# Patient Record
Sex: FEMALE | Race: WHITE | Employment: OTHER | URBAN - METROPOLITAN AREA
[De-identification: names, ages, dates, MRNs, and addresses within clinical notes are randomized per-mention and may not be internally consistent; named-entity substitution may affect disease eponyms.]

---

## 2018-04-11 LAB
ALBUMIN SERPL-MCNC: 4.4 G/DL
ALP BLD-CCNC: 90 U/L
ALT SERPL-CCNC: 10 U/L
ANION GAP SERPL CALCULATED.3IONS-SCNC: NORMAL MMOL/L
AST SERPL-CCNC: 14 U/L
BASOPHILS ABSOLUTE: 0 /ΜL
BASOPHILS RELATIVE PERCENT: 0 %
BILIRUB SERPL-MCNC: 0.4 MG/DL (ref 0.1–1.4)
BUN BLDV-MCNC: 8 MG/DL
CALCIUM SERPL-MCNC: 9.6 MG/DL
CHLORIDE BLD-SCNC: 100 MMOL/L
CHOLESTEROL, TOTAL: 200 MG/DL
CHOLESTEROL/HDL RATIO: NORMAL
CO2: 24 MMOL/L
CREAT SERPL-MCNC: 0.65 MG/DL
EOSINOPHILS ABSOLUTE: 0.3 /ΜL
EOSINOPHILS RELATIVE PERCENT: 3 %
GFR CALCULATED: 100
GLUCOSE BLD-MCNC: 118 MG/DL
HCT VFR BLD CALC: 46.3 % (ref 36–46)
HDLC SERPL-MCNC: 67 MG/DL (ref 35–70)
HEMOGLOBIN: 15.2 G/DL (ref 12–16)
LDL CHOLESTEROL CALCULATED: 114 MG/DL (ref 0–160)
LYMPHOCYTES ABSOLUTE: 2.6 /ΜL
LYMPHOCYTES RELATIVE PERCENT: 27 %
MCH RBC QN AUTO: 31.9 PG
MCHC RBC AUTO-ENTMCNC: 32.8 G/DL
MCV RBC AUTO: 97 FL
MONOCYTES ABSOLUTE: 0.6 /ΜL
MONOCYTES RELATIVE PERCENT: 6 %
NEUTROPHILS ABSOLUTE: 6.3 /ΜL
NEUTROPHILS RELATIVE PERCENT: 64 %
NONHDLC SERPL-MCNC: NORMAL MG/DL
PDW BLD-RTO: 14 %
PLATELET # BLD: 238 K/ΜL
PMV BLD AUTO: ABNORMAL FL
POTASSIUM SERPL-SCNC: 4.5 MMOL/L
RBC # BLD: 4.77 10^6/ΜL
SODIUM BLD-SCNC: 144 MMOL/L
TOTAL PROTEIN: 6.9
TRIGL SERPL-MCNC: 95 MG/DL
TSH SERPL DL<=0.05 MIU/L-ACNC: 6.07 UIU/ML
VLDLC SERPL CALC-MCNC: 19 MG/DL
WBC # BLD: 9.7 10^3/ML

## 2019-06-11 ENCOUNTER — APPOINTMENT (OUTPATIENT)
Dept: GENERAL RADIOLOGY | Age: 59
End: 2019-06-11
Payer: MEDICARE

## 2019-06-11 ENCOUNTER — APPOINTMENT (OUTPATIENT)
Dept: CT IMAGING | Age: 59
End: 2019-06-11
Payer: MEDICARE

## 2019-06-11 ENCOUNTER — HOSPITAL ENCOUNTER (EMERGENCY)
Age: 59
Discharge: ANOTHER HOSPITAL | End: 2019-06-11
Attending: EMERGENCY MEDICINE
Payer: MEDICARE

## 2019-06-11 VITALS
HEIGHT: 64 IN | WEIGHT: 100 LBS | SYSTOLIC BLOOD PRESSURE: 116 MMHG | BODY MASS INDEX: 17.07 KG/M2 | HEART RATE: 101 BPM | OXYGEN SATURATION: 96 % | TEMPERATURE: 97.2 F | DIASTOLIC BLOOD PRESSURE: 72 MMHG | RESPIRATION RATE: 21 BRPM

## 2019-06-11 DIAGNOSIS — S36.039A LACERATION OF SPLEEN, INITIAL ENCOUNTER: ICD-10-CM

## 2019-06-11 DIAGNOSIS — S36.899A TRAUMATIC HEMOPERITONEUM, INITIAL ENCOUNTER: Primary | ICD-10-CM
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
## 2019-06-11 DIAGNOSIS — R55 SYNCOPE AND COLLAPSE: ICD-10-CM

## 2019-06-11 LAB
A/G RATIO: 0.9 (ref 1.1–2.2)
ABO/RH: NORMAL
ACETAMINOPHEN LEVEL: <5 UG/ML (ref 10–30)
ALBUMIN SERPL-MCNC: 2.7 G/DL (ref 3.4–5)
ALP BLD-CCNC: 132 U/L (ref 40–129)
ALT SERPL-CCNC: 12 U/L (ref 10–40)
AMPHETAMINE SCREEN, URINE: POSITIVE
ANION GAP SERPL CALCULATED.3IONS-SCNC: 16 MMOL/L (ref 3–16)
ANTIBODY SCREEN: NORMAL
AST SERPL-CCNC: 16 U/L (ref 15–37)
BACTERIA: ABNORMAL /HPF
BANDED NEUTROPHILS RELATIVE PERCENT: 6 % (ref 0–7)
BARBITURATE SCREEN URINE: ABNORMAL
BASOPHILS ABSOLUTE: 0 K/UL (ref 0–0.2)
BASOPHILS RELATIVE PERCENT: 0 %
BENZODIAZEPINE SCREEN, URINE: ABNORMAL
BILIRUB SERPL-MCNC: 0.7 MG/DL (ref 0–1)
BILIRUBIN URINE: NEGATIVE
BLOOD, URINE: NEGATIVE
BUN BLDV-MCNC: 13 MG/DL (ref 7–20)
CALCIUM SERPL-MCNC: 8.9 MG/DL (ref 8.3–10.6)
CANNABINOID SCREEN URINE: POSITIVE
CHLORIDE BLD-SCNC: 91 MMOL/L (ref 99–110)
CLARITY: ABNORMAL
CO2: 22 MMOL/L (ref 21–32)
COCAINE METABOLITE SCREEN URINE: ABNORMAL
COLOR: YELLOW
CREAT SERPL-MCNC: 1.3 MG/DL (ref 0.6–1.1)
EOSINOPHILS ABSOLUTE: 0 K/UL (ref 0–0.6)
EOSINOPHILS RELATIVE PERCENT: 0 %
EPITHELIAL CELLS, UA: ABNORMAL /HPF
ETHANOL: NORMAL MG/DL (ref 0–0.08)
GFR AFRICAN AMERICAN: 51
GFR NON-AFRICAN AMERICAN: 42
GLOBULIN: 2.9 G/DL
GLUCOSE BLD-MCNC: 209 MG/DL (ref 70–99)
GLUCOSE URINE: NEGATIVE MG/DL
HCT VFR BLD CALC: 24.2 % (ref 36–48)
HEMOGLOBIN: 7.9 G/DL (ref 12–16)
INR BLD: 1.34 (ref 0.86–1.14)
KETONES, URINE: NEGATIVE MG/DL
LACTIC ACID, SEPSIS: 1.8 MMOL/L (ref 0.4–1.9)
LEUKOCYTE ESTERASE, URINE: NEGATIVE
LYMPHOCYTES ABSOLUTE: 1.5 K/UL (ref 1–5.1)
LYMPHOCYTES RELATIVE PERCENT: 5 %
Lab: ABNORMAL
MCH RBC QN AUTO: 30.9 PG (ref 26–34)
MCHC RBC AUTO-ENTMCNC: 32.6 G/DL (ref 31–36)
MCV RBC AUTO: 94.6 FL (ref 80–100)
METHADONE SCREEN, URINE: ABNORMAL
MICROSCOPIC EXAMINATION: YES
MONOCYTES ABSOLUTE: 0.3 K/UL (ref 0–1.3)
MONOCYTES RELATIVE PERCENT: 1 %
NEUTROPHILS ABSOLUTE: 28.8 K/UL (ref 1.7–7.7)
NEUTROPHILS RELATIVE PERCENT: 88 %
NITRITE, URINE: NEGATIVE
OCCULT BLOOD SCREENING: NORMAL
OPIATE SCREEN URINE: POSITIVE
OXYCODONE URINE: POSITIVE
PDW BLD-RTO: 14 % (ref 12.4–15.4)
PH UA: 5.5
PH UA: 5.5 (ref 5–8)
PHENCYCLIDINE SCREEN URINE: ABNORMAL
PLATELET # BLD: 359 K/UL (ref 135–450)
PLATELET SLIDE REVIEW: ADEQUATE
PMV BLD AUTO: 8.6 FL (ref 5–10.5)
POTASSIUM REFLEX MAGNESIUM: 4.5 MMOL/L (ref 3.5–5.1)
PROPOXYPHENE SCREEN: ABNORMAL
PROTEIN UA: 30 MG/DL
PROTHROMBIN TIME: 15.3 SEC (ref 9.8–13)
RBC # BLD: 2.55 M/UL (ref 4–5.2)
RBC UA: ABNORMAL /HPF (ref 0–2)
SALICYLATE, SERUM: 0.4 MG/DL (ref 15–30)
SLIDE REVIEW: ABNORMAL
SODIUM BLD-SCNC: 129 MMOL/L (ref 136–145)
SPECIFIC GRAVITY UA: 1.02 (ref 1–1.03)
SPECIMEN STATUS: NORMAL
TOTAL PROTEIN: 5.6 G/DL (ref 6.4–8.2)
TROPONIN: <0.01 NG/ML
URINE REFLEX TO CULTURE: ABNORMAL
URINE TYPE: ABNORMAL
UROBILINOGEN, URINE: 0.2 E.U./DL
WBC # BLD: 30.6 K/UL (ref 4–11)
WBC UA: ABNORMAL /HPF (ref 0–5)

## 2019-06-11 PROCEDURE — 71045 X-RAY EXAM CHEST 1 VIEW: CPT

## 2019-06-11 PROCEDURE — 87801 DETECT AGNT MULT DNA AMPLI: CPT

## 2019-06-11 PROCEDURE — 93005 ELECTROCARDIOGRAM TRACING: CPT | Performed by: EMERGENCY MEDICINE

## 2019-06-11 PROCEDURE — 96365 THER/PROPH/DIAG IV INF INIT: CPT

## 2019-06-11 PROCEDURE — 83605 ASSAY OF LACTIC ACID: CPT

## 2019-06-11 PROCEDURE — 74177 CT ABD & PELVIS W/CONTRAST: CPT

## 2019-06-11 PROCEDURE — 6360000004 HC RX CONTRAST MEDICATION: Performed by: EMERGENCY MEDICINE

## 2019-06-11 PROCEDURE — 2580000003 HC RX 258: Performed by: EMERGENCY MEDICINE

## 2019-06-11 PROCEDURE — 6360000002 HC RX W HCPCS: Performed by: EMERGENCY MEDICINE

## 2019-06-11 PROCEDURE — 86901 BLOOD TYPING SEROLOGIC RH(D): CPT

## 2019-06-11 PROCEDURE — 4500000026 HC ED CRITICAL CARE PROCEDURE

## 2019-06-11 PROCEDURE — G0480 DRUG TEST DEF 1-7 CLASSES: HCPCS

## 2019-06-11 PROCEDURE — 96361 HYDRATE IV INFUSION ADD-ON: CPT

## 2019-06-11 PROCEDURE — 99292 CRITICAL CARE ADDL 30 MIN: CPT

## 2019-06-11 PROCEDURE — 96375 TX/PRO/DX INJ NEW DRUG ADDON: CPT

## 2019-06-11 PROCEDURE — 85025 COMPLETE CBC W/AUTO DIFF WBC: CPT

## 2019-06-11 PROCEDURE — 87040 BLOOD CULTURE FOR BACTERIA: CPT

## 2019-06-11 PROCEDURE — 84443 ASSAY THYROID STIM HORMONE: CPT

## 2019-06-11 PROCEDURE — G0328 FECAL BLOOD SCRN IMMUNOASSAY: HCPCS

## 2019-06-11 PROCEDURE — 85610 PROTHROMBIN TIME: CPT

## 2019-06-11 PROCEDURE — 80053 COMPREHEN METABOLIC PANEL: CPT

## 2019-06-11 PROCEDURE — 80307 DRUG TEST PRSMV CHEM ANLYZR: CPT

## 2019-06-11 PROCEDURE — 96367 TX/PROPH/DG ADDL SEQ IV INF: CPT

## 2019-06-11 PROCEDURE — 81001 URINALYSIS AUTO W/SCOPE: CPT

## 2019-06-11 PROCEDURE — 70450 CT HEAD/BRAIN W/O DYE: CPT

## 2019-06-11 PROCEDURE — 86850 RBC ANTIBODY SCREEN: CPT

## 2019-06-11 PROCEDURE — 99291 CRITICAL CARE FIRST HOUR: CPT

## 2019-06-11 PROCEDURE — 86900 BLOOD TYPING SEROLOGIC ABO: CPT

## 2019-06-11 PROCEDURE — 84484 ASSAY OF TROPONIN QUANT: CPT

## 2019-06-11 PROCEDURE — 2580000003 HC RX 258

## 2019-06-11 PROCEDURE — 36415 COLL VENOUS BLD VENIPUNCTURE: CPT

## 2019-06-11 RX ORDER — 0.9 % SODIUM CHLORIDE 0.9 %
2000 INTRAVENOUS SOLUTION INTRAVENOUS ONCE
Status: COMPLETED | OUTPATIENT
Start: 2019-06-11 | End: 2019-06-11

## 2019-06-11 RX ORDER — SODIUM CHLORIDE, SODIUM LACTATE, POTASSIUM CHLORIDE, AND CALCIUM CHLORIDE .6; .31; .03; .02 G/100ML; G/100ML; G/100ML; G/100ML
2000 INJECTION, SOLUTION INTRAVENOUS ONCE
Status: COMPLETED | OUTPATIENT
Start: 2019-06-11 | End: 2019-06-11

## 2019-06-11 RX ORDER — ONDANSETRON 2 MG/ML
4 INJECTION INTRAMUSCULAR; INTRAVENOUS
Status: DISCONTINUED | OUTPATIENT
Start: 2019-06-11 | End: 2019-06-12 | Stop reason: HOSPADM

## 2019-06-11 RX ORDER — NALOXONE HYDROCHLORIDE 1 MG/ML
2 INJECTION INTRAMUSCULAR; INTRAVENOUS; SUBCUTANEOUS ONCE
Status: COMPLETED | OUTPATIENT
Start: 2019-06-11 | End: 2019-06-11

## 2019-06-11 RX ORDER — 0.9 % SODIUM CHLORIDE 0.9 %
250 INTRAVENOUS SOLUTION INTRAVENOUS ONCE
Status: DISCONTINUED | OUTPATIENT
Start: 2019-06-11 | End: 2019-06-12 | Stop reason: HOSPADM

## 2019-06-11 RX ORDER — SODIUM CHLORIDE 9 MG/ML
INJECTION, SOLUTION INTRAVENOUS
Status: COMPLETED
Start: 2019-06-11 | End: 2019-06-11

## 2019-06-11 RX ORDER — SODIUM CHLORIDE 9 MG/ML
INJECTION, SOLUTION INTRAVENOUS
Status: DISCONTINUED
Start: 2019-06-11 | End: 2019-06-12 | Stop reason: HOSPADM

## 2019-06-11 RX ADMIN — SODIUM CHLORIDE, POTASSIUM CHLORIDE, SODIUM LACTATE AND CALCIUM CHLORIDE 2000 ML: 600; 310; 30; 20 INJECTION, SOLUTION INTRAVENOUS at 22:35

## 2019-06-11 RX ADMIN — SODIUM CHLORIDE 2000 ML: 9 INJECTION, SOLUTION INTRAVENOUS at 18:21

## 2019-06-11 RX ADMIN — PIPERACILLIN AND TAZOBACTAM 3.38 G: 3; .375 INJECTION, POWDER, FOR SOLUTION INTRAVENOUS at 22:00

## 2019-06-11 RX ADMIN — VANCOMYCIN HYDROCHLORIDE 750 MG: 750 INJECTION, POWDER, LYOPHILIZED, FOR SOLUTION INTRAVENOUS at 22:35

## 2019-06-11 RX ADMIN — Medication 2000 ML: at 18:21

## 2019-06-11 RX ADMIN — NALOXONE HYDROCHLORIDE 2 MG: 1 INJECTION PARENTERAL at 18:15

## 2019-06-11 RX ADMIN — IOPAMIDOL 75 ML: 755 INJECTION, SOLUTION INTRAVENOUS at 21:34

## 2019-06-11 NOTE — ED NOTES
Bed: 02  Expected date:   Expected time:   Means of arrival:   Comments:     Marlene Frias RN  06/11/19 4020

## 2019-06-12 LAB
EKG ATRIAL RATE: 109 BPM
EKG DIAGNOSIS: NORMAL
EKG P AXIS: 56 DEGREES
EKG P-R INTERVAL: 126 MS
EKG Q-T INTERVAL: 326 MS
EKG QRS DURATION: 74 MS
EKG QTC CALCULATION (BAZETT): 439 MS
EKG R AXIS: 80 DEGREES
EKG T AXIS: 71 DEGREES
EKG VENTRICULAR RATE: 109 BPM
TSH SERPL DL<=0.05 MIU/L-ACNC: 0.35 UIU/ML (ref 0.27–4.2)

## 2019-06-12 PROCEDURE — 93010 ELECTROCARDIOGRAM REPORT: CPT | Performed by: INTERNAL MEDICINE

## 2019-06-12 NOTE — CONSULTS
Pharmacy to dose Vancomycin for ER    Weight: 45.4 kg  15 mg/kg X1 rounded up to 750 mg IV X1 dose.     Lashanda Lau, [address] [facility]

## 2019-06-12 NOTE — ED NOTES
Patient given narcan upon arrival.  Patient is now A&O. Patient was thrashing around the bed, and would not sit still. Patient was not following commands. Patient is now more pleasant. VSS. Patient has no complaints at this time.        Viola Dhillon RN  06/11/19 3194

## 2019-06-12 NOTE — ED PROVIDER NOTES
Emergency Physician Note    Chief Complaint  Fall (fall; complaining of chest pains, abdominal pains; low blood pressure and low oxygen)       History of Present Illness  PAIGE Burns is a 63 y.o. female who presents to the ED for fall. EMS reports that they were called for a fall. They report that patient had very low blood pressure and they could not obtain IV access so they placed a left humeral IO. They also report the EKG showed ST elevation for them. Patient complains of chest pain and abdominal pain. History and review of systems limited by patient's mental status    I have reviewed the following from the nursing documentation:      Prior to Admission medications    Not on File       Allergies as of 06/11/2019    (No Known Allergies)       No past medical history on file. Surgical History: No past surgical history on file. Family History:  No family history on file.     Social History     Socioeconomic History    Marital status:      Spouse name: Not on file    Number of children: Not on file    Years of education: Not on file    Highest education level: Not on file   Occupational History    Not on file   Social Needs    Financial resource strain: Not on file    Food insecurity:     Worry: Not on file     Inability: Not on file    Transportation needs:     Medical: Not on file     Non-medical: Not on file   Tobacco Use    Smoking status: Not on file   Substance and Sexual Activity    Alcohol use: Not on file    Drug use: Not on file    Sexual activity: Not on file   Lifestyle    Physical activity:     Days per week: Not on file     Minutes per session: Not on file    Stress: Not on file   Relationships    Social connections:     Talks on phone: Not on file     Gets together: Not on file     Attends [employer] service: Not on file     Active member of club or organization: Not on file     Attends meetings of clubs or organizations: Not on file     Relationship status: Not on file    Intimate partner violence:     Fear of current or ex partner: Not on file     Emotionally abused: Not on file     Physically abused: Not on file     Forced sexual activity: Not on file   Other Topics Concern    Not on file   Social History Narrative    Not on file       Nursing notes reviewed. ED Triage Vitals   Enc Vitals Group      BP 06/11/19 1814 84/61      Pulse 06/11/19 1845 111      Resp 06/11/19 1845 (!) 32      Temp 06/11/19 2058 97.2 °F (36.2 °C)      Temp src --       SpO2 06/11/19 1814 90 %      Weight 06/11/19 1816 100 lb (45.4 kg)      Height 06/11/19 1816 5' 4\" (1.626 m)      Head Circumference --       Peak Flow --       Pain Score --       Pain Loc --       Pain Edu? --       Excl. in [address]? --        GENERAL: Drowsy and barely arousable. Well developed, thin, mild respiratory distress. HENT:  Normocephalic, Atraumatic, moist mucous membranes. EYES:  Pupils equal round and reactive to light, Conjunctiva pale, extraocular movements normal.  NECK:  No meningeal signs, Supple. CHEST: Tachycardic and regular  LUNGS:  Clear to auscultation bilaterally. ABDOMEN:  Soft, mild diffuse tenderness, no rebound, rigidity or guarding, non-distended, normal bowel sounds. No costovertebral angle tenderness to palpation. EXTREMITIES:  Normal range of motion, no edema, no tenderness, no deformity, distal pulses present. BACK:  No tenderness. SKIN: Warm, dry and intact. NEUROLOGIC: Responds to pain and gives some disoriented 1 and 2 word answers to questions. LABS and DIAGNOSTIC RESULTS  EKG  The Ekg interpreted by me shows  sinus tachycardia, qylr=141   Axis is   Normal  QTc is  normal  Intervals and Durations are unremarkable. ST Segments: normal  Delta waves, Brugada Syndrome, and Short CO are not present. No prior EKG available for comparison. RADIOLOGY  X-RAYS:  I have reviewed radiologic plain film image(s).   ALL OTHER NON-PLAIN FILM IMAGES SUCH AS CT, Performed at:  [address],  [place] [facility] [employer]   Phone [phone]   BLOOD OCCULT STOOL SCREEN #1    Narrative:     ORDER#: [id number]                          ORDERED BY: ASIA CASPER  SOURCE: Stool                              COLLECTED:  06/11/19 22:10  ANTIBIOTICS AT [place].:                      RECEIVED :  06/11/19 22:14  Performed at:  [address],  [place] [facility] [employer] [facility]   Phone [phone]   TSH WITHOUT REFLEX   LACTIC ACID, PLASMA   LACTATE, SEPSIS   MICROSCOPIC URINALYSIS   TYPE AND SCREEN    Narrative:     Performed at:  [address],  [place] [facility] [employer] [facility]   Phone [phone]       PROCEDURES  CENTRAL VENOUS CATHETER PLACEMENT  Indication: vascular access, poor peripheral access, hypovolemia and need for frequent blood draws  The femoral vein was chosen because low risk for procedural complication given patient's inability to be fully compliant. The area was prepped and draped in a sterile fashion and the catheter was placed using Seldinger technique under direct ultrasound guidance. Ultrasound images saved on the machine. All the ports godwin and flushed easily and the line was sutured in place and a sterile dressing was placed over the line. The patient tolerated the procedure well with minimal blood loss. There were no apparent complications during the procedure. The patient tolerated procedure well. MEDICAL DECISION MAKING     The total Critical Care time is 75 minutes which excludes separately billable procedures. The critical care was concerning for treatment of intra-abdominal hemorrhage with crystalloid. .  This time is exclusive of any time documented by any other providers.     While in the emergency department the patient's mentation improved and she was able to give history that she had fallen from a standing position while walking from her bedroom to her bathroom. She believes she lost consciousness. She states that she called for her family members who called 911. Although the patient's been in the ER for 4 hours no family members have presented to the emergency department. Patient is now alert and oriented x3. While in the emergency department the patient responded to IV fluids and her blood pressure normalized and remained slightly tachycardic with heart rate of 105-110. I spoke with Dr. Beto Saucedo. We thoroughly discussed the history, physical exam, laboratory and imaging studies, as well as, emergency department course. Based upon that discussion, we've decided to admit Robson Savers for further observation and evaluation of PAIGE Burns's mental status change. As I have deemed necessary from their history, physical and studies, I have considered and evaluated PAIGE Burns for the following diagnoses:  DIABETES, INTRACRANIAL HEMORRHAGE, MENINGITIS, SEPSIS SUBARACHNOID HEMORRHAGE, SUBDURAL HEMATOMA, & STROKE. FINAL IMPRESSION  1. Traumatic hemoperitoneum, initial encounter    2. Laceration of spleen, initial encounter    3. Fall from slip, trip, or stumble, initial encounter    4. Syncope and collapse        Vitals:  Blood pressure 103/71, pulse 107, temperature 97.2 °F (36.2 °C), resp. rate 26, height 5' 4\" (1.626 m), weight 100 lb (45.4 kg), SpO2 96 %. Patient was given scripts for the following medications. I counseled patient how to take these medications. New Prescriptions    No medications on file       Disposition  Pt is in serious condition upon Transfer to [facility] to ED. This chart was generated using the [address] [place] [address] dictation system. I created this record but it may contain dictation errors.          Ana Bryant MD  06/11/19 8057

## 2019-06-12 NOTE — PROGRESS NOTES
2227 spoke to Dr Esperanza Hannah at [facility]   Patient going by [facility] to [facility] ED

## 2019-06-15 LAB — REPORT: NORMAL

## 2019-06-15 NOTE — ED NOTES
Report of positive blood cultures called to Trista Carvalho rn at [facility].      Fantasma Prince RN  06/15/19 2290

## 2019-06-16 LAB — CULTURE, BLOOD 2: NORMAL

## 2019-06-17 LAB
BLOOD CULTURE, ROUTINE: ABNORMAL
BLOOD CULTURE, ROUTINE: ABNORMAL
ORGANISM: ABNORMAL

## 2019-07-23 ENCOUNTER — HOSPITAL ENCOUNTER (EMERGENCY)
Age: 59
Discharge: HOME | End: 2019-07-23
Attending: EMERGENCY MEDICINE
Payer: MEDICARE

## 2019-07-23 ENCOUNTER — APPOINTMENT (OUTPATIENT)
Dept: CT IMAGING | Age: 59
End: 2019-07-23
Payer: MEDICARE

## 2019-07-23 VITALS
BODY MASS INDEX: 17.36 KG/M2 | SYSTOLIC BLOOD PRESSURE: 149 MMHG | WEIGHT: 98 LBS | OXYGEN SATURATION: 97 % | HEART RATE: 103 BPM | RESPIRATION RATE: 16 BRPM | HEIGHT: 63 IN | DIASTOLIC BLOOD PRESSURE: 86 MMHG | TEMPERATURE: 98.1 F

## 2019-07-23 DIAGNOSIS — D75.838 THROMBOCYTOSIS AFTER SPLENECTOMY: ICD-10-CM

## 2019-07-23 DIAGNOSIS — Z90.81 THROMBOCYTOSIS AFTER SPLENECTOMY: ICD-10-CM

## 2019-07-23 DIAGNOSIS — D72.829 LEUKOCYTOSIS, UNSPECIFIED TYPE: Primary | ICD-10-CM

## 2019-07-23 DIAGNOSIS — Z90.81 HISTORY OF SPLENECTOMY: ICD-10-CM

## 2019-07-23 LAB
A/G RATIO: 1.6 (ref 1.1–2.2)
ALBUMIN SERPL-MCNC: 4.8 G/DL (ref 3.4–5)
ALP BLD-CCNC: 118 U/L (ref 40–129)
ALT SERPL-CCNC: 15 U/L (ref 10–40)
ANION GAP SERPL CALCULATED.3IONS-SCNC: 12 MMOL/L (ref 3–16)
ANISOCYTOSIS: ABNORMAL
AST SERPL-CCNC: 22 U/L (ref 15–37)
BASOPHILS ABSOLUTE: 0.2 K/UL (ref 0–0.2)
BASOPHILS RELATIVE PERCENT: 1.2 %
BILIRUB SERPL-MCNC: 0.3 MG/DL (ref 0–1)
BUN BLDV-MCNC: 8 MG/DL (ref 7–20)
CALCIUM SERPL-MCNC: 10.6 MG/DL (ref 8.3–10.6)
CHLORIDE BLD-SCNC: 98 MMOL/L (ref 99–110)
CO2: 28 MMOL/L (ref 21–32)
CREAT SERPL-MCNC: 0.6 MG/DL (ref 0.6–1.1)
EOSINOPHILS ABSOLUTE: 0.2 K/UL (ref 0–0.6)
EOSINOPHILS RELATIVE PERCENT: 1.5 %
GFR AFRICAN AMERICAN: >60
GFR NON-AFRICAN AMERICAN: >60
GLOBULIN: 3 G/DL
GLUCOSE BLD-MCNC: 101 MG/DL (ref 70–99)
HCT VFR BLD CALC: 40.3 % (ref 36–48)
HEMOGLOBIN: 13.4 G/DL (ref 12–16)
LYMPHOCYTES ABSOLUTE: 3.7 K/UL (ref 1–5.1)
LYMPHOCYTES RELATIVE PERCENT: 27.2 %
MCH RBC QN AUTO: 29.1 PG (ref 26–34)
MCHC RBC AUTO-ENTMCNC: 33.2 G/DL (ref 31–36)
MCV RBC AUTO: 87.6 FL (ref 80–100)
MONOCYTES ABSOLUTE: 1.1 K/UL (ref 0–1.3)
MONOCYTES RELATIVE PERCENT: 7.9 %
NEUTROPHILS ABSOLUTE: 8.4 K/UL (ref 1.7–7.7)
NEUTROPHILS RELATIVE PERCENT: 62.2 %
PDW BLD-RTO: 23.2 % (ref 12.4–15.4)
PLATELET # BLD: 577 K/UL (ref 135–450)
PLATELET SLIDE REVIEW: ABNORMAL
PMV BLD AUTO: 7.5 FL (ref 5–10.5)
POTASSIUM REFLEX MAGNESIUM: 4.3 MMOL/L (ref 3.5–5.1)
RBC # BLD: 4.59 M/UL (ref 4–5.2)
SLIDE REVIEW: ABNORMAL
SODIUM BLD-SCNC: 138 MMOL/L (ref 136–145)
TOTAL PROTEIN: 7.8 G/DL (ref 6.4–8.2)
WBC # BLD: 13.5 K/UL (ref 4–11)

## 2019-07-23 PROCEDURE — 85025 COMPLETE CBC W/AUTO DIFF WBC: CPT

## 2019-07-23 PROCEDURE — 70450 CT HEAD/BRAIN W/O DYE: CPT

## 2019-07-23 PROCEDURE — 80053 COMPREHEN METABOLIC PANEL: CPT

## 2019-07-23 PROCEDURE — 99284 EMERGENCY DEPT VISIT MOD MDM: CPT

## 2019-07-23 PROCEDURE — 36415 COLL VENOUS BLD VENIPUNCTURE: CPT

## 2019-07-23 RX ORDER — HYDROMORPHONE HYDROCHLORIDE 2 MG/1
2 TABLET ORAL 2 TIMES DAILY
COMMUNITY
End: 2019-08-27

## 2019-07-23 RX ORDER — LEVOTHYROXINE SODIUM 0.05 MG/1
50 TABLET ORAL DAILY
COMMUNITY

## 2019-07-23 RX ORDER — GABAPENTIN 300 MG/1
300 CAPSULE ORAL 2 TIMES DAILY
COMMUNITY
End: 2020-11-05 | Stop reason: SDUPTHER

## 2019-07-23 RX ORDER — ONDANSETRON 8 MG/1
8 TABLET, ORALLY DISINTEGRATING ORAL EVERY 8 HOURS PRN
COMMUNITY

## 2019-07-23 RX ORDER — MIDODRINE HYDROCHLORIDE 5 MG/1
5 TABLET ORAL 3 TIMES DAILY
COMMUNITY
End: 2019-08-27

## 2019-07-23 RX ORDER — DULOXETIN HYDROCHLORIDE 60 MG/1
30 CAPSULE, DELAYED RELEASE ORAL 2 TIMES DAILY
COMMUNITY
End: 2019-09-24

## 2019-07-23 RX ORDER — PANTOPRAZOLE SODIUM 40 MG/1
40 GRANULE, DELAYED RELEASE ORAL
COMMUNITY
End: 2019-11-11 | Stop reason: SDUPTHER

## 2019-07-23 SDOH — HEALTH STABILITY: MENTAL HEALTH: HOW OFTEN DO YOU HAVE A DRINK CONTAINING ALCOHOL?: NEVER

## 2019-07-23 ASSESSMENT — PAIN DESCRIPTION - PAIN TYPE: TYPE: CHRONIC PAIN

## 2019-07-23 ASSESSMENT — ENCOUNTER SYMPTOMS
NAUSEA: 0
VOMITING: 0
COUGH: 0
SHORTNESS OF BREATH: 0

## 2019-07-23 ASSESSMENT — PAIN DESCRIPTION - LOCATION: LOCATION: BACK

## 2019-07-23 ASSESSMENT — PAIN DESCRIPTION - ONSET: ONSET: ON-GOING

## 2019-07-23 ASSESSMENT — PAIN DESCRIPTION - DESCRIPTORS: DESCRIPTORS: ACHING;CONSTANT

## 2019-07-23 NOTE — ED PROVIDER NOTES
[address] ED  eMERGENCY dEPARTMENT eNCOUnter        Pt Name: Nic Mackenzie  MRN: [id number]  [place] 1960  Date of evaluation: 7/23/2019  Provider: Carmen Wheeler PA-C  PCP: Anila Gardiner DO  ED Attending: Placido Diaz MD    [facility]       Chief Complaint   Patient presents with    Post-op Problem     Pt was told to come in here by her PCP due to an elevated WBC. Pt states that she had her spleen removed on 6.11.19 at [facility]. Pt was never started on antibiotics post-op. HISTORY OF PRESENT ILLNESS   (Location/Symptom, Timing/Onset, Context/Setting, Quality, Duration, Modifying Factors, Severity)  Note limiting factors. Nic Mackenzie is a 63 y.o. female for evaluation after being advised by PCP to come to the emergency room she states that she had a splenectomy a month ago and her PCP advised her her white blood cell count was double the high end of normal.  And she should be put on prophylactic antibiotics for a year. Patient denies having any symptoms at this time no signs of infection no fevers no increase in coughing or sputum production. No dysuria. No shortness of breath. Patient denies any chest pain. Patient indicates that she is having some residual intermittent nerve pain across her abdomen where the spleen was removed and at her incision site. She indicates she has had intermittent left upper and lower extremity paresthesias over the past month. Nursing Notes were all reviewed and agreed with or any disagreements were addressed  in the HPI. REVIEW OF SYSTEMS  (2-9 systems for level 4, 10 or more for level 5)     Review of Systems   Constitutional: Negative for chills and fever. HENT: Negative. Respiratory: Negative for cough, chest tightness and shortness of breath. Cardiovascular: Negative for chest pain and palpitations. Gastrointestinal: Positive for abdominal pain.  Negative for constipation, diarrhea, nausea, rectal pain and vomiting. Genitourinary: Negative for difficulty urinating, dysuria and frequency. Musculoskeletal: Positive for back pain. Chronic   Skin: Positive for wound. Neurological: Positive for weakness. All other systems reviewed and are negative. Positivesand Pertinent negatives as per HPI. Except as noted above in the ROS, all other systems were reviewed and negative. PAST MEDICAL HISTORY     Past Medical History:   Diagnosis Date    Back pain     [facility]     Thyroid disease     Ulcerative colitis, unspecified, without complications ([facility]          SURGICAL HISTORY       Past Surgical History:   Procedure Laterality Date    BACK SURGERY      HYSTERECTOMY      SPLENECTOMY      TONSILLECTOMY           CURRENT MEDICATIONS       Discharge Medication List as of 7/23/2019  5:57 PM      CONTINUE these medications which have NOT CHANGED    Details   fluticasone-salmeterol (ADVAIR) 250-50 MCG/DOSE AEPB Inhale 1 puff into the lungs every 12 hoursHistorical Med      DULoxetine (CYMBALTA) 60 MG extended release capsule Take 60 mg by mouth dailyHistorical Med      pantoprazole sodium (PROTONIX) 40 MG PACK packet Take 40 mg by mouth every morning (before breakfast)Historical Med      HYDROmorphone (DILAUDID) 2 MG tablet Take 2 mg by mouth 2 times daily. Historical Med      progesterone (PROMETRIUM) 100 MG capsule Take 100 mg by mouth dailyHistorical Med      gabapentin (NEURONTIN) 300 MG capsule Take 300 mg by mouth daily. Historical Med      levothyroxine (SYNTHROID) 50 MCG tablet Take 50 mcg by mouth DailyHistorical Med      ondansetron (ZOFRAN-ODT) 8 MG TBDP disintegrating tablet Place 8 mg under the tongue every 8 hours as needed for Nausea or VomitingHistorical Med      midodrine (PROAMATINE) 5 MG tablet Take 5 mg by mouth 3 times dailyHistorical Med               ALLERGIES     Patient has no known allergies. FAMILY HISTORY     History reviewed.  No

## 2019-07-24 ASSESSMENT — ENCOUNTER SYMPTOMS
CHEST TIGHTNESS: 0
BACK PAIN: 1
DIARRHEA: 0
CONSTIPATION: 0
ABDOMINAL PAIN: 1
RECTAL PAIN: 0

## 2019-07-26 NOTE — ED PROVIDER NOTES
I independently performed a history and physical on PAIGE Burns. All diagnostic, treatment, and disposition decisions were made by myself in conjunction with the advanced practice provider. Briefly, this is a 63 y.o. female here for elevated white blood cell count. The patient had her spleen removed, and was told by her doctor that she needs to be on antibiotics. On exam, patient has a benign physical examination, and we did speak with the patient's surgeon, who states that there is no need for the patient be on antibiotics which is exactly what we had thought was the case. For further details of [facility] emergency department encounter, please see documentation by advanced practice provider Lynn Vazquez.         Brigido Granados MD  07/26/19 0785

## 2019-08-12 ENCOUNTER — HOSPITAL ENCOUNTER (OUTPATIENT)
Dept: MRI IMAGING | Age: 59
Discharge: HOME | End: 2019-08-12
Payer: MEDICARE

## 2019-08-12 DIAGNOSIS — E27.8 ADRENAL NODULE (HCC): ICD-10-CM

## 2019-08-12 PROCEDURE — A9579 GAD-BASE MR CONTRAST NOS,1ML: HCPCS

## 2019-08-12 PROCEDURE — 74183 MRI ABD W/O CNTR FLWD CNTR: CPT

## 2019-08-12 PROCEDURE — 6360000004 HC RX CONTRAST MEDICATION

## 2019-08-12 RX ADMIN — GADOTERIDOL 9 ML: 279.3 INJECTION, SOLUTION INTRAVENOUS at 15:23

## 2019-08-27 ENCOUNTER — HOSPITAL ENCOUNTER (EMERGENCY)
Age: 59
Discharge: HOME | End: 2019-08-27
Attending: EMERGENCY MEDICINE
Payer: MEDICARE

## 2019-08-27 ENCOUNTER — APPOINTMENT (OUTPATIENT)
Dept: CT IMAGING | Age: 59
End: 2019-08-27
Payer: MEDICARE

## 2019-08-27 VITALS
HEIGHT: 63 IN | WEIGHT: 100 LBS | HEART RATE: 90 BPM | RESPIRATION RATE: 16 BRPM | BODY MASS INDEX: 17.72 KG/M2 | SYSTOLIC BLOOD PRESSURE: 127 MMHG | TEMPERATURE: 98.7 F | OXYGEN SATURATION: 95 % | DIASTOLIC BLOOD PRESSURE: 78 MMHG

## 2019-08-27 DIAGNOSIS — R03.0 ELEVATED BLOOD PRESSURE READING: ICD-10-CM

## 2019-08-27 DIAGNOSIS — R10.31 ABDOMINAL PAIN, RIGHT LOWER QUADRANT: ICD-10-CM

## 2019-08-27 DIAGNOSIS — N39.0 URINARY TRACT INFECTION WITHOUT HEMATURIA, SITE UNSPECIFIED: Primary | ICD-10-CM

## 2019-08-27 LAB
A/G RATIO: 1.9 (ref 1.1–2.2)
ALBUMIN SERPL-MCNC: 4.8 G/DL (ref 3.4–5)
ALP BLD-CCNC: 129 U/L (ref 40–129)
ALT SERPL-CCNC: 21 U/L (ref 10–40)
ANION GAP SERPL CALCULATED.3IONS-SCNC: 10 MMOL/L (ref 3–16)
ANISOCYTOSIS: ABNORMAL
AST SERPL-CCNC: 17 U/L (ref 15–37)
ATYPICAL LYMPHOCYTE RELATIVE PERCENT: 3 % (ref 0–6)
BACTERIA: ABNORMAL /HPF
BASOPHILS ABSOLUTE: 0.2 K/UL (ref 0–0.2)
BASOPHILS RELATIVE PERCENT: 1 %
BILIRUB SERPL-MCNC: 0.3 MG/DL (ref 0–1)
BILIRUBIN URINE: NEGATIVE
BLOOD, URINE: NEGATIVE
BUN BLDV-MCNC: 7 MG/DL (ref 7–20)
BURR CELLS: ABNORMAL
CALCIUM SERPL-MCNC: 10.6 MG/DL (ref 8.3–10.6)
CHLORIDE BLD-SCNC: 100 MMOL/L (ref 99–110)
CLARITY: CLEAR
CO2: 25 MMOL/L (ref 21–32)
COLOR: ABNORMAL
CREAT SERPL-MCNC: 0.6 MG/DL (ref 0.6–1.1)
EOSINOPHILS ABSOLUTE: 0.5 K/UL (ref 0–0.6)
EOSINOPHILS RELATIVE PERCENT: 3 %
EPITHELIAL CELLS, UA: ABNORMAL /HPF
GFR AFRICAN AMERICAN: >60
GFR NON-AFRICAN AMERICAN: >60
GLOBULIN: 2.5 G/DL
GLUCOSE BLD-MCNC: 116 MG/DL (ref 70–99)
GLUCOSE URINE: NEGATIVE MG/DL
HCT VFR BLD CALC: 40.8 % (ref 36–48)
HEMATOLOGY PATH CONSULT: YES
HEMOGLOBIN: 13.6 G/DL (ref 12–16)
KETONES, URINE: NEGATIVE MG/DL
LEUKOCYTE ESTERASE, URINE: ABNORMAL
LYMPHOCYTES ABSOLUTE: 8.9 K/UL (ref 1–5.1)
LYMPHOCYTES RELATIVE PERCENT: 55 %
MCH RBC QN AUTO: 29.6 PG (ref 26–34)
MCHC RBC AUTO-ENTMCNC: 33.3 G/DL (ref 31–36)
MCV RBC AUTO: 88.6 FL (ref 80–100)
MICROSCOPIC EXAMINATION: YES
MONOCYTES ABSOLUTE: 0.9 K/UL (ref 0–1.3)
MONOCYTES RELATIVE PERCENT: 6 %
NEUTROPHILS ABSOLUTE: 4.9 K/UL (ref 1.7–7.7)
NEUTROPHILS RELATIVE PERCENT: 32 %
NITRITE, URINE: NEGATIVE
PDW BLD-RTO: 19.5 % (ref 12.4–15.4)
PH UA: 6.5 (ref 5–8)
PLATELET # BLD: 543 K/UL (ref 135–450)
PLATELET SLIDE REVIEW: ABNORMAL
PMV BLD AUTO: 7.6 FL (ref 5–10.5)
POIKILOCYTES: ABNORMAL
POTASSIUM REFLEX MAGNESIUM: 3.9 MMOL/L (ref 3.5–5.1)
PROTEIN UA: NEGATIVE MG/DL
RBC # BLD: 4.6 M/UL (ref 4–5.2)
RBC UA: ABNORMAL /HPF (ref 0–2)
SLIDE REVIEW: ABNORMAL
SODIUM BLD-SCNC: 135 MMOL/L (ref 136–145)
SPECIFIC GRAVITY UA: 1.01 (ref 1–1.03)
TARGET CELLS: ABNORMAL
TOTAL PROTEIN: 7.3 G/DL (ref 6.4–8.2)
URINE REFLEX TO CULTURE: YES
URINE TYPE: ABNORMAL
UROBILINOGEN, URINE: 0.2 E.U./DL
WBC # BLD: 15.3 K/UL (ref 4–11)
WBC UA: ABNORMAL /HPF (ref 0–5)

## 2019-08-27 PROCEDURE — 96375 TX/PRO/DX INJ NEW DRUG ADDON: CPT

## 2019-08-27 PROCEDURE — 80053 COMPREHEN METABOLIC PANEL: CPT

## 2019-08-27 PROCEDURE — 96361 HYDRATE IV INFUSION ADD-ON: CPT

## 2019-08-27 PROCEDURE — 74177 CT ABD & PELVIS W/CONTRAST: CPT

## 2019-08-27 PROCEDURE — 85025 COMPLETE CBC W/AUTO DIFF WBC: CPT

## 2019-08-27 PROCEDURE — 6360000002 HC RX W HCPCS: Performed by: EMERGENCY MEDICINE

## 2019-08-27 PROCEDURE — 99284 EMERGENCY DEPT VISIT MOD MDM: CPT

## 2019-08-27 PROCEDURE — 96365 THER/PROPH/DIAG IV INF INIT: CPT

## 2019-08-27 PROCEDURE — 6360000004 HC RX CONTRAST MEDICATION: Performed by: EMERGENCY MEDICINE

## 2019-08-27 PROCEDURE — 87086 URINE CULTURE/COLONY COUNT: CPT

## 2019-08-27 PROCEDURE — 81001 URINALYSIS AUTO W/SCOPE: CPT

## 2019-08-27 PROCEDURE — 2580000003 HC RX 258: Performed by: EMERGENCY MEDICINE

## 2019-08-27 RX ORDER — 0.9 % SODIUM CHLORIDE 0.9 %
1000 INTRAVENOUS SOLUTION INTRAVENOUS ONCE
Status: COMPLETED | OUTPATIENT
Start: 2019-08-27 | End: 2019-08-27

## 2019-08-27 RX ORDER — SULFAMETHOXAZOLE AND TRIMETHOPRIM 800; 160 MG/1; MG/1
1 TABLET ORAL 2 TIMES DAILY
Qty: 14 TABLET | Refills: 0 | Status: SHIPPED | OUTPATIENT
Start: 2019-08-27 | End: 2019-09-03

## 2019-08-27 RX ORDER — ONDANSETRON 2 MG/ML
4 INJECTION INTRAMUSCULAR; INTRAVENOUS
Status: DISCONTINUED | OUTPATIENT
Start: 2019-08-27 | End: 2019-08-27 | Stop reason: HOSPADM

## 2019-08-27 RX ORDER — PHENAZOPYRIDINE HYDROCHLORIDE 100 MG/1
100 TABLET, FILM COATED ORAL 3 TIMES DAILY PRN
Qty: 10 TABLET | Refills: 0 | Status: SHIPPED | OUTPATIENT
Start: 2019-08-27 | End: 2019-09-12 | Stop reason: ALTCHOICE

## 2019-08-27 RX ORDER — KETOROLAC TROMETHAMINE 30 MG/ML
30 INJECTION, SOLUTION INTRAMUSCULAR; INTRAVENOUS ONCE
Status: COMPLETED | OUTPATIENT
Start: 2019-08-27 | End: 2019-08-27

## 2019-08-27 RX ADMIN — KETOROLAC TROMETHAMINE 30 MG: 30 INJECTION, SOLUTION INTRAMUSCULAR at 20:36

## 2019-08-27 RX ADMIN — IOPAMIDOL 75 ML: 755 INJECTION, SOLUTION INTRAVENOUS at 19:22

## 2019-08-27 RX ADMIN — HYDROMORPHONE HYDROCHLORIDE 0.5 MG: 1 INJECTION, SOLUTION INTRAMUSCULAR; INTRAVENOUS; SUBCUTANEOUS at 19:06

## 2019-08-27 RX ADMIN — SODIUM CHLORIDE 1000 ML: 9 INJECTION, SOLUTION INTRAVENOUS at 19:04

## 2019-08-27 RX ADMIN — ONDANSETRON 4 MG: 2 INJECTION INTRAMUSCULAR; INTRAVENOUS at 19:05

## 2019-08-27 RX ADMIN — CEFTRIAXONE SODIUM 1 G: 1 INJECTION, POWDER, FOR SOLUTION INTRAMUSCULAR; INTRAVENOUS at 20:36

## 2019-08-27 ASSESSMENT — PAIN DESCRIPTION - ONSET: ONSET: GRADUAL

## 2019-08-27 ASSESSMENT — PAIN DESCRIPTION - FREQUENCY: FREQUENCY: CONTINUOUS

## 2019-08-27 ASSESSMENT — PAIN DESCRIPTION - PROGRESSION: CLINICAL_PROGRESSION: GRADUALLY WORSENING

## 2019-08-27 ASSESSMENT — PAIN SCALES - GENERAL
PAINLEVEL_OUTOF10: 6

## 2019-08-27 ASSESSMENT — PAIN DESCRIPTION - DESCRIPTORS: DESCRIPTORS: STABBING

## 2019-08-27 ASSESSMENT — PAIN DESCRIPTION - LOCATION: LOCATION: ABDOMEN

## 2019-08-27 ASSESSMENT — PAIN DESCRIPTION - ORIENTATION: ORIENTATION: RIGHT;LOWER

## 2019-08-27 ASSESSMENT — PAIN DESCRIPTION - PAIN TYPE: TYPE: ACUTE PAIN

## 2019-08-27 NOTE — ED PROVIDER NOTES
[address] ED      CHIEF COMPLAINT  Abdominal Pain (pt arrives to room 14 c/o rlq onset sunday, pt states had a fever at home Sunday that was 100.2,  pt states has prn atb at home due to not having spleen pt states has had 3 doses since, pt states saw pcp today and was told to come to ED, pt states having nausea, pt states taking zofran without relief, pt states last bm a few hours ago, pt denies any urinary complaints)       HISTORY OF PRESENT ILLNESS  Girish Livingston is a 63 y.o. female  who presents to the ED complaining of right lower quadrant abdominal pain. Symptoms started on Sunday and have been persistent. She states that sharp pain is made worse whenever she tries to eat or drink anything. She states that her bowel movements been \"different\" have been lower caliber and more of thin slivers but no definite blood or melena. She has been very nauseated but no vomiting. She has been taking Zofran without any relief. She states that she saw her PCP and was advised to come here for further evaluation. She had a fever on Sunday but states that she has not had any persistent fevers. She is asplenic and is supposed to be on prophylactic antibiotic's but does not like to take them but did start taking them on Sunday. She denies any dysuria or hematuria. She states that she has a history of diverticulitis and ulcerative colitis. No other complaints, modifying factors or associated symptoms. I have reviewed the following from the nursing documentation. Past Medical History:   Diagnosis Date    Back pain     COPD (chronic obstructive pulmonary disease) (HCC)     Thyroid disease     Ulcerative colitis, unspecified, without complications ([facility]      Past Surgical History:   Procedure Laterality Date    BACK SURGERY      HYSTERECTOMY      SPLENECTOMY      TONSILLECTOMY       History reviewed. No pertinent family history.   Social History     Socioeconomic History    Marital status: function. Urine does show evidence of mild UTI. Unclear if this could be the full etiology of her symptoms. She was given initially IV Dilaudid with improvement of her pain but does still have mild residual headache and will be given IV Toradol. She is also given Zofran for some further symptom control. She is also given IV fluids. CT scan of the abdomen pelvis shows no additional intra-abdominal pathology to explain her symptoms, including no evidence of acute appendicitis or other inflammation of the bowels. Patient will be started on antibiotics for UTI and was given IV Rocephin while here in the ER. No previous urine culture to guide antibiotic selection. She will be started on Bactrim given her previous splenectomy for further coverage of her urine infection and is to follow very closely with her PCP. She has an appointment next Tuesday but I advised her to follow-up closer for recheck if possible. She is incidental finding of elevated blood pressure but no evidence of acute endorgan damage and I will have her have this rechecked with her PCP as well. Reasons to return to the ER were discussed. All questions answered at time of discharge. I estimate there is LOW risk for ACUTE APPENDICITIS, BOWEL OBSTRUCTION, CHOLECYSTITIS, DIVERTICULITIS, INCARCERATED HERNIA, PANCREATITIS, or PERFORATED BOWEL or ULCER, thus I consider the discharge disposition reasonable. Also, there is no evidence or peritonitis, sepsis, or toxicity. Kiley Damon and I have discussed the diagnosis and risks, and we agree with discharging home to follow-up with their primary doctor. We also discussed returning to the Emergency Department immediately if new or worsening symptoms occur. We have discussed the symptoms which are most concerning (e.g., bloody stool, fever, changing or worsening pain, vomiting) that necessitate immediate return.        During the patient's ED course, the patient was given:  Medications HYDROmorphone (DILAUDID) injection 0.5 mg (0.5 mg Intravenous Given 8/27/19 1906)   ondansetron (ZOFRAN) injection 4 mg (4 mg Intravenous Given 8/27/19 1905)   cefTRIAXone (ROCEPHIN) 1 g IVPB in 50 mL D5W minibag (1 g Intravenous New Bag 8/27/19 2036)   0.9 % sodium chloride bolus (1,000 mLs Intravenous New Bag 8/27/19 1904)   iopamidol (ISOVUE-370) 76 % injection 75 mL (75 mLs Intravenous Given 8/27/19 1922)   ketorolac (TORADOL) injection 30 mg (30 mg Intravenous Given 8/27/19 2036)        CLINICAL IMPRESSION  1. Urinary tract infection without hematuria, site unspecified    2. Abdominal pain, right lower quadrant    3. Elevated blood pressure reading        Blood pressure (!) 155/91, pulse 92, temperature 98.7 °F (37.1 °C), temperature source Oral, resp. rate 16, height 5' 3\" (1.6 m), weight 100 lb (45.4 kg), SpO2 96 %. DISPOSITION  PAIGE Burns was discharged to home in stable condition. Patient was given scripts for the following medications. I counseled patient how to take these medications. New Prescriptions    PHENAZOPYRIDINE (PYRIDIUM) 100 MG TABLET    Take 1 tablet by mouth 3 times daily as needed for Pain    SULFAMETHOXAZOLE-TRIMETHOPRIM (BACTRIM DS;SEPTRA DS) 800-160 MG PER TABLET    Take 1 tablet by mouth 2 times daily for 7 days       Follow-up with:  Debra Graff [place] [address]  [phone]    Schedule an appointment as soon as possible for a visit in 2 days  For recheck and to recheck your blood pressure, which was slightly elevated today in the ER      DISCLAIMER: This chart was created using Dragon dictation software. Efforts were made by me to ensure accuracy, however some errors may be present due to limitations of this technology and occasionally words are not transcribed correctly.         Allyssa Griggs MD  08/27/19 2038

## 2019-08-28 LAB
HEMATOLOGY PATH CONSULT: NORMAL
ORGANISM: ABNORMAL
URINE CULTURE, ROUTINE: ABNORMAL

## 2019-09-03 ENCOUNTER — OFFICE VISIT (OUTPATIENT)
Dept: CARDIOLOGY CLINIC | Age: 59
End: 2019-09-03
Payer: MEDICARE

## 2019-09-03 VITALS
WEIGHT: 104.6 LBS | OXYGEN SATURATION: 98 % | HEIGHT: 63 IN | SYSTOLIC BLOOD PRESSURE: 100 MMHG | HEART RATE: 118 BPM | BODY MASS INDEX: 18.54 KG/M2 | DIASTOLIC BLOOD PRESSURE: 80 MMHG

## 2019-09-03 DIAGNOSIS — R55 SYNCOPE AND COLLAPSE: ICD-10-CM

## 2019-09-03 PROCEDURE — 99204 OFFICE O/P NEW MOD 45 MIN: CPT | Performed by: INTERNAL MEDICINE

## 2019-09-03 PROCEDURE — G8427 DOCREV CUR MEDS BY ELIG CLIN: HCPCS | Performed by: INTERNAL MEDICINE

## 2019-09-03 PROCEDURE — G8420 CALC BMI NORM PARAMETERS: HCPCS | Performed by: INTERNAL MEDICINE

## 2019-09-03 RX ORDER — AMOXICILLIN 500 MG/1
1000 CAPSULE ORAL EVERY 12 HOURS
Status: ON HOLD | COMMUNITY
End: 2019-12-02 | Stop reason: SDUPTHER

## 2019-09-03 RX ORDER — METOPROLOL SUCCINATE 25 MG/1
25 TABLET, EXTENDED RELEASE ORAL DAILY
Qty: 30 TABLET | Refills: 11 | Status: SHIPPED
Start: 2019-09-03 | End: 2020-11-05 | Stop reason: DRUGHIGH

## 2019-09-03 RX ORDER — CEFUROXIME AXETIL 500 MG/1
500 TABLET ORAL 2 TIMES DAILY
COMMUNITY
End: 2019-11-29

## 2019-09-03 RX ORDER — LISINOPRIL 10 MG/1
10 TABLET ORAL DAILY
Status: ON HOLD | COMMUNITY
End: 2019-09-26 | Stop reason: HOSPADM

## 2019-09-12 ENCOUNTER — OFFICE VISIT (OUTPATIENT)
Dept: PULMONOLOGY | Age: 59
End: 2019-09-12
Payer: MEDICARE

## 2019-09-12 VITALS
SYSTOLIC BLOOD PRESSURE: 144 MMHG | HEIGHT: 63 IN | WEIGHT: 105 LBS | OXYGEN SATURATION: 95 % | HEART RATE: 112 BPM | BODY MASS INDEX: 18.61 KG/M2 | DIASTOLIC BLOOD PRESSURE: 96 MMHG

## 2019-09-12 DIAGNOSIS — Z87.891 PERSONAL HISTORY OF TOBACCO USE: ICD-10-CM

## 2019-09-12 DIAGNOSIS — J43.9 PULMONARY EMPHYSEMA, UNSPECIFIED EMPHYSEMA TYPE (HCC): Primary | ICD-10-CM

## 2019-09-12 PROCEDURE — 1036F TOBACCO NON-USER: CPT | Performed by: INTERNAL MEDICINE

## 2019-09-12 PROCEDURE — G8926 SPIRO NO PERF OR DOC: HCPCS | Performed by: INTERNAL MEDICINE

## 2019-09-12 PROCEDURE — 3017F COLORECTAL CA SCREEN DOC REV: CPT | Performed by: INTERNAL MEDICINE

## 2019-09-12 PROCEDURE — G8420 CALC BMI NORM PARAMETERS: HCPCS | Performed by: INTERNAL MEDICINE

## 2019-09-12 PROCEDURE — 3023F SPIROM DOC REV: CPT | Performed by: INTERNAL MEDICINE

## 2019-09-12 PROCEDURE — G8427 DOCREV CUR MEDS BY ELIG CLIN: HCPCS | Performed by: INTERNAL MEDICINE

## 2019-09-12 PROCEDURE — G0296 VISIT TO DETERM LDCT ELIG: HCPCS | Performed by: INTERNAL MEDICINE

## 2019-09-12 PROCEDURE — 99205 OFFICE O/P NEW HI 60 MIN: CPT | Performed by: INTERNAL MEDICINE

## 2019-09-12 RX ORDER — ATORVASTATIN CALCIUM 10 MG/1
10 TABLET, FILM COATED ORAL DAILY
Status: ON HOLD | COMMUNITY
End: 2021-03-23 | Stop reason: SDUPTHER

## 2019-09-12 RX ORDER — SUCRALFATE 1 G/1
TABLET ORAL PRN
Refills: 0 | COMMUNITY
Start: 2019-08-27 | End: 2019-11-29

## 2019-09-12 RX ORDER — HYDROCODONE BITARTRATE AND ACETAMINOPHEN 5; 325 MG/1; MG/1
1 TABLET ORAL EVERY 6 HOURS PRN
COMMUNITY
End: 2019-09-24

## 2019-09-12 RX ORDER — IPRATROPIUM BROMIDE AND ALBUTEROL SULFATE 2.5; .5 MG/3ML; MG/3ML
1 SOLUTION RESPIRATORY (INHALATION) EVERY 4 HOURS PRN
Refills: 5 | COMMUNITY
Start: 2019-06-28 | End: 2020-06-23 | Stop reason: ALTCHOICE

## 2019-09-12 RX ORDER — ALBUTEROL SULFATE 90 UG/1
1 AEROSOL, METERED RESPIRATORY (INHALATION) PRN
COMMUNITY
End: 2019-10-10 | Stop reason: SDUPTHER

## 2019-09-12 NOTE — PROGRESS NOTES
Problem Relation Age of Onset    Heart Disease Father     High Blood Pressure Sister      Allergies:  has No Known Allergies. Current Outpatient Medications:     Acetaminophen (TYLENOL) 325 MG CAPS, Take 975 mg by mouth as needed, Disp: , Rfl:     atorvastatin (LIPITOR) 10 MG tablet, Take 10 mg by mouth daily, Disp: , Rfl:     ipratropium-albuterol (DUONEB) 0.5-2.5 (3) MG/3ML SOLN nebulizer solution, as needed, Disp: , Rfl: 5    sucralfate (CARAFATE) 1 GM tablet, as needed, Disp: , Rfl: 0    albuterol sulfate  (90 Base) MCG/ACT inhaler, Inhale 1 puff into the lungs as needed, Disp: , Rfl:     HYDROcodone-acetaminophen (NORCO) 5-325 MG per tablet, Take 1 tablet by mouth every 6 hours as needed for Pain., Disp: , Rfl:     lisinopril (PRINIVIL;ZESTRIL) 10 MG tablet, Take 10 mg by mouth daily, Disp: , Rfl:     amoxicillin (AMOXIL) 500 MG capsule, Take 1,000 mg by mouth every 12 hours, Disp: , Rfl:     cefUROXime (CEFTIN) 500 MG tablet, Take 500 mg by mouth 2 times daily Only if fever over 100.4, Disp: , Rfl:     metoprolol succinate (TOPROL XL) 25 MG extended release tablet, Take 1 tablet by mouth daily, Disp: 30 tablet, Rfl: 11    fluticasone-salmeterol (ADVAIR) 250-50 MCG/DOSE AEPB, Inhale 1 puff into the lungs every 12 hours, Disp: , Rfl:     DULoxetine (CYMBALTA) 60 MG extended release capsule, Take 30 mg by mouth 2 times daily , Disp: , Rfl:     pantoprazole sodium (PROTONIX) 40 MG PACK packet, Take 40 mg by mouth every morning (before breakfast), Disp: , Rfl:     gabapentin (NEURONTIN) 300 MG capsule, Take 300 mg by mouth 2 times daily.  , Disp: , Rfl:     levothyroxine (SYNTHROID) 50 MCG tablet, Take 50 mcg by mouth Daily, Disp: , Rfl:     ondansetron (ZOFRAN-ODT) 8 MG TBDP disintegrating tablet, Place 8 mg under the tongue every 8 hours as needed for Nausea or Vomiting, Disp: , Rfl:     Objective:   PHYSICAL EXAM:   BP (!) 144/96 (Site: Left Upper Arm, Position: Sitting, Cuff Size: cigarette smoking abstinence if former smoker; or the importance of smoking cessation if current smoker and, if appropriate, furnishing of information about tobacco cessation interventions; and   I have furnished a written order for lung cancer screening with LDCT. Order for Screening chest CT scan should be placed with documentation as below:  Beneficiary date of birth;    Actual pack - year smoking history (number) from above;   Current smoking status, and for former smokers, the number of years since quitting smoking from above  Beneficiary is asymptomatic   National Provider Identifier (NPI) for Dr. Gloria Whiteside

## 2019-09-24 ENCOUNTER — APPOINTMENT (OUTPATIENT)
Dept: GENERAL RADIOLOGY | Age: 59
End: 2019-09-24
Payer: MEDICARE

## 2019-09-24 ENCOUNTER — HOSPITAL ENCOUNTER (OUTPATIENT)
Age: 59
Setting detail: OBSERVATION
Discharge: HOME | End: 2019-09-26
Attending: EMERGENCY MEDICINE | Admitting: EMERGENCY MEDICINE
Payer: MEDICARE

## 2019-09-24 DIAGNOSIS — I10 HYPERTENSION, UNSPECIFIED TYPE: ICD-10-CM

## 2019-09-24 DIAGNOSIS — I15.8 OTHER SECONDARY HYPERTENSION: ICD-10-CM

## 2019-09-24 DIAGNOSIS — R07.9 CHEST PAIN, UNSPECIFIED TYPE: Primary | ICD-10-CM

## 2019-09-24 PROBLEM — R51.9 HEADACHE: Status: ACTIVE | Noted: 2019-09-24

## 2019-09-24 LAB
A/G RATIO: 1.5 (ref 1.1–2.2)
ALBUMIN SERPL-MCNC: 4.7 G/DL (ref 3.4–5)
ALP BLD-CCNC: 125 U/L (ref 40–129)
ALT SERPL-CCNC: 20 U/L (ref 10–40)
ANION GAP SERPL CALCULATED.3IONS-SCNC: 10 MMOL/L (ref 3–16)
AST SERPL-CCNC: 19 U/L (ref 15–37)
ATYPICAL LYMPHOCYTE RELATIVE PERCENT: 12 % (ref 0–6)
BANDED NEUTROPHILS RELATIVE PERCENT: 1 % (ref 0–7)
BASOPHILS ABSOLUTE: 0 K/UL (ref 0–0.2)
BASOPHILS RELATIVE PERCENT: 0 %
BILIRUB SERPL-MCNC: 0.3 MG/DL (ref 0–1)
BUN BLDV-MCNC: 9 MG/DL (ref 7–20)
CALCIUM SERPL-MCNC: 10.3 MG/DL (ref 8.3–10.6)
CHLORIDE BLD-SCNC: 103 MMOL/L (ref 99–110)
CO2: 27 MMOL/L (ref 21–32)
CREAT SERPL-MCNC: 0.6 MG/DL (ref 0.6–1.1)
EOSINOPHILS ABSOLUTE: 0.9 K/UL (ref 0–0.6)
EOSINOPHILS RELATIVE PERCENT: 6 %
GFR AFRICAN AMERICAN: >60
GFR NON-AFRICAN AMERICAN: >60
GLOBULIN: 3.1 G/DL
GLUCOSE BLD-MCNC: 93 MG/DL (ref 70–99)
HCT VFR BLD CALC: 44.1 % (ref 36–48)
HEMATOLOGY PATH CONSULT: YES
HEMOGLOBIN: 14.7 G/DL (ref 12–16)
LYMPHOCYTES ABSOLUTE: 6.1 K/UL (ref 1–5.1)
LYMPHOCYTES RELATIVE PERCENT: 31 %
MCH RBC QN AUTO: 30.2 PG (ref 26–34)
MCHC RBC AUTO-ENTMCNC: 33.2 G/DL (ref 31–36)
MCV RBC AUTO: 90.9 FL (ref 80–100)
MONOCYTES ABSOLUTE: 0.9 K/UL (ref 0–1.3)
MONOCYTES RELATIVE PERCENT: 6 %
NEUTROPHILS ABSOLUTE: 6.4 K/UL (ref 1.7–7.7)
NEUTROPHILS RELATIVE PERCENT: 44 %
PDW BLD-RTO: 15.9 % (ref 12.4–15.4)
PLATELET # BLD: 592 K/UL (ref 135–450)
PMV BLD AUTO: 8.4 FL (ref 5–10.5)
POTASSIUM SERPL-SCNC: 4.4 MMOL/L (ref 3.5–5.1)
RBC # BLD: 4.86 M/UL (ref 4–5.2)
SLIDE REVIEW: ABNORMAL
SODIUM BLD-SCNC: 140 MMOL/L (ref 136–145)
TARGET CELLS: ABNORMAL
TOTAL PROTEIN: 7.8 G/DL (ref 6.4–8.2)
TROPONIN: <0.01 NG/ML
WBC # BLD: 14.3 K/UL (ref 4–11)

## 2019-09-24 PROCEDURE — 2580000003 HC RX 258: Performed by: INTERNAL MEDICINE

## 2019-09-24 PROCEDURE — 96375 TX/PRO/DX INJ NEW DRUG ADDON: CPT

## 2019-09-24 PROCEDURE — 85025 COMPLETE CBC W/AUTO DIFF WBC: CPT

## 2019-09-24 PROCEDURE — 6370000000 HC RX 637 (ALT 250 FOR IP): Performed by: INTERNAL MEDICINE

## 2019-09-24 PROCEDURE — G0378 HOSPITAL OBSERVATION PER HR: HCPCS

## 2019-09-24 PROCEDURE — 6360000002 HC RX W HCPCS: Performed by: INTERNAL MEDICINE

## 2019-09-24 PROCEDURE — 83835 ASSAY OF METANEPHRINES: CPT

## 2019-09-24 PROCEDURE — 6370000000 HC RX 637 (ALT 250 FOR IP): Performed by: EMERGENCY MEDICINE

## 2019-09-24 PROCEDURE — 80053 COMPREHEN METABOLIC PANEL: CPT

## 2019-09-24 PROCEDURE — 96361 HYDRATE IV INFUSION ADD-ON: CPT

## 2019-09-24 PROCEDURE — 93005 ELECTROCARDIOGRAM TRACING: CPT | Performed by: EMERGENCY MEDICINE

## 2019-09-24 PROCEDURE — 96374 THER/PROPH/DIAG INJ IV PUSH: CPT

## 2019-09-24 PROCEDURE — 6360000002 HC RX W HCPCS: Performed by: EMERGENCY MEDICINE

## 2019-09-24 PROCEDURE — 94640 AIRWAY INHALATION TREATMENT: CPT

## 2019-09-24 PROCEDURE — 2580000003 HC RX 258: Performed by: EMERGENCY MEDICINE

## 2019-09-24 PROCEDURE — 71045 X-RAY EXAM CHEST 1 VIEW: CPT

## 2019-09-24 PROCEDURE — 99285 EMERGENCY DEPT VISIT HI MDM: CPT

## 2019-09-24 PROCEDURE — 84484 ASSAY OF TROPONIN QUANT: CPT

## 2019-09-24 RX ORDER — DIPHENHYDRAMINE HYDROCHLORIDE 50 MG/ML
25 INJECTION INTRAMUSCULAR; INTRAVENOUS ONCE
Status: COMPLETED | OUTPATIENT
Start: 2019-09-24 | End: 2019-09-24

## 2019-09-24 RX ORDER — METOPROLOL SUCCINATE 25 MG/1
25 TABLET, EXTENDED RELEASE ORAL DAILY
Status: DISCONTINUED | OUTPATIENT
Start: 2019-09-24 | End: 2019-09-26 | Stop reason: HOSPADM

## 2019-09-24 RX ORDER — NITROGLYCERIN 0.4 MG/1
0.4 TABLET SUBLINGUAL EVERY 5 MIN PRN
Status: DISCONTINUED | OUTPATIENT
Start: 2019-09-24 | End: 2019-09-26 | Stop reason: HOSPADM

## 2019-09-24 RX ORDER — METOCLOPRAMIDE HYDROCHLORIDE 5 MG/ML
10 INJECTION INTRAMUSCULAR; INTRAVENOUS ONCE
Status: COMPLETED | OUTPATIENT
Start: 2019-09-24 | End: 2019-09-24

## 2019-09-24 RX ORDER — 0.9 % SODIUM CHLORIDE 0.9 %
1000 INTRAVENOUS SOLUTION INTRAVENOUS ONCE
Status: COMPLETED | OUTPATIENT
Start: 2019-09-24 | End: 2019-09-24

## 2019-09-24 RX ORDER — LISINOPRIL 10 MG/1
10 TABLET ORAL DAILY
Status: DISCONTINUED | OUTPATIENT
Start: 2019-09-24 | End: 2019-09-25

## 2019-09-24 RX ORDER — LEVOTHYROXINE SODIUM 0.05 MG/1
50 TABLET ORAL DAILY
Status: DISCONTINUED | OUTPATIENT
Start: 2019-09-25 | End: 2019-09-26 | Stop reason: HOSPADM

## 2019-09-24 RX ORDER — ATORVASTATIN CALCIUM 10 MG/1
10 TABLET, FILM COATED ORAL DAILY
Status: DISCONTINUED | OUTPATIENT
Start: 2019-09-24 | End: 2019-09-26 | Stop reason: HOSPADM

## 2019-09-24 RX ORDER — ALBUTEROL SULFATE 2.5 MG/3ML
2.5 SOLUTION RESPIRATORY (INHALATION) DAILY
Status: DISCONTINUED | OUTPATIENT
Start: 2019-09-25 | End: 2019-09-25

## 2019-09-24 RX ORDER — ONDANSETRON 2 MG/ML
4 INJECTION INTRAMUSCULAR; INTRAVENOUS EVERY 6 HOURS PRN
Status: DISCONTINUED | OUTPATIENT
Start: 2019-09-24 | End: 2019-09-26 | Stop reason: HOSPADM

## 2019-09-24 RX ORDER — ALBUTEROL SULFATE 90 UG/1
1 AEROSOL, METERED RESPIRATORY (INHALATION) EVERY 4 HOURS PRN
Status: DISCONTINUED | OUTPATIENT
Start: 2019-09-24 | End: 2019-09-26 | Stop reason: HOSPADM

## 2019-09-24 RX ORDER — SODIUM CHLORIDE 0.9 % (FLUSH) 0.9 %
10 SYRINGE (ML) INJECTION EVERY 12 HOURS SCHEDULED
Status: DISCONTINUED | OUTPATIENT
Start: 2019-09-24 | End: 2019-09-26 | Stop reason: HOSPADM

## 2019-09-24 RX ORDER — GABAPENTIN 300 MG/1
300 CAPSULE ORAL 2 TIMES DAILY
Status: DISCONTINUED | OUTPATIENT
Start: 2019-09-24 | End: 2019-09-26 | Stop reason: HOSPADM

## 2019-09-24 RX ORDER — PANTOPRAZOLE SODIUM 40 MG/1
40 GRANULE, DELAYED RELEASE ORAL
Status: DISCONTINUED | OUTPATIENT
Start: 2019-09-25 | End: 2019-09-26 | Stop reason: HOSPADM

## 2019-09-24 RX ORDER — ACETAMINOPHEN 325 MG/1
650 TABLET ORAL EVERY 4 HOURS PRN
Status: DISCONTINUED | OUTPATIENT
Start: 2019-09-24 | End: 2019-09-26 | Stop reason: HOSPADM

## 2019-09-24 RX ORDER — MORPHINE SULFATE 2 MG/ML
2 INJECTION, SOLUTION INTRAMUSCULAR; INTRAVENOUS EVERY 4 HOURS PRN
Status: DISCONTINUED | OUTPATIENT
Start: 2019-09-24 | End: 2019-09-26 | Stop reason: HOSPADM

## 2019-09-24 RX ORDER — SODIUM CHLORIDE 0.9 % (FLUSH) 0.9 %
10 SYRINGE (ML) INJECTION PRN
Status: DISCONTINUED | OUTPATIENT
Start: 2019-09-24 | End: 2019-09-26 | Stop reason: HOSPADM

## 2019-09-24 RX ORDER — ASPIRIN 81 MG/1
324 TABLET, CHEWABLE ORAL ONCE
Status: COMPLETED | OUTPATIENT
Start: 2019-09-24 | End: 2019-09-24

## 2019-09-24 RX ADMIN — METOPROLOL SUCCINATE 25 MG: 25 TABLET, EXTENDED RELEASE ORAL at 23:30

## 2019-09-24 RX ADMIN — ONDANSETRON 4 MG: 2 INJECTION INTRAMUSCULAR; INTRAVENOUS at 23:30

## 2019-09-24 RX ADMIN — GABAPENTIN 300 MG: 300 CAPSULE ORAL at 23:30

## 2019-09-24 RX ADMIN — ATORVASTATIN CALCIUM 10 MG: 10 TABLET, FILM COATED ORAL at 23:30

## 2019-09-24 RX ADMIN — DIPHENHYDRAMINE HYDROCHLORIDE 25 MG: 50 INJECTION, SOLUTION INTRAMUSCULAR; INTRAVENOUS at 16:07

## 2019-09-24 RX ADMIN — Medication 10 ML: at 23:31

## 2019-09-24 RX ADMIN — Medication 2 PUFF: at 21:36

## 2019-09-24 RX ADMIN — LISINOPRIL 10 MG: 10 TABLET ORAL at 23:30

## 2019-09-24 RX ADMIN — SODIUM CHLORIDE 1000 ML: 9 INJECTION, SOLUTION INTRAVENOUS at 16:07

## 2019-09-24 RX ADMIN — ASPIRIN 81 MG CHEWABLE TABLET 324 MG: 81 TABLET CHEWABLE at 18:58

## 2019-09-24 RX ADMIN — METOCLOPRAMIDE 10 MG: 5 INJECTION, SOLUTION INTRAMUSCULAR; INTRAVENOUS at 16:07

## 2019-09-24 ASSESSMENT — ENCOUNTER SYMPTOMS
WHEEZING: 0
VOICE CHANGE: 0
TROUBLE SWALLOWING: 0
ABDOMINAL PAIN: 0
FACIAL SWELLING: 0
STRIDOR: 0
VOMITING: 0
COLOR CHANGE: 0
SHORTNESS OF BREATH: 0
NAUSEA: 0

## 2019-09-24 ASSESSMENT — PAIN SCALES - GENERAL
PAINLEVEL_OUTOF10: 3
PAINLEVEL_OUTOF10: 7
PAINLEVEL_OUTOF10: 6

## 2019-09-24 ASSESSMENT — PAIN DESCRIPTION - PAIN TYPE: TYPE: ACUTE PAIN

## 2019-09-24 ASSESSMENT — PAIN DESCRIPTION - LOCATION: LOCATION: HEAD

## 2019-09-24 NOTE — ED PROVIDER NOTES
organizations: None     Relationship status: None    Intimate partner violence:     Fear of current or ex partner: None     Emotionally abused: None     Physically abused: None     Forced sexual activity: None   Other Topics Concern    None   Social History Narrative    None         PHYSICAL EXAM    (up to 7 for level 4, 8 or more for level 5)     ED Triage Vitals [09/24/19 1432]   BP Temp Temp Source Pulse Resp SpO2 Height Weight   (!) 165/100 98.6 °F (37 °C) Oral 81 18 97 % 5' 3\" (1.6 m) 105 lb (47.6 kg)       Physical Exam   Constitutional: She is oriented to person, place, and time. She appears well-developed and well-nourished. Non-toxic appearing. No acute distress. HENT:   Head: Normocephalic and atraumatic. Right Ear: External ear normal.   Left Ear: External ear normal.   Eyes: Conjunctivae and EOM are normal.   Neck: Neck supple. No JVD present. No tracheal deviation present. Cardiovascular: Normal rate and intact distal pulses. Pulmonary/Chest: Effort normal and breath sounds normal. No respiratory distress. She has no wheezes. Abdominal: Soft. She exhibits no distension. There is no tenderness. There is no rebound and no guarding. Musculoskeletal: Normal range of motion. She exhibits no tenderness or deformity. Neurological: She is alert and oriented to person, place, and time. No cranial nerve deficit. Skin: Skin is warm and dry. She is not diaphoretic. Nursing note and vitals reviewed. DIAGNOSTIC RESULTS     EKG:All EKG's are interpreted by the Emergency Department Physician who either signs or Co-signs this chart in the absence of a cardiologist.    The Ekg interpreted by me shows  normal sinus rhythm with a rate of 83  Axis is   Normal  QTc is  427ms  Intervals and Durations are unremarkable.       ST Segments: no acute change  No significant change from prior EKG dated 6/11/19      RADIOLOGY:     Interpretation per the Radiologist below, if available at the time of this

## 2019-09-25 ENCOUNTER — APPOINTMENT (OUTPATIENT)
Dept: CT IMAGING | Age: 59
End: 2019-09-25
Payer: MEDICARE

## 2019-09-25 LAB
EKG ATRIAL RATE: 83 BPM
EKG DIAGNOSIS: NORMAL
EKG P AXIS: 84 DEGREES
EKG P-R INTERVAL: 156 MS
EKG Q-T INTERVAL: 364 MS
EKG QRS DURATION: 80 MS
EKG QTC CALCULATION (BAZETT): 427 MS
EKG R AXIS: 88 DEGREES
EKG T AXIS: 73 DEGREES
EKG VENTRICULAR RATE: 83 BPM
HEMATOLOGY PATH CONSULT: NORMAL
TROPONIN: 0.02 NG/ML
TROPONIN: <0.01 NG/ML

## 2019-09-25 PROCEDURE — 36415 COLL VENOUS BLD VENIPUNCTURE: CPT

## 2019-09-25 PROCEDURE — 2580000003 HC RX 258: Performed by: INTERNAL MEDICINE

## 2019-09-25 PROCEDURE — 93010 ELECTROCARDIOGRAM REPORT: CPT | Performed by: INTERNAL MEDICINE

## 2019-09-25 PROCEDURE — 6370000000 HC RX 637 (ALT 250 FOR IP): Performed by: INTERNAL MEDICINE

## 2019-09-25 PROCEDURE — 6360000002 HC RX W HCPCS: Performed by: INTERNAL MEDICINE

## 2019-09-25 PROCEDURE — G0378 HOSPITAL OBSERVATION PER HR: HCPCS

## 2019-09-25 PROCEDURE — 75574 CT ANGIO HRT W/3D IMAGE: CPT

## 2019-09-25 PROCEDURE — 99220 PR INITIAL OBSERVATION CARE/DAY 70 MINUTES: CPT | Performed by: INTERNAL MEDICINE

## 2019-09-25 PROCEDURE — 84484 ASSAY OF TROPONIN QUANT: CPT

## 2019-09-25 PROCEDURE — 6360000004 HC RX CONTRAST MEDICATION: Performed by: INTERNAL MEDICINE

## 2019-09-25 PROCEDURE — 96376 TX/PRO/DX INJ SAME DRUG ADON: CPT

## 2019-09-25 PROCEDURE — 83835 ASSAY OF METANEPHRINES: CPT

## 2019-09-25 PROCEDURE — 94640 AIRWAY INHALATION TREATMENT: CPT

## 2019-09-25 RX ORDER — VALSARTAN 80 MG/1
160 TABLET ORAL DAILY
Status: DISCONTINUED | OUTPATIENT
Start: 2019-09-26 | End: 2019-09-26 | Stop reason: HOSPADM

## 2019-09-25 RX ORDER — METOPROLOL TARTRATE 50 MG/1
50 TABLET, FILM COATED ORAL
Status: COMPLETED | OUTPATIENT
Start: 2019-09-25 | End: 2019-09-25

## 2019-09-25 RX ORDER — ALBUTEROL SULFATE 2.5 MG/3ML
2.5 SOLUTION RESPIRATORY (INHALATION) EVERY 6 HOURS PRN
Status: DISCONTINUED | OUTPATIENT
Start: 2019-09-25 | End: 2019-09-26 | Stop reason: HOSPADM

## 2019-09-25 RX ORDER — HYDROCHLOROTHIAZIDE 12.5 MG/1
25 CAPSULE, GELATIN COATED ORAL DAILY
Status: DISCONTINUED | OUTPATIENT
Start: 2019-09-25 | End: 2019-09-26 | Stop reason: HOSPADM

## 2019-09-25 RX ADMIN — ACETAMINOPHEN 650 MG: 325 TABLET ORAL at 09:06

## 2019-09-25 RX ADMIN — Medication 10 ML: at 20:52

## 2019-09-25 RX ADMIN — ATORVASTATIN CALCIUM 10 MG: 10 TABLET, FILM COATED ORAL at 20:52

## 2019-09-25 RX ADMIN — Medication 10 ML: at 09:09

## 2019-09-25 RX ADMIN — METOPROLOL TARTRATE 50 MG: 50 TABLET ORAL at 15:49

## 2019-09-25 RX ADMIN — ACETAMINOPHEN 650 MG: 325 TABLET ORAL at 04:27

## 2019-09-25 RX ADMIN — GABAPENTIN 300 MG: 300 CAPSULE ORAL at 20:52

## 2019-09-25 RX ADMIN — LEVOTHYROXINE SODIUM 50 MCG: 50 TABLET ORAL at 09:06

## 2019-09-25 RX ADMIN — GABAPENTIN 300 MG: 300 CAPSULE ORAL at 09:06

## 2019-09-25 RX ADMIN — Medication 2 PUFF: at 08:43

## 2019-09-25 RX ADMIN — ONDANSETRON 4 MG: 2 INJECTION INTRAMUSCULAR; INTRAVENOUS at 11:53

## 2019-09-25 RX ADMIN — Medication 2 PUFF: at 20:14

## 2019-09-25 RX ADMIN — PANTOPRAZOLE SODIUM 40 MG: 40 GRANULE, DELAYED RELEASE ORAL at 09:06

## 2019-09-25 RX ADMIN — IOPAMIDOL 100 ML: 755 INJECTION, SOLUTION INTRAVENOUS at 17:23

## 2019-09-25 RX ADMIN — ACETAMINOPHEN 650 MG: 325 TABLET ORAL at 14:31

## 2019-09-25 RX ADMIN — LISINOPRIL 10 MG: 10 TABLET ORAL at 09:06

## 2019-09-25 RX ADMIN — ALBUTEROL SULFATE 2.5 MG: 2.5 SOLUTION RESPIRATORY (INHALATION) at 08:43

## 2019-09-25 RX ADMIN — METOPROLOL SUCCINATE 25 MG: 25 TABLET, EXTENDED RELEASE ORAL at 09:06

## 2019-09-25 ASSESSMENT — PAIN SCALES - GENERAL
PAINLEVEL_OUTOF10: 5
PAINLEVEL_OUTOF10: 6
PAINLEVEL_OUTOF10: 5
PAINLEVEL_OUTOF10: 6

## 2019-09-25 ASSESSMENT — PAIN DESCRIPTION - PAIN TYPE: TYPE: ACUTE PAIN

## 2019-09-25 ASSESSMENT — PAIN DESCRIPTION - LOCATION: LOCATION: HEAD

## 2019-09-25 NOTE — H&P
Hospital Medicine History & Physical      PCP: Beverly Ying DO    Date of Admission: 9/24/2019    Date of Service: Pt seen/examined on 9/24   and Admitted to Inpatient with expected LOS greater than two midnights due to medical therapy. Chief Complaint:  Elevated BP, headaches, r/u pheochromocytoma in fact of presence of adrenal mass per CT abdomen and pelvis      History Of Present Illness:     63 y.o. female who presented to [facility] for evaluation of elevated blood pressures  ( systolics of approximately 215) and headaches  headaches which have been ongoing for several weeks, also some pressure-like chest pain which beginning yesterday. The patient  had a CT abd/ pelvis 1 month ago which showed stable right adrenal adenoma,  concern for possible Pheochromocytoma per her PCP  who  was planning on doing an outpatient work up , but based on the fact that she developed hypertensive emergency she was advised to events to the emergency room for admission to the hospital for further evaluation. Past Medical History:          Diagnosis Date    Back pain     COPD [facility]ch[facility] obstructive [facility] disease) [facility]     Hypertension     Syncope and collapse 03/2019    Thyroid disease     Ulcerative colitis, unspecified, without complications ([facility] [address]        Past Surgical History:          Procedure Laterality Date    BACK SURGERY      HYSTERECTOMY      SPLENECTOMY      TONSILLECTOMY         Medications Prior to Admission:      Prior to Admission medications    Medication Sig Start Date End Date Taking?  Authorizing Provider   Acetaminophen (TYLENOL) 325 MG CAPS Take 975 mg by mouth as needed 6/15/19  Yes Historical Provider, MD   atorvastatin (LIPITOR) 10 MG tablet Take 10 mg by mouth daily   Yes Historical Provider, MD   sucralfate (CARAFATE) 1 GM tablet as needed 8/27/19  Yes Historical Provider, MD   albuterol sulfate  (90 Base) MCG/ACT inhaler Inhale 1 puff into the lungs as needed

## 2019-09-25 NOTE — CONSULTS
[address] [address]                                  CONSULTATION    PATIENT NAME: Christina Jaimes                    :        1960  MED REC NO:   [id number]                          ROOM:       [id number]  ACCOUNT NO:   [de-identified]                           ADMIT DATE: 2019  PROVIDER:     Willy Farris MD    CONSULT DATE:  2019    REASON FOR CONSULTATION:  Chest pain, hypertension. HISTORY OF PRESENT ILLNESS:  A 41-year-old female presented to the  hospital with complaints of chest pain, headaches, high blood pressure. Symptoms started the day of admission. She noted her blood pressure was  very high over 062 systolic and 834 diastolic. She had associated  headaches. She had associated chest pain, felt like a pressure  sensation in the center of her chest, radiates into her left arm. When  her blood pressure came down, the chest pain went away. She also notes  that she occasionally gets chest pain with activity, but primarily she  gets it with high blood pressure. It can last for minutes to hours. She has no associated shortness of breath. PAST MEDICAL HISTORY:  1.  Labile hypertension. 2.  History of orthostatic hypotension in the spring of 2019 when she  was living in [place]. Ultimately, she was diagnosed with a splenic  laceration from an injury and was noted to be anemic and when these  issues were addressed, her orthostatic hypotension resolved. 3.  COPD. SOCIAL HISTORY:  She is a former smoker. She is . FAMILY HISTORY:  Positive for heart disease. REVIEW OF SYSTEMS:  She denies fevers or chills. No cough. No focal  neurologic symptoms. No visual changes. No recent GI or  bleeding. All other systems are negative except history of present illness. ALLERGIES:  No known drug allergies. MEDICATIONS:  See list in the chart which I have reviewed.     PHYSICAL

## 2019-09-25 NOTE — DISCHARGE SUMMARY
Hospital Medicine PROGRESS NOTE  Not discharged because she needs more monitoring per cardiology, I appreciate their prompt consultation. Patient ID: Naren Garcia      Patient's PCP: Gin Lazcano DO    Admit Date: 9/24/2019     Discharge Date:   09/25/19     Admitting Physician: Jeanmarie Vang MD     Discharge Physician: Stefani Paz MD     Discharge Diagnoses: Active Hospital Problems    Diagnosis    HTN (hypertension) [I10]    Headache [R51]    Chest pain [R07.9]       The patient was seen and examined on day of discharge and this discharge summary is in conjunction with any daily progress note from day of discharge. Hospital Course: The patient is a 63 Y F with a h/o HTN and COPD. She had a severe respiratory illness early this year, says she required intubation in [place] and eventually discharged with home oxygen after 9 days. She apparently recovered well, quit smoking, and no longer requires supplemental oxygen. She moved to [place] 3 months ago to be closer to her grandchildren. She has been fixing up a house, and she slipped and lacerated her spleen which required emergent splenectomy at [facility] in 6/2019. She seemed to recover after this as well. For the last 3 months, however, she has had more severe and more frequent headaches than normal.  They are in the frontal area, feel like throbbing. Sometimes she notices that when she is in severe pain her BP is quite high. She hasn't had any focal neurological deficits. She has actually been gaining a little weight since her critical illness, and doesn't really have any particular symptoms suggestive of malignancy. She followed up with her primary physician yesterday regarding the headache, and her SBP was reportedly 214 at the time, so she was sent to the ED with concerns for a pheochromocytoma.   BP was 165/100 upon arrival, and BP quickly normalized and stayed normal.  When headache worsened again later, the

## 2019-09-25 NOTE — PLAN OF CARE
Problem: Falls - Risk of:  Goal: Will remain free from falls  Description  Will remain free from falls  9/25/2019 1017 by Patricia Coffey RN  Outcome: Ongoing    No falls recorded during this shift. Pt using call light appropriately.

## 2019-09-26 VITALS
HEIGHT: 63 IN | OXYGEN SATURATION: 97 % | RESPIRATION RATE: 16 BRPM | TEMPERATURE: 98 F | BODY MASS INDEX: 18.8 KG/M2 | SYSTOLIC BLOOD PRESSURE: 144 MMHG | WEIGHT: 106.1 LBS | DIASTOLIC BLOOD PRESSURE: 81 MMHG | HEART RATE: 62 BPM

## 2019-09-26 LAB
LV EF: 55 %
LVEF MODALITY: NORMAL

## 2019-09-26 PROCEDURE — G0378 HOSPITAL OBSERVATION PER HR: HCPCS

## 2019-09-26 PROCEDURE — 93306 TTE W/DOPPLER COMPLETE: CPT

## 2019-09-26 PROCEDURE — 94640 AIRWAY INHALATION TREATMENT: CPT

## 2019-09-26 PROCEDURE — 6370000000 HC RX 637 (ALT 250 FOR IP): Performed by: INTERNAL MEDICINE

## 2019-09-26 PROCEDURE — 2580000003 HC RX 258: Performed by: INTERNAL MEDICINE

## 2019-09-26 RX ORDER — LOSARTAN POTASSIUM AND HYDROCHLOROTHIAZIDE 12.5; 5 MG/1; MG/1
1 TABLET ORAL DAILY
Qty: 30 TABLET | Refills: 2 | Status: ON HOLD | OUTPATIENT
Start: 2019-09-26 | End: 2021-03-23 | Stop reason: HOSPADM

## 2019-09-26 RX ORDER — SUMATRIPTAN 25 MG/1
25 TABLET, FILM COATED ORAL
Qty: 9 TABLET | Refills: 0 | Status: SHIPPED | OUTPATIENT
Start: 2019-09-26 | End: 2020-06-23

## 2019-09-26 RX ADMIN — VALSARTAN 160 MG: 80 TABLET, FILM COATED ORAL at 08:14

## 2019-09-26 RX ADMIN — Medication 2 PUFF: at 08:11

## 2019-09-26 RX ADMIN — PANTOPRAZOLE SODIUM 40 MG: 40 GRANULE, DELAYED RELEASE ORAL at 08:14

## 2019-09-26 RX ADMIN — GABAPENTIN 300 MG: 300 CAPSULE ORAL at 08:14

## 2019-09-26 RX ADMIN — Medication 10 ML: at 08:20

## 2019-09-26 RX ADMIN — ACETAMINOPHEN 650 MG: 325 TABLET ORAL at 08:14

## 2019-09-26 RX ADMIN — LEVOTHYROXINE SODIUM 50 MCG: 50 TABLET ORAL at 08:14

## 2019-09-26 RX ADMIN — HYDROCHLOROTHIAZIDE 25 MG: 12.5 CAPSULE ORAL at 08:14

## 2019-09-26 RX ADMIN — ACETAMINOPHEN 650 MG: 325 TABLET ORAL at 02:58

## 2019-09-26 RX ADMIN — METOPROLOL SUCCINATE 25 MG: 25 TABLET, EXTENDED RELEASE ORAL at 08:14

## 2019-09-26 ASSESSMENT — PAIN DESCRIPTION - PAIN TYPE: TYPE: ACUTE PAIN

## 2019-09-26 ASSESSMENT — PAIN SCALES - GENERAL
PAINLEVEL_OUTOF10: 3
PAINLEVEL_OUTOF10: 6
PAINLEVEL_OUTOF10: 6

## 2019-09-26 ASSESSMENT — PAIN DESCRIPTION - LOCATION: LOCATION: HEAD

## 2019-09-26 NOTE — PROGRESS NOTES
Pt given discharge instructions. Pt verbalized understanding. All questions answered. Medications will be picked up at [address] IV and tele box removed. No further needs and pt discharged home.     Electronically signed by Jose Pang RN on 9/26/2019 at 11:53 AM
when ANY of the following criteria are met  a. Incognizant or uncooperative          b. Patients treated with HHN at Home        c. Unable to demonstrate proper use of MDI with spacer     d. RR > 30 bpm   5. Bronchodilators will be delivered via Metered Dose Inhaler (MDI), HHN, Aerogen to intubated patients on mechanical ventilation. 6. Inhalation medication orders will be delivered and/or substituted as outlined below. Aerosolized Medications Ordering and Administration Guidelines:    1. All Medications will be ordered by a physician, and their frequency and/or modality will be adjusted as defined by the patients Respiratory Severity Index (RSI) score. 2. If the patient does not have documented COPD, consider discontinuing anticholinergics when RSI is less than 9.  3. If the bronchospasm worsens (increased RSI), then the bronchodilator frequency can be increased to a maximum of every 4 hours. If greater than every 4 hours is required, the physician will be contacted. 4. If the bronchospasm improves, the frequency of the bronchodilator can be decreased, based on the patient's RSI, but not less than home treatment regimen frequency. 5. Bronchodilator(s) will be discontinued if patient has a RSI less than 9 and has received no scheduled or as needed treatment for 72  Hrs. Patients Ordered on a Mucolytic Agent:    1. Must always be administered with a bronchodilator. 2. Discontinue if patient experiences worsened bronchospasm, or secretions have lessened to the point that the patient is able to clear them with a cough. Anti-inflammatory and Combination Medications:    1. If the patient lacks prior history of lung disease, is not using inhaled anti-inflammatory medication at home, and lacks wheezing by examination or by history for at least 24 hours, contact physician for possible discontinuation.

## 2019-09-27 LAB
METANEPH/PLASMA INTERP: ABNORMAL
METANEPHRINE FREE PLASMA: 0.27 NMOL/L (ref 0–0.49)
NORMETANEPHRINE FREE PLASMA: 1.95 NMOL/L (ref 0–0.89)

## 2019-09-28 LAB
CREATININE 24 HOUR URINE: ABNORMAL MG/D (ref 500–1400)
CREATININE URINE: 26 MG/DL
METANEPH/PLASMA INTERP: ABNORMAL
METANEPHRINE FREE PLASMA: 0.34 NMOL/L (ref 0–0.49)
METANEPHRINE INTREP URINE: ABNORMAL
METANEPHRINE UG/G CRE: 258 UG/G CRT (ref 0–300)
METANEPHRINE, UR-PER VOL: 67 UG/L
METANEPHRINES URINE: ABNORMAL UG/D (ref 39–143)
NORMETANEPHRINE 24 HOUR URINE: ABNORMAL UG/D (ref 109–393)
NORMETANEPHRINE FREE PLASMA: 1.01 NMOL/L (ref 0–0.89)
NORMETANEPHRINE, (G/CRT): 765 UG/G CRT (ref 0–400)
NORMETANEPHRINES, NMOL/L: 199 UG/L
URINE TOTAL VOLUME: ABNORMAL ML

## 2019-09-30 ENCOUNTER — TELEPHONE (OUTPATIENT)
Dept: CARDIOLOGY CLINIC | Age: 59
End: 2019-09-30

## 2019-09-30 DIAGNOSIS — R89.9 ABNORMAL LABORATORY TEST: Primary | ICD-10-CM

## 2019-09-30 NOTE — TELEPHONE ENCOUNTER
Created telephone encounter. Spoke with Nnliza relayed message per [address] regarding labs. Pt verbalized understanding. Placed referral to Dr Vesna Lora.

## 2019-10-07 ENCOUNTER — HOSPITAL ENCOUNTER (OUTPATIENT)
Dept: CT IMAGING | Age: 59
Discharge: HOME | End: 2019-10-07
Payer: MEDICARE

## 2019-10-07 ENCOUNTER — HOSPITAL ENCOUNTER (OUTPATIENT)
Dept: PULMONOLOGY | Age: 59
Discharge: HOME | End: 2019-10-07
Payer: MEDICARE

## 2019-10-07 ENCOUNTER — HOSPITAL ENCOUNTER (OUTPATIENT)
Age: 59
Discharge: HOME | End: 2019-10-07
Payer: MEDICARE

## 2019-10-07 DIAGNOSIS — J43.9 PULMONARY EMPHYSEMA, UNSPECIFIED EMPHYSEMA TYPE (HCC): ICD-10-CM

## 2019-10-07 DIAGNOSIS — Z87.891 PERSONAL HISTORY OF TOBACCO USE: ICD-10-CM

## 2019-10-07 LAB
DLCO %PRED: 49 %
DLCO PRED: NORMAL ML/MIN/MMHG
DLCO/VA %PRED: NORMAL %
DLCO/VA PRED: NORMAL ML/MIN/MMHG
DLCO/VA: NORMAL ML/MIN/MMHG
DLCO: NORMAL ML/MIN/MMHG
EXPIRATORY TIME-POST: NORMAL SEC
EXPIRATORY TIME: NORMAL SEC
FEF 25-75% %CHNG: NORMAL
FEF 25-75% %PRED-POST: NORMAL %
FEF 25-75% %PRED-PRE: NORMAL L/SEC
FEF 25-75% PRED: NORMAL L/SEC
FEF 25-75%-POST: NORMAL L/SEC
FEF 25-75%-PRE: NORMAL L/SEC
FEV1 %PRED-POST: 54 %
FEV1 %PRED-PRE: 51 %
FEV1 PRED: NORMAL L
FEV1-POST: NORMAL L
FEV1-PRE: NORMAL L
FEV1/FVC %PRED-POST: NORMAL %
FEV1/FVC %PRED-PRE: NORMAL %
FEV1/FVC PRED: NORMAL %
FEV1/FVC-POST: 48 %
FEV1/FVC-PRE: 50 %
FVC %PRED-POST: NORMAL L
FVC %PRED-PRE: NORMAL %
FVC PRED: NORMAL L
FVC-POST: NORMAL L
FVC-PRE: NORMAL L
GAW %PRED: NORMAL %
GAW PRED: NORMAL L/S/CMH2O
GAW: NORMAL L/S/CMH2O
IC %PRED: NORMAL %
IC PRED: NORMAL L
IC: NORMAL L
MEP: NORMAL
MIP: NORMAL
MVV %PRED-PRE: NORMAL %
MVV PRED: NORMAL L/MIN
MVV-PRE: NORMAL L/MIN
PEF %PRED-POST: NORMAL %
PEF %PRED-PRE: NORMAL L/SEC
PEF PRED: NORMAL L/SEC
PEF%CHNG: NORMAL
PEF-POST: NORMAL L/SEC
PEF-PRE: NORMAL L/SEC
RAW %PRED: NORMAL %
RAW PRED: NORMAL CMH2O/L/S
RAW: NORMAL CMH2O/L/S
RV %PRED: NORMAL %
RV PRED: NORMAL L
RV: NORMAL L
SVC %PRED: NORMAL %
SVC PRED: NORMAL L
SVC: NORMAL L
TLC %PRED: 124 %
TLC PRED: NORMAL L
TLC: NORMAL L
VA %PRED: NORMAL %
VA PRED: NORMAL L
VA: NORMAL L
VTG %PRED: NORMAL %
VTG PRED: NORMAL L
VTG: NORMAL L

## 2019-10-07 PROCEDURE — G0297 LDCT FOR LUNG CA SCREEN: HCPCS

## 2019-10-07 PROCEDURE — 94060 EVALUATION OF WHEEZING: CPT

## 2019-10-07 PROCEDURE — 94618 PULMONARY STRESS TESTING: CPT

## 2019-10-07 PROCEDURE — 6360000002 HC RX W HCPCS: Performed by: INTERNAL MEDICINE

## 2019-10-07 PROCEDURE — 94640 AIRWAY INHALATION TREATMENT: CPT

## 2019-10-07 PROCEDURE — 82104 ALPHA-1-ANTITRYPSIN PHENO: CPT

## 2019-10-07 PROCEDURE — 94729 DIFFUSING CAPACITY: CPT

## 2019-10-07 PROCEDURE — 94726 PLETHYSMOGRAPHY LUNG VOLUMES: CPT

## 2019-10-07 PROCEDURE — 82103 ALPHA-1-ANTITRYPSIN TOTAL: CPT

## 2019-10-07 RX ORDER — ALBUTEROL SULFATE 2.5 MG/3ML
2.5 SOLUTION RESPIRATORY (INHALATION) ONCE
Status: COMPLETED | OUTPATIENT
Start: 2019-10-07 | End: 2019-10-07

## 2019-10-07 RX ADMIN — ALBUTEROL SULFATE 2.5 MG: 2.5 SOLUTION RESPIRATORY (INHALATION) at 14:56

## 2019-10-07 ASSESSMENT — PULMONARY FUNCTION TESTS
FEV1/FVC_POST: 48
FEV1/FVC_PRE: 50
FEV1_PERCENT_PREDICTED_PRE: 51
FEV1_PERCENT_PREDICTED_POST: 54

## 2019-10-09 LAB
ALPHA-1 ANTITRYPSIN PHENOTYPE: NORMAL
ALPHA-1 ANTITRYPSIN: 100 MG/DL (ref 90–200)

## 2019-10-10 ENCOUNTER — OFFICE VISIT (OUTPATIENT)
Dept: PULMONOLOGY | Age: 59
End: 2019-10-10
Payer: MEDICARE

## 2019-10-10 VITALS
BODY MASS INDEX: 18.51 KG/M2 | DIASTOLIC BLOOD PRESSURE: 64 MMHG | HEART RATE: 90 BPM | HEIGHT: 64 IN | WEIGHT: 108.4 LBS | OXYGEN SATURATION: 98 % | RESPIRATION RATE: 16 BRPM | SYSTOLIC BLOOD PRESSURE: 101 MMHG

## 2019-10-10 DIAGNOSIS — Z87.891 PERSONAL HISTORY OF TOBACCO USE: ICD-10-CM

## 2019-10-10 DIAGNOSIS — E88.01 HETEROZYGOUS ALPHA 1-ANTITRYPSIN DEFICIENCY (HCC): ICD-10-CM

## 2019-10-10 DIAGNOSIS — J44.9 MODERATE COPD (CHRONIC OBSTRUCTIVE PULMONARY DISEASE) (HCC): Primary | ICD-10-CM

## 2019-10-10 PROCEDURE — G8926 SPIRO NO PERF OR DOC: HCPCS | Performed by: INTERNAL MEDICINE

## 2019-10-10 PROCEDURE — 3017F COLORECTAL CA SCREEN DOC REV: CPT | Performed by: INTERNAL MEDICINE

## 2019-10-10 PROCEDURE — G8420 CALC BMI NORM PARAMETERS: HCPCS | Performed by: INTERNAL MEDICINE

## 2019-10-10 PROCEDURE — 99214 OFFICE O/P EST MOD 30 MIN: CPT | Performed by: INTERNAL MEDICINE

## 2019-10-10 PROCEDURE — 1036F TOBACCO NON-USER: CPT | Performed by: INTERNAL MEDICINE

## 2019-10-10 PROCEDURE — G8427 DOCREV CUR MEDS BY ELIG CLIN: HCPCS | Performed by: INTERNAL MEDICINE

## 2019-10-10 PROCEDURE — 3023F SPIROM DOC REV: CPT | Performed by: INTERNAL MEDICINE

## 2019-10-10 PROCEDURE — G8484 FLU IMMUNIZE NO ADMIN: HCPCS | Performed by: INTERNAL MEDICINE

## 2019-10-10 RX ORDER — CIPROFLOXACIN 500 MG/1
500 TABLET, FILM COATED ORAL 2 TIMES DAILY PRN
COMMUNITY
End: 2020-06-23

## 2019-10-10 RX ORDER — ALBUTEROL SULFATE 90 UG/1
1 AEROSOL, METERED RESPIRATORY (INHALATION) PRN
Qty: 1 INHALER | Refills: 5 | Status: SHIPPED | OUTPATIENT
Start: 2019-10-10 | End: 2020-11-25 | Stop reason: SDUPTHER

## 2019-10-30 ENCOUNTER — HOSPITAL ENCOUNTER (OUTPATIENT)
Age: 59
Discharge: HOME | End: 2019-10-30
Payer: MEDICARE

## 2019-10-30 ENCOUNTER — OFFICE VISIT (OUTPATIENT)
Dept: ENDOCRINOLOGY | Age: 59
End: 2019-10-30
Payer: MEDICARE

## 2019-10-30 VITALS
OXYGEN SATURATION: 97 % | DIASTOLIC BLOOD PRESSURE: 81 MMHG | WEIGHT: 106.6 LBS | BODY MASS INDEX: 18.2 KG/M2 | HEIGHT: 64 IN | HEART RATE: 81 BPM | SYSTOLIC BLOOD PRESSURE: 120 MMHG

## 2019-10-30 DIAGNOSIS — E03.9 ACQUIRED HYPOTHYROIDISM: ICD-10-CM

## 2019-10-30 DIAGNOSIS — R79.89 ELEVATED PLASMA METANEPHRINES: ICD-10-CM

## 2019-10-30 DIAGNOSIS — I15.8 OTHER SECONDARY HYPERTENSION: Primary | ICD-10-CM

## 2019-10-30 DIAGNOSIS — D35.01 ADENOMA OF RIGHT ADRENAL GLAND: ICD-10-CM

## 2019-10-30 DIAGNOSIS — I15.8 OTHER SECONDARY HYPERTENSION: ICD-10-CM

## 2019-10-30 DIAGNOSIS — G44.211 INTRACTABLE EPISODIC TENSION-TYPE HEADACHE: ICD-10-CM

## 2019-10-30 LAB
A/G RATIO: 2.1 (ref 1.1–2.2)
ALBUMIN SERPL-MCNC: 5.5 G/DL (ref 3.4–5)
ALP BLD-CCNC: 121 U/L (ref 40–129)
ALT SERPL-CCNC: 18 U/L (ref 10–40)
ANION GAP SERPL CALCULATED.3IONS-SCNC: 16 MMOL/L (ref 3–16)
AST SERPL-CCNC: 22 U/L (ref 15–37)
BILIRUB SERPL-MCNC: 0.4 MG/DL (ref 0–1)
BUN BLDV-MCNC: 9 MG/DL (ref 7–20)
CALCIUM SERPL-MCNC: 10.7 MG/DL (ref 8.3–10.6)
CHLORIDE BLD-SCNC: 96 MMOL/L (ref 99–110)
CO2: 30 MMOL/L (ref 21–32)
CREAT SERPL-MCNC: 0.7 MG/DL (ref 0.6–1.1)
GFR AFRICAN AMERICAN: >60
GFR NON-AFRICAN AMERICAN: >60
GLOBULIN: 2.6 G/DL
GLUCOSE BLD-MCNC: 114 MG/DL (ref 70–99)
POTASSIUM SERPL-SCNC: 4.9 MMOL/L (ref 3.5–5.1)
SODIUM BLD-SCNC: 142 MMOL/L (ref 136–145)
TOTAL PROTEIN: 8.1 G/DL (ref 6.4–8.2)

## 2019-10-30 PROCEDURE — 36415 COLL VENOUS BLD VENIPUNCTURE: CPT

## 2019-10-30 PROCEDURE — 99205 OFFICE O/P NEW HI 60 MIN: CPT | Performed by: INTERNAL MEDICINE

## 2019-10-30 PROCEDURE — G8484 FLU IMMUNIZE NO ADMIN: HCPCS | Performed by: INTERNAL MEDICINE

## 2019-10-30 PROCEDURE — 80053 COMPREHEN METABOLIC PANEL: CPT

## 2019-10-30 PROCEDURE — G8419 CALC BMI OUT NRM PARAM NOF/U: HCPCS | Performed by: INTERNAL MEDICINE

## 2019-10-30 PROCEDURE — 84244 ASSAY OF RENIN: CPT

## 2019-10-30 PROCEDURE — 83835 ASSAY OF METANEPHRINES: CPT

## 2019-10-30 PROCEDURE — G8427 DOCREV CUR MEDS BY ELIG CLIN: HCPCS | Performed by: INTERNAL MEDICINE

## 2019-10-30 PROCEDURE — 82088 ASSAY OF ALDOSTERONE: CPT

## 2019-10-30 PROCEDURE — 1036F TOBACCO NON-USER: CPT | Performed by: INTERNAL MEDICINE

## 2019-10-30 PROCEDURE — 84443 ASSAY THYROID STIM HORMONE: CPT

## 2019-10-30 PROCEDURE — 3017F COLORECTAL CA SCREEN DOC REV: CPT | Performed by: INTERNAL MEDICINE

## 2019-10-30 RX ORDER — DEXAMETHASONE 1 MG
TABLET ORAL
Qty: 1 TABLET | Refills: 0 | Status: SHIPPED | OUTPATIENT
Start: 2019-10-30 | End: 2019-11-29

## 2019-10-31 ENCOUNTER — TELEPHONE (OUTPATIENT)
Dept: GASTROENTEROLOGY | Age: 59
End: 2019-10-31

## 2019-10-31 LAB — TSH, 3RD GENERATION: 1.62 MU/L (ref 0.3–4)

## 2019-11-01 LAB
ALDOSTERONE: 11.1 NG/DL
RENIN ACTIVITY: 1.5 NG/ML/HR

## 2019-11-02 LAB
METANEPH/PLASMA INTERP: ABNORMAL
METANEPHRINE FREE PLASMA: 0.45 NMOL/L (ref 0–0.49)
NORMETANEPHRINE FREE PLASMA: 2 NMOL/L (ref 0–0.89)

## 2019-11-05 ENCOUNTER — HOSPITAL ENCOUNTER (OUTPATIENT)
Age: 59
Discharge: HOME | End: 2019-11-05
Payer: MEDICARE

## 2019-11-05 DIAGNOSIS — D35.01 ADENOMA OF RIGHT ADRENAL GLAND: ICD-10-CM

## 2019-11-05 DIAGNOSIS — R79.89 ELEVATED PLASMA METANEPHRINES: ICD-10-CM

## 2019-11-05 LAB — CORTISOL - AM: 2.1 UG/DL (ref 4.3–22.4)

## 2019-11-05 PROCEDURE — 82024 ASSAY OF ACTH: CPT

## 2019-11-05 PROCEDURE — 82533 TOTAL CORTISOL: CPT

## 2019-11-05 PROCEDURE — 83835 ASSAY OF METANEPHRINES: CPT

## 2019-11-05 PROCEDURE — 36415 COLL VENOUS BLD VENIPUNCTURE: CPT

## 2019-11-07 LAB — ADRENOCORTICOTROPIC HORMONE: 11 PG/ML (ref 6–58)

## 2019-11-09 LAB
CREATININE 24 HOUR URINE: 616 MG/D (ref 500–1400)
CREATININE URINE: 77 MG/DL
HOURS COLLECTED: 24 HR
METANEPHRINE INTREP URINE: ABNORMAL
METANEPHRINE UG/G CRE: 212 UG/G CRT (ref 0–300)
METANEPHRINE, UR-PER VOL: 163 UG/L
METANEPHRINES URINE: 130 UG/D (ref 39–143)
NORMETANEPHRINE 24 HOUR URINE: 418 UG/D (ref 109–393)
NORMETANEPHRINE, (G/CRT): 679 UG/G CRT (ref 0–400)
NORMETANEPHRINES, NMOL/L: 523 UG/L
URINE TOTAL VOLUME: 800 ML

## 2019-11-11 ENCOUNTER — INITIAL CONSULT (OUTPATIENT)
Dept: GASTROENTEROLOGY | Age: 59
End: 2019-11-11
Payer: MEDICARE

## 2019-11-11 VITALS
SYSTOLIC BLOOD PRESSURE: 120 MMHG | BODY MASS INDEX: 18.2 KG/M2 | HEART RATE: 74 BPM | WEIGHT: 106.6 LBS | DIASTOLIC BLOOD PRESSURE: 80 MMHG | RESPIRATION RATE: 16 BRPM | HEIGHT: 64 IN | OXYGEN SATURATION: 98 %

## 2019-11-11 DIAGNOSIS — K51.019 ULCERATIVE PANCOLITIS WITH COMPLICATION (HCC): ICD-10-CM

## 2019-11-11 DIAGNOSIS — R12 HEART BURN: Primary | ICD-10-CM

## 2019-11-11 PROCEDURE — 3017F COLORECTAL CA SCREEN DOC REV: CPT | Performed by: INTERNAL MEDICINE

## 2019-11-11 PROCEDURE — G8484 FLU IMMUNIZE NO ADMIN: HCPCS | Performed by: INTERNAL MEDICINE

## 2019-11-11 PROCEDURE — 1036F TOBACCO NON-USER: CPT | Performed by: INTERNAL MEDICINE

## 2019-11-11 PROCEDURE — G8427 DOCREV CUR MEDS BY ELIG CLIN: HCPCS | Performed by: INTERNAL MEDICINE

## 2019-11-11 PROCEDURE — 99204 OFFICE O/P NEW MOD 45 MIN: CPT | Performed by: INTERNAL MEDICINE

## 2019-11-11 PROCEDURE — G8419 CALC BMI OUT NRM PARAM NOF/U: HCPCS | Performed by: INTERNAL MEDICINE

## 2019-11-11 RX ORDER — PANTOPRAZOLE SODIUM 40 MG/1
40 GRANULE, DELAYED RELEASE ORAL 2 TIMES DAILY
Qty: 60 PACKET | Refills: 1 | Status: SHIPPED | OUTPATIENT
Start: 2019-11-11 | End: 2019-11-20 | Stop reason: CLARIF

## 2019-11-11 RX ORDER — FOLIC ACID 1 MG/1
1 TABLET ORAL DAILY
Qty: 30 TABLET | Refills: 11 | Status: SHIPPED | OUTPATIENT
Start: 2019-11-11

## 2019-11-11 RX ORDER — SULFASALAZINE 500 MG/1
1000 TABLET, DELAYED RELEASE ORAL 4 TIMES DAILY
Qty: 240 TABLET | Refills: 1 | Status: SHIPPED | OUTPATIENT
Start: 2019-11-11 | End: 2020-02-12 | Stop reason: ALTCHOICE

## 2019-11-12 ENCOUNTER — HOSPITAL ENCOUNTER (OUTPATIENT)
Dept: CARDIAC REHAB | Age: 59
Setting detail: THERAPIES SERIES
Discharge: HOME | End: 2019-11-12
Payer: MEDICARE

## 2019-11-15 ENCOUNTER — APPOINTMENT (OUTPATIENT)
Dept: CARDIAC REHAB | Age: 59
End: 2019-11-15
Payer: MEDICARE

## 2019-11-18 ENCOUNTER — HOSPITAL ENCOUNTER (OUTPATIENT)
Dept: CARDIAC REHAB | Age: 59
Setting detail: THERAPIES SERIES
Discharge: HOME | End: 2019-11-18
Payer: MEDICARE

## 2019-11-18 PROCEDURE — G0424 PULMONARY REHAB W EXER: HCPCS

## 2019-11-19 ENCOUNTER — TELEPHONE (OUTPATIENT)
Dept: GASTROENTEROLOGY | Age: 59
End: 2019-11-19

## 2019-11-20 ENCOUNTER — HOSPITAL ENCOUNTER (OUTPATIENT)
Dept: CARDIAC REHAB | Age: 59
Setting detail: THERAPIES SERIES
Discharge: HOME | End: 2019-11-20
Payer: MEDICARE

## 2019-11-20 PROCEDURE — G0424 PULMONARY REHAB W EXER: HCPCS

## 2019-11-20 RX ORDER — OMEPRAZOLE 20 MG/1
20 CAPSULE, DELAYED RELEASE ORAL
Qty: 60 CAPSULE | Refills: 1 | Status: SHIPPED | OUTPATIENT
Start: 2019-11-20 | End: 2020-11-05

## 2019-11-20 RX ORDER — OMEPRAZOLE 20 MG/1
20 CAPSULE, DELAYED RELEASE ORAL
Qty: 30 CAPSULE | Refills: 1 | Status: SHIPPED | OUTPATIENT
Start: 2019-11-20 | End: 2019-11-20 | Stop reason: CLARIF

## 2019-11-22 ENCOUNTER — HOSPITAL ENCOUNTER (OUTPATIENT)
Dept: CARDIAC REHAB | Age: 59
Setting detail: THERAPIES SERIES
Discharge: HOME | End: 2019-11-22
Payer: MEDICARE

## 2019-11-22 PROCEDURE — G0424 PULMONARY REHAB W EXER: HCPCS

## 2019-11-29 ENCOUNTER — APPOINTMENT (OUTPATIENT)
Dept: GENERAL RADIOLOGY | Age: 59
DRG: 190 | End: 2019-11-29
Payer: MEDICARE

## 2019-11-29 ENCOUNTER — HOSPITAL ENCOUNTER (INPATIENT)
Age: 59
LOS: 3 days | Discharge: HOME | DRG: 190 | End: 2019-12-02
Attending: EMERGENCY MEDICINE | Admitting: INTERNAL MEDICINE
Payer: MEDICARE

## 2019-11-29 DIAGNOSIS — J96.21 ACUTE ON CHRONIC RESPIRATORY FAILURE WITH HYPOXIA (HCC): ICD-10-CM

## 2019-11-29 DIAGNOSIS — J44.1 COPD EXACERBATION (HCC): Primary | ICD-10-CM

## 2019-11-29 LAB
A/G RATIO: 1.2 (ref 1.1–2.2)
ALBUMIN SERPL-MCNC: 4.3 G/DL (ref 3.4–5)
ALP BLD-CCNC: 97 U/L (ref 40–129)
ALT SERPL-CCNC: 17 U/L (ref 10–40)
ANION GAP SERPL CALCULATED.3IONS-SCNC: 11 MMOL/L (ref 3–16)
AST SERPL-CCNC: 25 U/L (ref 15–37)
BASE EXCESS VENOUS: 3.2 MMOL/L (ref -3–3)
BASOPHILS ABSOLUTE: 0 K/UL (ref 0–0.2)
BASOPHILS RELATIVE PERCENT: 0.3 %
BILIRUB SERPL-MCNC: 0.4 MG/DL (ref 0–1)
BILIRUBIN URINE: NEGATIVE
BLOOD, URINE: NEGATIVE
BUN BLDV-MCNC: 9 MG/DL (ref 7–20)
CALCIUM SERPL-MCNC: 10.7 MG/DL (ref 8.3–10.6)
CARBOXYHEMOGLOBIN: 4.4 % (ref 0–1.5)
CHLORIDE BLD-SCNC: 98 MMOL/L (ref 99–110)
CLARITY: CLEAR
CO2: 28 MMOL/L (ref 21–32)
COLOR: YELLOW
CREAT SERPL-MCNC: 0.7 MG/DL (ref 0.6–1.1)
EOSINOPHILS ABSOLUTE: 1.7 K/UL (ref 0–0.6)
EOSINOPHILS RELATIVE PERCENT: 13.7 %
GFR AFRICAN AMERICAN: >60
GFR NON-AFRICAN AMERICAN: >60
GLOBULIN: 3.5 G/DL
GLUCOSE BLD-MCNC: 124 MG/DL (ref 70–99)
GLUCOSE URINE: NEGATIVE MG/DL
HCO3 VENOUS: 28.3 MMOL/L (ref 23–29)
HCT VFR BLD CALC: 43.6 % (ref 36–48)
HEMOGLOBIN: 14.2 G/DL (ref 12–16)
KETONES, URINE: NEGATIVE MG/DL
LEUKOCYTE ESTERASE, URINE: NEGATIVE
LYMPHOCYTES ABSOLUTE: 3.2 K/UL (ref 1–5.1)
LYMPHOCYTES RELATIVE PERCENT: 25.7 %
MCH RBC QN AUTO: 30.7 PG (ref 26–34)
MCHC RBC AUTO-ENTMCNC: 32.6 G/DL (ref 31–36)
MCV RBC AUTO: 94.1 FL (ref 80–100)
METHEMOGLOBIN VENOUS: 0.3 %
MICROSCOPIC EXAMINATION: NORMAL
MONOCYTES ABSOLUTE: 1.3 K/UL (ref 0–1.3)
MONOCYTES RELATIVE PERCENT: 10.3 %
NEUTROPHILS ABSOLUTE: 6.3 K/UL (ref 1.7–7.7)
NEUTROPHILS RELATIVE PERCENT: 50 %
NITRITE, URINE: NEGATIVE
O2 CONTENT, VEN: 18 VOL %
O2 SAT, VEN: 87 %
O2 THERAPY: ABNORMAL
PCO2, VEN: 44.5 MMHG (ref 40–50)
PDW BLD-RTO: 14.9 % (ref 12.4–15.4)
PH UA: 6 (ref 5–8)
PH VENOUS: 7.42 (ref 7.35–7.45)
PLATELET # BLD: 506 K/UL (ref 135–450)
PMV BLD AUTO: 8.4 FL (ref 5–10.5)
PO2, VEN: 51.7 MMHG (ref 25–40)
POTASSIUM REFLEX MAGNESIUM: 3.7 MMOL/L (ref 3.5–5.1)
PROCALCITONIN: 0.02 NG/ML (ref 0–0.15)
PROTEIN UA: NEGATIVE MG/DL
RBC # BLD: 4.63 M/UL (ref 4–5.2)
SODIUM BLD-SCNC: 137 MMOL/L (ref 136–145)
SPECIFIC GRAVITY UA: 1.02 (ref 1–1.03)
TCO2 CALC VENOUS: 30 MMOL/L
TOTAL PROTEIN: 7.8 G/DL (ref 6.4–8.2)
TROPONIN: <0.01 NG/ML
TROPONIN: <0.01 NG/ML
TSH REFLEX: 0.53 UIU/ML (ref 0.27–4.2)
URINE TYPE: NORMAL
UROBILINOGEN, URINE: 0.2 E.U./DL
WBC # BLD: 12.6 K/UL (ref 4–11)

## 2019-11-29 PROCEDURE — 94150 VITAL CAPACITY TEST: CPT

## 2019-11-29 PROCEDURE — 6370000000 HC RX 637 (ALT 250 FOR IP): Performed by: EMERGENCY MEDICINE

## 2019-11-29 PROCEDURE — 2700000000 HC OXYGEN THERAPY PER DAY

## 2019-11-29 PROCEDURE — 84443 ASSAY THYROID STIM HORMONE: CPT

## 2019-11-29 PROCEDURE — 1200000000 HC SEMI PRIVATE

## 2019-11-29 PROCEDURE — 81003 URINALYSIS AUTO W/O SCOPE: CPT

## 2019-11-29 PROCEDURE — 85025 COMPLETE CBC W/AUTO DIFF WBC: CPT

## 2019-11-29 PROCEDURE — 6370000000 HC RX 637 (ALT 250 FOR IP): Performed by: INTERNAL MEDICINE

## 2019-11-29 PROCEDURE — 96361 HYDRATE IV INFUSION ADD-ON: CPT

## 2019-11-29 PROCEDURE — 71046 X-RAY EXAM CHEST 2 VIEWS: CPT

## 2019-11-29 PROCEDURE — 84484 ASSAY OF TROPONIN QUANT: CPT

## 2019-11-29 PROCEDURE — 80053 COMPREHEN METABOLIC PANEL: CPT

## 2019-11-29 PROCEDURE — 96374 THER/PROPH/DIAG INJ IV PUSH: CPT

## 2019-11-29 PROCEDURE — 94761 N-INVAS EAR/PLS OXIMETRY MLT: CPT

## 2019-11-29 PROCEDURE — 99285 EMERGENCY DEPT VISIT HI MDM: CPT

## 2019-11-29 PROCEDURE — 82803 BLOOD GASES ANY COMBINATION: CPT

## 2019-11-29 PROCEDURE — 6360000002 HC RX W HCPCS: Performed by: EMERGENCY MEDICINE

## 2019-11-29 PROCEDURE — 36415 COLL VENOUS BLD VENIPUNCTURE: CPT

## 2019-11-29 PROCEDURE — 2580000003 HC RX 258: Performed by: EMERGENCY MEDICINE

## 2019-11-29 PROCEDURE — 84145 PROCALCITONIN (PCT): CPT

## 2019-11-29 PROCEDURE — 93005 ELECTROCARDIOGRAM TRACING: CPT | Performed by: EMERGENCY MEDICINE

## 2019-11-29 PROCEDURE — 94640 AIRWAY INHALATION TREATMENT: CPT

## 2019-11-29 RX ORDER — SODIUM CHLORIDE 0.9 % (FLUSH) 0.9 %
10 SYRINGE (ML) INJECTION PRN
Status: DISCONTINUED | OUTPATIENT
Start: 2019-11-29 | End: 2019-12-02 | Stop reason: HOSPADM

## 2019-11-29 RX ORDER — FOLIC ACID 1 MG/1
1 TABLET ORAL DAILY
Status: DISCONTINUED | OUTPATIENT
Start: 2019-11-30 | End: 2019-12-02 | Stop reason: HOSPADM

## 2019-11-29 RX ORDER — IPRATROPIUM BROMIDE AND ALBUTEROL SULFATE 2.5; .5 MG/3ML; MG/3ML
1 SOLUTION RESPIRATORY (INHALATION)
Status: DISCONTINUED | OUTPATIENT
Start: 2019-11-30 | End: 2019-11-29

## 2019-11-29 RX ORDER — 0.9 % SODIUM CHLORIDE 0.9 %
500 INTRAVENOUS SOLUTION INTRAVENOUS ONCE
Status: COMPLETED | OUTPATIENT
Start: 2019-11-29 | End: 2019-11-29

## 2019-11-29 RX ORDER — GABAPENTIN 300 MG/1
300 CAPSULE ORAL 2 TIMES DAILY
Status: DISCONTINUED | OUTPATIENT
Start: 2019-11-29 | End: 2019-12-02 | Stop reason: HOSPADM

## 2019-11-29 RX ORDER — DOXYCYCLINE HYCLATE 100 MG
100 TABLET ORAL ONCE
Status: COMPLETED | OUTPATIENT
Start: 2019-11-29 | End: 2019-11-29

## 2019-11-29 RX ORDER — HYDROCHLOROTHIAZIDE 25 MG/1
6.25 TABLET ORAL DAILY
Status: DISCONTINUED | OUTPATIENT
Start: 2019-11-30 | End: 2019-12-02 | Stop reason: HOSPADM

## 2019-11-29 RX ORDER — PANTOPRAZOLE SODIUM 20 MG/1
20 TABLET, DELAYED RELEASE ORAL
Status: DISCONTINUED | OUTPATIENT
Start: 2019-11-30 | End: 2019-12-02 | Stop reason: HOSPADM

## 2019-11-29 RX ORDER — ACETAMINOPHEN 325 MG/1
650 TABLET ORAL EVERY 4 HOURS PRN
Status: DISCONTINUED | OUTPATIENT
Start: 2019-11-29 | End: 2019-12-02 | Stop reason: HOSPADM

## 2019-11-29 RX ORDER — ZOLPIDEM TARTRATE 5 MG/1
5 TABLET ORAL NIGHTLY PRN
COMMUNITY
End: 2020-02-12

## 2019-11-29 RX ORDER — IPRATROPIUM BROMIDE AND ALBUTEROL SULFATE 2.5; .5 MG/3ML; MG/3ML
1 SOLUTION RESPIRATORY (INHALATION) ONCE
Status: COMPLETED | OUTPATIENT
Start: 2019-11-29 | End: 2019-11-29

## 2019-11-29 RX ORDER — LOSARTAN POTASSIUM AND HYDROCHLOROTHIAZIDE 12.5; 5 MG/1; MG/1
0.5 TABLET ORAL DAILY
Status: DISCONTINUED | OUTPATIENT
Start: 2019-11-30 | End: 2019-11-29 | Stop reason: CLARIF

## 2019-11-29 RX ORDER — NICOTINE 21 MG/24HR
1 PATCH, TRANSDERMAL 24 HOURS TRANSDERMAL EVERY 24 HOURS
COMMUNITY
End: 2020-02-12

## 2019-11-29 RX ORDER — SODIUM CHLORIDE 0.9 % (FLUSH) 0.9 %
10 SYRINGE (ML) INJECTION EVERY 12 HOURS SCHEDULED
Status: DISCONTINUED | OUTPATIENT
Start: 2019-11-29 | End: 2019-12-02 | Stop reason: HOSPADM

## 2019-11-29 RX ORDER — ONDANSETRON 2 MG/ML
4 INJECTION INTRAMUSCULAR; INTRAVENOUS EVERY 6 HOURS PRN
Status: DISCONTINUED | OUTPATIENT
Start: 2019-11-29 | End: 2019-12-02 | Stop reason: HOSPADM

## 2019-11-29 RX ORDER — ATORVASTATIN CALCIUM 10 MG/1
10 TABLET, FILM COATED ORAL DAILY
Status: DISCONTINUED | OUTPATIENT
Start: 2019-11-30 | End: 2019-12-02 | Stop reason: HOSPADM

## 2019-11-29 RX ORDER — ALBUTEROL SULFATE 2.5 MG/3ML
2.5 SOLUTION RESPIRATORY (INHALATION)
Status: DISCONTINUED | OUTPATIENT
Start: 2019-11-29 | End: 2019-12-02 | Stop reason: HOSPADM

## 2019-11-29 RX ORDER — LEVOTHYROXINE SODIUM 0.05 MG/1
50 TABLET ORAL DAILY
Status: DISCONTINUED | OUTPATIENT
Start: 2019-11-30 | End: 2019-12-02 | Stop reason: HOSPADM

## 2019-11-29 RX ORDER — METOPROLOL SUCCINATE 25 MG/1
25 TABLET, EXTENDED RELEASE ORAL DAILY
Status: DISCONTINUED | OUTPATIENT
Start: 2019-11-30 | End: 2019-12-02 | Stop reason: HOSPADM

## 2019-11-29 RX ORDER — SODIUM CHLORIDE 9 MG/ML
INJECTION, SOLUTION INTRAVENOUS CONTINUOUS
Status: DISCONTINUED | OUTPATIENT
Start: 2019-11-29 | End: 2019-11-30

## 2019-11-29 RX ORDER — IPRATROPIUM BROMIDE AND ALBUTEROL SULFATE 2.5; .5 MG/3ML; MG/3ML
1 SOLUTION RESPIRATORY (INHALATION) EVERY 4 HOURS
Status: DISCONTINUED | OUTPATIENT
Start: 2019-11-30 | End: 2019-11-30

## 2019-11-29 RX ORDER — ASPIRIN 325 MG
325 TABLET ORAL ONCE
Status: COMPLETED | OUTPATIENT
Start: 2019-11-29 | End: 2019-11-29

## 2019-11-29 RX ORDER — MORPHINE SULFATE 2 MG/ML
2 INJECTION, SOLUTION INTRAMUSCULAR; INTRAVENOUS
Status: DISCONTINUED | OUTPATIENT
Start: 2019-11-29 | End: 2019-12-02 | Stop reason: HOSPADM

## 2019-11-29 RX ORDER — LOSARTAN POTASSIUM 25 MG/1
25 TABLET ORAL DAILY
Status: DISCONTINUED | OUTPATIENT
Start: 2019-11-30 | End: 2019-12-02 | Stop reason: HOSPADM

## 2019-11-29 RX ORDER — LANOLIN ALCOHOL/MO/W.PET/CERES
6 CREAM (GRAM) TOPICAL NIGHTLY PRN
Status: DISCONTINUED | OUTPATIENT
Start: 2019-11-29 | End: 2019-12-02 | Stop reason: HOSPADM

## 2019-11-29 RX ORDER — METHYLPREDNISOLONE SODIUM SUCCINATE 125 MG/2ML
125 INJECTION, POWDER, LYOPHILIZED, FOR SOLUTION INTRAMUSCULAR; INTRAVENOUS ONCE
Status: COMPLETED | OUTPATIENT
Start: 2019-11-29 | End: 2019-11-29

## 2019-11-29 RX ORDER — SULFASALAZINE 500 MG/1
500 TABLET, DELAYED RELEASE ORAL 4 TIMES DAILY
Status: DISCONTINUED | OUTPATIENT
Start: 2019-11-30 | End: 2019-12-02 | Stop reason: HOSPADM

## 2019-11-29 RX ORDER — DOXYCYCLINE HYCLATE 100 MG
100 TABLET ORAL EVERY 12 HOURS SCHEDULED
Status: DISCONTINUED | OUTPATIENT
Start: 2019-11-30 | End: 2019-12-02 | Stop reason: HOSPADM

## 2019-11-29 RX ADMIN — DOXYCYCLINE HYCLATE 100 MG: 100 TABLET, COATED ORAL at 20:00

## 2019-11-29 RX ADMIN — IPRATROPIUM BROMIDE AND ALBUTEROL SULFATE 1 AMPULE: .5; 3 SOLUTION RESPIRATORY (INHALATION) at 18:51

## 2019-11-29 RX ADMIN — Medication 325 MG: at 19:45

## 2019-11-29 RX ADMIN — SODIUM CHLORIDE 500 ML: 9 INJECTION, SOLUTION INTRAVENOUS at 18:50

## 2019-11-29 RX ADMIN — IPRATROPIUM BROMIDE AND ALBUTEROL SULFATE 1 AMPULE: .5; 3 SOLUTION RESPIRATORY (INHALATION) at 23:15

## 2019-11-29 RX ADMIN — METHYLPREDNISOLONE SODIUM SUCCINATE 125 MG: 125 INJECTION, POWDER, FOR SOLUTION INTRAMUSCULAR; INTRAVENOUS at 18:51

## 2019-11-29 ASSESSMENT — PAIN DESCRIPTION - PAIN TYPE: TYPE: ACUTE PAIN

## 2019-11-29 ASSESSMENT — PAIN DESCRIPTION - LOCATION: LOCATION: CHEST

## 2019-11-29 ASSESSMENT — PAIN SCALES - GENERAL: PAINLEVEL_OUTOF10: 6

## 2019-11-30 PROBLEM — J44.1 COPD WITH ACUTE EXACERBATION (HCC): Status: ACTIVE | Noted: 2019-11-30

## 2019-11-30 PROBLEM — R05.9 COUGH: Status: ACTIVE | Noted: 2019-11-30

## 2019-11-30 LAB
A/G RATIO: 1.2 (ref 1.1–2.2)
ALBUMIN SERPL-MCNC: 3.7 G/DL (ref 3.4–5)
ALP BLD-CCNC: 77 U/L (ref 40–129)
ALT SERPL-CCNC: 15 U/L (ref 10–40)
ANION GAP SERPL CALCULATED.3IONS-SCNC: 13 MMOL/L (ref 3–16)
AST SERPL-CCNC: 20 U/L (ref 15–37)
BASOPHILS ABSOLUTE: 0 K/UL (ref 0–0.2)
BASOPHILS RELATIVE PERCENT: 0.4 %
BILIRUB SERPL-MCNC: 0.3 MG/DL (ref 0–1)
BUN BLDV-MCNC: 10 MG/DL (ref 7–20)
CALCIUM SERPL-MCNC: 9.6 MG/DL (ref 8.3–10.6)
CHLORIDE BLD-SCNC: 100 MMOL/L (ref 99–110)
CO2: 24 MMOL/L (ref 21–32)
CREAT SERPL-MCNC: <0.5 MG/DL (ref 0.6–1.1)
EKG ATRIAL RATE: 95 BPM
EKG DIAGNOSIS: NORMAL
EKG P AXIS: 82 DEGREES
EKG P-R INTERVAL: 158 MS
EKG Q-T INTERVAL: 360 MS
EKG QRS DURATION: 86 MS
EKG QTC CALCULATION (BAZETT): 452 MS
EKG R AXIS: 85 DEGREES
EKG T AXIS: 72 DEGREES
EKG VENTRICULAR RATE: 95 BPM
EOSINOPHILS ABSOLUTE: 0 K/UL (ref 0–0.6)
EOSINOPHILS RELATIVE PERCENT: 0.3 %
GFR AFRICAN AMERICAN: >60
GFR NON-AFRICAN AMERICAN: >60
GLOBULIN: 3.2 G/DL
GLUCOSE BLD-MCNC: 136 MG/DL (ref 70–99)
HCT VFR BLD CALC: 39.7 % (ref 36–48)
HEMOGLOBIN: 12.8 G/DL (ref 12–16)
LYMPHOCYTES ABSOLUTE: 1.5 K/UL (ref 1–5.1)
LYMPHOCYTES RELATIVE PERCENT: 16 %
MCH RBC QN AUTO: 30.5 PG (ref 26–34)
MCHC RBC AUTO-ENTMCNC: 32.2 G/DL (ref 31–36)
MCV RBC AUTO: 94.7 FL (ref 80–100)
MONOCYTES ABSOLUTE: 0.5 K/UL (ref 0–1.3)
MONOCYTES RELATIVE PERCENT: 4.7 %
NEUTROPHILS ABSOLUTE: 7.5 K/UL (ref 1.7–7.7)
NEUTROPHILS RELATIVE PERCENT: 78.6 %
PDW BLD-RTO: 14.5 % (ref 12.4–15.4)
PLATELET # BLD: 467 K/UL (ref 135–450)
PMV BLD AUTO: 8.7 FL (ref 5–10.5)
POTASSIUM REFLEX MAGNESIUM: 4.1 MMOL/L (ref 3.5–5.1)
RAPID INFLUENZA  B AGN: NEGATIVE
RAPID INFLUENZA A AGN: NEGATIVE
RBC # BLD: 4.2 M/UL (ref 4–5.2)
SODIUM BLD-SCNC: 137 MMOL/L (ref 136–145)
TOTAL PROTEIN: 6.9 G/DL (ref 6.4–8.2)
WBC # BLD: 9.5 K/UL (ref 4–11)

## 2019-11-30 PROCEDURE — 93010 ELECTROCARDIOGRAM REPORT: CPT | Performed by: INTERNAL MEDICINE

## 2019-11-30 PROCEDURE — 99223 1ST HOSP IP/OBS HIGH 75: CPT | Performed by: INTERNAL MEDICINE

## 2019-11-30 PROCEDURE — 94640 AIRWAY INHALATION TREATMENT: CPT

## 2019-11-30 PROCEDURE — 87205 SMEAR GRAM STAIN: CPT

## 2019-11-30 PROCEDURE — 85025 COMPLETE CBC W/AUTO DIFF WBC: CPT

## 2019-11-30 PROCEDURE — 1200000000 HC SEMI PRIVATE

## 2019-11-30 PROCEDURE — 6360000002 HC RX W HCPCS: Performed by: INTERNAL MEDICINE

## 2019-11-30 PROCEDURE — 6370000000 HC RX 637 (ALT 250 FOR IP): Performed by: INTERNAL MEDICINE

## 2019-11-30 PROCEDURE — 87804 INFLUENZA ASSAY W/OPTIC: CPT

## 2019-11-30 PROCEDURE — 2700000000 HC OXYGEN THERAPY PER DAY

## 2019-11-30 PROCEDURE — 94150 VITAL CAPACITY TEST: CPT

## 2019-11-30 PROCEDURE — 87070 CULTURE OTHR SPECIMN AEROBIC: CPT

## 2019-11-30 PROCEDURE — 2580000003 HC RX 258: Performed by: INTERNAL MEDICINE

## 2019-11-30 PROCEDURE — 99232 SBSQ HOSP IP/OBS MODERATE 35: CPT | Performed by: INTERNAL MEDICINE

## 2019-11-30 PROCEDURE — 94761 N-INVAS EAR/PLS OXIMETRY MLT: CPT

## 2019-11-30 PROCEDURE — 80053 COMPREHEN METABOLIC PANEL: CPT

## 2019-11-30 PROCEDURE — 36415 COLL VENOUS BLD VENIPUNCTURE: CPT

## 2019-11-30 RX ORDER — METHYLPREDNISOLONE SODIUM SUCCINATE 40 MG/ML
40 INJECTION, POWDER, LYOPHILIZED, FOR SOLUTION INTRAMUSCULAR; INTRAVENOUS DAILY
Status: DISCONTINUED | OUTPATIENT
Start: 2019-11-30 | End: 2019-12-02

## 2019-11-30 RX ORDER — NICOTINE 21 MG/24HR
1 PATCH, TRANSDERMAL 24 HOURS TRANSDERMAL DAILY
Status: DISCONTINUED | OUTPATIENT
Start: 2019-11-30 | End: 2019-12-02 | Stop reason: HOSPADM

## 2019-11-30 RX ORDER — IPRATROPIUM BROMIDE AND ALBUTEROL SULFATE 2.5; .5 MG/3ML; MG/3ML
1 SOLUTION RESPIRATORY (INHALATION) EVERY 6 HOURS
Status: DISCONTINUED | OUTPATIENT
Start: 2019-12-01 | End: 2019-12-02 | Stop reason: HOSPADM

## 2019-11-30 RX ADMIN — IPRATROPIUM BROMIDE AND ALBUTEROL SULFATE 1 AMPULE: .5; 3 SOLUTION RESPIRATORY (INHALATION) at 07:00

## 2019-11-30 RX ADMIN — GABAPENTIN 300 MG: 300 CAPSULE ORAL at 00:37

## 2019-11-30 RX ADMIN — Medication 5 ML: at 17:32

## 2019-11-30 RX ADMIN — IPRATROPIUM BROMIDE AND ALBUTEROL SULFATE 1 AMPULE: .5; 3 SOLUTION RESPIRATORY (INHALATION) at 19:50

## 2019-11-30 RX ADMIN — Medication 5 ML: at 13:37

## 2019-11-30 RX ADMIN — DOXYCYCLINE HYCLATE 100 MG: 100 TABLET, COATED ORAL at 09:34

## 2019-11-30 RX ADMIN — MELATONIN 3 MG ORAL TABLET 6 MG: 3 TABLET ORAL at 00:37

## 2019-11-30 RX ADMIN — Medication 10 ML: at 13:37

## 2019-11-30 RX ADMIN — MELATONIN 3 MG ORAL TABLET 6 MG: 3 TABLET ORAL at 22:08

## 2019-11-30 RX ADMIN — LOSARTAN POTASSIUM 25 MG: 25 TABLET, FILM COATED ORAL at 09:34

## 2019-11-30 RX ADMIN — SODIUM CHLORIDE: 9 INJECTION, SOLUTION INTRAVENOUS at 00:37

## 2019-11-30 RX ADMIN — Medication 10 ML: at 20:19

## 2019-11-30 RX ADMIN — DOXYCYCLINE HYCLATE 100 MG: 100 TABLET, COATED ORAL at 20:19

## 2019-11-30 RX ADMIN — SULFASALAZINE 500 MG: 500 TABLET, DELAYED RELEASE ORAL at 09:33

## 2019-11-30 RX ADMIN — IPRATROPIUM BROMIDE AND ALBUTEROL SULFATE 1 AMPULE: .5; 3 SOLUTION RESPIRATORY (INHALATION) at 03:05

## 2019-11-30 RX ADMIN — IPRATROPIUM BROMIDE AND ALBUTEROL SULFATE 1 AMPULE: .5; 3 SOLUTION RESPIRATORY (INHALATION) at 14:54

## 2019-11-30 RX ADMIN — PANTOPRAZOLE SODIUM 20 MG: 20 TABLET, DELAYED RELEASE ORAL at 09:34

## 2019-11-30 RX ADMIN — GABAPENTIN 300 MG: 300 CAPSULE ORAL at 20:19

## 2019-11-30 RX ADMIN — ATORVASTATIN CALCIUM 10 MG: 10 TABLET, FILM COATED ORAL at 09:33

## 2019-11-30 RX ADMIN — SULFASALAZINE 500 MG: 500 TABLET, DELAYED RELEASE ORAL at 16:35

## 2019-11-30 RX ADMIN — IPRATROPIUM BROMIDE AND ALBUTEROL SULFATE 1 AMPULE: .5; 3 SOLUTION RESPIRATORY (INHALATION) at 23:35

## 2019-11-30 RX ADMIN — HYDROCHLOROTHIAZIDE 6.25 MG: 25 TABLET ORAL at 09:34

## 2019-11-30 RX ADMIN — PANTOPRAZOLE SODIUM 20 MG: 20 TABLET, DELAYED RELEASE ORAL at 16:35

## 2019-11-30 RX ADMIN — ENOXAPARIN SODIUM 40 MG: 40 INJECTION SUBCUTANEOUS at 09:34

## 2019-11-30 RX ADMIN — LEVOTHYROXINE SODIUM 50 MCG: 50 TABLET ORAL at 09:32

## 2019-11-30 RX ADMIN — Medication 10 ML: at 00:37

## 2019-11-30 RX ADMIN — METOPROLOL SUCCINATE 25 MG: 25 TABLET, FILM COATED, EXTENDED RELEASE ORAL at 09:34

## 2019-11-30 RX ADMIN — SULFASALAZINE 500 MG: 500 TABLET, DELAYED RELEASE ORAL at 13:37

## 2019-11-30 RX ADMIN — Medication 10 ML: at 09:35

## 2019-11-30 RX ADMIN — SULFASALAZINE 500 MG: 500 TABLET, DELAYED RELEASE ORAL at 20:19

## 2019-11-30 RX ADMIN — GABAPENTIN 300 MG: 300 CAPSULE ORAL at 09:34

## 2019-11-30 RX ADMIN — Medication 5 ML: at 22:08

## 2019-11-30 RX ADMIN — FOLIC ACID 1 MG: 1 TABLET ORAL at 09:33

## 2019-11-30 RX ADMIN — IPRATROPIUM BROMIDE AND ALBUTEROL SULFATE 1 AMPULE: .5; 3 SOLUTION RESPIRATORY (INHALATION) at 10:36

## 2019-11-30 RX ADMIN — METHYLPREDNISOLONE SODIUM SUCCINATE 40 MG: 40 INJECTION, POWDER, FOR SOLUTION INTRAMUSCULAR; INTRAVENOUS at 13:37

## 2019-11-30 ASSESSMENT — PAIN SCALES - GENERAL: PAINLEVEL_OUTOF10: 0

## 2019-12-01 PROCEDURE — 99231 SBSQ HOSP IP/OBS SF/LOW 25: CPT | Performed by: NURSE PRACTITIONER

## 2019-12-01 PROCEDURE — 6360000002 HC RX W HCPCS: Performed by: INTERNAL MEDICINE

## 2019-12-01 PROCEDURE — 6370000000 HC RX 637 (ALT 250 FOR IP): Performed by: INTERNAL MEDICINE

## 2019-12-01 PROCEDURE — 94761 N-INVAS EAR/PLS OXIMETRY MLT: CPT

## 2019-12-01 PROCEDURE — 2700000000 HC OXYGEN THERAPY PER DAY

## 2019-12-01 PROCEDURE — 99232 SBSQ HOSP IP/OBS MODERATE 35: CPT | Performed by: INTERNAL MEDICINE

## 2019-12-01 PROCEDURE — 1200000000 HC SEMI PRIVATE

## 2019-12-01 PROCEDURE — 2580000003 HC RX 258: Performed by: INTERNAL MEDICINE

## 2019-12-01 PROCEDURE — 94640 AIRWAY INHALATION TREATMENT: CPT

## 2019-12-01 RX ADMIN — Medication 10 ML: at 08:24

## 2019-12-01 RX ADMIN — GABAPENTIN 300 MG: 300 CAPSULE ORAL at 21:19

## 2019-12-01 RX ADMIN — LOSARTAN POTASSIUM 25 MG: 25 TABLET, FILM COATED ORAL at 08:13

## 2019-12-01 RX ADMIN — Medication 10 ML: at 21:19

## 2019-12-01 RX ADMIN — Medication 5 ML: at 05:03

## 2019-12-01 RX ADMIN — DOXYCYCLINE HYCLATE 100 MG: 100 TABLET, COATED ORAL at 09:11

## 2019-12-01 RX ADMIN — MELATONIN 3 MG ORAL TABLET 6 MG: 3 TABLET ORAL at 21:19

## 2019-12-01 RX ADMIN — LEVOTHYROXINE SODIUM 50 MCG: 50 TABLET ORAL at 05:03

## 2019-12-01 RX ADMIN — ACETAMINOPHEN 650 MG: 325 TABLET ORAL at 21:19

## 2019-12-01 RX ADMIN — DOXYCYCLINE HYCLATE 100 MG: 100 TABLET, COATED ORAL at 21:19

## 2019-12-01 RX ADMIN — PANTOPRAZOLE SODIUM 20 MG: 20 TABLET, DELAYED RELEASE ORAL at 17:35

## 2019-12-01 RX ADMIN — SULFASALAZINE 500 MG: 500 TABLET, DELAYED RELEASE ORAL at 21:19

## 2019-12-01 RX ADMIN — Medication 5 ML: at 12:58

## 2019-12-01 RX ADMIN — ENOXAPARIN SODIUM 40 MG: 40 INJECTION SUBCUTANEOUS at 08:18

## 2019-12-01 RX ADMIN — IPRATROPIUM BROMIDE AND ALBUTEROL SULFATE 1 AMPULE: 2.5; .5 SOLUTION RESPIRATORY (INHALATION) at 06:49

## 2019-12-01 RX ADMIN — IPRATROPIUM BROMIDE AND ALBUTEROL SULFATE 1 AMPULE: 2.5; .5 SOLUTION RESPIRATORY (INHALATION) at 19:35

## 2019-12-01 RX ADMIN — ATORVASTATIN CALCIUM 10 MG: 10 TABLET, FILM COATED ORAL at 08:15

## 2019-12-01 RX ADMIN — PANTOPRAZOLE SODIUM 20 MG: 20 TABLET, DELAYED RELEASE ORAL at 05:03

## 2019-12-01 RX ADMIN — SULFASALAZINE 500 MG: 500 TABLET, DELAYED RELEASE ORAL at 12:48

## 2019-12-01 RX ADMIN — Medication 5 ML: at 17:35

## 2019-12-01 RX ADMIN — Medication 5 ML: at 09:11

## 2019-12-01 RX ADMIN — METOPROLOL SUCCINATE 25 MG: 25 TABLET, FILM COATED, EXTENDED RELEASE ORAL at 08:13

## 2019-12-01 RX ADMIN — FOLIC ACID 1 MG: 1 TABLET ORAL at 08:14

## 2019-12-01 RX ADMIN — HYDROCHLOROTHIAZIDE 6.25 MG: 25 TABLET ORAL at 08:14

## 2019-12-01 RX ADMIN — METHYLPREDNISOLONE SODIUM SUCCINATE 40 MG: 40 INJECTION, POWDER, FOR SOLUTION INTRAMUSCULAR; INTRAVENOUS at 08:14

## 2019-12-01 RX ADMIN — SULFASALAZINE 500 MG: 500 TABLET, DELAYED RELEASE ORAL at 08:18

## 2019-12-01 RX ADMIN — IPRATROPIUM BROMIDE AND ALBUTEROL SULFATE 1 AMPULE: 2.5; .5 SOLUTION RESPIRATORY (INHALATION) at 13:20

## 2019-12-01 RX ADMIN — GABAPENTIN 300 MG: 300 CAPSULE ORAL at 08:13

## 2019-12-01 RX ADMIN — SULFASALAZINE 500 MG: 500 TABLET, DELAYED RELEASE ORAL at 17:35

## 2019-12-01 RX ADMIN — Medication 5 ML: at 21:19

## 2019-12-01 ASSESSMENT — PAIN SCALES - GENERAL: PAINLEVEL_OUTOF10: 3

## 2019-12-02 VITALS
OXYGEN SATURATION: 97 % | RESPIRATION RATE: 18 BRPM | SYSTOLIC BLOOD PRESSURE: 117 MMHG | DIASTOLIC BLOOD PRESSURE: 66 MMHG | HEIGHT: 64 IN | TEMPERATURE: 98.3 F | HEART RATE: 95 BPM | BODY MASS INDEX: 18.27 KG/M2 | WEIGHT: 107 LBS

## 2019-12-02 LAB
CULTURE, RESPIRATORY: NORMAL
GRAM STAIN RESULT: NORMAL

## 2019-12-02 PROCEDURE — 2580000003 HC RX 258: Performed by: INTERNAL MEDICINE

## 2019-12-02 PROCEDURE — 2700000000 HC OXYGEN THERAPY PER DAY

## 2019-12-02 PROCEDURE — 94640 AIRWAY INHALATION TREATMENT: CPT

## 2019-12-02 PROCEDURE — 99238 HOSP IP/OBS DSCHRG MGMT 30/<: CPT | Performed by: INTERNAL MEDICINE

## 2019-12-02 PROCEDURE — 6360000002 HC RX W HCPCS: Performed by: INTERNAL MEDICINE

## 2019-12-02 PROCEDURE — 6370000000 HC RX 637 (ALT 250 FOR IP): Performed by: INTERNAL MEDICINE

## 2019-12-02 PROCEDURE — 99232 SBSQ HOSP IP/OBS MODERATE 35: CPT | Performed by: INTERNAL MEDICINE

## 2019-12-02 PROCEDURE — 94761 N-INVAS EAR/PLS OXIMETRY MLT: CPT

## 2019-12-02 RX ORDER — PREDNISONE 20 MG/1
40 TABLET ORAL DAILY
Status: DISCONTINUED | OUTPATIENT
Start: 2019-12-02 | End: 2019-12-02 | Stop reason: HOSPADM

## 2019-12-02 RX ORDER — PREDNISONE 10 MG/1
TABLET ORAL
Qty: 18 TABLET | Refills: 0 | Status: SHIPPED | OUTPATIENT
Start: 2019-12-02 | End: 2020-02-12 | Stop reason: ALTCHOICE

## 2019-12-02 RX ORDER — DOXYCYCLINE HYCLATE 100 MG
100 TABLET ORAL EVERY 12 HOURS SCHEDULED
Qty: 8 TABLET | Refills: 0 | Status: SHIPPED | OUTPATIENT
Start: 2019-12-02 | End: 2019-12-06

## 2019-12-02 RX ORDER — AMOXICILLIN 500 MG/1
1000 CAPSULE ORAL EVERY 12 HOURS
COMMUNITY
Start: 2019-12-07 | End: 2020-11-05

## 2019-12-02 RX ADMIN — IPRATROPIUM BROMIDE AND ALBUTEROL SULFATE 1 AMPULE: 2.5; .5 SOLUTION RESPIRATORY (INHALATION) at 07:08

## 2019-12-02 RX ADMIN — LOSARTAN POTASSIUM 25 MG: 25 TABLET, FILM COATED ORAL at 09:41

## 2019-12-02 RX ADMIN — Medication 5 ML: at 05:46

## 2019-12-02 RX ADMIN — ATORVASTATIN CALCIUM 10 MG: 10 TABLET, FILM COATED ORAL at 09:41

## 2019-12-02 RX ADMIN — SULFASALAZINE 500 MG: 500 TABLET, DELAYED RELEASE ORAL at 09:41

## 2019-12-02 RX ADMIN — Medication 10 ML: at 09:39

## 2019-12-02 RX ADMIN — IPRATROPIUM BROMIDE AND ALBUTEROL SULFATE 1 AMPULE: 2.5; .5 SOLUTION RESPIRATORY (INHALATION) at 14:35

## 2019-12-02 RX ADMIN — LEVOTHYROXINE SODIUM 50 MCG: 50 TABLET ORAL at 05:43

## 2019-12-02 RX ADMIN — GABAPENTIN 300 MG: 300 CAPSULE ORAL at 09:40

## 2019-12-02 RX ADMIN — PREDNISONE 40 MG: 20 TABLET ORAL at 10:36

## 2019-12-02 RX ADMIN — IPRATROPIUM BROMIDE AND ALBUTEROL SULFATE 1 AMPULE: 2.5; .5 SOLUTION RESPIRATORY (INHALATION) at 01:15

## 2019-12-02 RX ADMIN — DOXYCYCLINE HYCLATE 100 MG: 100 TABLET, COATED ORAL at 09:41

## 2019-12-02 RX ADMIN — FOLIC ACID 1 MG: 1 TABLET ORAL at 09:41

## 2019-12-02 RX ADMIN — HYDROCHLOROTHIAZIDE 6.25 MG: 25 TABLET ORAL at 09:40

## 2019-12-02 RX ADMIN — ENOXAPARIN SODIUM 40 MG: 40 INJECTION SUBCUTANEOUS at 09:39

## 2019-12-02 RX ADMIN — SULFASALAZINE 500 MG: 500 TABLET, DELAYED RELEASE ORAL at 13:38

## 2019-12-02 RX ADMIN — PANTOPRAZOLE SODIUM 20 MG: 20 TABLET, DELAYED RELEASE ORAL at 05:43

## 2019-12-02 RX ADMIN — METOPROLOL SUCCINATE 25 MG: 25 TABLET, FILM COATED, EXTENDED RELEASE ORAL at 09:41

## 2019-12-06 ENCOUNTER — HOSPITAL ENCOUNTER (OUTPATIENT)
Dept: CARDIAC REHAB | Age: 59
Setting detail: THERAPIES SERIES
Discharge: HOME | End: 2019-12-06
Payer: MEDICARE

## 2019-12-06 PROCEDURE — G0424 PULMONARY REHAB W EXER: HCPCS

## 2019-12-09 ENCOUNTER — HOSPITAL ENCOUNTER (OUTPATIENT)
Dept: CARDIAC REHAB | Age: 59
Setting detail: THERAPIES SERIES
Discharge: HOME | End: 2019-12-09
Payer: MEDICARE

## 2019-12-09 PROCEDURE — G0424 PULMONARY REHAB W EXER: HCPCS

## 2019-12-11 ENCOUNTER — HOSPITAL ENCOUNTER (OUTPATIENT)
Dept: CARDIAC REHAB | Age: 59
Setting detail: THERAPIES SERIES
Discharge: HOME | End: 2019-12-11
Payer: MEDICARE

## 2019-12-11 PROCEDURE — G0424 PULMONARY REHAB W EXER: HCPCS

## 2019-12-13 ENCOUNTER — HOSPITAL ENCOUNTER (OUTPATIENT)
Dept: CARDIAC REHAB | Age: 59
Setting detail: THERAPIES SERIES
Discharge: HOME | End: 2019-12-13
Payer: MEDICARE

## 2019-12-13 PROCEDURE — G0424 PULMONARY REHAB W EXER: HCPCS

## 2019-12-18 ENCOUNTER — HOSPITAL ENCOUNTER (OUTPATIENT)
Dept: CARDIAC REHAB | Age: 59
Setting detail: THERAPIES SERIES
Discharge: HOME | End: 2019-12-18
Payer: MEDICARE

## 2019-12-18 PROCEDURE — G0424 PULMONARY REHAB W EXER: HCPCS

## 2019-12-20 ENCOUNTER — HOSPITAL ENCOUNTER (OUTPATIENT)
Dept: CARDIAC REHAB | Age: 59
Setting detail: THERAPIES SERIES
Discharge: HOME | End: 2019-12-20
Payer: MEDICARE

## 2019-12-20 PROCEDURE — G0424 PULMONARY REHAB W EXER: HCPCS

## 2019-12-23 ENCOUNTER — HOSPITAL ENCOUNTER (OUTPATIENT)
Dept: CARDIAC REHAB | Age: 59
Setting detail: THERAPIES SERIES
Discharge: HOME | End: 2019-12-23
Payer: MEDICARE

## 2019-12-23 PROCEDURE — G0424 PULMONARY REHAB W EXER: HCPCS

## 2019-12-27 ENCOUNTER — HOSPITAL ENCOUNTER (OUTPATIENT)
Dept: CARDIAC REHAB | Age: 59
Setting detail: THERAPIES SERIES
Discharge: HOME | End: 2019-12-27
Payer: MEDICARE

## 2019-12-27 PROCEDURE — G0424 PULMONARY REHAB W EXER: HCPCS

## 2019-12-30 ENCOUNTER — HOSPITAL ENCOUNTER (OUTPATIENT)
Dept: CARDIAC REHAB | Age: 59
Setting detail: THERAPIES SERIES
Discharge: HOME | End: 2019-12-30
Payer: MEDICARE

## 2019-12-30 PROCEDURE — 93798 PHYS/QHP OP CAR RHAB W/ECG: CPT

## 2019-12-30 PROCEDURE — G0424 PULMONARY REHAB W EXER: HCPCS

## 2020-01-01 PROBLEM — R05.9 COUGH: Status: RESOLVED | Noted: 2019-11-30 | Resolved: 2020-01-01

## 2020-01-03 ENCOUNTER — HOSPITAL ENCOUNTER (OUTPATIENT)
Dept: CARDIAC REHAB | Age: 60
Setting detail: THERAPIES SERIES
Discharge: HOME | End: 2020-01-03
Payer: MEDICARE

## 2020-01-03 PROCEDURE — G0424 PULMONARY REHAB W EXER: HCPCS

## 2020-01-06 ENCOUNTER — HOSPITAL ENCOUNTER (OUTPATIENT)
Dept: CARDIAC REHAB | Age: 60
Setting detail: THERAPIES SERIES
Discharge: HOME | End: 2020-01-06
Payer: MEDICARE

## 2020-01-06 PROCEDURE — G0424 PULMONARY REHAB W EXER: HCPCS

## 2020-01-08 ENCOUNTER — HOSPITAL ENCOUNTER (OUTPATIENT)
Dept: CARDIAC REHAB | Age: 60
Setting detail: THERAPIES SERIES
Discharge: HOME | End: 2020-01-08
Payer: MEDICARE

## 2020-01-08 PROCEDURE — G0424 PULMONARY REHAB W EXER: HCPCS

## 2020-01-13 ENCOUNTER — HOSPITAL ENCOUNTER (OUTPATIENT)
Dept: CARDIAC REHAB | Age: 60
Setting detail: THERAPIES SERIES
Discharge: HOME | End: 2020-01-13
Payer: MEDICARE

## 2020-01-13 PROCEDURE — G0424 PULMONARY REHAB W EXER: HCPCS

## 2020-01-15 ENCOUNTER — HOSPITAL ENCOUNTER (OUTPATIENT)
Dept: CARDIAC REHAB | Age: 60
Setting detail: THERAPIES SERIES
Discharge: HOME | End: 2020-01-15
Payer: MEDICARE

## 2020-01-15 PROCEDURE — 93798 PHYS/QHP OP CAR RHAB W/ECG: CPT

## 2020-01-15 PROCEDURE — G0424 PULMONARY REHAB W EXER: HCPCS

## 2020-01-17 ENCOUNTER — HOSPITAL ENCOUNTER (OUTPATIENT)
Dept: CARDIAC REHAB | Age: 60
Setting detail: THERAPIES SERIES
Discharge: HOME | End: 2020-01-17
Payer: MEDICARE

## 2020-01-17 PROCEDURE — G0424 PULMONARY REHAB W EXER: HCPCS

## 2020-01-22 ENCOUNTER — HOSPITAL ENCOUNTER (OUTPATIENT)
Dept: CARDIAC REHAB | Age: 60
Setting detail: THERAPIES SERIES
Discharge: HOME | End: 2020-01-22
Payer: MEDICARE

## 2020-01-22 LAB
GLUCOSE BLD-MCNC: 100 MG/DL (ref 70–99)
PERFORMED ON: ABNORMAL

## 2020-01-22 PROCEDURE — G0424 PULMONARY REHAB W EXER: HCPCS

## 2020-01-24 ENCOUNTER — HOSPITAL ENCOUNTER (OUTPATIENT)
Dept: CARDIAC REHAB | Age: 60
Setting detail: THERAPIES SERIES
Discharge: HOME | End: 2020-01-24
Payer: MEDICARE

## 2020-01-24 PROCEDURE — G0424 PULMONARY REHAB W EXER: HCPCS

## 2020-01-27 ENCOUNTER — HOSPITAL ENCOUNTER (OUTPATIENT)
Dept: CARDIAC REHAB | Age: 60
Setting detail: THERAPIES SERIES
Discharge: HOME | End: 2020-01-27
Payer: MEDICARE

## 2020-01-27 PROCEDURE — G0424 PULMONARY REHAB W EXER: HCPCS

## 2020-01-29 ENCOUNTER — HOSPITAL ENCOUNTER (OUTPATIENT)
Dept: CARDIAC REHAB | Age: 60
Setting detail: THERAPIES SERIES
Discharge: HOME | End: 2020-01-29
Payer: MEDICARE

## 2020-01-29 PROCEDURE — G0424 PULMONARY REHAB W EXER: HCPCS

## 2020-01-31 ENCOUNTER — HOSPITAL ENCOUNTER (OUTPATIENT)
Dept: CARDIAC REHAB | Age: 60
Setting detail: THERAPIES SERIES
Discharge: HOME | End: 2020-01-31
Payer: MEDICARE

## 2020-01-31 PROCEDURE — G0424 PULMONARY REHAB W EXER: HCPCS

## 2020-02-03 ENCOUNTER — HOSPITAL ENCOUNTER (OUTPATIENT)
Dept: CARDIAC REHAB | Age: 60
Setting detail: THERAPIES SERIES
Discharge: HOME | End: 2020-02-03
Payer: MEDICARE

## 2020-02-03 PROCEDURE — G0424 PULMONARY REHAB W EXER: HCPCS

## 2020-02-05 ENCOUNTER — HOSPITAL ENCOUNTER (OUTPATIENT)
Dept: CARDIAC REHAB | Age: 60
Setting detail: THERAPIES SERIES
Discharge: HOME | End: 2020-02-05
Payer: MEDICARE

## 2020-02-05 PROCEDURE — G0424 PULMONARY REHAB W EXER: HCPCS

## 2020-02-07 ENCOUNTER — HOSPITAL ENCOUNTER (OUTPATIENT)
Dept: CARDIAC REHAB | Age: 60
Setting detail: THERAPIES SERIES
Discharge: HOME | End: 2020-02-07
Payer: MEDICARE

## 2020-02-07 PROCEDURE — G0424 PULMONARY REHAB W EXER: HCPCS

## 2020-02-10 ENCOUNTER — HOSPITAL ENCOUNTER (OUTPATIENT)
Dept: CARDIAC REHAB | Age: 60
Setting detail: THERAPIES SERIES
Discharge: HOME | End: 2020-02-10
Payer: MEDICARE

## 2020-02-10 PROCEDURE — G0424 PULMONARY REHAB W EXER: HCPCS

## 2020-02-12 ENCOUNTER — HOSPITAL ENCOUNTER (OUTPATIENT)
Dept: CARDIAC REHAB | Age: 60
Setting detail: THERAPIES SERIES
Discharge: HOME | End: 2020-02-12
Payer: MEDICARE

## 2020-02-12 ENCOUNTER — OFFICE VISIT (OUTPATIENT)
Dept: ENDOCRINOLOGY | Age: 60
End: 2020-02-12
Payer: MEDICARE

## 2020-02-12 VITALS
WEIGHT: 113.8 LBS | OXYGEN SATURATION: 96 % | SYSTOLIC BLOOD PRESSURE: 152 MMHG | DIASTOLIC BLOOD PRESSURE: 95 MMHG | HEIGHT: 64 IN | BODY MASS INDEX: 19.43 KG/M2 | HEART RATE: 83 BPM

## 2020-02-12 PROCEDURE — G8484 FLU IMMUNIZE NO ADMIN: HCPCS | Performed by: INTERNAL MEDICINE

## 2020-02-12 PROCEDURE — 99214 OFFICE O/P EST MOD 30 MIN: CPT | Performed by: INTERNAL MEDICINE

## 2020-02-12 PROCEDURE — G8427 DOCREV CUR MEDS BY ELIG CLIN: HCPCS | Performed by: INTERNAL MEDICINE

## 2020-02-12 PROCEDURE — 3017F COLORECTAL CA SCREEN DOC REV: CPT | Performed by: INTERNAL MEDICINE

## 2020-02-12 PROCEDURE — 1036F TOBACCO NON-USER: CPT | Performed by: INTERNAL MEDICINE

## 2020-02-12 PROCEDURE — G0424 PULMONARY REHAB W EXER: HCPCS

## 2020-02-12 PROCEDURE — G8420 CALC BMI NORM PARAMETERS: HCPCS | Performed by: INTERNAL MEDICINE

## 2020-02-12 RX ORDER — DULOXETIN HYDROCHLORIDE 60 MG/1
60 CAPSULE, DELAYED RELEASE ORAL DAILY
COMMUNITY

## 2020-02-12 RX ORDER — QUETIAPINE FUMARATE 25 MG/1
TABLET, FILM COATED ORAL
COMMUNITY
Start: 2020-01-07 | End: 2020-06-23

## 2020-02-12 ASSESSMENT — ENCOUNTER SYMPTOMS
BACK PAIN: 0
PHOTOPHOBIA: 0
COUGH: 0

## 2020-02-12 NOTE — PROGRESS NOTES
Endocrinology  Mirian Miranda M.D. Phone: [phone]   FAX: [phone]       Larissa Capone [facility]   YOB: 1960    Date of Visit:  2/12/2020    No Known Allergies  Outpatient Medications Marked as Taking for the 2/12/20 encounter (Office Visit) with Genet Ramos MD   Medication Sig Dispense Refill    amoxicillin (AMOXIL) 500 MG capsule Take 2 capsules by mouth every 12 hours Resume after completion of course of DOXY      ciprofloxacin (CIPRO) 500 MG tablet Take 500 mg by mouth 2 times daily as needed Take only when she has a fever over 100.4      albuterol sulfate  (90 Base) MCG/ACT inhaler Inhale 1 puff into the lungs as needed for Wheezing or Shortness of Breath 1 Inhaler 5    losartan-hydrochlorothiazide (HYZAAR) 50-12.5 MG per tablet Take 1 tablet by mouth daily (Patient taking differently: Take 0.5 tablets by mouth daily ) 30 tablet 2    Acetaminophen (TYLENOL) 325 MG CAPS Take 975 mg by mouth as needed      atorvastatin (LIPITOR) 10 MG tablet Take 10 mg by mouth daily      ipratropium-albuterol (DUONEB) 0.5-2.5 (3) MG/3ML SOLN nebulizer solution Take 1 vial by nebulization every 4 hours as needed for Shortness of Breath   5    metoprolol succinate (TOPROL XL) 25 MG extended release tablet Take 1 tablet by mouth daily 30 tablet 11    fluticasone-salmeterol (ADVAIR) 250-50 MCG/DOSE AEPB Inhale 1 puff into the lungs every 12 hours      gabapentin (NEURONTIN) 300 MG capsule Take 300 mg by mouth 2 times daily.        levothyroxine (SYNTHROID) 50 MCG tablet Take 50 mcg by mouth Daily      ondansetron (ZOFRAN-ODT) 8 MG TBDP disintegrating tablet Place 8 mg under the tongue every 8 hours as needed for Nausea or Vomiting           Vitals:    02/12/20 1539 02/12/20 1544   BP: (!) 165/98 (!) 152/95   Site: Right Upper Arm Left Upper Arm   Position: Sitting Sitting   Cuff Size: Medium Adult Medium Adult   Pulse: 83    SpO2: 96%    Weight: 113 lb 12.8 oz (51.6 kg)    Height: 5' 4\" (1.626 m)      Body mass index is 19.53 kg/m². Wt Readings from Last 3 Encounters:   20 113 lb 12.8 oz (51.6 kg)   19 107 lb (48.5 kg)   19 106 lb 9.6 oz (48.4 kg)     BP Readings from Last 3 Encounters:   20 (!) 152/95   19 117/66   19 120/80        Past Medical History:   Diagnosis Date    Back pain     [facility] [address]     Hypertension     Syncope and collapse 2019    Thyroid disease     Ulcerative colitis, unspecified, without complications ([facility] [address]      Past Surgical History:   Procedure Laterality Date    BACK SURGERY      HYSTERECTOMY      SPLENECTOMY      TONSILLECTOMY       Family History   Problem Relation Age of Onset    Heart Disease Father     High Blood Pressure Sister      Social History     Tobacco Use   Smoking Status Former Smoker    Packs/day: 1.00    Years: 32.00    Pack years: 32.00    Types: Cigarettes    Last attempt to quit: 3/1/2019    Years since quittin.9   Smokeless Tobacco Never Used      Social History     Substance and Sexual Activity   Alcohol Use Never    Frequency: Never       HPI    Darlyn Stanton is a 63 y.o. female who is here for management of  adrenal mass. Dr. Corby Smith ( cardiology)      Patient has a PMH of HTN, COPD, respiratory failure,  Ulcerative colitis, hypothyroidism , hyperlipidemia. She was living in [place] and moved to [place] in Summer of  to be close to her grand kids. She has been experiencing spells of headache and HTN for 3-4 months. She is currently on Hyzaar 50-12.5 mg daily and Toprol XL 25 mg BID      Experiences headache, nausea, sweating and very high BP ( in 200s)    She was hospitalized in  for chest pain and very high BP , was seen and evaluated by cardiology    Plasma metaneprhine and urine metanephrine in the indeterminate range.        Patient had a CT scan of abdomen in  which showed 2 cm Right adrenal adenoma with HU WITHOUT AND WITH CONTRAST, 8/12/2019 3:23 pm       TECHNIQUE:   Multiplanar multisequence MRI of the abdomen was performed without and with   the administration of intravenous contrast.       COMPARISON:   Recent CT scan 06/11/2019       HISTORY:   ORDERING SYSTEM PROVIDED HISTORY: Adrenal nodule ([address]   TECHNOLOGIST PROVIDED HISTORY:   Reason for Exam: Abnormal findings on CT of abd/pelvis   Acuity: Acute   Type of Exam: Subsequent/Follow-up       FINDINGS:   There is mild respiratory motion artifact.       There is redemonstration of the previously described nodule in the right   adrenal gland.  This measures approximately 2.0 cm medial-lateral.  This   loses signal on out of phase imaging. , compatible with adenoma       No significant intrahepatic biliary ductal dilatation.       No hydronephrosis on the right.  No hydronephrosis on the left       T2 hyperintense focus is seen in the left kidney measuring 1.4 cm, likely cyst       Spleen is absent.       Mild spurring is seen in the spine.  Sacral nerve sheath cysts are seen,   incidentally noted       Postsurgical changes seen in the anterior abdominal wall. .           Impression   Right adrenal nodule has imaging features most compatible with benign adrenal   adenoma       Interval splenectomy since prior CT scan 06/11/2019     Hospital Outpatient Visit on 01/22/2020   Component Date Value Ref Range Status    POC Glucose 01/22/2020 100* 70 - 99 mg/dl Final    Performed on 01/22/2020 ACCU-CHEK   Final         Assessment/Plan      1. Elevated normetanephrines      This 63 yrs old female has been experiencing spells of headache , sweatingand high BP for 3-4 months. Was found to have 2 cm adrenal adenoma ( benign in appearance)    Her plasma and urine metanephrine were found to be in indeterminate range in 09/19 and 10/19     She is experiencing high BP, headaches.       -Will repeat her plasma metanephrine and 24 hr urine test again        2.  Right adrenal

## 2020-02-14 ENCOUNTER — HOSPITAL ENCOUNTER (OUTPATIENT)
Dept: CARDIAC REHAB | Age: 60
Setting detail: THERAPIES SERIES
Discharge: HOME | End: 2020-02-14
Payer: MEDICARE

## 2020-02-14 PROCEDURE — G0424 PULMONARY REHAB W EXER: HCPCS

## 2020-02-19 ENCOUNTER — HOSPITAL ENCOUNTER (OUTPATIENT)
Dept: CARDIAC REHAB | Age: 60
Setting detail: THERAPIES SERIES
Discharge: HOME | End: 2020-02-19
Payer: MEDICARE

## 2020-02-19 PROCEDURE — G0424 PULMONARY REHAB W EXER: HCPCS

## 2020-02-20 ENCOUNTER — TELEPHONE (OUTPATIENT)
Dept: ENDOCRINOLOGY | Age: 60
End: 2020-02-20

## 2020-02-21 ENCOUNTER — HOSPITAL ENCOUNTER (OUTPATIENT)
Dept: CARDIAC REHAB | Age: 60
Setting detail: THERAPIES SERIES
Discharge: HOME | End: 2020-02-21
Payer: MEDICARE

## 2020-02-21 ENCOUNTER — HOSPITAL ENCOUNTER (OUTPATIENT)
Age: 60
Discharge: HOME | End: 2020-02-21
Payer: MEDICARE

## 2020-02-21 ENCOUNTER — HOSPITAL ENCOUNTER (OUTPATIENT)
Dept: GENERAL RADIOLOGY | Age: 60
Discharge: HOME | End: 2020-02-21
Payer: MEDICARE

## 2020-02-21 ENCOUNTER — OFFICE VISIT (OUTPATIENT)
Dept: PULMONOLOGY | Age: 60
End: 2020-02-21
Payer: MEDICARE

## 2020-02-21 VITALS
HEIGHT: 64 IN | HEART RATE: 63 BPM | OXYGEN SATURATION: 93 % | BODY MASS INDEX: 18.78 KG/M2 | DIASTOLIC BLOOD PRESSURE: 78 MMHG | SYSTOLIC BLOOD PRESSURE: 126 MMHG | WEIGHT: 110 LBS | RESPIRATION RATE: 15 BRPM

## 2020-02-21 PROCEDURE — 99214 OFFICE O/P EST MOD 30 MIN: CPT | Performed by: INTERNAL MEDICINE

## 2020-02-21 PROCEDURE — G8420 CALC BMI NORM PARAMETERS: HCPCS | Performed by: INTERNAL MEDICINE

## 2020-02-21 PROCEDURE — 3023F SPIROM DOC REV: CPT | Performed by: INTERNAL MEDICINE

## 2020-02-21 PROCEDURE — G0424 PULMONARY REHAB W EXER: HCPCS

## 2020-02-21 PROCEDURE — G0296 VISIT TO DETERM LDCT ELIG: HCPCS | Performed by: INTERNAL MEDICINE

## 2020-02-21 PROCEDURE — G8926 SPIRO NO PERF OR DOC: HCPCS | Performed by: INTERNAL MEDICINE

## 2020-02-21 PROCEDURE — 1036F TOBACCO NON-USER: CPT | Performed by: INTERNAL MEDICINE

## 2020-02-21 PROCEDURE — G8427 DOCREV CUR MEDS BY ELIG CLIN: HCPCS | Performed by: INTERNAL MEDICINE

## 2020-02-21 PROCEDURE — 3017F COLORECTAL CA SCREEN DOC REV: CPT | Performed by: INTERNAL MEDICINE

## 2020-02-21 PROCEDURE — G8484 FLU IMMUNIZE NO ADMIN: HCPCS | Performed by: INTERNAL MEDICINE

## 2020-02-21 PROCEDURE — 72040 X-RAY EXAM NECK SPINE 2-3 VW: CPT

## 2020-02-21 RX ORDER — DOXYCYCLINE HYCLATE 100 MG
100 TABLET ORAL 2 TIMES DAILY
Qty: 14 TABLET | Refills: 0 | Status: SHIPPED | OUTPATIENT
Start: 2020-02-21 | End: 2020-02-28

## 2020-02-21 RX ORDER — PREDNISONE 10 MG/1
30 TABLET ORAL DAILY
Qty: 15 TABLET | Refills: 0 | Status: SHIPPED | OUTPATIENT
Start: 2020-02-21 | End: 2020-02-26

## 2020-02-21 NOTE — PROGRESS NOTES
[facility]    Outpatient Follow Up Note    Chief Complaint: COPD, hosp f/u  Consulting provider: Rodriguez Cordova DO    Interval History: 63 y.o. female USing nebs about 2x/day. Some SOB and cough with yellow sputum. Probably worse than last visit. Admitted for COPD 12/2019    Initial HPI:The patient has a history of COPD and chronic back pain. The patient does not have SOB at rest but has REESE. Exercise tolerance on level ground is 1 block. Trouble going up Stairs. The patient does not wheeze or cough. She was followed by a pulmonologist in [facility] and had been on Oxygen after a resp illness. She does not use it now. She states she was hospitalized for COPD several times in the past. She was intubated last year. She has been taking advair since that time. She does not notice that it helps. She has a nebulizer that she uses once per day. The patient has smoked 32 PPD for years. Quit in 3/2019. Her  still smokes.     Current Outpatient Medications:     DULoxetine (CYMBALTA) 30 MG extended release capsule, Take 30 mg by mouth, Disp: , Rfl:     QUEtiapine (SEROQUEL) 25 MG tablet, , Disp: , Rfl:     amoxicillin (AMOXIL) 500 MG capsule, Take 2 capsules by mouth every 12 hours Resume after completion of course of DOXY, Disp: , Rfl:     omeprazole (PRILOSEC) 20 MG delayed release capsule, Take 1 capsule by mouth 2 times daily (before meals), Disp: 60 capsule, Rfl: 1    folic acid (FOLVITE) 1 MG tablet, Take 1 tablet by mouth daily, Disp: 30 tablet, Rfl: 11    albuterol sulfate  (90 Base) MCG/ACT inhaler, Inhale 1 puff into the lungs as needed for Wheezing or Shortness of Breath, Disp: 1 Inhaler, Rfl: 5    losartan-hydrochlorothiazide (HYZAAR) 50-12.5 MG per tablet, Take 1 tablet by mouth daily (Patient taking differently: Take 0.5 tablets by mouth daily ), Disp: 30 tablet, Rfl: 2    SUMAtriptan (IMITREX) 25 MG tablet, Take 1 tablet by mouth once as needed for Migraine, Disp: 9 tablet, Rfl: 0    Acetaminophen (TYLENOL) 325 MG CAPS, Take 975 mg by mouth as needed, Disp: , Rfl:     atorvastatin (LIPITOR) 10 MG tablet, Take 10 mg by mouth daily, Disp: , Rfl:     ipratropium-albuterol (DUONEB) 0.5-2.5 (3) MG/3ML SOLN nebulizer solution, Take 1 vial by nebulization every 4 hours as needed for Shortness of Breath , Disp: , Rfl: 5    metoprolol succinate (TOPROL XL) 25 MG extended release tablet, Take 1 tablet by mouth daily, Disp: 30 tablet, Rfl: 11    fluticasone-salmeterol (ADVAIR) 250-50 MCG/DOSE AEPB, Inhale 1 puff into the lungs every 12 hours, Disp: , Rfl:     gabapentin (NEURONTIN) 300 MG capsule, Take 300 mg by mouth 2 times daily. , Disp: , Rfl:     levothyroxine (SYNTHROID) 50 MCG tablet, Take 50 mcg by mouth Daily, Disp: , Rfl:     ondansetron (ZOFRAN-ODT) 8 MG TBDP disintegrating tablet, Place 8 mg under the tongue every 8 hours as needed for Nausea or Vomiting, Disp: , Rfl:     ciprofloxacin (CIPRO) 500 MG tablet, Take 500 mg by mouth 2 times daily as needed Take only when she has a fever over 100.4, Disp: , Rfl:     Objective:   PHYSICAL EXAM: /78   Pulse 63   Resp 15   Ht 5' 4\" (1.626 m)   Wt 110 lb (49.9 kg)   SpO2 93% Comment: RA  BMI 18.88 kg/m²    Constitutional:  No acute distress. Eyes: PERRL. Conjunctivae anicteric. ENT: Normal nose. Normal tongue. Oropharynx clear. Neck:  Trachea is midline. No thyroid tenderness. Respiratory: No accessory muscle usage. decreased breath sounds. + wheezes. No rales. No Rhonchi. Cardiovascular: Normal S1S2. No digit clubbing. No digit cyanosis. No LE edema. Psychiatric: No anxiety or Agitation. Alert and Oriented to person, place and time. LABS:  Reviewed any pertinent new labs that are available.   A1AT level 100    PFTs 10/7/19  FVC  (%) FEV1 1.32 (62%) FEV1/FVC ratio 0.50  TLC  (124%)  RV  (217%)   DLCO (49%) Bronchodilator response: +    6MWT: 1000ft, 92%    IMAGING:  I personally reviewed and interpreted the following today in the office:   LDCT 10/7/19 Chest CT:  Lungs/pleura: Linear and curvilinear opacities are seen in the apices,   compatible with scarring.  There is moderate to severe apical predominant   emphysema.  No pneumonia.  No edema.  No pleural effusion.       A few calcified granuloma seen in the right upper lobe.  There is a 3-4 mm   noncalcified pulmonary nodule in the right lower lobe.       There is a tiny punctate nodular density in the right upper lobe measuring 3   mm. .  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 There are no signs or symptoms of lung cancer. Tobacco History 32 pack-years, quit  1 years ago  · Verbal counseling has been performed by me to include benefits and harms of screening, follow-up diagnostic testing, over-diagnosis, false positive rate, and total radiation exposure;   · I have counseled on the importance of adherence to annual lung cancer LDCT screening, the impact of comorbidities and patient is willing to undergo diagnosis and treatment;   · I have provided counseling on the importance of maintaining cigarette smoking abstinence if former smoker; or the importance of smoking cessation if current smoker and, if appropriate, furnishing of information about tobacco cessation interventions; and   · I have furnished a written order for lung cancer screening with LDCT.    · Order for Screening chest CT scan should be placed with documentation as below:  · Beneficiary date of birth;   · Actual pack - year smoking history (number) from above;   · Current smoking status, and for former smokers, the number of years since quitting smoking from above  · Beneficiary is asymptomatic   · National Provider Identifier (NPI) for Dr. Lanny Kovacs

## 2020-02-24 ENCOUNTER — HOSPITAL ENCOUNTER (OUTPATIENT)
Dept: CARDIAC REHAB | Age: 60
Setting detail: THERAPIES SERIES
Discharge: HOME | End: 2020-02-24
Payer: MEDICARE

## 2020-02-24 ENCOUNTER — HOSPITAL ENCOUNTER (OUTPATIENT)
Dept: VASCULAR LAB | Age: 60
Discharge: HOME | End: 2020-02-24
Payer: MEDICARE

## 2020-02-24 PROCEDURE — 93931 UPPER EXTREMITY STUDY: CPT

## 2020-02-24 PROCEDURE — G0424 PULMONARY REHAB W EXER: HCPCS

## 2020-02-26 ENCOUNTER — HOSPITAL ENCOUNTER (OUTPATIENT)
Dept: CARDIAC REHAB | Age: 60
Setting detail: THERAPIES SERIES
Discharge: HOME | End: 2020-02-26
Payer: MEDICARE

## 2020-02-26 PROCEDURE — G0424 PULMONARY REHAB W EXER: HCPCS

## 2020-02-28 ENCOUNTER — HOSPITAL ENCOUNTER (OUTPATIENT)
Dept: CARDIAC REHAB | Age: 60
Setting detail: THERAPIES SERIES
Discharge: HOME | End: 2020-02-28
Payer: MEDICARE

## 2020-02-28 PROCEDURE — G0424 PULMONARY REHAB W EXER: HCPCS

## 2020-03-04 ENCOUNTER — HOSPITAL ENCOUNTER (OUTPATIENT)
Dept: CARDIAC REHAB | Age: 60
Setting detail: THERAPIES SERIES
Discharge: HOME | End: 2020-03-04
Payer: MEDICARE

## 2020-03-04 PROCEDURE — G0424 PULMONARY REHAB W EXER: HCPCS

## 2020-03-06 ENCOUNTER — HOSPITAL ENCOUNTER (OUTPATIENT)
Dept: CARDIAC REHAB | Age: 60
Setting detail: THERAPIES SERIES
Discharge: HOME | End: 2020-03-06
Payer: MEDICARE

## 2020-03-06 PROCEDURE — G0424 PULMONARY REHAB W EXER: HCPCS

## 2020-03-09 ENCOUNTER — APPOINTMENT (OUTPATIENT)
Dept: CARDIAC REHAB | Age: 60
End: 2020-03-09
Payer: MEDICARE

## 2020-03-11 ENCOUNTER — APPOINTMENT (OUTPATIENT)
Dept: CARDIAC REHAB | Age: 60
End: 2020-03-11
Payer: MEDICARE

## 2020-03-13 ENCOUNTER — APPOINTMENT (OUTPATIENT)
Dept: CARDIAC REHAB | Age: 60
End: 2020-03-13
Payer: MEDICARE

## 2020-03-16 ENCOUNTER — APPOINTMENT (OUTPATIENT)
Dept: CARDIAC REHAB | Age: 60
End: 2020-03-16
Payer: MEDICARE

## 2020-03-18 ENCOUNTER — APPOINTMENT (OUTPATIENT)
Dept: CARDIAC REHAB | Age: 60
End: 2020-03-18
Payer: MEDICARE

## 2020-03-18 PROCEDURE — 9900000038 HC CARDIAC REHAB PHASE 3 - MONTHLY

## 2020-03-23 ENCOUNTER — TELEPHONE (OUTPATIENT)
Dept: PULMONOLOGY | Age: 60
End: 2020-03-23

## 2020-03-23 NOTE — TELEPHONE ENCOUNTER
Do you have the following symptoms? Shortness of Breath  yes  Wheezing  yes  Cough  yes                  Cough Characteristics:               Productive   yes          Sputum Color    Brownish yellow           Hemoptysis   Yes in the morning           Consistency of sputum  Thick. Watery after she uses nebulizer     Fever:  No Temp:n/a  Chills/Sweats:  sweats  What other symptoms are you having?:  Ear ache,left lymph node swollen and sore, tired    How long have you had these symptoms? 4-5 days    Ph[facility]: [facility]          Review medications and allergies: Allergies? No Known Allergies                   Currently on Antibiotics? Amoxicillin  (Drug/Dose/Frequency and how long on?) 500mg 2x a day since June since she had spleen removed                   Systemic Steroids? No         Pull in last office note.   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  Tobacco History 32 pack-years, quit  1 years ago  · Verbal counseling has been performed by me to include benefits and harms of screening, follow-up diagnostic testing, over-diagnosis, false positive rate, and total radiation exposure;   · I have counseled on the importance of adherence to annual lung cancer LDCT screening, the impact of comorbidities and patient is willing to undergo diagnosis and treatment;   · I have provided counseling on the importance of maintaining cigarette smoking abstinence if former smoker; or the importance of smoking cessation if current smoker and, if appropriate, furnishing of information about tobacco cessation interventions; and   · I have furnished a written order for lung cancer screening with LDCT.    · Order for Screening chest CT scan should be placed with documentation as below:  · Beneficiary date of birth;   · Actual pack - year smoking history (number) from above;   · Current smoking status, and for former smokers, the number of years since quitting smoking from above  · Beneficiary is asymptomatic   · National Provider Identifier (NPI) for Dr. Luc Lopez

## 2020-03-24 ENCOUNTER — VIRTUAL VISIT (OUTPATIENT)
Dept: PULMONOLOGY | Age: 60
End: 2020-03-24
Payer: MEDICARE

## 2020-03-24 PROCEDURE — G8428 CUR MEDS NOT DOCUMENT: HCPCS | Performed by: INTERNAL MEDICINE

## 2020-03-24 PROCEDURE — 3017F COLORECTAL CA SCREEN DOC REV: CPT | Performed by: INTERNAL MEDICINE

## 2020-03-24 PROCEDURE — 99214 OFFICE O/P EST MOD 30 MIN: CPT | Performed by: INTERNAL MEDICINE

## 2020-03-24 RX ORDER — LEVOFLOXACIN 750 MG/1
750 TABLET ORAL DAILY
Qty: 7 TABLET | Refills: 0 | Status: SHIPPED | OUTPATIENT
Start: 2020-03-24 | End: 2020-03-31

## 2020-03-24 RX ORDER — PREDNISONE 10 MG/1
TABLET ORAL
Qty: 30 TABLET | Refills: 0 | Status: SHIPPED | OUTPATIENT
Start: 2020-03-24 | End: 2020-04-03

## 2020-03-24 NOTE — PROGRESS NOTES
[facility]    Outpatient Follow Up Note    Chief Complaint: COPD, hosp f/u  Consulting provider: Dr. Lorin Grubbs    Interval History: 63 y.o. female   Shortness of Breath  yes  Wheezing  yes  Cough  yes                  Cough Characteristics:               Productive   yes          Sputum Color    Brownish yellow           Hemoptysis   Yes in the morning           Consistency of sputum  Thick. Watery after she uses nebulizer     Fever:  No Temp:n/a  Chills/Sweats:  sweats  What other symptoms are you having?:  Ear ache,left lymph node swollen and sore, tired     How long have you had these symptoms? 4-5 days    Admitted for COPD 12/2019    Initial HPI:The patient has a history of COPD and chronic back pain. The patient does not have SOB at rest but has REESE. Exercise tolerance on level ground is 1 block. Trouble going up Stairs. The patient does not wheeze or cough. She was followed by a pulmonologist in [facility] and had been on Oxygen after a resp illness. She does not use it now. She states she was hospitalized for COPD several times in the past. She was intubated last year. She has been taking advair since that time. She does not notice that it helps. She has a nebulizer that she uses once per day. The patient has smoked 32 PPD for years. Quit in 3/2019. Her  still smokes.     Current Outpatient Medications:     DULoxetine (CYMBALTA) 30 MG extended release capsule, Take 30 mg by mouth, Disp: , Rfl:     QUEtiapine (SEROQUEL) 25 MG tablet, , Disp: , Rfl:     amoxicillin (AMOXIL) 500 MG capsule, Take 2 capsules by mouth every 12 hours Resume after completion of course of DOXY, Disp: , Rfl:     omeprazole (PRILOSEC) 20 MG delayed release capsule, Take 1 capsule by mouth 2 times daily (before meals), Disp: 60 capsule, Rfl: 1    folic acid (FOLVITE) 1 MG tablet, Take 1 tablet by mouth daily, Disp: 30 tablet, Rfl: 11    ciprofloxacin (CIPRO) 500 MG tablet, Take 500 mg by mouth 2 times daily as needed Take only when she has a fever over 100.4, Disp: , Rfl:     albuterol sulfate  (90 Base) MCG/ACT inhaler, Inhale 1 puff into the lungs as needed for Wheezing or Shortness of Breath, Disp: 1 Inhaler, Rfl: 5    losartan-hydrochlorothiazide (HYZAAR) 50-12.5 MG per tablet, Take 1 tablet by mouth daily (Patient taking differently: Take 0.5 tablets by mouth daily ), Disp: 30 tablet, Rfl: 2    SUMAtriptan (IMITREX) 25 MG tablet, Take 1 tablet by mouth once as needed for Migraine, Disp: 9 tablet, Rfl: 0    Acetaminophen (TYLENOL) 325 MG CAPS, Take 975 mg by mouth as needed, Disp: , Rfl:     atorvastatin (LIPITOR) 10 MG tablet, Take 10 mg by mouth daily, Disp: , Rfl:     ipratropium-albuterol (DUONEB) 0.5-2.5 (3) MG/3ML SOLN nebulizer solution, Take 1 vial by nebulization every 4 hours as needed for Shortness of Breath , Disp: , Rfl: 5    metoprolol succinate (TOPROL XL) 25 MG extended release tablet, Take 1 tablet by mouth daily, Disp: 30 tablet, Rfl: 11    fluticasone-salmeterol (ADVAIR) 250-50 MCG/DOSE AEPB, Inhale 1 puff into the lungs every 12 hours, Disp: , Rfl:     gabapentin (NEURONTIN) 300 MG capsule, Take 300 mg by mouth 2 times daily. , Disp: , Rfl:     levothyroxine (SYNTHROID) 50 MCG tablet, Take 50 mcg by mouth Daily, Disp: , Rfl:     ondansetron (ZOFRAN-ODT) 8 MG TBDP disintegrating tablet, Place 8 mg under the tongue every 8 hours as needed for Nausea or Vomiting, Disp: , Rfl:     Objective:   PHYSICAL EXAM: HR 95, SPo2 91% on 2.5lpm  Constitutional:  No acute distress. Eyes: PERRL. Conjunctivae anicteric. ENT: Normal nose. Normal tongue. Neck:  Trachea is midline. Thyroid not obviously enlarged. Respiratory: No accessory muscle usage. Cardiovascular: No LE edema. Skin: No rash on the exposed extremities. No Nodules or induration on exposed extremities. Psychiatric: No anxiety or Agitation. Alert and Oriented to person, place and time.  Normal judgement and insight. LABS:  Reviewed any pertinent new labs that are available. A1AT level 100    PFTs 10/7/19  FVC  (%) FEV1 1.32 (62%) FEV1/FVC ratio 0.50  TLC  (124%)  RV  (217%)   DLCO (49%) Bronchodilator response: +    6MWT: 1000ft, 92%    IMAGING:  I personally reviewed and interpreted the following today in the office:   LDCT 10/7/19 Chest CT:  Lungs/pleura: Linear and curvilinear opacities are seen in the apices,   compatible with scarring.  There is moderate to severe apical predominant   emphysema.  No pneumonia.  No edema.  No pleural effusion.       A few calcified granuloma seen in the right upper lobe.  There is a 3-4 mm   noncalcified pulmonary nodule in the right lower lobe.       There is a tiny punctate nodular density in the right upper lobe measuring 3   mm. .  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 There are no signs or symptoms of lung cancer.   Tobacco History 32 pack-years, quit  1 years ago  · Verbal counseling has been performed by me to include benefits and harms of screening, follow-up diagnostic testing, over-diagnosis, false positive rate, and total radiation exposure;   · I have counseled on the importance of adherence to annual lung cancer LDCT screening, the impact of comorbidities and patient is willing to undergo diagnosis and treatment;   · I have provided counseling on the importance of maintaining cigarette smoking abstinence if former smoker; or the importance of smoking cessation if current smoker and, if appropriate, furnishing of information about tobacco cessation interventions; and   · I have furnished a written order for lung cancer screening with LDCT. · Order for Screening chest CT scan should be placed with documentation as below:  · Beneficiary date of birth;   · Actual pack - year smoking history (number) from above;   · Current smoking status, and for former smokers, the number of years since quitting smoking from above  · Beneficiary is asymptomatic   · National Provider Identifier (NPI) for Dr. Sreekanth Wright. ME  Pursuant to the emergency declaration under the [facility], [facility] waiver authority and the [employer] and [employer] Act, this Virtual  Visit was conducted, with patient's consent, to reduce the patient's risk of exposure to COVID-19 and provide continuity of care for an established patient. Services were provided through a video synchronous discussion virtually to substitute for in-person clinic visit.

## 2020-03-24 NOTE — PROGRESS NOTES
illness- We want to confirm that, for purposes of billing, this is a virtual visit with your provider  for which we will submit a claim for reimbursement with your insurance company. You  will be responsible for any copays, coinsurance amounts or other amounts not covered  by your insurance company. If you do not accept this, unfortunately we will not be able  to schedule a virtual visit with the provider. Do you accept?  Patient replied \"yes\"

## 2020-03-30 ENCOUNTER — HOSPITAL ENCOUNTER (OUTPATIENT)
Dept: CARDIAC REHAB | Age: 60
Discharge: HOME | End: 2020-03-30

## 2020-04-10 ENCOUNTER — HOSPITAL ENCOUNTER (EMERGENCY)
Age: 60
Discharge: HOME | End: 2020-04-10
Payer: MEDICARE

## 2020-04-10 ENCOUNTER — APPOINTMENT (OUTPATIENT)
Dept: CT IMAGING | Age: 60
End: 2020-04-10
Payer: MEDICARE

## 2020-04-10 VITALS
HEART RATE: 84 BPM | RESPIRATION RATE: 16 BRPM | WEIGHT: 107 LBS | DIASTOLIC BLOOD PRESSURE: 77 MMHG | TEMPERATURE: 97.9 F | HEIGHT: 64 IN | BODY MASS INDEX: 18.27 KG/M2 | SYSTOLIC BLOOD PRESSURE: 130 MMHG | OXYGEN SATURATION: 98 %

## 2020-04-10 LAB
A/G RATIO: 1.4 (ref 1.1–2.2)
ALBUMIN SERPL-MCNC: 4.1 G/DL (ref 3.4–5)
ALP BLD-CCNC: 93 U/L (ref 40–129)
ALT SERPL-CCNC: 15 U/L (ref 10–40)
ANION GAP SERPL CALCULATED.3IONS-SCNC: 10 MMOL/L (ref 3–16)
ANISOCYTOSIS: ABNORMAL
AST SERPL-CCNC: 19 U/L (ref 15–37)
ATYPICAL LYMPHOCYTE RELATIVE PERCENT: 5 % (ref 0–6)
BACTERIA: ABNORMAL /HPF
BANDED NEUTROPHILS RELATIVE PERCENT: 3 % (ref 0–7)
BASOPHILS ABSOLUTE: 0 K/UL (ref 0–0.2)
BASOPHILS RELATIVE PERCENT: 0 %
BILIRUB SERPL-MCNC: 0.5 MG/DL (ref 0–1)
BILIRUBIN URINE: NEGATIVE
BLOOD, URINE: NEGATIVE
BUN BLDV-MCNC: 9 MG/DL (ref 7–20)
CALCIUM SERPL-MCNC: 10 MG/DL (ref 8.3–10.6)
CHLORIDE BLD-SCNC: 98 MMOL/L (ref 99–110)
CLARITY: CLEAR
CO2: 25 MMOL/L (ref 21–32)
COLOR: ABNORMAL
CREAT SERPL-MCNC: 0.7 MG/DL (ref 0.6–1.1)
EOSINOPHILS ABSOLUTE: 0.8 K/UL (ref 0–0.6)
EOSINOPHILS RELATIVE PERCENT: 4 %
GFR AFRICAN AMERICAN: >60
GFR NON-AFRICAN AMERICAN: >60
GLOBULIN: 3 G/DL
GLUCOSE BLD-MCNC: 108 MG/DL (ref 70–99)
GLUCOSE URINE: NEGATIVE MG/DL
HCT VFR BLD CALC: 44 % (ref 36–48)
HEMOGLOBIN: 14.6 G/DL (ref 12–16)
KETONES, URINE: NEGATIVE MG/DL
LEUKOCYTE ESTERASE, URINE: ABNORMAL
LYMPHOCYTES ABSOLUTE: 4.5 K/UL (ref 1–5.1)
LYMPHOCYTES RELATIVE PERCENT: 17 %
MCH RBC QN AUTO: 30.9 PG (ref 26–34)
MCHC RBC AUTO-ENTMCNC: 33.1 G/DL (ref 31–36)
MCV RBC AUTO: 93.4 FL (ref 80–100)
MICROSCOPIC EXAMINATION: YES
MONOCYTES ABSOLUTE: 1.2 K/UL (ref 0–1.3)
MONOCYTES RELATIVE PERCENT: 6 %
NEUTROPHILS ABSOLUTE: 14 K/UL (ref 1.7–7.7)
NEUTROPHILS RELATIVE PERCENT: 65 %
NITRITE, URINE: NEGATIVE
PDW BLD-RTO: 14.4 % (ref 12.4–15.4)
PH UA: 5 (ref 5–8)
PLATELET # BLD: 464 K/UL (ref 135–450)
PLATELET SLIDE REVIEW: ADEQUATE
PMV BLD AUTO: 7.6 FL (ref 5–10.5)
POIKILOCYTES: ABNORMAL
POLYCHROMASIA: ABNORMAL
POTASSIUM REFLEX MAGNESIUM: 4.2 MMOL/L (ref 3.5–5.1)
PROTEIN UA: NEGATIVE MG/DL
RBC # BLD: 4.71 M/UL (ref 4–5.2)
RBC UA: ABNORMAL /HPF (ref 0–4)
SLIDE REVIEW: ABNORMAL
SODIUM BLD-SCNC: 133 MMOL/L (ref 136–145)
SPECIFIC GRAVITY UA: 1.01 (ref 1–1.03)
TARGET CELLS: ABNORMAL
TOTAL PROTEIN: 7.1 G/DL (ref 6.4–8.2)
URINE REFLEX TO CULTURE: YES
URINE TYPE: ABNORMAL
UROBILINOGEN, URINE: 0.2 E.U./DL
WBC # BLD: 20.6 K/UL (ref 4–11)
WBC UA: ABNORMAL /HPF (ref 0–5)

## 2020-04-10 PROCEDURE — 87086 URINE CULTURE/COLONY COUNT: CPT

## 2020-04-10 PROCEDURE — 80053 COMPREHEN METABOLIC PANEL: CPT

## 2020-04-10 PROCEDURE — 96375 TX/PRO/DX INJ NEW DRUG ADDON: CPT

## 2020-04-10 PROCEDURE — 6360000002 HC RX W HCPCS: Performed by: PHYSICIAN ASSISTANT

## 2020-04-10 PROCEDURE — 81001 URINALYSIS AUTO W/SCOPE: CPT

## 2020-04-10 PROCEDURE — 74176 CT ABD & PELVIS W/O CONTRAST: CPT

## 2020-04-10 PROCEDURE — 99284 EMERGENCY DEPT VISIT MOD MDM: CPT

## 2020-04-10 PROCEDURE — 85025 COMPLETE CBC W/AUTO DIFF WBC: CPT

## 2020-04-10 PROCEDURE — 36415 COLL VENOUS BLD VENIPUNCTURE: CPT

## 2020-04-10 PROCEDURE — 96374 THER/PROPH/DIAG INJ IV PUSH: CPT

## 2020-04-10 PROCEDURE — 2580000003 HC RX 258: Performed by: PHYSICIAN ASSISTANT

## 2020-04-10 RX ORDER — MORPHINE SULFATE 4 MG/ML
4 INJECTION, SOLUTION INTRAMUSCULAR; INTRAVENOUS ONCE
Status: COMPLETED | OUTPATIENT
Start: 2020-04-10 | End: 2020-04-10

## 2020-04-10 RX ORDER — ONDANSETRON 2 MG/ML
4 INJECTION INTRAMUSCULAR; INTRAVENOUS ONCE
Status: COMPLETED | OUTPATIENT
Start: 2020-04-10 | End: 2020-04-10

## 2020-04-10 RX ORDER — ONDANSETRON 4 MG/1
4 TABLET, ORALLY DISINTEGRATING ORAL EVERY 8 HOURS PRN
Qty: 15 TABLET | Refills: 0 | Status: SHIPPED | OUTPATIENT
Start: 2020-04-10 | End: 2020-06-23

## 2020-04-10 RX ORDER — 0.9 % SODIUM CHLORIDE 0.9 %
1000 INTRAVENOUS SOLUTION INTRAVENOUS ONCE
Status: COMPLETED | OUTPATIENT
Start: 2020-04-10 | End: 2020-04-10

## 2020-04-10 RX ORDER — OXYCODONE HYDROCHLORIDE AND ACETAMINOPHEN 5; 325 MG/1; MG/1
1 TABLET ORAL EVERY 6 HOURS PRN
Qty: 12 TABLET | Refills: 0 | Status: SHIPPED | OUTPATIENT
Start: 2020-04-10 | End: 2020-04-13

## 2020-04-10 RX ADMIN — ONDANSETRON 4 MG: 2 INJECTION INTRAMUSCULAR; INTRAVENOUS at 17:55

## 2020-04-10 RX ADMIN — MORPHINE SULFATE 4 MG: 4 INJECTION, SOLUTION INTRAMUSCULAR; INTRAVENOUS at 17:55

## 2020-04-10 RX ADMIN — SODIUM CHLORIDE 1000 ML: 9 INJECTION, SOLUTION INTRAVENOUS at 17:55

## 2020-04-10 ASSESSMENT — ENCOUNTER SYMPTOMS
SHORTNESS OF BREATH: 0
ABDOMINAL PAIN: 1
DIARRHEA: 1
BACK PAIN: 0
COLOR CHANGE: 0
COUGH: 0
EYES NEGATIVE: 1
NAUSEA: 1
VOMITING: 1

## 2020-04-10 ASSESSMENT — PAIN SCALES - GENERAL
PAINLEVEL_OUTOF10: 8
PAINLEVEL_OUTOF10: 8

## 2020-04-10 ASSESSMENT — PAIN DESCRIPTION - PAIN TYPE: TYPE: ACUTE PAIN

## 2020-04-10 ASSESSMENT — PAIN DESCRIPTION - PROGRESSION: CLINICAL_PROGRESSION: NOT CHANGED

## 2020-04-10 ASSESSMENT — PAIN DESCRIPTION - FREQUENCY: FREQUENCY: CONTINUOUS

## 2020-04-10 ASSESSMENT — PAIN DESCRIPTION - LOCATION: LOCATION: ABDOMEN

## 2020-04-10 ASSESSMENT — PAIN DESCRIPTION - DESCRIPTORS: DESCRIPTORS: ACHING

## 2020-04-10 ASSESSMENT — PAIN DESCRIPTION - ONSET: ONSET: ON-GOING

## 2020-04-10 NOTE — ED PROVIDER NOTES
lungs as needed for Wheezing or Shortness of Breath, Disp-1 Inhaler, R-5Normal      losartan-hydrochlorothiazide (HYZAAR) 50-12.5 MG per tablet Take 1 tablet by mouth daily, Disp-30 tablet, R-2Normal      SUMAtriptan (IMITREX) 25 MG tablet Take 1 tablet by mouth once as needed for Migraine, Disp-9 tablet, R-0Normal      Acetaminophen (TYLENOL) 325 MG CAPS Take 975 mg by mouth as neededHistorical Med      atorvastatin (LIPITOR) 10 MG tablet Take 10 mg by mouth dailyHistorical Med      ipratropium-albuterol (DUONEB) 0.5-2.5 (3) MG/3ML SOLN nebulizer solution Take 1 vial by nebulization every 4 hours as needed for Shortness of Breath , R-5Historical Med      metoprolol succinate (TOPROL XL) 25 MG extended release tablet Take 1 tablet by mouth daily, Disp-30 tablet, R-11Normal      fluticasone-salmeterol (ADVAIR) 250-50 MCG/DOSE AEPB Inhale 1 puff into the lungs every 12 hoursHistorical Med      gabapentin (NEURONTIN) 300 MG capsule Take 300 mg by mouth 2 times daily. Historical Med      levothyroxine (SYNTHROID) 50 MCG tablet Take 50 mcg by mouth DailyHistorical Med      !! ondansetron (ZOFRAN-ODT) 8 MG TBDP disintegrating tablet Place 8 mg under the tongue every 8 hours as needed for Nausea or VomitingHistorical Med       !! - Potential duplicate medications found. Please discuss with provider. ALLERGIES:    Patient has no known allergies.     FAMILY HISTORY:       Family History   Problem Relation Age of Onset    Heart Disease Father     High Blood Pressure Sister           SOCIAL HISTORY:       Social History     Socioeconomic History    Marital status:      Spouse name: None    Number of children: None    Years of education: None    Highest education level: None   Occupational History    None   Social Needs    Financial resource strain: None    Food insecurity     Worry: None     Inability: None    Transportation needs     Medical: None     Non-medical: None   Tobacco Use    Smoking status: Former Smoker     Packs/day: 1.00     Years: 32.00     Pack years: 32.00     Types: Cigarettes     Last attempt to quit: 2020     Years since quittin.1    Smokeless tobacco: Never Used   Substance and Sexual Activity    Alcohol use: Never     Frequency: Never    Drug use: Yes     Types: Marijuana    Sexual activity: None   Lifestyle    Physical activity     Days per week: None     Minutes per session: None    Stress: None   Relationships    Social connections     Talks on phone: None     Gets together: None     Attends Hoahaoism service: None     Active member of club or organization: None     Attends meetings of clubs or organizations: None     Relationship status: None    Intimate partner violence     Fear of current or ex partner: None     Emotionally abused: None     Physically abused: None     Forced sexual activity: None   Other Topics Concern    None   Social History Narrative    None       SCREENINGS:             PHYSICAL EXAM:       ED Triage Vitals [04/10/20 1709]   BP Temp Temp Source Pulse Resp SpO2 Height Weight   132/84 97.9 °F (36.6 °C) Oral 90 12 99 % 5' 4\" (1.626 m) 107 lb (48.5 kg)       Physical Exam    CONSTITUTIONAL: Awake and alert. Cooperative. Well-developed. Well-nourished. Non-toxic. No acute distress. HENT: Normocephalic. Atraumatic. External ears normal, without discharge. No nasal discharge. Oropharynx clear. Mucous membranes moist.  EYES: Conjunctiva non-injected. No scleral icterus. PERRL. EOM's grossly intact. NECK: Supple. Normal ROM. CARDIOVASCULAR: RRR. No Murmer. Intact distal pulses. PULMONARY/CHEST WALL: Effort normal. No tachypnea. Lungs clear to ausculation. ABDOMEN: Normal BS. Soft. Nondistended. LLQ tenderness to palpate. No guarding. /ANORECTAL: Not assessed  MUSKULOSKELETAL: Normal ROM. No acute deformities. No edema. No tenderness to palpate. SKIN: Warm and dry. No rash. NEUROLOGICAL: Alert and oriented x 3.  GCS 15. CN II-XII No ascites or pneumoperitoneum. Aorta normal in caliber Bones/Soft Tissues: No acute bony abnormality. Status post L4-L5 and left SI fusion     1. No acute process 2. Right adrenal adenoma 3. Duodenal and colonic diverticulosis         PROCEDURES:   N/A    CRITICAL CARE TIME:       None      CONSULTS:  IP CONSULT TO GI      EMERGENCY DEPARTMENT COURSE and DIFFERENTIAL DIAGNOSIS/MDM:   Vitals:    Vitals:    04/10/20 1709 04/10/20 1739 04/10/20 1846 04/10/20 2015   BP: 132/84 125/81 137/78 130/77   Pulse: 90 88 87 84   Resp: 12 14 15 16   Temp: 97.9 °F (36.6 °C)      TempSrc: Oral      SpO2: 99% 99% 97% 98%   Weight: 107 lb (48.5 kg)      Height: 5' 4\" (1.626 m)          Patient was given the following medications:  Medications   0.9 % sodium chloride bolus (0 mLs Intravenous Stopped 4/10/20 2015)   ondansetron (ZOFRAN) injection 4 mg (4 mg Intravenous Given 4/10/20 1755)   morphine (PF) injection 4 mg (4 mg Intravenous Given 4/10/20 1755)         I have evaluated this patient in the ED. Old records were reviewed. Patient arrives describing 2 to 3 weeks of diarrhea and more recent left lower quadrant abdominal pain with rare nausea and vomiting. She has normal vital signs here. She has had no fevers. She has not vomited at all today. Her last bowel movement was around 2:30 PM today. CBC in the ED shows a white count of 20.6. H&H 14.6 and 44.0. Platelet count 529. Both CMP and urinalysis are unremarkable. CT abdomen pelvis shows no acute process. She has a right adrenal adenoma. There is duodenal and colonic diverticulosis. Given the findings both on labs with leukocytosis and with her unremarkable CT scan I consulted her GI doctor. I spoke with Dr. Lou Apodaca. Given her history of ulcerative colitis he recommended me order stool studies for C. difficile and calprotectin. He would like to have a telemedicine appointment with the patient as well.   Patient unable to give stool sample in the ED so

## 2020-04-10 NOTE — DISCHARGE INSTR - COC
Continuity of Care Form    Patient Name: Salvatore Trejo   :  1960  MRN:  [id number]    Admit date:  4/10/2020  Discharge date:  ***    Code Status Order: Prior   Advance Directives:     Admitting Physician:  No admitting provider for patient encounter.   PCP: Kevin Cates DO    Discharging Nurse: [facility] Unit/Room#:   Discharging Unit Phone Number: ***    Emergency Contact:   Extended Emergency Contact Information  Primary Emergency Contact: YORDAN VELEZ  Home Phone: [phone]  Mobile Phone: [phone]  Relation: Spouse  Secondary Emergency Contact: blair holcomb  Home Phone: [phone]  Mobile Phone: [phone]  Relation: Child    Past Surgical History:  Past Surgical History:   Procedure Laterality Date    BACK SURGERY      HYSTERECTOMY      SPLENECTOMY      TONSILLECTOMY         Immunization History:   Immunization History   Administered Date(s) Administered    HIB PRP-T (ActHIB, Hiberix) 06/15/2019    Meningococcal ACWY Vaccine 2019    Meningococcal B, OMV (Bexsero) 06/15/2019, 06/15/2019    Meningococcal B, Recombinant Margeret Lighter) 2019, 2019, 2019, 2019    Meningococcal MCV4O (Menveo) 06/15/2019    Meningococcal Vac Of Unknown Formula And Unknown Serogroup 2019    Pneumococcal Polysaccharide ([id number]) 06/15/2019, 06/15/2019       Active Problems:  Patient Active Problem List   Diagnosis Code    Syncope and collapse R55    HTN (hypertension) I10    Headache R51    Chest pain R07.9    Moderate COPD (chronic obstructive pulmonary disease) ([facility] [address] J44.9    Heterozygous alpha 1-antitrypsin deficiency (HCC) E88.01    Acute on chronic respiratory failure with hypoxia ([facility]) J96.21    COPD exacerbation ([facility]) J44.1    Leukocytosis D72.829       Isolation/Infection:   Isolation          No Isolation        Patient Infection Status     None to display          Nurse Assessment:  Last Vital Signs: /78   Pulse 87   Temp 97.9 °F (36.6 °C) (Oral)   Resp 15   Ht 5' 4\" (1.626 m)   Wt 107 lb (48.5 kg)   SpO2 97%   BMI 18.37 kg/m²     Last documented pain score (0-10 scale): Pain Level: 8  Last Weight:   Wt Readings from Last 1 Encounters:   04/10/20 107 lb (48.5 kg)     Mental Status:  {IP PT MENTAL STATUS:}    IV Access:  { KAYODE IV ACCESS:157166600}    Nursing Mobility/ADLs:  Walking   {CHP DME HJG}  Transfer  {CHP DME IBJS:064373160}  Bathing  {CHP DME MWTK:189374034}  Dressing  {CHP DME IUPH:006957649}  Toileting  {CHP DME GLORIA:702979320}  Feeding  {CHP DME NPD}  Med Admin  {CHP DME PADE:256774620}  Med Delivery   { KAYODE MED Delivery:598435712}    Wound Care Documentation and Therapy:        Elimination:  Continence:   · Bowel: {YES / BF:30327}  · Bladder: {YES / IJ:11384}  Urinary Catheter: {Urinary Catheter:172760078}   Colostomy/Ileostomy/Ileal Conduit: {YES / HE:35787}       Date of Last BM: ***  No intake or output data in the 24 hours ending 04/10/20 1959  No intake/output data recorded.     Safety Concerns:     [employer] Safety Concerns:291012331}    Impairments/Disabilities:      [employer] Impairments/Disabilities:620422143}    Nutrition Therapy:  Current Nutrition Therapy:   [employer] Diet List:217017458}    Routes of Feeding: {CHP DME Other Feedings:673913555}  Liquids: {Slp liquid thickness:51993}  Daily Fluid Restriction: {CHP DME Yes amt example:633852472}  Last Modified Barium Swallow with Video (Video Swallowing Test): {Done Not Done FSB}    Treatments at the Time of Hospital Discharge:   Respiratory Treatments: ***  Oxygen Therapy:  {Therapy; copd oxygen:10861}  Ventilator:    { CC Vent DXKR:306950167}    Rehab Therapies: {THERAPEUTIC INTERVENTION:2345842453}  Weight Bearing Status/Restrictions: [address] SHEIKH Weight Bearin}  Other Medical Equipment (for information only, NOT a DME order):  {EQUIPMENT:486578631}  Other Treatments: ***    Patient's personal belongings (please

## 2020-04-11 ENCOUNTER — CARE COORDINATION (OUTPATIENT)
Dept: CARE COORDINATION | Age: 60
End: 2020-04-11

## 2020-04-11 LAB — URINE CULTURE, ROUTINE: NORMAL

## 2020-04-13 ENCOUNTER — CARE COORDINATION (OUTPATIENT)
Dept: CARE COORDINATION | Age: 60
End: 2020-04-13

## 2020-04-28 ENCOUNTER — CARE COORDINATION (OUTPATIENT)
Dept: CARE COORDINATION | Age: 60
End: 2020-04-28

## 2020-04-28 NOTE — CARE COORDINATION
You Patient resolved from the Care Transitions episode on 4/28/20  Patient/family has been provided the following resources and education related to COVID-19:                         Signs, symptoms and red flags related to COVID-19            CDC exposure and quarantine guidelines            Conduit exposure contact - [phone]            Contact for their local Department of Health               No further outreach scheduled with this CTN/ACM. Episode of Care resolved. Patient has this CTN/ACM contact information if future needs arise.

## 2020-05-26 ENCOUNTER — VIRTUAL VISIT (OUTPATIENT)
Dept: CARDIOLOGY CLINIC | Age: 60
End: 2020-05-26
Payer: MEDICARE

## 2020-05-26 VITALS
DIASTOLIC BLOOD PRESSURE: 88 MMHG | HEIGHT: 64 IN | OXYGEN SATURATION: 94 % | BODY MASS INDEX: 18.27 KG/M2 | SYSTOLIC BLOOD PRESSURE: 148 MMHG | WEIGHT: 107 LBS | HEART RATE: 80 BPM

## 2020-05-26 PROCEDURE — 99214 OFFICE O/P EST MOD 30 MIN: CPT | Performed by: INTERNAL MEDICINE

## 2020-05-26 NOTE — PROGRESS NOTES
Historical Provider, MD   albuterol sulfate  (90 Base) MCG/ACT inhaler Inhale 1 puff into the lungs as needed for Wheezing or Shortness of Breath Yes Fernando Whitfield MD   losartan-hydrochlorothiazide (HYZAAR) 50-12.5 MG per tablet Take 1 tablet by mouth daily  Patient taking differently: Take 0.5 tablets by mouth daily  Yes Zoran Crespo MD   Acetaminophen (TYLENOL) 325 MG CAPS Take 975 mg by mouth as needed Yes Historical Provider, MD   atorvastatin (LIPITOR) 10 MG tablet Take 10 mg by mouth daily Yes Historical Provider, MD   ipratropium-albuterol (DUONEB) 0.5-2.5 (3) MG/3ML SOLN nebulizer solution Take 1 vial by nebulization every 4 hours as needed for Shortness of Breath  Yes Historical Provider, MD   metoprolol succinate (TOPROL XL) 25 MG extended release tablet Take 1 tablet by mouth daily Yes Munira Meléndez MD   fluticasone-salmeterol (ADVAIR) 250-50 MCG/DOSE AEPB Inhale 1 puff into the lungs every 12 hours Yes Historical Provider, MD   gabapentin (NEURONTIN) 300 MG capsule Take 300 mg by mouth 2 times daily.   Yes Historical Provider, MD   levothyroxine (SYNTHROID) 50 MCG tablet Take 50 mcg by mouth Daily Yes Historical Provider, MD   ondansetron (ZOFRAN-ODT) 8 MG TBDP disintegrating tablet Place 8 mg under the tongue every 8 hours as needed for Nausea or Vomiting Yes Historical Provider, MD   omeprazole (PRILOSEC) 20 MG delayed release capsule Take 1 capsule by mouth 2 times daily (before meals)  Erika Iverson MD   folic acid (FOLVITE) 1 MG tablet Take 1 tablet by mouth daily  Erika Iverson MD   SUMAtriptan (IMITREX) 25 MG tablet Take 1 tablet by mouth once as needed for Migraine  Zoran Crespo MD       Social History     Tobacco Use    Smoking status: Former Smoker     Packs/day: 1.00     Years: 32.00     Pack years: 32.00     Types: Cigarettes     Last attempt to quit: 2020     Years since quittin.2    Smokeless tobacco: Never Used   Substance Use Topics    Alcohol use: Never     Frequency: Never    Drug use: Yes     Types: Marijuana          No Known Allergies,   Past Medical History:   Diagnosis Date    Back pain     COPD (chronic obstructive pulmonary disease) ([facility])     Hypertension     Syncope and collapse 2019    Thyroid disease     Ulcerative colitis, unspecified, without complications ([facility] [address]    ,   Past Surgical History:   Procedure Laterality Date    BACK SURGERY      HYSTERECTOMY      SPLENECTOMY      TONSILLECTOMY     ,   Social History     Tobacco Use    Smoking status: Former Smoker     Packs/day: 1.00     Years: 32.00     Pack years: 32.00     Types: Cigarettes     Last attempt to quit: 2020     Years since quittin.2    Smokeless tobacco: Never Used   Substance Use Topics    Alcohol use: Never     Frequency: Never    Drug use: Yes     Types: Marijuana   ,   Family History   Problem Relation Age of Onset    Heart Disease Father     High Blood Pressure Sister    ,   Immunization History   Administered Date(s) Administered    HIB PRP-T (ActHIB, Hiberix) 06/15/2019    Meningococcal ACWY Vaccine 2019    Meningococcal B, OMV (Bexsero) 06/15/2019, 06/15/2019    Meningococcal B, Recombinant Regine Serene) 2019, 2019, 2019, 2019    Meningococcal MCV4O (Menveo) 06/15/2019    Meningococcal Vac Of Unknown Formula And Unknown Serogroup 2019    Pneumococcal Polysaccharide ([id number]) 06/15/2019, 06/15/2019   ,   Health Maintenance   Topic Date Due    Hepatitis C screen  1960    Lipid screen  1971    HIV screen  1976    DTaP/Tdap/Td vaccine (1 - Tdap) 1980    Cervical cancer screen  1982    Breast cancer screen  2011    Shingles Vaccine (1 of 2) 2011    Colon cancer screen colonoscopy  2011    Annual Wellness Visit (AWV)  2019    Meningococcal (ACWY) vaccine (2 - Risk 2-dose series) 2019    Meningococcal B vaccine (3 of 3 - Risk Trumenba 3-dose series) 02/06/2020    Pneumococcal 0-64 years Vaccine (2 of 3 - PCV13) 06/15/2020    Flu vaccine (Season Ended) 09/01/2020    Low dose CT lung screening  10/07/2020    Potassium monitoring  04/10/2021    Creatinine monitoring  04/10/2021    Hib vaccine  Completed    Hepatitis A vaccine  Aged Out    Hepatitis B vaccine  Aged Out     BP (!) 148/88   Pulse 80   Ht 5' 4\" (1.626 m)   Wt 107 lb (48.5 kg)   SpO2 94%   BMI 18.37 kg/m²      PHYSICAL EXAMINATION:  [ INSTRUCTIONS:  \"[x]\" Indicates a positive item  \"[]\" Indicates a negative item  -- DELETE ALL ITEMS NOT EXAMINED]  Vital Signs: (As obtained by patient/caregiver or practitioner observation)    Blood pressure- 148/88 Heart rate- 80   Respiratory rate-    Temperature-  Pulse oximetry-           Other pertinent observable physical exam findings-     Stress test 4/2019 [place]   Normal     Cardiac CTA 9/2019  No significant coronary artery stenosis is identified. Right coronary dominance. Left ventricular ejection fraction 70.9%. Advanced pattern of emphysema. Echocardiogram 9/26/19  Summary   Technically difficult examination. Low parasternal window secondary to COPD. LV systolic function is normal with EF estimated at 55%. No regional wall motion abnormalities. Normal left ventricular diastolic filling pressure. Mild mitral regurgitation. ASSESSMENT:  Syncope - resolved post splenectomy 6/2019. Was believed to have had splecic laceration that was undiagnosed for weeks. No recurrence. Orthostatic hypotension - persistent. BP remains labile ranging from -150. Reluctant to add midodrine or florinef as will exacerbate HTN. Symptoms currently manageable. SOB due to smoking, pulmonary  Tachycardia - intermittent   Hypertension - labile bu as above, acceptable  COPD   Former smoker-  Continued cessation discussed.       Plan:   Continue current medications   Check blood pressure at home weekly  Regular exercise and following

## 2020-05-26 NOTE — PATIENT INSTRUCTIONS
Plan:   Continue current medications   Check blood pressure at home weekly  Regular exercise and following a healthy diet encouraged   Follow up with me in 1 year

## 2020-06-08 ENCOUNTER — TELEPHONE (OUTPATIENT)
Dept: PULMONOLOGY | Age: 60
End: 2020-06-08

## 2020-06-08 NOTE — TELEPHONE ENCOUNTER

## 2020-06-10 ENCOUNTER — VIRTUAL VISIT (OUTPATIENT)
Dept: ENDOCRINOLOGY | Age: 60
End: 2020-06-10
Payer: MEDICARE

## 2020-06-10 PROCEDURE — G8427 DOCREV CUR MEDS BY ELIG CLIN: HCPCS | Performed by: INTERNAL MEDICINE

## 2020-06-10 PROCEDURE — 3017F COLORECTAL CA SCREEN DOC REV: CPT | Performed by: INTERNAL MEDICINE

## 2020-06-10 PROCEDURE — 99214 OFFICE O/P EST MOD 30 MIN: CPT | Performed by: INTERNAL MEDICINE

## 2020-06-10 ASSESSMENT — ENCOUNTER SYMPTOMS
COUGH: 0
PHOTOPHOBIA: 0
BACK PAIN: 0

## 2020-06-10 NOTE — PROGRESS NOTES
Allergic/Immunologic: Negative for environmental allergies. Neurological: Positive for headaches. Negative for dizziness, tremors and seizures. Hematological: Does not bruise/bleed easily. Psychiatric/Behavioral: Negative for hallucinations and suicidal ideas. The patient is not nervous/anxious. EXAMINATION:   MRI OF THE ABDOMEN WITHOUT AND WITH CONTRAST, 8/12/2019 3:23 pm       TECHNIQUE:   Multiplanar multisequence MRI of the abdomen was performed without and with   the administration of intravenous contrast.       COMPARISON:   Recent CT scan 06/11/2019       HISTORY:   ORDERING SYSTEM PROVIDED HISTORY: Adrenal nodule ([facility] [address]   TECHNOLOGIST PROVIDED HISTORY:   Reason for Exam: Abnormal findings on CT of abd/pelvis   Acuity: Acute   Type of Exam: Subsequent/Follow-up       FINDINGS:   There is mild respiratory motion artifact.       There is redemonstration of the previously described nodule in the right   adrenal gland.  This measures approximately 2.0 cm medial-lateral.  This   loses signal on out of phase imaging. , compatible with adenoma       No significant intrahepatic biliary ductal dilatation.       No hydronephrosis on the right.  No hydronephrosis on the left       T2 hyperintense focus is seen in the left kidney measuring 1.4 cm, likely cyst       Spleen is absent.       Mild spurring is seen in the spine.  Sacral nerve sheath cysts are seen,   incidentally noted       Postsurgical changes seen in the anterior abdominal wall. .           Impression   Right adrenal nodule has imaging features most compatible with benign adrenal   adenoma       Interval splenectomy since prior CT scan 06/11/2019     No visits with results within 2 Month(s) from this visit.    Latest known visit with results is:   Admission on 04/10/2020, Discharged on 04/10/2020   Component Date Value Ref Range Status    Color, UA 04/10/2020 Straw  Straw/Yellow Final    Clarity, UA 04/10/2020 Clear  Clear Final    Glucose, Ur 04/10/2020 Negative  Negative mg/dL Final    Bilirubin Urine 04/10/2020 Negative  Negative Final    Ketones, Urine 04/10/2020 Negative  Negative mg/dL Final    Specific Gravity, UA 04/10/2020 1.015  1.005 - 1.030 Final    Blood, Urine 04/10/2020 Negative  Negative Final    pH, UA 04/10/2020 5.0  5.0 - 8.0 Final    Protein, UA 04/10/2020 Negative  Negative mg/dL Final    Urobilinogen, Urine 04/10/2020 0.2  <2.0 E.U./dL Final    Nitrite, Urine 04/10/2020 Negative  Negative Final    Leukocyte Esterase, Urine 04/10/2020 TRACE* Negative Final    Microscopic Examination 04/10/2020 YES   Final    Urine Type 04/10/2020 NotGiven   Final    Urine received in a container without preservatives.     Urine Reflex to Culture 04/10/2020 Yes   Final    WBC 04/10/2020 20.6* 4.0 - 11.0 K/uL Final    RBC 04/10/2020 4.71  4.00 - 5.20 M/uL Final    Hemoglobin 04/10/2020 14.6  12.0 - 16.0 g/dL Final    Hematocrit 04/10/2020 44.0  36.0 - 48.0 % Final    MCV 04/10/2020 93.4  80.0 - 100.0 fL Final    MCH 04/10/2020 30.9  26.0 - 34.0 pg Final    MCHC 04/10/2020 33.1  31.0 - 36.0 g/dL Final    RDW 04/10/2020 14.4  12.4 - 15.4 % Final    Platelets 71/31/9424 464* 135 - 450 K/uL Final    MPV 04/10/2020 7.6  5.0 - 10.5 fL Final    PLATELET SLIDE REVIEW 04/10/2020 Adequate   Final    SLIDE REVIEW 04/10/2020 see below   Final    Slide review agrees with reported results    Neutrophils % 04/10/2020 65.0  % Final    Lymphocytes % 04/10/2020 17.0  % Final    Monocytes % 04/10/2020 6.0  % Final    Eosinophils % 04/10/2020 4.0  % Final    Basophils % 04/10/2020 0.0  % Final    Neutrophils Absolute 04/10/2020 14.0* 1.7 - 7.7 K/uL Final    Lymphocytes Absolute 04/10/2020 4.5  1.0 - 5.1 K/uL Final    Monocytes Absolute 04/10/2020 1.2  0.0 - 1.3 K/uL Final    Eosinophils Absolute 04/10/2020 0.8* 0.0 - 0.6 K/uL Final    Basophils Absolute 04/10/2020 0.0  0.0 - 0.2 K/uL Final    Bands Relative 04/10/2020 3  0 - 7

## 2020-06-15 ENCOUNTER — HOSPITAL ENCOUNTER (EMERGENCY)
Age: 60
Discharge: HOME | End: 2020-06-15
Attending: EMERGENCY MEDICINE
Payer: MEDICARE

## 2020-06-15 ENCOUNTER — APPOINTMENT (OUTPATIENT)
Dept: GENERAL RADIOLOGY | Age: 60
End: 2020-06-15
Payer: MEDICARE

## 2020-06-15 VITALS
RESPIRATION RATE: 19 BRPM | OXYGEN SATURATION: 95 % | DIASTOLIC BLOOD PRESSURE: 95 MMHG | WEIGHT: 107 LBS | HEART RATE: 81 BPM | TEMPERATURE: 97.8 F | HEIGHT: 63 IN | SYSTOLIC BLOOD PRESSURE: 150 MMHG | BODY MASS INDEX: 18.96 KG/M2

## 2020-06-15 LAB
A/G RATIO: 1.8 (ref 1.1–2.2)
ALBUMIN SERPL-MCNC: 4.5 G/DL (ref 3.4–5)
ALP BLD-CCNC: 99 U/L (ref 40–129)
ALT SERPL-CCNC: 14 U/L (ref 10–40)
ANION GAP SERPL CALCULATED.3IONS-SCNC: 12 MMOL/L (ref 3–16)
AST SERPL-CCNC: 25 U/L (ref 15–37)
BASE EXCESS VENOUS: -0.5 MMOL/L (ref -3–3)
BASOPHILS ABSOLUTE: 0.1 K/UL (ref 0–0.2)
BASOPHILS RELATIVE PERCENT: 0.7 %
BILIRUB SERPL-MCNC: 0.4 MG/DL (ref 0–1)
BUN BLDV-MCNC: 6 MG/DL (ref 7–20)
CALCIUM SERPL-MCNC: 10.6 MG/DL (ref 8.3–10.6)
CARBOXYHEMOGLOBIN: 2.9 % (ref 0–1.5)
CHLORIDE BLD-SCNC: 100 MMOL/L (ref 99–110)
CO2: 22 MMOL/L (ref 21–32)
CREAT SERPL-MCNC: 0.6 MG/DL (ref 0.6–1.1)
EKG ATRIAL RATE: 82 BPM
EKG DIAGNOSIS: NORMAL
EKG P AXIS: 84 DEGREES
EKG P-R INTERVAL: 170 MS
EKG Q-T INTERVAL: 362 MS
EKG QRS DURATION: 82 MS
EKG QTC CALCULATION (BAZETT): 422 MS
EKG R AXIS: 82 DEGREES
EKG T AXIS: 72 DEGREES
EKG VENTRICULAR RATE: 82 BPM
EOSINOPHILS ABSOLUTE: 1.6 K/UL (ref 0–0.6)
EOSINOPHILS RELATIVE PERCENT: 10.8 %
GFR AFRICAN AMERICAN: >60
GFR NON-AFRICAN AMERICAN: >60
GLOBULIN: 2.5 G/DL
GLUCOSE BLD-MCNC: 98 MG/DL (ref 70–99)
HCO3 VENOUS: 24.2 MMOL/L (ref 23–29)
HCT VFR BLD CALC: 43.1 % (ref 36–48)
HEMOGLOBIN: 14 G/DL (ref 12–16)
LYMPHOCYTES ABSOLUTE: 4.6 K/UL (ref 1–5.1)
LYMPHOCYTES RELATIVE PERCENT: 30.5 %
MCH RBC QN AUTO: 30.8 PG (ref 26–34)
MCHC RBC AUTO-ENTMCNC: 32.4 G/DL (ref 31–36)
MCV RBC AUTO: 95.3 FL (ref 80–100)
METHEMOGLOBIN VENOUS: 0.4 %
MONOCYTES ABSOLUTE: 1.4 K/UL (ref 0–1.3)
MONOCYTES RELATIVE PERCENT: 9.6 %
NEUTROPHILS ABSOLUTE: 7.2 K/UL (ref 1.7–7.7)
NEUTROPHILS RELATIVE PERCENT: 48.4 %
O2 CONTENT, VEN: 19 VOL %
O2 SAT, VEN: 90 %
O2 THERAPY: ABNORMAL
PCO2, VEN: 40.1 MMHG (ref 40–50)
PDW BLD-RTO: 14.5 % (ref 12.4–15.4)
PH VENOUS: 7.4 (ref 7.35–7.45)
PLATELET # BLD: 551 K/UL (ref 135–450)
PMV BLD AUTO: 8.7 FL (ref 5–10.5)
PO2, VEN: 54.4 MMHG (ref 25–40)
POTASSIUM REFLEX MAGNESIUM: 4.6 MMOL/L (ref 3.5–5.1)
RBC # BLD: 4.53 M/UL (ref 4–5.2)
SODIUM BLD-SCNC: 134 MMOL/L (ref 136–145)
TCO2 CALC VENOUS: 26 MMOL/L
TOTAL PROTEIN: 7 G/DL (ref 6.4–8.2)
TROPONIN: <0.01 NG/ML
WBC # BLD: 15 K/UL (ref 4–11)

## 2020-06-15 PROCEDURE — 85025 COMPLETE CBC W/AUTO DIFF WBC: CPT

## 2020-06-15 PROCEDURE — 82803 BLOOD GASES ANY COMBINATION: CPT

## 2020-06-15 PROCEDURE — 99285 EMERGENCY DEPT VISIT HI MDM: CPT

## 2020-06-15 PROCEDURE — 6360000002 HC RX W HCPCS: Performed by: NURSE PRACTITIONER

## 2020-06-15 PROCEDURE — 84484 ASSAY OF TROPONIN QUANT: CPT

## 2020-06-15 PROCEDURE — 93005 ELECTROCARDIOGRAM TRACING: CPT | Performed by: EMERGENCY MEDICINE

## 2020-06-15 PROCEDURE — 6370000000 HC RX 637 (ALT 250 FOR IP): Performed by: NURSE PRACTITIONER

## 2020-06-15 PROCEDURE — 71045 X-RAY EXAM CHEST 1 VIEW: CPT

## 2020-06-15 PROCEDURE — 94640 AIRWAY INHALATION TREATMENT: CPT

## 2020-06-15 PROCEDURE — 80053 COMPREHEN METABOLIC PANEL: CPT

## 2020-06-15 PROCEDURE — 93010 ELECTROCARDIOGRAM REPORT: CPT | Performed by: INTERNAL MEDICINE

## 2020-06-15 RX ORDER — IPRATROPIUM BROMIDE AND ALBUTEROL SULFATE 2.5; .5 MG/3ML; MG/3ML
1 SOLUTION RESPIRATORY (INHALATION) ONCE
Status: COMPLETED | OUTPATIENT
Start: 2020-06-15 | End: 2020-06-15

## 2020-06-15 RX ORDER — PREDNISONE 10 MG/1
40 TABLET ORAL DAILY
Qty: 16 TABLET | Refills: 0 | Status: SHIPPED | OUTPATIENT
Start: 2020-06-15 | End: 2020-06-19

## 2020-06-15 RX ORDER — ALBUTEROL SULFATE 2.5 MG/3ML
5 SOLUTION RESPIRATORY (INHALATION) ONCE
Status: COMPLETED | OUTPATIENT
Start: 2020-06-15 | End: 2020-06-15

## 2020-06-15 RX ORDER — PREDNISONE 20 MG/1
60 TABLET ORAL ONCE
Status: COMPLETED | OUTPATIENT
Start: 2020-06-15 | End: 2020-06-15

## 2020-06-15 RX ORDER — IBUPROFEN 800 MG/1
800 TABLET ORAL ONCE
Status: COMPLETED | OUTPATIENT
Start: 2020-06-15 | End: 2020-06-15

## 2020-06-15 RX ADMIN — IBUPROFEN 800 MG: 800 TABLET ORAL at 18:52

## 2020-06-15 RX ADMIN — IPRATROPIUM BROMIDE AND ALBUTEROL SULFATE 1 AMPULE: .5; 3 SOLUTION RESPIRATORY (INHALATION) at 15:53

## 2020-06-15 RX ADMIN — ALBUTEROL SULFATE 5 MG: 2.5 SOLUTION RESPIRATORY (INHALATION) at 15:55

## 2020-06-15 RX ADMIN — PREDNISONE 60 MG: 20 TABLET ORAL at 16:46

## 2020-06-15 ASSESSMENT — PAIN SCALES - GENERAL
PAINLEVEL_OUTOF10: 6
PAINLEVEL_OUTOF10: 6

## 2020-06-15 ASSESSMENT — PAIN DESCRIPTION - ORIENTATION: ORIENTATION: LEFT

## 2020-06-15 ASSESSMENT — PAIN DESCRIPTION - LOCATION
LOCATION: CHEST
LOCATION: HEAD

## 2020-06-15 ASSESSMENT — PAIN DESCRIPTION - PAIN TYPE: TYPE: ACUTE PAIN

## 2020-06-15 NOTE — PROGRESS NOTES
Ambulated pt approximately 15 feet without assistance of staff or assistive device and on 3 L of oxygen. Pt denied feeling SOB, dizzy, but stated that she felt light headed but thinks that may be due to being in bed \"forever\". Pt's gait was steady. Pt's pulse ranged from 85 bpm - 90 bpm and oxygen ranged 94% - 96% throughout the course of the ambulation. Pt back to bed, bed locked and in lowest position, call light in reach, side rails up x2. ED RN Eduin Calloway and ED NP Matilda notified of results and completion of ambulation.

## 2020-06-15 NOTE — ED PROVIDER NOTES
Evaluated by Advanced Practice Provider    EMERGENCY DEPARTMENT ENCOUNTER      CHIEFCOMPLAINT  Shortness of Breath (hx copd, worsened SOB over the weekend); Chest Pain (chest \"tightness\" ); Cough; and Other (sores in her nose that have been there for about a month)    HPI    Christianne Moran is a 62 y.o. female who presents to the emergency department with complaints of shortness of breath. CP that is squeezing her chest, lower chest on left side and into the mid left back. Reports SOB with this as well. Has had the CP and SOB for the past 3 weeks. When she gets up and walks around her CP and SOB get worse. She has to sleep sitting up because it also gets worse then as well. Reports nausea. Has also had a HA and feeling like her head is cloudy. Has history of COPD, wears 2.5L oxygen at home, does not need it when at rest.    Has tumor on her adrenal gland, MD thinks its causing her BP to go high, passing out, etc. She had a bad sinus infection and sore in her nose that are not getting better. These have been present for about 1 month. She is on amoxicillin daily and has been for a year, she does not have a spleen and her doctor wanted her on it. Uses inhalers, rinses her mouth out after. Has been using her nebulizer every 3-4 hours. Just got off steroids 2 weeks ago. Does feel better when she is taking them. Patient denies abdominal pain, nausea, vomiting, diarrhea. Patient denies any fever or chills. Sees Dr. Renetta Thompson, called them last Wednesday. She has had only virtual appointments, with endocrine and one scheduled with pulm on the 23rd. Oscar had virtual appointment and thinks her symptoms are from the tumor on the adrenal gland. Thepatient is currently rating their pain as 6/10 and describes it as an aching, squeezing type of pain. Treatments tried prior to arrival in the ED include: none. The patient denies other complaints, modifying factors orassociated symptoms.      The patient arrived to Take 1 vial by nebulization every 4 hours as needed for Shortness of Breath   5    metoprolol succinate (TOPROL XL) 25 MG extended release tablet Take 1 tablet by mouth daily 30 tablet 11    fluticasone-salmeterol (ADVAIR) 250-50 MCG/DOSE AEPB Inhale 1 puff into the lungs every 12 hours      gabapentin (NEURONTIN) 300 MG capsule Take 300 mg by mouth 2 times daily.        levothyroxine (SYNTHROID) 50 MCG tablet Take 50 mcg by mouth Daily      ondansetron (ZOFRAN-ODT) 8 MG TBDP disintegrating tablet Place 8 mg under the tongue every 8 hours as needed for Nausea or Vomiting       ALLERGIES    No Known Allergies    FAMILY HISTORY    Family History   Problem Relation Age of Onset    Heart Disease Father     High Blood Pressure Sister        SOCIAL HISTORY    Social History     Socioeconomic History    Marital status:      Spouse name: Not on file    Number of children: Not on file    Years of education: Not on file    Highest education level: Not on file   Occupational History    Not on file   Social Needs    Financial resource strain: Not on file    Food insecurity     Worry: Not on file     Inability: Not on file    Transportation needs     Medical: Not on file     Non-medical: Not on file   Tobacco Use    Smoking status: Former Smoker     Packs/day: 0.00     Years: 32.00     Pack years: 0.00     Types: Cigarettes     Last attempt to quit: 2020     Years since quittin.3    Smokeless tobacco: Never Used   Substance and Sexual Activity    Alcohol use: Never     Frequency: Never    Drug use: Yes     Types: Marijuana    Sexual activity: Not on file   Lifestyle    Physical activity     Days per week: Not on file     Minutes per session: Not on file    Stress: Not on file   Relationships    Social connections     Talks on phone: Not on file     Gets together: Not on file     Attends Jewish service: Not on file     Active member of club or organization: Not on file     Attends meetings of 06/15/20   CBC Auto Differential   Result Value Ref Range    WBC 15.0 (H) 4.0 - 11.0 K/uL    RBC 4.53 4.00 - 5.20 M/uL    Hemoglobin 14.0 12.0 - 16.0 g/dL    Hematocrit 43.1 36.0 - 48.0 %    MCV 95.3 80.0 - 100.0 fL    MCH 30.8 26.0 - 34.0 pg    MCHC 32.4 31.0 - 36.0 g/dL    RDW 14.5 12.4 - 15.4 %    Platelets 682 (H) 444 - 450 K/uL    MPV 8.7 5.0 - 10.5 fL    Neutrophils % 48.4 %    Lymphocytes % 30.5 %    Monocytes % 9.6 %    Eosinophils % 10.8 %    Basophils % 0.7 %    Neutrophils Absolute 7.2 1.7 - 7.7 K/uL    Lymphocytes Absolute 4.6 1.0 - 5.1 K/uL    Monocytes Absolute 1.4 (H) 0.0 - 1.3 K/uL    Eosinophils Absolute 1.6 (H) 0.0 - 0.6 K/uL    Basophils Absolute 0.1 0.0 - 0.2 K/uL   Comprehensive Metabolic Panel w/ Reflex to MG   Result Value Ref Range    Sodium 134 (L) 136 - 145 mmol/L    Potassium reflex Magnesium 4.6 3.5 - 5.1 mmol/L    Chloride 100 99 - 110 mmol/L    CO2 22 21 - 32 mmol/L    Anion Gap 12 3 - 16    Glucose 98 70 - 99 mg/dL    BUN 6 (L) 7 - 20 mg/dL    CREATININE 0.6 0.6 - 1.1 mg/dL    GFR Non-African American >60 >60    GFR African American >60 >60    Calcium 10.6 8.3 - 10.6 mg/dL    Total Protein 7.0 6.4 - 8.2 g/dL    Alb 4.5 3.4 - 5.0 g/dL    Albumin/Globulin Ratio 1.8 1.1 - 2.2    Total Bilirubin 0.4 0.0 - 1.0 mg/dL    Alkaline Phosphatase 99 40 - 129 U/L    ALT 14 10 - 40 U/L    AST 25 15 - 37 U/L    Globulin 2.5 g/dL   Blood Gas, Venous   Result Value Ref Range    pH, Michael 7.399 7.350 - 7.450    pCO2, Michael 40.1 40.0 - 50.0 mmHg    pO2, Michael 54.4 (H) 25.0 - 40.0 mmHg    HCO3, Venous 24.2 23.0 - 29.0 mmol/L    Base Excess, Michael -0.5 -3.0 - 3.0 mmol/L    O2 Sat, Michael 90 Not Established %    Carboxyhemoglobin 2.9 (H) 0.0 - 1.5 %    MetHgb, Michael 0.4 <1.5 %    TC02 (Calc), Michael 26 Not Established mmol/L    O2 Content, Michael 19 Not Established VOL %    O2 Therapy Unknown    Troponin   Result Value Ref Range    Troponin <0.01 <0.01 ng/mL   EKG 12 Lead   Result Value Ref Range    Ventricular Rate 82 BPM

## 2020-06-15 NOTE — ED PROVIDER NOTES
Patient seen and evaluated by nurse practitioner. Sinus rhythm with a rate of 82. Fusion complexes noted. Normal intervals and durations. QTc 422. No ST or T wave changes appreciated.      July Plane, DO  06/15/20 1600

## 2020-06-15 NOTE — ED NOTES
Pt is medium fall risk. Fall risk orders started. Bed check in place, fall risk bracelet on R wrist,  Fall risk light on outside room.      Mariluz Gillespie RN  06/15/20 4130

## 2020-06-15 NOTE — ED PROVIDER NOTES
I independently performed a history and physical on Maranda Burns. All diagnostic, treatment, and disposition decisions were made by myself in conjunction with the advanced practice provider.     -Sarah Contreras is a 62 y.o. female HNT, COPD, presents to ED for shortness of breath and left chest pain. Patient states she has COPD, and shortness of breath has gotten worse over the last 2 weeks, more dyspneic on exertion. Also reports left side chest pain that radiates around to left back. States she thinks its 'my lungs' intermittent, 'squeezing sensation' No aggravating, alleviating factors. Reports orthopnea, no leg swelling. Has been using inhaler and nebulizer Q3-4 hours without much improvement. -PE: well appearing, nontoxic, not in acute distress. RRR, CTAB/l, no w/r/r, abdomen soft, ND, NTTP, + BS x 4, no rigidity, rebound, guarding  -lab workup significant for: Unremarkable  -This x-ray shows no radiographic evidence of acute cardiopulmonary process  -Was given breathing treatment and steroids.  -Patient was ambulated and was able to maintain her oxygen saturation.  -Results were discussed with patient, she requested to be discharged. Strict ED return precautions given for new/worsending symptoms. Patient and family in agreement with plan, verbally confirm understanding and have no further questions/concerns. For further details of Argelia Treviño emergency department encounter, please see HAYLEY Murphy documentation.         Airam Diaz MD  06/16/20 3507

## 2020-06-16 ENCOUNTER — CARE COORDINATION (OUTPATIENT)
Dept: CARE COORDINATION | Age: 60
End: 2020-06-16

## 2020-06-16 NOTE — CARE COORDINATION
Patient contacted regarding [address] Discussed COVID-19 related testing which was not done at this time. Test results were not done. Patient informed of results, if available? N/A    Care Transition Nurse/ Ambulatory Care Manager contacted the patient by telephone to perform post discharge assessment. Verified name and  with patient as identifiers. Provided introduction to self, and explanation of the CTN/ACM role, and reason for call due to risk factors for infection and/or exposure to COVID-19. She states that she is feeling about the same today. Her  is at the pharmacy now picking up her prednisone. She states that she is having increased REESE that resolves with rest. She states that she is using some of the breathing techniques that she learned at pulmonary rehab. She is using 2.5-3lpm of her supplemental O2; normally only wears it with activity. She is monitoring her spO2; states that it stays around 97% if she \"is in bed all day,\" but then drops to 91% with activity. She states that someone from [facility] called this morning to enroll her in the [facility] program, which she accepted. She is waiting to hear back from them regarding when a physician will come to her home. Symptoms reviewed with patient who verbalized the following symptoms: cough, shortness of breath, no new symptoms and no worsening symptoms. Due to no new or worsening symptoms encounter was not routed to provider for escalation. Discussed follow-up appointments.  If no appointment was previously scheduled, appointment scheduling offered: N/A; appointment already scheduled  [address] follow up appointment(s):   Future Appointments   Date Time Provider Dieudonne Salmeron   2020  1:45 PM Clementina Burger MD [facility]   10/14/2020 11:00 AM [facility] CT ED [facility] [place] [facility]   10/14/2020 11:30 AM Clementina Burger MD [facility]   2020  2:20 PM Haley Henry MD AND [facility]     Non-[facility] follow up

## 2020-06-19 ENCOUNTER — CARE COORDINATION (OUTPATIENT)
Dept: CARE COORDINATION | Age: 60
End: 2020-06-19

## 2020-06-19 NOTE — CARE COORDINATION
Protect Yourself      Plan for follow-up call in 5-7 days based on severity of symptoms and risk factors. Future Appointments   Date Time Provider Dieudonne Salmeron   6/23/2020  1:45 PM Nithin Boles MD [facility]   10/14/2020 11:00 AM [facility] CT ED [facility] CT [facility] [place] [facility]   10/14/2020 11:30 AM Nithin Boles MD [facility]   12/16/2020  2:20 PM Minna Goodwin MD AND [facility]       Yifan Chow MSN, RN  Ambulatory Care Manager  [phone]  [email or web address]

## 2020-06-23 ENCOUNTER — VIRTUAL VISIT (OUTPATIENT)
Dept: PULMONOLOGY | Age: 60
End: 2020-06-23
Payer: MEDICARE

## 2020-06-23 PROCEDURE — 99214 OFFICE O/P EST MOD 30 MIN: CPT | Performed by: INTERNAL MEDICINE

## 2020-06-23 RX ORDER — PREDNISONE 20 MG/1
40 TABLET ORAL DAILY
COMMUNITY
End: 2020-11-05

## 2020-06-23 RX ORDER — FLUTICASONE FUROATE, UMECLIDINIUM BROMIDE AND VILANTEROL TRIFENATATE 100; 62.5; 25 UG/1; UG/1; UG/1
1 POWDER RESPIRATORY (INHALATION) DAILY
Qty: 1 EACH | Refills: 5 | Status: SHIPPED | OUTPATIENT
Start: 2020-06-23 | End: 2021-05-10

## 2020-06-23 RX ORDER — SACCHAROMYCES BOULARDII 250 MG
CAPSULE ORAL
COMMUNITY
Start: 2020-06-01 | End: 2020-06-23

## 2020-06-23 RX ORDER — ALBUTEROL SULFATE 2.5 MG/3ML
2.5 SOLUTION RESPIRATORY (INHALATION) EVERY 6 HOURS PRN
Qty: 120 EACH | Refills: 3 | Status: SHIPPED | OUTPATIENT
Start: 2020-06-23

## 2020-06-23 RX ORDER — ACETAMINOPHEN, ASPIRIN AND CAFFEINE 250; 250; 65 MG/1; MG/1; MG/1
1 TABLET, FILM COATED ORAL EVERY 6 HOURS PRN
COMMUNITY

## 2020-06-23 NOTE — PROGRESS NOTES
· Current smoking status, and for former smokers, the number of years since quitting smoking from above  · Beneficiary is asymptomatic   · National Provider Identifier (NPI) for Dr. Deejay Glass. [place]  Pursuant to the emergency declaration under the [facility], [facility] waiver authority and the [employer] Act, this Virtual  Visit was conducted, with patient's consent, to reduce the patient's risk of exposure to COVID-19 and provide continuity of care for an established patient. Services were provided through a video synchronous discussion virtually to substitute for in-person clinic visit.

## 2020-06-26 ENCOUNTER — HOSPITAL ENCOUNTER (OUTPATIENT)
Age: 60
Discharge: HOME | End: 2020-06-26
Payer: MEDICARE

## 2020-06-26 LAB — TSH SERPL DL<=0.05 MIU/L-ACNC: 0.82 UIU/ML (ref 0.27–4.2)

## 2020-06-26 PROCEDURE — 82103 ALPHA-1-ANTITRYPSIN TOTAL: CPT

## 2020-06-26 PROCEDURE — 83835 ASSAY OF METANEPHRINES: CPT

## 2020-06-26 PROCEDURE — 36415 COLL VENOUS BLD VENIPUNCTURE: CPT

## 2020-06-26 PROCEDURE — 84443 ASSAY THYROID STIM HORMONE: CPT

## 2020-06-30 LAB
ALPHA-1 ANTITRYPSIN: 83 MG/DL (ref 90–200)
METANEPH/PLASMA INTERP: ABNORMAL
METANEPHRINE FREE PLASMA: 0.36 NMOL/L (ref 0–0.49)
NORMETANEPHRINE FREE PLASMA: 1.14 NMOL/L (ref 0–0.89)

## 2020-07-01 ENCOUNTER — CARE COORDINATION (OUTPATIENT)
Dept: CARE COORDINATION | Age: 60
End: 2020-07-01

## 2020-07-09 ENCOUNTER — TELEPHONE (OUTPATIENT)
Dept: PULMONOLOGY | Age: 60
End: 2020-07-09

## 2020-09-01 ENCOUNTER — HOSPITAL ENCOUNTER (OUTPATIENT)
Age: 60
Discharge: HOME | End: 2020-09-01
Payer: MEDICARE

## 2020-09-01 PROCEDURE — 82103 ALPHA-1-ANTITRYPSIN TOTAL: CPT

## 2020-09-04 LAB — ALPHA-1 ANTITRYPSIN: 99 MG/DL (ref 90–200)

## 2020-09-08 ENCOUNTER — VIRTUAL VISIT (OUTPATIENT)
Dept: PULMONOLOGY | Age: 60
End: 2020-09-08
Payer: MEDICARE

## 2020-09-08 PROCEDURE — 99214 OFFICE O/P EST MOD 30 MIN: CPT | Performed by: INTERNAL MEDICINE

## 2020-09-08 RX ORDER — QUETIAPINE FUMARATE 25 MG/1
TABLET, FILM COATED ORAL
COMMUNITY
Start: 2020-07-17 | End: 2020-12-02 | Stop reason: ALTCHOICE

## 2020-09-08 RX ORDER — HYDROXYZINE HYDROCHLORIDE 10 MG/1
TABLET, FILM COATED ORAL
Status: ON HOLD | COMMUNITY
Start: 2020-08-26 | End: 2021-03-23 | Stop reason: HOSPADM

## 2020-09-08 NOTE — PROGRESS NOTES
losartan-hydrochlorothiazide (HYZAAR) 50-12.5 MG per tablet, Take 1 tablet by mouth daily (Patient taking differently: Take 0.5 tablets by mouth daily ), Disp: 30 tablet, Rfl: 2    Acetaminophen (TYLENOL) 325 MG CAPS, Take 975 mg by mouth as needed, Disp: , Rfl:     atorvastatin (LIPITOR) 10 MG tablet, Take 10 mg by mouth daily, Disp: , Rfl:     metoprolol succinate (TOPROL XL) 25 MG extended release tablet, Take 1 tablet by mouth daily, Disp: 30 tablet, Rfl: 11    gabapentin (NEURONTIN) 300 MG capsule, Take 300 mg by mouth 2 times daily. , Disp: , Rfl:     levothyroxine (SYNTHROID) 50 MCG tablet, Take 50 mcg by mouth Daily, Disp: , Rfl:     ondansetron (ZOFRAN-ODT) 8 MG TBDP disintegrating tablet, Place 8 mg under the tongue every 8 hours as needed for Nausea or Vomiting, Disp: , Rfl:     hydrOXYzine (ATARAX) 10 MG tablet, , Disp: , Rfl:     QUEtiapine (SEROQUEL) 25 MG tablet, , Disp: , Rfl:     predniSONE (DELTASONE) 20 MG tablet, Take 40 mg by mouth daily, Disp: , Rfl:     amoxicillin (AMOXIL) 500 MG capsule, Take 2 capsules by mouth every 12 hours Resume after completion of course of DOXY, Disp: , Rfl:     Objective:   PHYSICAL EXAM: HR 95, SPo2 91% on 2.5lpm  Constitutional:  No acute distress. Eyes: PERRL. Conjunctivae anicteric. ENT: Normal nose. Normal tongue. Neck:  Trachea is midline. Thyroid not obviously enlarged. Respiratory: No accessory muscle usage. Cardiovascular: No LE edema. Skin: No rash on the exposed extremities. No Nodules or induration on exposed extremities. Psychiatric: No anxiety or Agitation. Alert and Oriented to person, place and time. Normal judgement and insight. LABS:  Reviewed any pertinent new labs that are available.   10/7/19 A1AT level 100      6/26/20 83      9/1/20 99    PFTs 10/7/19  FVC  (%) FEV1 1.32 (62%) FEV1/FVC ratio 0.50  TLC  (124%)  RV  (217%)   DLCO (49%) Bronchodilator response: +    6MWT: 1000ft, 92%    IMAGING:  I personally reviewed and interpreted the following today in the office:   LDCT 10/7/19 Chest CT:  Lungs/pleura: Linear and curvilinear opacities are seen in the apices,   compatible with scarring.  There is moderate to severe apical predominant   emphysema.  No pneumonia.  No edema.  No pleural effusion.       A few calcified granuloma seen in the right upper lobe.  There is a 3-4 mm   noncalcified pulmonary nodule in the right lower lobe.       There is a tiny punctate nodular density in the right upper lobe measuring 3   mm. .  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 There are no signs or symptoms of lung cancer. Tobacco History 32 pack-years, quit  1 years ago  · Verbal counseling has been performed by me to include benefits and harms of screening, follow-up diagnostic testing, over-diagnosis, false positive rate, and total radiation exposure;   · I have counseled on the importance of adherence to annual lung cancer LDCT screening, the impact of comorbidities and patient is willing to undergo diagnosis and treatment;   · I have provided counseling on the importance of maintaining cigarette smoking abstinence if former smoker; or the importance of smoking cessation if current smoker and, if appropriate, furnishing of information about tobacco cessation interventions; and   · I have furnished a written order for lung cancer screening with LDCT.    · Order for Screening chest CT scan should be placed with documentation as below:  · Beneficiary date of birth;   · Actual pack - year smoking history (number) from above;   · Current smoking status, and for former smokers, the number of years since quitting smoking from above  · Beneficiary is asymptomatic   · National Provider Identifier (NPI) for Dr. Colby JOSEPH  Pursuant to the emergency declaration under the [address] [place] [address] waiver authority and the [employer] and [employer] Act, this Virtual  Visit was conducted, with patient's consent, to reduce the patient's risk of exposure to COVID-19 and provide continuity of care for an established patient. Services were provided through a video synchronous discussion virtually to substitute for in-person clinic visit.

## 2020-10-14 ENCOUNTER — HOSPITAL ENCOUNTER (OUTPATIENT)
Dept: CT IMAGING | Age: 60
Discharge: HOME | End: 2020-10-14
Payer: MEDICARE

## 2020-10-14 PROCEDURE — G0297 LDCT FOR LUNG CA SCREEN: HCPCS

## 2020-10-15 ENCOUNTER — VIRTUAL VISIT (OUTPATIENT)
Dept: PULMONOLOGY | Age: 60
End: 2020-10-15
Payer: MEDICARE

## 2020-10-15 PROCEDURE — 99214 OFFICE O/P EST MOD 30 MIN: CPT | Performed by: INTERNAL MEDICINE

## 2020-10-15 PROCEDURE — 99406 BEHAV CHNG SMOKING 3-10 MIN: CPT | Performed by: INTERNAL MEDICINE

## 2020-10-15 RX ORDER — PREDNISONE 10 MG/1
30 TABLET ORAL DAILY
Qty: 15 TABLET | Refills: 0 | Status: SHIPPED | OUTPATIENT
Start: 2020-10-15 | End: 2020-10-20

## 2020-10-15 RX ORDER — DOXYCYCLINE HYCLATE 100 MG/1
100 CAPSULE ORAL 2 TIMES DAILY
Qty: 10 CAPSULE | Refills: 0 | Status: SHIPPED | OUTPATIENT
Start: 2020-10-15 | End: 2020-10-20

## 2020-10-29 ENCOUNTER — TELEPHONE (OUTPATIENT)
Dept: INTERNAL MEDICINE CLINIC | Age: 60
End: 2020-10-29

## 2020-10-29 NOTE — TELEPHONE ENCOUNTER
----- Message from Marsha Campos sent at 10/27/2020  2:42 PM EDT -----  Subject: Appointment Request    Reason for Call: New Patient Request Appointment    QUESTIONS  Type of Appointment? New Patient/New to Provider  Reason for appointment request? No appointments available during search  Additional Information for Provider? Patient will be a new patient she is   experiencing a rash on her back and patient does have back pain and would   like to be seen or advice please give pt a call   ---------------------------------------------------------------------------  --------------  CALL BACK INFO  What is the best way for the office to contact you? OK to leave message on   voicemail  Preferred Call Back Phone Number? [phone]  ---------------------------------------------------------------------------  --------------  SCRIPT ANSWERS  Relationship to Patient? Self  Appointment reason? Establish Care/Find a provider  Have you been diagnosed with   tested for   or told that you are suspected of having COVID-19 (Coronavirus)? No  Have you had a fever or taken medication to treat a fever within the past   3 days? No  Have you had a cough   shortness of breath or flu-like symptoms within the past 3 days? No  Do you currently have flu-like symptoms including fever or chills   cough   shortness of breath   or difficulty breathing   or new loss of taste or smell? No  (Service Expert  click yes below to proceed with [employer] As [employer]ual   Scheduling)?  Yes

## 2020-11-05 ENCOUNTER — OFFICE VISIT (OUTPATIENT)
Dept: INTERNAL MEDICINE CLINIC | Age: 60
End: 2020-11-05
Payer: MEDICARE

## 2020-11-05 VITALS
HEIGHT: 63 IN | WEIGHT: 109 LBS | SYSTOLIC BLOOD PRESSURE: 132 MMHG | TEMPERATURE: 98.3 F | DIASTOLIC BLOOD PRESSURE: 78 MMHG | BODY MASS INDEX: 19.31 KG/M2 | OXYGEN SATURATION: 94 % | HEART RATE: 83 BPM

## 2020-11-05 PROBLEM — R51.9 HEADACHE: Status: RESOLVED | Noted: 2019-09-24 | Resolved: 2020-11-05

## 2020-11-05 PROBLEM — K21.9 GASTROESOPHAGEAL REFLUX DISEASE WITHOUT ESOPHAGITIS: Status: ACTIVE | Noted: 2020-11-05

## 2020-11-05 PROBLEM — F32.A ANXIETY AND DEPRESSION: Status: ACTIVE | Noted: 2020-11-05

## 2020-11-05 PROBLEM — J44.1 COPD EXACERBATION (HCC): Status: RESOLVED | Noted: 2019-11-30 | Resolved: 2020-11-05

## 2020-11-05 PROBLEM — J96.21 ACUTE ON CHRONIC RESPIRATORY FAILURE WITH HYPOXIA (HCC): Status: RESOLVED | Noted: 2019-11-29 | Resolved: 2020-11-05

## 2020-11-05 PROBLEM — R55 SYNCOPE AND COLLAPSE: Status: RESOLVED | Noted: 2019-09-03 | Resolved: 2020-11-05

## 2020-11-05 PROBLEM — F41.9 ANXIETY AND DEPRESSION: Status: ACTIVE | Noted: 2020-11-05

## 2020-11-05 PROBLEM — R07.9 CHEST PAIN: Status: RESOLVED | Noted: 2019-09-24 | Resolved: 2020-11-05

## 2020-11-05 PROBLEM — E03.9 ACQUIRED HYPOTHYROIDISM: Status: ACTIVE | Noted: 2020-11-05

## 2020-11-05 PROBLEM — G57.92 NEUROPATHY OF LEFT LOWER EXTREMITY: Status: ACTIVE | Noted: 2020-11-05

## 2020-11-05 LAB
CHOLESTEROL, TOTAL: 236 MG/DL (ref 0–199)
HDLC SERPL-MCNC: 94 MG/DL (ref 40–60)
LDL CHOLESTEROL CALCULATED: 121 MG/DL
TRIGL SERPL-MCNC: 107 MG/DL (ref 0–150)
VLDLC SERPL CALC-MCNC: 21 MG/DL

## 2020-11-05 PROCEDURE — 90686 IIV4 VACC NO PRSV 0.5 ML IM: CPT | Performed by: INTERNAL MEDICINE

## 2020-11-05 PROCEDURE — 3017F COLORECTAL CA SCREEN DOC REV: CPT | Performed by: INTERNAL MEDICINE

## 2020-11-05 PROCEDURE — 99386 PREV VISIT NEW AGE 40-64: CPT | Performed by: INTERNAL MEDICINE

## 2020-11-05 PROCEDURE — G8427 DOCREV CUR MEDS BY ELIG CLIN: HCPCS | Performed by: INTERNAL MEDICINE

## 2020-11-05 PROCEDURE — 3023F SPIROM DOC REV: CPT | Performed by: INTERNAL MEDICINE

## 2020-11-05 PROCEDURE — 99204 OFFICE O/P NEW MOD 45 MIN: CPT | Performed by: INTERNAL MEDICINE

## 2020-11-05 PROCEDURE — G8420 CALC BMI NORM PARAMETERS: HCPCS | Performed by: INTERNAL MEDICINE

## 2020-11-05 PROCEDURE — 36415 COLL VENOUS BLD VENIPUNCTURE: CPT | Performed by: INTERNAL MEDICINE

## 2020-11-05 PROCEDURE — 1036F TOBACCO NON-USER: CPT | Performed by: INTERNAL MEDICINE

## 2020-11-05 PROCEDURE — G8926 SPIRO NO PERF OR DOC: HCPCS | Performed by: INTERNAL MEDICINE

## 2020-11-05 PROCEDURE — G8482 FLU IMMUNIZE ORDER/ADMIN: HCPCS | Performed by: INTERNAL MEDICINE

## 2020-11-05 PROCEDURE — G0008 ADMIN INFLUENZA VIRUS VAC: HCPCS | Performed by: INTERNAL MEDICINE

## 2020-11-05 RX ORDER — CLINDAMYCIN HYDROCHLORIDE 150 MG/1
150 CAPSULE ORAL 3 TIMES DAILY
Status: ON HOLD | COMMUNITY
Start: 2020-11-03 | End: 2020-12-24 | Stop reason: HOSPADM

## 2020-11-05 RX ORDER — TRAMADOL HYDROCHLORIDE 50 MG/1
50 TABLET ORAL EVERY 6 HOURS
COMMUNITY
Start: 2020-11-03 | End: 2020-12-02 | Stop reason: ALTCHOICE

## 2020-11-05 RX ORDER — METOPROLOL SUCCINATE 50 MG/1
50 TABLET, EXTENDED RELEASE ORAL DAILY
Qty: 1 TABLET | Refills: 0 | Status: ON HOLD | COMMUNITY
Start: 2020-11-05 | End: 2021-03-23 | Stop reason: SDUPTHER

## 2020-11-05 RX ORDER — GABAPENTIN 300 MG/1
300 CAPSULE ORAL 4 TIMES DAILY
Qty: 120 CAPSULE | Refills: 0 | Status: SHIPPED | OUTPATIENT
Start: 2020-11-05

## 2020-11-05 RX ORDER — SACCHAROMYCES BOULARDII 250 MG
250 CAPSULE ORAL DAILY
COMMUNITY
Start: 2020-09-17

## 2020-11-05 RX ORDER — PANTOPRAZOLE SODIUM 40 MG/1
40 TABLET, DELAYED RELEASE ORAL DAILY
COMMUNITY
Start: 2020-10-16

## 2020-11-05 RX ORDER — CHLORHEXIDINE GLUCONATE 0.12 MG/ML
RINSE ORAL
Status: ON HOLD | COMMUNITY
Start: 2020-11-03 | End: 2021-03-23 | Stop reason: HOSPADM

## 2020-11-05 NOTE — PROGRESS NOTES
[facility]  History and Physical  Sarah Guerrero M.D. Jamshid Burns  YOB: 1960    Date of Service:  11/5/2020    Chief Complaint:   Thomas Cheng is a 62 y.o. female who presents for   Chief Complaint   Patient presents with    New Patient     Establish care, back pain (pt needing referral to rheumatology)    Hypertension    COPD     Sees Dr. Momo Funk for this        HPI: Here for Annual Physical and Follow up. Hypothyroidism: Recent symptoms: weight loss, hair loss, diarrhea daily and increase HR on Levothyroxine 50 mcg qd. She denies fatigue, weight gain, cold intolerance, heat intolerance, dry skin, constipation and edema. Patient is  taking her medication consistently on an empty stomach. Hypertension with ? Adrenal ds:  Home blood pressure monitoring: No 180/100 on Hyzaar 50/12.5 mg bid and Toprol XL 50 mg qd. She is adherent to a low sodium diet. Patient denies chest pain, shortness of breath, headache, lightheadedness, blurred vision, peripheral edema, palpitations, dry cough and fatigue. Antihypertensive medication side effects: no medication side effects noted. Use of agents associated with hypertension: thyroid hormones. Hyperlipidemia:  No new myalgias or GI upset on atorvastatin (Lipitor) 10 mg qd with intermittent muslce spams/jerks. Medication compliance: compliant most of the time. Patient is  following a low fat, low cholesterol diet. She is  exercising regularly.    GERD:  Stable on Protonix 40 mg qd and Florastor 250 mg bid  Severe COPD with alpha 1 antitrypsin deficiency:  Stable on Trelegy 511 qd and Folic acid qd  Anxiety/depression: stable on meds on Cymbalta 60 mg qd, seroquel 25 mg qhs, hydroxyzine 10 mg    Neuropathy left leg:  Still some symptoms on neurontin 300 mg tid    Lab Results   Component Value Date    LABMICR YES 04/10/2020     Lab Results   Component Value Date     (L) 06/15/2020    K 4.6 06/15/2020     06/15/2020    CO2 22 06/15/2020    BUN 6 (L) 06/15/2020    CREATININE 0.6 06/15/2020    GLUCOSE 98 06/15/2020    CALCIUM 10.6 06/15/2020     No results found for: CHOL, TRIG, HDL, LDLCALC, LDLDIRECT  Lab Results   Component Value Date    ALT 14 06/15/2020    AST 25 06/15/2020     Lab Results   Component Value Date    TSH 0.82 06/26/2020    TSH 0.35 06/11/2019     Lab Results   Component Value Date    WBC 15.0 (H) 06/15/2020    HGB 14.0 06/15/2020    HCT 43.1 06/15/2020    MCV 95.3 06/15/2020     (H) 06/15/2020     Lab Results   Component Value Date    INR 1.34 (H) 06/11/2019      No results found for: PSA   No results found for: LABURIC     Wt Readings from Last 3 Encounters:   11/05/20 109 lb (49.4 kg)   06/15/20 107 lb (48.5 kg)   05/26/20 107 lb (48.5 kg)     BP Readings from Last 3 Encounters:   11/05/20 132/78   06/15/20 (!) 150/95   05/26/20 (!) 148/88       Patient Active Problem List   Diagnosis    Syncope and collapse    HTN (hypertension)    Headache    Chest pain    Moderate COPD (chronic obstructive pulmonary disease) (HCC)    Heterozygous alpha 1-antitrypsin deficiency (HCC)    Acute on chronic respiratory failure with hypoxia (HCC)    COPD exacerbation (HCC)    Leukocytosis       No Known Allergies  Outpatient Medications Marked as Taking for the 11/5/20 encounter (Office Visit) with Mathew Abdul MD   Medication Sig Dispense Refill    clindamycin (CLEOCIN) 150 MG capsule Take 150 mg by mouth 3 times daily      traMADol (ULTRAM) 50 MG tablet Take 50 mg by mouth every 6 hours.       pantoprazole (PROTONIX) 40 MG tablet Take 40 mg by mouth daily      FLORASTOR 250 MG capsule Take 250 mg by mouth daily      chlorhexidine (PERIDEX) 0.12 % solution       nicotine (NICODERM CQ) 7 MG/24HR Place 1 patch onto the skin daily 30 patch 1    hydrOXYzine (ATARAX) 10 MG tablet       QUEtiapine (SEROQUEL) 25 MG tablet       aspirin-acetaminophen-caffeine (EXCEDRIN MIGRAINE) 250-250-65 MG per tablet Take 1 tablet by mouth every 6 hours as needed for Headaches      fluticasone-umeclidin-vilant (TRELEGY ELLIPTA) 100-62.5-25 MCG/INH AEPB Inhale 1 puff into the lungs daily 1 each 5    albuterol (PROVENTIL) (2.5 MG/3ML) 0.083% nebulizer solution Take 3 mLs by nebulization every 6 hours as needed for Wheezing 120 each 3    DULoxetine (CYMBALTA) 60 MG extended release capsule Take 60 mg by mouth       omeprazole (PRILOSEC) 20 MG delayed release capsule Take 1 capsule by mouth 2 times daily (before meals) 60 capsule 1    folic acid (FOLVITE) 1 MG tablet Take 1 tablet by mouth daily 30 tablet 11    albuterol sulfate  (90 Base) MCG/ACT inhaler Inhale 1 puff into the lungs as needed for Wheezing or Shortness of Breath 1 Inhaler 5    losartan-hydrochlorothiazide (HYZAAR) 50-12.5 MG per tablet Take 1 tablet by mouth daily (Patient taking differently: Take 1 tablet by mouth 2 times daily ) 30 tablet 2    Acetaminophen (TYLENOL) 325 MG CAPS Take 975 mg by mouth as needed      atorvastatin (LIPITOR) 10 MG tablet Take 10 mg by mouth daily      metoprolol succinate (TOPROL XL) 25 MG extended release tablet Take 1 tablet by mouth daily 30 tablet 11    gabapentin (NEURONTIN) 300 MG capsule Take 300 mg by mouth 2 times daily.        levothyroxine (SYNTHROID) 50 MCG tablet Take 50 mcg by mouth Daily      ondansetron (ZOFRAN-ODT) 8 MG TBDP disintegrating tablet Place 8 mg under the tongue every 8 hours as needed for Nausea or Vomiting         Past Medical History:   Diagnosis Date    Back pain     COPD [facility]chronic obstructive pulmonary disease) ([facility] [address]     Hypertension     Syncope and collapse 03/2019    Thyroid disease     Ulcerative colitis, unspecified, without complications ([address]      Past Surgical History:   Procedure Laterality Date    BACK SURGERY      L4-L5 rods in    HYSTERECTOMY, VAGINAL  2000    SPLENECTOMY  2019    due to a fall    TONSILLECTOMY       Family History   Problem Relation Age of Onset    Heart Disease Father     High Blood Pressure Sister      Social History     Socioeconomic History    Marital status:      Spouse name: Not on file    Number of children: Not on file    Years of education: Not on file    Highest education level: Not on file   Occupational History    Not on file   Social Needs    Financial resource strain: Not on file    Food insecurity     Worry: Not on file     Inability: Not on file    Transportation needs     Medical: Not on file     Non-medical: Not on file   Tobacco Use    Smoking status: Former Smoker     Packs/day: 0.25     Years: 32.00     Pack years: 8.00     Types: Cigarettes     Last attempt to quit: 2020     Years since quittin.7    Smokeless tobacco: Never Used    Tobacco comment: currently using nicotine patch   Substance and Sexual Activity    Alcohol use: Never     Frequency: Never    Drug use: Yes     Types: Marijuana    Sexual activity: Not on file   Lifestyle    Physical activity     Days per week: Not on file     Minutes per session: Not on file    Stress: Not on file   Relationships    Social connections     Talks on phone: Not on file     Gets together: Not on file     Attends Islam service: Not on file     Active member of club or organization: Not on file     Attends meetings of clubs or organizations: Not on file     Relationship status: Not on file    Intimate partner violence     Fear of current or ex partner: Not on file     Emotionally abused: Not on file     Physically abused: Not on file     Forced sexual activity: Not on file   Other Topics Concern    Not on file   Social History Narrative    Not on file       Review of Systems:  A comprehensive review of systems was negative except for what was noted in the HPI.      Physical Exam:   Vitals:    20 1154   BP: 132/78   Site: Right Upper Arm   Position: Sitting   Cuff Size: Medium Adult   Pulse: 83   Temp: 98.3 °F (36.8 °C)   TempSrc: Temporal   SpO2: 94%   Weight: 109 lb (49.4 kg)   Height: 5' 3\" (1.6 m)     Body mass index is 19.31 kg/m². Constitutional: She is oriented to person, place, and time. She appears well-developed and well-nourished. No distress. HEENT:   Head: Normocephalic and atraumatic. Right Ear: Tympanic membrane, external ear and ear canal normal.   Left Ear: Tympanic membrane, external ear and ear canal normal.   Mouth/Throat: Oropharynx is clear and moist, and mucous membranes are normal.  There is no cervical adenopathy. Eyes: Conjunctivae and extraocular motions are normal. Pupils are equal, round, and reactive to light. Neck: Supple. No JVD present. Carotid bruit is not present. No mass and no thyromegaly present. Cardiovascular: Normal rate, regular rhythm, normal heart sounds and intact distal pulses. Exam reveals no gallop and no friction rub. No murmur heard. Pulmonary/Chest: Effort normal and breath sounds normal. No respiratory distress. She has no wheezes, rhonchi or rales. Abdominal: Soft, non-tender. Bowel sounds and aorta are normal. She exhibits no organomegaly, mass or bruit. Musculoskeletal: Normal range of motion, no synovitis. She exhibits no edema. Neurological: She is alert and oriented to person, place, and time. She has normal reflexes. No cranial nerve deficit. Coordination normal.   Skin: Skin is warm and dry. There is no rash or erythema. No suspicious lesions noted. Psychiatric: She has a normal mood and affect.  Her speech is normal and behavior is normal. Judgment, cognition and memory are normal.       Preventive Care:  Health Maintenance   Topic Date Due    Hepatitis C screen  1960    Lipid screen  05/28/1971    HIV screen  05/28/1976    Breast cancer screen  05/28/2011    Shingles Vaccine (1 of 2) 05/28/2011    Colon cancer screen colonoscopy  05/28/2011    Annual Wellness Visit (AWV)  06/22/2019    Meningococcal (ACWY) vaccine (2 - Risk 2-dose series) 09/07/2019    Meningococcal B vaccine (3 of 5 - Risk Trumenba 3-dose series) 02/06/2020    Flu vaccine (1) 09/01/2020    Potassium monitoring  06/15/2021    Creatinine monitoring  06/15/2021    Pneumococcal 0-64 years Vaccine (3 of 3 - PPSV23) 06/15/2024    DTaP/Tdap/Td vaccine (2 - Td) 07/17/2030    Hib vaccine  Completed    Hepatitis A vaccine  Aged Out    Hepatitis B vaccine  Aged Out      Hx abnormal PAP: no  Sexual activity: none   Last eye exam: 2019, normal  Exercise: walks 7 time(s) per week       Preventive plan of care for Anayeli Burns        11/5/2020           Preventive Measures Status       Recommendations for screening                   Diabetes Screen  Glucose (mg/dL)   Date Value   06/15/2020 98    Repeat yearly   Cholesterol Screen  No results found for: CHOL, TRIG, HDL, LDLCALC, LDLDIRECT Test recommended and ordered       Weight: Body mass index is 19.31 kg/m².   5' 3\" (1.6 m)109 lb (49.4 kg)    Your BMI is between 18.5 and 24.9, which indicates that you are at a healthy weight        Recommended Immunizations    Immunization History   Administered Date(s) Administered    HIB PRP-T (ActHIB, Hiberix) 06/15/2019    Meningococcal ACWY Vaccine 07/13/2019    Meningococcal B, OMV (Bexsero) 06/15/2019, 06/15/2019    Meningococcal B, Recombinant Arely Sauloinski) 08/06/2019, 08/06/2019, 09/11/2019, 09/11/2019    Meningococcal MCV4O (Menveo) 06/15/2019    Meningococcal Vac Of Unknown Formula And Unknown Serogroup 07/13/2019    Pneumococcal Conjugate 13-valent ([id number]) 07/17/2020    Pneumococcal Polysaccharide ([id number]) 06/15/2019, 06/15/2019    Tdap (Boostrix, Adacel) 07/17/2020        Influenza vaccine:  recommended every fall         Other Recommendations ·   See a dentist every 6 months  · Try to get at least 30 minutes of exercise 3-5 days per week  · Always wear a seat belt when traveling in a car  · Always wear a helmet when riding a bicycle or motorcycle  · When exposed to the sun, use a sunscreen that protects against both UVA and UVB radiation with an SPF of 30 or greater- reapply every 2 to 3 hours or after sweating, drying off with a towel, or swimming  · You need 8249-8889 mg of calcium and 1509-3037 IU of vitamin D per day- it is possible to meet your calcium requirement with diet alone, but a vitamin D supplement is usually necessary                 Assessment/Plan:    Levi Grubbs was seen today for new patient, hypertension and copd. Diagnoses and all orders for this visit:    Annual physical exam  -     Lipid Panel    Need for influenza vaccination  -     INFLUENZA, QUADV, 3 YRS AND OLDER, IM PF, PREFILL SYR OR SDV, 0.5ML (AFLURIA QUADV, PF)    Neuropathy of left lower extremity  Increase gabapentin (NEURONTIN) 300 MG capsule; Take 1 capsule by mouth 4 times daily for 30 days. Acquired hypothyroidism  Decrease synthroid 50 mcg 1/2 qd    Mixed hyperlipidemia  -     Lipid Panel  D/c lipitor to see if musle spasm/jerks improve    Essential hypertension  Per cardio    Gastroesophageal reflux disease without esophagitis    Anxiety and depression    Moderate COPD (chronic obstructive pulmonary disease) (HCC)    Heterozygous alpha 1-antitrypsin deficiency ([facility] [address]        Return Dec 2 at 2:40 VV neuropathy and thyroid.

## 2020-11-05 NOTE — LETTER
CONTROLLED SUBSTANCE MEDICATION AGREEMENT     Patient Name: Teresa Ramirez  Patient YOB: 1960   I understand, that controlled substance medications may be used to help better manage my symptoms and to improve my ability to function at home, work and in social settings. However, I also understand that these medications do have risks, which have been discussed with me, including possible development of physical or psychological dependence. I understand that successful treatment requires mutual trust and honesty between me and my provider. I understand and agree that following this Medication Agreement is necessary in continuing my provider-patient relationship and the success of my treatment plan. Explanation from my Provider: Benefits and Goals of Controlled Substance Medications: There are two potential goals for your treatment: (1) decreased pain and suffering (2) improved daily life functions. There are many possible treatments for your chronic condition(s). Alternatives such as physical therapy, yoga, massage, home daily exercise, meditation, relaxation techniques, injections, chiropractic manipulations, surgery, cognitive therapy, hypnosis and many medications that are not habit-forming may be used. Use of controlled substance medications may be helpful, but they are unlikely to resolve all symptoms or restore all function. Explanation from my Provider: Risks of Controlled Substance Medications:  Opioid pain medications: These medications can lead to problems such as addiction/dependence, sedation, lightheadedness/dizziness, memory issues, falls, constipation, nausea, or vomiting. They may also impair the ability to drive or operate machinery. Additionally, these medications may lower testosterone levels, leading to loss of bone strength, stamina and sex drive.   They may cause problems with breathing, sleep apnea and reduced coughing, which is especially dangerous for patients with lung disease. Overdose or dangerous interactions with alcohol and other medications may occur, leading to death. Hyperalgesia may develop, which means patients receiving opioids for the treatment of pain may become more sensitive to certain painful stimuli, and in some cases, experience pain from ordinarily non-painful stimuli. Women between the ages of 14-53 who could become pregnant should carefully weigh the risks and benefits of opioids with their physicians, as these medications increase the risk of pregnancy complications, including miscarriage,  delivery and stillbirth. It is also possible for babies to be born addicted to opioids. Opioid dependence withdrawal symptoms may include; feelings of uneasiness, increased pain, irritability, belly pain, diarrhea, sweats and goose-flesh. Benzodiazepines and non-benzodiazepine sleep medications: These medications can lead to problems such as addiction/dependence, sedation, fatigue, lightheadedness, dizziness, incoordination, falls, depression, hallucinations, and impaired judgment, memory and concentration. The ability to drive and operate machinery may also be affected. Abnormal sleep-related behaviors have been reported, including sleepwalking, driving, making telephone calls, eating, or having sex while not fully awake. These medications can suppress breathing and worsen sleep apnea, particularly when combined with alcohol or other sedating medications, potentially leading to death. Dependence withdrawal symptoms may include tremors, anxiety, hallucinations and seizures. Stimulants:  Common adverse effects include addiction/dependence, increased blood  pressure and heart rate, decreased appetite, nausea, involuntary weight loss, insomnia,                                                                                                                     Initials:_______   irritability, and headaches.   These risks may increase when these medications are combined with other stimulants, such as caffeine pills or energy drinks, certain weight loss supplements and oral decongestants. Dependence withdrawal symptoms may include depressed mood, loss of interest, suicidal thoughts, anxiety, fatigue, appetite changes and agitation. Testosterone replacement therapy:  Potential side effects include increased risk of stroke and heart attack, blood clots, increased blood pressure, increased cholesterol, enlarged prostate, sleep apnea, irritability/aggression and other mood disorders, and decreased fertility. I agree and understand that I and my prescriber have the following rights and responsibilities regarding my treatment plan:     1. MY RIGHTS:  To be informed of my treatment and medication plan. To be an active participant in my health and wellbeing. 2. MY RESPONSIBILITY AND UNDERSTANDING FOR USE OF MEDICATIONS   I will take medications at the dose and frequency as directed. For my safety, I will not increase or change how I take my medications without the recommendation of my healthcare provider.  I will actively participate in any program recommended by my provider which may improve function, including social, physical, psychological programs.  I will not take my medications with alcohol or other drugs not prescribed to me. I understand that drinking alcohol with my medications increases the chances of side effects, including reduced breathing rate and could lead to personal injury when operating machinery.  I understand that if I have a history of substance use disorders, including alcohol or other illicit drugs, that I may be at increased risk of addiction to my medications.  I agree to notify my provider immediately if I should become pregnant so that my treatment plan can be adjusted.    I agree and understand that I shall only receive controlled substance medications from the prescriber that signed this agreement unless there is written agreement among other prescribers of controlled substances outlining the responsibility of the medications being prescribed.  I understand that the if the controlled medication is not helping to achieve goals, the dosage may be tapered and no longer prescribed. 3. MY RESPONSIBILITY FOR COMMUNICATION / PRESCRIPTION RENEWALS   I agree that all controlled substance medications that I take will be prescribed only by my provider. If another healthcare provider prescribes me medication in an emergency, I will notify my provider within seventy-two (72) hours.  I will arrange for refills at the prescribed interval ONLY during regular office hours. I will not ask for refills earlier than agreed, after-hours, on holidays or weekends. Refills may take up to 72 hours for processing and prescriptions to reach the pharmacy.  I will inform my other health care providers that I am taking these medications and of the existence of this [id number]. In the event of an emergency, I will provide the same information to the emergency department prescribers.  I will keep my provider updated on the pharmacy I am using for controlled medication prescription filling. Initials:_______  4. MY RESPONSIBILITY FOR PROTECTING MEDICATIONS   I will protect my prescriptions and medications. I understand that lost or misplaced prescriptions will not be replaced.  I will keep medications only for my own use and will not share them with others. I will keep all medications away from children.  I agree that if my medications are adjusted or discontinued, I will properly dispose of any remaining medications. I understand that I will be required to dispose of any remaining controlled medications as, directed by my prescriber, prior to being provided with any prescriptions for other controlled medications.   Medication drop box locations can be found at: [email or web address]    5. MY RESPONSIBILITY WITH ILLEGAL DRUGS    I will not use illegal or street drugs or another person's prescription medications not prescribed to me.  If there are identified addiction type symptoms, then referral to a program may be provided by my provider and I agree to follow through with this recommendation. 6. MY RESPONSIBILITY FOR COOPERATION WITH INVESTIGATIONS   I understand that my provider will comply with any applicable law and may discuss my use and/or possible misuse/abuse of controlled substances and alcohol, as appropriate, with any health care provider involved in my care, pharmacist, or legal authority.  I authorize my provider and pharmacy to cooperate fully with law enforcement agencies (as permitted by law) in the investigation of any possible misuse, sale, or other diversion of my controlled substances.  I agree to waive any applicable privilege or right of privacy or confidentiality with respect to these authorizations. 7. PROVIDERS RIGHT TO MONITOR FOR SAFETY: PRESCRIPTION MONITORING / DRUG TESTING   I consent to drug/toxicology screening and will submit to a drug screen upon my providers request to assure I am only taking the prescribed drugs for my safety monitoring. I understand that a drug screen is a laboratory test in which a sample of my urine, blood or saliva is checked to see what drugs I have been taking. This may entail an observed urine specimen, which means that a nurse or other health care provider may watch me provide urine, and I will cooperate if I am asked to provide an observed specimen.  I understand that my provider will check a copy of my State Prescription Monitoring Program () Report in order to safely prescribe medications.  Pill Counts: I consent to pill counts when requested.   I may be asked to bring all my prescribed controlled substance medications, in their original bottles, to all of my scheduled appointments. In addition, my provider may ask me to come to the practice at any time for a random pill count. 8. TERMINATION OF THIS AGREEMENT  For my safety, my prescriber has the right to stop prescribing controlled substance medications and may end this agreement. Initials:_______   Conditions that may result in termination of this agreement:  a. I do not show any improvement in pain, or my activity has not improved. b. I develop rapid tolerance or loss of improvement, as described in my treatment plan.  c. I develop significant side effects from the medication. d. My behavior is not consistent with the responsibilities outlined above, thereby causing safety concerns to continue prescribing controlled substance medications. e. I fail to follow the terms of this agreement. f. Other:____________________________       UNDERSTANDING THIS MEDICATION AGREEMENT:    I have read the above and have had all my questions answered. For chronic disease management, I know that my symptoms can be managed with many types of treatments. A chronic medication trial may be part of my treatment, but I must be an active participant in my care. Medication therapy is only one part of my symptom management plan. In some cases, there may be limited scientific evidence to support the chronic use of certain medications to improve symptoms and daily function. Furthermore, in certain circumstances, there may be scientific information that suggests that the use of chronic controlled substances may worsen my symptoms and increase my risk of unintentional death directly related to this medication therapy. I know that if my provider feels my risk from controlled medications is greater than my benefit, I will have my controlled substance medication(s) compassionately lowered or removed altogether.      I further agree to allow this office to contact my HIPAA contact if there are concerns about my safety and use of the controlled medications. I have agreed to use the prescribed controlled substance medications to me as instructed by my provider and as stated in this Medication Agreement. My initial on each page and my signature below shows that I have read each page and I have had the opportunity to ask questions with answers provided by my provider. Patient Name (Printed): _____________________________________      Patient Signature:  ______________________   Date: _____________      Prescriber Name (Printed): ___________________________________  Prescriber Signature: Becca Rasmussen   AUNJ:20/9/7495

## 2020-11-12 ENCOUNTER — TELEPHONE (OUTPATIENT)
Dept: INTERNAL MEDICINE CLINIC | Age: 60
End: 2020-11-12

## 2020-11-12 NOTE — TELEPHONE ENCOUNTER
Outgoing fax attached, Medical Records Request faxed 11/11/2020.     ( our fax machine is broken so outgoing fax sent from another office)

## 2020-11-25 RX ORDER — ALBUTEROL SULFATE 90 UG/1
1 AEROSOL, METERED RESPIRATORY (INHALATION) PRN
Qty: 1 INHALER | Refills: 5 | Status: SHIPPED | OUTPATIENT
Start: 2020-11-25 | End: 2022-01-05

## 2020-12-02 ENCOUNTER — HOSPITAL ENCOUNTER (OUTPATIENT)
Age: 60
Discharge: HOME | End: 2020-12-02
Payer: MEDICARE

## 2020-12-02 ENCOUNTER — OFFICE VISIT (OUTPATIENT)
Dept: ENDOCRINOLOGY | Age: 60
End: 2020-12-02

## 2020-12-02 VITALS
WEIGHT: 108.2 LBS | OXYGEN SATURATION: 98 % | DIASTOLIC BLOOD PRESSURE: 81 MMHG | BODY MASS INDEX: 19.17 KG/M2 | HEART RATE: 74 BPM | HEIGHT: 63 IN | SYSTOLIC BLOOD PRESSURE: 125 MMHG

## 2020-12-02 LAB — TSH SERPL DL<=0.05 MIU/L-ACNC: 2.25 UIU/ML (ref 0.27–4.2)

## 2020-12-02 PROCEDURE — 36415 COLL VENOUS BLD VENIPUNCTURE: CPT

## 2020-12-02 PROCEDURE — G8427 DOCREV CUR MEDS BY ELIG CLIN: HCPCS | Performed by: INTERNAL MEDICINE

## 2020-12-02 PROCEDURE — G8420 CALC BMI NORM PARAMETERS: HCPCS | Performed by: INTERNAL MEDICINE

## 2020-12-02 PROCEDURE — G8482 FLU IMMUNIZE ORDER/ADMIN: HCPCS | Performed by: INTERNAL MEDICINE

## 2020-12-02 PROCEDURE — 3017F COLORECTAL CA SCREEN DOC REV: CPT | Performed by: INTERNAL MEDICINE

## 2020-12-02 PROCEDURE — 1036F TOBACCO NON-USER: CPT | Performed by: INTERNAL MEDICINE

## 2020-12-02 PROCEDURE — 83835 ASSAY OF METANEPHRINES: CPT

## 2020-12-02 PROCEDURE — 84443 ASSAY THYROID STIM HORMONE: CPT

## 2020-12-02 PROCEDURE — 99214 OFFICE O/P EST MOD 30 MIN: CPT | Performed by: INTERNAL MEDICINE

## 2020-12-02 ASSESSMENT — ENCOUNTER SYMPTOMS
PHOTOPHOBIA: 0
BACK PAIN: 0
COUGH: 0

## 2020-12-02 NOTE — PROGRESS NOTES
Endocrinology  Jose Shah M.D. Phone: [phone]   FAX: [phone]       Maricarmen Fosterjackie Boone   YOB: 1960    Date of Visit:  12/2/2020    No Known Allergies  Outpatient Medications Marked as Taking for the 12/2/20 encounter (Office Visit) with Jamaal Gómez MD   Medication Sig Dispense Refill    albuterol sulfate  (90 Base) MCG/ACT inhaler Inhale 1 puff into the lungs as needed for Wheezing or Shortness of Breath 1 Inhaler 5    clindamycin (CLEOCIN) 150 MG capsule Take 150 mg by mouth 3 times daily      pantoprazole (PROTONIX) 40 MG tablet Take 40 mg by mouth daily      FLORASTOR 250 MG capsule Take 250 mg by mouth daily      chlorhexidine (PERIDEX) 0.12 % solution       metoprolol succinate (TOPROL XL) 50 MG extended release tablet Take 1 tablet by mouth daily 1 tablet 0    gabapentin (NEURONTIN) 300 MG capsule Take 1 capsule by mouth 4 times daily for 30 days.  120 capsule 0    nicotine (NICODERM CQ) 7 MG/24HR Place 1 patch onto the skin daily 30 patch 1    hydrOXYzine (ATARAX) 10 MG tablet       aspirin-acetaminophen-caffeine (EXCEDRIN MIGRAINE) 250-250-65 MG per tablet Take 1 tablet by mouth every 6 hours as needed for Headaches      fluticasone-umeclidin-vilant (TRELEGY ELLIPTA) 100-62.5-25 MCG/INH AEPB Inhale 1 puff into the lungs daily 1 each 5    albuterol (PROVENTIL) (2.5 MG/3ML) 0.083% nebulizer solution Take 3 mLs by nebulization every 6 hours as needed for Wheezing 120 each 3    DULoxetine (CYMBALTA) 60 MG extended release capsule Take 60 mg by mouth       folic acid (FOLVITE) 1 MG tablet Take 1 tablet by mouth daily 30 tablet 11    losartan-hydrochlorothiazide (HYZAAR) 50-12.5 MG per tablet Take 1 tablet by mouth daily (Patient taking differently: Take 1 tablet by mouth 2 times daily ) 30 tablet 2    Acetaminophen (TYLENOL) 325 MG CAPS Take 975 mg by mouth as needed      atorvastatin (LIPITOR) 10 MG tablet Take 10 mg by mouth daily      levothyroxine (SYNTHROID) 50 MCG tablet Take 50 mcg by mouth Daily      ondansetron (ZOFRAN-ODT) 8 MG TBDP disintegrating tablet Place 8 mg under the tongue every 8 hours as needed for Nausea or Vomiting           Vitals:    20 1353   BP: 125/81   Site: Right Upper Arm   Position: Sitting   Cuff Size: Medium Adult   Pulse: 74   SpO2: 98%   Weight: 108 lb 3.2 oz (49.1 kg)   Height: 5' 3\" (1.6 m)     Body mass index is 19.17 kg/m². Wt Readings from Last 3 Encounters:   20 108 lb 3.2 oz (49.1 kg)   20 109 lb (49.4 kg)   06/15/20 107 lb (48.5 kg)     BP Readings from Last 3 Encounters:   20 125/81   20 132/78   06/15/20 (!) 150/95        Past Medical History:   Diagnosis Date    Back pain     [facility]chronic obstructive [facility] disease) [facility] [address]     Hypertension     Syncope and collapse 2019    Thyroid disease     Ulcerative colitis, unspecified, without complications ([facility] [address]      Past Surgical History:   Procedure Laterality Date    BACK SURGERY      L4-L5 rods in    HYSTERECTOMY, VAGINAL  2000    SPLENECTOMY  2019    due to a fall    TONSILLECTOMY       Family History   Problem Relation Age of Onset    Heart Disease Father     High Blood Pressure Sister      Social History     Tobacco Use   Smoking Status Former Smoker    Packs/day: 0.25    Years: 32.00    Pack years: 8.00    Types: Cigarettes    Last attempt to quit: 2020    Years since quittin.8   Smokeless Tobacco Never Used   Tobacco Comment    currently using nicotine patch      Social History     Substance and Sexual Activity   Alcohol Use Never    Frequency: Never       HPI    Maria Luisa Regalado is a 62 y.o. female who is here for management of  adrenal mass, thyroid disease     Dr. Patrick Holland ( cardiology)          Patient has a PMH of HTN, COPD, respiratory failure,  Ulcerative colitis, hypothyroidism , hyperlipidemia.        She was living in [place] and moved to [place] in Summer of 2019 to be close to her grand kids. She has been experiencing spells of headache and HTN for 3-4 months. She is currently on Hyzaar 50-12.5 mg daily and Toprol XL 25 mg BID      Experiences headache, nausea, sweating and very high BP ( in 200s)    She was hospitalized in 09/19 for chest pain and very high BP , was seen and evaluated by cardiology    Plasma metaneprhine and urine metanephrine in the indeterminate range. Patient had a CT scan of abdomen in 06/19 which showed 2 cm Right adrenal adenoma with HU of 52    She had MRI in 08/19 which showed 2 cm Right adrenal adenoma with loss of signal intensity on ot of phase imaging consistent with benign adenoma       H/o weight gain  No renal stones, easy bruisability. No recent Steroid use. Hypothyroidism :    Diagnosed in 2017  On levothyroxine 50 mcg daily. C/o fatigue, weight gain. C/o high blood pressure and headache. Review of Systems   Constitutional: Negative for chills, diaphoresis and fever. Eyes: Negative for photophobia. Respiratory: Negative for cough. Cardiovascular: Negative for chest pain and palpitations. Endocrine: Negative for polydipsia. Genitourinary: Negative for dysuria, flank pain, frequency, hematuria and urgency. Musculoskeletal: Negative for back pain, myalgias and neck pain. Skin: Negative for rash. Allergic/Immunologic: Negative for environmental allergies. Neurological: Positive for headaches. Negative for dizziness, tremors and seizures. Hematological: Does not bruise/bleed easily. Psychiatric/Behavioral: Negative for hallucinations and suicidal ideas. The patient is not nervous/anxious.               EXAMINATION:   MRI OF THE ABDOMEN WITHOUT AND WITH CONTRAST, 8/12/2019 3:23 pm       TECHNIQUE:   Multiplanar multisequence MRI of the abdomen was performed without and with   the administration of intravenous contrast.       COMPARISON:   Recent CT scan 06/11/2019       HISTORY:   [address] PROVIDED HISTORY: Adrenal nodule ([address]   TECHNOLOGIST PROVIDED HISTORY:   Reason for Exam: Abnormal findings on CT of abd/pelvis   Acuity: Acute   Type of Exam: Subsequent/Follow-up       FINDINGS:   There is mild respiratory motion artifact.       There is redemonstration of the previously described nodule in the right   adrenal gland.  This measures approximately 2.0 cm medial-lateral.  This   loses signal on out of phase imaging. , compatible with adenoma       No significant intrahepatic biliary ductal dilatation.       No hydronephrosis on the right.  No hydronephrosis on the left       T2 hyperintense focus is seen in the left kidney measuring 1.4 cm, likely cyst       Spleen is absent.       Mild spurring is seen in the spine.  Sacral nerve sheath cysts are seen,   incidentally noted       Postsurgical changes seen in the anterior abdominal wall. .           Impression   Right adrenal nodule has imaging features most compatible with benign adrenal   adenoma       Interval splenectomy since prior CT scan 06/11/2019     Abstract on 11/17/2020   Component Date Value Ref Range Status    WBC 04/11/2018 9.7  10^3/mL Final    RBC 04/11/2018 4.77  10^6/µL Final    Hemoglobin 04/11/2018 15.2  12.0 - 16.0 g/dL Final    Hematocrit 04/11/2018 46.3* 36 - 46 % Final    MCV 04/11/2018 97  fL Final    MCH 04/11/2018 31.9  pg Final    MCHC 04/11/2018 32.8  g/dL Final    Platelets 29/78/4033 238  K/µL Final    RDW 04/11/2018 14.0  % Final    Neutrophils % 04/11/2018 64  % Final    Lymphocytes % 04/11/2018 27  % Final    Monocytes % 04/11/2018 6  % Final    Eosinophils % 04/11/2018 3  % Final    Basophils % 04/11/2018 0  % Final    Neutrophils Absolute 04/11/2018 6.3  /µL Final    Lymphocytes Absolute 04/11/2018 2.6  /µL Final    Monocytes Absolute 04/11/2018 0.6  /µL Final    Eosinophils Absolute 04/11/2018 0.3  /µL Final    Basophils Absolute 04/11/2018 0.0  /µL Final    Sodium 04/11/2018 144  mmol/L Final  Chloride 04/11/2018 100  mmol/L Final    Potassium 04/11/2018 4.5  mmol/L Final    BUN 04/11/2018 8  mg/dL Final    CREATININE 04/11/2018 0.65   Final    Glucose 04/11/2018 118  mg/dL Final    AST 04/11/2018 14  U/L Final    ALT 04/11/2018 10  U/L Final    Calcium 04/11/2018 9.6  mg/dL Final    Total Protein 04/11/2018 6.9   Final    CO2 04/11/2018 24  mmol/L Final    Alb 04/11/2018 4.4   Final    Alkaline Phosphatase 04/11/2018 90  U/L Final    Total Bilirubin 04/11/2018 0.4  0.1 - 1.4 mg/dL Final    Gfr Calculated 04/11/2018 100   Final    TSH 04/11/2018 6.070  uIU/mL Final    Cholesterol, Total 04/11/2018 200  mg/dL Final    HDL 04/11/2018 67  35 - 70 mg/dL Final    LDL Calculated 04/11/2018 114  0 - 160 mg/dL Final    Triglycerides 04/11/2018 95  mg/dL Final    VLDL 04/11/2018 19  mg/dL Final   Office Visit on 11/05/2020   Component Date Value Ref Range Status    Cholesterol, Total 11/05/2020 236* 0 - 199 mg/dL Final    Triglycerides 11/05/2020 107  0 - 150 mg/dL Final    HDL 11/05/2020 94* 40 - 60 mg/dL Final    LDL Calculated 11/05/2020 121* <100 mg/dL Final    VLDL Cholesterol Calculated 11/05/2020 21  Not Established mg/dL Final         Assessment/Plan      1. Elevated normetanephrines      This 62 yrs old female had been experiencing spells of headache , sweating and high BP . Was found to have 2 cm adrenal adenoma ( benign in appearance)    Her plasma and urine metanephrine were found to be in indeterminate range in 09/19 and 10/19     Plasma normetanephrine 2 in 10/19     Plasma normetanephrine 1.14 in 06/20  Metanephrine normal.     BP is controlled on current medications       -Will repeat her plasma metanephrine.       2. Right adrenal adenoma      She has a 2 cm R adrenal adenoma      MRI showed the adenoma to be benign  with loss of signal intensity on ot of phase imaging consistent with benign adenoma      Low risk of cancer given the imaging characteristics    aldosterone, renin, 1 mg dex suppression test showed normal results in 10/19      CT scan in 04/20 showed low density adrenal adenoma 2.5 cm       Will repeat imaging in 1 year again. 3. Hypothyroidism     On levothyroxine 50 mcg daily. TSH 0.53 in 11/19---> 0.82 ( 06/20 )           4. COPD On oxygen at night  Managed by pulmonology.

## 2020-12-06 LAB
METANEPH/PLASMA INTERP: ABNORMAL
METANEPHRINE FREE PLASMA: 0.45 NMOL/L (ref 0–0.49)
NORMETANEPHRINE FREE PLASMA: 3.36 NMOL/L (ref 0–0.89)

## 2020-12-08 ENCOUNTER — TELEPHONE (OUTPATIENT)
Dept: ENDOCRINOLOGY | Age: 60
End: 2020-12-08

## 2020-12-08 NOTE — TELEPHONE ENCOUNTER
Pt called back I read her message from the doctor she understood .  And to schedule in the time period needed we had to schedule a virtual thru [facility]

## 2020-12-22 ENCOUNTER — HOSPITAL ENCOUNTER (INPATIENT)
Age: 60
LOS: 2 days | Discharge: HOME | DRG: 191 | End: 2020-12-24
Attending: EMERGENCY MEDICINE | Admitting: INTERNAL MEDICINE
Payer: MEDICARE

## 2020-12-22 ENCOUNTER — TELEPHONE (OUTPATIENT)
Dept: PULMONOLOGY | Age: 60
End: 2020-12-22

## 2020-12-22 ENCOUNTER — APPOINTMENT (OUTPATIENT)
Dept: GENERAL RADIOLOGY | Age: 60
DRG: 191 | End: 2020-12-22
Payer: MEDICARE

## 2020-12-22 DIAGNOSIS — R77.8 ELEVATED TROPONIN: ICD-10-CM

## 2020-12-22 DIAGNOSIS — J44.1 COPD EXACERBATION (HCC): Primary | ICD-10-CM

## 2020-12-22 DIAGNOSIS — J96.21 ACUTE ON CHRONIC RESPIRATORY FAILURE WITH HYPOXIA (HCC): ICD-10-CM

## 2020-12-22 LAB
A/G RATIO: 1.4 (ref 1.1–2.2)
ALBUMIN SERPL-MCNC: 4.1 G/DL (ref 3.4–5)
ALP BLD-CCNC: 90 U/L (ref 40–129)
ALT SERPL-CCNC: 20 U/L (ref 10–40)
ANION GAP SERPL CALCULATED.3IONS-SCNC: 6 MMOL/L (ref 3–16)
AST SERPL-CCNC: 31 U/L (ref 15–37)
BASE EXCESS VENOUS: 4.4 MMOL/L (ref -3–3)
BASOPHILS ABSOLUTE: 0 K/UL (ref 0–0.2)
BASOPHILS RELATIVE PERCENT: 0 %
BILIRUB SERPL-MCNC: 0.4 MG/DL (ref 0–1)
BUN BLDV-MCNC: 8 MG/DL (ref 7–20)
CALCIUM SERPL-MCNC: 10.1 MG/DL (ref 8.3–10.6)
CARBOXYHEMOGLOBIN: 4.8 % (ref 0–1.5)
CHLORIDE BLD-SCNC: 98 MMOL/L (ref 99–110)
CO2: 29 MMOL/L (ref 21–32)
CREAT SERPL-MCNC: 0.6 MG/DL (ref 0.6–1.1)
D DIMER: <200 NG/ML DDU (ref 0–229)
EKG ATRIAL RATE: 74 BPM
EKG DIAGNOSIS: NORMAL
EKG P AXIS: 88 DEGREES
EKG P-R INTERVAL: 168 MS
EKG Q-T INTERVAL: 402 MS
EKG QRS DURATION: 78 MS
EKG QTC CALCULATION (BAZETT): 446 MS
EKG R AXIS: 78 DEGREES
EKG T AXIS: 78 DEGREES
EKG VENTRICULAR RATE: 74 BPM
EOSINOPHILS ABSOLUTE: 4.3 K/UL (ref 0–0.6)
EOSINOPHILS RELATIVE PERCENT: 27 %
GFR AFRICAN AMERICAN: >60
GFR NON-AFRICAN AMERICAN: >60
GLOBULIN: 2.9 G/DL
GLUCOSE BLD-MCNC: 120 MG/DL (ref 70–99)
HCO3 VENOUS: 29.2 MMOL/L (ref 23–29)
HCT VFR BLD CALC: 40.9 % (ref 36–48)
HEMATOLOGY PATH CONSULT: YES
HEMOGLOBIN: 13.5 G/DL (ref 12–16)
LYMPHOCYTES ABSOLUTE: 4 K/UL (ref 1–5.1)
LYMPHOCYTES RELATIVE PERCENT: 25 %
MAGNESIUM: 1.9 MG/DL (ref 1.8–2.4)
MCH RBC QN AUTO: 30.4 PG (ref 26–34)
MCHC RBC AUTO-ENTMCNC: 33.1 G/DL (ref 31–36)
MCV RBC AUTO: 91.9 FL (ref 80–100)
METHEMOGLOBIN VENOUS: 0.3 %
MONOCYTES ABSOLUTE: 1.4 K/UL (ref 0–1.3)
MONOCYTES RELATIVE PERCENT: 9 %
NEUTROPHILS ABSOLUTE: 6.2 K/UL (ref 1.7–7.7)
NEUTROPHILS RELATIVE PERCENT: 39 %
O2 CONTENT, VEN: 19 VOL %
O2 SAT, VEN: 99 %
O2 THERAPY: ABNORMAL
PCO2, VEN: 44.2 MMHG (ref 40–50)
PDW BLD-RTO: 14.5 % (ref 12.4–15.4)
PH VENOUS: 7.44 (ref 7.35–7.45)
PLATELET # BLD: 464 K/UL (ref 135–450)
PLATELET SLIDE REVIEW: ABNORMAL
PMV BLD AUTO: 8.9 FL (ref 5–10.5)
PO2, VEN: 121.2 MMHG (ref 25–40)
POTASSIUM REFLEX MAGNESIUM: 3.2 MMOL/L (ref 3.5–5.1)
RBC # BLD: 4.45 M/UL (ref 4–5.2)
SARS-COV-2, NAAT: NOT DETECTED
SODIUM BLD-SCNC: 133 MMOL/L (ref 136–145)
TCO2 CALC VENOUS: 31 MMOL/L
TOTAL PROTEIN: 7 G/DL (ref 6.4–8.2)
TROPONIN: 0.02 NG/ML
TROPONIN: 0.02 NG/ML
TROPONIN: <0.01 NG/ML
WBC # BLD: 15.9 K/UL (ref 4–11)

## 2020-12-22 PROCEDURE — 6370000000 HC RX 637 (ALT 250 FOR IP): Performed by: INTERNAL MEDICINE

## 2020-12-22 PROCEDURE — U0002 COVID-19 LAB TEST NON-CDC: HCPCS

## 2020-12-22 PROCEDURE — 2580000003 HC RX 258: Performed by: EMERGENCY MEDICINE

## 2020-12-22 PROCEDURE — 83735 ASSAY OF MAGNESIUM: CPT

## 2020-12-22 PROCEDURE — 2580000003 HC RX 258: Performed by: INTERNAL MEDICINE

## 2020-12-22 PROCEDURE — 82803 BLOOD GASES ANY COMBINATION: CPT

## 2020-12-22 PROCEDURE — 71045 X-RAY EXAM CHEST 1 VIEW: CPT

## 2020-12-22 PROCEDURE — 94640 AIRWAY INHALATION TREATMENT: CPT

## 2020-12-22 PROCEDURE — 85379 FIBRIN DEGRADATION QUANT: CPT

## 2020-12-22 PROCEDURE — 93010 ELECTROCARDIOGRAM REPORT: CPT | Performed by: INTERNAL MEDICINE

## 2020-12-22 PROCEDURE — 6370000000 HC RX 637 (ALT 250 FOR IP): Performed by: EMERGENCY MEDICINE

## 2020-12-22 PROCEDURE — 84484 ASSAY OF TROPONIN QUANT: CPT

## 2020-12-22 PROCEDURE — 2700000000 HC OXYGEN THERAPY PER DAY

## 2020-12-22 PROCEDURE — 2500000003 HC RX 250 WO HCPCS: Performed by: EMERGENCY MEDICINE

## 2020-12-22 PROCEDURE — 96374 THER/PROPH/DIAG INJ IV PUSH: CPT

## 2020-12-22 PROCEDURE — 99283 EMERGENCY DEPT VISIT LOW MDM: CPT

## 2020-12-22 PROCEDURE — 36415 COLL VENOUS BLD VENIPUNCTURE: CPT

## 2020-12-22 PROCEDURE — 93005 ELECTROCARDIOGRAM TRACING: CPT | Performed by: EMERGENCY MEDICINE

## 2020-12-22 PROCEDURE — 6360000002 HC RX W HCPCS: Performed by: EMERGENCY MEDICINE

## 2020-12-22 PROCEDURE — 6360000002 HC RX W HCPCS: Performed by: INTERNAL MEDICINE

## 2020-12-22 PROCEDURE — 80053 COMPREHEN METABOLIC PANEL: CPT

## 2020-12-22 PROCEDURE — 85025 COMPLETE CBC W/AUTO DIFF WBC: CPT

## 2020-12-22 PROCEDURE — 96375 TX/PRO/DX INJ NEW DRUG ADDON: CPT

## 2020-12-22 PROCEDURE — 1200000000 HC SEMI PRIVATE

## 2020-12-22 PROCEDURE — 94761 N-INVAS EAR/PLS OXIMETRY MLT: CPT

## 2020-12-22 RX ORDER — ATORVASTATIN CALCIUM 10 MG/1
10 TABLET, FILM COATED ORAL DAILY
Status: DISCONTINUED | OUTPATIENT
Start: 2020-12-23 | End: 2020-12-24 | Stop reason: HOSPADM

## 2020-12-22 RX ORDER — PREDNISONE 20 MG/1
40 TABLET ORAL DAILY
Status: DISCONTINUED | OUTPATIENT
Start: 2020-12-25 | End: 2020-12-24 | Stop reason: HOSPADM

## 2020-12-22 RX ORDER — METHYLPREDNISOLONE SODIUM SUCCINATE 40 MG/ML
40 INJECTION, POWDER, LYOPHILIZED, FOR SOLUTION INTRAMUSCULAR; INTRAVENOUS EVERY 6 HOURS
Status: DISCONTINUED | OUTPATIENT
Start: 2020-12-22 | End: 2020-12-24 | Stop reason: HOSPADM

## 2020-12-22 RX ORDER — POTASSIUM CHLORIDE 20 MEQ/1
40 TABLET, EXTENDED RELEASE ORAL ONCE
Status: COMPLETED | OUTPATIENT
Start: 2020-12-22 | End: 2020-12-22

## 2020-12-22 RX ORDER — METOPROLOL SUCCINATE 50 MG/1
50 TABLET, EXTENDED RELEASE ORAL DAILY
Status: DISCONTINUED | OUTPATIENT
Start: 2020-12-23 | End: 2020-12-24 | Stop reason: HOSPADM

## 2020-12-22 RX ORDER — POTASSIUM CHLORIDE 7.45 MG/ML
10 INJECTION INTRAVENOUS PRN
Status: DISCONTINUED | OUTPATIENT
Start: 2020-12-22 | End: 2020-12-24 | Stop reason: HOSPADM

## 2020-12-22 RX ORDER — HYDROCHLOROTHIAZIDE 25 MG/1
12.5 TABLET ORAL DAILY
Status: DISCONTINUED | OUTPATIENT
Start: 2020-12-23 | End: 2020-12-24 | Stop reason: HOSPADM

## 2020-12-22 RX ORDER — IPRATROPIUM BROMIDE AND ALBUTEROL SULFATE 2.5; .5 MG/3ML; MG/3ML
1 SOLUTION RESPIRATORY (INHALATION) ONCE
Status: COMPLETED | OUTPATIENT
Start: 2020-12-22 | End: 2020-12-22

## 2020-12-22 RX ORDER — KETOROLAC TROMETHAMINE 30 MG/ML
15 INJECTION, SOLUTION INTRAMUSCULAR; INTRAVENOUS ONCE
Status: COMPLETED | OUTPATIENT
Start: 2020-12-22 | End: 2020-12-22

## 2020-12-22 RX ORDER — LEVOTHYROXINE SODIUM 0.05 MG/1
50 TABLET ORAL DAILY
Status: DISCONTINUED | OUTPATIENT
Start: 2020-12-23 | End: 2020-12-24 | Stop reason: HOSPADM

## 2020-12-22 RX ORDER — SACCHAROMYCES BOULARDII 250 MG
250 CAPSULE ORAL DAILY
Status: DISCONTINUED | OUTPATIENT
Start: 2020-12-23 | End: 2020-12-24 | Stop reason: HOSPADM

## 2020-12-22 RX ORDER — SODIUM CHLORIDE 0.9 % (FLUSH) 0.9 %
10 SYRINGE (ML) INJECTION PRN
Status: DISCONTINUED | OUTPATIENT
Start: 2020-12-22 | End: 2020-12-24 | Stop reason: HOSPADM

## 2020-12-22 RX ORDER — ACETAMINOPHEN 650 MG/1
650 SUPPOSITORY RECTAL EVERY 6 HOURS PRN
Status: DISCONTINUED | OUTPATIENT
Start: 2020-12-22 | End: 2020-12-24 | Stop reason: HOSPADM

## 2020-12-22 RX ORDER — FOLIC ACID 1 MG/1
1 TABLET ORAL DAILY
Status: DISCONTINUED | OUTPATIENT
Start: 2020-12-23 | End: 2020-12-24 | Stop reason: HOSPADM

## 2020-12-22 RX ORDER — PROMETHAZINE HYDROCHLORIDE 25 MG/1
12.5 TABLET ORAL EVERY 6 HOURS PRN
Status: DISCONTINUED | OUTPATIENT
Start: 2020-12-22 | End: 2020-12-24 | Stop reason: HOSPADM

## 2020-12-22 RX ORDER — ASPIRIN 81 MG/1
162 TABLET, CHEWABLE ORAL ONCE
Status: COMPLETED | OUTPATIENT
Start: 2020-12-22 | End: 2020-12-22

## 2020-12-22 RX ORDER — PANTOPRAZOLE SODIUM 40 MG/1
40 TABLET, DELAYED RELEASE ORAL DAILY
Status: DISCONTINUED | OUTPATIENT
Start: 2020-12-23 | End: 2020-12-24 | Stop reason: HOSPADM

## 2020-12-22 RX ORDER — SODIUM CHLORIDE 0.9 % (FLUSH) 0.9 %
10 SYRINGE (ML) INJECTION EVERY 12 HOURS SCHEDULED
Status: DISCONTINUED | OUTPATIENT
Start: 2020-12-22 | End: 2020-12-24 | Stop reason: HOSPADM

## 2020-12-22 RX ORDER — POLYETHYLENE GLYCOL 3350 17 G/17G
17 POWDER, FOR SOLUTION ORAL DAILY PRN
Status: DISCONTINUED | OUTPATIENT
Start: 2020-12-22 | End: 2020-12-24 | Stop reason: HOSPADM

## 2020-12-22 RX ORDER — LOSARTAN POTASSIUM AND HYDROCHLOROTHIAZIDE 12.5; 5 MG/1; MG/1
1 TABLET ORAL DAILY
Status: DISCONTINUED | OUTPATIENT
Start: 2020-12-23 | End: 2020-12-22

## 2020-12-22 RX ORDER — METHYLPREDNISOLONE SODIUM SUCCINATE 125 MG/2ML
125 INJECTION, POWDER, LYOPHILIZED, FOR SOLUTION INTRAMUSCULAR; INTRAVENOUS ONCE
Status: COMPLETED | OUTPATIENT
Start: 2020-12-22 | End: 2020-12-22

## 2020-12-22 RX ORDER — DOXYCYCLINE HYCLATE 100 MG
100 TABLET ORAL EVERY 12 HOURS
Status: DISCONTINUED | OUTPATIENT
Start: 2020-12-23 | End: 2020-12-24 | Stop reason: HOSPADM

## 2020-12-22 RX ORDER — HYDROXYZINE HYDROCHLORIDE 10 MG/1
10 TABLET, FILM COATED ORAL 3 TIMES DAILY PRN
Status: DISCONTINUED | OUTPATIENT
Start: 2020-12-22 | End: 2020-12-24 | Stop reason: HOSPADM

## 2020-12-22 RX ORDER — POTASSIUM CHLORIDE 20 MEQ/1
40 TABLET, EXTENDED RELEASE ORAL PRN
Status: DISCONTINUED | OUTPATIENT
Start: 2020-12-22 | End: 2020-12-24 | Stop reason: HOSPADM

## 2020-12-22 RX ORDER — LOSARTAN POTASSIUM 25 MG/1
50 TABLET ORAL DAILY
Status: DISCONTINUED | OUTPATIENT
Start: 2020-12-23 | End: 2020-12-24 | Stop reason: HOSPADM

## 2020-12-22 RX ORDER — DULOXETIN HYDROCHLORIDE 60 MG/1
60 CAPSULE, DELAYED RELEASE ORAL DAILY
Status: DISCONTINUED | OUTPATIENT
Start: 2020-12-23 | End: 2020-12-24 | Stop reason: HOSPADM

## 2020-12-22 RX ORDER — IPRATROPIUM BROMIDE AND ALBUTEROL SULFATE 2.5; .5 MG/3ML; MG/3ML
1 SOLUTION RESPIRATORY (INHALATION)
Status: DISCONTINUED | OUTPATIENT
Start: 2020-12-23 | End: 2020-12-22

## 2020-12-22 RX ORDER — ACETAMINOPHEN 325 MG/1
650 TABLET ORAL EVERY 6 HOURS PRN
Status: DISCONTINUED | OUTPATIENT
Start: 2020-12-22 | End: 2020-12-24 | Stop reason: HOSPADM

## 2020-12-22 RX ORDER — IPRATROPIUM BROMIDE AND ALBUTEROL SULFATE 2.5; .5 MG/3ML; MG/3ML
1 SOLUTION RESPIRATORY (INHALATION) EVERY 4 HOURS
Status: DISCONTINUED | OUTPATIENT
Start: 2020-12-22 | End: 2020-12-23

## 2020-12-22 RX ORDER — ONDANSETRON 4 MG/1
8 TABLET, ORALLY DISINTEGRATING ORAL EVERY 8 HOURS PRN
Status: DISCONTINUED | OUTPATIENT
Start: 2020-12-22 | End: 2020-12-24 | Stop reason: HOSPADM

## 2020-12-22 RX ORDER — ONDANSETRON 2 MG/ML
4 INJECTION INTRAMUSCULAR; INTRAVENOUS EVERY 6 HOURS PRN
Status: DISCONTINUED | OUTPATIENT
Start: 2020-12-22 | End: 2020-12-24 | Stop reason: HOSPADM

## 2020-12-22 RX ADMIN — METHYLPREDNISOLONE SODIUM SUCCINATE 40 MG: 40 INJECTION, POWDER, FOR SOLUTION INTRAMUSCULAR; INTRAVENOUS at 23:23

## 2020-12-22 RX ADMIN — IPRATROPIUM BROMIDE AND ALBUTEROL SULFATE 1 AMPULE: .5; 2.5 SOLUTION RESPIRATORY (INHALATION) at 16:12

## 2020-12-22 RX ADMIN — IPRATROPIUM BROMIDE AND ALBUTEROL SULFATE 1 AMPULE: .5; 3 SOLUTION RESPIRATORY (INHALATION) at 22:50

## 2020-12-22 RX ADMIN — ACETAMINOPHEN 650 MG: 325 TABLET ORAL at 23:19

## 2020-12-22 RX ADMIN — KETOROLAC TROMETHAMINE 15 MG: 30 INJECTION, SOLUTION INTRAMUSCULAR at 19:49

## 2020-12-22 RX ADMIN — METHYLPREDNISOLONE SODIUM SUCCINATE 125 MG: 125 INJECTION, POWDER, FOR SOLUTION INTRAMUSCULAR; INTRAVENOUS at 16:12

## 2020-12-22 RX ADMIN — ASPIRIN 162 MG: 81 TABLET, CHEWABLE ORAL at 17:38

## 2020-12-22 RX ADMIN — POTASSIUM CHLORIDE 40 MEQ: 1500 TABLET, EXTENDED RELEASE ORAL at 17:39

## 2020-12-22 RX ADMIN — DOXYCYCLINE 100 MG: 100 INJECTION, POWDER, LYOPHILIZED, FOR SOLUTION INTRAVENOUS at 17:39

## 2020-12-22 RX ADMIN — POTASSIUM CHLORIDE 40 MEQ: 1500 TABLET, EXTENDED RELEASE ORAL at 23:19

## 2020-12-22 RX ADMIN — Medication 10 ML: at 23:19

## 2020-12-22 RX ADMIN — IPRATROPIUM BROMIDE AND ALBUTEROL SULFATE 1 AMPULE: .5; 2.5 SOLUTION RESPIRATORY (INHALATION) at 19:50

## 2020-12-22 ASSESSMENT — PAIN DESCRIPTION - DESCRIPTORS: DESCRIPTORS: BURNING;ACHING

## 2020-12-22 ASSESSMENT — PAIN DESCRIPTION - LOCATION: LOCATION: CHEST

## 2020-12-22 ASSESSMENT — PAIN DESCRIPTION - PAIN TYPE: TYPE: ACUTE PAIN

## 2020-12-22 ASSESSMENT — PAIN SCALES - GENERAL
PAINLEVEL_OUTOF10: 6
PAINLEVEL_OUTOF10: 7

## 2020-12-22 NOTE — PLAN OF CARE
Acute copd exaceration      Steroids, doxy    Mildly elevated troponin   no chest pain  Follow troponins

## 2020-12-22 NOTE — PROGRESS NOTES
Cardiology on call notified. Patient with hypoxic respiratory failure and chest discomfort. Reproducible per ER physician. EKG stable from prior. Recommend treating underlying cause, aspirin, and trend troponin q8h. Cardiology will evaluate tomorrow. Please call with any questions.

## 2020-12-22 NOTE — ED NOTES
Perfect serve message to Dr. Renzo Culp @ [phone]  Dr Renzo Culp returned the call to Dr. René Black @ [facility] 7  12/22/20 [id number]

## 2020-12-22 NOTE — TELEPHONE ENCOUNTER
Patient had Covid19 test done 3 weeks ago and it was negative. Do you have the following symptoms? Shortness of Breath  Yes   Wheezing  yes  Cough  yes  Cough Characteristics:  Productive    yes  Sputum Color    yellow  Hemoptysis   Yes - streaks  Consistency of sputum   thick     Fever:    no    Temp:n/a  Chills/Sweats:  sweats  What other symptoms are you having?:  Upon waking in morning o2 sat is at 84% (pt uses nocturnal O2) pt is taking breathing treatments every 4 hours. Pt said that after doing a breathing treatment she can get it to 90% on 3lpm at rest and and 88% on 3lpm with exertion    How long have you had these symptoms? 2 months     Ph[facility]: [facility]          Review medications and allergies: Allergies? No Known Allergies          Currently on Antibiotics? (Drug/Dose/Frequency and how long on?) yes - levofloxacin 750 mg Daily & Metronidazole 500 MG TID (for a bone infection in mouth)        Systemic Steroids? (Drug/Dose/Frequency and how long on?) no      Last sick call taken on n/a. Meds prescribed were n/a    Pull in last office note. 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 We discussed the association of smoking with the patient's current symptoms and the risks of continued use and the options for achieving cessation. The patient was advised to set a quit date and alert family members and remove smoking paraphernalia before the quit date.    Rx for Nicotine low dose patches    · F/u for COPD in 4 mo and for LDCT next in October  · Screening CT scan was considered in a lung cancer screening counseling and shared decision making visit today that included the following elements:   · Eligibility: Karolina Pennant are no signs or symptoms of lung cancer.  Tobacco History 32 pack-years, quit  1 years ago  · Verbal counseling has been performed by me to include benefits and harms of screening, follow-up diagnostic testing, over-diagnosis, false positive rate, and total radiation exposure;   · I have counseled on the importance of adherence to annual lung cancer LDCT screening, the impact of comorbidities and patient is willing to undergo diagnosis and treatment;   · I have provided counseling on the importance of maintaining cigarette smoking abstinence if former smoker; or the importance of smoking cessation if current smoker and, if appropriate, furnishing of information about tobacco cessation interventions; and   · I have furnished a written order for lung cancer screening with LDCT.      THIS VISIT WAS COMPLETED VIRTUALLY USING DOXY. ME  Pursuant to the emergency declaration under the [facility], [facility] waiver authority and the [employer] Act, this Virtual  Visit was conducted, with patient's consent, to reduce the patient's risk of exposure to COVID-19 and provide continuity of care for an established patient. Services were provided through a video synchronous discussion virtually to substitute for in-person clinic visit.

## 2020-12-23 LAB
BASOPHILS ABSOLUTE: 0.1 K/UL (ref 0–0.2)
BASOPHILS RELATIVE PERCENT: 0.6 %
C DIFF TOXIN/ANTIGEN: NORMAL
EOSINOPHILS ABSOLUTE: 0 K/UL (ref 0–0.6)
EOSINOPHILS RELATIVE PERCENT: 0.2 %
HCT VFR BLD CALC: 39.6 % (ref 36–48)
HEMATOLOGY PATH CONSULT: NORMAL
HEMOGLOBIN: 13.1 G/DL (ref 12–16)
LYMPHOCYTES ABSOLUTE: 1.2 K/UL (ref 1–5.1)
LYMPHOCYTES RELATIVE PERCENT: 14.7 %
MCH RBC QN AUTO: 30.9 PG (ref 26–34)
MCHC RBC AUTO-ENTMCNC: 33.1 G/DL (ref 31–36)
MCV RBC AUTO: 93.3 FL (ref 80–100)
MONOCYTES ABSOLUTE: 0.2 K/UL (ref 0–1.3)
MONOCYTES RELATIVE PERCENT: 2.1 %
NEUTROPHILS ABSOLUTE: 6.8 K/UL (ref 1.7–7.7)
NEUTROPHILS RELATIVE PERCENT: 82.4 %
PDW BLD-RTO: 15 % (ref 12.4–15.4)
PLATELET # BLD: 445 K/UL (ref 135–450)
PMV BLD AUTO: 9.1 FL (ref 5–10.5)
RBC # BLD: 4.25 M/UL (ref 4–5.2)
TROPONIN: <0.01 NG/ML
WBC # BLD: 8.3 K/UL (ref 4–11)

## 2020-12-23 PROCEDURE — 99232 SBSQ HOSP IP/OBS MODERATE 35: CPT | Performed by: INTERNAL MEDICINE

## 2020-12-23 PROCEDURE — 6370000000 HC RX 637 (ALT 250 FOR IP): Performed by: PHYSICIAN ASSISTANT

## 2020-12-23 PROCEDURE — 99221 1ST HOSP IP/OBS SF/LOW 40: CPT | Performed by: INTERNAL MEDICINE

## 2020-12-23 PROCEDURE — 6360000002 HC RX W HCPCS: Performed by: INTERNAL MEDICINE

## 2020-12-23 PROCEDURE — 87324 CLOSTRIDIUM AG IA: CPT

## 2020-12-23 PROCEDURE — 6370000000 HC RX 637 (ALT 250 FOR IP): Performed by: INTERNAL MEDICINE

## 2020-12-23 PROCEDURE — 84484 ASSAY OF TROPONIN QUANT: CPT

## 2020-12-23 PROCEDURE — 85025 COMPLETE CBC W/AUTO DIFF WBC: CPT

## 2020-12-23 PROCEDURE — 87449 NOS EACH ORGANISM AG IA: CPT

## 2020-12-23 PROCEDURE — 94761 N-INVAS EAR/PLS OXIMETRY MLT: CPT

## 2020-12-23 PROCEDURE — 1200000000 HC SEMI PRIVATE

## 2020-12-23 PROCEDURE — 36415 COLL VENOUS BLD VENIPUNCTURE: CPT

## 2020-12-23 PROCEDURE — 2700000000 HC OXYGEN THERAPY PER DAY

## 2020-12-23 PROCEDURE — 2580000003 HC RX 258: Performed by: INTERNAL MEDICINE

## 2020-12-23 PROCEDURE — 94640 AIRWAY INHALATION TREATMENT: CPT

## 2020-12-23 RX ORDER — IPRATROPIUM BROMIDE AND ALBUTEROL SULFATE 2.5; .5 MG/3ML; MG/3ML
1 SOLUTION RESPIRATORY (INHALATION) 4 TIMES DAILY
Status: DISCONTINUED | OUTPATIENT
Start: 2020-12-24 | End: 2020-12-24 | Stop reason: HOSPADM

## 2020-12-23 RX ORDER — BUDESONIDE AND FORMOTEROL FUMARATE DIHYDRATE 160; 4.5 UG/1; UG/1
1 AEROSOL RESPIRATORY (INHALATION) DAILY
Status: CANCELLED | OUTPATIENT
Start: 2020-12-23

## 2020-12-23 RX ORDER — GABAPENTIN 300 MG/1
300 CAPSULE ORAL 4 TIMES DAILY
Status: DISCONTINUED | OUTPATIENT
Start: 2020-12-23 | End: 2020-12-24 | Stop reason: HOSPADM

## 2020-12-23 RX ORDER — LOPERAMIDE HYDROCHLORIDE 2 MG/1
2 CAPSULE ORAL 4 TIMES DAILY PRN
Status: DISCONTINUED | OUTPATIENT
Start: 2020-12-23 | End: 2020-12-24 | Stop reason: HOSPADM

## 2020-12-23 RX ADMIN — PANTOPRAZOLE SODIUM 40 MG: 40 TABLET, DELAYED RELEASE ORAL at 08:29

## 2020-12-23 RX ADMIN — METHYLPREDNISOLONE SODIUM SUCCINATE 40 MG: 40 INJECTION, POWDER, FOR SOLUTION INTRAMUSCULAR; INTRAVENOUS at 06:04

## 2020-12-23 RX ADMIN — ENOXAPARIN SODIUM 40 MG: 40 INJECTION SUBCUTANEOUS at 08:28

## 2020-12-23 RX ADMIN — GABAPENTIN 300 MG: 300 CAPSULE ORAL at 17:51

## 2020-12-23 RX ADMIN — ATORVASTATIN CALCIUM 10 MG: 10 TABLET, FILM COATED ORAL at 08:29

## 2020-12-23 RX ADMIN — DULOXETINE HYDROCHLORIDE 60 MG: 60 CAPSULE, DELAYED RELEASE ORAL at 08:30

## 2020-12-23 RX ADMIN — GABAPENTIN 300 MG: 300 CAPSULE ORAL at 12:53

## 2020-12-23 RX ADMIN — DOXYCYCLINE HYCLATE 100 MG: 100 TABLET, COATED ORAL at 06:04

## 2020-12-23 RX ADMIN — METOPROLOL SUCCINATE 50 MG: 50 TABLET, EXTENDED RELEASE ORAL at 08:29

## 2020-12-23 RX ADMIN — IPRATROPIUM BROMIDE AND ALBUTEROL SULFATE 1 AMPULE: .5; 3 SOLUTION RESPIRATORY (INHALATION) at 11:32

## 2020-12-23 RX ADMIN — GABAPENTIN 300 MG: 300 CAPSULE ORAL at 21:18

## 2020-12-23 RX ADMIN — IPRATROPIUM BROMIDE AND ALBUTEROL SULFATE 1 AMPULE: .5; 3 SOLUTION RESPIRATORY (INHALATION) at 08:32

## 2020-12-23 RX ADMIN — IPRATROPIUM BROMIDE AND ALBUTEROL SULFATE 1 AMPULE: .5; 3 SOLUTION RESPIRATORY (INHALATION) at 15:09

## 2020-12-23 RX ADMIN — DOXYCYCLINE HYCLATE 100 MG: 100 TABLET, COATED ORAL at 17:52

## 2020-12-23 RX ADMIN — ACETAMINOPHEN 650 MG: 325 TABLET ORAL at 11:44

## 2020-12-23 RX ADMIN — HYDROCHLOROTHIAZIDE 12.5 MG: 25 TABLET ORAL at 08:28

## 2020-12-23 RX ADMIN — LEVOTHYROXINE SODIUM 50 MCG: 50 TABLET ORAL at 06:04

## 2020-12-23 RX ADMIN — RDII 250 MG CAPSULE 250 MG: at 08:29

## 2020-12-23 RX ADMIN — LOSARTAN POTASSIUM 50 MG: 25 TABLET, FILM COATED ORAL at 08:29

## 2020-12-23 RX ADMIN — FOLIC ACID 1 MG: 1 TABLET ORAL at 08:29

## 2020-12-23 RX ADMIN — Medication 10 ML: at 08:28

## 2020-12-23 RX ADMIN — IPRATROPIUM BROMIDE AND ALBUTEROL SULFATE 1 AMPULE: .5; 3 SOLUTION RESPIRATORY (INHALATION) at 03:14

## 2020-12-23 RX ADMIN — LOPERAMIDE HYDROCHLORIDE 2 MG: 2 CAPSULE ORAL at 11:44

## 2020-12-23 RX ADMIN — Medication 10 ML: at 19:44

## 2020-12-23 RX ADMIN — ACETAMINOPHEN 650 MG: 325 TABLET ORAL at 19:44

## 2020-12-23 RX ADMIN — IPRATROPIUM BROMIDE AND ALBUTEROL SULFATE 1 AMPULE: .5; 3 SOLUTION RESPIRATORY (INHALATION) at 19:34

## 2020-12-23 RX ADMIN — METHYLPREDNISOLONE SODIUM SUCCINATE 40 MG: 40 INJECTION, POWDER, FOR SOLUTION INTRAMUSCULAR; INTRAVENOUS at 11:40

## 2020-12-23 RX ADMIN — METHYLPREDNISOLONE SODIUM SUCCINATE 40 MG: 40 INJECTION, POWDER, FOR SOLUTION INTRAMUSCULAR; INTRAVENOUS at 17:55

## 2020-12-23 ASSESSMENT — PAIN DESCRIPTION - PAIN TYPE: TYPE: ACUTE PAIN

## 2020-12-23 ASSESSMENT — PAIN DESCRIPTION - LOCATION: LOCATION: HEAD

## 2020-12-23 ASSESSMENT — PAIN SCALES - GENERAL
PAINLEVEL_OUTOF10: 6
PAINLEVEL_OUTOF10: 2
PAINLEVEL_OUTOF10: 6

## 2020-12-23 NOTE — CARE COORDINATION
Case Management Assessment  Initial Evaluation      Patient Name: Simone Card  YOB: 1960  Diagnosis: [facility] with acute exacerbation ([facility] [J44.1]  Date / Time: 12/22/2020  2:44 PM    Admission status/Date:  12/23/2020  Chart Reviewed: Yes      Patient Interviewed: Yes   Family Interviewed:  No      Hospitalization in the last 30 days:  No      Health Care Decision Maker :     (CM - must 1st enter selection under Navigator - emergency contact- [place] Relationship and pick relationship)   Who do you trust or have selected to make healthcare decisions for you      Met with: patient  Interview conducted  (bedside/phone): phone    Current PCP: Caesar Scott MD    Financial  [employer] Medicare  Precert required for SNF : WAIVED       3 night stay required - NA    ADLS  Support Systems/Care Needs:    Transportation: self/spouse    Meal Preparation: self    Housing  Living Arrangements: lives in ranch home w/sp Steps: 2  Intent for return to present living arrangements: Yes  Identified Issues: NA    Home Care Information  Active with [address] : Yes - SN visits through [place] 2x month     Passport/Waiver : No  :                      Phone Number:    Passport/Waiver Services: NA          Durable Medical Equiptment   DME Provider: Yanet Franco  Equipment:   Walker_X__Cane_X__RTS___ BSC___Shower Chair_X__Hospital Bed___W/C____Other________  02 at __3__Liter(s)---wears(frequency)_______ HHN _X__ CPAP___ BiPap___   N/A____      Home O2 Use :  Yes      Informed of need for care provider to bring portable home O2 tank on day of discharge for nursing to connect prior to leaving:   Yes  Verbalized agreement/Understanding:   Yes    Community Service Affiliation  Dialysis:  No    · Agency:  · Location:  · Dialysis Schedule:  · Phone:   · Fax:     Other Community Services: (ex:PT/OT,Mental Health,Wound Clinic, [address] [place] [address] [facility] DISCHARGE PLAN: Explained Case Management role/services. Chart reviewed. Met with pt via room intercom and explained the role of the CM. Plans to return home. Pt is interested in portable concentrator. She will need walk test, documentation and DME order for portable concentrator sent to [address] [place] [address] upon DC.  +CM following

## 2020-12-23 NOTE — CONSULTS
reports that she quit smoking about 10 months ago. Her smoking use included cigarettes. She has a 8.00 pack-year smoking history. She has never used smokeless tobacco. She reports previous drug use. Drug: Marijuana. She reports that she does not drink alcohol. Family History:  family history includes Heart Disease in her father; High Blood Pressure in her sister. Home Medications:  Were reviewed and are listed in nursing record. and/or listed below  Prior to Admission medications    Medication Sig Start Date End Date Taking?  Authorizing Provider   albuterol sulfate  (90 Base) MCG/ACT inhaler Inhale 1 puff into the lungs as needed for Wheezing or Shortness of Breath 11/25/20  Yes Justin Gregg MD   clindamycin (CLEOCIN) 150 MG capsule Take 150 mg by mouth 3 times daily 11/3/20  Yes Historical Provider, MD   pantoprazole (PROTONIX) 40 MG tablet Take 40 mg by mouth daily 10/16/20  Yes Historical Provider, MD   FLORASTOR 250 MG capsule Take 250 mg by mouth daily 9/17/20  Yes Historical Provider, MD   chlorhexidine (PERIDEX) 0.12 % solution  11/3/20  Yes Historical Provider, MD   metoprolol succinate (TOPROL XL) 50 MG extended release tablet Take 1 tablet by mouth daily 11/5/20  Yes Leo Abdul MD   hydrOXYzine (ATARAX) 10 MG tablet  8/26/20  Yes Historical Provider, MD   aspirin-acetaminophen-caffeine (Abhishek Bullion) 425-223-10 MG per tablet Take 1 tablet by mouth every 6 hours as needed for Headaches   Yes Historical Provider, MD   fluticasone-umeclidin-vilant (TRELEGY ELLIPTA) 100-62.5-25 MCG/INH AEPB Inhale 1 puff into the lungs daily 6/23/20  Yes Justin Gregg MD   albuterol (PROVENTIL) (2.5 MG/3ML) 0.083% nebulizer solution Take 3 mLs by nebulization every 6 hours as needed for Wheezing 6/23/20  Yes Justin Gregg MD   DULoxetine (CYMBALTA) 60 MG extended release capsule Take 60 mg by mouth    Yes Historical Provider, MD   folic acid (FOLVITE) 1 MG tablet Take 1 tablet by mouth daily 11/11/19 Yes Frankie Baumgarten, MD   losartan-hydrochlorothiazide [facility]) 50-12.5 MG per tablet Take 1 tablet by mouth daily  Patient taking differently: Take 1 tablet by mouth 2 times daily  9/26/19  Yes Slick Saucedo MD   atorvastatin (LIPITOR) 10 MG tablet Take 10 mg by mouth daily   Yes Historical Provider, MD   levothyroxine (SYNTHROID) 50 MCG tablet Take 50 mcg by mouth Daily   Yes Historical Provider, MD   ondansetron (ZOFRAN-ODT) 8 MG TBDP disintegrating tablet Place 8 mg under the tongue every 8 hours as needed for Nausea or Vomiting   Yes Historical Provider, MD   gabapentin (NEURONTIN) 300 MG capsule Take 1 capsule by mouth 4 times daily for 30 days. 11/5/20 12/5/20  Sandi Abdul MD   nicotine (NICODERM CQ) 7 MG/24HR Place 1 patch onto the skin daily 10/15/20 12/14/20  Alfonso Castrejon MD   Acetaminophen (TYLENOL) 325 MG CAPS Take 975 mg by mouth as needed 6/15/19   Historical Provider, MD        Allergies:  Patient has no known allergies.      Review of Systems:   12 point ROS negative in all areas as listed below except as in [place]  Constitutional, EENT, Cardiovascular, pulmonary, GI, , Musculoskeletal, skin, neurological, hematological, endocrine, Psychiatric    Physical Examination:    Vitals:    12/23/20 0832   BP:    Pulse:    Resp: 18   Temp:    SpO2: 96%    Weight: 106 lb (48.1 kg)         General Appearance:  Alert, cooperative, no distress, appears stated age   Head:  Normocephalic, without obvious abnormality, atraumatic   Eyes:  PERRL, conjunctiva/corneas clear       Nose: Nares normal, no drainage or sinus tenderness   Throat: Lips, mucosa, and tongue normal   Neck: Supple, symmetrical, trachea midline, no adenopathy, thyroid: not enlarged, symmetric, no tenderness/mass/nodules, no carotid bruit or JVD       Lungs:   Diffusely soft breath sounds with expiratory wheezing when coughing   Chest Wall:  No deformity; +tenderness left chest   Heart:  Regular rate and rhythm, S1, S2 normal, no murmur, rub or gallop   Abdomen:   Soft, non-tender, bowel sounds active all four quadrants,  no masses, no organomegaly           Extremities: Extremities normal, atraumatic, no cyanosis or edema   Pulses: 2+ and symmetric   Skin: Skin color, texture, turgor normal, no rashes or lesions   Pysch: Normal mood and affect   Neurologic: Normal gross motor and sensory exam.         Labs  CBC:   Lab Results   Component Value Date    WBC 8.3 12/23/2020    RBC 4.25 12/23/2020    HGB 13.1 12/23/2020    HCT 39.6 12/23/2020    MCV 93.3 12/23/2020    RDW 15.0 12/23/2020     12/23/2020     CMP:    Lab Results   Component Value Date     12/22/2020    K 3.2 12/22/2020    CL 98 12/22/2020    CO2 29 12/22/2020    BUN 8 12/22/2020    CREATININE 0.6 12/22/2020    GFRAA >60 12/22/2020    AGRATIO 1.4 12/22/2020    LABGLOM >60 12/22/2020    GLUCOSE 120 12/22/2020    PROT 7.0 12/22/2020    CALCIUM 10.1 12/22/2020    BILITOT 0.4 12/22/2020    ALKPHOS 90 12/22/2020    AST 31 12/22/2020    ALT 20 12/22/2020     PT/INR:  No results found for: [employer]  Lab Results   Component Value Date    TROPONINI <0.01 12/23/2020       EKG 12/22/20   Normal sinus rhythmNormal ECGNo previous ECGs availableConfirmed by Catarina Araiza MD, [address] ([facility]) on 12/22/2020 4:57:37 PM    ECHO 9/26/19   Technically difficult examination. Low parasternal window secondary to COPD. LV systolic function is normal with EF estimated at 55%. No regional wall motion abnormalities. Normal left ventricular diastolic filling pressure. Mild mitral regurgitation. Cardiac CTA 9/25/19   No significant coronary artery stenosis is identified. Right coronary dominance. Left ventricular ejection fraction 70.9%. Advanced pattern of emphysema. Cesar Valle 4/3/2019 [place], [place]       Assessment:  Kavon Qiu is a 62 y.o. patient who presented to [facility] 12/22/20 with c/o SOB and cough.  She has PMH: HTN, thyroid disease, right adrenal adenoma (follows Dr. Eric Real), s/p splenectomy, and moderate COPD wears 3L NC O2 mainly nighttime (follows Dr. Chelly Carmona). She follows my partner Dr. Ruffin Waterford 5/26/20 and 9/30/19. Past testing lexiscan myoview 4/2019 showed no ischemia. Cardiac CTA 9/25/19 showed no significant CAD; EF=71%; calcium score=0. Most recent ECHO 9/26/19  EF estimated at 55%. No regional wall motion abnormalities; Normal left ventricular diastolic filling pressure. Mild  Now presents with c/o worsening SOB and cough x 1 month. Reports being on 2 abx (levaquin and unknown 2nd drug) due to abscess tooth pulled 1 month ago. Has continued abx but reports increasing SOB mainly with exertion associated with cough productive of yellowish sputum. Also with focal left chest pain to touch and some tightness in chest with SOB. Admit EKG NSR 74bpm (no change from 6/20 EKG); Moni 0.02, <0.01, <0.1; K+ 3.2; BUN/Cr=8/0.6; CXR Lungs hyperinflated with generalized interstitial prominence, compatible with COPD. No focal airspace consolidation. Diagnosis of elevated Moni and unspecified CP in middle-aged female with COPD. Recs:  1. Note EKG negative for ischemia and Moni equivocal mild elevation with two results 0.2 and then remainder within normal limits. 2. Note recent CTA 9/19 with no concerning CAD and calcium score 0.  3. Focal tenderness in chest wall reproduces pain and likely non-cardiac. 4. Pulmonary consult for COPD    No need for cardiac testing at this time. No further cardiac recs and will sign off. Call if further questions. Thank you.       Patient Active Problem List   Diagnosis    Essential hypertension    Chest pain    Moderate COPD (chronic obstructive pulmonary disease) (HCC)    Heterozygous alpha 1-antitrypsin deficiency ([address]    COPD exacerbation (HCC)    Gastroesophageal reflux disease without esophagitis    Anxiety and depression    Neuropathy of left lower extremity    Acquired hypothyroidism    COPD with acute exacerbation ([address]    Diarrhea

## 2020-12-23 NOTE — PROGRESS NOTES
Assessment complete and medications administered per order, see MAR. Patient resting quietly in bed with no C/O thus far. Call light in reach, will continue to monitor.

## 2020-12-23 NOTE — PLAN OF CARE
Problem: Discharge Planning:  Goal: Discharged to appropriate level of care  Description: Discharged to appropriate level of care  Outcome: Ongoing     Problem: Activity Intolerance:  Goal: Ability to tolerate increased activity will improve  Description: Ability to tolerate increased activity will improve  Outcome: Ongoing     Problem: Airway Clearance - Ineffective:  Goal: Ability to maintain a clear airway will improve  Description: Ability to maintain a clear airway will improve  Outcome: Ongoing     Problem: Breathing Pattern - Ineffective:  Goal: Ability to achieve and maintain a regular respiratory rate will improve  Description: Ability to achieve and maintain a regular respiratory rate will improve  Outcome: Ongoing     Problem: Gas Exchange - Impaired:  Goal: Levels of oxygenation will improve  Description: Levels of oxygenation will improve  Outcome: Ongoing     Problem: Infection:  Goal: Will remain free from infection  Description: Will remain free from infection  Outcome: Ongoing     Problem: Safety:  Goal: Free from accidental physical injury  Description: Free from accidental physical injury  Outcome: Ongoing  Goal: Free from intentional harm  Description: Free from intentional harm  Outcome: Ongoing     Problem: Daily Care:  Goal: Daily care needs are met  Description: Daily care needs are met  Outcome: Ongoing     Problem: Pain:  Goal: Patient's pain/discomfort is manageable  Description: Patient's pain/discomfort is manageable  Outcome: Ongoing     Problem: Skin Integrity:  Goal: Skin integrity will stabilize  Description: Skin integrity will stabilize  Outcome: Ongoing     Problem: Discharge Planning:  Goal: Patients continuum of care needs are met  Description: Patients continuum of care needs are met  Outcome: Ongoing     Problem:  Bowel/Gastric:  Goal: Control of bowel function will improve  Description: Control of bowel function will improve  Outcome: Ongoing  Goal: Ability to achieve a regular elimination pattern will improve  Description: Ability to achieve a regular elimination pattern will improve  Outcome: Ongoing     Problem: Nutritional:  Goal: Ability to follow a diet with enough fiber (20 to 30 grams) for normal bowel function will improve  Description: Ability to follow a diet with enough fiber (20 to 30 grams) for normal bowel function will improve  Outcome: Ongoing     Problem: Skin Integrity:  Goal: Risk for impaired skin integrity will decrease  Description: Risk for impaired skin integrity will decrease  Outcome: Ongoing

## 2020-12-23 NOTE — PROGRESS NOTES
RESPIRATORY THERAPY ASSESSMENT    Name:  Alyx Smith  Medical Record Number:  [id number]  Age: 62 y.o. Gender: female  : 1960  Today's Date:  2020  Room:  0216/0216-01    Assessment     Is the patient being admitted for a COPD or Asthma exacerbation? Yes   (If yes the patient will be seen every 4 hours for the first 24 hours and then reassessed)    Patient Admission Diagnosis      Allergies  No Known Allergies    Minimum Predicted Vital Capacity:               Actual Vital Capacity:                    Pulmonary History:COPD  Home Oxygen Therapy:  3l/m at noc and PRN  Home Respiratory Therapy:Albuterol and Trelegy   Current Respiratory Therapy:  duoneb Q4WA Trelegy          Respiratory Severity Index(RSI)   Patients with orders for inhalation medications, oxygen, or any therapeutic treatment modality will be placed on Respiratory Protocol. They will be assessed with the first treatment and at least every 72 hours thereafter. The following severity scale will be used to determine frequency of treatment intervention.     Smoking History: Pulmonary Disease or Smoking History, Greater than 15 pack year = 2    Social History  Social History     Tobacco Use    Smoking status: Former Smoker     Packs/day: 0.25     Years: 32.00     Pack years: 8.00     Types: Cigarettes     Quit date: 2020     Years since quittin.8    Smokeless tobacco: Never Used    Tobacco comment: currently using nicotine patch   Substance Use Topics    Alcohol use: Never     Frequency: Never    Drug use: Not Currently     Types: Marijuana       Recent Surgical History: None = 0  Past Surgical History  Past Surgical History:   Procedure Laterality Date    BACK SURGERY      L4-L5 rods in    HYSTERECTOMY, VAGINAL  2000    SPLENECTOMY  2019    due to a fall    TONSILLECTOMY         Level of Consciousness: Alert, Oriented, and Cooperative = 0    Level of Activity: Walking unassisted = 0    Respiratory Pattern: Dyspnea with exertion;Irregular pattern;or RR less than 6 = 2    Breath Sounds: Diminshed bilaterally and/or crackles = 2    Sputum   ,  ,    Cough: Strong, spontaneous, non-productive = 0    Vital Signs   /73   Pulse 82   Temp 96.9 °F (36.1 °C) (Oral)   Resp 19   Ht 5' 4\" (1.626 m)   Wt 106 lb (48.1 kg)   SpO2 92%   BMI 18.19 kg/m²   SPO2 (COPD values may differ): 90-91% on room air or greater than 92% on FiO2 24- 28% = 1    Peak Flow (asthma only): not applicable = 0    RSI: 7-8 = BID and Q4HPRN (every four hours as needed) for dyspnea        Plan       Goals: medication delivery and improve oxygenation    Patient/caregiver was educated on the proper method of use for Respiratory Care Devices:  Yes      Level of patient/caregiver understanding able to:   ? Verbalize understanding   ? Demonstrate understanding       ? Teach back        ? Needs reinforcement       ? No available caregiver               ? Other:     Response to education:  Excellent     Is patient being placed on Home Treatment Regimen? No     Does the patient have everything they need prior to discharge? NA     Comments: Patient chart reviewed, patient assessed. Plan of Care: change patient to duoneb Q4 x24 hours     Electronically signed by Vinay Saavedra RCP on 12/22/2020 at 10:46 PM    Respiratory Protocol Guidelines     1. Assessment and treatment by Respiratory Therapy will be initiated for medication and therapeutic interventions upon initiation of aerosolized medication. 2. Physician will be contacted for respiratory rate (RR) greater than 35 breaths per minute. Therapy will be held for heart rate (HR) greater than 140 beats per minute, pending direction from physician. 3. Bronchodilators will be administered via Metered Dose Inhaler (MDI) with spacer when the following criteria are met:  a.  Alert and cooperative     b. HR < 140 bpm  c. RR < 30 bpm                d. Can demonstrate a 23 second inspiratory hold  4. Bronchodilators will be administered via Hand Held Nebulizer [facility]) to patients when ANY of the following criteria are met  a. Incognizant or uncooperative          b. Patients treated with HHN at Home        c. Unable to demonstrate proper use of MDI with spacer     d. RR > 30 bpm   5. Bronchodilators will be delivered via Metered Dose Inhaler (MDI), HHN, Aerogen to intubated patients on mechanical ventilation. 6. Inhalation medication orders will be delivered and/or substituted as outlined below. Aerosolized Medications Ordering and Administration Guidelines:    1. All Medications will be ordered by a physician, and their frequency and/or modality will be adjusted as defined by the patients Respiratory Severity Index (RSI) score. 2. If the patient does not have documented COPD, consider discontinuing anticholinergics when RSI is less than 9.  3. If the bronchospasm worsens (increased RSI), then the bronchodilator frequency can be increased to a maximum of every 4 hours. If greater than every 4 hours is required, the physician will be contacted. 4. If the bronchospasm improves, the frequency of the bronchodilator can be decreased, based on the patient's RSI, but not less than home treatment regimen frequency. 5. Bronchodilator(s) will be discontinued if patient has a RSI less than 9 and has received no scheduled or as needed treatment for 72  Hrs. Patients Ordered on a Mucolytic Agent:    1. Must always be administered with a bronchodilator. 2. Discontinue if patient experiences worsened bronchospasm, or secretions have lessened to the point that the patient is able to clear them with a cough. Anti-inflammatory and Combination Medications:    1. If the patient lacks prior history of lung disease, is not using inhaled anti-inflammatory medication at home, and lacks wheezing by examination or by history for at least 24 hours, contact physician for possible discontinuation.

## 2020-12-23 NOTE — H&P
(PROTONIX) 40 MG tablet Take 40 mg by mouth daily 10/16/20  Yes Historical Provider, MD   FLORASTOR 250 MG capsule Take 250 mg by mouth daily 9/17/20  Yes Historical Provider, MD   chlorhexidine (PERIDEX) 0.12 % solution  11/3/20  Yes Historical Provider, MD   metoprolol succinate (TOPROL XL) 50 MG extended release tablet Take 1 tablet by mouth daily 11/5/20  Yes Lorene Abdul MD   hydrOXYzine (ATARAX) 10 MG tablet  8/26/20  Yes Historical Provider, MD   aspirin-acetaminophen-caffeine ([place]bb [place]) [phone] MG per tablet Take 1 tablet by mouth every 6 hours as needed for Headaches   Yes Historical Provider, MD   fluticasone-umeclidin-vilant (Marj Genta) 100-62.5-25 MCG/INH AEPB Inhale 1 puff into the lungs daily 6/23/20  Yes James Gray MD   albuterol (PROVENTIL) (2.5 MG/3ML) 0.083% nebulizer solution Take 3 mLs by nebulization every 6 hours as needed for Wheezing 6/23/20  Yes James Gray MD   DULoxetine (CYMBALTA) 60 MG extended release capsule Take 60 mg by mouth    Yes Historical Provider, MD   folic acid (FOLVITE) 1 MG tablet Take 1 tablet by mouth daily 11/11/19  Yes Simona Machuca MD   losartan-hydrochlorothiazide (HYZAAR) 50-12.5 MG per tablet Take 1 tablet by mouth daily  Patient taking differently: Take 1 tablet by mouth 2 times daily  9/26/19  Yes Fidencio Grey MD   atorvastatin (LIPITOR) 10 MG tablet Take 10 mg by mouth daily   Yes Historical Provider, MD   levothyroxine (SYNTHROID) 50 MCG tablet Take 50 mcg by mouth Daily   Yes Historical Provider, MD   ondansetron (ZOFRAN-ODT) 8 MG TBDP disintegrating tablet Place 8 mg under the tongue every 8 hours as needed for Nausea or Vomiting   Yes Historical Provider, MD   gabapentin (NEURONTIN) 300 MG capsule Take 1 capsule by mouth 4 times daily for 30 days.  11/5/20 12/5/20  Kim Abdul MD   nicotine (NICODERM CQ) 7 MG/24HR Place 1 patch onto the skin daily 10/15/20 12/14/20  James Gray MD   Acetaminophen (TYLENOL) 325 MG CAPS reviewed the data (labs and imaging) and after discussion with my PA formulated the plan. Agree with note with the following edits. HPI: A 60-year-old female with a history of COPD, hypertension, hypothyroidism presented emergency room with shortness of breath which has been worsening over the past month or so. Has a productive cough. Complains having intermittent left-sided chest pain. Not related to exertion. No radiation. I reviewed the patient's Past Medical History, Past Surgical History, Medications, and Allergies. Physical exam:    /79   Pulse 89   Temp 96.8 °F (36 °C) (Oral)   Resp 18   Ht 5' 4\" (1.626 m)   Wt 106 lb (48.1 kg)   SpO2 96%   BMI 18.19 kg/m²     Gen: No distress. Alert. Eyes: PERRL. No sclera icterus. No conjunctival injection. ENT: No discharge. Pharynx clear. Neck: Trachea midline. Normal thyroid. Resp: No accessory muscle use. No crackles. lanie wheezes. No rhonchi. No dullness on percussion. CV: Regular rate. Regular rhythm. No murmur or rub. No edema. GI: Non-tender. Non-distended. No masses. No organomegaly. Normal bowel sounds. No hernia. Skin: Warm and dry. No nodule on exposed extremities. No rash on exposed extremities. Lymph: No cervical LAD. No supraclavicular LAD. M/S: No cyanosis. No joint deformity. No clubbing. Neuro: Awake. Moves all 4 extremities, non focal  Psych: Oriented x 3. No anxiety or agitation. GEORGIA Castillo.      CBC:   Recent Labs     12/22/20  1533 12/23/20  0512   WBC 15.9* 8.3   HGB 13.5 13.1   HCT 40.9 39.6   MCV 91.9 93.3   * 445     BMP:   Recent Labs     12/22/20  1533   *   K 3.2*   CL 98*   CO2 29   BUN 8   CREATININE 0.6     LIVER PROFILE:   Recent Labs     12/22/20  1533   AST 31   ALT 20   BILITOT 0.4   ALKPHOS 90     CARDIAC ENZYMES  Recent Labs     12/22/20  1949 12/22/20  2249 12/23/20  0512   TROPONINI 0.02* <0.01 <0.01     CULTURES  SARS-CoV-2, NAAT Not Detected      EKG:   I have reviewed the EKG with the following interpretation:   NSR, normal axis, normal interval, no acute ST segment changes concerning for acute ischemia     RADIOLOGY  XR CHEST PORTABLE   Final Result   Lungs are hyperinflated with generalized interstitial prominence, compatible   with COPD. No focal airspace consolidation or pleural effusion or   pneumothorax. Pertinent previous results reviewed   None     ASSESSMENT/PLAN:  COPD AE  - CXR with COPD changes only   - COVID-19 not detected   - Continue nebulizers, steroids, doxycycline     Leukocytosis  -No acute source of infection   - Monitor     Hypokalemia  - Mild, 3.2  - Sliding scale replacement      Elevated troponin  Chest pain   - Initial troponin 0.02-->0.01-->0.01  D dimer negative  - EKG WNL   - Follows with Dr. Kari Horvath with cardiology and per his notes \"Cardiac CTA 9/2019: No significant coronary artery stenosis is identified.   Right coronary dominance.   Left ventricular ejection fraction 70.9%. Advanced pattern of emphysema\"   - Monitor symptoms  Cardiology consult     Hypothyroidism  - Continue Synthroid     HLD  - Continue statin     HTN  - BP is well controlled   - Continue Cozaar/HCTZ, BB    GERD  - Continue PPI     S/p splenectomy   - 2/2 fall with splenic laceration in June 2019     Chronic Back Pain   Neuropathy   - Continue gabapentin     R Adrenal adenoma   - follows with endocrinology     DVT Prophylaxis: Lovenox   Diet: DIET GENERAL;   Code Status: Full Code      Jamaal Grurola PA-C  12/23/2020 8:26 AM    Acute COPD exacerbation. Uses oxygen at night at home. Chest x-ray no infiltrates. Continue steroids, doxycycline inhaled bronchodilators. Diarrhea. Multiple recent antibiotics for dental problems. Stool C. difficile negative. Imodium as needed. GEORGIA Castillo.

## 2020-12-23 NOTE — PROGRESS NOTES
Handoff report and transfer of care given at bedside to [facility] and Troy Del Rio RN. Patient in stable condition, denies needs/concerns at this time. Call light within reach.

## 2020-12-23 NOTE — ED PROVIDER NOTES
[address] [place] [facility]      CHIEF COMPLAINT  Shortness of Breath (pt c/o sob worsening x 1 month, sent for eval by pulmonologist, concern for pneumonia or COPD exacerabation, on 3L oxygen at night)       HISTORY OF PRESENT ILLNESS  Maria Luisa Regalado is a 62 y.o. female  who presents to the ED complaining of shortness of breath. The patient states that she has been Short of breath over the past month, started a new job and feels major dyspnea on exertion. Wears oxygen 3 L at night wears it during the day if needed. Her oxygen still 84% on baseline oxygen as she is currently wearing it during the day. She states she is having trouble walking from one room to the next. No leg swelling or orthopnea. Denies history of VTE. She does have left-sided chest pain that she states is reproducible, nonexertional.  She states she has been treated outpatient for her COPD with breathing treatments around-the-clock, and she is currently on antibiotics Levaquin and Flagyl for a dental infection and potential osteomyelitis. She is following up for this outpatient. She describes body aches but denies fever. She denies abdominal pain but describes diarrhea. Denies vomiting. No other complaints, modifying factors or associated symptoms. I have reviewed the following from the nursing documentation.     Past Medical History:   Diagnosis Date    Back pain     COPD (chronic obstructive pulmonary disease) ([address]     Hypertension     Syncope and collapse 03/2019    Thyroid disease     Ulcerative colitis, unspecified, without complications ([address]      Past Surgical History:   Procedure Laterality Date    BACK SURGERY      L4-L5 rods in    HYSTERECTOMY, VAGINAL  2000    SPLENECTOMY  2019    due to a fall    TONSILLECTOMY       Family History   Problem Relation Age of Onset    Heart Disease Father     High Blood Pressure Sister      Social History     Socioeconomic History    Marital status:      Spouse name: Not on file    Number of children: Not on file    Years of education: Not on file    Highest education level: Not on file   Occupational History    Not on file   Social Needs    Financial resource strain: Not on file    Food insecurity     Worry: Not on file     Inability: Not on file    Transportation needs     Medical: Not on file     Non-medical: Not on file   Tobacco Use    Smoking status: Former Smoker     Packs/day: 0.25     Years: 32.00     Pack years: 8.00     Types: Cigarettes     Quit date: 2020     Years since quittin.8    Smokeless tobacco: Never Used    Tobacco comment: currently using nicotine patch   Substance and Sexual Activity    Alcohol use: Never     Frequency: Never    Drug use: Not Currently     Types: Marijuana    Sexual activity: Not on file   Lifestyle    Physical activity     Days per week: Not on file     Minutes per session: Not on file    Stress: Not on file   Relationships    Social connections     Talks on phone: Not on file     Gets together: Not on file     Attends Holiness service: Not on file     Active member of club or organization: Not on file     Attends meetings of clubs or organizations: Not on file     Relationship status: Not on file    Intimate partner violence     Fear of current or ex partner: Not on file     Emotionally abused: Not on file     Physically abused: Not on file     Forced sexual activity: Not on file   Other Topics Concern    Not on file   Social History Narrative    Not on file     Current Facility-Administered Medications   Medication Dose Route Frequency Provider Last Rate Last Admin    atorvastatin (LIPITOR) tablet 10 mg  10 mg Oral Daily Bernard Akins MD        DULoxetine (CYMBALTA) extended release capsule 60 mg  60 mg Oral Daily Bernard Akins MD        saccharomyces boulardii (FLORASTOR) capsule 250 mg  250 mg Oral Daily Bernard Akins MD        folic acid (FOLVITE) tablet 1 mg  1 mg Oral Daily Pearl Bustillo MD        hydrOXYzine (ATARAX) tablet 10 mg  10 mg Oral TID PRN Pearl Bustillo MD        levothyroxine (SYNTHROID) tablet 50 mcg  50 mcg Oral Daily Pearl Bustillo MD        metoprolol succinate (TOPROL XL) extended release tablet 50 mg  50 mg Oral Daily Pearl Bustillo MD        pantoprazole (PROTONIX) tablet 40 mg  40 mg Oral Daily Pearl Bustillo MD        ondansetron (ZOFRAN-ODT) disintegrating tablet 8 mg  8 mg Sublingual Q8H PRN Pearl Bustillo MD        sodium chloride flush 0.9 % injection 10 mL  10 mL Intravenous 2 times per day Pearl Bustillo MD   10 mL at 12/22/20 2319    sodium chloride flush 0.9 % injection 10 mL  10 mL Intravenous PRN Pearl Bustillo MD        promethazine (PHENERGAN) tablet 12.5 mg  12.5 mg Oral Q6H PRN Pearl Bustillo MD        Or    ondansetron (ZOFRAN) injection 4 mg  4 mg Intravenous Q6H PRN Pearl Bustillo MD        polyethylene glycol (GLYCOLAX) packet 17 g  17 g Oral Daily PRN Pearl Bustillo MD        enoxaparin (LOVENOX) injection 40 mg  40 mg Subcutaneous Daily Pearl Bustillo MD        acetaminophen (TYLENOL) tablet 650 mg  650 mg Oral Q6H PRN Pearl Bustillo MD   650 mg at 12/22/20 2319    Or    acetaminophen (TYLENOL) suppository 650 mg  650 mg Rectal Q6H PRN Pearl Bustillo MD        methylPREDNISolone sodium (SOLU-MEDROL) injection 40 mg  40 mg Intravenous Q6H Cara Lopez MD   40 mg at 12/22/20 2323    Followed by   Chantal Rousseau ON 12/25/2020] predniSONE (DELTASONE) tablet 40 mg  40 mg Oral Daily Pearl Bustillo MD        doxycycline hyclate (VIBRA-TABS) tablet 100 mg  100 mg Oral Q12H Pearl Bustillo MD        potassium chloride (KLOR-CON M) extended release tablet 40 mEq  40 mEq Oral PRN MD Sourav Barrow the hospital encounter of 12/22/20   CBC Auto Differential   Result Value Ref Range    WBC 15.9 (H) 4.0 - 11.0 K/uL    RBC 4.45 4.00 - 5.20 M/uL    Hemoglobin 13.5 12.0 - 16.0 g/dL    Hematocrit 40.9 36.0 - 48.0 %    MCV 91.9 80.0 - 100.0 fL    MCH 30.4 26.0 - 34.0 pg    MCHC 33.1 31.0 - 36.0 g/dL    RDW 14.5 12.4 - 15.4 %    Platelets 898 (H) 278 - 450 K/uL    MPV 8.9 5.0 - 10.5 fL    PLATELET SLIDE REVIEW Sl.  Increased     Path Consult Yes     Neutrophils % 39.0 %    Lymphocytes % 25.0 %    Monocytes % 9.0 %    Eosinophils % 27.0 %    Basophils % 0.0 %    Neutrophils Absolute 6.2 1.7 - 7.7 K/uL    Lymphocytes Absolute 4.0 1.0 - 5.1 K/uL    Monocytes Absolute 1.4 (H) 0.0 - 1.3 K/uL    Eosinophils Absolute 4.3 (H) 0.0 - 0.6 K/uL    Basophils Absolute 0.0 0.0 - 0.2 K/uL   Comprehensive Metabolic Panel w/ Reflex to MG   Result Value Ref Range    Sodium 133 (L) 136 - 145 mmol/L    Potassium reflex Magnesium 3.2 (L) 3.5 - 5.1 mmol/L    Chloride 98 (L) 99 - 110 mmol/L    CO2 29 21 - 32 mmol/L    Anion Gap 6 3 - 16    Glucose 120 (H) 70 - 99 mg/dL    BUN 8 7 - 20 mg/dL    CREATININE 0.6 0.6 - 1.1 mg/dL    GFR Non-African American >60 >60    GFR African American >60 >60    Calcium 10.1 8.3 - 10.6 mg/dL    Total Protein 7.0 6.4 - 8.2 g/dL    Alb 4.1 3.4 - 5.0 g/dL    Albumin/Globulin Ratio 1.4 1.1 - 2.2    Total Bilirubin 0.4 0.0 - 1.0 mg/dL    Alkaline Phosphatase 90 40 - 129 U/L    ALT 20 10 - 40 U/L    AST 31 15 - 37 U/L    Globulin 2.9 g/dL   Troponin   Result Value Ref Range    Troponin 0.02 (H) <0.01 ng/mL   Blood Gas, Venous   Result Value Ref Range    pH, Michael 7.438 7.350 - 7.450    pCO2, Michael 44.2 40.0 - 50.0 mmHg    pO2, Michael 121.2 (H) 25.0 - 40.0 mmHg    HCO3, Venous 29.2 (H) 23.0 - 29.0 mmol/L    Base Excess, Michael 4.4 (H) -3.0 - 3.0 mmol/L    O2 Sat, Michael 99 Not Established %    Carboxyhemoglobin 4.8 (H) 0.0 - 1.5 %    MetHgb, Michael 0.3 <1.5 %    TC02 (Calc), Michael 31 Not Established mmol/L    O2 Content, Michael 19 Not Established VOL %    O2 Therapy Unknown    COVID-19   Result Value Ref Range    SARS-CoV-2, NAAT Not Detected Not Detected   Magnesium   Result Value Ref Range    Magnesium 1.90 1.80 - 2.40 mg/dL   D-dimer, quantitative   Result Value Ref Range    D-Dimer, Quant <200 0 - 229 ng/mL DDU   Troponin   Result Value Ref Range    Troponin 0.02 (H) <0.01 ng/mL   Troponin   Result Value Ref Range    Troponin <0.01 <0.01 ng/mL   EKG 12 Lead   Result Value Ref Range    Ventricular Rate 74 BPM    Atrial Rate 74 BPM    P-R Interval 168 ms    QRS Duration 78 ms    Q-T Interval 402 ms    QTc Calculation (Bazett) 446 ms    P Axis 88 degrees    R Axis 78 degrees    T Axis 78 degrees    Diagnosis       Normal sinus rhythmNormal ECGNo previous ECGs availableConfirmed by ERIN MOURA MD ([id number]) on 12/22/2020 4:57:37 PM       ECG  The Ekg interpreted by me shows  normal sinus rhythm with a rate of 74  Axis is   Normal  QTc is  within an acceptable range  Intervals and Durations are unremarkable. ST Segments: no acute change  No significant change from prior EKG dated 6/15/20    RADIOLOGY  Xr Chest Portable    Result Date: 12/22/2020  EXAMINATION: ONE XRAY VIEW OF THE CHEST 12/22/2020 3:56 pm COMPARISON: 10/14/2020 HISTORY: ORDERING SYSTEM PROVIDED HISTORY: short of breath TECHNOLOGIST PROVIDED HISTORY: Reason for exam:->short of breath Reason for Exam: sob FINDINGS: Single portable frontal view of the chest is submitted for review. The cardiac silhouette is normal in size. Lungs are hyperinflated with generalized interstitial prominence. No definite focal airspace consolidation, sizeable pleural effusion or pneumothorax. Visualized osseous structures and soft tissues are grossly intact. Lungs are hyperinflated with generalized interstitial prominence, compatible with COPD. No focal airspace consolidation or pleural effusion or pneumothorax. ED COURSE/MDM  Patient seen and evaluated. Old records reviewed.  Labs and imaging reviewed and results discussed with patient. The patient presents with shortness of breath. Her vital signs are within normal limits. She is mildly hypertensive, not tachycardic. Oxygen is 95%. She has some mild conversational dyspnea but no significant increased work of breathing on exam.  She is afebrile. It appears that she has been failing outpatient therapy as she has been on DuoNeb's around-the-clock, and has also been on Levaquin and Flagyl for other reasons. Her work-up is obtained here. She is not hypercapnic. She does have a leukocytosis of 15.9, I do not suspect pneumonia and her chest x-ray is clear. She is more likely COPD exacerbation she could have a leukocytosis from her odontogenic infection, however I do not feel this needs transfer to a different hospital as there is nothing acute occurring with this today. The patient is following up outpatient with a surgeon. I did start her on doxycycline here. She is also given Solu-Medrol. Troponin is mildly elevated at 0.02, no ischemic changes on EKG. I do not suspect of VTE, D-dimer is negative patient does not require further evaluation with CT as she is low risk. I did speak with cardiology and they agreed that there were no ischemic changes and stated this could be from her hypoxia that she is experiencing at home. I do feel the patient will require admission since she continues to be hypoxic on her baseline oxygen. I spoke with the hospitalist who accepts.     During the patient's ED course, the patient was given:  Medications   atorvastatin (LIPITOR) tablet 10 mg (has no administration in time range)   DULoxetine (CYMBALTA) extended release capsule 60 mg (has no administration in time range)   saccharomyces boulardii (FLORASTOR) capsule 250 mg (has no administration in time range)   folic acid (FOLVITE) tablet 1 mg (has no administration in time range)   hydrOXYzine (ATARAX) tablet 10 mg (has no administration in time range)   levothyroxine (SYNTHROID) tablet 50 mcg (has no administration in time range)   metoprolol succinate (TOPROL XL) extended release tablet 50 mg (has no administration in time range)   pantoprazole (PROTONIX) tablet 40 mg (has no administration in time range)   ondansetron (ZOFRAN-ODT) disintegrating tablet 8 mg (has no administration in time range)   sodium chloride flush 0.9 % injection 10 mL (10 mLs Intravenous Given 12/22/20 2319)   sodium chloride flush 0.9 % injection 10 mL (has no administration in time range)   promethazine (PHENERGAN) tablet 12.5 mg (has no administration in time range)     Or   ondansetron (ZOFRAN) injection 4 mg (has no administration in time range)   polyethylene glycol (GLYCOLAX) packet 17 g (has no administration in time range)   enoxaparin (LOVENOX) injection 40 mg (has no administration in time range)   acetaminophen (TYLENOL) tablet 650 mg (650 mg Oral Given 12/22/20 2319)     Or   acetaminophen (TYLENOL) suppository 650 mg ( Rectal See Alternative 12/22/20 2319)   methylPREDNISolone sodium (SOLU-MEDROL) injection 40 mg (40 mg Intravenous Given 12/22/20 2323)     Followed by   predniSONE (DELTASONE) tablet 40 mg (has no administration in time range)   doxycycline hyclate (VIBRA-TABS) tablet 100 mg (has no administration in time range)   potassium chloride (KLOR-CON M) extended release tablet 40 mEq (has no administration in time range)     Or   potassium bicarb-citric acid (EFFER-K) effervescent tablet 40 mEq (has no administration in time range)     Or   potassium chloride 10 mEq/100 mL IVPB (Peripheral Line) (has no administration in time range)   ipratropium-albuterol (DUONEB) nebulizer solution 1 ampule (1 ampule Inhalation Given 12/22/20 2250)   losartan (COZAAR) tablet 50 mg (has no administration in time range)     And   hydroCHLOROthiazide (HYDRODIURIL) tablet 12.5 mg (has no administration in time range)   ipratropium-albuterol (DUONEB) nebulizer solution 1 ampule (1 ampule Inhalation Given 12/22/20 1612)   ipratropium-albuterol (DUONEB) nebulizer solution 1 ampule (1 ampule Inhalation Given 12/22/20 1612)   methylPREDNISolone sodium (SOLU-MEDROL) injection 125 mg (125 mg Intravenous Given 12/22/20 1612)   aspirin chewable tablet 162 mg (162 mg Oral Given 12/22/20 1738)   potassium chloride (KLOR-CON M) extended release tablet 40 mEq (40 mEq Oral Given 12/22/20 1739)   potassium chloride (KLOR-CON M) extended release tablet 40 mEq (40 mEq Oral Given 12/22/20 2319)   ipratropium-albuterol (DUONEB) nebulizer solution 1 ampule (1 ampule Inhalation Given 12/22/20 1950)   ketorolac (TORADOL) injection 15 mg (15 mg Intravenous Given 12/22/20 1949)        CLINICAL IMPRESSION  1. COPD exacerbation ([address]    2. Acute on chronic respiratory failure with hypoxia (HCC)    3. Elevated troponin        Blood pressure 131/73, pulse 82, temperature 96.9 °F (36.1 °C), temperature source Oral, resp. rate 18, height 5' 4\" (1.626 m), weight 106 lb (48.1 kg), SpO2 93 %, not currently breastfeeding. Patient was given scripts for the following medications. I counseled patient how to take these medications. Current Discharge Medication List          Follow-up with:  No follow-up provider specified. DISCLAIMER: This chart was created using Dragon dictation software. Efforts were made by me to ensure accuracy, however some errors may be present due to limitations of this technology and occasionally words are not transcribed correctly.        Moriah [place]  12/23/20 [id number]

## 2020-12-23 NOTE — PROGRESS NOTES
Consult has been called to Dr. Shayy West on 12/23/20. Spoke with collins.  10:39 AM    Yelena Files  12/23/2020

## 2020-12-23 NOTE — PROGRESS NOTES
Admission questions and assessment completed. See Flow sheets. Scheduled medications administered. See MAR. IV infusing without difficulty. O2 3 LPM NC in place. Patient c/o headache. PRN Tylenol given at this time. Patient denies any needs at this time. Patient educated on use of call light and to call out with needs, verbalizing understanding. Bed in low locked position for patient safety. Recliner Assessment:     Patient is able to demonstrate the ability to move from a reclining position to an upright position within the recliner.

## 2020-12-24 VITALS
HEIGHT: 64 IN | HEART RATE: 83 BPM | BODY MASS INDEX: 18.1 KG/M2 | RESPIRATION RATE: 16 BRPM | WEIGHT: 106 LBS | DIASTOLIC BLOOD PRESSURE: 83 MMHG | OXYGEN SATURATION: 95 % | SYSTOLIC BLOOD PRESSURE: 129 MMHG | TEMPERATURE: 97.5 F

## 2020-12-24 PROCEDURE — 6370000000 HC RX 637 (ALT 250 FOR IP): Performed by: PHYSICIAN ASSISTANT

## 2020-12-24 PROCEDURE — 6370000000 HC RX 637 (ALT 250 FOR IP): Performed by: INTERNAL MEDICINE

## 2020-12-24 PROCEDURE — 94640 AIRWAY INHALATION TREATMENT: CPT

## 2020-12-24 PROCEDURE — 2580000003 HC RX 258: Performed by: INTERNAL MEDICINE

## 2020-12-24 PROCEDURE — 99238 HOSP IP/OBS DSCHRG MGMT 30/<: CPT | Performed by: INTERNAL MEDICINE

## 2020-12-24 PROCEDURE — 6360000002 HC RX W HCPCS: Performed by: INTERNAL MEDICINE

## 2020-12-24 RX ORDER — DOXYCYCLINE HYCLATE 100 MG
100 TABLET ORAL EVERY 12 HOURS
Qty: 10 TABLET | Refills: 0 | Status: SHIPPED | OUTPATIENT
Start: 2020-12-24 | End: 2020-12-29

## 2020-12-24 RX ORDER — PREDNISONE 20 MG/1
TABLET ORAL
Qty: 13 TABLET | Refills: 0 | Status: SHIPPED | OUTPATIENT
Start: 2020-12-24 | End: 2021-03-02

## 2020-12-24 RX ADMIN — METHYLPREDNISOLONE SODIUM SUCCINATE 40 MG: 40 INJECTION, POWDER, FOR SOLUTION INTRAMUSCULAR; INTRAVENOUS at 00:03

## 2020-12-24 RX ADMIN — FOLIC ACID 1 MG: 1 TABLET ORAL at 08:08

## 2020-12-24 RX ADMIN — DOXYCYCLINE HYCLATE 100 MG: 100 TABLET, COATED ORAL at 05:57

## 2020-12-24 RX ADMIN — LOSARTAN POTASSIUM 50 MG: 25 TABLET, FILM COATED ORAL at 08:09

## 2020-12-24 RX ADMIN — ATORVASTATIN CALCIUM 10 MG: 10 TABLET, FILM COATED ORAL at 08:08

## 2020-12-24 RX ADMIN — GABAPENTIN 300 MG: 300 CAPSULE ORAL at 08:08

## 2020-12-24 RX ADMIN — IPRATROPIUM BROMIDE AND ALBUTEROL SULFATE 1 AMPULE: .5; 3 SOLUTION RESPIRATORY (INHALATION) at 07:05

## 2020-12-24 RX ADMIN — METHYLPREDNISOLONE SODIUM SUCCINATE 40 MG: 40 INJECTION, POWDER, FOR SOLUTION INTRAMUSCULAR; INTRAVENOUS at 05:57

## 2020-12-24 RX ADMIN — LEVOTHYROXINE SODIUM 50 MCG: 50 TABLET ORAL at 05:57

## 2020-12-24 RX ADMIN — Medication 10 ML: at 08:08

## 2020-12-24 RX ADMIN — ENOXAPARIN SODIUM 40 MG: 40 INJECTION SUBCUTANEOUS at 08:09

## 2020-12-24 RX ADMIN — HYDROCHLOROTHIAZIDE 12.5 MG: 25 TABLET ORAL at 08:08

## 2020-12-24 RX ADMIN — DULOXETINE HYDROCHLORIDE 60 MG: 60 CAPSULE, DELAYED RELEASE ORAL at 08:08

## 2020-12-24 RX ADMIN — RDII 250 MG CAPSULE 250 MG: at 08:09

## 2020-12-24 RX ADMIN — PANTOPRAZOLE SODIUM 40 MG: 40 TABLET, DELAYED RELEASE ORAL at 08:09

## 2020-12-24 RX ADMIN — METOPROLOL SUCCINATE 50 MG: 50 TABLET, EXTENDED RELEASE ORAL at 08:08

## 2020-12-24 NOTE — PROGRESS NOTES
Reviewed AVS with patient and new medications instructions and possible side effects. Pt verbalized understanding. Packed own belongings. Transported to family vehicle via w/c.

## 2020-12-24 NOTE — PROGRESS NOTES
Patient 95%  at rest on RA. Patient 95% on 1 L at rest.  Patient 92% on RA ambulating  Patient 96 at  1 L ambulating.

## 2020-12-24 NOTE — PROGRESS NOTES
RESPIRATORY THERAPY ASSESSMENT    Name:  Geetha Patel  Medical Record Number:  [id number]  Age: 62 y.o. Gender: female  : 1960  Today's Date:  2020  Room:  0216/0216-01    Assessment     Is the patient being admitted for a COPD or Asthma exacerbation? No   (If yes the patient will be seen every 4 hours for the first 24 hours and then reassessed)    Patient Admission Diagnosis      Allergies  No Known Allergies    Minimum Predicted Vital Capacity:               Actual Vital Capacity:                    Pulmonary History:COPD  Home Oxygen Therapy:  3 liters/min via nasal cannula PRN  Home Respiratory Therapy:Albuterol and Trelegy   Current Respiratory Therapy:  duoneb Q4  Treatment Type: HHN  Medications: Albuterol/Ipratropium    Respiratory Severity Index(RSI)   Patients with orders for inhalation medications, oxygen, or any therapeutic treatment modality will be placed on Respiratory Protocol. They will be assessed with the first treatment and at least every 72 hours thereafter. The following severity scale will be used to determine frequency of treatment intervention.     Smoking History: Pulmonary Disease or Smoking History, Greater than 15 pack year = 2    Social History  Social History     Tobacco Use    Smoking status: Former Smoker     Packs/day: 0.25     Years: 32.00     Pack years: 8.00     Types: Cigarettes     Quit date: 2020     Years since quittin.8    Smokeless tobacco: Never Used    Tobacco comment: currently using nicotine patch   Substance Use Topics    Alcohol use: Never     Frequency: Never    Drug use: Not Currently     Types: Marijuana       Recent Surgical History: None = 0  Past Surgical History  Past Surgical History:   Procedure Laterality Date    BACK SURGERY      L4-L5 rods in    HYSTERECTOMY, VAGINAL      SPLENECTOMY  2019    due to a fall    TONSILLECTOMY         Level of Consciousness: Alert, Oriented, and Cooperative = 0    Level of Activity: Walking unassisted = 0    Respiratory Pattern: Regular Pattern; RR 8-20 = 0    Breath Sounds: Diminshed bilaterally and/or crackles = 2    Sputum  Sputum Color: Yellow(Per Patient),  , Sputum How Obtained: Spontaneous cough  Cough: Strong, spontaneous, non-productive = 0    Vital Signs   /77   Pulse 90   Temp 97.5 °F (36.4 °C) (Oral)   Resp 18   Ht 5' 4\" (1.626 m)   Wt 106 lb (48.1 kg)   SpO2 96%   BMI 18.19 kg/m²   SPO2 (COPD values may differ): 90-91% on room air or greater than 92% on FiO2 24- 28% = 1    Peak Flow (asthma only): not applicable = 0    RSI: 5-6 = Q4hr PRN (every four hours as needed) for dyspnea        Plan       Goals: medication delivery    Patient/caregiver was educated on the proper method of use for Respiratory Care Devices:  Yes      Level of patient/caregiver understanding able to:   ? Verbalize understanding   ? Demonstrate understanding       ? Teach back        ? Needs reinforcement       ? No available caregiver               ? Other:     Response to education:  Excellent     Is patient being placed on Home Treatment Regimen? Yes     Does the patient have everything they need prior to discharge? NA     Comments: Patient chart reviewed, patient assessed. Plan of Care: change patient to duoneb QID/ PRN home use     Electronically signed by Cody Lauren RCP on 12/23/2020 at 7:36 PM    Respiratory Protocol Guidelines     1. Assessment and treatment by Respiratory Therapy will be initiated for medication and therapeutic interventions upon initiation of aerosolized medication. 2. Physician will be contacted for respiratory rate (RR) greater than 35 breaths per minute. Therapy will be held for heart rate (HR) greater than 140 beats per minute, pending direction from physician. 3. Bronchodilators will be administered via Metered Dose Inhaler (MDI) with spacer when the following criteria are met:  a.  Alert and cooperative     b. HR < 140 bpm  c. RR < 30 bpm possible discontinuation.

## 2020-12-24 NOTE — CARE COORDINATION
DISCHARGE ORDER  Date/Time 2020 9:48 AM  Completed by: Sven Pablo, Case Management    Patient Name: Antonio Barney      : 1960  Admitting Diagnosis: COPD with acute exacerbation ([address] [J44.1]      Admit order Date and Status: IP 2020  (verify MD's last order for status of admission)      Noted discharge order. If applicable PT/OT recommendation at Discharge: NA  DME recommendation by PT/OT:NA  Confirmed discharge plan w/pt  Discharge Plan: home no DCP needs        Reviewed chart. Role of discharge planner explained and patient verbalized understanding. Discharge order is noted. Has Home O2 in place on admit:  No  Informed of need to bring portable home O2 tank on day of discharge for nursing to connect prior to leaving:   No  Verbalized agreement/Understanding:   Not Indicated  Pt is being d/c'd to home today. Pt's O2 sats are 95% on 1 liter. Discharge timeout done with Amna Dan. All discharge needs and concerns addressed.

## 2020-12-24 NOTE — PROGRESS NOTES
See PM shift assessment. Tylenol given for c/o headache. Pleasant and cooperative; states she is trying to get portable O2; states she is no longer a smoker. Will continue to monitor.

## 2020-12-24 NOTE — PROGRESS NOTES
Physician Progress Note      PATIENTJerral Courser  CSN #:                  [id number]  :                       1960  ADMIT DATE:       2020 2:44 PM  [address] DATE:        2020 11:24 AM  RESPONDING  PROVIDER #:        Keisha Fink MD          QUERY TEXT:    Pt admitted with ae COPD. Noted documentation of  Acute on chronic   respiratory failure with hypoxia. If possible, please document in progress   notes and discharge summary:    The medical record reflects the following:  Risk Factors: ae COPD, uses O2 at night  Clinical Indicators: She uses 3L NC O2 at home, mostly at night, but has been   needing it more frequently lately. Respiratory: + cough. + SOB, + REESE. Resp: +   accessory muscle use, RR 23 before being placed on 3L and now on 1L. Treatment: O2 support, monitor vitals, monitor labs/images, nebulizers,   steroids, doxycycline    Thank You Kaylin Gomez RN, [email or web address] [email or web address]  Options provided:  -- Acute on chronic respiratory failure with hypoxia confirmed present on   admission  -- Acute on chronic respiratory failure with hypoxia ruled out  -- Other - I will add my own diagnosis  -- Disagree - Not applicable / Not valid  -- Disagree - Clinically unable to determine / Unknown  -- Refer to Clinical Documentation Reviewer    PROVIDER RESPONSE TEXT:    The diagnosis of Acute on chronic respiratory failure with hypoxia was   confirmed as present on admission.     Query created by: Cassia Aponte on 2020 12:46 PM      Electronically signed by:  Keisha Fink MD 2020 12:48 PM

## 2020-12-24 NOTE — FLOWSHEET NOTE
12/24/20 0724   Vital Signs   Temp 97.5 °F (36.4 °C)   Temp Source Oral   Pulse 83   Heart Rate Source Monitor   Resp 16   /83   BP Location Right upper arm   Patient Position High fowlers   Level of Consciousness Alert (0)   MEWS Score 1   Oxygen Therapy   SpO2 95 %   O2 Device Nasal cannula   O2 Flow Rate (L/min) 1 L/min   Pt resting in bed, alert and oriented x 4. Received AM medications per order and tolerated well. Resp e/e at rest. No s/s of distress noted. Assessment complete, see flow sheet. Voices no concerns at this time. Call light within reach.

## 2020-12-24 NOTE — PROGRESS NOTES
Shift report received from [place], [facility].

## 2020-12-26 NOTE — DISCHARGE SUMMARY
Name:  Lien Hawk  Room:  0216/0216-01  MRN:    [id number]    Discharge Summary      This discharge summary is in conjunction with a complete physical exam done on the day of discharge. Attending Physician: Dr. Petra Willson  Discharging Physician: Dr. Hector Ramirez: 12/22/2020  Discharge:  12/24/2020    HPI:  The patient is a 62 y.o. female with COPD, HTN, hypothyroidism, UC who presented to [facility] with complaint of SOB. Patient reports this has been a progressive issue for the last month. She feels more SOB with exertion and has had an intermittent, productive cough. She reports no fevers at home and no sick contacts. She has been having some mild left sided chest pain at times, but nothing consistent. She reports she has a cardiologist that she follows with closely for this. She uses 3L NC O2 at home, mostly at night, but has been needing it more frequently lately. She reports that since the pandemic, she has started smoking again but on 1 cigarette every 1 or 2. Diagnoses this Admission and Hospital Course   Acute on chronic respiratory failure with hypoxia was   confirmed as present on admission.  - due to COPD Exacerbation. - uses oxygen at home at night.   - on 1 L at d/c. COPD AE  - CXR with COPD changes only   - COVID-19 not detected   - Continued nebulizers, steroids, doxycycline   D/c on Doxycycline, predisone taper.      Leukocytosis  -No acute source of infection   - Monitored      Hypokalemia  - Mild, 3.2  - Sliding scale replacement       Elevated troponin  Chest pain   - Initial troponin 0.02-->0.01-->0.01  D dimer negative  - EKG WNL   - Follows with Dr. George Mueller with cardiology and per his notes \"Cardiac CTA 9/2019: No significant coronary artery stenosis is identified.   Right coronary dominance.   Left ventricular ejection fraction 70.9%. Advanced pattern of emphysema\"   - Monitored symptoms  Cardiology consulted.  No need for further cardiac testing, no further recommendations. Cardiology signed off.      Hypothyroidism  - Continued Synthroid      HLD  - Continued statin      HTN  - BP is well controlled   - Continued Cozaar/HCTZ, BB     GERD  - Continued PPI      S/p splenectomy   - 2/2 fall with splenic laceration in June 2019      Chronic Back Pain   Neuropathy   - Continued gabapentin      R Adrenal adenoma   - follows with endocrinology      DVT Prophylaxis: Lovenox     Procedures (Please Review Full Report for Details)  N/A    Consults    Cardiology       Physical Exam at Discharge:    /83   Pulse 83   Temp 97.5 °F (36.4 °C) (Oral)   Resp 16   Ht 5' 4\" (1.626 m)   Wt 106 lb (48.1 kg)   SpO2 95%   BMI 18.19 kg/m²        Gen: No distress. Alert. Eyes: PERRL. No sclera icterus. No conjunctival injection. ENT: No discharge. Pharynx clear. Neck: Trachea midline. Normal thyroid. Resp: No accessory muscle use. No crackles. lanie wheezes. No rhonchi. No dullness on percussion. CV: Regular rate. Regular rhythm. No murmur or rub. No edema. GI: Non-tender. Non-distended. No masses. No organomegaly. Normal bowel sounds. No hernia. Skin: Warm and dry. No nodule on exposed extremities. No rash on exposed extremities. Lymph: No cervical LAD. No supraclavicular LAD. M/S: No cyanosis. No joint deformity. No clubbing. Neuro: Awake. Moves all 4 extremities, non focal  Psych: Oriented x 3. No anxiety or agitation.      CBC:        Recent Labs     12/22/20  1533 12/23/20  0512   WBC 15.9* 8.3   HGB 13.5 13.1   HCT 40.9 39.6   MCV 91.9 93.3   * 445      BMP:       Recent Labs     12/22/20  1533   *   K 3.2*   CL 98*   CO2 29   BUN 8   CREATININE 0.6      LIVER PROFILE:       Recent Labs     12/22/20  1533   AST 31   ALT 20   BILITOT 0.4   ALKPHOS 90      CARDIAC ENZYMES        Recent Labs     12/22/20  1949 12/22/20  2249 12/23/20  0512   TROPONINI 0.02* <0.01 <0.01         CULTURES  SARS-CoV-2, NAAT Not Detected        RADIOLOGY  XR CHEST PORTABLE   Final Result   Lungs are hyperinflated with generalized interstitial prominence, compatible   with COPD. No focal airspace consolidation or pleural effusion or   pneumothorax. Discharge Medications     Medication List      START taking these medications    doxycycline hyclate 100 MG tablet  Commonly known as: VIBRA-TABS  Take 1 tablet by mouth every 12 hours for 5 days  Notes to patient: Doxycycline (Vibramycin, Doxy, Vibra Tabs)  Use: Treat infection  Side effects: nausea, vomiting, diarrhea & rash     predniSONE 20 MG tablet  Commonly known as: DELTASONE  2 qd- 2 days, 11/2 qd- 3 days,1 qd- 3 days,1/2 qd- 3 days  Notes to patient: Prednisone  Use: treat asthma and COPD     Side effects: upset stomach, high blood sugars, weight     gain, mood changes, and increased chances of infection        CHANGE how you take these medications    losartan-hydroCHLOROthiazide 50-12.5 MG per tablet  Commonly known as: HYZAAR  Take 1 tablet by mouth daily  What changed: when to take this        CONTINUE taking these medications    * albuterol (2.5 MG/3ML) 0.083% nebulizer solution  Commonly known as: PROVENTIL  Take 3 mLs by nebulization every 6 hours as needed for Wheezing     * albuterol sulfate  (90 Base) MCG/ACT inhaler  Inhale 1 puff into the lungs as needed for Wheezing or Shortness of Breath     atorvastatin 10 MG tablet  Commonly known as: LIPITOR     chlorhexidine 0.12 % solution  Commonly known as: PERIDEX     Cymbalta 60 MG extended release capsule  Generic drug: DULoxetine     Excedrin Migraine 250-250-65 MG per tablet  Generic drug: aspirin-acetaminophen-caffeine     Florastor 250 MG capsule  Generic drug: saccharomyces boulardii     folic acid 1 MG tablet  Commonly known as: FOLVITE  Take 1 tablet by mouth daily     gabapentin 300 MG capsule  Commonly known as: NEURONTIN  Take 1 capsule by mouth 4 times daily for 30 days.      hydrOXYzine 10 MG tablet  Commonly known as: ATARAX levothyroxine 50 MCG tablet  Commonly known as: SYNTHROID     nicotine 7 MG/24HR  Commonly known as: NICODERM CQ  Place 1 patch onto the skin daily     ondansetron 8 MG Tbdp disintegrating tablet  Commonly known as: ZOFRAN-ODT     pantoprazole 40 MG tablet  Commonly known as: PROTONIX     Toprol XL 50 MG extended release tablet  Generic drug: metoprolol succinate     Trelegy Ellipta 100-62.5-25 MCG/INH Aepb  Generic drug: fluticasone-umeclidin-vilant  Inhale 1 puff into the lungs daily     Tylenol 325 MG Caps  Generic drug: Acetaminophen         * This list has 2 medication(s) that are the same as other medications prescribed for you. Read the directions carefully, and ask your doctor or other care provider to review them with you. STOP taking these medications    clindamycin 150 MG capsule  Commonly known as: CLEOCIN           Where to Get Your Medications      These medications were sent to [place], [address]  [facility]    Phone: [phone]   · doxycycline hyclate 100 MG tablet  · predniSONE 20 MG tablet           Discharged in stable condition to home. Follow Up: Follow up with PCP. Total time spent on discharge is 35 minutes    GEORGIA Castillo.

## 2021-01-04 NOTE — PROGRESS NOTES
Physician Progress Note      Jake Doe  CSN #:                  [id number]  :                       1960  ADMIT DATE:       2020 2:44 PM  [address] DATE:        2020 11:24 AM  RESPONDING  PROVIDER #:        Jake Washburn MD          QUERY TEXT:    Patient admitted with ae COPD. If possible, please document in progress notes   and discharge summary if you are evaluating and /or treating any of the   following: The medical record reflects the following:  Risk Factors: ae COPD  Clinical Indicators: Nutrition Probably inadequate per nursing, BMI 18.1  Treatment: monitor labs/images, general diet, O2 support    Thank You Addis Hidalgo RN, [email or web address] [email or web address]  Options provided:  -- Underweight with BMI 18.1  -- Please document type protein calorie malnutrition, please document protein   calorie malnutrition. -- Other - I will add my own diagnosis  -- Disagree - Not applicable / Not valid  -- Disagree - Clinically unable to determine / Unknown  -- Refer to Clinical Documentation Reviewer    PROVIDER RESPONSE TEXT:    This patient is underweight with a BMI of 18.1.     Query created by: Justus Blair on 2020 1:01 PM      Electronically signed by:  Jake Washburn MD 2021 1:35 PM

## 2021-01-29 NOTE — PROGRESS NOTES
SUBJECTIVE:  Noman Butler is a 62 y.o. female who is being evaluated for adrenal disease. H.    1. Acquired hypothyroidism  Hypothyroidism diagnosed in 2015  On levothyroxine 0.05 mg daily  TSH 2.25  Has fatigue    2. Essential hypertension  This started in 2007. Patient history significant for hypertension, COPD, respiratory failure, ulcerative colitis, hypothyroidism, hyperlipidemia. Patient has been experiencing spells of headache, nausea, sweating and hypertension for 3 to 4 months. Blood pressure was very high during the spells, in 200s. Patient is currently on Hyzaar 50/12.5 mg daily and Toprol-XL 25 mg twice a day  Patient was hospitalized in September 2019 for chest pain and very high blood pressure, was seen and evaluated by cardiology. Plasma metanephrines and urine metanephrines are in the indeterminate range. Has headaches    3. Hypercalcemia  Calcium 10.7 in 2019  No kidney stones  No numbness or tingling    4. Adrenal nodule ([address]  CT of abdomen in June 2019 showed 2 cm right adrenal adenoma with HU 52  MRI in 8/2019 showed 2 cm right adrenal adenoma consistent with benign adenoma    Current complaints: headaches, nausea, fatigue, weakness    5. Goiter  History of obstructive symptoms: difficulty swallowing No, changes in voice/hoarseness Yes. History of radiation to patient's neck: No  Resent iodine exposure: No  Family history includes mother had thyroid abnormalities.   Family history of thyroid cancer: No      Past Medical History:   Diagnosis Date    Back pain     COPD (chronic obstructive pulmonary disease) ([address]     Fatigue     Hypertension     Syncope and collapse 03/2019    Thyroid disease     Ulcerative colitis, unspecified, without complications [facility])      Patient Active Problem List    Diagnosis Date Noted    Adrenal nodule ([address] 03/01/2021    Hypercalcemia 03/01/2021    Goiter 03/01/2021    Diarrhea     COPD with acute exacerbation ([address] 12/22/2020    Gastroesophageal reflux disease without esophagitis 2020    Anxiety and depression 2020    Neuropathy of left lower extremity 2020    Acquired hypothyroidism 2020    COPD exacerbation [facility] 2019    Moderate COPD [facility]ch[facility] obstructive pulmonary disease) [facility] 10/10/2019    Heterozygous alpha 1-antitrypsin deficiency ([facility] 10/10/2019    Essential hypertension 2019    Chest pain 2019     Past Surgical History:   Procedure Laterality Date    BACK SURGERY      L4-L5 rods in    HYSTERECTOMY, VAGINAL  2000    SPLENECTOMY  2019    due to a fall    TONSILLECTOMY       Family History   Problem Relation Age of Onset    Heart Disease Father     High Blood Pressure Sister      Social History     Socioeconomic History    Marital status:      Spouse name: None    Number of children: None    Years of education: None    Highest education level: None   Occupational History    None   Social Needs    Financial resource strain: None    Food insecurity     Worry: None     Inability: None    Transportation needs     Medical: None     Non-medical: None   Tobacco Use    Smoking status: Former Smoker     Packs/day: 0.25     Years: 32.00     Pack years: 8.00     Types: Cigarettes     Quit date: 2020     Years since quittin.0    Smokeless tobacco: Never Used    Tobacco comment: currently using nicotine patch   Substance and Sexual Activity    Alcohol use: Never     Frequency: Never    Drug use: Not Currently     Types: Marijuana    Sexual activity: None   Lifestyle    Physical activity     Days per week: None     Minutes per session: None    Stress: None   Relationships    Social connections     Talks on phone: None     Gets together: None     Attends Druze service: None     Active member of club or organization: None     Attends meetings of clubs or organizations: None     Relationship status: None    Intimate partner violence     Fear of current or ex partner: None     Emotionally abused: None     Physically abused: None     Forced sexual activity: None   Other Topics Concern    None   Social History Narrative    None     Current Outpatient Medications   Medication Sig Dispense Refill    losartan (COZAAR) 50 MG tablet Take 50 mg by mouth 2 times daily      albuterol sulfate  (90 Base) MCG/ACT inhaler Inhale 1 puff into the lungs as needed for Wheezing or Shortness of Breath 1 Inhaler 5    pantoprazole (PROTONIX) 40 MG tablet Take 40 mg by mouth daily      FLORASTOR 250 MG capsule Take 250 mg by mouth daily      chlorhexidine (PERIDEX) 0.12 % solution       metoprolol succinate (TOPROL XL) 50 MG extended release tablet Take 1 tablet by mouth daily 1 tablet 0    hydrOXYzine (ATARAX) 10 MG tablet       aspirin-acetaminophen-caffeine (EXCEDRIN MIGRAINE) 250-250-65 MG per tablet Take 1 tablet by mouth every 6 hours as needed for Headaches      fluticasone-umeclidin-vilant (TRELEGY ELLIPTA) 100-62.5-25 MCG/INH AEPB Inhale 1 puff into the lungs daily 1 each 5    albuterol (PROVENTIL) (2.5 MG/3ML) 0.083% nebulizer solution Take 3 mLs by nebulization every 6 hours as needed for Wheezing 120 each 3    DULoxetine (CYMBALTA) 60 MG extended release capsule Take 60 mg by mouth       folic acid (FOLVITE) 1 MG tablet Take 1 tablet by mouth daily 30 tablet 11    Acetaminophen (TYLENOL) 325 MG CAPS Take 975 mg by mouth as needed      atorvastatin (LIPITOR) 10 MG tablet Take 10 mg by mouth daily      levothyroxine (SYNTHROID) 50 MCG tablet Take 50 mcg by mouth Daily      ondansetron (ZOFRAN-ODT) 8 MG TBDP disintegrating tablet Place 8 mg under the tongue every 8 hours as needed for Nausea or Vomiting      predniSONE (DELTASONE) 20 MG tablet 2 qd- 2 days, 11/2 qd- 3 days,1 qd- 3 days,1/2 qd- 3 days (Patient not taking: Reported on 2/25/2021) 13 tablet 0    gabapentin (NEURONTIN) 300 MG capsule Take 1 capsule by mouth 4 times daily for 30 days.  106 Kezia Saucedo capsule 0    nicotine (NICODERM CQ) 7 MG/24HR Place 1 patch onto the skin daily 30 patch 1    losartan-hydrochlorothiazide (HYZAAR) 50-12.5 MG per tablet Take 1 tablet by mouth daily (Patient not taking: Reported on 2021) 30 tablet 2     No current facility-administered medications for this visit.       No Known Allergies  Family Status   Relation Name Status    Father      Sister  Alive       Review of Systems:  Constitutional: has fatigue, no fever, no recent weight gain, no recent weight loss, no changes in appetite  Eyes: no eye pain, no change in vision, no eye redness, no eye irritation, no double vision  Ears, nose, throat: no nasal congestion, no sore throat, no earache, no decrease in hearing, no hoarseness, no dry mouth, no sinus problems, no difficulty swallowing, no neck lumps, no dental problems, no mouth sores, no ringing in ears  Pulmonary: no shortness of breath, no wheezing, no dyspnea on exertion, no cough  Cardiovascular: no chest pain, no lower extremity edema, no orthopnea, no intermittent leg claudication, no palpitations  Gastrointestinal: no abdominal pain, has nausea, no vomiting, no diarrhea, no constipation, no dysphagia, no heartburn, no bloating  Genitourinary: no dysuria, no urinary incontinence, no urinary hesitancy, no urinary frequency, no feelings of urinary urgency, no nocturia  Musculoskeletal: no joint swelling, no joint stiffness, no joint pain, no muscle cramps, no muscle pain, no bone pain  Integument/Breast: no hair loss, no skin rashes, no skin lesions, no itching, no dry skin  Neurological: no numbness, no tingling, has weakness, no confusion, has headaches, no dizziness, no fainting, no tremors, no decrease in memory, no balance problems  Psychiatric: no anxiety, no depression, no insomnia  Hematologic/Lymphatic: no tendency for easy bleeding, no swollen lymph nodes, no tendency for easy bruising  Immunology: no seasonal allergies, no frequent infections, no frequent illnesses  Endocrine: no temperature intolerance    /88   Pulse 72   Resp 14   Ht 5' 4\" (1.626 m)   Wt 100 lb (45.4 kg)   BMI 17.16 kg/m²    Wt Readings from Last 3 Encounters:   02/25/21 100 lb (45.4 kg)   12/22/20 106 lb (48.1 kg)   12/02/20 108 lb 3.2 oz (49.1 kg)     Body mass index is 17.16 kg/m².     OBJECTIVE:  Constitutional: no acute distress, well appearing and well nourished  Psychiatric: oriented to person, place and time, judgement and insight and normal, recent and remote memory and intact and mood and affect are normal  Skin: skin and subcutaneous tissue is normal without mass, normal turgor  Head and Face: examination of head and face revealed no abnormalities  Eyes: no lid or conjunctival swelling, erythema or discharge, pupils are normal, equal, round, reactive to light  Ears/Nose: external inspection of ears and nose revealed no abnormalities, hearing is grossly normal  Oropharynx/Mouth/Face: lips, tongue and gums are normal with no lesions, the voice quality was normal  Neck: neck is supple and symmetric, with midline trachea and no masses, thyroid is enlarged  Lymphatics: normal cervical lymph nodes, normal supraclavicular nodes  Pulmonary: no increased work of breathing or signs of respiratory distress, lungs are clear to auscultation  Cardiovascular: normal heart rate and rhythm, normal S1 and S2, no murmurs and pedal pulses and 2+ bilaterally, No edema  Abdomen: abdomen is soft, non-tender with no masses  Musculoskeletal: normal gait and station and exam of the digits and nails are normal  Neurological: normal coordination and normal general cortical function      Lab Review:    Lab Results   Component Value Date    WBC 8.3 12/23/2020    HGB 13.1 12/23/2020    HCT 39.6 12/23/2020    MCV 93.3 12/23/2020     12/23/2020     Lab Results   Component Value Date     12/22/2020    K 3.2 12/22/2020    CL 98 12/22/2020    CO2 29 12/22/2020    BUN 8 12/22/2020 CREATININE 0.6 12/22/2020    GLUCOSE 120 12/22/2020    CALCIUM 10.1 12/22/2020    PROT 7.0 12/22/2020    LABALBU 4.1 12/22/2020    BILITOT 0.4 12/22/2020    ALKPHOS 90 12/22/2020    AST 31 12/22/2020    ALT 20 12/22/2020    LABGLOM >60 12/22/2020    GFRAA >60 12/22/2020    AGRATIO 1.4 12/22/2020    GLOB 2.9 12/22/2020     Lab Results   Component Value Date    TSH 2.25 12/02/2020     No results found for: LABA1C  No results found for: EAG  Lab Results   Component Value Date    CHOL 236 11/05/2020     Lab Results   Component Value Date    TRIG 107 11/05/2020     Lab Results   Component Value Date    HDL 94 11/05/2020     Lab Results   Component Value Date    LDLCALC 121 11/05/2020     Lab Results   Component Value Date    LABVLDL 21 11/05/2020    VLDL 19 04/11/2018     No results found for: [facility]  Lab Results   Component Value Date    LABMICR YES 04/10/2020     No results found for: VITD25     ASSESSMENT/PLAN:  1. Acquired hypothyroidism  Continue levothyroxine 0.05 mg daily  - T3, Free; Future  - T4, Free; Future  - TSH without Reflex; Future  - Thyroid Peroxidase Antibody; Future  - Anti-Thyroglobulin Antibody; Future  - Comprehensive Metabolic Panel; Future  - US HEAD NECK SOFT TISSUE THYROID; Future    2. Essential hypertension  Metoprolol  - losartan (COZAAR) 50 MG tablet; Take 50 mg by mouth 2 times daily  - ACTH; Future  - Aldosterone; Future  - Renin; Future  - Cortisol AM, Total; Future  - Metanephrines Plasma Free; Future  - Cortisol, Urine, Free; Future  - Calcium, 24 HR Urine; Future  - Creatinine Clearance, Urine, 24 HR; Future  - Sodium, urine, 24 hour; Future  - Catecholamines Free Urine; Future  - Metanephrines Urine; Future  - Comprehensive Metabolic Panel; Future    3.  Adrenal nodule (HCC)  Elevated normetanephrines  60-year-old female experiencing spells of headaches, sweating, high blood pressure  Found to have 2 cm adrenal adenoma  Plasma and urine metanephrines were found in indeterminate range  Blood pressure controlled on current medications  Aldosterone, renin, 1 mg dexamethasone suppression test showed normal results in 10/2019  We will repeat imaging   - ACTH; Future  - Aldosterone; Future  - Renin; Future  - Cortisol AM, Total; Future  - Prolactin; Future  - Metanephrines Plasma Free; Future  - Cortisol, Urine, Free; Future  - Calcium, 24 HR Urine; Future  - Creatinine Clearance, Urine, 24 HR; Future  - Sodium, urine, 24 hour; Future  - Catecholamines Free Urine; Future  - 5-Hydroxyindoleacetic acid (HIAA) urine; Future  - Metanephrines Urine; Future  - Comprehensive Metabolic Panel; Future  - CT ADRENALS WO CONTRAST; Future    4. Hypercalcemia    - Calcium, 24 HR Urine; Future  - Creatinine Clearance, Urine, 24 HR; Future  - Sodium, urine, 24 hour; Future  - Calcium Ionized Serum; Future  - PTH-Related Peptide; Future  - PTH, Intact; Future  - Phosphorus; Future  - Magnesium; Future  - Vitamin D 25 Hydroxy; Future  - Vitamin D 1,25 Dihydroxy; Future  - Electrophoresis Protein, Serum without Reflex to Immunofixation; Future  - Immunofixation serum profile; Future  - Comprehensive Metabolic Panel; Future  - US HEAD NECK SOFT TISSUE THYROID; Future    5. Goiter    - T3, Free; Future  - T4, Free; Future  - TSH without Reflex; Future  - Thyroid Peroxidase Antibody; Future  - Anti-Thyroglobulin Antibody;  Future  - US HEAD NECK SOFT TISSUE THYROID; Future     Reviewed and/or ordered clinical lab results Yes  Reviewed and/or ordered radiology tests Yes   Reviewed and/or ordered other diagnostic tests No  Discussed test results with performing physician No  Independently reviewed image, tracing, or specimen No  Made a decision to obtain old records No  Reviewed and summarized old records Yes   78-year-old female experiencing spells of headaches, sweating, high blood pressure  Found to have 2 cm adrenal adenoma  Plasma and urine metanephrines were found in indeterminate range  Blood pressure controlled on current medications  Aldosterone, renin, 1 mg dexamethasone suppression test showed normal results in 10/2019  Obtained history from other than patient Anneliese Jaytensia Josefina English was counseled regarding symptoms of adrenal nodule, hypothyroidism, hypercalcemia diagnosis, course and complications of disease if inadequately treated, side effects of medications, diagnosis, treatment options, and prognosis, risks, benefits, complications, and alternatives of treatment, labs, imaging and other studies and treatment targets and goals. She understands instructions and counseling. Total time I spent for this encounter 40 minutes    Return in about 1 month (around 3/25/2021) for adrenal , thyroid problems.     Electronically signed by Emelia Galan MD on 3/1/2021 at 1:35 AM

## 2021-02-25 ENCOUNTER — OFFICE VISIT (OUTPATIENT)
Dept: ENDOCRINOLOGY | Age: 61
End: 2021-02-25
Payer: MEDICARE

## 2021-02-25 VITALS
WEIGHT: 100 LBS | SYSTOLIC BLOOD PRESSURE: 134 MMHG | BODY MASS INDEX: 17.07 KG/M2 | HEIGHT: 64 IN | HEART RATE: 72 BPM | RESPIRATION RATE: 14 BRPM | DIASTOLIC BLOOD PRESSURE: 88 MMHG

## 2021-02-25 DIAGNOSIS — E27.8 ADRENAL NODULE (HCC): ICD-10-CM

## 2021-02-25 DIAGNOSIS — E04.9 GOITER: ICD-10-CM

## 2021-02-25 DIAGNOSIS — E83.52 HYPERCALCEMIA: ICD-10-CM

## 2021-02-25 DIAGNOSIS — E03.9 ACQUIRED HYPOTHYROIDISM: Primary | ICD-10-CM

## 2021-02-25 DIAGNOSIS — I10 ESSENTIAL HYPERTENSION: ICD-10-CM

## 2021-02-25 PROCEDURE — 1036F TOBACCO NON-USER: CPT | Performed by: INTERNAL MEDICINE

## 2021-02-25 PROCEDURE — G8482 FLU IMMUNIZE ORDER/ADMIN: HCPCS | Performed by: INTERNAL MEDICINE

## 2021-02-25 PROCEDURE — 99215 OFFICE O/P EST HI 40 MIN: CPT | Performed by: INTERNAL MEDICINE

## 2021-02-25 PROCEDURE — G8419 CALC BMI OUT NRM PARAM NOF/U: HCPCS | Performed by: INTERNAL MEDICINE

## 2021-02-25 PROCEDURE — G8427 DOCREV CUR MEDS BY ELIG CLIN: HCPCS | Performed by: INTERNAL MEDICINE

## 2021-02-25 PROCEDURE — 3017F COLORECTAL CA SCREEN DOC REV: CPT | Performed by: INTERNAL MEDICINE

## 2021-02-25 RX ORDER — LOSARTAN POTASSIUM 50 MG/1
50 TABLET ORAL 2 TIMES DAILY
COMMUNITY
End: 2021-04-07 | Stop reason: SDUPTHER

## 2021-03-01 PROBLEM — E27.9 ADRENAL NODULE (HCC): Status: ACTIVE | Noted: 2021-03-01

## 2021-03-01 PROBLEM — E27.8 ADRENAL NODULE (HCC): Status: ACTIVE | Noted: 2021-03-01

## 2021-03-01 PROBLEM — E04.9 GOITER: Status: ACTIVE | Noted: 2021-03-01

## 2021-03-01 PROBLEM — E83.52 HYPERCALCEMIA: Status: ACTIVE | Noted: 2021-03-01

## 2021-03-02 ENCOUNTER — OFFICE VISIT (OUTPATIENT)
Dept: PULMONOLOGY | Age: 61
End: 2021-03-02
Payer: MEDICARE

## 2021-03-02 VITALS
OXYGEN SATURATION: 91 % | WEIGHT: 100 LBS | HEIGHT: 64 IN | RESPIRATION RATE: 18 BRPM | DIASTOLIC BLOOD PRESSURE: 82 MMHG | BODY MASS INDEX: 17.07 KG/M2 | HEART RATE: 64 BPM | TEMPERATURE: 98.1 F | SYSTOLIC BLOOD PRESSURE: 138 MMHG

## 2021-03-02 DIAGNOSIS — J44.9 MODERATE COPD (CHRONIC OBSTRUCTIVE PULMONARY DISEASE) (HCC): Primary | ICD-10-CM

## 2021-03-02 PROCEDURE — G8427 DOCREV CUR MEDS BY ELIG CLIN: HCPCS | Performed by: INTERNAL MEDICINE

## 2021-03-02 PROCEDURE — 3017F COLORECTAL CA SCREEN DOC REV: CPT | Performed by: INTERNAL MEDICINE

## 2021-03-02 PROCEDURE — 1036F TOBACCO NON-USER: CPT | Performed by: INTERNAL MEDICINE

## 2021-03-02 PROCEDURE — 99214 OFFICE O/P EST MOD 30 MIN: CPT | Performed by: INTERNAL MEDICINE

## 2021-03-02 PROCEDURE — G8419 CALC BMI OUT NRM PARAM NOF/U: HCPCS | Performed by: INTERNAL MEDICINE

## 2021-03-02 PROCEDURE — 3023F SPIROM DOC REV: CPT | Performed by: INTERNAL MEDICINE

## 2021-03-02 PROCEDURE — G8926 SPIRO NO PERF OR DOC: HCPCS | Performed by: INTERNAL MEDICINE

## 2021-03-02 PROCEDURE — G8482 FLU IMMUNIZE ORDER/ADMIN: HCPCS | Performed by: INTERNAL MEDICINE

## 2021-03-02 RX ORDER — BENZONATATE 100 MG/1
100 CAPSULE ORAL 3 TIMES DAILY PRN
COMMUNITY

## 2021-03-02 RX ORDER — MULTIVIT-MIN/IRON/FOLIC ACID/K 18-600-40
CAPSULE ORAL DAILY
COMMUNITY

## 2021-03-02 NOTE — PROGRESS NOTES
[facility]    Outpatient Follow Up Note    Chief Complaint: COPD  Consulting provider: Dr. Kayleen White    Interval History: 62 y.o. female    COPD overnight admission end of Dec 2020. Now back at baseline. Daily cough and intermittent wheezing is normal for her. 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 Admitted for COPD 12/2019 and 12/2020  Quit smoking 12/2020    Initial HPI:The patient has a history of COPD and chronic back pain. The patient does not have SOB at rest but has REESE. Exercise tolerance on level ground is 1 block. Trouble going up Stairs. The patient does not wheeze or cough. She was followed by a pulmonologist in [facility] and had been on Oxygen after a resp illness. She does not use it now. She states she was hospitalized for COPD several times in the past. She was intubated last year. She has been taking advair since that time. She does not notice that it helps. She has a nebulizer that she uses once per day. The patient has smoked 32 PPD for years. Quit in 3/2019. Her  still smokes.     Current Outpatient Medications:     Cholecalciferol (VITAMIN D) 50 MCG (2000 UT) CAPS capsule, Take by mouth, Disp: , Rfl:     benzonatate (TESSALON) 100 MG capsule, Take 100 mg by mouth 3 times daily as needed for Cough, Disp: , Rfl:     losartan (COZAAR) 50 MG tablet, Take 50 mg by mouth 2 times daily, Disp: , Rfl:     albuterol sulfate  (90 Base) MCG/ACT inhaler, Inhale 1 puff into the lungs as needed for Wheezing or Shortness of Breath, Disp: 1 Inhaler, Rfl: 5    pantoprazole (PROTONIX) 40 MG tablet, Take 40 mg by mouth daily, Disp: , Rfl:     FLORASTOR 250 MG capsule, Take 250 mg by mouth daily, Disp: , Rfl:     chlorhexidine (PERIDEX) 0.12 % solution, , Disp: , Rfl:     metoprolol succinate (TOPROL XL) 50 MG extended release tablet, Take 1 tablet by mouth daily, Disp: 1 tablet, Rfl: 0    gabapentin (NEURONTIN) 300 MG capsule, Take 1 capsule by mouth 4 times daily for 30 days. , Disp: 120 capsule, Rfl: 0    hydrOXYzine (ATARAX) 10 MG tablet, , Disp: , Rfl:     aspirin-acetaminophen-caffeine (EXCEDRIN MIGRAINE) 250-250-65 MG per tablet, Take 1 tablet by mouth every 6 hours as needed for Headaches, Disp: , Rfl:     fluticasone-umeclidin-vilant (TRELEGY ELLIPTA) 100-62.5-25 MCG/INH AEPB, Inhale 1 puff into the lungs daily, Disp: 1 each, Rfl: 5    albuterol (PROVENTIL) (2.5 MG/3ML) 0.083% nebulizer solution, Take 3 mLs by nebulization every 6 hours as needed for Wheezing, Disp: 120 each, Rfl: 3    DULoxetine (CYMBALTA) 60 MG extended release capsule, Take 60 mg by mouth , Disp: , Rfl:     folic acid (FOLVITE) 1 MG tablet, Take 1 tablet by mouth daily, Disp: 30 tablet, Rfl: 11    Acetaminophen (TYLENOL) 325 MG CAPS, Take 975 mg by mouth as needed, Disp: , Rfl:     atorvastatin (LIPITOR) 10 MG tablet, Take 10 mg by mouth daily, Disp: , Rfl:     levothyroxine (SYNTHROID) 50 MCG tablet, Take 50 mcg by mouth Daily, Disp: , Rfl:     ondansetron (ZOFRAN-ODT) 8 MG TBDP disintegrating tablet, Place 8 mg under the tongue every 8 hours as needed for Nausea or Vomiting, Disp: , Rfl:     losartan-hydrochlorothiazide (HYZAAR) 50-12.5 MG per tablet, Take 1 tablet by mouth daily (Patient not taking: Reported on 2/25/2021), Disp: 30 tablet, Rfl: 2    Objective:   /82   Pulse 64   Temp 98.1 °F (36.7 °C) (Temporal)   Resp 18   Ht 5' 4\" (1.626 m)   Wt 100 lb (45.4 kg)   SpO2 91% Comment: RA  BMI 17.16 kg/m²    Constitutional:  No acute distress. Eyes: PERRL. Conjunctivae anicteric. ENT: Normal nose. Normal tongue. Neck:  Trachea is midline. No thyroid tenderness. Respiratory: No accessory muscle usage. Mildly decreased breath sounds. No wheezes. No rales. No Rhonchi. Cardiovascular: Normal S1S2. No digit clubbing. No digit cyanosis. No LE edema. Psychiatric: No anxiety or Agitation.  Alert and Oriented to person, place and time. LABS:  Reviewed any pertinent new labs that are available. 10/7/19 A1AT level 100      6/26/20 83      9/1/20 99    PFTs 10/7/19  FVC  (%) FEV1 1.32 (62%) FEV1/FVC ratio 0.50  TLC  (124%)  RV  (217%)   DLCO (49%) Bronchodilator response: +    6MWT: 1000ft, 92%    IMAGING:  I personally reviewed and interpreted the following today in the office:   10/14/20 LDCT:   Mucous plugging is present within the subsegmental bronchi. There is no pneumothorax or pleural effusion.  Moderate to severe emphysema    involves the bilateral lungs.  There is biapical and bibasilar scarring.         No change in the 2-3 mm right pulmonary nodules.  No new pulmonary nodules    have developed. 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 There are no signs or symptoms of lung cancer.   Tobacco History 32 pack-years, quit  1 years ago  · Verbal counseling has been performed by me to include benefits and harms of screening, follow-up diagnostic testing, over-diagnosis, false positive rate, and total radiation exposure;   · I have counseled on the importance of adherence to annual lung cancer LDCT screening, the impact of comorbidities and patient is willing to undergo diagnosis and treatment;   · I have provided counseling on the importance of maintaining cigarette smoking abstinence if former smoker; or the importance of smoking cessation if current smoker and, if appropriate, furnishing of

## 2021-03-08 ENCOUNTER — HOSPITAL ENCOUNTER (OUTPATIENT)
Dept: CT IMAGING | Age: 61
Discharge: HOME | End: 2021-03-08
Payer: MEDICARE

## 2021-03-08 ENCOUNTER — HOSPITAL ENCOUNTER (OUTPATIENT)
Age: 61
Discharge: HOME | End: 2021-03-08
Payer: MEDICARE

## 2021-03-08 ENCOUNTER — HOSPITAL ENCOUNTER (OUTPATIENT)
Dept: ULTRASOUND IMAGING | Age: 61
Discharge: HOME | End: 2021-03-08
Payer: MEDICARE

## 2021-03-08 DIAGNOSIS — I10 ESSENTIAL HYPERTENSION: ICD-10-CM

## 2021-03-08 DIAGNOSIS — E03.9 ACQUIRED HYPOTHYROIDISM: ICD-10-CM

## 2021-03-08 DIAGNOSIS — E04.9 GOITER: ICD-10-CM

## 2021-03-08 DIAGNOSIS — E83.52 HYPERCALCEMIA: ICD-10-CM

## 2021-03-08 DIAGNOSIS — E27.8 ADRENAL NODULE (HCC): ICD-10-CM

## 2021-03-08 LAB
A/G RATIO: 1.6 (ref 1.1–2.2)
ALBUMIN SERPL-MCNC: 4.4 G/DL (ref 3.4–5)
ALP BLD-CCNC: 110 U/L (ref 40–129)
ALT SERPL-CCNC: 13 U/L (ref 10–40)
ANION GAP SERPL CALCULATED.3IONS-SCNC: 8 MMOL/L (ref 3–16)
ANTI-THYROGLOB ABS: 14 IU/ML
AST SERPL-CCNC: 22 U/L (ref 15–37)
BILIRUB SERPL-MCNC: 0.4 MG/DL (ref 0–1)
BUN BLDV-MCNC: 10 MG/DL (ref 7–20)
CALCIUM IONIZED: 1.27 MMOL/L (ref 1.12–1.32)
CALCIUM SERPL-MCNC: 10.5 MG/DL (ref 8.3–10.6)
CHLORIDE BLD-SCNC: 103 MMOL/L (ref 99–110)
CO2: 30 MMOL/L (ref 21–32)
CORTISOL - AM: 16.7 UG/DL (ref 4.3–22.4)
CREAT SERPL-MCNC: 0.8 MG/DL (ref 0.6–1.1)
GFR AFRICAN AMERICAN: >60
GFR NON-AFRICAN AMERICAN: >60
GLOBULIN: 2.8 G/DL
GLUCOSE BLD-MCNC: 113 MG/DL (ref 70–99)
MAGNESIUM: 2 MG/DL (ref 1.8–2.4)
PARATHYROID HORMONE INTACT: 34.5 PG/ML (ref 14–72)
PH VENOUS: 7.29 (ref 7.35–7.45)
PHOSPHORUS: 2.8 MG/DL (ref 2.5–4.9)
POTASSIUM SERPL-SCNC: 4.6 MMOL/L (ref 3.5–5.1)
PROLACTIN: 17.8 NG/ML
SODIUM BLD-SCNC: 141 MMOL/L (ref 136–145)
T3 FREE: 2.7 PG/ML (ref 2.3–4.2)
T4 FREE: 1.4 NG/DL (ref 0.9–1.8)
THYROID PEROXIDASE (TPO) ABS: 15 IU/ML
TSH SERPL DL<=0.05 MIU/L-ACNC: 0.65 UIU/ML (ref 0.27–4.2)
VITAMIN D 25-HYDROXY: 30.6 NG/ML

## 2021-03-08 PROCEDURE — 82533 TOTAL CORTISOL: CPT

## 2021-03-08 PROCEDURE — 36415 COLL VENOUS BLD VENIPUNCTURE: CPT

## 2021-03-08 PROCEDURE — 84165 PROTEIN E-PHORESIS SERUM: CPT

## 2021-03-08 PROCEDURE — 83970 ASSAY OF PARATHORMONE: CPT

## 2021-03-08 PROCEDURE — 74150 CT ABDOMEN W/O CONTRAST: CPT

## 2021-03-08 PROCEDURE — 83835 ASSAY OF METANEPHRINES: CPT

## 2021-03-08 PROCEDURE — 86376 MICROSOMAL ANTIBODY EACH: CPT

## 2021-03-08 PROCEDURE — 82330 ASSAY OF CALCIUM: CPT

## 2021-03-08 PROCEDURE — 82024 ASSAY OF ACTH: CPT

## 2021-03-08 PROCEDURE — 82306 VITAMIN D 25 HYDROXY: CPT

## 2021-03-08 PROCEDURE — 82575 CREATININE CLEARANCE TEST: CPT

## 2021-03-08 PROCEDURE — 84155 ASSAY OF PROTEIN SERUM: CPT

## 2021-03-08 PROCEDURE — 82652 VIT D 1 25-DIHYDROXY: CPT

## 2021-03-08 PROCEDURE — 82542 COL CHROMOTOGRAPHY QUAL/QUAN: CPT

## 2021-03-08 PROCEDURE — 84481 FREE ASSAY (FT-3): CPT

## 2021-03-08 PROCEDURE — 84146 ASSAY OF PROLACTIN: CPT

## 2021-03-08 PROCEDURE — 76536 US EXAM OF HEAD AND NECK: CPT

## 2021-03-08 PROCEDURE — 84100 ASSAY OF PHOSPHORUS: CPT

## 2021-03-08 PROCEDURE — 84244 ASSAY OF RENIN: CPT

## 2021-03-08 PROCEDURE — 80053 COMPREHEN METABOLIC PANEL: CPT

## 2021-03-08 PROCEDURE — 86800 THYROGLOBULIN ANTIBODY: CPT

## 2021-03-08 PROCEDURE — 82088 ASSAY OF ALDOSTERONE: CPT

## 2021-03-08 PROCEDURE — 84443 ASSAY THYROID STIM HORMONE: CPT

## 2021-03-08 PROCEDURE — 83735 ASSAY OF MAGNESIUM: CPT

## 2021-03-08 PROCEDURE — 84439 ASSAY OF FREE THYROXINE: CPT

## 2021-03-10 LAB
ALBUMIN SERPL-MCNC: 3.7 G/DL (ref 3.1–4.9)
ALDOSTERONE: 10.3 NG/DL
ALPHA-1-GLOBULIN: 0.3 G/DL (ref 0.2–0.4)
ALPHA-2-GLOBULIN: 1.1 G/DL (ref 0.4–1.1)
BETA GLOBULIN: 1.4 G/DL (ref 0.9–1.6)
GAMMA GLOBULIN: 0.7 G/DL (ref 0.6–1.8)
METANEPH/PLASMA INTERP: ABNORMAL
METANEPHRINE FREE PLASMA: 0.41 NMOL/L (ref 0–0.49)
NORMETANEPHRINE FREE PLASMA: 2.19 NMOL/L (ref 0–0.89)
TOTAL PROTEIN: 7.2 G/DL (ref 6.4–8.2)
VITAMIN D 1,25-DIHYDROXY: 73.6 PG/ML (ref 19.9–79.3)

## 2021-03-11 ENCOUNTER — HOSPITAL ENCOUNTER (OUTPATIENT)
Age: 61
Setting detail: SPECIMEN
Discharge: HOME | End: 2021-03-11
Payer: MEDICARE

## 2021-03-11 DIAGNOSIS — E27.8 ADRENAL NODULE (HCC): ICD-10-CM

## 2021-03-11 DIAGNOSIS — E83.52 HYPERCALCEMIA: ICD-10-CM

## 2021-03-11 DIAGNOSIS — I10 ESSENTIAL HYPERTENSION: ICD-10-CM

## 2021-03-11 LAB
ADRENOCORTICOTROPIC HORMONE: <5 PG/ML (ref 6–58)
SPE/IFE INTERPRETATION: NORMAL

## 2021-03-11 PROCEDURE — 82384 ASSAY THREE CATECHOLAMINES: CPT

## 2021-03-11 PROCEDURE — 83497 ASSAY OF 5-HIAA: CPT

## 2021-03-11 PROCEDURE — 84300 ASSAY OF URINE SODIUM: CPT

## 2021-03-11 PROCEDURE — 82530 CORTISOL FREE: CPT

## 2021-03-11 PROCEDURE — 83835 ASSAY OF METANEPHRINES: CPT

## 2021-03-11 PROCEDURE — 82575 CREATININE CLEARANCE TEST: CPT

## 2021-03-11 PROCEDURE — 36415 COLL VENOUS BLD VENIPUNCTURE: CPT

## 2021-03-11 PROCEDURE — 82340 ASSAY OF CALCIUM IN URINE: CPT

## 2021-03-12 LAB
24HR URINE VOLUME (ML): 1300 ML
CALCIUM 24 HOUR URINE: 165 MG/24 HR (ref 42–353)
CREAT SERPL-MCNC: 0.8 MG/DL (ref 0.6–1.2)
CREATININE 24 HOUR URINE: 1.1 G/24HR (ref 0.6–1.5)
CREATININE CLEARANCE: 114 ML/MIN (ref 88–128)
Lab: 24 HR
RENIN ACTIVITY: 0.4 NG/ML/HR
SODIUM 24 HOUR URINE: 42 MMOL/24 HR (ref 40–220)

## 2021-03-14 LAB
5 HIAA URINE (PER GM CREAT): 4 MG/GCR (ref 0–14)
5-HIAA 24 HOUR URINE: 5 MG/D (ref 0–15)
5-HIAA INTERPRETATION: NORMAL
5-HIAA URINE: 3.5 MG/L

## 2021-03-15 ENCOUNTER — OFFICE VISIT (OUTPATIENT)
Dept: ENDOCRINOLOGY | Age: 61
End: 2021-03-15
Payer: MEDICARE

## 2021-03-15 VITALS
WEIGHT: 103 LBS | SYSTOLIC BLOOD PRESSURE: 138 MMHG | BODY MASS INDEX: 17.58 KG/M2 | HEIGHT: 64 IN | HEART RATE: 68 BPM | RESPIRATION RATE: 14 BRPM | DIASTOLIC BLOOD PRESSURE: 98 MMHG | OXYGEN SATURATION: 98 %

## 2021-03-15 DIAGNOSIS — I10 ESSENTIAL HYPERTENSION: ICD-10-CM

## 2021-03-15 DIAGNOSIS — E83.52 HYPERCALCEMIA: ICD-10-CM

## 2021-03-15 DIAGNOSIS — E03.9 ACQUIRED HYPOTHYROIDISM: Primary | ICD-10-CM

## 2021-03-15 DIAGNOSIS — E27.8 ADRENAL NODULE (HCC): ICD-10-CM

## 2021-03-15 DIAGNOSIS — Z78.0 MENOPAUSE: ICD-10-CM

## 2021-03-15 PROCEDURE — 3017F COLORECTAL CA SCREEN DOC REV: CPT | Performed by: INTERNAL MEDICINE

## 2021-03-15 PROCEDURE — 99215 OFFICE O/P EST HI 40 MIN: CPT | Performed by: INTERNAL MEDICINE

## 2021-03-15 PROCEDURE — G8482 FLU IMMUNIZE ORDER/ADMIN: HCPCS | Performed by: INTERNAL MEDICINE

## 2021-03-15 PROCEDURE — 4004F PT TOBACCO SCREEN RCVD TLK: CPT | Performed by: INTERNAL MEDICINE

## 2021-03-15 PROCEDURE — G8419 CALC BMI OUT NRM PARAM NOF/U: HCPCS | Performed by: INTERNAL MEDICINE

## 2021-03-15 PROCEDURE — G8427 DOCREV CUR MEDS BY ELIG CLIN: HCPCS | Performed by: INTERNAL MEDICINE

## 2021-03-15 NOTE — PROGRESS NOTES
SUBJECTIVE:  Kathy Lara is a 62 y.o. female who is being evaluated for adrenal disease. H.    1. Acquired hypothyroidism  Hypothyroidism diagnosed in 2015  On levothyroxine 0.05 mg daily  TSH 2.25  Has fatigue    Current complaints: headaches, nausea, fatigue, weakness, left flank pain  Left flank pain all the time, 7 out of 10    History of obstructive symptoms: difficulty swallowing No, changes in voice/hoarseness Yes. History of radiation to patient's neck: No  Resent iodine exposure: No  Family history includes mother had thyroid abnormalities. Family history of thyroid cancer: No    2. Essential hypertension  This started in 2007. Patient history significant for hypertension, COPD, respiratory failure, ulcerative colitis, hypothyroidism, hyperlipidemia. Patient has been experiencing spells of headache, nausea, sweating and hypertension for 3 to 4 months. Blood pressure was very high during the spells, in 200s. Patient is currently on Hyzaar 50/12.5 mg daily and Toprol-XL 25 mg twice a day  Patient was hospitalized in September 2019 for chest pain and very high blood pressure, was seen and evaluated by cardiology. Plasma metanephrines and urine metanephrines are in the indeterminate range. Has headaches, severe, exedrin does not help, worse for 2 weeks    3. Hypercalcemia  Calcium 10.7 in 2019  No kidney stones  No numbness or tingling    4. Adrenal nodule (HCC)  CT of abdomen in June 2019 showed 2 cm right adrenal adenoma with HU 52  MRI in 8/2019 showed 2 cm right adrenal adenoma consistent with benign adenoma  3/8/2021 CT adrenals  The adrenal glands are again noted for a lipid rich   adrenal adenoma on the right mildly increased in size measuring approximately   17 x 28 mm, previously 13 x 25 mm.       Left flank pain all the time, 7 out of 10      EXAMINATION:   CT ABDOMEN WITHOUT CONTRAST 3/8/2021 4:07 pm       TECHNIQUE:   CT of the abdomen was performed without the administration of intravenous   contrast. Multiplanar reformatted images are provided for review. Dose   modulation, iterative reconstruction, and/or weight based adjustment of the   mA/kV was utilized to reduce the radiation dose to as low as reasonably   achievable.       COMPARISON:   CT abdomen/pelvis without contrast dated 04/10/2020.       CT abdomen/pelvis with IV contrast dated 08/27/2019.       HISTORY:   ORDERING SYSTEM PROVIDED HISTORY: Adrenal nodule ([address]   TECHNOLOGIST PROVIDED HISTORY:   Reason for Exam: right adernal nodule   Acuity: Acute   Type of Exam: Initial       FINDINGS:   Lower Chest: Lung bases are noted for moderate emphysema.       Organs: Liver, gallbladder, pancreas are unremarkable.  The patient is status   post splenectomy.  The adrenal glands are again noted for a lipid rich   adrenal adenoma on the right mildly increased in size measuring approximately   17 x 28 mm, previously 13 x 25 mm.  There is unchanged left lateral renal   hypodensity at the interpolar region consistent with cyst.  The visualized   ureters are unremarkable.       GI/Bowel: Stomach and duodenal sweep demonstrate no acute abnormality. Remaining visualized bowel is unremarkable.       Peritoneum/Retroperitoneum: No evidence of ascites or free air.  No evidence   of lymphadenopathy.  Aorta is normal in caliber.       Bones/Soft Tissues: Surrounding osseous and soft tissue structures show no   acute or concerning abnormality.  An intradiscal plug is noted at L4-5.  Left   SI joint has been pinned.           Impression   Lipid rich right adrenal adenoma mildly increased in size from the previous   exam of 04/10/2020.       Stable left renal hypodensity consistent with cyst.       Postop changes at the lower lumbar spine and left SI joint.          EXAMINATION:   THYROID ULTRASOUND       3/8/2021       COMPARISON:   None.       HISTORY:   ORDERING SYSTEM PROVIDED HISTORY: Acquired hypothyroidism   TECHNOLOGIST PROVIDED HISTORY: Reason for exam:->enlarged thyroid       Hypothyroidism.       FINDINGS:   Right thyroid lobe:  3.6 x 1.6 x 1.5 cm       Left thyroid lobe:  4 x 1.4 x 1.1 cm       Isthmus:  2 mm       Thyroid Gland:  Thyroid is diffusely heterogeneous in echogenicity.    Vascularity appears normal.       Nodules: No thyroid nodules are present.       Cervical lymphadenopathy: No abnormal lymph nodes in the imaged portions of   the neck.           Impression   Heterogeneous thyroid.  No discrete nodule identified.           Past Medical History:   Diagnosis Date    Back pain     COPD (chronic obstructive pulmonary disease) (HCC)     Fatigue     Hypertension     Syncope and collapse 03/2019    Thyroid disease     Ulcerative colitis, unspecified, without complications ([address]      Patient Active Problem List    Diagnosis Date Noted    Adrenal nodule ([address] 03/01/2021    Hypercalcemia 03/01/2021    Diarrhea     COPD with acute exacerbation ([address] 12/22/2020    Gastroesophageal reflux disease without esophagitis 11/05/2020    Anxiety and depression 11/05/2020    Neuropathy of left lower extremity 11/05/2020    Acquired hypothyroidism 11/05/2020    COPD exacerbation ([address] 11/30/2019    Moderate COPD (chronic obstructive pulmonary disease) ([address] 10/10/2019    Heterozygous alpha 1-antitrypsin deficiency ([address] 10/10/2019    Essential hypertension 09/24/2019    Chest pain 09/24/2019     Past Surgical History:   Procedure Laterality Date    BACK SURGERY      L4-L5 rods in    HYSTERECTOMY, VAGINAL  2000    SPLENECTOMY  2019    due to a fall    TONSILLECTOMY       Family History   Problem Relation Age of Onset    Heart Disease Father     High Blood Pressure Sister      Social History     Socioeconomic History    Marital status:      Spouse name: None    Number of children: None    Years of education: None    Highest education level: None   Occupational History    None   Social Needs    Financial resource strain: None    Food insecurity     Worry: None     Inability: None    Transportation needs     Medical: None     Non-medical: None   Tobacco Use    Smoking status: Current Every Day Smoker     Packs/day: 0.10     Years: 32.00     Pack years: 3.20     Types: Cigarettes     Last attempt to quit: 2020     Years since quittin.0    Smokeless tobacco: Never Used   Substance and Sexual Activity    Alcohol use: Never     Frequency: Never    Drug use: Not Currently     Types: Marijuana    Sexual activity: None   Lifestyle    Physical activity     Days per week: None     Minutes per session: None    Stress: None   Relationships    Social connections     Talks on phone: None     Gets together: None     Attends [employer] service: None     Active member of club or organization: None     Attends meetings of clubs or organizations: None     Relationship status: None    Intimate partner violence     Fear of current or ex partner: None     Emotionally abused: None     Physically abused: None     Forced sexual activity: None   Other Topics Concern    None   Social History Narrative    None     Current Outpatient Medications   Medication Sig Dispense Refill    Cholecalciferol (VITAMIN D) 50 MCG (2000 UT) CAPS capsule Take by mouth      benzonatate (TESSALON) 100 MG capsule Take 100 mg by mouth 3 times daily as needed for Cough      losartan (COZAAR) 50 MG tablet Take 50 mg by mouth 2 times daily      albuterol sulfate  (90 Base) MCG/ACT inhaler Inhale 1 puff into the lungs as needed for Wheezing or Shortness of Breath 1 Inhaler 5    pantoprazole (PROTONIX) 40 MG tablet Take 40 mg by mouth daily      FLORASTOR 250 MG capsule Take 250 mg by mouth daily      chlorhexidine (PERIDEX) 0.12 % solution       metoprolol succinate (TOPROL XL) 50 MG extended release tablet Take 1 tablet by mouth daily 1 tablet 0    gabapentin (NEURONTIN) 300 MG capsule Take 1 capsule by mouth 4 times daily for 30 days.  106 Kezia Saucedo capsule 0    hydrOXYzine (ATARAX) 10 MG tablet       aspirin-acetaminophen-caffeine (EXCEDRIN MIGRAINE) 250-250-65 MG per tablet Take 1 tablet by mouth every 6 hours as needed for Headaches      fluticasone-umeclidin-vilant (TRELEGY ELLIPTA) 100-62.5-25 MCG/INH AEPB Inhale 1 puff into the lungs daily 1 each 5    albuterol (PROVENTIL) (2.5 MG/3ML) 0.083% nebulizer solution Take 3 mLs by nebulization every 6 hours as needed for Wheezing 120 each 3    DULoxetine (CYMBALTA) 60 MG extended release capsule Take 60 mg by mouth       folic acid (FOLVITE) 1 MG tablet Take 1 tablet by mouth daily 30 tablet 11    losartan-hydrochlorothiazide (HYZAAR) 50-12.5 MG per tablet Take 1 tablet by mouth daily 30 tablet 2    Acetaminophen (TYLENOL) 325 MG CAPS Take 975 mg by mouth as needed      atorvastatin (LIPITOR) 10 MG tablet Take 10 mg by mouth daily      levothyroxine (SYNTHROID) 50 MCG tablet Take 50 mcg by mouth Daily      ondansetron (ZOFRAN-ODT) 8 MG TBDP disintegrating tablet Place 8 mg under the tongue every 8 hours as needed for Nausea or Vomiting       No current facility-administered medications for this visit.       No Known Allergies  Family Status   Relation Name Status    Father      Sister  Alive       Review of Systems:  Constitutional: has fatigue, no fever, no recent weight gain, no recent weight loss, no changes in appetite  Eyes: no eye pain, no change in vision, no eye redness, no eye irritation, no double vision  Ears, nose, throat: no nasal congestion, no sore throat, no earache, no decrease in hearing, no hoarseness, no dry mouth, no sinus problems, no difficulty swallowing, no neck lumps, no dental problems, no mouth sores, no ringing in ears  Pulmonary: no shortness of breath, no wheezing, no dyspnea on exertion, no cough  Cardiovascular: no chest pain, no lower extremity edema, no orthopnea, no intermittent leg claudication, no palpitations  Gastrointestinal: no abdominal pain, has nausea, no vomiting, no diarrhea, no constipation, no dysphagia, no heartburn, no bloating, has flank pain  Genitourinary: no dysuria, no urinary incontinence, no urinary hesitancy, no urinary frequency, no feelings of urinary urgency, no nocturia  Musculoskeletal: no joint swelling, no joint stiffness, no joint pain, no muscle cramps, no muscle pain, no bone pain  Integument/Breast: no hair loss, no skin rashes, no skin lesions, no itching, no dry skin  Neurological: no numbness, no tingling, has weakness, no confusion, has headaches, no dizziness, no fainting, no tremors, no decrease in memory, no balance problems  Psychiatric: no anxiety, no depression, has insomnia  Hematologic/Lymphatic: no tendency for easy bleeding, no swollen lymph nodes, no tendency for easy bruising  Immunology: no seasonal allergies, no frequent infections, no frequent illnesses  Endocrine: no temperature intolerance    BP (!) 138/98   Pulse 68   Resp 14   Ht 5' 4\" (1.626 m)   Wt 103 lb (46.7 kg)   SpO2 98%   BMI 17.68 kg/m²    Wt Readings from Last 3 Encounters:   03/15/21 103 lb (46.7 kg)   03/02/21 100 lb (45.4 kg)   02/25/21 100 lb (45.4 kg)     Body mass index is 17.68 kg/m².     OBJECTIVE:  Constitutional: no acute distress, well appearing and well nourished  Psychiatric: oriented to person, place and time, judgement and insight and normal, recent and remote memory and intact and mood and affect are normal  Skin: skin and subcutaneous tissue is normal without mass, normal turgor  Head and Face: examination of head and face revealed no abnormalities  Eyes: no lid or conjunctival swelling, erythema or discharge, pupils are normal, equal, round, reactive to light  Ears/Nose: external inspection of ears and nose revealed no abnormalities, hearing is grossly normal  Oropharynx/Mouth/Face: lips, tongue and gums are normal with no lesions, the voice quality was normal  Neck: neck is supple and symmetric, with midline trachea and no masses, thyroid is enlarged  Lymphatics: normal cervical lymph nodes, normal supraclavicular nodes  Pulmonary: no increased work of breathing or signs of respiratory distress, lungs are clear to auscultation  Cardiovascular: normal heart rate and rhythm, normal S1 and S2, no murmurs and pedal pulses and 2+ bilaterally, No edema  Abdomen: abdomen is soft, non-tender with no masses  Musculoskeletal: normal gait and station and exam of the digits and nails are normal  Neurological: normal coordination and normal general cortical function      Lab Review:    Lab Results   Component Value Date    WBC 8.3 12/23/2020    HGB 13.1 12/23/2020    HCT 39.6 12/23/2020    MCV 93.3 12/23/2020     12/23/2020     Lab Results   Component Value Date     03/08/2021    K 4.6 03/08/2021    K 3.2 12/22/2020     03/08/2021    CO2 30 03/08/2021    BUN 10 03/08/2021    CREATININE 0.8 03/08/2021    CREATININE 0.8 03/08/2021    GLUCOSE 113 03/08/2021    CALCIUM 10.5 03/08/2021    PROT 7.2 03/08/2021    LABALBU 3.7 03/08/2021    LABALBU 4.4 03/08/2021    BILITOT 0.4 03/08/2021    ALKPHOS 110 03/08/2021    AST 22 03/08/2021    ALT 13 03/08/2021    LABGLOM >60 03/08/2021    GFRAA >60 03/08/2021    AGRATIO 1.6 03/08/2021    GLOB 2.8 03/08/2021     Lab Results   Component Value Date    TSH 0.65 03/08/2021    FT3 2.7 03/08/2021     No results found for: LABA1C  No results found for: EAG  Lab Results   Component Value Date    CHOL 236 11/05/2020     Lab Results   Component Value Date    TRIG 107 11/05/2020     Lab Results   Component Value Date    HDL 94 11/05/2020     Lab Results   Component Value Date    LDLCALC 121 11/05/2020     Lab Results   Component Value Date    LABVLDL 21 11/05/2020    VLDL 19 04/11/2018     No results found for: [facility]  Lab Results   Component Value Date    LABMICR YES 04/10/2020     Lab Results   Component Value Date    VITD25 30.6 03/08/2021        ASSESSMENT/PLAN:  1.  Acquired hypothyroidism  TSH 0.65  Continue levothyroxine 0.05 mg daily  - T3, Free; Future  - T4, Free; Future  - TSH without Reflex; Future    2. Essential hypertension  Metoprolol  - losartan (COZAAR) 50 MG tablet; Take 50 mg by mouth 2 times daily  Free normetanephrine and plasma elevated 3.362.19  Aldosterone 10.3    3. Adrenal nodule (HCC)  Repeat scan in 4 months per protocol  ACTH less than 5  On inhaled steroid  Elevated normetanephrines  52-year-old female experiencing spells of headaches, sweating, high blood pressure  Found to have 2 cm adrenal adenoma  Plasma and urine metanephrines were found in indeterminate range  Blood pressure controlled on current medications  Aldosterone, renin, 1 mg dexamethasone suppression test showed normal results in 10/2019    4. Hypercalcemia  PTH 34.5  25 hydroxy vitamin D 30.6  Calcium 10.110.5, upper limit normal 10.6  Serum protein electrophoresis and immunofixation normal  24-hour urine calcium 165  24-hour urine sodium 42  - Calcium Ionized Serum; Future  - PTH, Intact; Future  - Phosphorus; Future  - Vitamin D 25 Hydroxy; Future  - Comprehensive Metabolic Panel; Future    5.  Menopause  No hot flashes  64 yo      Reviewed and/or ordered clinical lab results Yes  Reviewed and/or ordered radiology tests Yes   Reviewed and/or ordered other diagnostic tests No  Discussed test results with performing physician No  Independently reviewed image, tracing, or specimen No  Made a decision to obtain old records No  Reviewed and summarized old records Yes   52-year-old female experiencing spells of headaches, sweating, high blood pressure  Found to have 2 cm adrenal adenoma  Plasma and urine metanephrines were found in indeterminate range  Blood pressure controlled on current medications  Aldosterone, renin, 1 mg dexamethasone suppression test showed normal results in 10/2019  Obtained history from other than patient No    Gemini Burns was counseled regarding symptoms of adrenal nodule, hypothyroidism, hypercalcemia diagnosis, course and complications of disease if inadequately treated, side effects of medications, diagnosis, treatment options, and prognosis, risks, benefits, complications, and alternatives of treatment, labs, imaging and other studies and treatment targets and goals. She understands instructions and counseling.     Total time I spent for this encounter 40 minutes    Return in about 1 month (around 4/15/2021) for adrenal.    Electronically signed by Kelsea Victor MD on 3/15/2021 at 11:32 AM

## 2021-03-16 LAB
CORTISOL (UR), FREE: 17.8 UG/D
CORTISOL URINE, FREE (/G CRT): 16.31 UG/G CRT
CORTISOL,F,UG/L,U: 13.7 UG/L
INTERPRETATION: NORMAL

## 2021-03-17 LAB
CATE U INT: ABNORMAL
CREATININE 24 HOUR URINE: 1092 MG/D (ref 500–1400)
CREATININE URINE: 84 MG/DL
DOPAMINE (G CRT): 336 UG/G CRT (ref 0–250)
DOPAMINE 24 HOUR URINE: 367 UG/D (ref 71–485)
DOPAMINE, (UG/L): 282 UG/L
EPINEPHRINE (G CRT): 18 UG/G CRT (ref 0–20)
EPINEPHRINE 24 HOUR URINE: 20 UG/D (ref 1–14)
EPINEPHRINE, (UG/L): 15 UG/L
HOURS COLLECTED: 24
METANEPHRINE INTREP URINE: ABNORMAL
METANEPHRINE UG/G CRE: 229 UG/G CRT (ref 0–300)
METANEPHRINE, UR-PER VOL: 192 UG/L
METANEPHRINES URINE: 250 UG/D (ref 36–229)
NOREPINEPH (G CRT): 126 UG/G CRT (ref 0–45)
NOREPINEPHRINE 24 HOUR URINE: 138 UG/D (ref 14–120)
NOREPINEPHRINE, (UG/L): 106 UG/L
NORMETANEPHRINE 24 HOUR URINE: 776 UG/D (ref 95–650)
NORMETANEPHRINE, (G/CRT): 711 UG/G CRT (ref 0–400)
NORMETANEPHRINES, NMOL/L: 597 UG/L
URINE TOTAL VOLUME: 1300

## 2021-03-21 ENCOUNTER — APPOINTMENT (OUTPATIENT)
Dept: GENERAL RADIOLOGY | Age: 61
DRG: 281 | End: 2021-03-21
Payer: MEDICARE

## 2021-03-21 ENCOUNTER — HOSPITAL ENCOUNTER (INPATIENT)
Age: 61
LOS: 2 days | Discharge: HOME | DRG: 281 | End: 2021-03-23
Attending: EMERGENCY MEDICINE | Admitting: HOSPITALIST
Payer: MEDICARE

## 2021-03-21 ENCOUNTER — APPOINTMENT (OUTPATIENT)
Dept: CT IMAGING | Age: 61
DRG: 281 | End: 2021-03-21
Payer: MEDICARE

## 2021-03-21 DIAGNOSIS — N17.9 AKI (ACUTE KIDNEY INJURY) (HCC): ICD-10-CM

## 2021-03-21 DIAGNOSIS — I21.4 NSTEMI (NON-ST ELEVATED MYOCARDIAL INFARCTION) (HCC): Primary | ICD-10-CM

## 2021-03-21 DIAGNOSIS — R07.82 INTERCOSTAL PAIN: ICD-10-CM

## 2021-03-21 DIAGNOSIS — I10 ESSENTIAL HYPERTENSION: ICD-10-CM

## 2021-03-21 PROBLEM — Z72.0 TOBACCO ABUSE: Status: ACTIVE | Noted: 2021-03-21

## 2021-03-21 LAB
A/G RATIO: 1.6 (ref 1.1–2.2)
ALBUMIN SERPL-MCNC: 4.4 G/DL (ref 3.4–5)
ALP BLD-CCNC: 112 U/L (ref 40–129)
ALT SERPL-CCNC: 16 U/L (ref 10–40)
ANION GAP SERPL CALCULATED.3IONS-SCNC: 12 MMOL/L (ref 3–16)
AST SERPL-CCNC: 27 U/L (ref 15–37)
BASOPHILS ABSOLUTE: 0.2 K/UL (ref 0–0.2)
BASOPHILS RELATIVE PERCENT: 0.9 %
BILIRUB SERPL-MCNC: 0.4 MG/DL (ref 0–1)
BUN BLDV-MCNC: 9 MG/DL (ref 7–20)
CALCIUM SERPL-MCNC: 10.7 MG/DL (ref 8.3–10.6)
CHLORIDE BLD-SCNC: 102 MMOL/L (ref 99–110)
CO2: 27 MMOL/L (ref 21–32)
CREAT SERPL-MCNC: 1.2 MG/DL (ref 0.6–1.1)
EKG ATRIAL RATE: 126 BPM
EKG ATRIAL RATE: 97 BPM
EKG DIAGNOSIS: NORMAL
EKG DIAGNOSIS: NORMAL
EKG P AXIS: 77 DEGREES
EKG P AXIS: 82 DEGREES
EKG P-R INTERVAL: 118 MS
EKG P-R INTERVAL: 140 MS
EKG Q-T INTERVAL: 316 MS
EKG Q-T INTERVAL: 356 MS
EKG QRS DURATION: 74 MS
EKG QRS DURATION: 96 MS
EKG QTC CALCULATION (BAZETT): 452 MS
EKG QTC CALCULATION (BAZETT): 457 MS
EKG R AXIS: 82 DEGREES
EKG R AXIS: 83 DEGREES
EKG T AXIS: 69 DEGREES
EKG T AXIS: 71 DEGREES
EKG VENTRICULAR RATE: 126 BPM
EKG VENTRICULAR RATE: 97 BPM
EOSINOPHILS ABSOLUTE: 0.5 K/UL (ref 0–0.6)
EOSINOPHILS RELATIVE PERCENT: 2.4 %
GFR AFRICAN AMERICAN: 55
GFR NON-AFRICAN AMERICAN: 46
GLOBULIN: 2.8 G/DL
GLUCOSE BLD-MCNC: 124 MG/DL (ref 70–99)
HCT VFR BLD CALC: 41.4 % (ref 36–48)
HEMOGLOBIN: 13.5 G/DL (ref 12–16)
LACTIC ACID: 1.8 MMOL/L (ref 0.4–2)
LYMPHOCYTES ABSOLUTE: 2.9 K/UL (ref 1–5.1)
LYMPHOCYTES RELATIVE PERCENT: 15.3 %
MCH RBC QN AUTO: 30.7 PG (ref 26–34)
MCHC RBC AUTO-ENTMCNC: 32.6 G/DL (ref 31–36)
MCV RBC AUTO: 94.2 FL (ref 80–100)
MONOCYTES ABSOLUTE: 1.4 K/UL (ref 0–1.3)
MONOCYTES RELATIVE PERCENT: 7.2 %
NEUTROPHILS ABSOLUTE: 14.1 K/UL (ref 1.7–7.7)
NEUTROPHILS RELATIVE PERCENT: 74.2 %
PDW BLD-RTO: 15 % (ref 12.4–15.4)
PLATELET # BLD: 597 K/UL (ref 135–450)
PMV BLD AUTO: 8 FL (ref 5–10.5)
POTASSIUM REFLEX MAGNESIUM: 3.8 MMOL/L (ref 3.5–5.1)
PRO-BNP: 3595 PG/ML (ref 0–124)
PROCALCITONIN: 0.12 NG/ML (ref 0–0.15)
RBC # BLD: 4.4 M/UL (ref 4–5.2)
SEDIMENTATION RATE, ERYTHROCYTE: 16 MM/HR (ref 0–30)
SODIUM BLD-SCNC: 141 MMOL/L (ref 136–145)
SPECIMEN STATUS: NORMAL
TOTAL PROTEIN: 7.2 G/DL (ref 6.4–8.2)
TROPONIN: 0.3 NG/ML
TROPONIN: 0.46 NG/ML
TROPONIN: 0.53 NG/ML
WBC # BLD: 19 K/UL (ref 4–11)

## 2021-03-21 PROCEDURE — 96361 HYDRATE IV INFUSION ADD-ON: CPT

## 2021-03-21 PROCEDURE — 2580000003 HC RX 258: Performed by: EMERGENCY MEDICINE

## 2021-03-21 PROCEDURE — 96374 THER/PROPH/DIAG INJ IV PUSH: CPT

## 2021-03-21 PROCEDURE — 2500000003 HC RX 250 WO HCPCS

## 2021-03-21 PROCEDURE — 6370000000 HC RX 637 (ALT 250 FOR IP)

## 2021-03-21 PROCEDURE — 2709999900 HC NON-CHARGEABLE SUPPLY

## 2021-03-21 PROCEDURE — B2111ZZ FLUOROSCOPY OF MULTIPLE CORONARY ARTERIES USING LOW OSMOLAR CONTRAST: ICD-10-PCS | Performed by: INTERNAL MEDICINE

## 2021-03-21 PROCEDURE — 86140 C-REACTIVE PROTEIN: CPT

## 2021-03-21 PROCEDURE — 6360000002 HC RX W HCPCS

## 2021-03-21 PROCEDURE — 6360000002 HC RX W HCPCS: Performed by: EMERGENCY MEDICINE

## 2021-03-21 PROCEDURE — 93010 ELECTROCARDIOGRAM REPORT: CPT | Performed by: INTERNAL MEDICINE

## 2021-03-21 PROCEDURE — 83605 ASSAY OF LACTIC ACID: CPT

## 2021-03-21 PROCEDURE — 84134 ASSAY OF PREALBUMIN: CPT

## 2021-03-21 PROCEDURE — 2500000003 HC RX 250 WO HCPCS: Performed by: EMERGENCY MEDICINE

## 2021-03-21 PROCEDURE — 84443 ASSAY THYROID STIM HORMONE: CPT

## 2021-03-21 PROCEDURE — 99285 EMERGENCY DEPT VISIT HI MDM: CPT

## 2021-03-21 PROCEDURE — 84484 ASSAY OF TROPONIN QUANT: CPT

## 2021-03-21 PROCEDURE — 4A023N7 MEASUREMENT OF CARDIAC SAMPLING AND PRESSURE, LEFT HEART, PERCUTANEOUS APPROACH: ICD-10-PCS | Performed by: INTERNAL MEDICINE

## 2021-03-21 PROCEDURE — 93458 L HRT ARTERY/VENTRICLE ANGIO: CPT

## 2021-03-21 PROCEDURE — 83880 ASSAY OF NATRIURETIC PEPTIDE: CPT

## 2021-03-21 PROCEDURE — 84145 PROCALCITONIN (PCT): CPT

## 2021-03-21 PROCEDURE — 85347 COAGULATION TIME ACTIVATED: CPT

## 2021-03-21 PROCEDURE — 6360000004 HC RX CONTRAST MEDICATION

## 2021-03-21 PROCEDURE — 85025 COMPLETE CBC W/AUTO DIFF WBC: CPT

## 2021-03-21 PROCEDURE — 2060000000 HC ICU INTERMEDIATE R&B

## 2021-03-21 PROCEDURE — 93005 ELECTROCARDIOGRAM TRACING: CPT | Performed by: EMERGENCY MEDICINE

## 2021-03-21 PROCEDURE — 80053 COMPREHEN METABOLIC PANEL: CPT

## 2021-03-21 PROCEDURE — B2151ZZ FLUOROSCOPY OF LEFT HEART USING LOW OSMOLAR CONTRAST: ICD-10-PCS | Performed by: INTERNAL MEDICINE

## 2021-03-21 PROCEDURE — 6370000000 HC RX 637 (ALT 250 FOR IP): Performed by: INTERNAL MEDICINE

## 2021-03-21 PROCEDURE — C1894 INTRO/SHEATH, NON-LASER: HCPCS

## 2021-03-21 PROCEDURE — 71045 X-RAY EXAM CHEST 1 VIEW: CPT

## 2021-03-21 PROCEDURE — 93005 ELECTROCARDIOGRAM TRACING: CPT | Performed by: INTERNAL MEDICINE

## 2021-03-21 PROCEDURE — 93458 L HRT ARTERY/VENTRICLE ANGIO: CPT | Performed by: INTERNAL MEDICINE

## 2021-03-21 PROCEDURE — 84439 ASSAY OF FREE THYROXINE: CPT

## 2021-03-21 PROCEDURE — 85652 RBC SED RATE AUTOMATED: CPT

## 2021-03-21 PROCEDURE — 36415 COLL VENOUS BLD VENIPUNCTURE: CPT

## 2021-03-21 PROCEDURE — C1769 GUIDE WIRE: HCPCS

## 2021-03-21 PROCEDURE — 99223 1ST HOSP IP/OBS HIGH 75: CPT | Performed by: INTERNAL MEDICINE

## 2021-03-21 RX ORDER — HEPARIN SODIUM 1000 [USP'U]/ML
60 INJECTION, SOLUTION INTRAVENOUS; SUBCUTANEOUS PRN
Status: DISCONTINUED | OUTPATIENT
Start: 2021-03-21 | End: 2021-03-21

## 2021-03-21 RX ORDER — MIDAZOLAM HYDROCHLORIDE 5 MG/ML
INJECTION INTRAMUSCULAR; INTRAVENOUS
Status: COMPLETED | OUTPATIENT
Start: 2021-03-21 | End: 2021-03-21

## 2021-03-21 RX ORDER — LISINOPRIL 2.5 MG/1
2.5 TABLET ORAL DAILY
Status: DISCONTINUED | OUTPATIENT
Start: 2021-03-21 | End: 2021-03-23

## 2021-03-21 RX ORDER — ASPIRIN 81 MG/1
81 TABLET, CHEWABLE ORAL DAILY
Status: DISCONTINUED | OUTPATIENT
Start: 2021-03-22 | End: 2021-03-23 | Stop reason: HOSPADM

## 2021-03-21 RX ORDER — VERAPAMIL HYDROCHLORIDE 40 MG/1
40 TABLET ORAL 3 TIMES DAILY
Status: ON HOLD | COMMUNITY
End: 2021-03-23 | Stop reason: HOSPADM

## 2021-03-21 RX ORDER — FENTANYL CITRATE 50 UG/ML
INJECTION, SOLUTION INTRAMUSCULAR; INTRAVENOUS
Status: COMPLETED | OUTPATIENT
Start: 2021-03-21 | End: 2021-03-21

## 2021-03-21 RX ORDER — ASPIRIN 81 MG/1
TABLET, CHEWABLE ORAL
Status: COMPLETED
Start: 2021-03-21 | End: 2021-03-21

## 2021-03-21 RX ORDER — SODIUM CHLORIDE 9 MG/ML
INJECTION, SOLUTION INTRAVENOUS CONTINUOUS
Status: ACTIVE | OUTPATIENT
Start: 2021-03-21 | End: 2021-03-21

## 2021-03-21 RX ORDER — SODIUM CHLORIDE 0.9 % (FLUSH) 0.9 %
10 SYRINGE (ML) INJECTION PRN
Status: DISCONTINUED | OUTPATIENT
Start: 2021-03-21 | End: 2021-03-23 | Stop reason: HOSPADM

## 2021-03-21 RX ORDER — SODIUM CHLORIDE 0.9 % (FLUSH) 0.9 %
10 SYRINGE (ML) INJECTION EVERY 12 HOURS SCHEDULED
Status: DISCONTINUED | OUTPATIENT
Start: 2021-03-21 | End: 2021-03-23 | Stop reason: HOSPADM

## 2021-03-21 RX ORDER — HEPARIN SODIUM 1000 [USP'U]/ML
60 INJECTION, SOLUTION INTRAVENOUS; SUBCUTANEOUS ONCE
Status: COMPLETED | OUTPATIENT
Start: 2021-03-21 | End: 2021-03-21

## 2021-03-21 RX ORDER — CARVEDILOL 3.12 MG/1
1.56 TABLET ORAL 2 TIMES DAILY WITH MEALS
Status: DISCONTINUED | OUTPATIENT
Start: 2021-03-21 | End: 2021-03-23

## 2021-03-21 RX ORDER — 0.9 % SODIUM CHLORIDE 0.9 %
500 INTRAVENOUS SOLUTION INTRAVENOUS ONCE
Status: COMPLETED | OUTPATIENT
Start: 2021-03-21 | End: 2021-03-21

## 2021-03-21 RX ORDER — HEPARIN SODIUM 10000 [USP'U]/100ML
540 INJECTION, SOLUTION INTRAVENOUS CONTINUOUS
Status: DISCONTINUED | OUTPATIENT
Start: 2021-03-21 | End: 2021-03-21

## 2021-03-21 RX ORDER — HEPARIN SODIUM 1000 [USP'U]/ML
30 INJECTION, SOLUTION INTRAVENOUS; SUBCUTANEOUS PRN
Status: DISCONTINUED | OUTPATIENT
Start: 2021-03-21 | End: 2021-03-21

## 2021-03-21 RX ADMIN — FENTANYL CITRATE 75 MCG: 50 INJECTION, SOLUTION INTRAMUSCULAR; INTRAVENOUS at 15:06

## 2021-03-21 RX ADMIN — MIDAZOLAM HYDROCHLORIDE 2 MG: 5 INJECTION INTRAMUSCULAR; INTRAVENOUS at 15:09

## 2021-03-21 RX ADMIN — SODIUM CHLORIDE 500 ML: 9 INJECTION, SOLUTION INTRAVENOUS at 12:41

## 2021-03-21 RX ADMIN — ASPIRIN 324 MG: 81 TABLET, CHEWABLE ORAL at 14:11

## 2021-03-21 RX ADMIN — MIDAZOLAM HYDROCHLORIDE 2 MG: 5 INJECTION INTRAMUSCULAR; INTRAVENOUS at 15:06

## 2021-03-21 RX ADMIN — CARVEDILOL 1.56 MG: 3.12 TABLET, FILM COATED ORAL at 16:52

## 2021-03-21 RX ADMIN — HEPARIN SODIUM 540 UNITS/HR: 10000 INJECTION, SOLUTION INTRAVENOUS at 13:45

## 2021-03-21 RX ADMIN — HEPARIN SODIUM 2720 UNITS: 1000 INJECTION INTRAVENOUS; SUBCUTANEOUS at 13:45

## 2021-03-21 RX ADMIN — AZITHROMYCIN MONOHYDRATE 500 MG: 500 INJECTION, POWDER, LYOPHILIZED, FOR SOLUTION INTRAVENOUS at 16:52

## 2021-03-21 RX ADMIN — CEFTRIAXONE SODIUM 1000 MG: 1 INJECTION, POWDER, FOR SOLUTION INTRAMUSCULAR; INTRAVENOUS at 16:52

## 2021-03-21 NOTE — ED PROVIDER NOTES
[address] [facility]  ED    CHIEF COMPLAINT  Chest Pain (chest pain started yesterday and pt state that she has had increase heart rate)       HISTORY OF PRESENT ILLNESS  Angelique Steele is a 62 y.o. female with past medical history including hypertension, thyroid disease, right adrenal adenoma, moderate COPD (3 L nasal cannula at night) and as below who presents to the ED with complaint of chest pain. Patient reports the onset of chest pain yesterday evening. Describes left-sided pressure that radiates along the left side of her body. Moderate, no exacerbating or ameliorating factors, associated with shortness of breath and diaphoresis earlier today. Presents from home by EMS who administered nitroglycerin with some improvement in the patient's pain. Administered full dose aspirin. Patient denies fever, nausea, vomiting, diarrhea, abdominal pain. Admits to cough. Patient has history of hypertension, hyperlipidemia. Family history of early cardiac disease, father, before age 72. Chart review reveals patient had [facility] 4/2019 with no ischemia. Cardiac CTA 9/25/2019 no significant coronary artery disease EF 71% calcium score 0. No other complaints, modifying factors or associated symptoms.      I have reviewed the following from the nursing documentation:    Past Medical History:   Diagnosis Date    Back pain     COPD [facility]chronic obstructive pulmonary disease) ([facility] [address]     Fatigue     Hypertension     Syncope and collapse 03/2019    Thyroid disease     Ulcerative colitis, unspecified, without complications ([facility] [address]      Past Surgical History:   Procedure Laterality Date    BACK SURGERY      L4-L5 rods in    HYSTERECTOMY, VAGINAL  2000    SPLENECTOMY  2019    due to a fall    TONSILLECTOMY       Family History   Problem Relation Age of Onset    Heart Disease Father     High Blood Pressure Sister      Social History     Socioeconomic History    Marital status:      Spouse name: Not on file    Number of children: Not on file    Years of education: Not on file    Highest education level: Not on file   Occupational History    Not on file   Social Needs    Financial resource strain: Not on file    Food insecurity     Worry: Not on file     Inability: Not on file    Transportation needs     Medical: Not on file     Non-medical: Not on file   Tobacco Use    Smoking status: Current Every Day Smoker     Packs/day: 0.10     Years: 32.00     Pack years: 3.20     Types: Cigarettes     Last attempt to quit: 2020     Years since quittin.1    Smokeless tobacco: Never Used   Substance and Sexual Activity    Alcohol use: Never     Frequency: Never    Drug use: Not Currently     Types: Marijuana    Sexual activity: Not on file   Lifestyle    Physical activity     Days per week: Not on file     Minutes per session: Not on file    Stress: Not on file   Relationships    Social connections     Talks on phone: Not on file     Gets together: Not on file     Attends Alevism service: Not on file     Active member of club or organization: Not on file     Attends meetings of clubs or organizations: Not on file     Relationship status: Not on file    Intimate partner violence     Fear of current or ex partner: Not on file     Emotionally abused: Not on file     Physically abused: Not on file     Forced sexual activity: Not on file   Other Topics Concern    Not on file   Social History Narrative    Not on file     Current Facility-Administered Medications   Medication Dose Route Frequency Provider Last Rate Last Admin    heparin (porcine) injection 2,720 Units  60 Units/kg Intravenous PRN Rika Cruz MD        heparin (porcine) injection 1,360 Units  30 Units/kg Intravenous PRN Rika Cruz MD        heparin 25,000 unit in sodium chloride 0.45% 250 mL (premix) infusion  540 Units/hr Intravenous Continuous Rika Cruz MD 5.4 mL/hr at 21 1345 540 Units/hr at 21 1345    iopamidol (ISOVUE-370) 76 % injection 75 mL  75 mL Intravenous ONCE PRN Media MD Benita        cefTRIAXone (ROCEPHIN) 1000 mg IVPB in 50 mL D5W minibag  1,000 mg Intravenous Once Media MD Benita        azithromycin (ZITHROMAX) 500 mg in D5W 250ml addavial  500 mg Intravenous Once Media MD Benita         Current Outpatient Medications   Medication Sig Dispense Refill    verapamil (CALAN) 40 MG tablet Take 40 mg by mouth 3 times daily      Cholecalciferol (VITAMIN D) 50 MCG (2000 UT) CAPS capsule Take by mouth      benzonatate (TESSALON) 100 MG capsule Take 100 mg by mouth 3 times daily as needed for Cough      losartan (COZAAR) 50 MG tablet Take 50 mg by mouth 2 times daily      albuterol sulfate  (90 Base) MCG/ACT inhaler Inhale 1 puff into the lungs as needed for Wheezing or Shortness of Breath 1 Inhaler 5    pantoprazole (PROTONIX) 40 MG tablet Take 40 mg by mouth daily      FLORASTOR 250 MG capsule Take 250 mg by mouth daily      chlorhexidine (PERIDEX) 0.12 % solution       metoprolol succinate (TOPROL XL) 50 MG extended release tablet Take 1 tablet by mouth daily 1 tablet 0    gabapentin (NEURONTIN) 300 MG capsule Take 1 capsule by mouth 4 times daily for 30 days.  120 capsule 0    hydrOXYzine (ATARAX) 10 MG tablet       aspirin-acetaminophen-caffeine (EXCEDRIN MIGRAINE) 250-250-65 MG per tablet Take 1 tablet by mouth every 6 hours as needed for Headaches      fluticasone-umeclidin-vilant (TRELEGY ELLIPTA) 100-62.5-25 MCG/INH AEPB Inhale 1 puff into the lungs daily 1 each 5    albuterol (PROVENTIL) (2.5 MG/3ML) 0.083% nebulizer solution Take 3 mLs by nebulization every 6 hours as needed for Wheezing 120 each 3    DULoxetine (CYMBALTA) 60 MG extended release capsule Take 60 mg by mouth       folic acid (FOLVITE) 1 MG tablet Take 1 tablet by mouth daily 30 tablet 11    losartan-hydrochlorothiazide (HYZAAR) 50-12.5 MG per tablet Take 1 tablet by mouth daily 30 tablet 2    Acetaminophen (TYLENOL) 325 MG CAPS Take 975 mg by mouth as needed      atorvastatin (LIPITOR) 10 MG tablet Take 10 mg by mouth daily      levothyroxine (SYNTHROID) 50 MCG tablet Take 50 mcg by mouth Daily      ondansetron (ZOFRAN-ODT) 8 MG TBDP disintegrating tablet Place 8 mg under the tongue every 8 hours as needed for Nausea or Vomiting       No Known Allergies    REVIEW OF SYSTEMS  10 systems reviewed, pertinent positives and negatives per HPI, otherwise noted to be negative. PHYSICAL EXAM  ED Triage Vitals [03/21/21 1222]   Enc Vitals Group      BP (!) 87/56      Pulse 120      Resp 16      Temp 98.7 °F (37.1 °C)      Temp Source Oral      SpO2 98 %      Weight 100 lb (45.4 kg)      Height 5' 4\" (1.626 m)      Head Circumference       Peak Flow       Pain Score       Pain Loc       Pain Edu? Excl. in [address]? General appearance: Awake and alert. Cooperative. No acute distress. HENT: Normocephalic. Atraumatic. Mucous membranes are moist.  Neck: Supple. Eyes: PERRL. EOMI. Heart/Chest: RRR. No murmurs. Lungs: Respirations unlabored. CTAB. Good air exchange. Speaking comfortably in full sentences. Abdomen: Soft. Non-tender. Non-distended. No rebound or guarding. Musculoskeletal: No extremity edema. No deformity. No tenderness in the extremities. All extremities neurovascularly intact. Skin: Warm and dry. No acute rashes. Neurological: Alert and oriented. CN II-XII intact. Strength 5/5 bilateral upper and lower extremities. Sensation intact to light touch. Gait normal.  Psychiatric: Mood/affect: normal      LABS  I have reviewed all labs for this visit.    Results for orders placed or performed during the hospital encounter of 03/21/21   CBC Auto Differential   Result Value Ref Range    WBC 19.0 (H) 4.0 - 11.0 K/uL    RBC 4.40 4.00 - 5.20 M/uL    Hemoglobin 13.5 12.0 - 16.0 g/dL    Hematocrit 41.4 36.0 - 48.0 %    MCV 94.2 80.0 - 100.0 fL    MCH 30.7 26.0 - 34.0 pg    MCHC 32.6 31.0 - 36.0 g/dL    RDW 15.0 12.4 - 15.4 %    Platelets 042 (H) 574 - 450 K/uL    MPV 8.0 5.0 - 10.5 fL    Neutrophils % 74.2 %    Lymphocytes % 15.3 %    Monocytes % 7.2 %    Eosinophils % 2.4 %    Basophils % 0.9 %    Neutrophils Absolute 14.1 (H) 1.7 - 7.7 K/uL    Lymphocytes Absolute 2.9 1.0 - 5.1 K/uL    Monocytes Absolute 1.4 (H) 0.0 - 1.3 K/uL    Eosinophils Absolute 0.5 0.0 - 0.6 K/uL    Basophils Absolute 0.2 0.0 - 0.2 K/uL   Comprehensive Metabolic Panel w/ Reflex to MG   Result Value Ref Range    Sodium 141 136 - 145 mmol/L    Potassium reflex Magnesium 3.8 3.5 - 5.1 mmol/L    Chloride 102 99 - 110 mmol/L    CO2 27 21 - 32 mmol/L    Anion Gap 12 3 - 16    Glucose 124 (H) 70 - 99 mg/dL    BUN 9 7 - 20 mg/dL    CREATININE 1.2 (H) 0.6 - 1.1 mg/dL    GFR Non- 46 (A) >60    GFR  55 (A) >60    Calcium 10.7 (H) 8.3 - 10.6 mg/dL    Total Protein 7.2 6.4 - 8.2 g/dL    Albumin 4.4 3.4 - 5.0 g/dL    Albumin/Globulin Ratio 1.6 1.1 - 2.2    Total Bilirubin 0.4 0.0 - 1.0 mg/dL    Alkaline Phosphatase 112 40 - 129 U/L    ALT 16 10 - 40 U/L    AST 27 15 - 37 U/L    Globulin 2.8 g/dL   Troponin   Result Value Ref Range    Troponin 0.53 (HH) <0.01 ng/mL   Brain Natriuretic Peptide   Result Value Ref Range    Pro-BNP 3,595 (H) 0 - 124 pg/mL   Sample possible blood bank testing   Result Value Ref Range    Specimen Status MORENO    Procalcitonin   Result Value Ref Range    Procalcitonin 0.12 0.00 - 0.15 ng/mL   EKG 12 Lead   Result Value Ref Range    Ventricular Rate 126 BPM    Atrial Rate 126 BPM    P-R Interval 118 ms    QRS Duration 74 ms    Q-T Interval 316 ms    QTc Calculation (Bazett) 457 ms    P Axis 82 degrees    R Axis 83 degrees    T Axis 71 degrees    Diagnosis       Sinus tachycardiaOtherwise normal ECGWhen compared with ECG of 22-DEC-2020 16:08,Vent.  rate has increased BY  52 BPM   EKG 12 Lead   Result Value Ref Range    Ventricular Rate 97 BPM    Atrial Rate 97 BPM    P-R Interval 140 ms QRS Duration 96 ms    Q-T Interval 356 ms    QTc Calculation (Bazett) 452 ms    P Axis 77 degrees    R Axis 82 degrees    T Axis 69 degrees    Diagnosis       Normal sinus rhythmNormal ECGWhen compared with ECG of 21-MAR-2021 12:14,No significant change was found       RADIOLOGY  I have reviewed all radiographic studies for this visit. XR CHEST PORTABLE   Final Result   Emphysematous changes are again seen. Increased opacity is seen at the   medial left apex, potentially airspace disease or scarring. Follow-up to   resolution is suggested. ECG  EKG interpreted by myself. Rate: 126  Rhythm: sinus tachycardia   Axis: normal  Intervals: within normal limits  ST Segments: J point elevation V3-V6, no ST reciprocal changes  T waves: no acute abnormality  Comparison: Compared to 12/22/2020, normal sinus rhythm   Impression: NSR with J point elevation V3-V6       ED COURSE/MDM  Patient seen and evaluated. Old records reviewed. Labs and imaging reviewed and results discussed with patient/family to extent possible. This is a 25-year-old female who presents to the emergency department with complaint of chest pain. On arrival the patient is hypertensive and tachycardic. She was administered IV fluids with improvement in her hypotension. Her EKG shows some J-point elevation of the anterior and anterolateral precordial leads that appears new versus prior. I did discuss her case with Dr. Mary Cope. No immediate intervention is the patient has had recent CCTA without CAD. Renal panel shows creatinine 1.2, up from baseline of around 0.8. CBC with leukocytosis 19. Chest x-ray with opacity at the left apex. Will treat for the possibility of pneumonia as patient does meet SIRS criteria. Blood cultures obtained. Lactate pending. Will better characterize with procalcitonin. Troponin was elevated at 0.53 therefore reengaged cardiology who intends to take patient to Cath Lab.   Patient given full dose aspirin and heparinized. During the patient's ED course, the patient was given:  Medications   heparin (porcine) injection 2,720 Units (has no administration in time range)   heparin (porcine) injection 1,360 Units (has no administration in time range)   heparin 25,000 unit in sodium chloride 0.45% 250 mL (premix) infusion (540 Units/hr Intravenous New Bag 3/21/21 1345)   iopamidol (ISOVUE-370) 76 % injection 75 mL (has no administration in time range)   cefTRIAXone (ROCEPHIN) 1000 mg IVPB in 50 mL D5W minibag (has no administration in time range)   azithromycin (ZITHROMAX) 500 mg in D5W 250ml addavial (has no administration in time range)   0.9 % sodium chloride bolus (500 mLs Intravenous New Bag 3/21/21 1241)   heparin (porcine) injection 2,720 Units (2,720 Units Intravenous Given 3/21/21 1345)   aspirin 81 MG chewable tablet (324 mg  Given 3/21/21 1411)        All questions were answered and the patient/family expressed understanding and agreement with the plan. CRITICAL CARE  Total critical care time provided today was at least 31 minutes. This excludes seperately billable procedures. Critical care time was provided for NSTEMI that required close evaluation and/or intervention with concern for potential patient decompensation. CLINICAL IMPRESSION  1. NSTEMI (non-ST elevated myocardial infarction) ([facility] [address]    2. HOA (acute kidney injury) [facility])        DISPOSITION   Cath Lab / admit    Deon Ventura MD    Note: This chart was created using voice recognition dictation software. Efforts were made by me to ensure accuracy, however some errors may be present due to limitations of this technology and occasionally words are not transcribed correctly.        Deon Ventura MD  03/21/21 4728

## 2021-03-21 NOTE — PROGRESS NOTES
Patient alert and oriented x 4. VSS. Pulses detected. Site clean, dry and intact. Call light in reach. Will continue to monitor.

## 2021-03-21 NOTE — BRIEF OP NOTE
Brief Postoperative Note      Patient: Cyndie Saha  YOB: 1960  MRN: [id number]    Brief Postoperative Note  1. Pre-operative Diagnosis: NSTEMI        Post-operative Diagnosis: Same  2. Surgeons/Assistants: Pedro Cunningham MD, [address] [facility]  3. Complications: None  4. Estimated Blood Loss: less than 50   5. Specimens: Were Not Obtained  6. Anesthesia: Local and Moderate Sedation  For sedation: Moderated sedation was achieved with Versed (4mg) and Fentanyl (75mcg). Monitoring of the patient's vital signs and respiratory status was provided by a trained independent observer nurse during the entire course of the procedure(s) and under my direct supervision and recorded in patient's medical record. The duration of sedation was 1506 to 1516.    7. Procedure(s) performed: Moderate sedation  [facility]  LVG  [place]        Procedure Details:  Consent Access  site Bleed Risk Sedat US   Used*? Contrast Flouro TIme Fluoro  mGy   Yes Rt radial low MCSFC Yes 30mL 1.9 50   *CPT 23113: If stated \"yes\", Ultrasound guidance was used to access rt radial using Seldinger technique after local infiltration of 1% lidocaine. Ultrasound(US) documented/visualized size, patency, pulsatility and exact location for puncture and determined ok to proceed. Real-time imaging used for needle entry into the vessel(s). Image was printed off [employer] and sent to medical records on a progress note.    *Sedation Note: MCSFC = minimal conscious sedation for comfort      Left Heart Cath:   Findings   LVEDP 2   LVEF  60% hyperdynamic   LV wall motion  very hyperdynamic in the basal and mid segments with hypokinesis of the apical anterior apex and inferior and a possible variant of apical ballooning pattern   Gradient across AV  none   Mitral regurg  none     Coronary Angiogram:  Artery Findings/Result   LM  the left main, separate takeoff of LAD and circumflex   LAD  no angiographic CAD   LCx  no angiographic CAD       RCA  dominant, no angiographic CAD         Assessment/Plan  1. No angiographic CAD and no culprit lesion found. 2.  Nonischemic card myopathy  3. Appears to be dry with LVEDP of 2   -Given this fact and nitroglycerin the ER this probably explains her transient hypotension   -We will start IV fluids at 200 an hour for 5 hours   4. LV gram has very small area of apical ballooning with hyperkinesis of basal and mid segments.   Given normal LAD anatomy difficult to say if this represents an apical ballooning variant versus plaque rupture event that has self healed versus possible vasospasm event.   -We will put patient on goal-directed medical therapy for CHF   -Get cardiac MRI as an outpatient to evaluate for infarct versus edema    Electronically signed by Rena Guthrie MD on 3/21/2021 at 3:24 PM

## 2021-03-21 NOTE — ED NOTES
Called Cardiology @ [phone]  Re: chest pain, EKG changes  Per: Dr. Umana Poster returned call @ [address]  03/21/21 4315

## 2021-03-21 NOTE — CONSULTS
[facility]  [phone]      Attending Physician: Neida Aguiar MD  Reason for Consultation/Chief Complaint: CP    Subjective   History of Present Illness:  Cyndie Saha is a 62 y.o. patient who presented to the hospital with complaints of CP. States going o for a while, had spleen following fall.- done June 2019. CP comes and goes since then. States has had a lot of test for adrenal disease, \" too much calcium' felt breathless. Stood up and got very lightheaded and nauseous, very dizzy and almost past out. Last week metoprolol was changed to verapamil + Chest pressure since this week. Gotten worse daily, bad yesterday, states HR was 150 yesterday. Worse with breathing and taking deep breath. Not worse with position. Worse when standing striaght up. Still has 2/10. On admission 8/10 CP. No COVID. No fevers. ++Nauseae      Past Medical History:   has a past medical history of Back pain, COPD (chronic obstructive pulmonary disease) ([facility] [address] Fatigue, Hypertension, Syncope and collapse, Thyroid disease, and Ulcerative colitis, unspecified, without complications ([facility] [address] Surgical History:   has a past surgical history that includes Splenectomy (2019); Tonsillectomy; back surgery; and Hysterectomy, vaginal (2000). Social History:   reports that she has been smoking cigarettes. She has a 3.20 pack-year smoking history. She has never used smokeless tobacco. She reports previous drug use. Drug: Marijuana. She reports that she does not drink alcohol. Family History:  family history includes Heart Disease in her father; High Blood Pressure in her sister. Home Medications:  Were reviewed and are listed in nursing record and/or below  Prior to Admission medications    Medication Sig Start Date End Date Taking?  Authorizing Provider   verapamil (CALAN) 40 MG tablet Take 40 mg by mouth 3 times daily   Yes Historical Provider, MD   Cholecalciferol (VITAMIN D) 50 MCG (2000 UT) CAPS capsule Take by mouth    Historical Provider, MD   benzonatate (TESSALON) 100 MG capsule Take 100 mg by mouth 3 times daily as needed for Cough    Historical Provider, MD   losartan (COZAAR) 50 MG tablet Take 50 mg by mouth 2 times daily    Historical Provider, MD   albuterol sulfate  (90 Base) MCG/ACT inhaler Inhale 1 puff into the lungs as needed for Wheezing or Shortness of Breath 11/25/20   Marissa Woodard MD   pantoprazole (PROTONIX) 40 MG tablet Take 40 mg by mouth daily 10/16/20   Historical Provider, MD   FLORASTOR 250 MG capsule Take 250 mg by mouth daily 9/17/20   Historical Provider, MD   chlorhexidine (PERIDEX) 0.12 % solution  11/3/20   Historical Provider, MD   metoprolol succinate (TOPROL XL) 50 MG extended release tablet Take 1 tablet by mouth daily 11/5/20   Delmar Abdul MD   gabapentin (NEURONTIN) 300 MG capsule Take 1 capsule by mouth 4 times daily for 30 days.  11/5/20   Delmar Abdul MD   hydrOXYzine (ATARAX) 10 MG tablet  8/26/20   Historical Provider, MD   aspirin-acetaminophen-caffeine ([place] Gram) 097-890-68 MG per tablet Take 1 tablet by mouth every 6 hours as needed for Headaches    Historical Provider, MD   fluticasone-umeclidin-vilant (TRELEGY ELLIPTA) 100-62.5-25 MCG/INH AEPB Inhale 1 puff into the lungs daily 6/23/20   Marissa Woodard MD   albuterol (PROVENTIL) (2.5 MG/3ML) 0.083% nebulizer solution Take 3 mLs by nebulization every 6 hours as needed for Wheezing 6/23/20   Marissa Woodard MD   DULoxetine (CYMBALTA) 60 MG extended release capsule Take 60 mg by mouth     Historical Provider, MD   folic acid (FOLVITE) 1 MG tablet Take 1 tablet by mouth daily 11/11/19   Amanda Pettit MD   losartan-hydrochlorothiazide [facility]) 50-12.5 MG per tablet Take 1 tablet by mouth daily 9/26/19   Gael Merritt MD   Acetaminophen (TYLENOL) 325 MG CAPS Take 975 mg by mouth as needed 6/15/19   Historical Provider, MD   atorvastatin (LIPITOR) 10 MG tablet Take 10 mg by mouth daily Historical Provider, MD   levothyroxine (SYNTHROID) 50 MCG tablet Take 50 mcg by mouth Daily    Historical Provider, MD   ondansetron (ZOFRAN-ODT) 8 MG TBDP disintegrating tablet Place 8 mg under the tongue every 8 hours as needed for Nausea or Vomiting    Historical Provider, MD        CURRENT Medications:  heparin (porcine) injection 2,720 Units, PRN  heparin (porcine) injection 1,360 Units, PRN  heparin 25,000 unit in sodium chloride 0.45% 250 mL (premix) infusion, Continuous        Allergies:  Patient has no known allergies. Review of Systems:   A 14 point review of symptoms completed. Pertinent positives identified in the HPI, all other review of symptoms negative as below.       Objective   PHYSICAL EXAM:    Vitals:    03/21/21 1222   BP: (!) 87/56   Pulse: 120   Resp: 16   Temp: 98.7 °F (37.1 °C)   SpO2: 98%    Weight: 100 lb (45.4 kg)         General Appearance:  Alert, cooperative, no distress, appears stated age, thin   Head:  Normocephalic, without obvious abnormality, atraumatic   Eyes:  PERRL, conjunctiva/corneas clear   Nose: Nares normal, no drainage or sinus tenderness   Throat: Lips, mucosa, and tongue normal   Neck: Supple, symmetrical, trachea midline, no adenopathy, thyroid: not enlarged, symmetric, no tenderness/mass/nodules, no carotid bruit or JVD   Lungs:   Clear to auscultation bilaterally, respirations unlabored   Chest Wall:  No deformity or tenderness   Heart:  Regular rate and rhythm, S1, S2 normal, no murmur, rub or gallop   Abdomen:   Soft, non-tender, bowel sounds active all four quadrants,  no masses, no organomegaly   Extremities: Extremities normal, atraumatic, no cyanosis or edema   Pulses: 2+ and symmetric   Skin: Skin color, texture, turgor normal, no rashes or lesions   Pysch: Normal mood and affect   Neurologic: Normal gross motor and sensory exam.         Labs   CBC:   Lab Results   Component Value Date    WBC 19.0 03/21/2021    RBC 4.40 03/21/2021    HGB 13.5 03/21/2021 HCT 41.4 03/21/2021    MCV 94.2 03/21/2021    RDW 15.0 03/21/2021     03/21/2021     CMP:  Lab Results   Component Value Date     03/21/2021    K 3.8 03/21/2021     03/21/2021    CO2 27 03/21/2021    BUN 9 03/21/2021    CREATININE 1.2 03/21/2021    GFRAA 55 03/21/2021    AGRATIO 1.6 03/21/2021    LABGLOM 46 03/21/2021    GLUCOSE 124 03/21/2021    PROT 7.2 03/21/2021    CALCIUM 10.7 03/21/2021    BILITOT 0.4 03/21/2021    ALKPHOS 112 03/21/2021    AST 27 03/21/2021    ALT 16 03/21/2021     PT/INR:  No results found for: PTINR  HgBA1c:No results found for: LABA1C  Lab Results   Component Value Date    TROPONINI 0.53 ([place]) 03/21/2021         Cardiac Data     Last EKG:   3/21/2021 1214 SInus tach at 126,anterior infarct, lateral ST changes  3/21/2021 1356 NSR with improvement in lateral changes    Echo: 9/2019    Technically difficult examination. Low parasternal window secondary to COPD. LV systolic function is normal with EF estimated at 55%. No regional wall motion abnormalities. Normal left ventricular diastolic filling pressure. Mild mitral regurgitation. Stress Test:    Cath:    Studies:   CXR;  Emphysematous changes are again seen.  Increased opacity is seen at the   medial left apex, potentially airspace disease or scarring.  Follow-up to   resolution is suggested. Cardiac CTA:  No significant coronary artery stenosis is identified.       Right coronary dominance.       Left ventricular ejection fraction 70.9%.       Advanced pattern of emphysema. Assessment and Plan      1. NSTEMI: pt r/o for AMI  2. HOA  3. Hx of Orthostatic hypotension  4. COPD: moderate   - Heterozygous for alpha1 antitrypsin enzyme, M1Z  5. H of tobacco abuse  6. History of hypothyroidism on levothyroxine  7. right adrenal adenoma. Increase in size compared to last year   -Followed by endocrinology   -Of note elevated metanephrines previously during hypertension work-up        PLAN  1.  Pt with recent Cardiac CTA and negative stress but ruling in for AMI with high Trops and continued CP despite heparin, NTG   - plan for urgent cath today ? CAD, spasm? Myocarditis, apical ballooning   -Sed rate and CRP is pending  2. Heparin ggt  3. Further recs following cath      Addendum. Bedside echocardiogram with ER SonoSite does show mild pericardial effusion but no echo tamponade. LVEF appears to be hyperdynamic at 65% with no obvious wall motion. This obviously raises the suspicion for possible myocarditis but will proceed with cath to evaluate for unstable lesion    Follows by Dr. Keys Acworth      Patient Active Problem List   Diagnosis    Essential hypertension    Chest pain    Moderate COPD (chronic obstructive pulmonary disease) (HCC)    Heterozygous alpha 1-antitrypsin deficiency ([address]    COPD exacerbation ([address]    Gastroesophageal reflux disease without esophagitis    Anxiety and depression    Neuropathy of left lower extremity    Acquired hypothyroidism    COPD with acute exacerbation ([address]    Diarrhea    Adrenal nodule ([address]    Hypercalcemia           Thank you for allowing us to participate in the care of Maranda Burns. Please call me with any questions [phone]. Kelly Thompson MD, [address] [place] [address] Cardiologist  [facility]  [phone] [facility]  [phone] [address] [place] [address]  3/21/2021 1:48 PM    I will address the patient's cardiac risk factors and adjusted pharmacologic treatment as needed. In addition, I have reinforced the need for patient directed risk factor modification. All questions and concerns were addressed to the patient/family. Alternatives to my treatment were discussed. The note was completed using EMR. Every effort was made to ensure accuracy; however, inadvertent computerized transcription errors may be present.

## 2021-03-21 NOTE — PROCEDURES
Brief Pre-Op Note/Sedation Assessment      Cyndie Saha  1960 17/17      [id number]  3:02 PM    Planned Procedure: Cardiac Catheterization Procedure    Post Procedure Plan: Return to same level of care    Consent: I have discussed with the patient and/or the patient representative the indication, alternatives, and the possible risks and/or complications of the planned procedure and the anesthesia methods. The patient and/or patient representative appear to understand and agree to proceed.     Chief Complaint: Chest Pain/Pressure  NSTEMI      Indications for Cath Procedure:  ACS <= 24 hrs and Cardiac Arrythmia  Anginal Classification within 2 weeks:  CCS IV - Inability to perform any activity without angina or angina at rest, i.e., severe limitation  NYHA Heart Failure Class within 2 weeks: No symptoms  Is Cath Lab Visit Valve-related?: No  Surgical Risk: N/A  Functional Type: >= 4 METS with symptoms    Anti- Anginal Meds within 2 weeks:   ASA    Stress or Imaging Studies Performed (within 6 months):  None     Vital Signs:  /80   Pulse 102   Temp 98.7 °F (37.1 °C) (Oral)   Resp 17   Ht 5' 4\" (1.626 m)   Wt 100 lb (45.4 kg)   SpO2 100%   BMI 17.16 kg/m²     Allergies:  No Known Allergies    Past Medical History:  Past Medical History:   Diagnosis Date    Back pain     [facility]     Fatigue     Hypertension     Syncope and collapse 03/2019    Thyroid disease     Ulcerative colitis, unspecified, without complications ([facility] [address]          Surgical History:  Past Surgical History:   Procedure Laterality Date    BACK SURGERY      L4-L5 rods in    HYSTERECTOMY, VAGINAL  2000    SPLENECTOMY  2019    due to a fall    TONSILLECTOMY           Medications:  Current Facility-Administered Medications   Medication Dose Route Frequency Provider Last Rate Last Admin    heparin (porcine) injection 2,720 Units  60 Units/kg Intravenous PRN MD Sadiq Shoemaker heparin (porcine) injection 1,360 Units  30 Units/kg Intravenous PRN Elihu Apgar, MD        heparin 25,000 unit in sodium chloride 0.45% 250 mL (premix) infusion  540 Units/hr Intravenous Continuous Elihu Apgar, MD 5.4 mL/hr at 21 1345 540 Units/hr at 21 1345    iopamidol (ISOVUE-370) 76 % injection 75 mL  75 mL Intravenous ONCE PRN Elihu Apgar, MD        cefTRIAXone (ROCEPHIN) 1000 mg IVPB in 50 mL D5W minibag  1,000 mg Intravenous Once Elihu Apgar, MD        azithromycin (ZITHROMAX) 500 mg in D5W 250ml addavial  500 mg Intravenous Once Elihu Apgar, MD         Current Outpatient Medications   Medication Sig Dispense Refill    verapamil (CALAN) 40 MG tablet Take 40 mg by mouth 3 times daily      Cholecalciferol (VITAMIN D) 50 MCG ( UT) CAPS capsule Take by mouth      benzonatate (TESSALON) 100 MG capsule Take 100 mg by mouth 3 times daily as needed for Cough      losartan (COZAAR) 50 MG tablet Take 50 mg by mouth 2 times daily      albuterol sulfate  (90 Base) MCG/ACT inhaler Inhale 1 puff into the lungs as needed for Wheezing or Shortness of Breath 1 Inhaler 5    pantoprazole (PROTONIX) 40 MG tablet Take 40 mg by mouth daily      FLORASTOR 250 MG capsule Take 250 mg by mouth daily      chlorhexidine (PERIDEX) 0.12 % solution       metoprolol succinate (TOPROL XL) 50 MG extended release tablet Take 1 tablet by mouth daily 1 tablet 0    gabapentin (NEURONTIN) 300 MG capsule Take 1 capsule by mouth 4 times daily for 30 days.  120 capsule 0    hydrOXYzine (ATARAX) 10 MG tablet       aspirin-acetaminophen-caffeine (EXCEDRIN MIGRAINE) 250-250-65 MG per tablet Take 1 tablet by mouth every 6 hours as needed for Headaches      fluticasone-umeclidin-vilant (TRELEGY ELLIPTA) 100-62.5-25 MCG/INH AEPB Inhale 1 puff into the lungs daily 1 each 5    albuterol (PROVENTIL) (2.5 MG/3ML) 0.083% nebulizer solution Take 3 mLs by nebulization every 6 hours as needed for Wheezing 120 each 3    DULoxetine (CYMBALTA) 60 MG extended release capsule Take 60 mg by mouth       folic acid (FOLVITE) 1 MG tablet Take 1 tablet by mouth daily 30 tablet 11    losartan-hydrochlorothiazide (HYZAAR) 50-12.5 MG per tablet Take 1 tablet by mouth daily 30 tablet 2    Acetaminophen (TYLENOL) 325 MG CAPS Take 975 mg by mouth as needed      atorvastatin (LIPITOR) 10 MG tablet Take 10 mg by mouth daily      levothyroxine (SYNTHROID) 50 MCG tablet Take 50 mcg by mouth Daily      ondansetron (ZOFRAN-ODT) 8 MG TBDP disintegrating tablet Place 8 mg under the tongue every 8 hours as needed for Nausea or Vomiting             Pre-Sedation:    Pre-Sedation Documentation and Exam:  I have assessed the patient and agree with the H&P present on the chart. Prior History of Anesthesia Complications:   none    Modified Mallampati:  II (soft palate, uvula, fauces visible)    ASA Classification:  Class 4 - A patient with an incapacitating systemic disease that is a constant threat to life      Roseline Scale: Activity:  2 - Able to move 4 extremities voluntarily on command  Respiration:  2 - Able to breathe deeply and cough freely  Circulation:  2 - BP+/- 20mmHg of normal  Consciousness:  2 - Fully awake  Oxygen Saturation (color):  2 - Able to maintain oxygen saturation >92% on room air    Sedation/Anesthesia Plan:  Guard the patient's safety and welfare. Minimize physical discomfort and pain. Minimize negative psychological responses to treatment by providing sedation and analgesia and maximize the potential amnesia. Patient to meet pre-procedure discharge plan.     Medication Planned:  midazolam intravenously and fentanyl intravenously    Patient is an appropriate candidate for plan of sedation: yes      Electronically signed by Robert Antonio MD on 3/21/2021 at 3:02 PM

## 2021-03-22 ENCOUNTER — APPOINTMENT (OUTPATIENT)
Dept: CT IMAGING | Age: 61
DRG: 281 | End: 2021-03-22
Payer: MEDICARE

## 2021-03-22 PROBLEM — Z72.0 TOBACCO ABUSE: Status: RESOLVED | Noted: 2021-03-21 | Resolved: 2021-03-22

## 2021-03-22 LAB
ANION GAP SERPL CALCULATED.3IONS-SCNC: 8 MMOL/L (ref 3–16)
APTT: 29.8 SEC (ref 24.2–36.2)
BUN BLDV-MCNC: 7 MG/DL (ref 7–20)
C-REACTIVE PROTEIN: 4.8 MG/L (ref 0–5.1)
CALCIUM SERPL-MCNC: 9.6 MG/DL (ref 8.3–10.6)
CHLORIDE BLD-SCNC: 107 MMOL/L (ref 99–110)
CHOLESTEROL, TOTAL: 215 MG/DL (ref 0–199)
CO2: 27 MMOL/L (ref 21–32)
CREAT SERPL-MCNC: 0.6 MG/DL (ref 0.6–1.1)
D DIMER: 262 NG/ML DDU (ref 0–229)
ESTIMATED AVERAGE GLUCOSE: 131.2 MG/DL
GFR AFRICAN AMERICAN: >60
GFR NON-AFRICAN AMERICAN: >60
GLUCOSE BLD-MCNC: 85 MG/DL (ref 70–99)
HBA1C MFR BLD: 6.2 %
HCT VFR BLD CALC: 37.7 % (ref 36–48)
HDLC SERPL-MCNC: 79 MG/DL (ref 40–60)
HEMOGLOBIN: 12.2 G/DL (ref 12–16)
LDL CHOLESTEROL CALCULATED: 122 MG/DL
MCH RBC QN AUTO: 30.4 PG (ref 26–34)
MCHC RBC AUTO-ENTMCNC: 32.2 G/DL (ref 31–36)
MCV RBC AUTO: 94.3 FL (ref 80–100)
PDW BLD-RTO: 14.8 % (ref 12.4–15.4)
PLATELET # BLD: 490 K/UL (ref 135–450)
PMV BLD AUTO: 8.1 FL (ref 5–10.5)
POTASSIUM SERPL-SCNC: 3.8 MMOL/L (ref 3.5–5.1)
PREALBUMIN: 19 MG/DL (ref 20–40)
PROCALCITONIN: 0.09 NG/ML (ref 0–0.15)
RBC # BLD: 4 M/UL (ref 4–5.2)
SODIUM BLD-SCNC: 142 MMOL/L (ref 136–145)
T4 FREE: 1.3 NG/DL (ref 0.9–1.8)
T4 FREE: 1.3 NG/DL (ref 0.9–1.8)
TRIGL SERPL-MCNC: 71 MG/DL (ref 0–150)
TSH SERPL DL<=0.05 MIU/L-ACNC: 0.58 UIU/ML (ref 0.27–4.2)
VLDLC SERPL CALC-MCNC: 14 MG/DL
WBC # BLD: 16 K/UL (ref 4–11)

## 2021-03-22 PROCEDURE — 85027 COMPLETE CBC AUTOMATED: CPT

## 2021-03-22 PROCEDURE — 2580000003 HC RX 258: Performed by: HOSPITALIST

## 2021-03-22 PROCEDURE — 83036 HEMOGLOBIN GLYCOSYLATED A1C: CPT

## 2021-03-22 PROCEDURE — 6360000004 HC RX CONTRAST MEDICATION: Performed by: EMERGENCY MEDICINE

## 2021-03-22 PROCEDURE — 2060000000 HC ICU INTERMEDIATE R&B

## 2021-03-22 PROCEDURE — 80048 BASIC METABOLIC PNL TOTAL CA: CPT

## 2021-03-22 PROCEDURE — 6370000000 HC RX 637 (ALT 250 FOR IP): Performed by: HOSPITALIST

## 2021-03-22 PROCEDURE — 87040 BLOOD CULTURE FOR BACTERIA: CPT

## 2021-03-22 PROCEDURE — 6370000000 HC RX 637 (ALT 250 FOR IP): Performed by: INTERNAL MEDICINE

## 2021-03-22 PROCEDURE — 85730 THROMBOPLASTIN TIME PARTIAL: CPT

## 2021-03-22 PROCEDURE — 80061 LIPID PANEL: CPT

## 2021-03-22 PROCEDURE — 36415 COLL VENOUS BLD VENIPUNCTURE: CPT

## 2021-03-22 PROCEDURE — 2580000003 HC RX 258: Performed by: INTERNAL MEDICINE

## 2021-03-22 PROCEDURE — 94761 N-INVAS EAR/PLS OXIMETRY MLT: CPT

## 2021-03-22 PROCEDURE — 99233 SBSQ HOSP IP/OBS HIGH 50: CPT | Performed by: INTERNAL MEDICINE

## 2021-03-22 PROCEDURE — 6360000002 HC RX W HCPCS: Performed by: HOSPITALIST

## 2021-03-22 PROCEDURE — 2700000000 HC OXYGEN THERAPY PER DAY

## 2021-03-22 PROCEDURE — 85379 FIBRIN DEGRADATION QUANT: CPT

## 2021-03-22 PROCEDURE — 84145 PROCALCITONIN (PCT): CPT

## 2021-03-22 PROCEDURE — 84439 ASSAY OF FREE THYROXINE: CPT

## 2021-03-22 PROCEDURE — 71260 CT THORAX DX C+: CPT

## 2021-03-22 PROCEDURE — 94640 AIRWAY INHALATION TREATMENT: CPT

## 2021-03-22 RX ORDER — DULOXETIN HYDROCHLORIDE 60 MG/1
60 CAPSULE, DELAYED RELEASE ORAL DAILY
Status: DISCONTINUED | OUTPATIENT
Start: 2021-03-22 | End: 2021-03-23 | Stop reason: HOSPADM

## 2021-03-22 RX ORDER — BENZONATATE 100 MG/1
100 CAPSULE ORAL 3 TIMES DAILY PRN
Status: DISCONTINUED | OUTPATIENT
Start: 2021-03-22 | End: 2021-03-23 | Stop reason: HOSPADM

## 2021-03-22 RX ORDER — SACCHAROMYCES BOULARDII 250 MG
250 CAPSULE ORAL DAILY
Status: DISCONTINUED | OUTPATIENT
Start: 2021-03-22 | End: 2021-03-23 | Stop reason: HOSPADM

## 2021-03-22 RX ORDER — LEVOTHYROXINE SODIUM 0.05 MG/1
50 TABLET ORAL DAILY
Status: DISCONTINUED | OUTPATIENT
Start: 2021-03-22 | End: 2021-03-23 | Stop reason: HOSPADM

## 2021-03-22 RX ORDER — ATORVASTATIN CALCIUM 10 MG/1
10 TABLET, FILM COATED ORAL DAILY
Status: DISCONTINUED | OUTPATIENT
Start: 2021-03-22 | End: 2021-03-22

## 2021-03-22 RX ORDER — TRAMADOL HYDROCHLORIDE 50 MG/1
50 TABLET ORAL EVERY 6 HOURS PRN
Status: DISCONTINUED | OUTPATIENT
Start: 2021-03-22 | End: 2021-03-23 | Stop reason: HOSPADM

## 2021-03-22 RX ORDER — VERAPAMIL HYDROCHLORIDE 80 MG/1
40 TABLET ORAL 3 TIMES DAILY
Status: DISCONTINUED | OUTPATIENT
Start: 2021-03-22 | End: 2021-03-22

## 2021-03-22 RX ORDER — BUDESONIDE AND FORMOTEROL FUMARATE DIHYDRATE 160; 4.5 UG/1; UG/1
2 AEROSOL RESPIRATORY (INHALATION) 2 TIMES DAILY
Status: DISCONTINUED | OUTPATIENT
Start: 2021-03-22 | End: 2021-03-23 | Stop reason: HOSPADM

## 2021-03-22 RX ORDER — PANTOPRAZOLE SODIUM 40 MG/1
40 TABLET, DELAYED RELEASE ORAL DAILY
Status: DISCONTINUED | OUTPATIENT
Start: 2021-03-22 | End: 2021-03-23 | Stop reason: HOSPADM

## 2021-03-22 RX ORDER — FOLIC ACID 1 MG/1
1 TABLET ORAL DAILY
Status: DISCONTINUED | OUTPATIENT
Start: 2021-03-22 | End: 2021-03-23 | Stop reason: HOSPADM

## 2021-03-22 RX ORDER — LOSARTAN POTASSIUM 25 MG/1
50 TABLET ORAL 2 TIMES DAILY
Status: DISCONTINUED | OUTPATIENT
Start: 2021-03-22 | End: 2021-03-23 | Stop reason: HOSPADM

## 2021-03-22 RX ORDER — GABAPENTIN 300 MG/1
300 CAPSULE ORAL 4 TIMES DAILY
Status: DISCONTINUED | OUTPATIENT
Start: 2021-03-22 | End: 2021-03-23 | Stop reason: HOSPADM

## 2021-03-22 RX ORDER — ACETAMINOPHEN 325 MG/1
650 TABLET ORAL EVERY 4 HOURS PRN
Status: DISCONTINUED | OUTPATIENT
Start: 2021-03-22 | End: 2021-03-23 | Stop reason: HOSPADM

## 2021-03-22 RX ORDER — ATORVASTATIN CALCIUM 10 MG/1
20 TABLET, FILM COATED ORAL DAILY
Status: DISCONTINUED | OUTPATIENT
Start: 2021-03-23 | End: 2021-03-23 | Stop reason: HOSPADM

## 2021-03-22 RX ORDER — GUAIFENESIN 600 MG/1
600 TABLET, EXTENDED RELEASE ORAL 2 TIMES DAILY
Status: DISCONTINUED | OUTPATIENT
Start: 2021-03-22 | End: 2021-03-23 | Stop reason: HOSPADM

## 2021-03-22 RX ADMIN — DULOXETINE HYDROCHLORIDE 60 MG: 60 CAPSULE, DELAYED RELEASE ORAL at 08:50

## 2021-03-22 RX ADMIN — Medication 2 PUFF: at 20:46

## 2021-03-22 RX ADMIN — IOPAMIDOL 75 ML: 755 INJECTION, SOLUTION INTRAVENOUS at 09:09

## 2021-03-22 RX ADMIN — AZITHROMYCIN MONOHYDRATE 500 MG: 500 INJECTION, POWDER, LYOPHILIZED, FOR SOLUTION INTRAVENOUS at 08:51

## 2021-03-22 RX ADMIN — Medication 10 ML: at 08:51

## 2021-03-22 RX ADMIN — GABAPENTIN 300 MG: 300 CAPSULE ORAL at 12:54

## 2021-03-22 RX ADMIN — FOLIC ACID 1 MG: 1 TABLET ORAL at 08:50

## 2021-03-22 RX ADMIN — GABAPENTIN 300 MG: 300 CAPSULE ORAL at 16:55

## 2021-03-22 RX ADMIN — Medication 10 ML: at 20:11

## 2021-03-22 RX ADMIN — GABAPENTIN 300 MG: 300 CAPSULE ORAL at 20:10

## 2021-03-22 RX ADMIN — Medication 250 MG: at 08:48

## 2021-03-22 RX ADMIN — ASPIRIN 81 MG: 81 TABLET, CHEWABLE ORAL at 08:50

## 2021-03-22 RX ADMIN — CEFTRIAXONE SODIUM 1000 MG: 1 INJECTION, POWDER, FOR SOLUTION INTRAMUSCULAR; INTRAVENOUS at 08:51

## 2021-03-22 RX ADMIN — GABAPENTIN 300 MG: 300 CAPSULE ORAL at 08:50

## 2021-03-22 RX ADMIN — CARVEDILOL 1.56 MG: 3.12 TABLET, FILM COATED ORAL at 16:55

## 2021-03-22 RX ADMIN — PANTOPRAZOLE SODIUM 40 MG: 40 TABLET, DELAYED RELEASE ORAL at 08:50

## 2021-03-22 RX ADMIN — CARVEDILOL 1.56 MG: 3.12 TABLET, FILM COATED ORAL at 08:50

## 2021-03-22 RX ADMIN — ACETAMINOPHEN 650 MG: 325 TABLET ORAL at 15:51

## 2021-03-22 RX ADMIN — LEVOTHYROXINE SODIUM 50 MCG: 0.05 TABLET ORAL at 10:35

## 2021-03-22 RX ADMIN — ATORVASTATIN CALCIUM 10 MG: 10 TABLET, FILM COATED ORAL at 08:50

## 2021-03-22 RX ADMIN — LISINOPRIL 2.5 MG: 2.5 TABLET ORAL at 08:48

## 2021-03-22 RX ADMIN — GUAIFENESIN 600 MG: 600 TABLET, EXTENDED RELEASE ORAL at 20:10

## 2021-03-22 NOTE — PLAN OF CARE
Nutrition Problem #1: Inadequate oral intake  Intervention: Food and/or Nutrient Delivery: Continue Current Diet, Start Oral Nutrition Supplement  Nutritional Goals: Pt will consume at least 50% of meals and ONS this admission.

## 2021-03-22 NOTE — PROGRESS NOTES
[address] Daily Progress Note      Admit Date:  3/21/2021    Subjective:  Ms. Boone is seen for cardiology follow up  She denies chest pain but very SOB just going to the bath room in hospital  She feels she has cough but too weak to spit it out. Cheesh-Na   She was at work and suddenly felt nausea, hot, sweating, short of breath and rapid heart beat. She ruled in for NSTEMI underwent emergency cath 3.21.21 suspect stress cardiomyopathy  She has underactive thyroid,  Hypercalcemia,  suspected hyperparathyroidism work up not complete  ROS:  10 lbs weight loss recently  12 point ROS negative in all areas as listed below except as in [address]  Constitutional, EENT,  GI, , Musculoskeletal, skin, neurological, hematological, endocrine, Psychiatric  Quit smoking one month ago after 30packyr history. FH father MI  Mother resp issues  Past Medical History:   Diagnosis Date    Back pain     COPD (chronic obstructive pulmonary disease) ([address]     Fatigue     Hypertension     Syncope and collapse 03/2019    Thyroid disease     Ulcerative colitis, unspecified, without complications ([address]      Past Surgical History:   Procedure Laterality Date    BACK SURGERY      L4-L5 rods in    HYSTERECTOMY, VAGINAL  2000    SPLENECTOMY  2019    due to a fall    TONSILLECTOMY         Objective:   BP (!) 142/85   Pulse 90   Temp 98.6 °F (37 °C) (Oral)   Resp 16   Ht 5' 4\" (1.626 m)   Wt 101 lb 14.4 oz (46.2 kg)   SpO2 96%   BMI 17.49 kg/m²       Intake/Output Summary (Last 24 hours) at 3/22/2021 1517  Last data filed at 3/22/2021 0427  Gross per 24 hour   Intake 300 ml   Output 0 ml   Net 300 ml       TELEMETRY: NSR  Physical Exam:  General: No Respiratory distress, appears well developed and well nourished. Appears older than stated age.   Eyes:  Sclera nonicteric  Nose/Sinuses:  negative findings: nose shows no deformity, asymmetry, or inflammation, nasal mucosa normal, septum midline with no perforation or bleeding  Back:  no pain to palpation  Joint:  no active joint inflammation  Musculoskeletal:  negative  Skin:  Warm and dry  Neck:  Negative for JVD and Carotid Bruits. Chest:  Clear to auscultation, respiration easy  Cardiovascular:  RRR, S1S2 normal, no murmur, no rub or thrill. Abdomen:  Soft normal liver and spleen  Extremities:   No edema, clubbing, cyanosis,  Pulses: pedal pulses are normal.  Neuro: intact    Medications:    cefTRIAXone (ROCEPHIN) IV  1,000 mg Intravenous Q24H    azithromycin  500 mg Intravenous Q24H    DULoxetine  60 mg Oral Daily    saccharomyces boulardii  250 mg Oral Daily    folic acid  1 mg Oral Daily    gabapentin  300 mg Oral 4x Daily    levothyroxine  50 mcg Oral Daily    pantoprazole  40 mg Oral Daily    [Held by provider] losartan  50 mg Oral BID    budesonide-formoterol  2 puff Inhalation BID    tiotropium  2 puff Inhalation Daily    [START ON 3/23/2021] atorvastatin  20 mg Oral Daily    guaiFENesin  600 mg Oral BID    sodium chloride flush  10 mL Intravenous 2 times per day    carvedilol  1.5625 mg Oral BID WC    lisinopril  2.5 mg Oral Daily    aspirin  81 mg Oral Daily       benzonatate, acetaminophen, sodium chloride flush    Lab Data:  CBC:   Recent Labs     03/21/21  1234 03/22/21  0505   WBC 19.0* 16.0*   HGB 13.5 12.2   HCT 41.4 37.7   MCV 94.2 94.3   * 490*     BMP:   Recent Labs     03/21/21  1234 03/22/21  0505    142   K 3.8 3.8    107   CO2 27 27   BUN 9 7   CREATININE 1.2* 0.6     LIVER PROFILE:   Recent Labs     03/21/21  1234   AST 27   ALT 16   BILITOT 0.4   ALKPHOS 112     PT/INR: No results for input(s): PROTIME, INR in the last 72 hours. APTT:   Recent Labs     03/22/21  0749   APTT 29.8     BNP:  No results for input(s): BNP in the last 72 hours. Troponin 0.53 0.46 0.30  IMAGING:         Assessment/Plan cardiac cath 3.21.21    1. No angiographic CAD and no culprit lesion found. 2.  Nonischemic card myopathy  3. Appears to be dry with LVEDP of 2                -Given this fact and nitroglycerin the ER this probably explains her transient hypotension                -We will start IV fluids at 200 an hour for 5 hours   4. LV gram has very small area of apical ballooning with hyperkinesis of basal and mid segments. Given normal LAD anatomy difficult to say if this represents an apical ballooning variant versus plaque rupture event that has self healed versus possible vasospasm event.                -We will put patient on goal-directed medical therapy for CHF                -Get cardiac MRI as an outpatient to evaluate for infarct versus edema     Electronically signed by Louis Vieira MD on 3/21/2021 at 3:24 PM           Emphysematous changes are again seen. Increased opacity is seen at the   medial left apex, potentially airspace disease or scarring. Follow-up to   resolution is suggested. Final 03/21/2021  1:56 PM 14    Normal sinus rhythmNormal ECGWhen compared with ECG of 21-MAR-2021 12:14,No significant change was foundConfirmed by Salvador Kennedy ([id number]) on 3/21/2021 3:56:58 PM             Assessment:  Stress cardiomyopathy  NSTEMI  Acute decompensated chronic obstructive pulmonary disease  Patient Active Problem List    Diagnosis Date Noted    NSTEMI (non-ST elevated myocardial infarction) ([address]     Adrenal nodule ([address] 03/01/2021    Hypercalcemia 03/01/2021    Diarrhea     COPD with acute exacerbation ([address] 12/22/2020    Gastroesophageal reflux disease without esophagitis 11/05/2020    Anxiety and depression 11/05/2020    Neuropathy of left lower extremity 11/05/2020    Acquired hypothyroidism 11/05/2020    COPD exacerbation ([address] 11/30/2019    Moderate COPD (chronic obstructive pulmonary disease) ([address] 10/10/2019    Heterozygous alpha 1-antitrypsin deficiency ([address] 10/10/2019    Essential hypertension 09/24/2019    Chest pain 09/24/2019       Plan:  1.  Coreg and ace inhibitors for LV dysfunction  2. Asa  3. Statin  4.  Antibiotics  5.  bronchodialtors   6.  echo doppler of heart    Core Measures:  · Discharge instructions:   · LVEF documented: echo pending  · ACEI for LV dysfunction: yes  · Smoking Cessation:yes    [address] [facility] Kezia Crane MD 3/22/2021 3:17 PM

## 2021-03-22 NOTE — PROGRESS NOTES
Hospitalist Progress Note      PCP: Amadeo Lynne DO    Date of Admission: 3/21/2021    Hospital Course: \"62 y.o. female who presented with substernal pleuritic chest pain with associated tachycardia increasing in severity for 1 weeks duration. She recently discontinued her tobacco abuse approximately 1 month ago due to concerns for COPD with known antitrypsin deficiency. She reported uncontrolled hypertension ongoing for some time believed to be related to adrenal adenoma with ins metanephrine production. Labs were obtained and notable for troponin level 0.53, BNP 3595, and acute leukocytosis/thrombocytosis. Cardiology was consulted emergently by ED attending, with patient being transported to Cath Lab for immediate investigation possible NSTEMI. Patient was noted to have nonobstructive CAD. She was noted to have cardiomyopathy. Subjective:   She continues to have some intermittent chest pain. No nausea or vomiting.     Medications:  Reviewed    Infusion Medications   Scheduled Medications    cefTRIAXone (ROCEPHIN) IV  1,000 mg Intravenous Q24H    azithromycin  500 mg Intravenous Q24H    atorvastatin  10 mg Oral Daily    DULoxetine  60 mg Oral Daily    saccharomyces boulardii  250 mg Oral Daily    folic acid  1 mg Oral Daily    gabapentin  300 mg Oral 4x Daily    levothyroxine  50 mcg Oral Daily    pantoprazole  40 mg Oral Daily    [Held by provider] verapamil  40 mg Oral TID    [Held by provider] losartan  50 mg Oral BID    budesonide-formoterol  2 puff Inhalation BID    tiotropium  2 puff Inhalation Daily    sodium chloride flush  10 mL Intravenous 2 times per day    carvedilol  1.5625 mg Oral BID WC    lisinopril  2.5 mg Oral Daily    aspirin  81 mg Oral Daily     PRN Meds: benzonatate, sodium chloride flush      Intake/Output Summary (Last 24 hours) at 3/22/2021 1305  Last data filed at 3/22/2021 0427  Gross per 24 hour   Intake 800 ml   Output 0 ml   Net 800 ml Physical Exam Performed:    BP (!) 142/85   Pulse 90   Temp 98.6 °F (37 °C) (Oral)   Resp 16   Ht 5' 4\" (1.626 m)   Wt 101 lb 14.4 oz (46.2 kg)   SpO2 96%   BMI 17.49 kg/m²     General appearance: No apparent distress, appears stated age and cooperative. HEENT: Pupils equal, round, and reactive to light. Conjunctivae/corneas clear. Neck: Supple, with full range of motion. No jugular venous distention. Trachea midline. Respiratory:  Normal respiratory effort. Clear to auscultation, bilaterally without Rales/Wheezes/Rhonchi. Cardiovascular: Regular rate and rhythm with normal S1/S2 without murmurs, rubs or gallops. Abdomen: Soft, non-tender, non-distended with normal bowel sounds. Musculoskeletal: No clubbing, cyanosis or edema bilaterally. Full range of motion without deformity. Skin: Skin color, texture, turgor normal.  No rashes or lesions. Neurologic:  Neurovascularly intact without any focal sensory/motor deficits. Cranial nerves: II-XII intact, grossly non-focal.  Psychiatric: Alert and oriented, thought content appropriate, normal insight      Labs:   Recent Labs     03/21/21  1234 03/22/21  0505   WBC 19.0* 16.0*   HGB 13.5 12.2   HCT 41.4 37.7   * 490*     Recent Labs     03/21/21  1234 03/22/21  0505    142   K 3.8 3.8    107   CO2 27 27   BUN 9 7   CREATININE 1.2* 0.6   CALCIUM 10.7* 9.6     Recent Labs     03/21/21  1234   AST 27   ALT 16   BILITOT 0.4   ALKPHOS 112     No results for input(s): INR in the last 72 hours. Recent Labs     03/21/21  1234 03/21/21  1558 03/21/21  2239   TROPONINI 0.53* 0.46* 0.30*       Urinalysis:      Lab Results   Component Value Date    NITRU Negative 04/10/2020    WBCUA 0-2 04/10/2020    BACTERIA Rare 04/10/2020    RBCUA 0-2 04/10/2020    BLOODU Negative 04/10/2020    SPECGRAV 1.015 04/10/2020    GLUCOSEU Negative 04/10/2020       Radiology:  CT CHEST PULMONARY EMBOLISM W CONTRAST   Final Result   1.  Unchanged 2-3 mm solid right pulmonary nodules. RECOMMENDATION:   Per ACR Lung-RADS Version 1.1      Category 2, Benign appearance or behavior. Management:  Continue annual lung   screening with LDCT in 12 months. (probability of malignancy <1%). LUNGRADS ASSESSMENT      XR CHEST PORTABLE   Final Result   Emphysematous changes are again seen. Increased opacity is seen at the   medial left apex, potentially airspace disease or scarring. Follow-up to   resolution is suggested. Assessment/Plan:    Active Hospital Problems    Diagnosis    [facility] [I21.4]    Gastroesophageal reflux disease without esophagitis [K21.9]    Acquired hypothyroidism [E03.9]    Moderate COPD (chronic obstructive pulmonary disease) ([facility]) [J44.9]    Heterozygous alpha 1-antitrypsin deficiency ([facility] [E88.01]    Essential hypertension [I10]     Non-STEMI-likely due to demand ischemia. No significant stenotic lesions on cardiac cath. LV gram representative of apical ballooning versus variant plaque rupture versus vasospastic event. Plan for cardiac MRI on an outpatient basis  Patient is to be started on goal-directed medical therapy CHF per cardiology team  -LDL elevated,increase lipitor to 20mg daily. Elevated D-dimer, CTA PE negative.     Pulmonary emphysema with recent tobacco cessation/antitrypsin deficiency  Continue inhalers     GERD  Continue PPI     Hypothyroidism  TSH and free T4 within normal limits  Continue levothyroxine     Unchanged 2-3 mm solid right pulmonary nodules  -FU Dr Pride for pulm nodules. I informed patient about nodules and stressed follow up is needed with Pulmonology for surveillance. She verbalized understanding and would talk to PCP.     DVT Prophylaxis: Lovenox  Diet: DIET CARDIAC;  Code Status: Full Code    PT/OT Eval Status: as needed    MD Mariza

## 2021-03-22 NOTE — PROGRESS NOTES
Nutrition Education    · Verbally reviewed information with Patient  · Educated on Heart Helathy  · Written educational materials provided. · Contact name and number provided. · Refer to Patient Education activity for more details. · Pt requested heart healthy education. Dropped off packet with patient, reviewed some main points. Pt asked relevant question and was active in conversation. Left packet and RD phone number with patient, encouraged to call with any questions. Comprehensive Nutrition Assessment    Type and Reason for Visit:  Positive Nutrition Screen, Patient Education(Positive screen for loss of 2-13#, decreased appetite. Pt requested education on heart healthy diet.)    Nutrition Recommendations/Plan:   1. Continue current diet (Cardiac Diet)   2. Begin ONS BID (Magic Cups at lunch and dinner, vanilla per pt request)  3. Encourage PO intake  4. Monitor nutrition adequacy, pertinent labs, bowel habits, wt changes, and clinical progress    Nutrition Assessment:  61y/o pt admitted d/t NSTEMI. PMH COPD, HTN, ulcerative colitis. Pt nutritionally at risk AEB poor intake prior to admission and 10# wt loss in an unknown period of time. No c/o vomiting, but does endorse nausea recently, reporting nausea as an 8 on a scale of 1-10. Pt states she has never been a big eater. Pt reports normal BMs. Favorable to ONS for optimal nutritional status. Pt requested heart healthy education. Dropped packet off to patient and went over some points w/ patient. Continue to monitor. Malnutrition Assessment:  Malnutrition Status:   At risk for malnutrition (Comment)    Context:  Acute Illness     Findings of the 6 clinical characteristics of malnutrition:  Weight Loss:  Unable to assess(10# wt loss in unknown period of time)       Estimated Daily Nutrient Needs:  Energy (kcal):  1526-1800kcal; Weight Used for Energy Requirements:  EGRTU(89.4UF)     Protein (g):  65-82g; Weight Used for Protein Requirements: Ideal(1.2-1.5g/kg)        Fluid (ml/day):   ; Method Used for Fluid Requirements:  1 ml/kcal      Nutrition Related Findings:  Cardiac cath 3/21. No BM hx. Labs reviewed. Wounds:  None       Current Nutrition Therapies:    DIET CARDIAC; Anthropometric Measures:  · Height: 5' 4\" (162.6 cm)  · Current Body Weight: 101 lb (45.8 kg)   · Usual Body Weight: 110 lb (49.9 kg)     · Ideal Body Weight: 120 lbs; % Ideal Body Weight 84.2 %   · BMI: 17.3  · BMI Categories: Underweight (BMI less than 18.5)       Nutrition Diagnosis:   · Inadequate oral intake related to inadequate protein-energy intake as evidenced by weight loss, poor intake prior to admission, BMI      Nutrition Interventions:   Food and/or Nutrient Delivery:  Continue Current Diet, Start Oral Nutrition Supplement  Nutrition Education/Counseling:  Education completed   Coordination of Nutrition Care:  Continue to monitor while inpatient    Goals:  Pt will consume at least 50% of meals and ONS this admission. Nutrition Monitoring and Evaluation:   Behavioral-Environmental Outcomes:  None Identified   Food/Nutrient Intake Outcomes:  Food and Nutrient Intake, Supplement Intake  Physical Signs/Symptoms Outcomes:  Weight, Skin, Nutrition Focused Physical Findings, GI Status, Biochemical Data     Discharge Planning:     Too soon to determine     Electronically signed by Iraj Herman Dietetic Intern on 3/22/21 at 2:48 PM EDT    Contact: [phone]

## 2021-03-22 NOTE — H&P
HOSPITALISTS HISTORY AND PHYSICAL    3/22/2021 7:12 AM    Patient Information:  Beth Gibson is a 62 y.o. female [id number]  PCP:  Marci Curran DO (Tel: [phone] )    Chief complaint:    Chief Complaint   Patient presents with    Chest Pain     chest pain started yesterday and pt state that she has had increase heart rate        History of Present Illness:  Linda Webster is a 62 y.o. female who presented with  patient who presented to the ED to be evaluated for substernal pleuritic chest pain with associated tachycardia increasing in severity for 1 weeks duration. Patient reports that she recently discontinued her tobacco abuse approximately 1 month ago due to concerns for COPD with known antitrypsin deficiency. She reports that she has had uncontrolled hypertension ongoing for some time believed to be related to adrenal adenoma with ins metanephrine production. Her antihypertensives were adjusted last week with verapamil ordered to replace her previous beta-blocker dosage.  EKG obtained upon arrival to the ED revealed NSR without evidence of acute ischemia. Labs were obtained and notable for troponin level 0.53, BNP 3595, and acute leukocytosis/thrombocytosis. Cardiology was consulted emergently by ED attending, with patient being transported to Cath Lab for immediate investigation possible NSTEMI. History obtained from patient and review of Epic chart      REVIEW OF SYSTEMS:   Constitutional: Negative for fever,chills,weight loss, and generalized weakness  ENT: Negative for rhinorrhea, epistaxis, hoarseness, and sore throat.   Respiratory: Positive for chronic shortness of breath, wheezing, and cough  Cardiovascular: Positive for substernal chest pain with pleuritic component, palpitations  Gastrointestinal: Positive nausea; no hematemesis, hematochezia, or melena; no anorexia  Genitourinary: Negative for dysuria, frequency, hesitancy, and urgency; no incontinence  Hematologic/Lymphatic: Negative for bleeding tendency, excessive bruising, and enlarged LN  Musculoskeletal: Positive for myalgias and arthalgias; able to ambulate without difficulty  Neurologic: Negative for LOC, seizure activity, paresthesias, dysarthria, vertigo, and gait disturbance  Skin: Negative for itching,rash  Psychiatric: Positive for depression,anxiety  Endocrine: Negative for polyuria/polydipsia/polyphagia; no heat/cold intolerance    Past Medical History:   has a past medical history of Back pain, COPD (chronic obstructive pulmonary disease) ([facility] Fatigue, Hypertension, Syncope and collapse, Thyroid disease, and Ulcerative colitis, unspecified, without complications ([facility] Past Surgical History:   has a past surgical history that includes Splenectomy (2019); Tonsillectomy; back surgery; and Hysterectomy, vaginal (2000). Medications:  No current facility-administered medications on file prior to encounter.       Current Outpatient Medications on File Prior to Encounter   Medication Sig Dispense Refill    verapamil (CALAN) 40 MG tablet Take 40 mg by mouth 3 times daily      aspirin-acetaminophen-caffeine (EXCEDRIN MIGRAINE) 250-250-65 MG per tablet Take 1 tablet by mouth every 6 hours as needed for Headaches      Acetaminophen (TYLENOL) 325 MG CAPS Take 975 mg by mouth as needed      Cholecalciferol (VITAMIN D) 50 MCG (2000 UT) CAPS capsule Take by mouth      benzonatate (TESSALON) 100 MG capsule Take 100 mg by mouth 3 times daily as needed for Cough      losartan (COZAAR) 50 MG tablet Take 50 mg by mouth 2 times daily      albuterol sulfate  (90 Base) MCG/ACT inhaler Inhale 1 puff into the lungs as needed for Wheezing or Shortness of Breath 1 Inhaler 5    pantoprazole (PROTONIX) 40 MG tablet Take 40 mg by mouth daily      FLORASTOR 250 MG capsule Take 250 mg by mouth daily      chlorhexidine (PERIDEX) 0.12 % solution       metoprolol succinate (TOPROL XL) 50 MG extended release tablet Take 1 tablet by mouth daily 1 tablet 0    gabapentin (NEURONTIN) 300 MG capsule Take 1 capsule by mouth 4 times daily for 30 days. 120 capsule 0    hydrOXYzine (ATARAX) 10 MG tablet       fluticasone-umeclidin-vilant (TRELEGY ELLIPTA) 100-62.5-25 MCG/INH AEPB Inhale 1 puff into the lungs daily 1 each 5    albuterol (PROVENTIL) (2.5 MG/3ML) 0.083% nebulizer solution Take 3 mLs by nebulization every 6 hours as needed for Wheezing 120 each 3    DULoxetine (CYMBALTA) 60 MG extended release capsule Take 60 mg by mouth       folic acid (FOLVITE) 1 MG tablet Take 1 tablet by mouth daily 30 tablet 11    losartan-hydrochlorothiazide (HYZAAR) 50-12.5 MG per tablet Take 1 tablet by mouth daily 30 tablet 2    atorvastatin (LIPITOR) 10 MG tablet Take 10 mg by mouth daily      levothyroxine (SYNTHROID) 50 MCG tablet Take 50 mcg by mouth Daily      ondansetron (ZOFRAN-ODT) 8 MG TBDP disintegrating tablet Place 8 mg under the tongue every 8 hours as needed for Nausea or Vomiting         Allergies:  No Known Allergies     Social History:   reports that she has been smoking cigarettes. She has a 3.20 pack-year smoking history. She has never used smokeless tobacco. She reports previous drug use. Drug: Marijuana. She reports that she does not drink alcohol. Family History:  family history includes Heart Disease in her father; High Blood Pressure in her sister. Physical Exam:  BP (!) 130/90   Pulse 110   Temp 97.9 °F (36.6 °C) (Oral)   Resp 16   Ht 5' 4\" (1.626 m)   Wt 101 lb 14.4 oz (46.2 kg)   SpO2 91%   BMI 17.49 kg/m²     General appearance:  Thin middle-aged female appears older than her stated age  Eyes: Sclera clear without conjunctival injection; PERRLA; EOMI  ENT: Mucous membranes moist without thrush; normal dentition  Neck: Supple without meningismus; no goiter; no carotid bruit bilaterally  Cardiovascular: Regular rhythm without ectopy; normal S1-S2 with no murmurs; no peripheral edema; no JVD  Respiratory: No tachypnea; CTAB with diminished air exchange, no wheeze, rhonchi or rales; I:E intact  Gastrointestinal: Abdomen soft, non-tender, not distended; bowel sounds normal x4 quadrants; no masses/organomegaly appreciated  Musculoskeletal: FROM spine and extremities x4; no gross deformity  Neurology: A&O x3; cranial nerves 2-12 grossly intact; motor 5/5  BUE/BLE; finger-to-nose/heel-to-shin intact; no pronator drift; no seizure activity  Psychiatry: Well-groomed with good eye contact; appropriate affect; no visual/auditory hallucination  Skin: Warm, dry, normal turgor, no rash  PV: 2/4 radial and dorsalis pedis bilaterally; brisk capillary refill    Labs:  CBC:   Lab Results   Component Value Date    WBC 16.0 03/22/2021    RBC 4.00 03/22/2021    HGB 12.2 03/22/2021    HCT 37.7 03/22/2021    MCV 94.3 03/22/2021    MCH 30.4 03/22/2021    MCHC 32.2 03/22/2021    RDW 14.8 03/22/2021     03/22/2021    MPV 8.1 03/22/2021     BMP:    Lab Results   Component Value Date     03/22/2021    K 3.8 03/22/2021    K 3.8 03/21/2021     03/22/2021    CO2 27 03/22/2021    BUN 7 03/22/2021    CREATININE 0.6 03/22/2021    CALCIUM 9.6 03/22/2021    GFRAA >60 03/22/2021    LABGLOM >60 03/22/2021    GLUCOSE 85 03/22/2021     XR CHEST PORTABLE   Final Result   Emphysematous changes are again seen. Increased opacity is seen at the   medial left apex, potentially airspace disease or scarring. Follow-up to   resolution is suggested. EKG:     Ventricular Rate 97 BPM QTc Calculation (Bazett) 452 ms   Atrial Rate 97 BPM P Axis 77 degrees   P-R Interval 140 ms R Axis 82 degrees   QRS Duration 96 ms T Axis 69 degrees   Q-T Interval 356 ms Diagnosis Normal sinus rhythmNormal ECGWhen compared with ECG o     Left Heart Cath:    Findings   LVEDP 2   LVEF  60% hyperdynamic   LV wall motion  very hyperdynamic in the basal and mid segments with hypokinesis of the apical anterior apex and inferior and a possible variant of apical ballooning pattern   Gradient across AV  none   Mitral regurg  none      Coronary Angiogram:  Artery Findings/Result   LM  the left main, separate takeoff of LAD and circumflex   LAD  no angiographic CAD   LCx  no angiographic CAD         RCA  dominant, no angiographic CAD          Discussed case  with cardiologist Dr. Kelli Le following Cath Lab intervention    Problem List  Principal Problem:    NSTEMI (non-ST elevated myocardial infarction) [facility])  Active Problems:    Essential hypertension    Moderate COPD (chronic obstructive pulmonary disease) ([facility])    Heterozygous alpha 1-antitrypsin deficiency ([facility])    Gastroesophageal reflux disease without esophagitis    Acquired hypothyroidism    Tobacco abuse  Resolved Problems:    * No resolved hospital problems.  *        Assessment/Plan:     Chest pain with troponin elevation  Patient admitted to PCU following C intervention this afternoon  [facility] [place], operated by [facility] cardiology team to manage subsequent cardiac care  Patient with out angiographic CAD with no culprit lesion being found; appears to have nonischemic cardiomyopathy  LV gram representative of apical ballooning versus variant plaque rupture versus vasospastic event  Patient to be placed on goal-directed medical therapy CHF per cardiology team  Plan for cardiac MRI on an outpatient basis  Stat D-dimer ordered by hospitalist team in the setting of pleuritic chest pain with tachycardia; if elevated consider chest CTA with PE protocol to ensure symptoms not secondary to thromboembolic phenomenon    Pulmonary emphysema with recent tobacco cessation  Continuous pulse oximetry monitoring initiated with PRN supplemental O2   Continue home maintenance MDIs/nebulizer treatment  Encourage aggressive pulmonary toilet including incentive spirometry every 4H while awake  DuoNebs scheduled Q4H while awake  Patient congratulated on her achievement of smoking cessation; nicotine replacement patch provided    GERD  PPI initiated for GI prophylaxis  FOBT during stay    Hypothyroidism  TSH and free T4 pending  Adjust levothyroxine replacement accordingly      DVT prophylaxisLovenox 40 mg subcu daily  Code statusfull code  Dietcardiac with sodium restriction  IV accessPIV established in ED      Admit as inpatient. I anticipate hospitalization spanning more than two midnights for investigation and treatment of the above medically necessary diagnoses. Please note that some part of this chart was generated using Dragon dictation software. Although every effort was made to ensure the accuracy of this automated transcription, some errors in transcription may have occurred inadvertently. If you may need any clarification, please do not hesitate to contact me through [facility].        Jamel Radford MD    3/22/2021 7:12 AM

## 2021-03-23 VITALS
OXYGEN SATURATION: 98 % | HEART RATE: 88 BPM | TEMPERATURE: 98.8 F | BODY MASS INDEX: 17.4 KG/M2 | HEIGHT: 64 IN | DIASTOLIC BLOOD PRESSURE: 88 MMHG | SYSTOLIC BLOOD PRESSURE: 151 MMHG | RESPIRATION RATE: 18 BRPM | WEIGHT: 101.9 LBS

## 2021-03-23 LAB
ANION GAP SERPL CALCULATED.3IONS-SCNC: 10 MMOL/L (ref 3–16)
BACTERIA: ABNORMAL /HPF
BILIRUBIN URINE: NEGATIVE
BLOOD, URINE: ABNORMAL
BUN BLDV-MCNC: 4 MG/DL (ref 7–20)
CALCIUM SERPL-MCNC: 10.1 MG/DL (ref 8.3–10.6)
CHLORIDE BLD-SCNC: 96 MMOL/L (ref 99–110)
CLARITY: CLEAR
CO2: 28 MMOL/L (ref 21–32)
COLOR: YELLOW
CREAT SERPL-MCNC: 0.7 MG/DL (ref 0.6–1.1)
GFR AFRICAN AMERICAN: >60
GFR NON-AFRICAN AMERICAN: >60
GLUCOSE BLD-MCNC: 179 MG/DL (ref 70–99)
GLUCOSE URINE: NEGATIVE MG/DL
KETONES, URINE: NEGATIVE MG/DL
LEUKOCYTE ESTERASE, URINE: NEGATIVE
LV EF: 58 %
LVEF MODALITY: NORMAL
MAGNESIUM: 1.9 MG/DL (ref 1.8–2.4)
MICROSCOPIC EXAMINATION: YES
NITRITE, URINE: NEGATIVE
PH UA: 7 (ref 5–8)
POTASSIUM REFLEX MAGNESIUM: 3.5 MMOL/L (ref 3.5–5.1)
PROTEIN UA: NEGATIVE MG/DL
RBC UA: ABNORMAL /HPF (ref 0–4)
SODIUM BLD-SCNC: 134 MMOL/L (ref 136–145)
SPECIFIC GRAVITY UA: 1.01 (ref 1–1.03)
URINE TYPE: ABNORMAL
UROBILINOGEN, URINE: 0.2 E.U./DL
WBC UA: ABNORMAL /HPF (ref 0–5)

## 2021-03-23 PROCEDURE — 6370000000 HC RX 637 (ALT 250 FOR IP): Performed by: HOSPITALIST

## 2021-03-23 PROCEDURE — 2580000003 HC RX 258: Performed by: HOSPITALIST

## 2021-03-23 PROCEDURE — 94640 AIRWAY INHALATION TREATMENT: CPT

## 2021-03-23 PROCEDURE — 6370000000 HC RX 637 (ALT 250 FOR IP): Performed by: INTERNAL MEDICINE

## 2021-03-23 PROCEDURE — 6360000002 HC RX W HCPCS: Performed by: HOSPITALIST

## 2021-03-23 PROCEDURE — C8929 TTE W OR WO FOL WCON,DOPPLER: HCPCS

## 2021-03-23 PROCEDURE — 99233 SBSQ HOSP IP/OBS HIGH 50: CPT | Performed by: INTERNAL MEDICINE

## 2021-03-23 PROCEDURE — 81001 URINALYSIS AUTO W/SCOPE: CPT

## 2021-03-23 PROCEDURE — 83735 ASSAY OF MAGNESIUM: CPT

## 2021-03-23 PROCEDURE — 80048 BASIC METABOLIC PNL TOTAL CA: CPT

## 2021-03-23 PROCEDURE — 2580000003 HC RX 258: Performed by: INTERNAL MEDICINE

## 2021-03-23 PROCEDURE — 36415 COLL VENOUS BLD VENIPUNCTURE: CPT

## 2021-03-23 RX ORDER — ATORVASTATIN CALCIUM 20 MG/1
20 TABLET, FILM COATED ORAL DAILY
Qty: 30 TABLET | Refills: 0 | Status: SHIPPED | OUTPATIENT
Start: 2021-03-23 | End: 2021-05-10 | Stop reason: SDUPTHER

## 2021-03-23 RX ORDER — TRAMADOL HYDROCHLORIDE 50 MG/1
50 TABLET ORAL EVERY 8 HOURS PRN
Qty: 15 TABLET | Refills: 0 | Status: SHIPPED | OUTPATIENT
Start: 2021-03-23 | End: 2021-03-28

## 2021-03-23 RX ORDER — ASPIRIN 81 MG/1
81 TABLET, CHEWABLE ORAL DAILY
Qty: 30 TABLET | Refills: 0 | Status: SHIPPED | OUTPATIENT
Start: 2021-03-24 | End: 2021-05-10

## 2021-03-23 RX ORDER — METOPROLOL SUCCINATE 25 MG/1
25 TABLET, EXTENDED RELEASE ORAL DAILY
Qty: 30 TABLET | Refills: 0 | Status: SHIPPED | OUTPATIENT
Start: 2021-03-23 | End: 2021-04-07 | Stop reason: SDUPTHER

## 2021-03-23 RX ORDER — METOPROLOL SUCCINATE 25 MG/1
25 TABLET, EXTENDED RELEASE ORAL DAILY
Status: DISCONTINUED | OUTPATIENT
Start: 2021-03-23 | End: 2021-03-23 | Stop reason: HOSPADM

## 2021-03-23 RX ADMIN — GABAPENTIN 300 MG: 300 CAPSULE ORAL at 16:39

## 2021-03-23 RX ADMIN — GABAPENTIN 300 MG: 300 CAPSULE ORAL at 13:26

## 2021-03-23 RX ADMIN — METOPROLOL SUCCINATE 25 MG: 25 TABLET, EXTENDED RELEASE ORAL at 16:39

## 2021-03-23 RX ADMIN — DULOXETINE HYDROCHLORIDE 60 MG: 60 CAPSULE, DELAYED RELEASE ORAL at 08:45

## 2021-03-23 RX ADMIN — TRAMADOL HYDROCHLORIDE 50 MG: 50 TABLET ORAL at 06:08

## 2021-03-23 RX ADMIN — PANTOPRAZOLE SODIUM 40 MG: 40 TABLET, DELAYED RELEASE ORAL at 08:45

## 2021-03-23 RX ADMIN — GABAPENTIN 300 MG: 300 CAPSULE ORAL at 08:45

## 2021-03-23 RX ADMIN — AZITHROMYCIN MONOHYDRATE 500 MG: 500 INJECTION, POWDER, LYOPHILIZED, FOR SOLUTION INTRAVENOUS at 08:46

## 2021-03-23 RX ADMIN — TIOTROPIUM BROMIDE INHALATION SPRAY 2 PUFF: 3.12 SPRAY, METERED RESPIRATORY (INHALATION) at 08:14

## 2021-03-23 RX ADMIN — Medication 2 PUFF: at 08:15

## 2021-03-23 RX ADMIN — Medication 10 ML: at 08:47

## 2021-03-23 RX ADMIN — LISINOPRIL 2.5 MG: 2.5 TABLET ORAL at 08:44

## 2021-03-23 RX ADMIN — LEVOTHYROXINE SODIUM 50 MCG: 0.05 TABLET ORAL at 06:09

## 2021-03-23 RX ADMIN — FOLIC ACID 1 MG: 1 TABLET ORAL at 08:45

## 2021-03-23 RX ADMIN — CEFTRIAXONE SODIUM 1000 MG: 1 INJECTION, POWDER, FOR SOLUTION INTRAMUSCULAR; INTRAVENOUS at 08:46

## 2021-03-23 RX ADMIN — ASPIRIN 81 MG: 81 TABLET, CHEWABLE ORAL at 08:45

## 2021-03-23 RX ADMIN — Medication 250 MG: at 08:44

## 2021-03-23 RX ADMIN — ATORVASTATIN CALCIUM 20 MG: 10 TABLET, FILM COATED ORAL at 08:45

## 2021-03-23 RX ADMIN — CARVEDILOL 1.56 MG: 3.12 TABLET, FILM COATED ORAL at 08:45

## 2021-03-23 RX ADMIN — TRAMADOL HYDROCHLORIDE 50 MG: 50 TABLET ORAL at 11:05

## 2021-03-23 RX ADMIN — GUAIFENESIN 600 MG: 600 TABLET, EXTENDED RELEASE ORAL at 08:44

## 2021-03-23 ASSESSMENT — PAIN SCALES - GENERAL: PAINLEVEL_OUTOF10: 5

## 2021-03-23 NOTE — PROGRESS NOTES
03/22/21 2046   Treatment   Treatment Type MDI   $Treatment Type $Inhaled Therapy/Meds   Medications Budesonide/Formoterol   Pre-Tx Pulse 87   Pre-Tx Resps 18   Breath Sounds Pre-Tx MACIEL Clear   Breath Sounds Pre-Tx LLL Clear   Breath Sounds Pre-Tx RUL Clear   Breath Sounds Pre-Tx RML Clear   Breath Sounds Pre-Tx RLL Clear   Breath Sounds Post-Tx MACIEL Clear   Breath Sounds Post-Tx LLL Clear   Breath Sounds Post-Tx RUL Clear   Breath Sounds Post-Tx RML Clear   Breath Sounds Post-Tx RLL Clear   Post-Tx Pulse 87   Delivery Source Other (Comment)  (spacer used )   Position Semi-Sheriff's   Tx Tolerance Well   Duration 5   Is patient on O2? Y   Oxygen Therapy/Pulse Ox   O2 Therapy Oxygen   $Oxygen $Daily Charge   O2 Device Nasal cannula   O2 Flow Rate (L/min) 2 L/min   SpO2 95 %   Pulse Oximeter Device Mode Intermittent   Pulse Oximeter Device Location Finger   Blood Gas  Performed?  No   Cough/Sputum   Sputum How Obtained Spontaneous cough   Cough Congested;Moist;Non-productive;Strong   Sputum Amount None   Sputum Color None   Tenacity None

## 2021-03-23 NOTE — PROGRESS NOTES
active joint inflammation  Musculoskeletal:  negative  Skin:  Warm and dry  Neck:  Negative for JVD and Carotid Bruits. Chest:  Clear to auscultation, respiration easy  Cardiovascular:  RRR, S1S2 normal, no murmur, no rub or thrill. Abdomen:  Soft normal liver and spleen  Extremities:   No edema, clubbing, cyanosis,  Pulses: pedal pulses are normal.  Neuro: intact    Medications:    DULoxetine  60 mg Oral Daily    saccharomyces boulardii  250 mg Oral Daily    folic acid  1 mg Oral Daily    gabapentin  300 mg Oral 4x Daily    levothyroxine  50 mcg Oral Daily    pantoprazole  40 mg Oral Daily    losartan  50 mg Oral BID    budesonide-formoterol  2 puff Inhalation BID    tiotropium  2 puff Inhalation Daily    atorvastatin  20 mg Oral Daily    guaiFENesin  600 mg Oral BID    sodium chloride flush  10 mL Intravenous 2 times per day    carvedilol  1.5625 mg Oral BID WC    aspirin  81 mg Oral Daily       benzonatate, acetaminophen, traMADol, sodium chloride flush    Lab Data:  CBC:   Recent Labs     03/21/21  1234 03/22/21  0505   WBC 19.0* 16.0*   HGB 13.5 12.2   HCT 41.4 37.7   MCV 94.2 94.3   * 490*     BMP:   Recent Labs     03/21/21  1234 03/22/21  0505 03/23/21  1004    142 134*   K 3.8 3.8 3.5    107 96*   CO2 27 27 28   BUN 9 7 4*   CREATININE 1.2* 0.6 0.7     LIVER PROFILE:   Recent Labs     03/21/21  1234   AST 27   ALT 16   BILITOT 0.4   ALKPHOS 112     PT/INR: No results for input(s): PROTIME, INR in the last 72 hours. APTT:   Recent Labs     03/22/21  0749   APTT 29.8     BNP:  No results for input(s): BNP in the last 72 hours.    Troponin 0.53 0.46 0.30  IMAGING:   I have reviewed the following tests and documented in this encounter as follows:   Discussed with patient  Echo doppler of heart 3.23.21  Personally reviewed   Summary    Definity is used for myocardial border enhancement.    Left ventricular systolic function is normal with a visually estimated    ejection fraction of 55-60%. Mild apical hypokinesis.    The left ventricle is small in size with normal wall thickness.    Grade I diastolic dysfunction with normal LV pressure.    Right ventricular systolic function is impaired.    Mild mitral and aortic regurgitation.    Inadequate tricuspid valve regurgitation to estimate systolic pulmonary    artery pressure.           Assessment/Plan cardiac cath 3.21.21    1. No angiographic CAD and no culprit lesion found. 2.  Nonischemic card myopathy  3. Appears to be dry with LVEDP of 2                -Given this fact and nitroglycerin the ER this probably explains her transient hypotension                -We will start IV fluids at 200 an hour for 5 hours   4. LV gram has very small area of apical ballooning with hyperkinesis of basal and mid segments. Given normal LAD anatomy difficult to say if this represents an apical ballooning variant versus plaque rupture event that has self healed versus possible vasospasm event.                -We will put patient on goal-directed medical therapy for CHF                -Get cardiac MRI as an outpatient to evaluate for infarct versus edema     Electronically signed by Zo Shah MD on 3/21/2021 at 3:24 PM        Chest xray 3.21.21   Emphysematous changes are again seen. Increased opacity is seen at the   medial left apex, potentially airspace disease or scarring. Follow-up to   resolution is suggested.            EKG    Final 03/21/2021  1:56 PM 14    Normal sinus rhythmNormal ECGWhen compared with ECG of 21-MAR-2021 12:14,No significant change was foundConfirmed by Camron Brewer ([id number]) on 3/21/2021 3:56:58 PM             Assessment:  Stress cardiomyopathy  NSTEMI  Acute decompensated chronic obstructive pulmonary disease  Patient Active Problem List    Diagnosis Date Noted    NSTEMI (non-ST elevated myocardial infarction) ([address]     Adrenal nodule ([facility] 03/01/2021    Hypercalcemia 03/01/2021    Diarrhea     COPD with acute exacerbation ([facility] 12/22/2020    Gastroesophageal reflux disease without esophagitis 11/05/2020    Anxiety and depression 11/05/2020    Neuropathy of left lower extremity 11/05/2020    Acquired hypothyroidism 11/05/2020    COPD exacerbation ([facility] 11/30/2019    Moderate COPD (chronic obstructive pulmonary disease) ([facility] 10/10/2019    Heterozygous alpha 1-antitrypsin deficiency ([facility] 10/10/2019    Essential hypertension 09/24/2019    Chest pain 09/24/2019       Plan:  1. Change beta blockers to toprol due to COPD titrate up to 50mg as tolerated  2. Asa  3. Statin  4. Antibiotics  5.  bronchodialtors   6.   OK to discharge will set up follow up in our office    Core Measures:  · Discharge instructions:   · LVEF documented: 55  · ACEI for LV dysfunction: yes  · Smoking Cessation:yes    Christianne Thomas MD 3/23/2021 1:51 PM

## 2021-03-23 NOTE — PROGRESS NOTES
Hospitalist Progress Note      PCP: Viktoriya Cotton DO    Date of Admission: 3/21/2021    Hospital Course: \"62 y.o. female who presented with substernal pleuritic chest pain with associated tachycardia increasing in severity for 1 weeks duration. She recently discontinued her tobacco abuse approximately 1 month ago due to concerns for COPD with known antitrypsin deficiency. She reported uncontrolled hypertension ongoing for some time believed to be related to adrenal adenoma with ins metanephrine production. Labs were obtained and notable for troponin level 0.53, BNP 3595, and acute leukocytosis/thrombocytosis. Cardiology was consulted emergently by ED attending, with patient being transported to Cath Lab for immediate investigation possible NSTEMI. Patient was noted to have nonobstructive CAD. She was noted to have cardiomyopathy. Subjective:   She continues to have some intermittent chest pain in left side. No nausea or vomiting.     Medications:  Reviewed    Infusion Medications   Scheduled Medications    metoprolol succinate  25 mg Oral Daily    DULoxetine  60 mg Oral Daily    saccharomyces boulardii  250 mg Oral Daily    folic acid  1 mg Oral Daily    gabapentin  300 mg Oral 4x Daily    levothyroxine  50 mcg Oral Daily    pantoprazole  40 mg Oral Daily    losartan  50 mg Oral BID    budesonide-formoterol  2 puff Inhalation BID    tiotropium  2 puff Inhalation Daily    atorvastatin  20 mg Oral Daily    guaiFENesin  600 mg Oral BID    sodium chloride flush  10 mL Intravenous 2 times per day    aspirin  81 mg Oral Daily     PRN Meds: benzonatate, acetaminophen, traMADol, sodium chloride flush      Intake/Output Summary (Last 24 hours) at 3/23/2021 1400  Last data filed at 3/23/2021 0617  Gross per 24 hour   Intake 240 ml   Output    Net 240 ml       Physical Exam Performed:    BP (!) 153/89   Pulse 89   Temp 97.4 °F (36.3 °C) (Axillary)   Resp 18   Ht 5' 4\" (1.626 m)   Wt 101 lb 14.4 oz (46.2 kg)   SpO2 97%   BMI 17.49 kg/m²     General appearance: No apparent distress, appears stated age and cooperative. HEENT: Pupils equal, round, and reactive to light. Conjunctivae/corneas clear. Neck: Supple, with full range of motion. No jugular venous distention. Trachea midline. Respiratory:  Normal respiratory effort. Clear to auscultation, bilaterally without Rales/Wheezes/Rhonchi. Cardiovascular: Regular rate and rhythm with normal S1/S2 without murmurs, rubs or gallops. Abdomen: Soft, non-tender, non-distended with normal bowel sounds. Musculoskeletal: No clubbing, cyanosis or edema bilaterally. Full range of motion without deformity. Skin: Skin color, texture, turgor normal.  No rashes or lesions. Neurologic:  Neurovascularly intact without any focal sensory/motor deficits. Cranial nerves: II-XII intact, grossly non-focal.  Psychiatric: Alert and oriented, thought content appropriate, normal insight      Labs:   Recent Labs     03/21/21  1234 03/22/21  0505   WBC 19.0* 16.0*   HGB 13.5 12.2   HCT 41.4 37.7   * 490*     Recent Labs     03/21/21  1234 03/22/21  0505 03/23/21  1004    142 134*   K 3.8 3.8 3.5    107 96*   CO2 27 27 28   BUN 9 7 4*   CREATININE 1.2* 0.6 0.7   CALCIUM 10.7* 9.6 10.1     Recent Labs     03/21/21  1234   AST 27   ALT 16   BILITOT 0.4   ALKPHOS 112     No results for input(s): INR in the last 72 hours. Recent Labs     03/21/21  1234 03/21/21  1558 03/21/21  2239   TROPONINI 0.53* 0.46* 0.30*       Urinalysis:      Lab Results   Component Value Date    NITRU Negative 04/10/2020    WBCUA 0-2 04/10/2020    BACTERIA Rare 04/10/2020    RBCUA 0-2 04/10/2020    BLOODU Negative 04/10/2020    SPECGRAV 1.015 04/10/2020    GLUCOSEU Negative 04/10/2020       Radiology:  CT CHEST PULMONARY EMBOLISM W CONTRAST   Final Result   1. Unchanged 2-3 mm solid right pulmonary nodules.       RECOMMENDATION:   Per ACR Lung-RADS Version 1.1      Category 2, Benign appearance or behavior. Management:  Continue annual lung   screening with LDCT in 12 months. (probability of malignancy <1%). LUNGRADS ASSESSMENT      XR CHEST PORTABLE   Final Result   Emphysematous changes are again seen. Increased opacity is seen at the   medial left apex, potentially airspace disease or scarring. Follow-up to   resolution is suggested. Assessment/Plan:    Active Hospital Problems    Diagnosis    NSTEMI (non-ST elevated myocardial infarction) ([facility] [I21.4]    Gastroesophageal reflux disease without esophagitis [K21.9]    Acquired hypothyroidism [E03.9]    Moderate COPD (chronic obstructive pulmonary disease) ([facility] [J44.9]    [facility]itry[facility] [E88.01]    Essential hypertension [I10]     Non-STEMI-likely due to demand ischemia. No significant stenotic lesions on cardiac cath. LV gram representative of apical ballooning versus variant plaque rupture versus vasospastic event. Plan for cardiac MRI on an outpatient basis  -Echo shows EF 55-60%,Mild apical hypokinesis,Right ventricular systolic function is impaired. Patient is to be started on goal-directed medical therapy CHF per cardiology team  -LDL elevated,increased lipitor to 20mg daily. Elevated D-dimer, CTA PE negative.     Pulmonary emphysema with recent tobacco cessation/antitrypsin deficiency  Continue inhalers  -Mucus plugging noted on CTA chest.  Pulmonary toilet. -Procalcitonin within normal limits and no pneumonia thus DC antibiotics. -Wean oxygen as tolerated     GERD  Continue PPI     Hypothyroidism  TSH and free T4 within normal limits  Continue levothyroxine    Leukocytosis-likely reactive  -Trend WBC. -Follow-up UA  -No pneumonia on CTA chest    Functional adrenal adenoma  -Patient to continue outpatient follow-up     Unchanged 2-3 mm solid right pulmonary nodules  -FU Dr Pride for pulm nodules. I informed patient about nodules and stressed follow up is needed with Pulmonology for surveillance. She verbalized understanding and would talk to PCP. DVT Prophylaxis: Lovenox  Diet: DIET CARDIAC; Dietary Nutrition Supplements: Frozen Oral Supplement  Code Status: Full Code    PT/OT Eval Status: as needed    Dispo -Home today after UA.     Mc Odell MD

## 2021-03-23 NOTE — DISCHARGE SUMMARY
Hospital Medicine Discharge Summary    Patient ID: Linda Webster      Patient's PCP: Marci Curran DO    Admit Date: 3/21/2021     Discharge Date: 3/23/2021      Admitting Physician: Mckayla Johnson MD     Discharge Physician: Cristopher Whitt MD     Discharge Diagnoses: Active Hospital Problems    Diagnosis    [facility] [I21.4]    Gastroesophageal reflux disease without esophagitis [K21.9]    Acquired hypothyroidism [E03.9]    Moderate COPD (ch[facility] obstructive pulmonary disease) ([facility]) [J44.9]    Heterozygous alpha 1-antitrypsin deficiency ([facility] [E88.01]    Essential hypertension [I10]       The patient was seen and examined on day of discharge and this discharge summary is in conjunction with any daily progress note from day of discharge. Hospital Course:   62 y.o. female who presented with substernal pleuritic chest pain with associated tachycardia increasing in severity for 1 weeks duration.   She recently discontinued her tobacco abuse approximately 1 month ago due to concerns for COPD with known antitrypsin deficiency.    She reported uncontrolled hypertension ongoing for some time believed to be related to adrenal adenoma with ins metanephrine production.   Labs were obtained and notable for troponin level 0.53, BNP 3595, and acute leukocytosis/thrombocytosis.  Cardiology was consulted emergently by ED attending, with patient being transported to Cath Lab for immediate investigation possible NSTEMI.    Patient was noted to have nonobstructive CAD. She was noted to have cardiomyopathy. Non-STEMI-likely due to demand ischemia. No significant stenotic lesions on cardiac cath. LV gram representative of apical ballooning versus variant plaque rupture versus vasospastic event. Plan for cardiac MRI on an outpatient basis  -Echo showed EF 55-60%,Mild apical hypokinesis,Right ventricular systolic function is impaired.   Patient is to be started on goal-directed medical therapy CHF per cardiology team  -LDL elevated,increased lipitor to 20mg daily. Elevated D-dimer, CTA PE negative.     Pulmonary emphysema with recent tobacco cessation/antitrypsin deficiency  Continue inhalers  -Mucus plugging noted on CTA chest.  Pulmonary toilet. -Procalcitonin within normal limits and no pneumonia thus DC antibiotics. -Wean off oxygen    GERD  Continued PPI     Hypothyroidism  TSH and free T4 within normal limits  Continued levothyroxine     Leukocytosis-likely reactive  -WBC trended down  -Follow-up UA  -No pneumonia on CTA chest     Functional adrenal adenoma  -Patient to continue outpatient follow-up     Unchanged 2-3 mm solid right pulmonary nodules  -FU Dr Pride for pulm nodules. I informed patient about nodules and stressed follow up is needed with Pulmonology for surveillance. She verbalized understanding and would talk to PCP. Physical Exam Performed:     BP (!) 151/88   Pulse 88   Temp 98.8 °F (37.1 °C) (Oral)   Resp 18   Ht 5' 4\" (1.626 m)   Wt 101 lb 14.4 oz (46.2 kg)   SpO2 98%   BMI 17.49 kg/m²   General appearance: No apparent distress, appears stated age and cooperative. HEENT: Pupils equal, round, and reactive to light. Conjunctivae/corneas clear. Neck: Supple, with full range of motion. No jugular venous distention. Trachea midline. Respiratory:  Normal respiratory effort. Clear to auscultation, bilaterally without Rales/Wheezes/Rhonchi. Cardiovascular: Regular rate and rhythm with normal S1/S2 without murmurs, rubs or gallops. Abdomen: Soft, non-tender, non-distended with normal bowel sounds. Musculoskeletal: No clubbing, cyanosis or edema bilaterally. Full range of motion without deformity. Skin: Skin color, texture, turgor normal.  No rashes or lesions. Neurologic:  Neurovascularly intact without any focal sensory/motor deficits.  Cranial nerves: II-XII intact, grossly non-focal.  Psychiatric: Alert and oriented, thought content appropriate, normal insight      Labs: For convenience and continuity at follow-up the following most recent labs are provided:      CBC:    Lab Results   Component Value Date    WBC 16.0 03/22/2021    HGB 12.2 03/22/2021    HCT 37.7 03/22/2021     03/22/2021       Renal:    Lab Results   Component Value Date     03/23/2021    K 3.5 03/23/2021    CL 96 03/23/2021    CO2 28 03/23/2021    BUN 4 03/23/2021    CREATININE 0.7 03/23/2021    CALCIUM 10.1 03/23/2021    PHOS 2.8 03/08/2021         Significant Diagnostic Studies    Radiology:   CT CHEST PULMONARY EMBOLISM W CONTRAST   Final Result   1. Unchanged 2-3 mm solid right pulmonary nodules. RECOMMENDATION:   Per ACR Lung-RADS Version 1.1      Category 2, Benign appearance or behavior. Management:  Continue annual lung   screening with LDCT in 12 months. (probability of malignancy <1%). LUNGRADS ASSESSMENT      XR CHEST PORTABLE   Final Result   Emphysematous changes are again seen. Increased opacity is seen at the   medial left apex, potentially airspace disease or scarring. Follow-up to   resolution is suggested. Consults:     IP CONSULT TO CARDIOLOGY  IP CONSULT TO CARDIAC REHAB    Disposition:  Home     Condition at Discharge: Stable    Discharge Instructions/Follow-up:    Te[facility] Que[address]o [place] [address]  [phone]    Schedule an appointment as soon as possible for a visit in 1 week  after hospital care    Niya Mcdermott MD  [address] [place] [address]  [phone]    Schedule an appointment as soon as possible for a visit in 3 weeks  for chest pain       Code Status:  Prior     Activity: activity as tolerated    Diet: cardiac diet      Discharge Medications:     Discharge Medication List as of 3/23/2021  5:02 PM           Details   traMADol (ULTRAM) 50 MG tablet Take 1 tablet by mouth every 8 hours as needed for Pain for up to 5 days. , Disp-15 disintegrating tablet Place 8 mg under the tongue every 8 hours as needed for Nausea or VomitingHistorical Med             Time Spent on discharge is more than 30 minutes in the examination, evaluation, counseling and review of medications and discharge plan. Signed:    Sadaf Alvarez MD   4/20/2021      Thank you Alena Ricketts DO for the opportunity to be involved in this patient's care. If you have any questions or concerns please feel free to contact me at [phone].

## 2021-03-23 NOTE — DISCHARGE SUMMARY
Discharge orders noted. Patient's IV and telemetry box removed without complications. Discharge paperwork reviewed with patient, verbalized understanding. Patient discharged via private vehicle, wheeled to vehicle in wheelchair. [address] notified.

## 2021-03-24 ENCOUNTER — TELEPHONE (OUTPATIENT)
Dept: PULMONOLOGY | Age: 61
End: 2021-03-24

## 2021-03-24 LAB — POC ACT LR: 163 SEC

## 2021-03-24 NOTE — TELEPHONE ENCOUNTER
Pt called requesting to schedule a hospital follow up. Pt states that she was told she has heart failure caused by her lungs, and also has 2 nodules that she should follow up on. Pt asking for appt sooner than 6/8/21. Please advise. Narrative   EXAMINATION:   CTA OF THE CHEST 3/22/2021 9:00 am       TECHNIQUE:   CTA of the chest was performed after the administration of intravenous   contrast.  Multiplanar reformatted images are provided for review.  MIP   images are provided for review. Dose modulation, iterative reconstruction,   and/or weight based adjustment of the mA/kV was utilized to reduce the   radiation dose to as low as reasonably achievable.       COMPARISON:   03/21/2021 and 10/14/2020       HISTORY:   ORDERING SYSTEM PROVIDED HISTORY: Pleuritic chest pain with tachycardia: Also   assess for pneumonia   TECHNOLOGIST PROVIDED HISTORY:   Reason for exam:->Pleuritic chest pain with tachycardia: Also assess for   pneumonia   Reason for Exam: sob; Pleuritic chest pain with tachycardia: Also assess for   pneumonia   Acuity: Acute   Type of Exam: Initial   Relevant Medical/Surgical History: former smoker; copd       FINDINGS:   Pulmonary Arteries: Motion artifact degrades image quality.  There is no   acute pulmonary thromboembolus.       Mediastinum: Coronary artery calcifications are a marker of atherosclerosis. There are no enlarged thoracic lymph nodes.       Lungs/pleura: Secretions are present within the trachea with mucous plugging   in the subsegmental bilateral bronchi.  There is no pneumothorax or pleural   effusion.  Moderate to severe emphysema involves the bilateral lungs.  There   is biapical and bibasilar scarring.  No change in the 2-3 mm solid right   pulmonary nodules.       Upper Abdomen: No change in the 1.5 x 2.2 cm right adrenal adenoma.  There is   left simple midpole renal cyst.       Soft Tissues/Bones: Degenerative changes involve the thoracic spine.           Impression   1. Unchanged 2-3 mm solid right pulmonary nodules.       RECOMMENDATION:   Per ACR Lung-RADS Version 1.1       Category 2, Benign appearance or behavior.  Management:  Continue annual lung   screening with LDCT in 12 months. (probability of malignancy <1%).       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

## 2021-03-25 ENCOUNTER — TELEPHONE (OUTPATIENT)
Dept: ENDOCRINOLOGY | Age: 61
End: 2021-03-25

## 2021-03-25 LAB — PTH RELATED PEPTIDE: 2.5 PMOL/L (ref 0–3.4)

## 2021-03-26 LAB
BLOOD CULTURE, ROUTINE: NORMAL
CULTURE, BLOOD 2: NORMAL

## 2021-03-26 NOTE — TELEPHONE ENCOUNTER
Called back, spoke with Taft. Taft said that Dr. Jannie Farr is not available at this time. I left my cell phone number and office phone number to call me back.

## 2021-03-29 NOTE — TELEPHONE ENCOUNTER
Spoke with pt, informed of Dr. Ken Vang response with verbal understanding. Pt states that she will be seeking another opinion, as she was told in the hospital \"the reason for the heart having trouble pumping is being caused by my lungs\".

## 2021-04-02 ENCOUNTER — HOSPITAL ENCOUNTER (OUTPATIENT)
Age: 61
Discharge: HOME | End: 2021-04-02
Payer: MEDICARE

## 2021-04-02 DIAGNOSIS — E03.9 ACQUIRED HYPOTHYROIDISM: ICD-10-CM

## 2021-04-02 LAB — TSH SERPL DL<=0.05 MIU/L-ACNC: 1.81 UIU/ML (ref 0.27–4.2)

## 2021-04-02 PROCEDURE — 84443 ASSAY THYROID STIM HORMONE: CPT

## 2021-04-02 PROCEDURE — 36415 COLL VENOUS BLD VENIPUNCTURE: CPT

## 2021-04-02 PROCEDURE — 82024 ASSAY OF ACTH: CPT

## 2021-04-05 ENCOUNTER — TELEPHONE (OUTPATIENT)
Dept: ENDOCRINOLOGY | Age: 61
End: 2021-04-05

## 2021-04-05 DIAGNOSIS — E27.8 ADRENAL NODULE (HCC): Primary | ICD-10-CM

## 2021-04-05 DIAGNOSIS — I10 ESSENTIAL HYPERTENSION: ICD-10-CM

## 2021-04-05 NOTE — TELEPHONE ENCOUNTER
I spoke with Dr. Mihaela Pierre. Metanephrine and normetanephrine levels in 24-hour urine not high enough of to definitely diagnose pheochromocytoma. The levels are in indeterminate range. We will repeat 24-hour urine in 2 weeks. Also await results of ACTH. Patient will do salivary cortisol in 2 weeks. Obtain abdominal MRI to better evaluate adrenal gland for pheochromocytoma. Spoke with patient. She recently had cardiovascular event in is currently on beta-blocker. Informed her about lab results. Also informed about increase in her adrenal nodule.

## 2021-04-06 ENCOUNTER — HOSPITAL ENCOUNTER (OUTPATIENT)
Dept: WOMENS IMAGING | Age: 61
Discharge: HOME | End: 2021-04-06
Payer: MEDICARE

## 2021-04-06 DIAGNOSIS — E83.52 HYPERCALCEMIA: ICD-10-CM

## 2021-04-06 DIAGNOSIS — Z78.0 MENOPAUSE: ICD-10-CM

## 2021-04-06 PROCEDURE — 77080 DXA BONE DENSITY AXIAL: CPT

## 2021-04-07 ENCOUNTER — OFFICE VISIT (OUTPATIENT)
Dept: CARDIOLOGY CLINIC | Age: 61
End: 2021-04-07
Payer: MEDICARE

## 2021-04-07 VITALS
BODY MASS INDEX: 17.24 KG/M2 | OXYGEN SATURATION: 96 % | HEIGHT: 64 IN | HEART RATE: 84 BPM | DIASTOLIC BLOOD PRESSURE: 78 MMHG | WEIGHT: 101 LBS | SYSTOLIC BLOOD PRESSURE: 130 MMHG

## 2021-04-07 DIAGNOSIS — I10 ESSENTIAL HYPERTENSION: ICD-10-CM

## 2021-04-07 DIAGNOSIS — I42.8 NON-ISCHEMIC CARDIOMYOPATHY (HCC): ICD-10-CM

## 2021-04-07 DIAGNOSIS — I21.4 NSTEMI (NON-ST ELEVATED MYOCARDIAL INFARCTION) (HCC): Primary | ICD-10-CM

## 2021-04-07 LAB — ADRENOCORTICOTROPIC HORMONE: 17 PG/ML (ref 6–58)

## 2021-04-07 PROCEDURE — 3017F COLORECTAL CA SCREEN DOC REV: CPT | Performed by: INTERNAL MEDICINE

## 2021-04-07 PROCEDURE — 99214 OFFICE O/P EST MOD 30 MIN: CPT | Performed by: INTERNAL MEDICINE

## 2021-04-07 PROCEDURE — 1111F DSCHRG MED/CURRENT MED MERGE: CPT | Performed by: INTERNAL MEDICINE

## 2021-04-07 PROCEDURE — 4004F PT TOBACCO SCREEN RCVD TLK: CPT | Performed by: INTERNAL MEDICINE

## 2021-04-07 PROCEDURE — G8419 CALC BMI OUT NRM PARAM NOF/U: HCPCS | Performed by: INTERNAL MEDICINE

## 2021-04-07 PROCEDURE — G8427 DOCREV CUR MEDS BY ELIG CLIN: HCPCS | Performed by: INTERNAL MEDICINE

## 2021-04-07 RX ORDER — METOPROLOL SUCCINATE 50 MG/1
50 TABLET, EXTENDED RELEASE ORAL DAILY
Qty: 90 TABLET | Refills: 3 | Status: ON HOLD | OUTPATIENT
Start: 2021-04-07 | End: 2021-07-10 | Stop reason: HOSPADM

## 2021-04-07 RX ORDER — LOSARTAN POTASSIUM 50 MG/1
50 TABLET ORAL 2 TIMES DAILY
Qty: 180 TABLET | Refills: 3 | Status: SHIPPED | OUTPATIENT
Start: 2021-04-07 | End: 2021-05-10 | Stop reason: SDUPTHER

## 2021-04-07 NOTE — PROGRESS NOTES
[address]   Cardiac Consultation    Referring Provider:  Kaylynn Koenig DO     Chief Complaint   Patient presents with    Follow-Up from [facility]    Chest Pain    Hypertension     causing headaches in the evening         History of Present Illness:   Amarilys Jiang is a 62 y.o. female who is here today for hospital follow up S/P left heart cath. She has a past medical history of HTN, thyroid disease, right adrenal adenoma (follows Dr. Vasile Mccauley), s/p splenectomy, and moderate COPD. She was admitted to the hospital from 3/21-3/23/2021 due to chest pain and SOB. Troponin's found to be 0.53 and he was taken to the cath lab. No angiographic CAD and no culprit lesion found, LV gram has very small area of apical ballooning. An echocardiogram from   3/23/2021 showed an EF of 55-60%. Today she states she continues to have chest pain daily which has not changed since she left the hospital.  She states she has days where she feels like she is not able to get out of bed. Her blood pressure runs 160/90's later in the evenings. She takes her medications in the mornings. Blood pressure is lower in the mornings. She has been having headaches when her blood pressure is elevated. Patient currently denies any weight gain, edema, palpitations, shortness of breath, dizziness, and syncope. Past Medical History:   has a past medical history of Back pain, COPD (chronic obstructive pulmonary disease) ([address] Fatigue, Hypertension, Syncope and collapse, Thyroid disease, and Ulcerative colitis, unspecified, without complications ([address] Surgical History:   has a past surgical history that includes Splenectomy (2019); Tonsillectomy; back surgery; and Hysterectomy, vaginal (2000). Social History:   reports that she has been smoking cigarettes. She has a 3.20 pack-year smoking history. She has never used smokeless tobacco. She reports previous drug use. Drug: Marijuana. She reports that she does not drink alcohol. Family History:  family history includes Heart Disease in her father; High Blood Pressure in her sister. Home Medications:  Prior to Admission medications    Medication Sig Start Date End Date Taking? Authorizing Provider   aspirin 81 MG chewable tablet Take 1 tablet by mouth daily 3/24/21 4/23/21 Yes Scarlett Hernández MD   atorvastatin (LIPITOR) 20 MG tablet Take 1 tablet by mouth daily 3/23/21 4/22/21 Yes Scarlett Hernández MD   metoprolol succinate (TOPROL XL) 25 MG extended release tablet Take 1 tablet by mouth daily 3/23/21 4/22/21 Yes Scarlett Hernández MD   Cholecalciferol (VITAMIN D) 50 MCG (2000 UT) CAPS capsule Take by mouth   Yes Historical Provider, MD   benzonatate (TESSALON) 100 MG capsule Take 100 mg by mouth 3 times daily as needed for Cough   Yes Historical Provider, MD   losartan (COZAAR) 50 MG tablet Take 50 mg by mouth 2 times daily   Yes Historical Provider, MD   albuterol sulfate  (90 Base) MCG/ACT inhaler Inhale 1 puff into the lungs as needed for Wheezing or Shortness of Breath 11/25/20  Yes Juni Haley MD   pantoprazole (PROTONIX) 40 MG tablet Take 40 mg by mouth daily 10/16/20  Yes Historical Provider, MD   FLORASTOR 250 MG capsule Take 250 mg by mouth daily 9/17/20  Yes Historical Provider, MD   gabapentin (NEURONTIN) 300 MG capsule Take 1 capsule by mouth 4 times daily for 30 days.  11/5/20  Yes Irene Abdul MD   aspirin-acetaminophen-caffeine (EXCEDRIN MIGRAINE) 593-644-19 MG per tablet Take 1 tablet by mouth every 6 hours as needed for Headaches   Yes Historical Provider, MD   fluticasone-umeclidin-vilant (TRELEGY ELLIPTA) 100-62.5-25 MCG/INH AEPB Inhale 1 puff into the lungs daily 6/23/20  Yes Juni Haley MD   albuterol (PROVENTIL) (2.5 MG/3ML) 0.083% nebulizer solution Take 3 mLs by nebulization every 6 hours as needed for Wheezing 6/23/20  Yes Juni Haley MD   DULoxetine (CYMBALTA) 60 MG extended release capsule Take 60 mg by mouth    Yes Historical Provider, MD folic acid (FOLVITE) 1 MG tablet Take 1 tablet by mouth daily 11/11/19  Yes Gonzalo Sotelo MD   Acetaminophen (TYLENOL) 325 MG CAPS Take 975 mg by mouth as needed 6/15/19  Yes Historical Provider, MD   levothyroxine (SYNTHROID) 50 MCG tablet Take 50 mcg by mouth Daily   Yes Historical Provider, MD   ondansetron (ZOFRAN-ODT) 8 MG TBDP disintegrating tablet Place 8 mg under the tongue every 8 hours as needed for Nausea or Vomiting   Yes Historical Provider, MD        Allergies:  Patient has no known allergies. Review of Systems:   · Constitutional: there has been no unanticipated weight loss. There's been no change in energy level, sleep pattern, or activity level. · Eyes: No visual changes or diplopia. No scleral icterus. · ENT: No Headaches, hearing loss or vertigo. No mouth sores or sore throat. · Cardiovascular: Reviewed in HPI  · Respiratory: No cough or wheezing, no sputum production. No hematemesis. · Gastrointestinal: No abdominal pain, appetite loss, blood in stools. No change in bowel or bladder habits. · Genitourinary: No dysuria, trouble voiding, or hematuria. · Musculoskeletal:  No gait disturbance, weakness or joint complaints. · Integumentary: No rash or pruritis. · Neurological: No headache, diplopia, change in muscle strength, numbness or tingling. No change in gait, balance, coordination, mood, affect, memory, mentation, behavior. · Psychiatric: No anxiety, no depression. · Endocrine: No malaise, fatigue or temperature intolerance. No excessive thirst, fluid intake, or urination. No tremor. · Hematologic/Lymphatic: No abnormal bruising or bleeding, blood clots or swollen lymph nodes. · Allergic/Immunologic: No nasal congestion or hives.     Physical Examination:    Vitals:    04/07/21 1416   BP: 130/78   Pulse: 84   SpO2: 96%        Constitutional and General Appearance: NAD   Respiratory:  · Normal excursion and expansion without use of accessory muscles  · Resp Auscultation: Normal breath sounds without dullness  Cardiovascular:  · The apical impulses not displaced  · Heart tones are crisp and normal  · Cervical veins are not engorged  · The carotid upstroke is normal in amplitude and contour without delay or bruit  · Normal S1S2, No S3, No Murmur  · Peripheral pulses are symmetrical and full  · There is no clubbing, cyanosis of the extremities. · No edema  · Femoral Arteries: 2+ and equal  · Pedal Pulses: 2+ and equal   Abdomen:  · No masses or tenderness  · Liver/Spleen: No Abnormalities Noted  Neurological/Psychiatric:  · Alert and oriented in all spheres  · Moves all extremities well  · Exhibits normal gait balance and coordination  · No abnormalities of mood, affect, memory, mentation, or behavior are noted    Stress test 4/2019 [place]   Normal      Cardiac CTA 9/2019  No significant coronary artery stenosis is identified.   Right coronary dominance.   Left ventricular ejection fraction 70.9%. Advanced pattern of emphysema.      Echocardiogram 9/26/19  Summary   Technically difficult examination. Low parasternal window secondary to COPD.   LV systolic function is normal with EF estimated at 55%.   No regional wall motion abnormalities.   Normal left ventricular diastolic filling pressure.   Mild mitral regurgitation. Left heart cath 3/21/2021  Left Heart Cath:    Findings   LVEDP 2   LVEF  60% hyperdynamic   LV wall motion  very hyperdynamic in the basal and mid segments with hypokinesis of the apical anterior apex and inferior and a possible variant of apical ballooning pattern   Gradient across AV  none   Mitral regurg  none      Coronary Angiogram:  Artery Findings/Result   LM  the left main, separate takeoff of LAD and circumflex   LAD  no angiographic CAD   LCx  no angiographic CAD         RCA  dominant, no angiographic CAD            Assessment/Plan  1. No angiographic CAD and no culprit lesion found. 2.  Nonischemic cardiomyopathy  3.   Appears to be dry with LVEDP of 2                -Given this fact and nitroglycerin the ER this probably explains her transient hypotension                -We will start IV fluids at 200 an hour for 5 hours   4. LV gram has very small area of apical ballooning with hyperkinesis of basal and mid segments. Given normal LAD anatomy difficult to say if this represents an apical ballooning variant versus plaque rupture event that has self healed versus possible vasospasm event.                -We will put patient on goal-directed medical therapy for CHF                -Get cardiac MRI as an outpatient to evaluate for infarct versus edema    Echocardiogram 3/23/2021  Summary   Definity is used for myocardial border enhancement. Left ventricular systolic function is normal with a visually estimated   ejection fraction of 55-60%. Mild apical hypokinesis. The left ventricle is small in size with normal wall thickness. Grade I diastolic dysfunction with normal LV pressure. Right ventricular systolic function is impaired. Mild mitral and aortic regurgitation. Inadequate tricuspid valve regurgitation to estimate systolic pulmonary   artery pressure      Assessment:   Cardiomyopathy - mild apical ballooning noted at cath. Concern for transient ischemic event or NICMP and possible edema. Syncope - resolved post splenectomy 6/2019. Was believed to have had splenic laceration that was undiagnosed for weeks. Orthostatic hypotension - contributing to syncopal episodes  Right adrenal adenoma (follows Dr. Shasta Felty),  SOB due to smoking, pulmonary  Tachycardia  - intermittent, HR normal today   Hypertension - normal today but reports freq elev at home   COPD   Former smoker- she stopped this past March. Continued cessation discussed.    History of splenectomy in 6/2019    Plan:  Continued smoking cessation encouraged   Cardiac MRI- at [facility] on a Thursday or Friday     Increase Losartan to 50 mg daily   Increase Metoprolol 50 mg daily Continue other medications   Check blood pressure at home weekly  Regular exercise and following a healthy diet encouraged   Follow up with me in one month        Scribe's attestation: This note was scribed in the presence of Dr. Nemo Cason M.D. By Lita Nicolas RN    The scribes documentation has been prepared under my direction and personally reviewed by me in its entirety. I confirm that the note above accurately reflects all work, treatment, procedures, and medical decision making performed by me. Dr. Nemo Cason MD      Thank you for allowing me to participate in the care of this individual.      Zaira Molina.  David Downey M.D., Monika Burton

## 2021-04-07 NOTE — PATIENT INSTRUCTIONS
Plan:  Smoking cessation encouraged   Cardiac MRI- at [facility] on a Thursday or Friday     Increase Losartan to 50 mg daily   Increase Metoprolol 50 mg daily   Continue other medications   Check blood pressure at home weekly  Regular exercise and following a healthy diet encouraged   Follow up with me in one month  Your provider has ordered testing for further evaluation. An order/prescription has been included in your paper work.  To schedule outpatient testing, contact Central Scheduling by calling [address] [place] [address] ([phone]).

## 2021-04-19 NOTE — TELEPHONE ENCOUNTER
Spoke with patient and she informs that her bp has been running 180/100/s for the past 3 days and the abdominal pain has been going on a little longer. Patient states that she has been taking her Losartan BID.      Please advise

## 2021-04-21 ENCOUNTER — TELEPHONE (OUTPATIENT)
Dept: ENDOCRINOLOGY | Age: 61
End: 2021-04-21

## 2021-04-21 DIAGNOSIS — H57.9 EYE DISORDER: ICD-10-CM

## 2021-04-21 DIAGNOSIS — E27.8 ADRENAL NODULE (HCC): Primary | ICD-10-CM

## 2021-04-21 DIAGNOSIS — R79.89 ELEVATED PLASMA METANEPHRINES: ICD-10-CM

## 2021-04-21 NOTE — TELEPHONE ENCOUNTER
Spoke with patient. She is having abdominal pain on the left side. Adrenal nodule is on the right and then likely is the cause of discomfort. I advised patient to contact family doctor for evaluation of her abdominal pain. MRI was ordered and on 4/6/2020 of the abdomen. Patient stated that nobody called her to schedule. Asked patient to call scheduling and clarify why it is not scheduled. I asked to let us know if we can help. Ordered 24-hour urine, but asked patient to do it if she is not having UTI.

## 2021-04-21 NOTE — TELEPHONE ENCOUNTER
Laurie with [facility] scheduling needs a STAT BUN and CREATININE LAB test added to epic and the MRI of the Abdomen order needs to be updated to say With and Without Contrast and to please add that to epic and she will abbe the pt to schedule

## 2021-04-22 NOTE — TELEPHONE ENCOUNTER
I left a message for Laurie that they need clarification regarding requested x-ray for the eye injury. I gave my cell phone to call me back.   If patient had eye injury, she needs to take care of that first.

## 2021-04-22 NOTE — TELEPHONE ENCOUNTER
Laurie from [facility] scheduling [phone] called back, patient has had a recent eye injury and metal fragments could be present. They need an Orbitol x-ray of the left eye placed in epic, so she can have her x-ray prior to her MRI.

## 2021-04-23 NOTE — TELEPHONE ENCOUNTER
Laurie from scheduling called and states that since Dr. Latoya Dominguez ordered the MRI she then has to be the one to order the [facility] x-ray if the left eye, prior to the MRI. Radiology at [facility] can guide her how to order.  [phone] No significant past surgical history

## 2021-04-26 ENCOUNTER — HOSPITAL ENCOUNTER (OUTPATIENT)
Dept: MRI IMAGING | Age: 61
Discharge: HOME | End: 2021-04-26
Payer: MEDICARE

## 2021-04-26 ENCOUNTER — HOSPITAL ENCOUNTER (OUTPATIENT)
Age: 61
Discharge: HOME | End: 2021-04-26
Payer: MEDICARE

## 2021-04-26 DIAGNOSIS — R79.89 ELEVATED PLASMA METANEPHRINES: ICD-10-CM

## 2021-04-26 DIAGNOSIS — E27.8 ADRENAL NODULE (HCC): ICD-10-CM

## 2021-04-26 LAB
ALBUMIN SERPL-MCNC: 4.3 G/DL (ref 3.4–5)
ANION GAP SERPL CALCULATED.3IONS-SCNC: 11 MMOL/L (ref 3–16)
BUN BLDV-MCNC: 7 MG/DL (ref 7–20)
CALCIUM SERPL-MCNC: 10.6 MG/DL (ref 8.3–10.6)
CHLORIDE BLD-SCNC: 100 MMOL/L (ref 99–110)
CO2: 29 MMOL/L (ref 21–32)
CREAT SERPL-MCNC: 0.7 MG/DL (ref 0.6–1.1)
GFR AFRICAN AMERICAN: >60
GFR NON-AFRICAN AMERICAN: >60
GLUCOSE BLD-MCNC: 118 MG/DL (ref 70–99)
PHOSPHORUS: 3 MG/DL (ref 2.5–4.9)
POTASSIUM SERPL-SCNC: 4.3 MMOL/L (ref 3.5–5.1)
SODIUM BLD-SCNC: 140 MMOL/L (ref 136–145)

## 2021-04-26 PROCEDURE — 74183 MRI ABD W/O CNTR FLWD CNTR: CPT

## 2021-04-26 PROCEDURE — A9579 GAD-BASE MR CONTRAST NOS,1ML: HCPCS

## 2021-04-26 PROCEDURE — 36415 COLL VENOUS BLD VENIPUNCTURE: CPT

## 2021-04-26 PROCEDURE — 80069 RENAL FUNCTION PANEL: CPT

## 2021-04-26 PROCEDURE — 6360000004 HC RX CONTRAST MEDICATION

## 2021-04-26 RX ADMIN — GADOTERIDOL 9 ML: 279.3 INJECTION, SOLUTION INTRAVENOUS at 14:33

## 2021-04-30 ENCOUNTER — HOSPITAL ENCOUNTER (OUTPATIENT)
Dept: MRI IMAGING | Age: 61
Discharge: HOME | End: 2021-04-30
Payer: MEDICARE

## 2021-04-30 DIAGNOSIS — I42.8 NON-ISCHEMIC CARDIOMYOPATHY (HCC): ICD-10-CM

## 2021-04-30 LAB
LV EF: 69 %
LVEF MODALITY: NORMAL

## 2021-04-30 PROCEDURE — 75561 CARDIAC MRI FOR MORPH W/DYE: CPT | Performed by: INTERNAL MEDICINE

## 2021-04-30 PROCEDURE — 75565 CARD MRI VELOC FLOW MAPPING: CPT | Performed by: INTERNAL MEDICINE

## 2021-04-30 PROCEDURE — 75565 CARD MRI VELOC FLOW MAPPING: CPT

## 2021-04-30 PROCEDURE — A9585 GADOBUTROL INJECTION: HCPCS | Performed by: INTERNAL MEDICINE

## 2021-04-30 PROCEDURE — 6360000004 HC RX CONTRAST MEDICATION: Performed by: INTERNAL MEDICINE

## 2021-04-30 RX ADMIN — GADOBUTROL 8 ML: 604.72 INJECTION INTRAVENOUS at 10:31

## 2021-05-04 ENCOUNTER — HOSPITAL ENCOUNTER (OUTPATIENT)
Age: 61
Setting detail: SPECIMEN
Discharge: HOME | End: 2021-05-04
Payer: MEDICARE

## 2021-05-04 ENCOUNTER — HOSPITAL ENCOUNTER (OUTPATIENT)
Dept: CT IMAGING | Age: 61
Discharge: HOME | End: 2021-05-04
Payer: MEDICARE

## 2021-05-04 DIAGNOSIS — I10 ESSENTIAL HYPERTENSION: ICD-10-CM

## 2021-05-04 DIAGNOSIS — E27.8 ADRENAL NODULE (HCC): ICD-10-CM

## 2021-05-04 DIAGNOSIS — R10.32 LLQ PAIN: ICD-10-CM

## 2021-05-04 PROCEDURE — 82384 ASSAY THREE CATECHOLAMINES: CPT

## 2021-05-04 PROCEDURE — 83835 ASSAY OF METANEPHRINES: CPT

## 2021-05-04 PROCEDURE — 6360000004 HC RX CONTRAST MEDICATION: Performed by: INTERNAL MEDICINE

## 2021-05-04 PROCEDURE — 74177 CT ABD & PELVIS W/CONTRAST: CPT

## 2021-05-04 RX ADMIN — IOHEXOL 50 ML: 240 INJECTION, SOLUTION INTRATHECAL; INTRAVASCULAR; INTRAVENOUS; ORAL at 15:26

## 2021-05-04 RX ADMIN — IOPAMIDOL 75 ML: 755 INJECTION, SOLUTION INTRAVENOUS at 15:26

## 2021-05-09 LAB
CATE U INT: ABNORMAL
CREATININE 24 HOUR URINE: 794 MG/D (ref 500–1400)
CREATININE URINE: 69 MG/DL
DOPAMINE (G CRT): 313 UG/G CRT (ref 0–250)
DOPAMINE 24 HOUR URINE: 248 UG/D (ref 71–485)
DOPAMINE, (UG/L): 216 UG/L
EPINEPHRINE (G CRT): 9 UG/G CRT (ref 0–20)
EPINEPHRINE 24 HOUR URINE: 7 UG/D (ref 1–14)
EPINEPHRINE, (UG/L): 6 UG/L
HOURS COLLECTED: 24
METANEPHRINE INTREP URINE: ABNORMAL
METANEPHRINE UG/G CRE: 219 UG/G CRT (ref 0–300)
METANEPHRINE, UR-PER VOL: 151 UG/L
METANEPHRINES URINE: 174 UG/D (ref 36–229)
NOREPINEPH (G CRT): 104 UG/G CRT (ref 0–45)
NOREPINEPHRINE 24 HOUR URINE: 83 UG/D (ref 14–120)
NOREPINEPHRINE, (UG/L): 72 UG/L
NORMETANEPHRINE 24 HOUR URINE: 567 UG/D (ref 95–650)
NORMETANEPHRINE, (G/CRT): 714 UG/G CRT (ref 0–400)
NORMETANEPHRINES, NMOL/L: 493 UG/L
URINE TOTAL VOLUME: 1150

## 2021-05-09 NOTE — PROGRESS NOTES
[facility]   Cardiac Consultation    Referring Provider:  Byron Bailey DO     Chief Complaint   Patient presents with    Follow-up        History of Present Illness:   Missy Kaufman is a 62 y.o. female who is here today for a past medical history of HTN, thyroid disease, right adrenal adenoma (follows Dr. Guss Holstein), s/p splenectomy, and moderate COPD. She was admitted to the hospital from 3/21-3/23/2021 due to chest pain and SOB. Troponin's found to be 0.53 and he was taken to the cath lab. No angiographic CAD and no culprit lesion found, LV gram has very small area of apical ballooning. An echocardiogram from   3/23/2021 showed an EF of 55-60%. At her last visit her Losartan was increased to 50 mg daily, Metoprolol was increased to 50 mg daily. Cardiac MRI from 4/30/2021 showed findings are consistent with resolving stress induced  cardiomyopathy    Today she states she has been feeling well overall. She is tolerating her medications and taking them as prescribed. She checks her blood pressure at home and it has been as high as 200's for SBP in the afternoons. Patient currently denies any weight gain, edema, palpitations, chest pain, shortness of breath, dizziness, and syncope. Past Medical History:   has a past medical history of Back pain, COPD (chronic obstructive pulmonary disease) ([address] Fatigue, Hypertension, Syncope and collapse, Thyroid disease, and Ulcerative colitis, unspecified, without complications ([address] Surgical History:   has a past surgical history that includes Splenectomy (2019); Tonsillectomy; back surgery; and Hysterectomy, vaginal (2000). Social History:   reports that she has been smoking cigarettes. She has a 3.20 pack-year smoking history. She has never used smokeless tobacco. She reports previous drug use. Drug: Marijuana. She reports that she does not drink alcohol.      Family History:  family history includes Heart Disease in her father; High Blood Pressure MD Courtney   Acetaminophen (TYLENOL) 325 MG CAPS Take 975 mg by mouth as needed 6/15/19  Yes Historical Provider, MD   levothyroxine (SYNTHROID) 50 MCG tablet Take 50 mcg by mouth Daily   Yes Historical Provider, MD   ondansetron (ZOFRAN-ODT) 8 MG TBDP disintegrating tablet Place 8 mg under the tongue every 8 hours as needed for Nausea or Vomiting   Yes Historical Provider, MD        Allergies:  Patient has no known allergies. Review of Systems:   · Constitutional: there has been no unanticipated weight loss. There's been no change in energy level, sleep pattern, or activity level. · Eyes: No visual changes or diplopia. No scleral icterus. · ENT: No Headaches, hearing loss or vertigo. No mouth sores or sore throat. · Cardiovascular: Reviewed in HPI  · Respiratory: No cough or wheezing, no sputum production. No hematemesis. · Gastrointestinal: No abdominal pain, appetite loss, blood in stools. No change in bowel or bladder habits. · Genitourinary: No dysuria, trouble voiding, or hematuria. · Musculoskeletal:  No gait disturbance, weakness or joint complaints. · Integumentary: No rash or pruritis. · Neurological: No headache, diplopia, change in muscle strength, numbness or tingling. No change in gait, balance, coordination, mood, affect, memory, mentation, behavior. · Psychiatric: No anxiety, no depression. · Endocrine: No malaise, fatigue or temperature intolerance. No excessive thirst, fluid intake, or urination. No tremor. · Hematologic/Lymphatic: No abnormal bruising or bleeding, blood clots or swollen lymph nodes. · Allergic/Immunologic: No nasal congestion or hives.     Physical Examination:    Vitals:    05/10/21 1402   BP: 138/80   Pulse:    SpO2:         Constitutional and General Appearance: NAD   Respiratory:  · Normal excursion and expansion without use of accessory muscles  · Resp Auscultation: Normal breath sounds without dullness  Cardiovascular:  · The apical impulses not displaced  · Heart tones are crisp and normal  · Cervical veins are not engorged  · The carotid upstroke is normal in amplitude and contour without delay or bruit  · Normal S1S2, No S3, No Murmur  · Peripheral pulses are symmetrical and full  · There is no clubbing, cyanosis of the extremities. · No edema  · Femoral Arteries: 2+ and equal  · Pedal Pulses: 2+ and equal   Abdomen:  · No masses or tenderness  · Liver/Spleen: No Abnormalities Noted  Neurological/Psychiatric:  · Alert and oriented in all spheres  · Moves all extremities well  · Exhibits normal gait balance and coordination  · No abnormalities of mood, affect, memory, mentation, or behavior are noted    Stress test 4/2019 [place]   Normal      Cardiac CTA 9/2019  No significant coronary artery stenosis is identified.   Right coronary dominance.   Left ventricular ejection fraction 70.9%. Advanced pattern of emphysema.      Echocardiogram 9/26/19  Summary   Technically difficult examination. Low parasternal window secondary to COPD.   LV systolic function is normal with EF estimated at 55%.   No regional wall motion abnormalities.   Normal left ventricular diastolic filling pressure.   Mild mitral regurgitation. Left heart cath 3/21/2021  Left Heart Cath:    Findings   LVEDP 2   LVEF  60% hyperdynamic   LV wall motion  very hyperdynamic in the basal and mid segments with hypokinesis of the apical anterior apex and inferior and a possible variant of apical ballooning pattern   Gradient across AV  none   Mitral regurg  none      Coronary Angiogram:  Artery Findings/Result   LM  the left main, separate takeoff of LAD and circumflex   LAD  no angiographic CAD   LCx  no angiographic CAD         RCA  dominant, no angiographic CAD            Assessment/Plan  1. No angiographic CAD and no culprit lesion found. 2.  Nonischemic cardiomyopathy  3.   Appears to be dry with LVEDP of 2                -Given this fact and nitroglycerin the ER this probably explains her transient hypotension                -We will start IV fluids at 200 an hour for 5 hours   4. LV gram has very small area of apical ballooning with hyperkinesis of basal and mid segments. Given normal LAD anatomy difficult to say if this represents an apical ballooning variant versus plaque rupture event that has self healed versus possible vasospasm event.                -We will put patient on goal-directed medical therapy for CHF                -Get cardiac MRI as an outpatient to evaluate for infarct versus edema    Echocardiogram 3/23/2021  Summary   Definity is used for myocardial border enhancement. Left ventricular systolic function is normal with a visually estimated   ejection fraction of 55-60%. Mild apical hypokinesis. The left ventricle is small in size with normal wall thickness. Grade I diastolic dysfunction with normal LV pressure. Right ventricular systolic function is impaired. Mild mitral and aortic regurgitation. Inadequate tricuspid valve regurgitation to estimate systolic pulmonary   artery pressure    Cardiac MRI 4/30/2021  CONCLUSIONS   Overall findings are consistent with resolving stress induced  cardiomyopathy with normal LV and RV function. No evidence of myocardial scar.   -Normal left ventricular size and systolic function with a calculated ejection fraction of 69% by Butler's method -Normal LV GLS -24% -Normal right ventricular size and systolic function with a calculated ejection fraction of 72% by Butler's method -On T2w imaging, there is mild edema involving the true apex and apical anterior/apical septal segments. -No intracardiac shunt. Calculated Qp:Qs:1 (velocity encoding Ao/Pa) -Normal resting perfusion imaging. -On delayed enhancement imaging, there is no abnormal hyperenhancement to suggest myocardial scar/inflammation/infiltration.    The MRI sequences and imaging planes in this study were tailored for cardiac imaging and are suboptimal for

## 2021-05-10 ENCOUNTER — OFFICE VISIT (OUTPATIENT)
Dept: CARDIOLOGY CLINIC | Age: 61
End: 2021-05-10
Payer: MEDICARE

## 2021-05-10 VITALS
SYSTOLIC BLOOD PRESSURE: 138 MMHG | WEIGHT: 100 LBS | DIASTOLIC BLOOD PRESSURE: 80 MMHG | BODY MASS INDEX: 17.07 KG/M2 | OXYGEN SATURATION: 93 % | HEART RATE: 78 BPM | HEIGHT: 64 IN

## 2021-05-10 DIAGNOSIS — R07.82 INTERCOSTAL PAIN: ICD-10-CM

## 2021-05-10 DIAGNOSIS — I15.0 RENOVASCULAR HYPERTENSION: ICD-10-CM

## 2021-05-10 DIAGNOSIS — I42.8 NON-ISCHEMIC CARDIOMYOPATHY (HCC): Primary | ICD-10-CM

## 2021-05-10 DIAGNOSIS — I10 ESSENTIAL HYPERTENSION: ICD-10-CM

## 2021-05-10 PROCEDURE — G8419 CALC BMI OUT NRM PARAM NOF/U: HCPCS | Performed by: INTERNAL MEDICINE

## 2021-05-10 PROCEDURE — 3017F COLORECTAL CA SCREEN DOC REV: CPT | Performed by: INTERNAL MEDICINE

## 2021-05-10 PROCEDURE — 99214 OFFICE O/P EST MOD 30 MIN: CPT | Performed by: INTERNAL MEDICINE

## 2021-05-10 PROCEDURE — G8427 DOCREV CUR MEDS BY ELIG CLIN: HCPCS | Performed by: INTERNAL MEDICINE

## 2021-05-10 PROCEDURE — 4004F PT TOBACCO SCREEN RCVD TLK: CPT | Performed by: INTERNAL MEDICINE

## 2021-05-10 RX ORDER — ATORVASTATIN CALCIUM 40 MG/1
40 TABLET, FILM COATED ORAL DAILY
Qty: 90 TABLET | Refills: 3 | Status: SHIPPED | OUTPATIENT
Start: 2021-05-10 | End: 2022-05-05

## 2021-05-10 RX ORDER — AMLODIPINE BESYLATE 5 MG/1
5 TABLET ORAL NIGHTLY
Qty: 90 TABLET | Refills: 3 | Status: ON HOLD | OUTPATIENT
Start: 2021-05-10 | End: 2021-07-10 | Stop reason: HOSPADM

## 2021-05-10 RX ORDER — LOSARTAN POTASSIUM 100 MG/1
100 TABLET ORAL DAILY
Qty: 90 TABLET | Refills: 3 | Status: ON HOLD | OUTPATIENT
Start: 2021-05-10 | End: 2021-07-10 | Stop reason: HOSPADM

## 2021-05-10 NOTE — PATIENT INSTRUCTIONS
Plan:  smoking cessation encouraged   Start Norvasc (amlodipine) 5 mg nightly   Take Losartan 100 mg every morning   Increase Lipitor to 40 mg daily   Cardiac medications reviewed including indications and pertinent side effects    Check blood pressure and heart rate at home a few times per week- keep a log with dates and times and bring to office visit   Regular exercise and following a healthy diet encouraged   Follow up with me in 3 months   Ok to return to work

## 2021-05-11 ENCOUNTER — HOSPITAL ENCOUNTER (OUTPATIENT)
Dept: GENERAL RADIOLOGY | Age: 61
Discharge: HOME | End: 2021-05-11
Payer: MEDICARE

## 2021-05-11 DIAGNOSIS — R13.12 OROPHARYNGEAL DYSPHAGIA: ICD-10-CM

## 2021-05-11 PROCEDURE — 74230 X-RAY XM SWLNG FUNCJ C+: CPT

## 2021-05-11 RX ORDER — FLUTICASONE FUROATE, UMECLIDINIUM BROMIDE AND VILANTEROL TRIFENATATE 100; 62.5; 25 UG/1; UG/1; UG/1
1 POWDER RESPIRATORY (INHALATION) DAILY
Qty: 60 EACH | Refills: 5 | Status: SHIPPED | OUTPATIENT
Start: 2021-05-11

## 2021-05-13 ENCOUNTER — TELEPHONE (OUTPATIENT)
Dept: ENDOCRINOLOGY | Age: 61
End: 2021-05-13

## 2021-05-13 NOTE — TELEPHONE ENCOUNTER
ARIELLE.  Pt called stating she is having lower stomach pain, pt states she was DX with pelvic congestive syndrome. BP has been high all day, 210/115 was the highest. Cardiologist changed BP med to Losarten 100 mg. Increased Lipitor to 40 mg daily, Norvasc 5 mg nightly. Pt states she does not need a call back, only if Dr. Odalis Ryan has any questions.

## 2021-05-26 ENCOUNTER — OFFICE VISIT (OUTPATIENT)
Dept: ENDOCRINOLOGY | Age: 61
End: 2021-05-26
Payer: MEDICARE

## 2021-05-26 VITALS
HEART RATE: 70 BPM | DIASTOLIC BLOOD PRESSURE: 83 MMHG | HEIGHT: 64 IN | BODY MASS INDEX: 16.73 KG/M2 | SYSTOLIC BLOOD PRESSURE: 127 MMHG | WEIGHT: 98 LBS

## 2021-05-26 DIAGNOSIS — E83.52 HYPERCALCEMIA: ICD-10-CM

## 2021-05-26 DIAGNOSIS — E03.9 ACQUIRED HYPOTHYROIDISM: Primary | ICD-10-CM

## 2021-05-26 DIAGNOSIS — M81.8 OTHER OSTEOPOROSIS WITHOUT CURRENT PATHOLOGICAL FRACTURE: ICD-10-CM

## 2021-05-26 DIAGNOSIS — I10 ESSENTIAL HYPERTENSION: ICD-10-CM

## 2021-05-26 DIAGNOSIS — Z78.0 MENOPAUSE: ICD-10-CM

## 2021-05-26 DIAGNOSIS — E27.8 ADRENAL NODULE (HCC): ICD-10-CM

## 2021-05-26 PROCEDURE — 3017F COLORECTAL CA SCREEN DOC REV: CPT | Performed by: INTERNAL MEDICINE

## 2021-05-26 PROCEDURE — 99215 OFFICE O/P EST HI 40 MIN: CPT | Performed by: INTERNAL MEDICINE

## 2021-05-26 PROCEDURE — G8427 DOCREV CUR MEDS BY ELIG CLIN: HCPCS | Performed by: INTERNAL MEDICINE

## 2021-05-26 PROCEDURE — G8419 CALC BMI OUT NRM PARAM NOF/U: HCPCS | Performed by: INTERNAL MEDICINE

## 2021-05-26 PROCEDURE — 1036F TOBACCO NON-USER: CPT | Performed by: INTERNAL MEDICINE

## 2021-05-26 NOTE — PROGRESS NOTES
RA  No current smoking  On inhaled steroid  Worst T score -3.6   Has abdominal pain  Has indigestation  Very sensitive stomach  Not on supplements    EXAMINATION:   CT ABDOMEN WITHOUT CONTRAST 3/8/2021 4:07 pm       TECHNIQUE:   CT of the abdomen was performed without the administration of intravenous   contrast. Multiplanar reformatted images are provided for review. Dose   modulation, iterative reconstruction, and/or weight based adjustment of the   mA/kV was utilized to reduce the radiation dose to as low as reasonably   achievable.       COMPARISON:   CT abdomen/pelvis without contrast dated 04/10/2020.       CT abdomen/pelvis with IV contrast dated 08/27/2019.       HISTORY:   ORDERING SYSTEM PROVIDED HISTORY: Adrenal nodule ([address]   TECHNOLOGIST PROVIDED HISTORY:   Reason for Exam: right adernal nodule   Acuity: Acute   Type of Exam: Initial       FINDINGS:   Lower Chest: Lung bases are noted for moderate emphysema.       Organs: Liver, gallbladder, pancreas are unremarkable.  The patient is status   post splenectomy.  The adrenal glands are again noted for a lipid rich   adrenal adenoma on the right mildly increased in size measuring approximately   17 x 28 mm, previously 13 x 25 mm.  There is unchanged left lateral renal   hypodensity at the interpolar region consistent with cyst.  The visualized   ureters are unremarkable.       GI/Bowel: Stomach and duodenal sweep demonstrate no acute abnormality.    Remaining visualized bowel is unremarkable.       Peritoneum/Retroperitoneum: No evidence of ascites or free air.  No evidence   of lymphadenopathy. Ardyth Jenny is normal in caliber.       Bones/Soft Tissues: Surrounding osseous and soft tissue structures show no   acute or concerning abnormality.  An intradiscal plug is noted at L4-5.  Left   SI joint has been pinned.           Impression   Lipid rich right adrenal adenoma mildly increased in size from the previous   exam of 04/10/2020.       Stable left renal 60 each 5    losartan (COZAAR) 100 MG tablet Take 1 tablet by mouth daily 90 tablet 3    atorvastatin (LIPITOR) 40 MG tablet Take 1 tablet by mouth daily 90 tablet 3    metoprolol succinate (TOPROL XL) 50 MG extended release tablet Take 1 tablet by mouth daily 90 tablet 3    benzonatate (TESSALON) 100 MG capsule Take 100 mg by mouth 3 times daily as needed for Cough      albuterol sulfate  (90 Base) MCG/ACT inhaler Inhale 1 puff into the lungs as needed for Wheezing or Shortness of Breath 1 Inhaler 5    pantoprazole (PROTONIX) 40 MG tablet Take 40 mg by mouth daily      FLORASTOR 250 MG capsule Take 250 mg by mouth daily      gabapentin (NEURONTIN) 300 MG capsule Take 1 capsule by mouth 4 times daily for 30 days. 120 capsule 0    aspirin-acetaminophen-caffeine (EXCEDRIN MIGRAINE) 250-250-65 MG per tablet Take 1 tablet by mouth every 6 hours as needed for Headaches      albuterol (PROVENTIL) (2.5 MG/3ML) 0.083% nebulizer solution Take 3 mLs by nebulization every 6 hours as needed for Wheezing 120 each 3    DULoxetine (CYMBALTA) 60 MG extended release capsule Take 60 mg by mouth       folic acid (FOLVITE) 1 MG tablet Take 1 tablet by mouth daily 30 tablet 11    Acetaminophen (TYLENOL) 325 MG CAPS Take 975 mg by mouth as needed      levothyroxine (SYNTHROID) 50 MCG tablet Take 50 mcg by mouth Daily      ondansetron (ZOFRAN-ODT) 8 MG TBDP disintegrating tablet Place 8 mg under the tongue every 8 hours as needed for Nausea or Vomiting      amLODIPine (NORVASC) 5 MG tablet Take 1 tablet by mouth nightly (Patient not taking: Reported on 2021) 90 tablet 3    aspirin 81 MG chewable tablet Take 1 tablet by mouth daily 30 tablet 0    Cholecalciferol (VITAMIN D) 50 MCG ( UT) CAPS capsule Take by mouth (Patient not taking: Reported on 2021)       No current facility-administered medications for this visit.      No Known Allergies  Family Status   Relation Name Status    Father      Sister  Alive       Review of Systems:  Constitutional: has fatigue, no fever, no recent weight gain, no recent weight loss, no changes in appetite  Eyes: no eye pain, no change in vision, no eye redness, no eye irritation, no double vision  Ears, nose, throat: no nasal congestion, no sore throat, no earache, no decrease in hearing, no hoarseness, no dry mouth, no sinus problems, no difficulty swallowing, no neck lumps, no dental problems, no mouth sores, no ringing in ears  Pulmonary: no shortness of breath, no wheezing, no dyspnea on exertion, no cough  Cardiovascular: no chest pain, no lower extremity edema, no orthopnea, no intermittent leg claudication, no palpitations  Gastrointestinal: no abdominal pain, has nausea, no vomiting, no diarrhea, no constipation, no dysphagia, no heartburn, no bloating, has flank pain  Genitourinary: no dysuria, no urinary incontinence, no urinary hesitancy, no urinary frequency, no feelings of urinary urgency, no nocturia  Musculoskeletal: no joint swelling, no joint stiffness, no joint pain, no muscle cramps, no muscle pain, no bone pain  Integument/Breast: no hair loss, no skin rashes, no skin lesions, no itching, no dry skin  Neurological: no numbness, no tingling, has weakness, no confusion, has headaches, no dizziness, no fainting, no tremors, no decrease in memory, no balance problems  Psychiatric: no anxiety, no depression, has insomnia  Hematologic/Lymphatic: no tendency for easy bleeding, no swollen lymph nodes, no tendency for easy bruising  Immunology: no seasonal allergies, no frequent infections, no frequent illnesses  Endocrine: no temperature intolerance    /83   Pulse 70   Ht 5' 4\" (1.626 m)   Wt 98 lb (44.5 kg)   BMI 16.82 kg/m²    Wt Readings from Last 3 Encounters:   05/26/21 98 lb (44.5 kg)   05/10/21 100 lb (45.4 kg)   04/07/21 101 lb (45.8 kg)     Body mass index is 16.82 kg/m².     OBJECTIVE:  Constitutional: no acute distress, well appearing and 04/02/2021    FT3 2.7 03/08/2021     Lab Results   Component Value Date    LABA1C 6.2 03/22/2021     Lab Results   Component Value Date    .2 03/22/2021     Lab Results   Component Value Date    CHOL 215 03/22/2021     Lab Results   Component Value Date    TRIG 71 03/22/2021     Lab Results   Component Value Date    HDL 79 03/22/2021     Lab Results   Component Value Date    LDLCALC 122 03/22/2021     Lab Results   Component Value Date    LABVLDL 14 03/22/2021    VLDL 19 04/11/2018     No results found for: [facility]  Lab Results   Component Value Date    LABMICR YES 03/23/2021     Lab Results   Component Value Date    VITD25 30.6 03/08/2021        ASSESSMENT/PLAN:  1. Acquired hypothyroidism  TSH 0.651.81  Continue levothyroxine 0.05 mg daily  - T3, Free; Future  - T4, Free; Future  - TSH without Reflex; Future    2. Essential hypertension  Metoprolol  - losartan (COZAAR) 50 MG tablet; Take 50 mg by mouth 2 times daily  Free normetanephrine and plasma elevated 1.143.362.19  Aldosterone 10.3    3. Adrenal nodule [facility])  Unlikely pheochromocytoma, however, patient is very concerned. She does not feel well. Repeated 24-hour dopamine, norepinephrine and epinephrine normal  Repeated 24-hour urine metanephrines and normetanephrines normal  24-hour urine cortisol 16.31, normal  ACTH less than 5, now 17  On inhaled steroid  Elevated free plasma normetanephrines 1.143.362.19    52-year-old female experiencing spells of headaches, sweating, high blood pressure  Found to have adrenal adenoma  Plasma and urine metanephrines were found in indeterminate range  Most recent 24-hour urine catecholamines and metanephrines normal, plasma normetanephrine's decreased  Normal aldosterone  Blood pressure controlled on current medications  1 mg dexamethasone suppression test showed normal results in 10/2019      5/4/2021 CT abdomen  Organs:  The liver, spleen, adrenal glands, kidneys, and pancreas demonstrate   no acute abnormality. 4/26/2021 MRI abdomen  2.2 cm lipid rich benign right adrenal adenoma again demonstrated. Benign right adrenal adenoma again demonstrated, for which no imaging   follow-up is necessary. 3/8/2021 CT abdomen  The adrenal glands are again noted for a lipid rich   adrenal adenoma on the right mildly increased in size measuring approximately    17 x 28 mm, previously 13 x 25 mm. Lipid rich right adrenal adenoma mildly increased in size from the previous   exam of 04/10/2020.   4/10/2020 CT abdomen  2.5 cm low-density right adrenal mass.    Right adrenal adenoma  8/27/2019 CT abdomen  Stable   right adrenal adenoma. 8/12/2019 MRI abdomen  There is redemonstration of the previously described nodule in the right   adrenal gland.  This measures approximately 2.0 cm medial-lateral.  This   loses signal on out of phase imaging. , compatible with adenoma       4. Hypercalcemia  Differential diagnosis includes primary hyperparathyroidism versus normocalcemic hyperparathyroidism   No persistent hypercalcemia at this time, though calcium is upper limit of normal  PTH 34.5  25 hydroxy vitamin D 30.6  Calcium 10.110.510.6, upper limit normal 10.6  Serum protein electrophoresis and immunofixation normal  24-hour urine calcium 165  24-hour urine sodium 42  - Calcium Ionized Serum; Future  - PTH, Intact; Future  - Phosphorus; Future  - Vitamin D 25 Hydroxy; Future  - Comprehensive Metabolic Panel; Future    5. Menopause  No hot flashes  62 yo     6.  Osteoporosis  Counseled patient on treatment options  No personal history of fractures  Mother had osteoporosis  Father had hip replacement, but unclear what was the cause  No RA  No current smoking  On inhaled steroid  Worst T score -3.6 in lumbar spine  Has abdominal pain  Has indigestation  Very sensitive stomach  Not on supplements  Not a candidate for oral biphosphonates    Reviewed and/or ordered clinical lab results Yes  Reviewed and/or ordered radiology tests Yes   Reviewed and/or ordered other diagnostic tests No  Discussed test results with performing physician No  Independently reviewed image, tracing, or specimen No  Made a decision to obtain old records No  Reviewed and summarized old records Yes   50-year-old female experiencing spells of headaches, sweating, high blood pressure  Found to have 2 cm adrenal adenoma  Plasma and urine metanephrines were found in indeterminate range  Blood pressure controlled on current medications  Aldosterone, renin, 1 mg dexamethasone suppression test showed normal results in 10/2019  Obtained history from other than patient No    Ruben Burns was counseled regarding symptoms of adrenal nodule, hypothyroidism, hypercalcemia diagnosis, course and complications of disease if inadequately treated, side effects of medications, diagnosis, treatment options, and prognosis, risks, benefits, complications, and alternatives of treatment, labs, imaging and other studies and treatment targets and goals. She understands instructions and counseling. Total time I spent for this encounter 40 minutes    Return in about 3 months (around 8/26/2021) for Adrenal, osteoporosis.     Electronically signed by Edu Molina MD on 6/5/2021 at 11:21 PM

## 2021-06-05 PROBLEM — M81.8 OTHER OSTEOPOROSIS WITHOUT CURRENT PATHOLOGICAL FRACTURE: Status: ACTIVE | Noted: 2021-06-05

## 2021-06-06 DIAGNOSIS — E27.8 ADRENAL NODULE (HCC): Primary | ICD-10-CM

## 2021-06-06 DIAGNOSIS — I10 ESSENTIAL HYPERTENSION: ICD-10-CM

## 2021-06-06 DIAGNOSIS — E83.52 HYPERCALCEMIA: ICD-10-CM

## 2021-06-09 ENCOUNTER — TELEPHONE (OUTPATIENT)
Dept: ENDOCRINOLOGY | Age: 61
End: 2021-06-09

## 2021-06-09 ENCOUNTER — OFFICE VISIT (OUTPATIENT)
Dept: PULMONOLOGY | Age: 61
End: 2021-06-09
Payer: MEDICARE

## 2021-06-09 VITALS
OXYGEN SATURATION: 93 % | HEIGHT: 64 IN | BODY MASS INDEX: 16.73 KG/M2 | HEART RATE: 74 BPM | TEMPERATURE: 97.9 F | WEIGHT: 98 LBS | DIASTOLIC BLOOD PRESSURE: 92 MMHG | SYSTOLIC BLOOD PRESSURE: 160 MMHG

## 2021-06-09 DIAGNOSIS — J44.1 COPD EXACERBATION (HCC): ICD-10-CM

## 2021-06-09 DIAGNOSIS — J44.9 MODERATE COPD (CHRONIC OBSTRUCTIVE PULMONARY DISEASE) (HCC): Primary | ICD-10-CM

## 2021-06-09 DIAGNOSIS — E88.01 HETEROZYGOUS ALPHA 1-ANTITRYPSIN DEFICIENCY (HCC): ICD-10-CM

## 2021-06-09 PROCEDURE — G8419 CALC BMI OUT NRM PARAM NOF/U: HCPCS | Performed by: INTERNAL MEDICINE

## 2021-06-09 PROCEDURE — 1036F TOBACCO NON-USER: CPT | Performed by: INTERNAL MEDICINE

## 2021-06-09 PROCEDURE — G8926 SPIRO NO PERF OR DOC: HCPCS | Performed by: INTERNAL MEDICINE

## 2021-06-09 PROCEDURE — 99214 OFFICE O/P EST MOD 30 MIN: CPT | Performed by: INTERNAL MEDICINE

## 2021-06-09 PROCEDURE — 3017F COLORECTAL CA SCREEN DOC REV: CPT | Performed by: INTERNAL MEDICINE

## 2021-06-09 PROCEDURE — G8427 DOCREV CUR MEDS BY ELIG CLIN: HCPCS | Performed by: INTERNAL MEDICINE

## 2021-06-09 PROCEDURE — 3023F SPIROM DOC REV: CPT | Performed by: INTERNAL MEDICINE

## 2021-06-09 RX ORDER — PREDNISONE 10 MG/1
30 TABLET ORAL DAILY
Qty: 15 TABLET | Refills: 0 | Status: SHIPPED | OUTPATIENT
Start: 2021-06-09 | End: 2021-06-14

## 2021-06-09 RX ORDER — DOXYCYCLINE HYCLATE 100 MG/1
100 CAPSULE ORAL 2 TIMES DAILY
Qty: 14 CAPSULE | Refills: 0 | Status: SHIPPED | OUTPATIENT
Start: 2021-06-09 | End: 2021-06-16

## 2021-06-09 ASSESSMENT — SLEEP AND FATIGUE QUESTIONNAIRES
HOW LIKELY ARE YOU TO NOD OFF OR FALL ASLEEP WHILE SITTING QUIETLY AFTER LUNCH WITHOUT ALCOHOL: 0
HOW LIKELY ARE YOU TO NOD OFF OR FALL ASLEEP WHILE SITTING AND TALKING TO SOMEONE: 0
HOW LIKELY ARE YOU TO NOD OFF OR FALL ASLEEP WHEN YOU ARE A PASSENGER IN A CAR FOR AN HOUR WITHOUT A BREAK: 0
HOW LIKELY ARE YOU TO NOD OFF OR FALL ASLEEP WHILE LYING DOWN TO REST IN THE AFTERNOON WHEN CIRCUMSTANCES PERMIT: 0
HOW LIKELY ARE YOU TO NOD OFF OR FALL ASLEEP WHILE SITTING INACTIVE IN A PUBLIC PLACE: 0
ESS TOTAL SCORE: 0
HOW LIKELY ARE YOU TO NOD OFF OR FALL ASLEEP WHILE WATCHING TV: 0
HOW LIKELY ARE YOU TO NOD OFF OR FALL ASLEEP WHILE SITTING AND READING: 0
HOW LIKELY ARE YOU TO NOD OFF OR FALL ASLEEP IN A CAR, WHILE STOPPED FOR A FEW MINUTES IN TRAFFIC: 0

## 2021-06-09 NOTE — TELEPHONE ENCOUNTER
Spoke with patient. She understands that referral list for adrenal problem mostly, but she may consider to discuss me of her concerns as well. Discussed with patients again the options for osteoporosis. Recommended Prolia. Patient will let me know. Asked patient if she will not hear from UC within next few days, let me know. I also emailed Suzy, a person responsible for scheduling, with the note that my patient has not heard from them yet.

## 2021-06-09 NOTE — PROGRESS NOTES
[facility]    Outpatient Follow Up Note    Chief Complaint: COPD  Consulting provider: Dr. Jannie Farr    Interval History: 62 y.o. female  No fever, c/o cough and SOB and wheeze. Admitted for COPD 12/2019 and 12/2020  Quit smoking again 5/2021    Initial HPI:The patient has a history of COPD and chronic back pain. The patient does not have SOB at rest but has REESE. Exercise tolerance on level ground is 1 block. Trouble going up Stairs. The patient does not wheeze or cough. She was followed by a pulmonologist in [facility] and had been on Oxygen after a resp illness. She does not use it now. She states she was hospitalized for COPD several times in the past. She was intubated last year. She has been taking advair since that time. She does not notice that it helps. She has a nebulizer that she uses once per day. The patient has smoked 32 PPD for years. Quit in 3/2019. Her  still smokes.     Current Outpatient Medications:     TRELEGY ELLIPTA 100-62.5-25 MCG/INH AEPB, INHALE 1 PUFF INTO THE LUNGS DAILY, Disp: 60 each, Rfl: 5    losartan (COZAAR) 100 MG tablet, Take 1 tablet by mouth daily, Disp: 90 tablet, Rfl: 3    amLODIPine (NORVASC) 5 MG tablet, Take 1 tablet by mouth nightly (Patient not taking: Reported on 5/26/2021), Disp: 90 tablet, Rfl: 3    atorvastatin (LIPITOR) 40 MG tablet, Take 1 tablet by mouth daily, Disp: 90 tablet, Rfl: 3    metoprolol succinate (TOPROL XL) 50 MG extended release tablet, Take 1 tablet by mouth daily, Disp: 90 tablet, Rfl: 3    aspirin 81 MG chewable tablet, Take 1 tablet by mouth daily, Disp: 30 tablet, Rfl: 0    Cholecalciferol (VITAMIN D) 50 MCG (2000 UT) CAPS capsule, Take by mouth (Patient not taking: Reported on 5/26/2021), Disp: , Rfl:     benzonatate (TESSALON) 100 MG capsule, Take 100 mg by mouth 3 times daily as needed for Cough, Disp: , Rfl:     albuterol sulfate  (90 Base) MCG/ACT inhaler, Inhale 1 puff into the lungs as needed for Wheezing or Shortness of Breath, Disp: 1 Inhaler, Rfl: 5    pantoprazole (PROTONIX) 40 MG tablet, Take 40 mg by mouth daily, Disp: , Rfl:     FLORASTOR 250 MG capsule, Take 250 mg by mouth daily, Disp: , Rfl:     gabapentin (NEURONTIN) 300 MG capsule, Take 1 capsule by mouth 4 times daily for 30 days. , Disp: 120 capsule, Rfl: 0    aspirin-acetaminophen-caffeine (EXCEDRIN MIGRAINE) 250-250-65 MG per tablet, Take 1 tablet by mouth every 6 hours as needed for Headaches, Disp: , Rfl:     albuterol (PROVENTIL) (2.5 MG/3ML) 0.083% nebulizer solution, Take 3 mLs by nebulization every 6 hours as needed for Wheezing, Disp: 120 each, Rfl: 3    DULoxetine (CYMBALTA) 60 MG extended release capsule, Take 60 mg by mouth , Disp: , Rfl:     folic acid (FOLVITE) 1 MG tablet, Take 1 tablet by mouth daily, Disp: 30 tablet, Rfl: 11    Acetaminophen (TYLENOL) 325 MG CAPS, Take 975 mg by mouth as needed, Disp: , Rfl:     levothyroxine (SYNTHROID) 50 MCG tablet, Take 50 mcg by mouth Daily, Disp: , Rfl:     ondansetron (ZOFRAN-ODT) 8 MG TBDP disintegrating tablet, Place 8 mg under the tongue every 8 hours as needed for Nausea or Vomiting, Disp: , Rfl:     Objective:   BP (!) 160/92   Pulse 74   Temp 97.9 °F (36.6 °C)   Ht 5' 4\" (1.626 m)   Wt 98 lb (44.5 kg)   SpO2 93% Comment: room air  BMI 16.82 kg/m²    Constitutional:  No acute distress. Eyes: PERRL. Conjunctivae anicteric. ENT: Normal nose. Normal tongue. Neck:  Trachea is midline. No thyroid tenderness. Respiratory: No accessory muscle usage. Mildly decreased breath sounds. + wheezes. No rales. No Rhonchi. Cardiovascular: Normal S1S2. No digit clubbing. No digit cyanosis. No LE edema. Psychiatric: No anxiety or Agitation. Alert and Oriented to person, place and time. LABS:  Reviewed any pertinent new labs that are available.   10/7/19 A1AT level 100      6/26/20 83      9/1/20 99    PFTs 10/7/19  FVC  (%) FEV1 1.32 (62%) FEV1/FVC ratio 0.50 TLC  (124%)  RV  (217%)   DLCO (49%) Bronchodilator response: +    6MWT: 1000ft, 92%    IMAGING:  I personally reviewed and interpreted the following today in the office:   3/22/21 CTPA: Unchanged 2-3 mm solid right pulmonary nodules. 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 Doxy x 1 week  · Continue Trelegy and albuterol nebs  · Pulmonary rehab  - restart  · She uses home O2 at night and PRN that was prescribe in [place]  · Using NRT gum  · F/u with PFT/6WT in October  · LDCT due 3/2022  · Screening CT scan was considered in a lung cancer screening counseling and shared decision making visit today that included the following elements:   · Eligibility: Age: 62. There are no signs or symptoms of lung cancer. Tobacco History 32 pack-years, quit  1 years ago  · Verbal counseling has been performed by me to include benefits and harms of screening, follow-up diagnostic testing, over-diagnosis, false positive rate, and total radiation exposure;   · I have counseled on the importance of adherence to annual lung cancer LDCT screening, the impact of comorbidities and patient is willing to undergo diagnosis and treatment;   · I have provided counseling on the importance of maintaining cigarette smoking abstinence if former smoker; or the importance of smoking cessation if current smoker and, if appropriate, furnishing of information about tobacco cessation interventions; and   · I have furnished a written order for lung cancer screening with LDCT.

## 2021-06-11 ENCOUNTER — HOSPITAL ENCOUNTER (OUTPATIENT)
Dept: GENERAL RADIOLOGY | Age: 61
Discharge: HOME | End: 2021-06-11
Payer: MEDICARE

## 2021-06-11 ENCOUNTER — HOSPITAL ENCOUNTER (OUTPATIENT)
Age: 61
Discharge: HOME | End: 2021-06-11
Payer: MEDICARE

## 2021-06-11 DIAGNOSIS — G89.29 CHRONIC LEFT-SIDED LOW BACK PAIN WITH SCIATICA, SCIATICA LATERALITY UNSPECIFIED: ICD-10-CM

## 2021-06-11 DIAGNOSIS — M54.40 CHRONIC LEFT-SIDED LOW BACK PAIN WITH SCIATICA, SCIATICA LATERALITY UNSPECIFIED: ICD-10-CM

## 2021-06-11 PROCEDURE — 72220 X-RAY EXAM SACRUM TAILBONE: CPT

## 2021-07-01 ENCOUNTER — HOSPITAL ENCOUNTER (INPATIENT)
Age: 61
LOS: 6 days | Discharge: HOME | DRG: 386 | End: 2021-07-07
Attending: EMERGENCY MEDICINE | Admitting: INTERNAL MEDICINE
Payer: MEDICARE

## 2021-07-01 ENCOUNTER — APPOINTMENT (OUTPATIENT)
Dept: CT IMAGING | Age: 61
DRG: 386 | End: 2021-07-01
Payer: MEDICARE

## 2021-07-01 DIAGNOSIS — K62.5 HEMORRHAGE OF RECTUM AND ANUS: ICD-10-CM

## 2021-07-01 DIAGNOSIS — K52.9 COLITIS: Primary | ICD-10-CM

## 2021-07-01 PROBLEM — K51.911 ULCERATIVE COLITIS WITH RECTAL BLEEDING (HCC): Status: ACTIVE | Noted: 2021-07-01

## 2021-07-01 LAB
A/G RATIO: 1.3 (ref 1.1–2.2)
ALBUMIN SERPL-MCNC: 4 G/DL (ref 3.4–5)
ALP BLD-CCNC: 87 U/L (ref 40–129)
ALT SERPL-CCNC: 14 U/L (ref 10–40)
AMORPHOUS: ABNORMAL /HPF
ANION GAP SERPL CALCULATED.3IONS-SCNC: 11 MMOL/L (ref 3–16)
AST SERPL-CCNC: 24 U/L (ref 15–37)
BILIRUB SERPL-MCNC: 1.1 MG/DL (ref 0–1)
BILIRUBIN URINE: NEGATIVE
BLOOD, URINE: ABNORMAL
BUN BLDV-MCNC: 17 MG/DL (ref 7–20)
C DIFF TOXIN/ANTIGEN: NORMAL
C-REACTIVE PROTEIN: 88.4 MG/L (ref 0–5.1)
CALCIUM SERPL-MCNC: 10 MG/DL (ref 8.3–10.6)
CHLORIDE BLD-SCNC: 100 MMOL/L (ref 99–110)
CLARITY: ABNORMAL
CO2: 26 MMOL/L (ref 21–32)
COLOR: YELLOW
CREAT SERPL-MCNC: 0.9 MG/DL (ref 0.6–1.2)
EPITHELIAL CELLS, UA: ABNORMAL /HPF (ref 0–5)
GFR AFRICAN AMERICAN: >60
GFR NON-AFRICAN AMERICAN: >60
GLOBULIN: 3.2 G/DL
GLUCOSE BLD-MCNC: 125 MG/DL (ref 70–99)
GLUCOSE URINE: NEGATIVE MG/DL
HCT VFR BLD CALC: 40.1 % (ref 36–48)
HEMOGLOBIN: 13.2 G/DL (ref 12–16)
KETONES, URINE: NEGATIVE MG/DL
LACTIC ACID, SEPSIS: 1.6 MMOL/L (ref 0.4–1.9)
LEUKOCYTE ESTERASE, URINE: ABNORMAL
LIPASE: 8 U/L (ref 13–60)
MICROSCOPIC EXAMINATION: YES
NITRITE, URINE: NEGATIVE
OCCULT BLOOD DIAGNOSTIC: NORMAL
PH UA: 6 (ref 5–8)
POTASSIUM REFLEX MAGNESIUM: 3.9 MMOL/L (ref 3.5–5.1)
PROTEIN UA: NEGATIVE MG/DL
RBC UA: ABNORMAL /HPF (ref 0–4)
SEDIMENTATION RATE, ERYTHROCYTE: 2 MM/HR (ref 0–30)
SODIUM BLD-SCNC: 137 MMOL/L (ref 136–145)
SPECIFIC GRAVITY UA: 1.01 (ref 1–1.03)
TOTAL PROTEIN: 7.2 G/DL (ref 6.4–8.2)
URINE TYPE: ABNORMAL
UROBILINOGEN, URINE: 0.2 E.U./DL
WBC UA: ABNORMAL /HPF (ref 0–5)

## 2021-07-01 PROCEDURE — 87324 CLOSTRIDIUM AG IA: CPT

## 2021-07-01 PROCEDURE — 99284 EMERGENCY DEPT VISIT MOD MDM: CPT

## 2021-07-01 PROCEDURE — 85014 HEMATOCRIT: CPT

## 2021-07-01 PROCEDURE — 87505 NFCT AGENT DETECTION GI: CPT

## 2021-07-01 PROCEDURE — 83605 ASSAY OF LACTIC ACID: CPT

## 2021-07-01 PROCEDURE — 81001 URINALYSIS AUTO W/SCOPE: CPT

## 2021-07-01 PROCEDURE — 6360000004 HC RX CONTRAST MEDICATION: Performed by: EMERGENCY MEDICINE

## 2021-07-01 PROCEDURE — 36415 COLL VENOUS BLD VENIPUNCTURE: CPT

## 2021-07-01 PROCEDURE — 2500000003 HC RX 250 WO HCPCS: Performed by: EMERGENCY MEDICINE

## 2021-07-01 PROCEDURE — 96375 TX/PRO/DX INJ NEW DRUG ADDON: CPT

## 2021-07-01 PROCEDURE — 6360000002 HC RX W HCPCS: Performed by: EMERGENCY MEDICINE

## 2021-07-01 PROCEDURE — 85025 COMPLETE CBC W/AUTO DIFF WBC: CPT

## 2021-07-01 PROCEDURE — 6370000000 HC RX 637 (ALT 250 FOR IP): Performed by: INTERNAL MEDICINE

## 2021-07-01 PROCEDURE — 80053 COMPREHEN METABOLIC PANEL: CPT

## 2021-07-01 PROCEDURE — 2580000003 HC RX 258: Performed by: EMERGENCY MEDICINE

## 2021-07-01 PROCEDURE — 85018 HEMOGLOBIN: CPT

## 2021-07-01 PROCEDURE — 96361 HYDRATE IV INFUSION ADD-ON: CPT

## 2021-07-01 PROCEDURE — 2580000003 HC RX 258: Performed by: INTERNAL MEDICINE

## 2021-07-01 PROCEDURE — G0328 FECAL BLOOD SCRN IMMUNOASSAY: HCPCS

## 2021-07-01 PROCEDURE — 87449 NOS EACH ORGANISM AG IA: CPT

## 2021-07-01 PROCEDURE — 74177 CT ABD & PELVIS W/CONTRAST: CPT

## 2021-07-01 PROCEDURE — 87040 BLOOD CULTURE FOR BACTERIA: CPT

## 2021-07-01 PROCEDURE — 1200000000 HC SEMI PRIVATE

## 2021-07-01 PROCEDURE — 86140 C-REACTIVE PROTEIN: CPT

## 2021-07-01 PROCEDURE — 96365 THER/PROPH/DIAG IV INF INIT: CPT

## 2021-07-01 PROCEDURE — 96376 TX/PRO/DX INJ SAME DRUG ADON: CPT

## 2021-07-01 PROCEDURE — 85652 RBC SED RATE AUTOMATED: CPT

## 2021-07-01 PROCEDURE — 83690 ASSAY OF LIPASE: CPT

## 2021-07-01 RX ORDER — MORPHINE SULFATE 4 MG/ML
4 INJECTION, SOLUTION INTRAMUSCULAR; INTRAVENOUS ONCE
Status: COMPLETED | OUTPATIENT
Start: 2021-07-01 | End: 2021-07-01

## 2021-07-01 RX ORDER — FOLIC ACID 1 MG/1
1 TABLET ORAL DAILY
Status: DISCONTINUED | OUTPATIENT
Start: 2021-07-02 | End: 2021-07-07 | Stop reason: HOSPADM

## 2021-07-01 RX ORDER — ALBUTEROL SULFATE 2.5 MG/3ML
2.5 SOLUTION RESPIRATORY (INHALATION) EVERY 6 HOURS PRN
Status: DISCONTINUED | OUTPATIENT
Start: 2021-07-01 | End: 2021-07-07 | Stop reason: HOSPADM

## 2021-07-01 RX ORDER — DULOXETIN HYDROCHLORIDE 60 MG/1
60 CAPSULE, DELAYED RELEASE ORAL DAILY
Status: DISCONTINUED | OUTPATIENT
Start: 2021-07-02 | End: 2021-07-07 | Stop reason: HOSPADM

## 2021-07-01 RX ORDER — AMLODIPINE BESYLATE 5 MG/1
5 TABLET ORAL NIGHTLY
Status: DISCONTINUED | OUTPATIENT
Start: 2021-07-01 | End: 2021-07-07 | Stop reason: HOSPADM

## 2021-07-01 RX ORDER — METOPROLOL SUCCINATE 50 MG/1
50 TABLET, EXTENDED RELEASE ORAL DAILY
Status: DISCONTINUED | OUTPATIENT
Start: 2021-07-02 | End: 2021-07-07 | Stop reason: HOSPADM

## 2021-07-01 RX ORDER — SODIUM CHLORIDE 0.9 % (FLUSH) 0.9 %
5-40 SYRINGE (ML) INJECTION EVERY 12 HOURS SCHEDULED
Status: DISCONTINUED | OUTPATIENT
Start: 2021-07-01 | End: 2021-07-07 | Stop reason: HOSPADM

## 2021-07-01 RX ORDER — MIRTAZAPINE 15 MG/1
15 TABLET, FILM COATED ORAL NIGHTLY
COMMUNITY

## 2021-07-01 RX ORDER — MIRTAZAPINE 15 MG/1
15 TABLET, FILM COATED ORAL NIGHTLY
Status: DISCONTINUED | OUTPATIENT
Start: 2021-07-01 | End: 2021-07-07 | Stop reason: HOSPADM

## 2021-07-01 RX ORDER — ACETAMINOPHEN 325 MG/1
650 TABLET ORAL EVERY 6 HOURS PRN
Status: DISCONTINUED | OUTPATIENT
Start: 2021-07-01 | End: 2021-07-07 | Stop reason: HOSPADM

## 2021-07-01 RX ORDER — LEVOTHYROXINE SODIUM 0.05 MG/1
50 TABLET ORAL DAILY
Status: DISCONTINUED | OUTPATIENT
Start: 2021-07-02 | End: 2021-07-07 | Stop reason: HOSPADM

## 2021-07-01 RX ORDER — CIPROFLOXACIN 2 MG/ML
400 INJECTION, SOLUTION INTRAVENOUS EVERY 12 HOURS
Status: DISCONTINUED | OUTPATIENT
Start: 2021-07-02 | End: 2021-07-03

## 2021-07-01 RX ORDER — LOSARTAN POTASSIUM 100 MG/1
100 TABLET ORAL DAILY
Status: DISCONTINUED | OUTPATIENT
Start: 2021-07-02 | End: 2021-07-07 | Stop reason: HOSPADM

## 2021-07-01 RX ORDER — ONDANSETRON 2 MG/ML
4 INJECTION INTRAMUSCULAR; INTRAVENOUS ONCE
Status: COMPLETED | OUTPATIENT
Start: 2021-07-01 | End: 2021-07-01

## 2021-07-01 RX ORDER — MORPHINE SULFATE 2 MG/ML
2 INJECTION, SOLUTION INTRAMUSCULAR; INTRAVENOUS ONCE
Status: COMPLETED | OUTPATIENT
Start: 2021-07-01 | End: 2021-07-01

## 2021-07-01 RX ORDER — ATORVASTATIN CALCIUM 10 MG/1
40 TABLET, FILM COATED ORAL DAILY
Status: DISCONTINUED | OUTPATIENT
Start: 2021-07-02 | End: 2021-07-07 | Stop reason: HOSPADM

## 2021-07-01 RX ORDER — SODIUM CHLORIDE 9 MG/ML
25 INJECTION, SOLUTION INTRAVENOUS PRN
Status: DISCONTINUED | OUTPATIENT
Start: 2021-07-01 | End: 2021-07-07 | Stop reason: HOSPADM

## 2021-07-01 RX ORDER — 0.9 % SODIUM CHLORIDE 0.9 %
1000 INTRAVENOUS SOLUTION INTRAVENOUS ONCE
Status: COMPLETED | OUTPATIENT
Start: 2021-07-01 | End: 2021-07-01

## 2021-07-01 RX ORDER — ONDANSETRON 2 MG/ML
4 INJECTION INTRAMUSCULAR; INTRAVENOUS EVERY 6 HOURS PRN
Status: DISCONTINUED | OUTPATIENT
Start: 2021-07-01 | End: 2021-07-07 | Stop reason: HOSPADM

## 2021-07-01 RX ORDER — BUDESONIDE AND FORMOTEROL FUMARATE DIHYDRATE 160; 4.5 UG/1; UG/1
2 AEROSOL RESPIRATORY (INHALATION) 2 TIMES DAILY
Status: DISCONTINUED | OUTPATIENT
Start: 2021-07-02 | End: 2021-07-07 | Stop reason: HOSPADM

## 2021-07-01 RX ORDER — MORPHINE SULFATE 2 MG/ML
2 INJECTION, SOLUTION INTRAMUSCULAR; INTRAVENOUS EVERY 4 HOURS PRN
Status: DISCONTINUED | OUTPATIENT
Start: 2021-07-01 | End: 2021-07-04

## 2021-07-01 RX ORDER — SODIUM CHLORIDE 9 MG/ML
INJECTION, SOLUTION INTRAVENOUS CONTINUOUS
Status: DISCONTINUED | OUTPATIENT
Start: 2021-07-01 | End: 2021-07-07 | Stop reason: HOSPADM

## 2021-07-01 RX ORDER — ONDANSETRON 4 MG/1
4 TABLET, ORALLY DISINTEGRATING ORAL EVERY 8 HOURS PRN
Status: DISCONTINUED | OUTPATIENT
Start: 2021-07-01 | End: 2021-07-07 | Stop reason: HOSPADM

## 2021-07-01 RX ORDER — METHYLPREDNISOLONE SODIUM SUCCINATE 40 MG/ML
40 INJECTION, POWDER, LYOPHILIZED, FOR SOLUTION INTRAMUSCULAR; INTRAVENOUS EVERY 12 HOURS
Status: DISCONTINUED | OUTPATIENT
Start: 2021-07-02 | End: 2021-07-03

## 2021-07-01 RX ORDER — GABAPENTIN 300 MG/1
300 CAPSULE ORAL 4 TIMES DAILY
Status: DISCONTINUED | OUTPATIENT
Start: 2021-07-01 | End: 2021-07-07 | Stop reason: HOSPADM

## 2021-07-01 RX ORDER — ACETAMINOPHEN 650 MG/1
650 SUPPOSITORY RECTAL EVERY 6 HOURS PRN
Status: DISCONTINUED | OUTPATIENT
Start: 2021-07-01 | End: 2021-07-07 | Stop reason: HOSPADM

## 2021-07-01 RX ORDER — METHYLPREDNISOLONE SODIUM SUCCINATE 125 MG/2ML
60 INJECTION, POWDER, LYOPHILIZED, FOR SOLUTION INTRAMUSCULAR; INTRAVENOUS ONCE
Status: COMPLETED | OUTPATIENT
Start: 2021-07-01 | End: 2021-07-01

## 2021-07-01 RX ORDER — CIPROFLOXACIN 2 MG/ML
400 INJECTION, SOLUTION INTRAVENOUS ONCE
Status: COMPLETED | OUTPATIENT
Start: 2021-07-01 | End: 2021-07-01

## 2021-07-01 RX ORDER — SODIUM CHLORIDE 0.9 % (FLUSH) 0.9 %
5-40 SYRINGE (ML) INJECTION PRN
Status: DISCONTINUED | OUTPATIENT
Start: 2021-07-01 | End: 2021-07-07 | Stop reason: HOSPADM

## 2021-07-01 RX ADMIN — CIPROFLOXACIN 400 MG: 2 INJECTION, SOLUTION INTRAVENOUS at 16:53

## 2021-07-01 RX ADMIN — SODIUM CHLORIDE: 9 INJECTION, SOLUTION INTRAVENOUS at 21:20

## 2021-07-01 RX ADMIN — AMLODIPINE BESYLATE 5 MG: 5 TABLET ORAL at 21:15

## 2021-07-01 RX ADMIN — MORPHINE SULFATE 4 MG: 4 INJECTION, SOLUTION INTRAMUSCULAR; INTRAVENOUS at 17:21

## 2021-07-01 RX ADMIN — SODIUM CHLORIDE, PRESERVATIVE FREE 10 ML: 5 INJECTION INTRAVENOUS at 21:17

## 2021-07-01 RX ADMIN — GABAPENTIN 300 MG: 300 CAPSULE ORAL at 21:15

## 2021-07-01 RX ADMIN — METRONIDAZOLE 500 MG: 500 INJECTION, SOLUTION INTRAVENOUS at 18:42

## 2021-07-01 RX ADMIN — MIRTAZAPINE 15 MG: 15 TABLET, FILM COATED ORAL at 21:15

## 2021-07-01 RX ADMIN — IOPAMIDOL 75 ML: 755 INJECTION, SOLUTION INTRAVENOUS at 14:44

## 2021-07-01 RX ADMIN — MORPHINE SULFATE 2 MG: 2 INJECTION, SOLUTION INTRAMUSCULAR; INTRAVENOUS at 13:39

## 2021-07-01 RX ADMIN — METHYLPREDNISOLONE SODIUM SUCCINATE 60 MG: 125 INJECTION, POWDER, FOR SOLUTION INTRAMUSCULAR; INTRAVENOUS at 21:15

## 2021-07-01 RX ADMIN — SODIUM CHLORIDE 1000 ML: 9 INJECTION, SOLUTION INTRAVENOUS at 13:38

## 2021-07-01 RX ADMIN — ONDANSETRON 4 MG: 2 INJECTION INTRAMUSCULAR; INTRAVENOUS at 13:39

## 2021-07-01 RX ADMIN — ONDANSETRON 4 MG: 2 INJECTION INTRAMUSCULAR; INTRAVENOUS at 17:21

## 2021-07-01 ASSESSMENT — PAIN SCALES - GENERAL
PAINLEVEL_OUTOF10: 6
PAINLEVEL_OUTOF10: 7
PAINLEVEL_OUTOF10: 9
PAINLEVEL_OUTOF10: 8
PAINLEVEL_OUTOF10: 6
PAINLEVEL_OUTOF10: 8

## 2021-07-01 NOTE — ED NOTES
Called aries mcgowan @ [phone], [phone]  RE: consult aries MCGOWAN for probable UC flare.   Dr. Jessica Marin @ [address]  07/01/21 [id number]

## 2021-07-01 NOTE — ED NOTES
Pt calm and resting quietly with equal rise and fall of chest. Pt in NAD, RR even and unlabored. Side rails up, bed locked and in lowest position. Pt updated on plan of care. No needs at this time. Pt instructed on use of call light if needed.       Blanca Connor RN  07/01/21 [id number]

## 2021-07-01 NOTE — ED PROVIDER NOTES
[facility]      CHIEF COMPLAINT  Abdominal Pain (LLQ x1 month with rectal bleeding x1 week. pt reports hx of UC. pt reports that this is different than her UC. pt reports blood filling in toliet as dark and bright red with BM. pt is having nausea and diarrhea as well. last colonoscopy 2019. had CT scan approx 1 month ago and sts was negative.)      HISTORY OF PRESENT ILLNESS  Kavon Qiu is a 62 y.o. female with a history of COPD, hypertension and ulcerative colitis presents with left lower quadrant abdominal pain and rectal bleeding. She has had the pain for almost a month. She states she has been seen and evaluated for this and the source of the pain has not been identified. Over the past week she developed rectal bleeding. She states sometimes it is dark red blood and sometimes bright red blood. Initially was mixed in with her stool but this morning she just passed pure blood. She denies rectal pain. She denies fevers. She has had nausea with vomiting. Her emesis has been nonbloody. She states she has had rectal bleeding with her ulcerative colitis in the past but has never been this severe. She sees [facility]. Her last colonoscopy and endoscopy was in 2019. No other complaints, modifying factors or associated symptoms. I have reviewed the following from the nursing documentation.     Past Medical History:   Diagnosis Date    Back pain     COPD (chronic obstructive pulmonary disease) (HCC)     Fatigue     Hypertension     Syncope and collapse 03/2019    Thyroid disease     Ulcerative colitis, unspecified, without complications ([facility] [address]      Past Surgical History:   Procedure Laterality Date    BACK SURGERY      L4-L5 rods in    HYSTERECTOMY, VAGINAL  2000    SPLENECTOMY  2019    due to a fall    TONSILLECTOMY       Family History   Problem Relation Age of Onset    Heart Disease Father     High Blood Pressure Sister      Social History Socioeconomic History    Marital status:      Spouse name: Not on file    Number of children: Not on file    Years of education: Not on file    Highest education level: Not on file   Occupational History    Not on file   Tobacco Use    Smoking status: Former Smoker     Packs/day: 0.10     Years: 32.00     Pack years: 3.20     Types: Cigarettes     Quit date: 2020     Years since quittin.3    Smokeless tobacco: Never Used   Vaping Use    Vaping Use: Former    Substances: Always   Substance and Sexual Activity    Alcohol use: Never    Drug use: Yes     Types: Marijuana    Sexual activity: Not on file   Other Topics Concern    Not on file   Social History Narrative    Not on file     Social Determinants of Health     Financial Resource Strain:     Difficulty of Paying Living Expenses:    Food Insecurity:     Worried About [address] in the Last Year:     [employer] [employer] in the Last Year:    Transportation Needs:     Lack of Transportation (Medical):      Lack of Transportation (Non-Medical):    Physical Activity:     Days of Exercise per Week:     Minutes of Exercise per Session:    Stress:     Feeling of Stress :    Social Connections:     Frequency of Communication with Friends and Family:     Frequency of Social Gatherings with Friends and Family:     Attends Evangelical Services:     Active Member of Clubs or Organizations:     Attends Club or Organization Meetings:     Marital Status:    Intimate Partner Violence:     Fear of Current or Ex-Partner:     Emotionally Abused:     Physically Abused:     Sexually Abused:      Current Facility-Administered Medications   Medication Dose Route Frequency Provider Last Rate Last Admin    albuterol (PROVENTIL) nebulizer solution 2.5 mg  2.5 mg Nebulization Q6H PRN Geno Farooq MD        amLODIPine (NORVASC) tablet 5 mg  5 mg Oral Nightly Geno Farooq MD   5 mg at 218    atorvastatin (LIPITOR) tablet 40 mg  40 mg Oral Daily Leopold Crandall, MD   40 mg at 07/02/21 1009    DULoxetine (CYMBALTA) extended release capsule 60 mg  60 mg Oral Daily Leopold Crandall, MD   60 mg at 35/95/53 6747    folic acid (FOLVITE) tablet 1 mg  1 mg Oral Daily Leopold Crandall, MD   1 mg at 07/02/21 1010    gabapentin (NEURONTIN) capsule 300 mg  300 mg Oral 4x Daily Leopold Crandall, MD   300 mg at 07/02/21 1330    levothyroxine (SYNTHROID) tablet 50 mcg  50 mcg Oral Daily Leopold Crandall, MD   50 mcg at 07/02/21 1010    losartan (COZAAR) tablet 100 mg  100 mg Oral Daily Leopold Crandall, MD   100 mg at 07/02/21 1009    metoprolol succinate (TOPROL XL) extended release tablet 50 mg  50 mg Oral Daily Leopold Crandall, MD   50 mg at 07/02/21 1009    mirtazapine (REMERON) tablet 15 mg  15 mg Oral Nightly Leopold Crandall, MD   15 mg at 07/01/21 2115    sodium chloride flush 0.9 % injection 5-40 mL  5-40 mL Intravenous 2 times per day Leopold Crandall, MD   10 mL at 07/02/21 0959    sodium chloride flush 0.9 % injection 5-40 mL  5-40 mL Intravenous PRN Leopold Crandall, MD        0.9 % sodium chloride infusion  25 mL Intravenous PRN Leopold Crandall, MD        ondansetron (ZOFRAN-ODT) disintegrating tablet 4 mg  4 mg Oral Q8H PRN Leopold Crandall, MD        Or    ondansetron (ZOFRAN) injection 4 mg  4 mg Intravenous Q6H PRN Leopold Crandall, MD   4 mg at 07/02/21 1009    acetaminophen (TYLENOL) tablet 650 mg  650 mg Oral Q6H PRN Leopold Crandall, MD        Or    acetaminophen (TYLENOL) suppository 650 mg  650 mg Rectal Q6H PRN Leopold Crandall, MD        0.9 % sodium chloride infusion   Intravenous Continuous Leopold Crandall,  mL/hr at 07/02/21 0649 Rate Verify at 07/02/21 0649    methylPREDNISolone sodium (SOLU-MEDROL) injection 40 mg  40 mg Intravenous Q12H Leopold Crandall, MD   40 mg at 07/02/21 1009    ciprofloxacin (CIPRO) IVPB 400 mg  400 mg Intravenous Q12H Jerone Cooks, MD   Stopped at 07/02/21 6340    metronidazole (FLAGYL) 500 mg in NaCl 100 mL IVPB premix  500 mg Intravenous Volodymyr Christian MD   Stopped at 07/02/21 1119    morphine (PF) injection 2 mg  2 mg Intravenous Q4H PRN Jerone Cooks, MD   2 mg at 07/02/21 1330    budesonide-formoterol (SYMBICORT) 160-4.5 MCG/ACT inhaler 2 puff  2 puff Inhalation BID Jerone Cooks, MD   2 puff at 07/02/21 0835    tiotropium (SPIRIVA RESPIMAT) 2.5 MCG/ACT inhaler 2 puff  2 puff Inhalation Daily Jerone Cooks, MD   2 puff at 07/02/21 0835     No Known Allergies    REVIEW OF SYSTEMS  10 systems reviewed, pertinent positives per HPI otherwise noted to be negative. PHYSICAL EXAM  /66   Pulse 79   Temp 98.1 °F (36.7 °C) (Oral)   Resp 18   Ht 5' 4\" (1.626 m)   Wt 97 lb 3.2 oz (44.1 kg)   SpO2 97%   BMI 16.68 kg/m²   GENERAL APPEARANCE: Awake and alert. Cooperative. No acute distress. HEAD: Normocephalic. Atraumatic. EYES: PERRL. EOM's grossly intact. ENT: Mucous membranes are moist.   NECK: Supple, trachea midline. HEART: RRR. LUNGS: Respirations unlabored. Speaking comfortably in full sentences. ABDOMEN: Soft. Non-distended. Diffuse left-sided abdominal tenderness. No rebound or guarding. Rectal exam with no gross blood noted, no external hemorrhoids or fissures. Hemoccult pending. EXTREMITIES: No peripheral edema. MAEE. No acute deformities. SKIN: Warm, dry and intact. No acute rashes. NEUROLOGICAL: Alert and oriented X 3. CN II-XII grossly intact. Strength 5/5, sensation intact. Normal coordination. PSYCHIATRIC: Normal mood and affect. LABS  I have reviewed all labs for this visit.    Results for orders placed or performed during the hospital encounter of 07/01/21   Clostridium Difficile Toxin/Antigen    Specimen: Stool   Result Value Ref Range    C.diff Toxin/Antigen       Negative for Clostridium difficile antigen and toxin  Normal Range: Negative     Comprehensive Metabolic Panel w/ Reflex to MG   Result Value Ref Range    Sodium 137 136 - 145 mmol/L    Potassium reflex Magnesium 3.9 3.5 - 5.1 mmol/L    Chloride 100 99 - 110 mmol/L    CO2 26 21 - 32 mmol/L    Anion Gap 11 3 - 16    Glucose 125 (H) 70 - 99 mg/dL    BUN 17 7 - 20 mg/dL    CREATININE 0.9 0.6 - 1.2 mg/dL    GFR Non-African American >60 >60    GFR African American >60 >60    Calcium 10.0 8.3 - 10.6 mg/dL    Total Protein 7.2 6.4 - 8.2 g/dL    Albumin 4.0 3.4 - 5.0 g/dL    Albumin/Globulin Ratio 1.3 1.1 - 2.2    Total Bilirubin 1.1 (H) 0.0 - 1.0 mg/dL    Alkaline Phosphatase 87 40 - 129 U/L    ALT 14 10 - 40 U/L    AST 24 15 - 37 U/L    Globulin 3.2 g/dL   CBC Auto Differential   Result Value Ref Range    WBC 17.2 (H) 4.0 - 11.0 K/uL    RBC 4.58 4.00 - 5.20 M/uL    Hemoglobin 14.3 12.0 - 16.0 g/dL    Hematocrit 42.8 36.0 - 48.0 %    MCV 93.4 80.0 - 100.0 fL    MCH 31.2 26.0 - 34.0 pg    MCHC 33.5 31.0 - 36.0 g/dL    RDW 14.0 12.4 - 15.4 %    Platelets 616 857 - 380 K/uL    MPV 8.3 5.0 - 10.5 fL    PLATELET SLIDE REVIEW Increased     SLIDE REVIEW see below     Neutrophils % 38.0 %    Lymphocytes % 28.0 %    Monocytes % 12.0 %    Eosinophils % 6.0 %    Basophils % 0.0 %    Neutrophils Absolute 9.3 (H) 1.7 - 7.7 K/uL    Lymphocytes Absolute 4.8 1.0 - 5.1 K/uL    Monocytes Absolute 2.1 (H) 0.0 - 1.3 K/uL    Eosinophils Absolute 1.0 (H) 0.0 - 0.6 K/uL    Basophils Absolute 0.0 0.0 - 0.2 K/uL    Bands Relative 16 (H) 0 - 7 %   Lipase   Result Value Ref Range    Lipase 8.0 (L) 13.0 - 60.0 U/L   Urinalysis   Result Value Ref Range    Color, UA Yellow Straw/Yellow    Clarity, UA CLOUDY (A) Clear    Glucose, Ur Negative Negative mg/dL    Bilirubin Urine Negative Negative    Ketones, Urine Negative Negative mg/dL    Specific Gravity, UA 1.010 1.005 - 1.030    Blood, Urine LARGE (A) Negative    pH, UA 6.0 5.0 - 8.0    Protein, UA Negative Negative mg/dL    Urobilinogen, Urine 0.2 <2.0 E.U./dL Nitrite, Urine Negative Negative    Leukocyte Esterase, Urine SMALL (A) Negative    Microscopic Examination YES     Urine Type NotGiven    Blood Occult Stool Diagnostic   Result Value Ref Range    Occult Blood Diagnostic Result: Negative  Normal range: Negative      Lactate, Sepsis   Result Value Ref Range    Lactic Acid, Sepsis 1.6 0.4 - 1.9 mmol/L   Microscopic Urinalysis   Result Value Ref Range    WBC, UA 10-20 (A) 0 - 5 /HPF    RBC, UA 11-20 (A) 0 - 4 /HPF    Epithelial Cells, UA 2-5 0 - 5 /HPF    Amorphous, UA 2+ /HPF   C-Reactive Protein   Result Value Ref Range    CRP 88.4 (H) 0.0 - 5.1 mg/L   Sedimentation Rate   Result Value Ref Range    Sed Rate 2 0 - 30 mm/Hr   Basic Metabolic Panel w/ Reflex to MG   Result Value Ref Range    Sodium 138 136 - 145 mmol/L    Potassium reflex Magnesium 4.0 3.5 - 5.1 mmol/L    Chloride 106 99 - 110 mmol/L    CO2 24 21 - 32 mmol/L    Anion Gap 8 3 - 16    Glucose 131 (H) 70 - 99 mg/dL    BUN 10 7 - 20 mg/dL    CREATININE 0.6 0.6 - 1.2 mg/dL    GFR Non-African American >60 >60    GFR African American >60 >60    Calcium 9.1 8.3 - 10.6 mg/dL   CBC auto differential   Result Value Ref Range    WBC 16.9 (H) 4.0 - 11.0 K/uL    RBC 4.09 4.00 - 5.20 M/uL    Hemoglobin 12.5 12.0 - 16.0 g/dL    Hematocrit 38.1 36.0 - 48.0 %    MCV 93.3 80.0 - 100.0 fL    MCH 30.6 26.0 - 34.0 pg    MCHC 32.8 31.0 - 36.0 g/dL    RDW 13.8 12.4 - 15.4 %    Platelets 116 864 - 998 K/uL    MPV 8.2 5.0 - 10.5 fL    Neutrophils % 87.1 %    Lymphocytes % 6.1 %    Monocytes % 6.5 %    Eosinophils % 0.1 %    Basophils % 0.2 %    Neutrophils Absolute 14.7 (H) 1.7 - 7.7 K/uL    Lymphocytes Absolute 1.0 1.0 - 5.1 K/uL    Monocytes Absolute 1.1 0.0 - 1.3 K/uL    Eosinophils Absolute 0.0 0.0 - 0.6 K/uL    Basophils Absolute 0.0 0.0 - 0.2 K/uL   Hemoglobin and Hematocrit, Blood   Result Value Ref Range    Hemoglobin 13.2 12.0 - 16.0 g/dL    Hematocrit 40.1 36.0 - 48.0 %   Iron and TIBC   Result Value Ref Range Iron 17 (L) 37 - 145 ug/dL    TIBC 209 (L) 260 - 445 ug/dL    Iron Saturation 8 (L) 15 - 50 %       Cardiac Monitoring: The cardiac monitor revealed normal sinus rhythm as interpreted by me. The cardiac monitor was ordered secondary to the patient's complaint of abdominal pain and to monitor the patient for dysrhythmia. RADIOLOGY  X-RAYS:  I have reviewed radiologic plain film image(s). ALL OTHER NON-PLAIN FILM IMAGES SUCH AS CT, ULTRASOUND AND MRI HAVE BEEN READ BY THE RADIOLOGIST. CT ABDOMEN PELVIS W IV CONTRAST Additional Contrast? None   Final Result   Findings consistent with severe diffuse acute colitis. Rechecks: Physical assessment performed. She has required two doses of IV morphine here. Is feeling silightly improved. She has been updated on labs and CT results and plan for admit. Critical Care:I personally spent a total of 40 minutes of critical care time in obtaining history, performing a physical exam, bedside monitoring of interventions, collecting and interpreting tests and discussion with consultants but excluding time spent performing procedures, treating other patients and teaching time. ED COURSE/MDM  Patient seen and evaluated. Here the patient is afebrile with normal vitals signs. Old records reviewed. She appears uncomfortable and has diffuse abd ttp. Rectal exam with no gross blood, occult neg. Labs with leukocytosis, lactate wnl. CT with severe acute diffuse colitis. Spoke with Dr. Dennys Muhammad with GI and he does rec Solumedrol  In addition to abx due to her diagnosis of UC. Stool culture and C diff pending. Abd ordered, blood cultures spending. Will admit to hospitalist. Labs and imaging reviewed and results discussed with patient. Patient was reassessed as noted above . Plan of care discussed with patient. Patient in agreement with plan. CLINICAL IMPRESSION  1. Colitis    2. Hemorrhage of rectum and anus        Blood pressure 110/66, pulse 79, temperature 98.1 °F (36.7 °C), temperature source Oral, resp. rate 18, height 5' 4\" (1.626 m), weight 97 lb 3.2 oz (44.1 kg), SpO2 97 %, not currently breastfeeding. [facility] was admitted in stable condition.     (Please note this note was completed with a voice recognition program.  Efforts were made to edit the dictations but occasionally words are mis-transcribed.)       Von Beverly MD  07/02/21 9932

## 2021-07-01 NOTE — ED NOTES
Report given to floor RN face to face. Questions answered, care trnasferred.  Pt off unit via wheelchair in NAD, RR even and unalbored     Amanda Thao, FAITH  07/01/21 3931

## 2021-07-01 NOTE — ED NOTES
Pt calm and resting quietly with equal rise and fall of chest. Pt in NAD, RR even and unlabored. Side rails up, bed locked and in lowest position. Pt updated on plan of care. No needs at this time. Pt instructed on use of call light if needed.       Yanely Abbott RN  07/01/21 4283

## 2021-07-01 NOTE — PROGRESS NOTES
4 Eyes Skin Assessment     The patient is being assess for  Admission    I agree that 2 RN's have performed a thorough Head to Toe Skin Assessment on the patient. ALL assessment sites listed below have been assessed. Areas assessed by both nurses:   [x]   Head, Face, and Ears   [x]   Shoulders, Back, and Chest  [x]   Arms, Elbows, and Hands   [x]   Coccyx, Sacrum, and IschIum  [x]   Legs, Feet, and Heels        Does the Patient have Skin Breakdown?   No         Narciso Prevention initiated:  No   Wound Care Orders initiated:  No      [facility] nurse consulted for Pressure Injury (Stage 3,4, Unstageable, DTI, NWPT, and Complex wounds), New and Established Ostomies:  No      Nurse 1 eSignature: Electronically signed by Calos Haywood RN on 7/1/21 at 7:57 PM EDT    **SHARE this note so that the co-signing nurse is able to place an eSignature**    Nurse 2 eSignature: Electronically signed by Karey Perera RN on 7/1/21 at 11:19 PM EDT

## 2021-07-01 NOTE — ED NOTES
Pt calm and resting quietly with equal rise and fall of chest. Pt in NAD, RR even and unlabored. Side rails up, bed locked and in lowest position. Pt updated on plan of care. Pt given few ice chips upon request. No other  needs at this time. Pt instructed on use of call light if needed.       Yamile Corrigan RN  07/01/21 9828

## 2021-07-02 PROBLEM — E44.0 MODERATE MALNUTRITION (HCC): Status: ACTIVE | Noted: 2021-07-02

## 2021-07-02 LAB
ANION GAP SERPL CALCULATED.3IONS-SCNC: 8 MMOL/L (ref 3–16)
BASOPHILS ABSOLUTE: 0 K/UL (ref 0–0.2)
BASOPHILS RELATIVE PERCENT: 0.2 %
BUN BLDV-MCNC: 10 MG/DL (ref 7–20)
CALCIUM SERPL-MCNC: 9.1 MG/DL (ref 8.3–10.6)
CHLORIDE BLD-SCNC: 106 MMOL/L (ref 99–110)
CO2: 24 MMOL/L (ref 21–32)
CREAT SERPL-MCNC: 0.6 MG/DL (ref 0.6–1.2)
EOSINOPHILS ABSOLUTE: 0 K/UL (ref 0–0.6)
EOSINOPHILS RELATIVE PERCENT: 0.1 %
GFR AFRICAN AMERICAN: >60
GFR NON-AFRICAN AMERICAN: >60
GLUCOSE BLD-MCNC: 131 MG/DL (ref 70–99)
HCT VFR BLD CALC: 38.1 % (ref 36–48)
HEMOGLOBIN: 12.5 G/DL (ref 12–16)
IRON SATURATION: 8 % (ref 15–50)
IRON: 17 UG/DL (ref 37–145)
LYMPHOCYTES ABSOLUTE: 1 K/UL (ref 1–5.1)
LYMPHOCYTES RELATIVE PERCENT: 6.1 %
MCH RBC QN AUTO: 30.6 PG (ref 26–34)
MCHC RBC AUTO-ENTMCNC: 32.8 G/DL (ref 31–36)
MCV RBC AUTO: 93.3 FL (ref 80–100)
MONOCYTES ABSOLUTE: 1.1 K/UL (ref 0–1.3)
MONOCYTES RELATIVE PERCENT: 6.5 %
NEUTROPHILS ABSOLUTE: 14.7 K/UL (ref 1.7–7.7)
NEUTROPHILS RELATIVE PERCENT: 87.1 %
PDW BLD-RTO: 13.8 % (ref 12.4–15.4)
PLATELET # BLD: 324 K/UL (ref 135–450)
PMV BLD AUTO: 8.2 FL (ref 5–10.5)
POTASSIUM REFLEX MAGNESIUM: 4 MMOL/L (ref 3.5–5.1)
RBC # BLD: 4.09 M/UL (ref 4–5.2)
SODIUM BLD-SCNC: 138 MMOL/L (ref 136–145)
TOTAL IRON BINDING CAPACITY: 209 UG/DL (ref 260–445)
WBC # BLD: 16.9 K/UL (ref 4–11)

## 2021-07-02 PROCEDURE — 94640 AIRWAY INHALATION TREATMENT: CPT

## 2021-07-02 PROCEDURE — 6370000000 HC RX 637 (ALT 250 FOR IP): Performed by: INTERNAL MEDICINE

## 2021-07-02 PROCEDURE — 1200000000 HC SEMI PRIVATE

## 2021-07-02 PROCEDURE — 2580000003 HC RX 258: Performed by: INTERNAL MEDICINE

## 2021-07-02 PROCEDURE — 2500000003 HC RX 250 WO HCPCS: Performed by: INTERNAL MEDICINE

## 2021-07-02 PROCEDURE — 80048 BASIC METABOLIC PNL TOTAL CA: CPT

## 2021-07-02 PROCEDURE — 83540 ASSAY OF IRON: CPT

## 2021-07-02 PROCEDURE — 83550 IRON BINDING TEST: CPT

## 2021-07-02 PROCEDURE — 85025 COMPLETE CBC W/AUTO DIFF WBC: CPT

## 2021-07-02 PROCEDURE — 6360000002 HC RX W HCPCS: Performed by: INTERNAL MEDICINE

## 2021-07-02 RX ADMIN — TIOTROPIUM BROMIDE INHALATION SPRAY 2 PUFF: 3.12 SPRAY, METERED RESPIRATORY (INHALATION) at 08:35

## 2021-07-02 RX ADMIN — DULOXETINE HYDROCHLORIDE 60 MG: 60 CAPSULE, DELAYED RELEASE ORAL at 10:10

## 2021-07-02 RX ADMIN — SODIUM CHLORIDE, PRESERVATIVE FREE 10 ML: 5 INJECTION INTRAVENOUS at 20:15

## 2021-07-02 RX ADMIN — Medication 2 PUFF: at 19:09

## 2021-07-02 RX ADMIN — METOPROLOL SUCCINATE 50 MG: 50 TABLET, EXTENDED RELEASE ORAL at 10:09

## 2021-07-02 RX ADMIN — MORPHINE SULFATE 2 MG: 2 INJECTION, SOLUTION INTRAMUSCULAR; INTRAVENOUS at 23:21

## 2021-07-02 RX ADMIN — Medication 2 PUFF: at 08:35

## 2021-07-02 RX ADMIN — ONDANSETRON 4 MG: 2 INJECTION INTRAMUSCULAR; INTRAVENOUS at 18:38

## 2021-07-02 RX ADMIN — CIPROFLOXACIN 400 MG: 2 INJECTION, SOLUTION INTRAVENOUS at 18:44

## 2021-07-02 RX ADMIN — SODIUM CHLORIDE, PRESERVATIVE FREE 10 ML: 5 INJECTION INTRAVENOUS at 09:59

## 2021-07-02 RX ADMIN — FOLIC ACID 1 MG: 1 TABLET ORAL at 10:10

## 2021-07-02 RX ADMIN — METRONIDAZOLE 500 MG: 500 INJECTION, SOLUTION INTRAVENOUS at 10:19

## 2021-07-02 RX ADMIN — SODIUM CHLORIDE: 9 INJECTION, SOLUTION INTRAVENOUS at 05:17

## 2021-07-02 RX ADMIN — CIPROFLOXACIN 400 MG: 2 INJECTION, SOLUTION INTRAVENOUS at 05:19

## 2021-07-02 RX ADMIN — MIRTAZAPINE 15 MG: 15 TABLET, FILM COATED ORAL at 20:05

## 2021-07-02 RX ADMIN — LEVOTHYROXINE SODIUM 50 MCG: 0.05 TABLET ORAL at 10:10

## 2021-07-02 RX ADMIN — GABAPENTIN 300 MG: 300 CAPSULE ORAL at 13:30

## 2021-07-02 RX ADMIN — ATORVASTATIN CALCIUM 40 MG: 10 TABLET, FILM COATED ORAL at 10:09

## 2021-07-02 RX ADMIN — METRONIDAZOLE 500 MG: 500 INJECTION, SOLUTION INTRAVENOUS at 20:14

## 2021-07-02 RX ADMIN — ONDANSETRON 4 MG: 2 INJECTION INTRAMUSCULAR; INTRAVENOUS at 10:09

## 2021-07-02 RX ADMIN — METRONIDAZOLE 500 MG: 500 INJECTION, SOLUTION INTRAVENOUS at 02:12

## 2021-07-02 RX ADMIN — METHYLPREDNISOLONE SODIUM SUCCINATE 40 MG: 40 INJECTION, POWDER, FOR SOLUTION INTRAMUSCULAR; INTRAVENOUS at 10:09

## 2021-07-02 RX ADMIN — LOSARTAN POTASSIUM 100 MG: 100 TABLET, FILM COATED ORAL at 10:09

## 2021-07-02 RX ADMIN — GABAPENTIN 300 MG: 300 CAPSULE ORAL at 20:05

## 2021-07-02 RX ADMIN — MORPHINE SULFATE 2 MG: 2 INJECTION, SOLUTION INTRAMUSCULAR; INTRAVENOUS at 18:40

## 2021-07-02 RX ADMIN — GABAPENTIN 300 MG: 300 CAPSULE ORAL at 17:53

## 2021-07-02 RX ADMIN — METHYLPREDNISOLONE SODIUM SUCCINATE 40 MG: 40 INJECTION, POWDER, FOR SOLUTION INTRAMUSCULAR; INTRAVENOUS at 20:05

## 2021-07-02 RX ADMIN — MORPHINE SULFATE 2 MG: 2 INJECTION, SOLUTION INTRAMUSCULAR; INTRAVENOUS at 13:30

## 2021-07-02 RX ADMIN — AMLODIPINE BESYLATE 5 MG: 5 TABLET ORAL at 20:05

## 2021-07-02 RX ADMIN — GABAPENTIN 300 MG: 300 CAPSULE ORAL at 10:09

## 2021-07-02 ASSESSMENT — PAIN SCALES - GENERAL
PAINLEVEL_OUTOF10: 8
PAINLEVEL_OUTOF10: 6
PAINLEVEL_OUTOF10: 5
PAINLEVEL_OUTOF10: 7
PAINLEVEL_OUTOF10: 6

## 2021-07-02 ASSESSMENT — PAIN DESCRIPTION - ORIENTATION: ORIENTATION: LOWER

## 2021-07-02 ASSESSMENT — PAIN DESCRIPTION - LOCATION: LOCATION: ABDOMEN

## 2021-07-02 ASSESSMENT — PAIN DESCRIPTION - PAIN TYPE: TYPE: ACUTE PAIN

## 2021-07-02 NOTE — PROGRESS NOTES
Pt assessment completed and charted. VSS. Pt a/ox4. Pt Npo w/ ice chips and accepting. Pt on 3L nc, pt's baseline at home. Pt uses walker as needed a home. Pt reports being able to walk to bathroom on own, pt has done fine. Refuses bed check. Pt denies any other needs at this time. Pt calls out appropriately. Will continue to monitor. Pt is a fall risk;  -Bed in lowest position and wheels locked. -Call light within reach.   -Bedside table within reach.   -Non-skid footwear in place.

## 2021-07-02 NOTE — RT PROTOCOL NOTE
order mode. Repeat RT Therapy Protocol Assessment with second treatment then BID and as needed. If Albuterol Inhaler not tolerated or not effective, then discontinue the Albuterol Inhaler orders and enter two Albuterol Nebulizer orders with same frequencies and PRN reasons. 11-13 - discontinue any other Inpatient aerosolized bronchodilator medication orders and enter DuoNeb Nebulizer orders QID frequency and an Albuterol Nebulizer order every 2 hours PRN wheezing or increased work of breathing using Per Protocol order mode. Repeat RT Therapy Protocol Assessment with second treatment then QID and as needed. Greater than 13 - discontinue any other Inpatient bronchodilator aerosolized medication orders and enter DuoNeb Nebulizer order every 4 hours frequency and Albuterol Nebulizer every 2 hours PRN wheezing or increased work of breathing using Per Protocol order mode. Repeat RT Therapy Protocol Assessment with second treatment then every 4 hours and as needed. RT to enter RT Home Evaluation for COPD & MDI Assessment order using Per Protocol order mode.     Electronically signed by Julissa Vargas RCP on 7/2/2021 at 12:44 AM

## 2021-07-02 NOTE — PLAN OF CARE
Nutrition Problem #1: Moderate malnutrition  Intervention: Food and/or Nutrient Delivery: Continue NPO (Advance diet per MD)  Nutritional Goals: Receive the most appropriate form of nutrition in 24-48 hours.

## 2021-07-02 NOTE — PROGRESS NOTES
Pt assessment completed and charted. VSS. Pt a/o. Pt c/o7/10 pain and nausea. PRN medication administered per MAR. Pt also c/o hematochezia. Bed in lowest position and wheels locked. Call light within reach. Bedside table within reach. Non-skid footwear in place. Pt denies any other needs at this time. Pt calls out appropriately. Will continue to monitor.

## 2021-07-02 NOTE — PLAN OF CARE
Problem: Falls - Risk of:  Goal: Will remain free from falls  Description: Will remain free from falls  Outcome: Ongoing  Pt remains free from falls during this shift. Pt a/o and calls out appropriately. Bed in lowest position and wheels locked. Call light within reach. Bedside table within reach. Pt independent at home. Pt educated to call out if needing any assistance ambulating.

## 2021-07-02 NOTE — PROGRESS NOTES
Wounds:  None       Current Nutrition Therapies:    Diet NPO Exceptions are: Ice Chips    Anthropometric Measures:  · Height: 5' 4\" (162.6 cm)  · Current Body Weight: 97 lb (44 kg)    · Ideal Body Weight: 120 lbs; % Ideal Body Weight 80.8 %   · BMI: 16.6  · BMI Categories: Underweight (BMI less than 22) age over 72       Nutrition Diagnosis:   · Moderate malnutrition related to early satiety, pain as evidenced by intake 0-25%, poor intake prior to admission, BMI, weight loss, weight loss greater than or equal to 10% in 6 months, moderate loss of subcutaneous fat, moderate muscle loss, diarrhea, nausea    Nutrition Interventions:   Food and/or Nutrient Delivery:  Continue NPO (Advance diet per MD)  Nutrition Education/Counseling:  No recommendation at this time   Coordination of Nutrition Care:  Continue to monitor while inpatient    Goals:  Receive the most appropriate form of nutrition in 24-48 hours. Nutrition Monitoring and Evaluation:   Behavioral-Environmental Outcomes:  None Identified   Food/Nutrient Intake Outcomes:  None Identified  Physical Signs/Symptoms Outcomes:  Biochemical Data, Diarrhea, GI Status, Nausea or Vomiting, Nutrition Focused Physical Findings, Skin, Weight     Discharge Planning:     Too soon to determine     Electronically signed by Mayra Monroe, MS, RD, LD on 7/2/21 at 2:12 PM EDT    Contact: Office: [phone]; [address]: [id number]

## 2021-07-02 NOTE — H&P
Hospital Medicine History & Physical      PCP: Mary Saavedra DO    Date of Admission: 7/1/2021    Date of Service: Pt seen/examined on 7/1/2021 and Admitted to Inpatient with expected LOS greater than two midnights due to medical therapy. Chief Complaint: Abdominal pain, bloody stools      History Of Present Illness:   62 y.o. female who presented to [facility] with above complaints  Patient reports she has been having left lower quadrant abdominal pain for the past month intermittent, recently worsening. This cramping abdominal pain is associated with bloody bowel movements, mostly diarrheal stools. She has been having bloody BM over the past week. She has been having both melena and hematochezia with oftentimes blood filling up the toilet bowl. Patient reports history of UC, used to follow-up with GI down in [place], has now switched care to [facility] and saw GI few weeks back, does not remember whom she saw. Patient also reports nausea and vomiting, no hematemesis. Has not been eating or drinking much. Denies any subjective fevers but has been having chills. Past Medical History:          Diagnosis Date    Back pain     [facility]chronic obstructive pulmonary disease) ([facility]     Fatigue     Hypertension     Syncope and collapse 03/2019    Thyroid disease     Ulcerative colitis, unspecified, without complications ([facility] [address]        Past Surgical History:          Procedure Laterality Date    BACK SURGERY      L4-L5 rods in    HYSTERECTOMY, VAGINAL  2000    SPLENECTOMY  2019    due to a fall    TONSILLECTOMY         Medications Prior to Admission:      Prior to Admission medications    Medication Sig Start Date End Date Taking?  Authorizing Provider   mirtazapine (REMERON) 15 MG tablet Take 15 mg by mouth nightly   Yes Historical Provider, MD Felix Sanders 100-62.5-25 MCG/INH AEPB INHALE 1 PUFF INTO THE LUNGS DAILY 5/11/21  Yes Margy Barboza MD   losartan (COZAAR) 100 MG tablet Take 1 tablet by mouth daily 5/10/21  Yes Retia Paget, MD   amLODIPine (NORVASC) 5 MG tablet Take 1 tablet by mouth nightly 5/10/21  Yes Retia Paget, MD   atorvastatin (LIPITOR) 40 MG tablet Take 1 tablet by mouth daily 5/10/21 5/5/22 Yes Retia Paget, MD   metoprolol succinate (TOPROL XL) 50 MG extended release tablet Take 1 tablet by mouth daily 4/7/21 4/2/22 Yes Retia Paget, MD   Cholecalciferol (VITAMIN D) 50 MCG (2000 UT) CAPS capsule Take by mouth    Yes Historical Provider, MD   benzonatate (TESSALON) 100 MG capsule Take 100 mg by mouth 3 times daily as needed for Cough   Yes Historical Provider, MD   albuterol sulfate  (90 Base) MCG/ACT inhaler Inhale 1 puff into the lungs as needed for Wheezing or Shortness of Breath 11/25/20  Yes Sammie Guzmán MD   pantoprazole (PROTONIX) 40 MG tablet Take 40 mg by mouth daily 10/16/20  Yes Historical Provider, MD   FLORASTOR 250 MG capsule Take 250 mg by mouth daily 9/17/20  Yes Historical Provider, MD   gabapentin (NEURONTIN) 300 MG capsule Take 1 capsule by mouth 4 times daily for 30 days.  11/5/20  Yes Vilma Abdul MD   aspirin-acetaminophen-caffeine (EXCEDRIN MIGRAINE) 278-610-39 MG per tablet Take 1 tablet by mouth every 6 hours as needed for Headaches   Yes Historical Provider, MD   albuterol (PROVENTIL) (2.5 MG/3ML) 0.083% nebulizer solution Take 3 mLs by nebulization every 6 hours as needed for Wheezing 6/23/20  Yes Sammie Guzmán MD   DULoxetine (CYMBALTA) 60 MG extended release capsule Take 60 mg by mouth    Yes Historical Provider, MD   folic acid (FOLVITE) 1 MG tablet Take 1 tablet by mouth daily 11/11/19  Yes Zach Qureshi MD   Acetaminophen (TYLENOL) 325 MG CAPS Take 975 mg by mouth as needed 6/15/19  Yes Historical Provider, MD   levothyroxine (SYNTHROID) 50 MCG tablet Take 50 mcg by mouth Daily   Yes Historical Provider, MD   ondansetron (ZOFRAN-ODT) 8 MG TBDP disintegrating tablet Place 8 mg under the tongue every 8 hours as needed for Nausea or Vomiting   Yes Historical Provider, MD   aspirin 81 MG chewable tablet Take 1 tablet by mouth daily 3/24/21 5/10/21  Yoly Melara MD       Allergies:  Patient has no known allergies. Social History:      The patient currently lives at home    TOBACCO:   reports that she quit smoking about 16 months ago. Her smoking use included cigarettes. She has a 3.20 pack-year smoking history. She has never used smokeless tobacco.  ETOH:   reports no history of alcohol use. E-Cigarettes/Vaping Use     Questions Responses    E-Cigarette/Vaping Use Former User    Start Date     Passive Exposure     Quit Date     Counseling Given     Comments             Family History:    Positive as follows:        Problem Relation Age of Onset    Heart Disease Father     High Blood Pressure Sister        REVIEW OF SYSTEMS COMPLETED:   Pertinent positives as noted in the HPI. All other systems reviewed and negative. PHYSICAL EXAM PERFORMED:    /73   Pulse 96   Temp 99.8 °F (37.7 °C) (Oral)   Resp 17   Ht 5' 4\" (1.626 m)   Wt 97 lb 3.2 oz (44.1 kg)   SpO2 98%   BMI 16.68 kg/m²     General appearance:  No apparent distress, appears stated age and cooperative. HEENT:  Normal cephalic, atraumatic without obvious deformity. Pupils equal, round, and reactive to light. Extra ocular muscles intact. Conjunctivae/corneas clear. Neck: Supple, with full range of motion. No jugular venous distention. Trachea midline. Respiratory:  Normal respiratory effort. Clear to auscultation, bilaterally without Rales/Wheezes/Rhonchi. Cardiovascular:  Regular rate and rhythm with normal S1/S2 without murmurs, rubs or gallops. Abdomen: Soft, diffusely tender, non-distended with normal bowel sounds. Musculoskeletal:  No clubbing, cyanosis or edema bilaterally. Full range of motion without deformity. Skin: Skin color, texture, turgor normal.  No rashes or lesions.   Neurologic:  Neurovascularly intact without any focal sensory/motor deficits. Cranial nerves: II-XII intact, grossly non-focal.  Psychiatric:  Alert and oriented, thought content appropriate, normal insight  Capillary Refill: Brisk,3 seconds, normal  Peripheral Pulses: +2 palpable, equal bilaterally       Labs:     Recent Labs     07/01/21  1329   WBC 17.2*   HGB 14.3   HCT 42.8        Recent Labs     07/01/21  1329      K 3.9      CO2 26   BUN 17   CREATININE 0.9   CALCIUM 10.0     Recent Labs     07/01/21  1329   AST 24   ALT 14   BILITOT 1.1*   ALKPHOS 87     No results for input(s): INR in the last 72 hours. No results for input(s): Aditya Beery in the last 72 hours. Urinalysis:      Lab Results   Component Value Date    NITRU Negative 07/01/2021    WBCUA 10-20 07/01/2021    BACTERIA Rare 03/23/2021    RBCUA 11-20 07/01/2021    BLOODU LARGE 07/01/2021    SPECGRAV 1.010 07/01/2021    GLUCOSEU Negative 07/01/2021       Radiology:     CT abdomen personally reviewed by me, shows pancolitis      CT ABDOMEN PELVIS W IV CONTRAST Additional Contrast? None   Final Result   Findings consistent with severe diffuse acute colitis. ASSESSMENT:PLAN:    Ulcerative colitis with rectal bleeding    -CT showing pancolitis. Likely inflammatory from UC, less likely infectious. Unlikely vascular given distribution and lactic <2   -GI consulted   -Start IV steroids 40 mg Solu-Medrol twice daily   -Continue IV ciprofloxacin + Flagyl.    -Stool for C. difficile, stool culture ordered   -Supportive care with IV antiemetics, analgesics, IV fluids   -Follow-up periodic CBC   -Keep n.p.o. Hematochezia/melena  Due to severe colitis. Initial Hb 14.3, suspect hemoconcentrated from dehydration. Expected drop with IV fluids. Will recheck CBC again at midnight.     Acquired hypothyroidism-resume levothyroxine    HTN-controlled, resume home regimen    HLD-statin resumed    COPD-stable, albuterol nebs prn resumed along with Symbicort twice daily    DVT Prophylaxis: SCD  Diet: Diet NPO Exceptions are: Ice Chips  Code Status: Full Code    PT/OT Eval Status: Ambulatory    Dispo -IP stay       Geneva Guajardo MD    Thank you Felix Perdue DO for the opportunity to be involved in this patient's care. If you have any questions or concerns please feel free to contact me at [phone].

## 2021-07-02 NOTE — CONSULTS
Gastroenterology Consult Note    Patient:   Burgess Denis   YOB: 1960   Facility:   [facility]   Referring/PCP: Natali Cee DO  Date:     7/2/2021  Consultant:   Jagdeep Loya MD, MD    Subjective: This 62 y.o. female was admitted 7/1/2021 with a diagnosis of \"Ulcerative colitis with rectal bleeding ([facility] [address] [K51.911]\" and is seen in consultation regarding \"colitis\". Information was obtained from interview of  the patient, examination of the patient, and review of records. I did  update the past medical, surgical, social and / or family history. Colitis moderate for 1 month in colon assoc w abd pain/diarrhea/hematochezia and wt loss    Current status  Present  Diet Order: Diet NPO Exceptions are: Ice Chips and she is tolerating diet. Recently, she has experienced little, maximum 3/10 generalized abdominal  Pain and she has not required Intravenous narcotic analgesics. The patient has also experienced no constipation, fever, melena, nausea and vomiting      Prior to Admission medications    Medication Sig Start Date End Date Taking?  Authorizing Provider   mirtazapine (REMERON) 15 MG tablet Take 15 mg by mouth nightly   Yes Historical Provider, MD Alarcon Loges 100-62.5-25 MCG/INH AEPB INHALE 1 PUFF INTO THE LUNGS DAILY 5/11/21  Yes Forrest Dubin, MD   losartan (COZAAR) 100 MG tablet Take 1 tablet by mouth daily 5/10/21  Yes Fara Hernandez MD   amLODIPine (NORVASC) 5 MG tablet Take 1 tablet by mouth nightly 5/10/21  Yes Fara Hernandez MD   atorvastatin (LIPITOR) 40 MG tablet Take 1 tablet by mouth daily 5/10/21 5/5/22 Yes Fara Hernandez MD   metoprolol succinate (TOPROL XL) 50 MG extended release tablet Take 1 tablet by mouth daily 4/7/21 4/2/22 Yes Fara Hernandez MD   Cholecalciferol (VITAMIN D) 50 MCG (2000 UT) CAPS capsule Take by mouth    Yes Historical Provider, MD   benzonatate (TESSALON) 100 MG capsule Take 100 mg by mouth 3 times daily as needed for Cough   Yes Historical Provider, MD   albuterol sulfate  (90 Base) MCG/ACT inhaler Inhale 1 puff into the lungs as needed for Wheezing or Shortness of Breath 11/25/20  Yes Boyd Luna MD   pantoprazole (PROTONIX) 40 MG tablet Take 40 mg by mouth daily 10/16/20  Yes Historical Provider, MD   FLORASTOR 250 MG capsule Take 250 mg by mouth daily 9/17/20  Yes Historical Provider, MD   gabapentin (NEURONTIN) 300 MG capsule Take 1 capsule by mouth 4 times daily for 30 days.  11/5/20  Yes Irene Abdul MD   aspirin-acetaminophen-caffeine (EXCEDRIN MIGRAINE) 915-545-54 MG per tablet Take 1 tablet by mouth every 6 hours as needed for Headaches   Yes Historical Provider, MD   albuterol (PROVENTIL) (2.5 MG/3ML) 0.083% nebulizer solution Take 3 mLs by nebulization every 6 hours as needed for Wheezing 6/23/20  Yes Boyd Luna MD   DULoxetine (CYMBALTA) 60 MG extended release capsule Take 60 mg by mouth    Yes Historical Provider, MD   folic acid (FOLVITE) 1 MG tablet Take 1 tablet by mouth daily 11/11/19  Yes Shyam Murcia MD   Acetaminophen (TYLENOL) 325 MG CAPS Take 975 mg by mouth as needed 6/15/19  Yes Historical Provider, MD   levothyroxine (SYNTHROID) 50 MCG tablet Take 50 mcg by mouth Daily   Yes Historical Provider, MD   ondansetron (ZOFRAN-ODT) 8 MG TBDP disintegrating tablet Place 8 mg under the tongue every 8 hours as needed for Nausea or Vomiting   Yes Historical Provider, MD   aspirin 81 MG chewable tablet Take 1 tablet by mouth daily 3/24/21 5/10/21  Rene Bonner MD      Scheduled Medications:    amLODIPine  5 mg Oral Nightly    atorvastatin  40 mg Oral Daily    DULoxetine  60 mg Oral Daily    folic acid  1 mg Oral Daily    gabapentin  300 mg Oral 4x Daily    levothyroxine  50 mcg Oral Daily    losartan  100 mg Oral Daily    metoprolol succinate  50 mg Oral Daily    mirtazapine  15 mg Oral Nightly    sodium chloride flush  5-40 mL Intravenous 2 times per day  methylPREDNISolone  40 mg Intravenous Q12H    ciprofloxacin  400 mg Intravenous Q12H    metroNIDAZOLE  500 mg Intravenous Q8H    budesonide-formoterol  2 puff Inhalation BID    tiotropium  2 puff Inhalation Daily     Infusions:    sodium chloride      sodium chloride 150 mL/hr at 21 0649     PRN Medications: albuterol, sodium chloride flush, sodium chloride, ondansetron **OR** ondansetron, acetaminophen **OR** acetaminophen, morphine  Allergies: No Known Allergies    Past Medical History:   Diagnosis Date    Back pain     [facility]ch[facility]) [facility]     Fatigue     Hypertension     Syncope and collapse 2019    Thyroid disease     Ulcerative colitis, unspecified, without complications ([facility] [address]      Past Surgical History:   Procedure Laterality Date    BACK SURGERY      L4-L5 rods in    HYSTERECTOMY, VAGINAL      SPLENECTOMY      due to a fall    TONSILLECTOMY         Social:   Social History     Tobacco Use    Smoking status: Former Smoker     Packs/day: 0.10     Years: 32.00     Pack years: 3.20     Types: Cigarettes     Quit date: 2020     Years since quittin.3    Smokeless tobacco: Never Used   Substance Use Topics    Alcohol use: Never     Family:   Family History   Problem Relation Age of Onset    Heart Disease Father     High Blood Pressure Sister        ROS: Pertinent items are noted in HPI.     Objective:   Vital Signs:  Temp (24hrs), Av.7 °F (37.1 °C), Min:98.1 °F (36.7 °C), Max:99.8 °F (74.1 °C)     Systolic (15IAL), RBZ:106 , Min:105 , MSN:581      Diastolic (51ADN), BAL:03, Min:60, Max:76     Pulse  Av.4  Min: 79  Max: 96  /66   Pulse 79   Temp 98.1 °F (36.7 °C) (Oral)   Resp 18   Ht 5' 4\" (1.626 m)   Wt 97 lb 3.2 oz (44.1 kg)   SpO2 97%   BMI 16.68 kg/m²      Physical Exam:   /66   Pulse 79   Temp 98.1 °F (36.7 °C) (Oral)   Resp 18   Ht 5' 4\" (1.626 m)   Wt 97 lb 3.2 oz (44.1 kg)   SpO2 97%   BMI 16.68 kg/m²   General appearance: alert, appears stated age and cooperative  Lungs: clear to auscultation bilaterally  Chest wall: no tenderness  Heart: regular rate and rhythm, S1, S2 normal, no murmur, click, rub or gallop  Abdomen: abnormal findings:  tenderness mild in the entire abdomen  Extremities: extremities normal, atraumatic, no cyanosis or edema  Skin: Skin color, texture, turgor normal. No rashes or lesions  Neurologic: Grossly normal    Lab and Imaging Review   Recent Labs     07/01/21  1329 07/01/21  2305 07/02/21  0437   WBC 17.2*  --  16.9*   HGB 14.3 13.2 12.5   MCV 93.4  --  93.3     --  324     --  138   K 3.9  --  4.0     --  106   CO2 26  --  24   BUN 17  --  10   CREATININE 0.9  --  0.6   GLUCOSE 125*  --  131*   CALCIUM 10.0  --  9.1   PROT 7.2  --   --    LABALBU 4.0  --   --    AST 24  --   --    ALT 14  --   --    ALKPHOS 87  --   --    BILITOT 1.1*  --   --    LIPASE 8.0*  --   --        Assessment:     Patient Active Problem List    Diagnosis Date Noted    Moderate malnutrition ([address] 07/02/2021    Ulcerative colitis with rectal bleeding ([address] 07/01/2021    Other osteoporosis without current pathological fracture 06/05/2021    Menopause 05/26/2021    Non-ischemic cardiomyopathy ([address]     NSTEMI (non-ST elevated myocardial infarction) ([address]     Adrenal nodule ([address] 03/01/2021    Hypercalcemia 03/01/2021    Diarrhea     COPD with acute exacerbation ([address] 12/22/2020    Gastroesophageal reflux disease without esophagitis 11/05/2020    Anxiety and depression 11/05/2020    Neuropathy of left lower extremity 11/05/2020    Acquired hypothyroidism 11/05/2020    COPD exacerbation ([address] 11/30/2019    Moderate COPD (chronic obstructive pulmonary disease) ([address] 10/10/2019    Heterozygous alpha 1-antitrypsin deficiency ([address] 10/10/2019    Essential hypertension 09/24/2019    Chest pain 09/24/2019     63 yo w HTN, CAD, COPD, GERD admitted w 1 month of Lt-sided abd pain/diarrhea/hematochezia and wt loss and suggested to have diffuse colitis on CT associated w leucocytosis. C doff is neg, and her stool was interestingly heme-neg. Infectious etiology vs Ulcerative colitis are possibilities eventhough she had a neg. Colonoscopy in 2019. Plan:   1. Supportive care  2. Will observe clinical response to Cipro/Flagyl and Solumedrol started by the primary team  3. Would check another C. diff  4.  Will need a repeat colonoscopy (within a few days if no or slow response, or as outpatient if she significantly improves)     Britney Armstrong MD       [phone]

## 2021-07-02 NOTE — CARE COORDINATION
CASE MANAGEMENT INITIAL ASSESSMENT    Reviewed chart and completed assessment with: Patient     Explained Case Management role/services. Primary contact information: Dio Goodson- spouse     Health Care Decision Maker :   Primary Decision Maker: [place], [address] - [phone]    Primary Decision Maker: Tesha Villareal - Spouse - [phone]    Secondary Decision Maker: Cynthia Hidalgo - [phone]    Secondary Decision Maker: Debbie Merritt - Child - [phone]    Supplemental (Other) Decision Maker: Katie Elmore - [phone]        Can this person be reached and be able to respond quickly, such as within a few minutes or hours? Yes    Admit date/status:07/01/2021 Inpatient   Diagnosis: Ulcerative colitis with rectal bleeding  Is this a Readmission?:  No      Insurance:[employer]    Precert required for SNF: Yes       3 night stay required: No    Living arrangements, Adls, care needs, prior to admission: Patient reports living at home with spouse 02 at baseline, using RW on day when have COPD flare up and/or for longer \"Community distances\"    Transportation: Supported     Durable Medical Equipment at home:  Walker_x_Cane__RTS__ BSC__Shower Chair__  02_x apria_ HHN_x_ CPAP__  BiPap__  Hospital Bed__ W/C___ Other__________    Services in the home and/or outpatient, prior to admission: [address]- non skilled    PT/OT recs: not ordered     Hospital Exemption Notification (HEN): NA    Barriers to discharge: Denies    Plan/comments: Patient plans to return home with spouse. Patient reports baseline able to complete dressing task, bad days uses RW. Patient uses [place] [facility] [place] for 02. SW will ct to follow. Lisa Mejia, SEANW       ECOC on chart for MD signature

## 2021-07-02 NOTE — ACP (ADVANCE CARE PLANNING)
Advance Care Planning     General Advance Care Planning (ACP) Conversation    Date of Conversation: 2021  Conducted with: Patient with Decision Making Capacity    Healthcare Decision Maker:      Primary Decision Maker: [address] [place] [address], [address] [place] [address] - [phone]    Primary Decision Maker: Pawan Espinosa - Spouse - [phone]    Secondary Decision Maker: Maggy Mitchel - [phone]    Secondary Decision Maker: Samy Brown - Child - [phone]    Supplemental (Other) Decision Maker: Arlan Apgar - [phone]    Today we documented Decision Maker(s) consistent with Legal Next of Kin hierarchy.     Content/Action Overview:  DECLINED ACP Conversation - will revisit periodically  Reviewed DNR/DNI and patient elects Full Code (Attempt Resuscitation)    Length of Voluntary ACP Conversation in minutes:  <16 minutes (Non-Billable)    CARLIN August

## 2021-07-03 LAB
ALBUMIN SERPL-MCNC: 3.5 G/DL (ref 3.4–5)
ANION GAP SERPL CALCULATED.3IONS-SCNC: 8 MMOL/L (ref 3–16)
BUN BLDV-MCNC: 15 MG/DL (ref 7–20)
CALCIUM SERPL-MCNC: 9.5 MG/DL (ref 8.3–10.6)
CHLORIDE BLD-SCNC: 104 MMOL/L (ref 99–110)
CO2: 25 MMOL/L (ref 21–32)
CREAT SERPL-MCNC: <0.5 MG/DL (ref 0.6–1.2)
GFR AFRICAN AMERICAN: >60
GFR NON-AFRICAN AMERICAN: >60
GI BACTERIAL PATHOGENS BY PCR: NORMAL
GLUCOSE BLD-MCNC: 130 MG/DL (ref 70–99)
HCT VFR BLD CALC: 37.6 % (ref 36–48)
HEMOGLOBIN: 12.2 G/DL (ref 12–16)
MCH RBC QN AUTO: 30.4 PG (ref 26–34)
MCHC RBC AUTO-ENTMCNC: 32.6 G/DL (ref 31–36)
MCV RBC AUTO: 93.3 FL (ref 80–100)
PDW BLD-RTO: 14.3 % (ref 12.4–15.4)
PHOSPHORUS: 2 MG/DL (ref 2.5–4.9)
PLATELET # BLD: 352 K/UL (ref 135–450)
PMV BLD AUTO: 8.6 FL (ref 5–10.5)
POTASSIUM SERPL-SCNC: 3.7 MMOL/L (ref 3.5–5.1)
RBC # BLD: 4.03 M/UL (ref 4–5.2)
SODIUM BLD-SCNC: 137 MMOL/L (ref 136–145)
WBC # BLD: 21.4 K/UL (ref 4–11)

## 2021-07-03 PROCEDURE — 80069 RENAL FUNCTION PANEL: CPT

## 2021-07-03 PROCEDURE — 1200000000 HC SEMI PRIVATE

## 2021-07-03 PROCEDURE — 6370000000 HC RX 637 (ALT 250 FOR IP): Performed by: INTERNAL MEDICINE

## 2021-07-03 PROCEDURE — 2580000003 HC RX 258: Performed by: INTERNAL MEDICINE

## 2021-07-03 PROCEDURE — 85027 COMPLETE CBC AUTOMATED: CPT

## 2021-07-03 PROCEDURE — 6360000002 HC RX W HCPCS: Performed by: INTERNAL MEDICINE

## 2021-07-03 PROCEDURE — 36415 COLL VENOUS BLD VENIPUNCTURE: CPT

## 2021-07-03 PROCEDURE — 94640 AIRWAY INHALATION TREATMENT: CPT

## 2021-07-03 PROCEDURE — 2700000000 HC OXYGEN THERAPY PER DAY

## 2021-07-03 PROCEDURE — 2500000003 HC RX 250 WO HCPCS: Performed by: INTERNAL MEDICINE

## 2021-07-03 PROCEDURE — 94761 N-INVAS EAR/PLS OXIMETRY MLT: CPT

## 2021-07-03 RX ORDER — METHYLPREDNISOLONE SODIUM SUCCINATE 40 MG/ML
20 INJECTION, POWDER, LYOPHILIZED, FOR SOLUTION INTRAMUSCULAR; INTRAVENOUS EVERY 8 HOURS
Status: DISCONTINUED | OUTPATIENT
Start: 2021-07-03 | End: 2021-07-07 | Stop reason: HOSPADM

## 2021-07-03 RX ADMIN — Medication 2 PUFF: at 20:52

## 2021-07-03 RX ADMIN — METRONIDAZOLE 500 MG: 500 INJECTION, SOLUTION INTRAVENOUS at 04:04

## 2021-07-03 RX ADMIN — SODIUM CHLORIDE, PRESERVATIVE FREE 10 ML: 5 INJECTION INTRAVENOUS at 20:05

## 2021-07-03 RX ADMIN — LEVOTHYROXINE SODIUM 50 MCG: 0.05 TABLET ORAL at 05:40

## 2021-07-03 RX ADMIN — ONDANSETRON 4 MG: 2 INJECTION INTRAMUSCULAR; INTRAVENOUS at 15:49

## 2021-07-03 RX ADMIN — GABAPENTIN 300 MG: 300 CAPSULE ORAL at 09:04

## 2021-07-03 RX ADMIN — GABAPENTIN 300 MG: 300 CAPSULE ORAL at 16:10

## 2021-07-03 RX ADMIN — METHYLPREDNISOLONE SODIUM SUCCINATE 20 MG: 40 INJECTION, POWDER, FOR SOLUTION INTRAMUSCULAR; INTRAVENOUS at 16:12

## 2021-07-03 RX ADMIN — ONDANSETRON 4 MG: 2 INJECTION INTRAMUSCULAR; INTRAVENOUS at 07:06

## 2021-07-03 RX ADMIN — ATORVASTATIN CALCIUM 40 MG: 10 TABLET, FILM COATED ORAL at 09:03

## 2021-07-03 RX ADMIN — TIOTROPIUM BROMIDE INHALATION SPRAY 2 PUFF: 3.12 SPRAY, METERED RESPIRATORY (INHALATION) at 07:59

## 2021-07-03 RX ADMIN — METHYLPREDNISOLONE SODIUM SUCCINATE 40 MG: 40 INJECTION, POWDER, FOR SOLUTION INTRAMUSCULAR; INTRAVENOUS at 09:03

## 2021-07-03 RX ADMIN — MORPHINE SULFATE 2 MG: 2 INJECTION, SOLUTION INTRAMUSCULAR; INTRAVENOUS at 07:06

## 2021-07-03 RX ADMIN — MORPHINE SULFATE 2 MG: 2 INJECTION, SOLUTION INTRAMUSCULAR; INTRAVENOUS at 16:10

## 2021-07-03 RX ADMIN — GABAPENTIN 300 MG: 300 CAPSULE ORAL at 20:05

## 2021-07-03 RX ADMIN — LOSARTAN POTASSIUM 100 MG: 100 TABLET, FILM COATED ORAL at 09:03

## 2021-07-03 RX ADMIN — METOPROLOL SUCCINATE 50 MG: 50 TABLET, EXTENDED RELEASE ORAL at 09:03

## 2021-07-03 RX ADMIN — SODIUM CHLORIDE: 9 INJECTION, SOLUTION INTRAVENOUS at 08:58

## 2021-07-03 RX ADMIN — MORPHINE SULFATE 2 MG: 2 INJECTION, SOLUTION INTRAMUSCULAR; INTRAVENOUS at 20:05

## 2021-07-03 RX ADMIN — CIPROFLOXACIN 400 MG: 2 INJECTION, SOLUTION INTRAVENOUS at 05:47

## 2021-07-03 RX ADMIN — MIRTAZAPINE 15 MG: 15 TABLET, FILM COATED ORAL at 20:05

## 2021-07-03 RX ADMIN — FOLIC ACID 1 MG: 1 TABLET ORAL at 09:03

## 2021-07-03 RX ADMIN — DULOXETINE HYDROCHLORIDE 60 MG: 60 CAPSULE, DELAYED RELEASE ORAL at 09:04

## 2021-07-03 RX ADMIN — AMLODIPINE BESYLATE 5 MG: 5 TABLET ORAL at 20:05

## 2021-07-03 RX ADMIN — GABAPENTIN 300 MG: 300 CAPSULE ORAL at 12:11

## 2021-07-03 RX ADMIN — MORPHINE SULFATE 2 MG: 2 INJECTION, SOLUTION INTRAMUSCULAR; INTRAVENOUS at 12:08

## 2021-07-03 RX ADMIN — Medication 2 PUFF: at 07:59

## 2021-07-03 RX ADMIN — SODIUM CHLORIDE: 9 INJECTION, SOLUTION INTRAVENOUS at 16:09

## 2021-07-03 ASSESSMENT — PAIN SCALES - GENERAL
PAINLEVEL_OUTOF10: 7
PAINLEVEL_OUTOF10: 7
PAINLEVEL_OUTOF10: 5
PAINLEVEL_OUTOF10: 6

## 2021-07-03 NOTE — PROGRESS NOTES
Shift assessment updated as charted. VSS. Per patient she had 9 episodes of diarrhea this am. Denies nausea. GI advanced patient to clear liquid diet, will reassess.

## 2021-07-03 NOTE — PROGRESS NOTES
Hospitalist Progress Note      PCP: Jose Shipley DO    Date of Admission: 7/1/2021      Subjective:     Reports some improvement today. Still having diarrhea was nonbloody. Abdominal pain persists was better. Tolerating clears. No fevers or chills      Medications:  Reviewed    Infusion Medications    sodium chloride      sodium chloride 150 mL/hr at 07/03/21 8296     Scheduled Medications    methylPREDNISolone  20 mg Intravenous Q8H    amLODIPine  5 mg Oral Nightly    atorvastatin  40 mg Oral Daily    DULoxetine  60 mg Oral Daily    folic acid  1 mg Oral Daily    gabapentin  300 mg Oral 4x Daily    levothyroxine  50 mcg Oral Daily    losartan  100 mg Oral Daily    metoprolol succinate  50 mg Oral Daily    mirtazapine  15 mg Oral Nightly    sodium chloride flush  5-40 mL Intravenous 2 times per day    budesonide-formoterol  2 puff Inhalation BID    tiotropium  2 puff Inhalation Daily     PRN Meds: albuterol, sodium chloride flush, sodium chloride, ondansetron **OR** ondansetron, acetaminophen **OR** acetaminophen, morphine      Intake/Output Summary (Last 24 hours) at 7/3/2021 0956  Last data filed at 7/3/2021 0651  Gross per 24 hour   Intake 1555 ml   Output    Net 1555 ml       Exam:    /81   Pulse 79   Temp 97.8 °F (36.6 °C) (Oral)   Resp 16   Ht 5' 4\" (1.626 m)   Wt 97 lb 3.2 oz (44.1 kg)   SpO2 97%   BMI 16.68 kg/m²     General appearance: No apparent distress, appears stated age and cooperative. HEENT: Pupils equal, round, and reactive to light. Conjunctivae/corneas clear. Neck: Supple, with full range of motion. No jugular venous distention. Trachea midline. Respiratory:  Normal respiratory effort. Clear to auscultation, bilaterally without Rales/Wheezes/Rhonchi. Cardiovascular: Regular rate and rhythm with normal S1/S2 without murmurs, rubs or gallops. Abdomen: Soft, non-tender, non-distended with normal bowel sounds.   Musculoskeletal: No clubbing, cyanosis or edema bilaterally. Full range of motion without deformity. Skin: Skin color, texture, turgor normal.  No rashes or lesions. Neurologic:  Neurovascularly intact without any focal sensory/motor deficits. Cranial nerves: II-XII intact, grossly non-focal.  Psychiatric: Alert and oriented, thought content appropriate, normal insight  Capillary Refill: Brisk,< 3 seconds   Peripheral Pulses: +2 palpable, equal bilaterally       Labs:   Recent Labs     07/01/21  1329 07/01/21  2305 07/02/21  0437   WBC 17.2*  --  16.9*   HGB 14.3 13.2 12.5   HCT 42.8 40.1 38.1     --  324     Recent Labs     07/01/21  1329 07/02/21  0437    138   K 3.9 4.0    106   CO2 26 24   BUN 17 10   CREATININE 0.9 0.6   CALCIUM 10.0 9.1     Recent Labs     07/01/21  1329   AST 24   ALT 14   BILITOT 1.1*   ALKPHOS 87     No results for input(s): INR in the last 72 hours. No results for input(s): Earlis Hopkins in the last 72 hours. Assessment/Plan:    -Ulcerative colitis flare. .. CT showed severe diffuse colitis. .. GI pathogens by PCR negative. .. Patient is on IV Solu-Medrol. . IV Cipro and Flagyl discontinued. . Continue IV fluid and supportive care. . Clear liquid started. Iris Adorno Appreciate GI consult    -GI bleed/hematochezia due to severe colitis. . Continue to monitor H&H for acute blood loss anemia        -Acquired hypothyroidism-continue levothyroxine     -Essential HTN-controlled-continue amlodipine, losartan and metoprolol     -HLD-continue statin     -[id number] inhaled steroids and bronchodilators    DVT Prophylaxis: SCDs  Diet: ADULT DIET;  Clear Liquid  Code Status: Full Code        Ajith Sparrow MD

## 2021-07-03 NOTE — PROGRESS NOTES
GI Progress Note      SUBJECTIVE:  BM frequency and abdominal discomfort are improving. Diarrhea continues as does nausea w/o emesis.   Diet: NPO    OBJECTIVE      Medications    Current Facility-Administered Medications: albuterol (PROVENTIL) nebulizer solution 2.5 mg, 2.5 mg, Nebulization, Q6H PRN  amLODIPine (NORVASC) tablet 5 mg, 5 mg, Oral, Nightly  atorvastatin (LIPITOR) tablet 40 mg, 40 mg, Oral, Daily  DULoxetine (CYMBALTA) extended release capsule 60 mg, 60 mg, Oral, Daily  folic acid (FOLVITE) tablet 1 mg, 1 mg, Oral, Daily  gabapentin (NEURONTIN) capsule 300 mg, 300 mg, Oral, 4x Daily  levothyroxine (SYNTHROID) tablet 50 mcg, 50 mcg, Oral, Daily  losartan (COZAAR) tablet 100 mg, 100 mg, Oral, Daily  metoprolol succinate (TOPROL XL) extended release tablet 50 mg, 50 mg, Oral, Daily  mirtazapine (REMERON) tablet 15 mg, 15 mg, Oral, Nightly  sodium chloride flush 0.9 % injection 5-40 mL, 5-40 mL, Intravenous, 2 times per day  sodium chloride flush 0.9 % injection 5-40 mL, 5-40 mL, Intravenous, PRN  0.9 % sodium chloride infusion, 25 mL, Intravenous, PRN  ondansetron (ZOFRAN-ODT) disintegrating tablet 4 mg, 4 mg, Oral, Q8H PRN **OR** ondansetron (ZOFRAN) injection 4 mg, 4 mg, Intravenous, Q6H PRN  acetaminophen (TYLENOL) tablet 650 mg, 650 mg, Oral, Q6H PRN **OR** acetaminophen (TYLENOL) suppository 650 mg, 650 mg, Rectal, Q6H PRN  0.9 % sodium chloride infusion, , Intravenous, Continuous  methylPREDNISolone sodium (SOLU-MEDROL) injection 40 mg, 40 mg, Intravenous, Q12H  ciprofloxacin (CIPRO) IVPB 400 mg, 400 mg, Intravenous, Q12H  metronidazole (FLAGYL) 500 mg in NaCl 100 mL IVPB premix, 500 mg, Intravenous, Q8H  morphine (PF) injection 2 mg, 2 mg, Intravenous, Q4H PRN  budesonide-formoterol (SYMBICORT) 160-4.5 MCG/ACT inhaler 2 puff, 2 puff, Inhalation, BID  tiotropium (SPIRIVA RESPIMAT) 2.5 MCG/ACT inhaler 2 puff, 2 puff, Inhalation, Daily  Physical    VITALS:  /81   Pulse 79   Temp 97.8 °F (36.6 °C) (Oral)   Resp 16   Ht 5' 4\" (1.626 m)   Wt 97 lb 3.2 oz (44.1 kg)   SpO2 97%   BMI 16.68 kg/m²   ABD: soft, moderate generalized tenderness, mild non tympanic distention, NABs, no peritoneal signs  Data    No labs today    CT: Findings consistent with severe diffuse acute colitis. Stool cultures: negative    ASSESSMENT AND PLAN  60yo F admitted with flare of pan-UC. Symptomatic response to steroids noted.   - start clear liquids  - decrease IVF rate  - repeat labs  - adjust steroid dose  - d/c Cipro and Flagyl as stool cultures are negative

## 2021-07-04 ENCOUNTER — APPOINTMENT (OUTPATIENT)
Dept: GENERAL RADIOLOGY | Age: 61
DRG: 386 | End: 2021-07-04
Payer: MEDICARE

## 2021-07-04 LAB
ALBUMIN SERPL-MCNC: 3.4 G/DL (ref 3.4–5)
ANION GAP SERPL CALCULATED.3IONS-SCNC: 9 MMOL/L (ref 3–16)
BUN BLDV-MCNC: 9 MG/DL (ref 7–20)
CALCIUM SERPL-MCNC: 9.3 MG/DL (ref 8.3–10.6)
CHLORIDE BLD-SCNC: 105 MMOL/L (ref 99–110)
CO2: 25 MMOL/L (ref 21–32)
CREAT SERPL-MCNC: <0.5 MG/DL (ref 0.6–1.2)
GFR AFRICAN AMERICAN: >60
GFR NON-AFRICAN AMERICAN: >60
GLUCOSE BLD-MCNC: 139 MG/DL (ref 70–99)
HCT VFR BLD CALC: 38.2 % (ref 36–48)
HEMOGLOBIN: 12.5 G/DL (ref 12–16)
MCH RBC QN AUTO: 30.6 PG (ref 26–34)
MCHC RBC AUTO-ENTMCNC: 32.8 G/DL (ref 31–36)
MCV RBC AUTO: 93.3 FL (ref 80–100)
PDW BLD-RTO: 14.2 % (ref 12.4–15.4)
PHOSPHORUS: 1.7 MG/DL (ref 2.5–4.9)
PLATELET # BLD: 378 K/UL (ref 135–450)
PMV BLD AUTO: 8.2 FL (ref 5–10.5)
POTASSIUM SERPL-SCNC: 3.6 MMOL/L (ref 3.5–5.1)
RBC # BLD: 4.1 M/UL (ref 4–5.2)
SODIUM BLD-SCNC: 139 MMOL/L (ref 136–145)
WBC # BLD: 19.9 K/UL (ref 4–11)

## 2021-07-04 PROCEDURE — 94640 AIRWAY INHALATION TREATMENT: CPT

## 2021-07-04 PROCEDURE — 1200000000 HC SEMI PRIVATE

## 2021-07-04 PROCEDURE — 2700000000 HC OXYGEN THERAPY PER DAY

## 2021-07-04 PROCEDURE — 6370000000 HC RX 637 (ALT 250 FOR IP): Performed by: INTERNAL MEDICINE

## 2021-07-04 PROCEDURE — 80069 RENAL FUNCTION PANEL: CPT

## 2021-07-04 PROCEDURE — 94761 N-INVAS EAR/PLS OXIMETRY MLT: CPT

## 2021-07-04 PROCEDURE — 6360000002 HC RX W HCPCS: Performed by: INTERNAL MEDICINE

## 2021-07-04 PROCEDURE — 6360000002 HC RX W HCPCS: Performed by: HOSPITALIST

## 2021-07-04 PROCEDURE — 74018 RADEX ABDOMEN 1 VIEW: CPT

## 2021-07-04 PROCEDURE — 2580000003 HC RX 258: Performed by: INTERNAL MEDICINE

## 2021-07-04 PROCEDURE — 85027 COMPLETE CBC AUTOMATED: CPT

## 2021-07-04 RX ORDER — MORPHINE SULFATE 2 MG/ML
2 INJECTION, SOLUTION INTRAMUSCULAR; INTRAVENOUS
Status: DISCONTINUED | OUTPATIENT
Start: 2021-07-04 | End: 2021-07-07 | Stop reason: HOSPADM

## 2021-07-04 RX ADMIN — Medication 2 PUFF: at 07:28

## 2021-07-04 RX ADMIN — MORPHINE SULFATE 2 MG: 2 INJECTION, SOLUTION INTRAMUSCULAR; INTRAVENOUS at 17:01

## 2021-07-04 RX ADMIN — METHYLPREDNISOLONE SODIUM SUCCINATE 20 MG: 40 INJECTION, POWDER, FOR SOLUTION INTRAMUSCULAR; INTRAVENOUS at 17:01

## 2021-07-04 RX ADMIN — MIRTAZAPINE 15 MG: 15 TABLET, FILM COATED ORAL at 20:54

## 2021-07-04 RX ADMIN — METOPROLOL SUCCINATE 50 MG: 50 TABLET, EXTENDED RELEASE ORAL at 08:47

## 2021-07-04 RX ADMIN — Medication 2 PUFF: at 19:17

## 2021-07-04 RX ADMIN — GABAPENTIN 300 MG: 300 CAPSULE ORAL at 20:54

## 2021-07-04 RX ADMIN — LOSARTAN POTASSIUM 100 MG: 100 TABLET, FILM COATED ORAL at 08:47

## 2021-07-04 RX ADMIN — MORPHINE SULFATE 2 MG: 2 INJECTION, SOLUTION INTRAMUSCULAR; INTRAVENOUS at 05:32

## 2021-07-04 RX ADMIN — METHYLPREDNISOLONE SODIUM SUCCINATE 20 MG: 40 INJECTION, POWDER, FOR SOLUTION INTRAMUSCULAR; INTRAVENOUS at 08:46

## 2021-07-04 RX ADMIN — MORPHINE SULFATE 2 MG: 2 INJECTION, SOLUTION INTRAMUSCULAR; INTRAVENOUS at 13:29

## 2021-07-04 RX ADMIN — SODIUM CHLORIDE: 9 INJECTION, SOLUTION INTRAVENOUS at 13:28

## 2021-07-04 RX ADMIN — GABAPENTIN 300 MG: 300 CAPSULE ORAL at 17:01

## 2021-07-04 RX ADMIN — MORPHINE SULFATE 2 MG: 2 INJECTION, SOLUTION INTRAMUSCULAR; INTRAVENOUS at 01:05

## 2021-07-04 RX ADMIN — SODIUM CHLORIDE: 9 INJECTION, SOLUTION INTRAVENOUS at 04:43

## 2021-07-04 RX ADMIN — FOLIC ACID 1 MG: 1 TABLET ORAL at 08:47

## 2021-07-04 RX ADMIN — MORPHINE SULFATE 2 MG: 2 INJECTION, SOLUTION INTRAMUSCULAR; INTRAVENOUS at 09:29

## 2021-07-04 RX ADMIN — AMLODIPINE BESYLATE 5 MG: 5 TABLET ORAL at 20:54

## 2021-07-04 RX ADMIN — ATORVASTATIN CALCIUM 40 MG: 10 TABLET, FILM COATED ORAL at 08:47

## 2021-07-04 RX ADMIN — ONDANSETRON 4 MG: 2 INJECTION INTRAMUSCULAR; INTRAVENOUS at 01:05

## 2021-07-04 RX ADMIN — METHYLPREDNISOLONE SODIUM SUCCINATE 20 MG: 40 INJECTION, POWDER, FOR SOLUTION INTRAMUSCULAR; INTRAVENOUS at 01:05

## 2021-07-04 RX ADMIN — DULOXETINE HYDROCHLORIDE 60 MG: 60 CAPSULE, DELAYED RELEASE ORAL at 08:47

## 2021-07-04 RX ADMIN — GABAPENTIN 300 MG: 300 CAPSULE ORAL at 08:47

## 2021-07-04 RX ADMIN — TIOTROPIUM BROMIDE INHALATION SPRAY 2 PUFF: 3.12 SPRAY, METERED RESPIRATORY (INHALATION) at 07:28

## 2021-07-04 RX ADMIN — MORPHINE SULFATE 2 MG: 2 INJECTION, SOLUTION INTRAMUSCULAR; INTRAVENOUS at 20:59

## 2021-07-04 RX ADMIN — GABAPENTIN 300 MG: 300 CAPSULE ORAL at 13:29

## 2021-07-04 RX ADMIN — ONDANSETRON 4 MG: 2 INJECTION INTRAMUSCULAR; INTRAVENOUS at 20:59

## 2021-07-04 RX ADMIN — LEVOTHYROXINE SODIUM 50 MCG: 0.05 TABLET ORAL at 05:32

## 2021-07-04 ASSESSMENT — PAIN SCALES - GENERAL
PAINLEVEL_OUTOF10: 6
PAINLEVEL_OUTOF10: 8
PAINLEVEL_OUTOF10: 7
PAINLEVEL_OUTOF10: 6

## 2021-07-04 NOTE — PROGRESS NOTES
Shift assessment updated as charted. Patient is not tolerating diet. Pt c/o nausea, vomiting, diarrhea and pain after eating. VSS. Will continue to monitor.

## 2021-07-04 NOTE — PROGRESS NOTES
Pt A&O x4. VSS. Denies dyspnea and N/V. Bowel sounds hypoactive in all quadrants. Abdomen flat and mildly more firm than last night. Assessment is as charted. Call light and bedside table within reach, wheels locked, bed in lowest position, Pt instructed to call out for assistance and expressed understanding, calls out appropriately.     Electronically signed by Christina Kumari RN on 7/3/2021 at 10:41 PM

## 2021-07-04 NOTE — PROGRESS NOTES
Hospitalist Progress Note      PCP: Rosaura Mcmillan DO    Date of Admission: 7/1/2021      Subjective:     Patient reports her abdominal pain is worse this morning. She continues to have diarrhea , 5 BMs overnight. . 1 episode of emesis yesterday after eating. No fevers      Medications:  Reviewed    Infusion Medications    sodium chloride      sodium chloride 100 mL/hr at 07/04/21 0844     Scheduled Medications    methylPREDNISolone  20 mg Intravenous Q8H    amLODIPine  5 mg Oral Nightly    atorvastatin  40 mg Oral Daily    DULoxetine  60 mg Oral Daily    folic acid  1 mg Oral Daily    gabapentin  300 mg Oral 4x Daily    levothyroxine  50 mcg Oral Daily    losartan  100 mg Oral Daily    metoprolol succinate  50 mg Oral Daily    mirtazapine  15 mg Oral Nightly    sodium chloride flush  5-40 mL Intravenous 2 times per day    budesonide-formoterol  2 puff Inhalation BID    tiotropium  2 puff Inhalation Daily     PRN Meds: albuterol, sodium chloride flush, sodium chloride, ondansetron **OR** ondansetron, acetaminophen **OR** acetaminophen, morphine      Intake/Output Summary (Last 24 hours) at 7/4/2021 0905  Last data filed at 7/4/2021 0844  Gross per 24 hour   Intake 5340.74 ml   Output    Net 5340.74 ml       Exam:    /73   Pulse 70   Temp 98 °F (36.7 °C) (Oral)   Resp 16   Ht 5' 4\" (1.626 m)   Wt 97 lb 3.2 oz (44.1 kg)   SpO2 96%   BMI 16.68 kg/m²     General appearance: No apparent distress, appears stated age and cooperative. HEENT: Pupils equal, round, and reactive to light. Conjunctivae/corneas clear. Neck: Supple, with full range of motion. No jugular venous distention. Trachea midline. Respiratory:  Normal respiratory effort. Clear to auscultation, bilaterally without Rales/Wheezes/Rhonchi. Cardiovascular: Regular rate and rhythm with normal S1/S2 without murmurs, rubs or gallops. Abdomen: Soft, non-tender, non-distended with normal bowel sounds.   Musculoskeletal: No clubbing, cyanosis or edema bilaterally. Full range of motion without deformity. Skin: Skin color, texture, turgor normal.  No rashes or lesions. Neurologic:  Neurovascularly intact without any focal sensory/motor deficits. Cranial nerves: II-XII intact, grossly non-focal.  Psychiatric: Alert and oriented, thought content appropriate, normal insight  Capillary Refill: Brisk,< 3 seconds   Peripheral Pulses: +2 palpable, equal bilaterally       Labs:   Recent Labs     07/02/21  0437 07/03/21  1011 07/04/21  0517   WBC 16.9* 21.4* 19.9*   HGB 12.5 12.2 12.5   HCT 38.1 37.6 38.2    352 378     Recent Labs     07/02/21  0437 07/03/21  1011 07/04/21  0517    137 139   K 4.0 3.7 3.6    104 105   CO2 24 25 25   BUN 10 15 9   CREATININE 0.6 <0.5* <0.5*   CALCIUM 9.1 9.5 9.3   PHOS  --  2.0* 1.7*     Recent Labs     07/01/21  1329   AST 24   ALT 14   BILITOT 1.1*   ALKPHOS 87     No results for input(s): INR in the last 72 hours. No results for input(s): Reyne Wallisian in the last 72 hours. Assessment/Plan:    -Severe ulcerative colitis flare. .. CT showed severe diffuse colitis. .. GI pathogens by PCR negative. .. Continue IV Solu-Medrol. ... IV Cipro and Flagyl discontinued 7/3. Eric Salter Continue IV fluid and supportive care. . Clear liquid started. . Discussed with GI. .. Considering starting Remicade inpatient versus outpatient. . Reviewed repeat KUB today with no evidence of obstruction or ileus    -GI bleed/hematochezia due to severe colitis. resolved. Continue to monitor H&H for acute blood loss anemia        -Acquired hypothyroidism-continue levothyroxine     -Essential HTN-controlled-continue amlodipine, losartan and metoprolol     -HLD-continue statin     -[id number] inhaled steroids and bronchodilators    DVT Prophylaxis: SCDs  Diet: ADULT DIET;  Clear Liquid  Code Status: Full Code        Jane Betancur MD

## 2021-07-04 NOTE — PROGRESS NOTES
GI Progress Note      SUBJECTIVE:  Pt notes persistent abdominal pain, diarrhea, nausea and inability to tolerate oral liquids.   6 BMs documented overnight    OBJECTIVE      Medications    Current Facility-Administered Medications: methylPREDNISolone sodium (SOLU-MEDROL) injection 20 mg, 20 mg, Intravenous, Q8H  albuterol (PROVENTIL) nebulizer solution 2.5 mg, 2.5 mg, Nebulization, Q6H PRN  amLODIPine (NORVASC) tablet 5 mg, 5 mg, Oral, Nightly  atorvastatin (LIPITOR) tablet 40 mg, 40 mg, Oral, Daily  DULoxetine (CYMBALTA) extended release capsule 60 mg, 60 mg, Oral, Daily  folic acid (FOLVITE) tablet 1 mg, 1 mg, Oral, Daily  gabapentin (NEURONTIN) capsule 300 mg, 300 mg, Oral, 4x Daily  levothyroxine (SYNTHROID) tablet 50 mcg, 50 mcg, Oral, Daily  losartan (COZAAR) tablet 100 mg, 100 mg, Oral, Daily  metoprolol succinate (TOPROL XL) extended release tablet 50 mg, 50 mg, Oral, Daily  mirtazapine (REMERON) tablet 15 mg, 15 mg, Oral, Nightly  sodium chloride flush 0.9 % injection 5-40 mL, 5-40 mL, Intravenous, 2 times per day  sodium chloride flush 0.9 % injection 5-40 mL, 5-40 mL, Intravenous, PRN  0.9 % sodium chloride infusion, 25 mL, Intravenous, PRN  ondansetron (ZOFRAN-ODT) disintegrating tablet 4 mg, 4 mg, Oral, Q8H PRN **OR** ondansetron (ZOFRAN) injection 4 mg, 4 mg, Intravenous, Q6H PRN  acetaminophen (TYLENOL) tablet 650 mg, 650 mg, Oral, Q6H PRN **OR** acetaminophen (TYLENOL) suppository 650 mg, 650 mg, Rectal, Q6H PRN  0.9 % sodium chloride infusion, , Intravenous, Continuous  morphine (PF) injection 2 mg, 2 mg, Intravenous, Q4H PRN  budesonide-formoterol (SYMBICORT) 160-4.5 MCG/ACT inhaler 2 puff, 2 puff, Inhalation, BID  tiotropium (SPIRIVA RESPIMAT) 2.5 MCG/ACT inhaler 2 puff, 2 puff, Inhalation, Daily  Physical    VITALS:  /73   Pulse 70   Temp 98 °F (36.7 °C) (Oral)   Resp 16   Ht 5' 4\" (1.626 m)   Wt 97 lb 3.2 oz (44.1 kg)   SpO2 96%   BMI 16.68 kg/m²   ABD: soft with mild non tympanic distention. Moderate generalized tenderness greatest in the LLQ, NABS, no peritoneal signs  Data    CBC with Differential:    Lab Results   Component Value Date    WBC 19.9 07/04/2021    RBC 4.10 07/04/2021    HGB 12.5 07/04/2021    HCT 38.2 07/04/2021     07/04/2021    MCV 93.3 07/04/2021    MCH 30.6 07/04/2021    MCHC 32.8 07/04/2021    RDW 14.2 07/04/2021    BANDSPCT 16 07/01/2021    LYMPHOPCT 6.1 07/02/2021    MONOPCT 6.5 07/02/2021    EOSPCT 3 04/11/2018    BASOPCT 0.2 07/02/2021    MONOSABS 1.1 07/02/2021    LYMPHSABS 1.0 07/02/2021    EOSABS 0.0 07/02/2021    BASOSABS 0.0 07/02/2021     CMP:    Lab Results   Component Value Date     07/04/2021    K 3.6 07/04/2021    K 4.0 07/02/2021     07/04/2021    CO2 25 07/04/2021    BUN 9 07/04/2021    CREATININE <0.5 07/04/2021    GFRAA >60 07/04/2021    AGRATIO 1.3 07/01/2021    LABGLOM >60 07/04/2021    GLUCOSE 139 07/04/2021    PROT 7.2 07/01/2021    LABALBU 3.4 07/04/2021    CALCIUM 9.3 07/04/2021    BILITOT 1.1 07/01/2021    ALKPHOS 87 07/01/2021    AST 24 07/01/2021    ALT 14 07/01/2021     CRP 88.4  Stool cultures: negative    CT : Findings consistent with severe diffuse acute colitis. ASSESSMENT AND PLAN  64yo F admitted with flare of pan-UC. Symptoms persist w/o fevers or worsening leukocytosis.   - NPO, supportive care  - obtain AXR  - we reviewed the role of Remicade in the inpatient setting  - obtain TB skin test and Hep B S Ag before beginning Remicade  - an unprepped limited colonoscopy to assess disease severity and to r/o CMV may be necessary later this admission

## 2021-07-05 LAB
ALBUMIN SERPL-MCNC: 3.5 G/DL (ref 3.4–5)
ANION GAP SERPL CALCULATED.3IONS-SCNC: 7 MMOL/L (ref 3–16)
BLOOD CULTURE, ROUTINE: NORMAL
BUN BLDV-MCNC: 13 MG/DL (ref 7–20)
CALCIUM SERPL-MCNC: 9.2 MG/DL (ref 8.3–10.6)
CHLORIDE BLD-SCNC: 106 MMOL/L (ref 99–110)
CO2: 30 MMOL/L (ref 21–32)
CREAT SERPL-MCNC: <0.5 MG/DL (ref 0.6–1.2)
CULTURE, BLOOD 2: NORMAL
GFR AFRICAN AMERICAN: >60
GFR NON-AFRICAN AMERICAN: >60
GLUCOSE BLD-MCNC: 121 MG/DL (ref 70–99)
HCT VFR BLD CALC: 37 % (ref 36–48)
HEMOGLOBIN: 12.3 G/DL (ref 12–16)
MCH RBC QN AUTO: 30.7 PG (ref 26–34)
MCHC RBC AUTO-ENTMCNC: 33.1 G/DL (ref 31–36)
MCV RBC AUTO: 92.6 FL (ref 80–100)
PDW BLD-RTO: 14.1 % (ref 12.4–15.4)
PHOSPHORUS: 2.7 MG/DL (ref 2.5–4.9)
PLATELET # BLD: 447 K/UL (ref 135–450)
PMV BLD AUTO: 8.1 FL (ref 5–10.5)
POTASSIUM SERPL-SCNC: 3.7 MMOL/L (ref 3.5–5.1)
RBC # BLD: 4 M/UL (ref 4–5.2)
SODIUM BLD-SCNC: 143 MMOL/L (ref 136–145)
WBC # BLD: 16.7 K/UL (ref 4–11)

## 2021-07-05 PROCEDURE — 2580000003 HC RX 258: Performed by: INTERNAL MEDICINE

## 2021-07-05 PROCEDURE — 80069 RENAL FUNCTION PANEL: CPT

## 2021-07-05 PROCEDURE — 6360000002 HC RX W HCPCS: Performed by: HOSPITALIST

## 2021-07-05 PROCEDURE — 85027 COMPLETE CBC AUTOMATED: CPT

## 2021-07-05 PROCEDURE — 94640 AIRWAY INHALATION TREATMENT: CPT

## 2021-07-05 PROCEDURE — 94761 N-INVAS EAR/PLS OXIMETRY MLT: CPT

## 2021-07-05 PROCEDURE — 2500000003 HC RX 250 WO HCPCS: Performed by: INTERNAL MEDICINE

## 2021-07-05 PROCEDURE — 6370000000 HC RX 637 (ALT 250 FOR IP): Performed by: INTERNAL MEDICINE

## 2021-07-05 PROCEDURE — 2700000000 HC OXYGEN THERAPY PER DAY

## 2021-07-05 PROCEDURE — 6360000002 HC RX W HCPCS: Performed by: INTERNAL MEDICINE

## 2021-07-05 PROCEDURE — 36415 COLL VENOUS BLD VENIPUNCTURE: CPT

## 2021-07-05 PROCEDURE — 1200000000 HC SEMI PRIVATE

## 2021-07-05 RX ADMIN — SODIUM CHLORIDE: 9 INJECTION, SOLUTION INTRAVENOUS at 11:22

## 2021-07-05 RX ADMIN — Medication 2 PUFF: at 07:57

## 2021-07-05 RX ADMIN — MORPHINE SULFATE 2 MG: 2 INJECTION, SOLUTION INTRAMUSCULAR; INTRAVENOUS at 22:20

## 2021-07-05 RX ADMIN — GABAPENTIN 300 MG: 300 CAPSULE ORAL at 12:49

## 2021-07-05 RX ADMIN — METHYLPREDNISOLONE SODIUM SUCCINATE 20 MG: 40 INJECTION, POWDER, FOR SOLUTION INTRAMUSCULAR; INTRAVENOUS at 01:09

## 2021-07-05 RX ADMIN — SODIUM CHLORIDE: 9 INJECTION, SOLUTION INTRAVENOUS at 01:10

## 2021-07-05 RX ADMIN — TIOTROPIUM BROMIDE INHALATION SPRAY 2 PUFF: 3.12 SPRAY, METERED RESPIRATORY (INHALATION) at 07:57

## 2021-07-05 RX ADMIN — METHYLPREDNISOLONE SODIUM SUCCINATE 20 MG: 40 INJECTION, POWDER, FOR SOLUTION INTRAMUSCULAR; INTRAVENOUS at 16:58

## 2021-07-05 RX ADMIN — METOPROLOL SUCCINATE 50 MG: 50 TABLET, EXTENDED RELEASE ORAL at 09:39

## 2021-07-05 RX ADMIN — ONDANSETRON 4 MG: 2 INJECTION INTRAMUSCULAR; INTRAVENOUS at 17:03

## 2021-07-05 RX ADMIN — LOSARTAN POTASSIUM 100 MG: 100 TABLET, FILM COATED ORAL at 09:39

## 2021-07-05 RX ADMIN — MIRTAZAPINE 15 MG: 15 TABLET, FILM COATED ORAL at 22:11

## 2021-07-05 RX ADMIN — Medication 2 PUFF: at 19:23

## 2021-07-05 RX ADMIN — ATORVASTATIN CALCIUM 40 MG: 10 TABLET, FILM COATED ORAL at 09:39

## 2021-07-05 RX ADMIN — FOLIC ACID 1 MG: 1 TABLET ORAL at 09:39

## 2021-07-05 RX ADMIN — GABAPENTIN 300 MG: 300 CAPSULE ORAL at 22:10

## 2021-07-05 RX ADMIN — AMLODIPINE BESYLATE 5 MG: 5 TABLET ORAL at 22:10

## 2021-07-05 RX ADMIN — GABAPENTIN 300 MG: 300 CAPSULE ORAL at 09:39

## 2021-07-05 RX ADMIN — GABAPENTIN 300 MG: 300 CAPSULE ORAL at 16:58

## 2021-07-05 RX ADMIN — MORPHINE SULFATE 2 MG: 2 INJECTION, SOLUTION INTRAMUSCULAR; INTRAVENOUS at 17:03

## 2021-07-05 RX ADMIN — TUBERCULIN PURIFIED PROTEIN DERIVATIVE 5 UNITS: 5 INJECTION INTRADERMAL at 11:37

## 2021-07-05 RX ADMIN — DULOXETINE HYDROCHLORIDE 60 MG: 60 CAPSULE, DELAYED RELEASE ORAL at 09:39

## 2021-07-05 RX ADMIN — SODIUM CHLORIDE, PRESERVATIVE FREE 10 ML: 5 INJECTION INTRAVENOUS at 09:39

## 2021-07-05 RX ADMIN — LEVOTHYROXINE SODIUM 50 MCG: 0.05 TABLET ORAL at 05:52

## 2021-07-05 RX ADMIN — METHYLPREDNISOLONE SODIUM SUCCINATE 20 MG: 40 INJECTION, POWDER, FOR SOLUTION INTRAMUSCULAR; INTRAVENOUS at 09:38

## 2021-07-05 RX ADMIN — SODIUM CHLORIDE: 9 INJECTION, SOLUTION INTRAVENOUS at 22:16

## 2021-07-05 ASSESSMENT — PAIN SCALES - GENERAL
PAINLEVEL_OUTOF10: 4
PAINLEVEL_OUTOF10: 6
PAINLEVEL_OUTOF10: 7

## 2021-07-05 NOTE — PROGRESS NOTES
Shift assessment completed and charted. VSS. A&OX4. Per pt some abd pain and nausea noted, see MAR. Independent in transfer. Bed locked and in lowest position. Call light within reach. Pt denies any other needs at this time. Will continue to monitor.

## 2021-07-05 NOTE — PROGRESS NOTES
Hospitalist Progress Note      PCP: Eliza Brown DO    Date of Admission: 7/1/2021      Subjective:     Pt reports abd pain improving with no N/V or diarrhea today. Denied any fever or chills. On 2L 02. Medications:  Reviewed    Infusion Medications    sodium chloride      sodium chloride 100 mL/hr at 07/05/21 0110     Scheduled Medications    tuberculin  5 Units Intradermal Once    [START ON 7/7/2021] ppd   Does not apply Once    methylPREDNISolone  20 mg Intravenous Q8H    amLODIPine  5 mg Oral Nightly    atorvastatin  40 mg Oral Daily    DULoxetine  60 mg Oral Daily    folic acid  1 mg Oral Daily    gabapentin  300 mg Oral 4x Daily    levothyroxine  50 mcg Oral Daily    losartan  100 mg Oral Daily    metoprolol succinate  50 mg Oral Daily    mirtazapine  15 mg Oral Nightly    sodium chloride flush  5-40 mL Intravenous 2 times per day    budesonide-formoterol  2 puff Inhalation BID    tiotropium  2 puff Inhalation Daily     PRN Meds: morphine, albuterol, sodium chloride flush, sodium chloride, ondansetron **OR** ondansetron, acetaminophen **OR** acetaminophen      Intake/Output Summary (Last 24 hours) at 7/5/2021 0815  Last data filed at 7/5/2021 0552  Gross per 24 hour   Intake 2230.04 ml   Output    Net 2230.04 ml       Exam:    BP (!) 147/78   Pulse 57   Temp 97.8 °F (36.6 °C) (Oral)   Resp 20   Ht 5' 4\" (1.626 m)   Wt 97 lb 3.2 oz (44.1 kg)   SpO2 94%   BMI 16.68 kg/m²     General appearance: No apparent distress, appears stated age and cooperative. HEENT: Pupils equal, round, Conjunctivae/corneas clear. Neck: Supple, with full range of motion. No jugular venous distention. Trachea midline. Respiratory:  Normal respiratory effort. Clear to auscultation, bilaterally without Rales/Wheezes/Rhonchi. Cardiovascular: Regular rate and rhythm with normal S1/S2 without murmurs, rubs or gallops.   Abdomen: Soft, LLQ pain with no rebound or guarding , non-distended with normal

## 2021-07-05 NOTE — PROGRESS NOTES
GI Progress Note      SUBJECTIVE:  Pt is feeling better. Abdominal discomfort, nausea and BM frequency have improved.     OBJECTIVE      Medications    Current Facility-Administered Medications: tuberculin injection 5 Units, 5 Units, Intradermal, Once  [START ON 7/7/2021] ppd (tuberculin skin test) read, , Does not apply, Once  morphine (PF) injection 2 mg, 2 mg, Intravenous, Q3H PRN  methylPREDNISolone sodium (SOLU-MEDROL) injection 20 mg, 20 mg, Intravenous, Q8H  albuterol (PROVENTIL) nebulizer solution 2.5 mg, 2.5 mg, Nebulization, Q6H PRN  amLODIPine (NORVASC) tablet 5 mg, 5 mg, Oral, Nightly  atorvastatin (LIPITOR) tablet 40 mg, 40 mg, Oral, Daily  DULoxetine (CYMBALTA) extended release capsule 60 mg, 60 mg, Oral, Daily  folic acid (FOLVITE) tablet 1 mg, 1 mg, Oral, Daily  gabapentin (NEURONTIN) capsule 300 mg, 300 mg, Oral, 4x Daily  levothyroxine (SYNTHROID) tablet 50 mcg, 50 mcg, Oral, Daily  losartan (COZAAR) tablet 100 mg, 100 mg, Oral, Daily  metoprolol succinate (TOPROL XL) extended release tablet 50 mg, 50 mg, Oral, Daily  mirtazapine (REMERON) tablet 15 mg, 15 mg, Oral, Nightly  sodium chloride flush 0.9 % injection 5-40 mL, 5-40 mL, Intravenous, 2 times per day  sodium chloride flush 0.9 % injection 5-40 mL, 5-40 mL, Intravenous, PRN  0.9 % sodium chloride infusion, 25 mL, Intravenous, PRN  ondansetron (ZOFRAN-ODT) disintegrating tablet 4 mg, 4 mg, Oral, Q8H PRN **OR** ondansetron (ZOFRAN) injection 4 mg, 4 mg, Intravenous, Q6H PRN  acetaminophen (TYLENOL) tablet 650 mg, 650 mg, Oral, Q6H PRN **OR** acetaminophen (TYLENOL) suppository 650 mg, 650 mg, Rectal, Q6H PRN  0.9 % sodium chloride infusion, , Intravenous, Continuous  budesonide-formoterol (SYMBICORT) 160-4.5 MCG/ACT inhaler 2 puff, 2 puff, Inhalation, BID  tiotropium (SPIRIVA RESPIMAT) 2.5 MCG/ACT inhaler 2 puff, 2 puff, Inhalation, Daily  Physical    VITALS:  /74   Pulse 64   Temp 97.8 °F (36.6 °C) (Oral)   Resp 18   Ht 5' 4\" (1.626 m)   Wt 97 lb 3.2 oz (44.1 kg)   SpO2 99%   BMI 16.68 kg/m²   ABD: soft, mild generalized tenderness, NABs, ND  Data    CBC with Differential:    Lab Results   Component Value Date    WBC 16.7 07/05/2021    RBC 4.00 07/05/2021    HGB 12.3 07/05/2021    HCT 37.0 07/05/2021     07/05/2021    MCV 92.6 07/05/2021    MCH 30.7 07/05/2021    MCHC 33.1 07/05/2021    RDW 14.1 07/05/2021    BANDSPCT 16 07/01/2021    LYMPHOPCT 6.1 07/02/2021    MONOPCT 6.5 07/02/2021    EOSPCT 3 04/11/2018    BASOPCT 0.2 07/02/2021    MONOSABS 1.1 07/02/2021    LYMPHSABS 1.0 07/02/2021    EOSABS 0.0 07/02/2021    BASOSABS 0.0 07/02/2021     CMP:    Lab Results   Component Value Date     07/05/2021    K 3.7 07/05/2021    K 4.0 07/02/2021     07/05/2021    CO2 30 07/05/2021    BUN 13 07/05/2021    CREATININE <0.5 07/05/2021    GFRAA >60 07/05/2021    AGRATIO 1.3 07/01/2021    LABGLOM >60 07/05/2021    GLUCOSE 121 07/05/2021    PROT 7.2 07/01/2021    LABALBU 3.5 07/05/2021    CALCIUM 9.2 07/05/2021    BILITOT 1.1 07/01/2021    ALKPHOS 87 07/01/2021    AST 24 07/01/2021    ALT 14 07/01/2021     KUB(7/4): Nonspecific, nonobstructive bowel gas pattern.   Stool cultures: negative    ASSESSMENT AND PLAN  66yo F admitted with flare of pan-UC.  Symptoms are improving.  - resume clears  - await TB skin test and Hep B S Ag before considering Remicade  - an unprepped limited colonoscopy to assess disease severity and to r/o CMV may be necessary later this admission

## 2021-07-05 NOTE — CARE COORDINATION
Hospital day 4: Patient on C3 re Severe ulcerative colitis flare followed by IM and GI. Patient on 02 at baseline need. Patient NPO at this time. Patient plans to return home when medically appropriate. CARLIN Resendiz

## 2021-07-06 LAB
ALBUMIN SERPL-MCNC: 3.9 G/DL (ref 3.4–5)
ANION GAP SERPL CALCULATED.3IONS-SCNC: 11 MMOL/L (ref 3–16)
BANDED NEUTROPHILS RELATIVE PERCENT: 16 % (ref 0–7)
BASOPHILS ABSOLUTE: 0 K/UL (ref 0–0.2)
BASOPHILS RELATIVE PERCENT: 0 %
BUN BLDV-MCNC: 14 MG/DL (ref 7–20)
CALCIUM SERPL-MCNC: 9.5 MG/DL (ref 8.3–10.6)
CHLORIDE BLD-SCNC: 103 MMOL/L (ref 99–110)
CO2: 27 MMOL/L (ref 21–32)
CREAT SERPL-MCNC: <0.5 MG/DL (ref 0.6–1.2)
EOSINOPHILS ABSOLUTE: 1 K/UL (ref 0–0.6)
EOSINOPHILS RELATIVE PERCENT: 6 %
GFR AFRICAN AMERICAN: >60
GFR NON-AFRICAN AMERICAN: >60
GLUCOSE BLD-MCNC: 161 MG/DL (ref 70–99)
HCT VFR BLD CALC: 38.7 % (ref 36–48)
HCT VFR BLD CALC: 42.8 % (ref 36–48)
HEMATOLOGY PATH CONSULT: NORMAL
HEMATOLOGY PATH CONSULT: YES
HEMOGLOBIN: 12.9 G/DL (ref 12–16)
HEMOGLOBIN: 14.3 G/DL (ref 12–16)
LYMPHOCYTES ABSOLUTE: 4.8 K/UL (ref 1–5.1)
LYMPHOCYTES RELATIVE PERCENT: 28 %
MCH RBC QN AUTO: 30.7 PG (ref 26–34)
MCH RBC QN AUTO: 31.2 PG (ref 26–34)
MCHC RBC AUTO-ENTMCNC: 33.4 G/DL (ref 31–36)
MCHC RBC AUTO-ENTMCNC: 33.5 G/DL (ref 31–36)
MCV RBC AUTO: 91.9 FL (ref 80–100)
MCV RBC AUTO: 93.4 FL (ref 80–100)
MONOCYTES ABSOLUTE: 2.1 K/UL (ref 0–1.3)
MONOCYTES RELATIVE PERCENT: 12 %
NEUTROPHILS ABSOLUTE: 9.3 K/UL (ref 1.7–7.7)
NEUTROPHILS RELATIVE PERCENT: 38 %
PDW BLD-RTO: 13.9 % (ref 12.4–15.4)
PDW BLD-RTO: 14 % (ref 12.4–15.4)
PHOSPHORUS: 2.7 MG/DL (ref 2.5–4.9)
PLATELET # BLD: 379 K/UL (ref 135–450)
PLATELET # BLD: 507 K/UL (ref 135–450)
PLATELET SLIDE REVIEW: ABNORMAL
PMV BLD AUTO: 8.2 FL (ref 5–10.5)
PMV BLD AUTO: 8.3 FL (ref 5–10.5)
POTASSIUM SERPL-SCNC: 3.6 MMOL/L (ref 3.5–5.1)
RBC # BLD: 4.2 M/UL (ref 4–5.2)
RBC # BLD: 4.58 M/UL (ref 4–5.2)
SLIDE REVIEW: ABNORMAL
SODIUM BLD-SCNC: 141 MMOL/L (ref 136–145)
WBC # BLD: 17.2 K/UL (ref 4–11)
WBC # BLD: 17.4 K/UL (ref 4–11)

## 2021-07-06 PROCEDURE — 87340 HEPATITIS B SURFACE AG IA: CPT

## 2021-07-06 PROCEDURE — 6370000000 HC RX 637 (ALT 250 FOR IP): Performed by: INTERNAL MEDICINE

## 2021-07-06 PROCEDURE — 36415 COLL VENOUS BLD VENIPUNCTURE: CPT

## 2021-07-06 PROCEDURE — 80069 RENAL FUNCTION PANEL: CPT

## 2021-07-06 PROCEDURE — 94640 AIRWAY INHALATION TREATMENT: CPT

## 2021-07-06 PROCEDURE — 6360000002 HC RX W HCPCS: Performed by: HOSPITALIST

## 2021-07-06 PROCEDURE — 2700000000 HC OXYGEN THERAPY PER DAY

## 2021-07-06 PROCEDURE — 1200000000 HC SEMI PRIVATE

## 2021-07-06 PROCEDURE — 94761 N-INVAS EAR/PLS OXIMETRY MLT: CPT

## 2021-07-06 PROCEDURE — 6360000002 HC RX W HCPCS: Performed by: INTERNAL MEDICINE

## 2021-07-06 PROCEDURE — 2580000003 HC RX 258: Performed by: INTERNAL MEDICINE

## 2021-07-06 PROCEDURE — 85027 COMPLETE CBC AUTOMATED: CPT

## 2021-07-06 RX ADMIN — TIOTROPIUM BROMIDE INHALATION SPRAY 2 PUFF: 3.12 SPRAY, METERED RESPIRATORY (INHALATION) at 12:06

## 2021-07-06 RX ADMIN — GABAPENTIN 300 MG: 300 CAPSULE ORAL at 13:43

## 2021-07-06 RX ADMIN — METHYLPREDNISOLONE SODIUM SUCCINATE 20 MG: 40 INJECTION, POWDER, FOR SOLUTION INTRAMUSCULAR; INTRAVENOUS at 00:31

## 2021-07-06 RX ADMIN — AMLODIPINE BESYLATE 5 MG: 5 TABLET ORAL at 22:36

## 2021-07-06 RX ADMIN — LEVOTHYROXINE SODIUM 50 MCG: 0.05 TABLET ORAL at 06:44

## 2021-07-06 RX ADMIN — Medication 2 PUFF: at 12:06

## 2021-07-06 RX ADMIN — DULOXETINE HYDROCHLORIDE 60 MG: 60 CAPSULE, DELAYED RELEASE ORAL at 10:04

## 2021-07-06 RX ADMIN — MORPHINE SULFATE 2 MG: 2 INJECTION, SOLUTION INTRAMUSCULAR; INTRAVENOUS at 15:34

## 2021-07-06 RX ADMIN — SODIUM CHLORIDE: 9 INJECTION, SOLUTION INTRAVENOUS at 06:48

## 2021-07-06 RX ADMIN — METHYLPREDNISOLONE SODIUM SUCCINATE 20 MG: 40 INJECTION, POWDER, FOR SOLUTION INTRAMUSCULAR; INTRAVENOUS at 23:48

## 2021-07-06 RX ADMIN — GABAPENTIN 300 MG: 300 CAPSULE ORAL at 17:46

## 2021-07-06 RX ADMIN — MORPHINE SULFATE 2 MG: 2 INJECTION, SOLUTION INTRAMUSCULAR; INTRAVENOUS at 10:08

## 2021-07-06 RX ADMIN — METHYLPREDNISOLONE SODIUM SUCCINATE 20 MG: 40 INJECTION, POWDER, FOR SOLUTION INTRAMUSCULAR; INTRAVENOUS at 17:46

## 2021-07-06 RX ADMIN — SODIUM CHLORIDE: 9 INJECTION, SOLUTION INTRAVENOUS at 15:41

## 2021-07-06 RX ADMIN — FOLIC ACID 1 MG: 1 TABLET ORAL at 10:04

## 2021-07-06 RX ADMIN — LOSARTAN POTASSIUM 100 MG: 100 TABLET, FILM COATED ORAL at 10:04

## 2021-07-06 RX ADMIN — METOPROLOL SUCCINATE 50 MG: 50 TABLET, EXTENDED RELEASE ORAL at 10:07

## 2021-07-06 RX ADMIN — GABAPENTIN 300 MG: 300 CAPSULE ORAL at 10:04

## 2021-07-06 RX ADMIN — MIRTAZAPINE 15 MG: 15 TABLET, FILM COATED ORAL at 22:37

## 2021-07-06 RX ADMIN — ATORVASTATIN CALCIUM 40 MG: 10 TABLET, FILM COATED ORAL at 10:04

## 2021-07-06 RX ADMIN — GABAPENTIN 300 MG: 300 CAPSULE ORAL at 22:36

## 2021-07-06 RX ADMIN — METHYLPREDNISOLONE SODIUM SUCCINATE 20 MG: 40 INJECTION, POWDER, FOR SOLUTION INTRAMUSCULAR; INTRAVENOUS at 10:08

## 2021-07-06 RX ADMIN — MORPHINE SULFATE 2 MG: 2 INJECTION, SOLUTION INTRAMUSCULAR; INTRAVENOUS at 22:34

## 2021-07-06 RX ADMIN — Medication 2 PUFF: at 20:20

## 2021-07-06 ASSESSMENT — PAIN SCALES - GENERAL
PAINLEVEL_OUTOF10: 7
PAINLEVEL_OUTOF10: 5
PAINLEVEL_OUTOF10: 5

## 2021-07-06 NOTE — PLAN OF CARE
Problem: Falls - Risk of:  Goal: Will remain free from falls  Description: Will remain free from falls  Outcome: Ongoing  Note: Pt remains safe. Uses call light appropriately to call out for assistance. Independent with ADLs. Bed locked in lowest position; side rails 2/4; call light and bedside table within reach of pt.

## 2021-07-06 NOTE — PROGRESS NOTES
Comprehensive Nutrition Assessment    Type and Reason for Visit:  Reassess    Nutrition Recommendations/Plan:   1. Continue Clear Liquids. Further advancement per GI. Recommend advancement to low fiber diet. 2. Trial ONS: Ensure Clear with meals   3. Monitor diet tolerance, intakes, wt changes    Nutrition Assessment:  Follow up: Pt advanced to clear liquid diet per GI. Appetite minimal 2/2 GI pain and frequent diarrhea. Pt reporting fair diet tolerance thus far. Concern for inadequate nutrition given prolonged NPO status. Discussed ONS, Pt agreeable to trial ensure clear for now as compliant with diet order. Will monitor acceptance. Recommend monitoring for further diet progression, tolerance, vs need for alternative nutrition support. Malnutrition Assessment:  Malnutrition Status: Moderate malnutrition    Context:  Acute Illness     Findings of the 6 clinical characteristics of malnutrition:  Energy Intake:  1 - 75% or less of estimated energy requirements for 7 or more days  Weight Loss:  No significant weight loss (15% weight loss x 6 months)     Body Fat Loss:  7 - Moderate body fat loss Orbital, Triceps   Muscle Mass Loss:  7 - Moderate muscle mass loss Clavicles (pectoralis & deltoids), Temples (temporalis)  Fluid Accumulation:  No significant fluid accumulation     Strength:  Not Performed    Estimated Daily Nutrient Needs:  Energy (kcal):  2325-4486 kcals/day; Weight Used for Energy Requirements:  Ideal (30-35 kcal.kg)     Protein (g):  55-66 g/day; Weight Used for Protein Requirements:  Ideal (1-1.2 g/kg)        Fluid (ml/day):  9811-6941 ml/day; Method Used for Fluid Requirements:  1 ml/kcal      Nutrition Related Findings:  Labs reviewed. KUB on 7/4 showing no pbstruction. Frequent diarrhea. +14L since admission      Wounds:  None       Current Nutrition Therapies:    ADULT DIET;  Clear Liquid  Adult Oral Nutrition Supplement; Clear Liquid Oral Supplement    Anthropometric Measures:  · Height: 5' 4\" (162.6 cm)  · Current Body Weight: 97 lb (44 kg)   · Usual Body Weight: 114 lb (51.7 kg)     · Ideal Body Weight: 120 lbs; % Ideal Body Weight 80.8 %   · BMI: 16.6  · Adjusted Body Weight:  ; No Adjustment   · BMI Categories: Underweight (BMI less than 22) age over 72       Nutrition Diagnosis:   · Moderate malnutrition related to early satiety, pain as evidenced by intake 0-25%, poor intake prior to admission, BMI, weight loss, weight loss greater than or equal to 10% in 6 months, moderate loss of subcutaneous fat, moderate muscle loss, diarrhea, nausea      Nutrition Interventions:   Food and/or Nutrient Delivery:  Continue Current Diet, Start Oral Nutrition Supplement  Nutrition Education/Counseling:  Education initiated   Coordination of Nutrition Care:  Continue to monitor while inpatient    Goals:  Receive the most appropriate form of nutrition in 24-48 hours. Nutrition Monitoring and Evaluation:   Behavioral-Environmental Outcomes:  None Identified   Food/Nutrient Intake Outcomes:  Food and Nutrient Intake, Supplement Intake  Physical Signs/Symptoms Outcomes:  Biochemical Data, Diarrhea, GI Status, Nausea or Vomiting, Nutrition Focused Physical Findings, Skin, Weight     Discharge Planning: Too soon to determine     Electronically signed by Elsy Morrissey.  Mal James RD, LD on 7/6/21 at 10:43 AM EDT    Contact: [phone]

## 2021-07-06 NOTE — PLAN OF CARE
Problem: Pain:  Goal: Control of acute pain  Description: Control of acute pain  Outcome: Ongoing     Pt is rating pain 6/10. Pt medicated with PRN Morphine as ordered. Will continue to monitor.

## 2021-07-06 NOTE — CARE COORDINATION
Hospital day 5: Patient on C3 followed by IM and GI. Patient upgraded to clears. Patient with tentative remicade treatment plan per GI awaiting TB skin test. Patient plans to dc home when medically appropriate. CARLIN Ludwig

## 2021-07-06 NOTE — PROGRESS NOTES
61 yo F admitted with flare of pan-UC. Symptoms improving very slowly.   - Continue clears  - Await TB skin test and Hep B S Ag before considering Remicade  - An unprepped limited colonoscopy to assess disease severity and to r/o CMV will be scheduled tomorrow    Lesa Lira MD       [phone]) [phone]

## 2021-07-06 NOTE — PLAN OF CARE
Nutrition Problem #1: Moderate malnutrition  Intervention: Food and/or Nutrient Delivery: Continue Current Diet, Start Oral Nutrition Supplement  Nutritional Goals: Receive the most appropriate form of nutrition in 24-48 hours.

## 2021-07-06 NOTE — PROGRESS NOTES
Patient ID:  Paula Corbett  [id number]  10/27/1932 84 year old    CHIEF COMPLAINT: Nausea        ASSESSMENT AND PLAN:  1. Acute UTI. Diagnosed in the ED a couple of days ago but patient did not fill her prescription. Start IV Rocephin. Follow up on cultures.  2. Acute kidney injury. Likely secondary to volume depletion from vomiting, decreased oral intake. Gentle hydration. Repeat labs.  3. Intractable nausea/vomiting. Likely secondary to UTI. Patient received 2 doses of IV antiemetics in the emergency department and continues to have nausea and unable to keep down liquids. IV fluids and antiemetics as needed.  4. Chronic atrial fibrillation. Resume amiodarone. INR supratherapeutic. We'll hold warfarin and monitor daily INR.  5. Troponin elevation. Mild with repeat being stable. Will check one more troponin in the morning. Likely result of her acute UTI and worsening renal function.  DVT Prophylaxis: Warfarin, SCDs    Code status: Full code    This physician, recommends Paula Corbett be admitted as an INPATIENT to the medical unit/ICU. The expected LOS exceeds 2 midnights. Reason(s) for INPATIENT CARE include acute UTI with acute kidney injury and intractable nausea/vomiting requiring multiple doses of IV antiemetics with continued inability to keep down liquids. Based on severity of presenting sx, co-morbidities, and lab/imaging, this patient has an increased risk of adverse outcomes.       HPI: This is an 84-year-old female came to the emergency department with ongoing nausea/vomiting. She was seen a couple of days ago with similar symptoms. She had been unable to keep down any solid food and was only taking liquids at that time. She is not having any abdominal pain, chest pain, changes in bowel habits. Creatinine was slightly elevated at that time and she was found to have a UTI. She was given a prescription for Bactrim but never filled the medication. After going home she has continued to have nausea and vomiting at  Progress Note  Date:2021       Room:[address] [place] [address]  Patient Name:Maranda Burns     Date of Birth:     Age:61 y.o. Subjective    Subjective:  Symptoms:  Stable. Pain:  She complains of pain that is mild. Review of Systems  Objective         Vitals Last 24 Hours:  TEMPERATURE:  Temp  Av.9 °F (36.6 °C)  Min: 97.3 °F (36.3 °C)  Max: 98.2 °F (36.8 °C)  RESPIRATIONS RANGE: Resp  Av  Min: 16  Max: 16  PULSE OXIMETRY RANGE: SpO2  Av.7 %  Min: 94 %  Max: 97 %  PULSE RANGE: Pulse  Av  Min: 58  Max: 72  BLOOD PRESSURE RANGE: Systolic (28JJZ), JLR:193 , Min:154 , JOHNNY:040   ; Diastolic (38GWG), EXA:27, Min:85, Max:90    I/O (24Hr): Intake/Output Summary (Last 24 hours) at 2021 1451  Last data filed at 2021 1355  Gross per 24 hour   Intake 1985.8 ml   Output    Net 1985.8 ml     Objective:  General Appearance: In no acute distress and not in pain. Vital signs: (most recent): Blood pressure (!) 167/90, pulse 72, temperature 97.3 °F (36.3 °C), temperature source Oral, resp. rate 16, height 5' 4\" (1.626 m), weight 97 lb 3.2 oz (44.1 kg), SpO2 94 %, not currently breastfeeding. HEENT: Normal HEENT exam.    Lungs:  Normal effort and normal respiratory rate. Abdomen: Abdomen is soft. Bowel sounds are normal.   There is left upper quadrant and left lower quadrant tenderness. Labs/Imaging/Diagnostics    Labs:  CBC:  Recent Labs     21  0517 21  0518 21  0540   WBC 19.9* 16.7* 17.4*   RBC 4.10 4.00 4.20   HGB 12.5 12.3 12.9   HCT 38.2 37.0 38.7   MCV 93.3 92.6 91.9   RDW 14.2 14.1 13.9    447 507*     CHEMISTRIES:  Recent Labs     21  0517 21  0518 21  0540    143 141   K 3.6 3.7 3.6    106 103   CO2 25 30 27   BUN 9 13 14   CREATININE <0.5* <0.5* <0.5*   GLUCOSE 139* 121* 161*   PHOS 1.7* 2.7 2.7     PT/INR:No results for input(s): PROTIME, INR in the last 72 hours. APTT:No results for input(s):  APTT in the last 72 hours. LIVER PROFILE:No results for input(s): AST, ALT, BILIDIR, BILITOT, ALKPHOS in the last 72 hours. Imaging Last 24 Hours:  No results found. Assessment//Plan           Hospital Problems         Last Modified POA    Essential hypertension 7/1/2021 Yes    Moderate COPD (chronic obstructive pulmonary disease) ([address] 7/1/2021 Yes    Heterozygous alpha 1-antitrypsin deficiency ([facility] 7/1/2021 Yes    Acquired hypothyroidism 7/1/2021 Yes    Ulcerative colitis with rectal bleeding ([address] 7/1/2021 Yes    Moderate malnutrition ([address] 7/2/2021 Yes        Assessment & Plan  61 yo F admitted with flare of pan-UC. Symptoms improving very slowly.   - Continue clears  - Await TB skin test and Hep B S Ag before considering Remicade  - An unprepped limited colonoscopy to assess disease severity and to r/o CMV will be scheduled tomorrow    Jagdeep Loya MD       (O) [phone]        Electronically signed by Jagdeep Loya MD on 7/6/21 at 2:51 PM EDT this point is unable to keep down any liquids. She is also become increasingly weak to the point where she has a hard time even getting up from a chair and has been requiring the use of a walker in the home. Repeat labs today showed continued elevation in creatinine and persistent UTI. She received 2 doses of IV antiemetics in the emergency department but is still having nausea and is unable to keep down any liquids. She's been started on IV Rocephin for UTI.    PAST MEDICAL HISTORY:     CAD (coronary artery disease)                                   Comment: s/p CABG 12/29/2009    Myocardial infarction, inferior wall                          Hypertension                                                  Hyperlipidemia                                                CHF (congestive heart failure)                                Hypothyroidism                                                Ischemic cardiomyopathy                                       Nonsustained ventricular tachycardia                          Cataract                                                        Comment: s/p bilateral cataract extraction with IOL      Arterial ischemic stroke, vertebrobasilar, bra*               PONV (postoperative nausea and vomiting)                        Comment: \"years ago n/v but recently okay\"    Esophageal reflux                                             Urinary tract infection                                       H/O: gout                                                     Psoriasis                                                     History of shingles                                           DNR no code (do not resuscitate)                                Comment: pt wears a DNR bracelet     H/O bronchitis                                  2012          History of pneumonia                                          H/O transfusion of packed red blood cells                     Constipation                                                   Pacemaker                                                     Allergy                                                       PAST SURGICAL HISTORY:    EP PACER                                        03/05/2011      Comment: Medtronic dual chamber    ECHO RAMONE                                        02/26/2011      Comment: LVEF 35%    ECHO HEART RESTING                              02/25/2011      Comment: LVEF 37%    REPLACEMENT OF MITRAL VALVE                     01/04/2010      Comment: initial valve was mechanical, this developed a                thrombusand was changed to bioprosthetic valve                on 02/27/2011    CABG, ARTERIAL, THREE                           12/29/2009      Comment: NOY-LAD,LAMA-OM3 of the CX. D1, PDA of the RCA    EP STUDY                                        03/05/2011      Comment: prolonged SNRT    EYE SURGERY                                                   PTCA                                                          REMV CATARACT EXTRACAP INSERT LENS              02/07/2013      Comment: Cataract Removal Lens Implant    REMV CATARACT EXTRACAP INSERT LENS              01/21/2013      Comment: Cataract Removal Lens Implant    LEFT HEART CATH,PERCUTANEOUS                    02/26/2011      Comment: Cardiac Cath    TISSUE AORTIC AND MITRAL VALVE REPLACEMENT      02/27/2011    APPENDECTOMY                                                  HYSTERECTOMY                                                  VAGINAL DELIVERY                                                Comment: x3     CARDIOVERSION                                                 FAMILY HISTORY:  Family History   Problem Relation Age of Onset   • Heart disease Mother    • Heart disease Sister    • Heart disease Brother    • Heart disease Brother    • Heart disease Sister    • Heart disease Sister    • Heart disease Sister    • Heart disease Sister        SOCIAL HISTORY:  Social History     Social  History   • Marital status:      Spouse name: N/A   • Number of children: N/A   • Years of education: N/A     Occupational History   • Not on file.     Social History Main Topics   • Smoking status: Former Smoker     Years: 22.00     Types: Cigarettes     Quit date: 1/1/1973   • Smokeless tobacco: Never Used      Comment: started as a teenager and stopped in 1973   • Alcohol use No   • Drug use: No   • Sexual activity: Not on file     Other Topics Concern   • Not on file     Social History Narrative       MEDICATIONS:     (Not in a hospital admission)    ALLERGIES:  Allergies as of 03/04/2017 - Meño as Reviewed 03/04/2017   Allergen Reaction Noted   • Zithromax [azithromycin dihydrate] Nausea & Vomiting    • Amiodarone Other (See Comments) 12/19/2014   • Nitrofurantoin Other (See Comments) 08/29/2016   • Penicillins NAUSEA        REVIEW OF SYSTEMS:  Positive for: Urinary frequency, nausea/vomiting, weakness  Denies: Fevers, chills, chest pain, palpitations, shortness of breath, changes in bowel habits, GI bleeding  All other systems reviewed and negative.      PHYSICAL EXAM:  VITAL SIGNS:   Visit Vitals   • BP (!) 161/95   • Pulse 99   • Temp 97.5 °F (36.4 °C) (Temporal Artery)   • Resp 20   • Ht 5' (1.524 m)   • Wt 72.6 kg   • SpO2 93%   • BMI 31.25 kg/m2     GEN: Alert, oriented x 3, no acute distress. Well developed female appears stated age.  EYES: PERRLA, no scleral icterus, conjunctiva are pink.  ENT: Oral mucous membranes are dry. No oral lesions.  NECK: Supple. Trachea is midline. No thyromegaly.  CV: Regular rate and rhythm. No murmurs.  LUNGS: Clear to auscultation bilaterally. Normal respiratory effort.  ABD: Soft, non-tender, non-distended, active bowel sounds. No HSM appreciated.  EXT: Chronic lower extremity edema. No cyanosis or clubbing.  MS: Normal muscle mass and tone. Normal ROM.  SKIN: Warm and dry. No rashes.       LABS:   Lab Results   Component Value Date    SODIUM 143 03/04/2017     POTASSIUM 3.3 (L) 03/04/2017    CHLORIDE 103 03/04/2017    CO2 27 03/04/2017    GLUCOSE 128 (H) 03/04/2017    BUN 24 (H) 03/04/2017    CREATININE 2.04 (H) 03/04/2017    ALBUMIN 3.3 (L) 03/04/2017    BILIRUBIN 1.3 (H) 03/04/2017    LACTA 2.4 (HH) 03/04/2017    AST 25 03/04/2017    INR 3.5 03/04/2017     Lab Results   Component Value Date    PTT 38 (H) 03/04/2017    WBC 9.9 03/04/2017    HGB 14.0 03/04/2017    HCT 43.1 03/04/2017     03/04/2017    BNP 1210 (H) 03/02/2017    RAPDTR 0.06 (HH) 03/04/2017    TSH 1.301 02/01/2017    PHAN 32 12/09/2016     CT brain negative. CT abdomen/pelvis shows right-sided hydronephrosis which is chronic and unchanged from previous.    Seven Chandler,   3/4/2017  11:36 PM

## 2021-07-07 ENCOUNTER — ANESTHESIA (OUTPATIENT)
Dept: ENDOSCOPY | Age: 61
DRG: 386 | End: 2021-07-07
Payer: MEDICARE

## 2021-07-07 ENCOUNTER — ANESTHESIA EVENT (OUTPATIENT)
Dept: ENDOSCOPY | Age: 61
DRG: 386 | End: 2021-07-07
Payer: MEDICARE

## 2021-07-07 VITALS
BODY MASS INDEX: 16.59 KG/M2 | TEMPERATURE: 98.2 F | DIASTOLIC BLOOD PRESSURE: 85 MMHG | RESPIRATION RATE: 16 BRPM | WEIGHT: 97.2 LBS | HEART RATE: 62 BPM | SYSTOLIC BLOOD PRESSURE: 160 MMHG | OXYGEN SATURATION: 100 % | HEIGHT: 64 IN

## 2021-07-07 VITALS — OXYGEN SATURATION: 99 % | DIASTOLIC BLOOD PRESSURE: 85 MMHG | SYSTOLIC BLOOD PRESSURE: 129 MMHG

## 2021-07-07 LAB
ALBUMIN SERPL-MCNC: 3.3 G/DL (ref 3.4–5)
ANION GAP SERPL CALCULATED.3IONS-SCNC: 5 MMOL/L (ref 3–16)
BUN BLDV-MCNC: 13 MG/DL (ref 7–20)
CALCIUM SERPL-MCNC: 8.9 MG/DL (ref 8.3–10.6)
CHLORIDE BLD-SCNC: 106 MMOL/L (ref 99–110)
CO2: 31 MMOL/L (ref 21–32)
CREAT SERPL-MCNC: <0.5 MG/DL (ref 0.6–1.2)
GFR AFRICAN AMERICAN: >60
GFR NON-AFRICAN AMERICAN: >60
GLUCOSE BLD-MCNC: 132 MG/DL (ref 70–99)
HCT VFR BLD CALC: 35.9 % (ref 36–48)
HEMOGLOBIN: 11.9 G/DL (ref 12–16)
HEPATITIS B SURFACE ANTIGEN INTERPRETATION: NORMAL
MCH RBC QN AUTO: 30.6 PG (ref 26–34)
MCHC RBC AUTO-ENTMCNC: 33.2 G/DL (ref 31–36)
MCV RBC AUTO: 92.3 FL (ref 80–100)
PDW BLD-RTO: 13.9 % (ref 12.4–15.4)
PHOSPHORUS: 2.4 MG/DL (ref 2.5–4.9)
PLATELET # BLD: 457 K/UL (ref 135–450)
PMV BLD AUTO: 8 FL (ref 5–10.5)
POTASSIUM SERPL-SCNC: 3.2 MMOL/L (ref 3.5–5.1)
RBC # BLD: 3.89 M/UL (ref 4–5.2)
SODIUM BLD-SCNC: 142 MMOL/L (ref 136–145)
WBC # BLD: 14.4 K/UL (ref 4–11)

## 2021-07-07 PROCEDURE — 2580000003 HC RX 258: Performed by: INTERNAL MEDICINE

## 2021-07-07 PROCEDURE — 7100000011 HC PHASE II RECOVERY - ADDTL 15 MIN: Performed by: INTERNAL MEDICINE

## 2021-07-07 PROCEDURE — 2580000003 HC RX 258: Performed by: NURSE ANESTHETIST, CERTIFIED REGISTERED

## 2021-07-07 PROCEDURE — 6370000000 HC RX 637 (ALT 250 FOR IP): Performed by: INTERNAL MEDICINE

## 2021-07-07 PROCEDURE — 88342 IMHCHEM/IMCYTCHM 1ST ANTB: CPT

## 2021-07-07 PROCEDURE — 94761 N-INVAS EAR/PLS OXIMETRY MLT: CPT

## 2021-07-07 PROCEDURE — 88305 TISSUE EXAM BY PATHOLOGIST: CPT

## 2021-07-07 PROCEDURE — 2709999900 HC NON-CHARGEABLE SUPPLY: Performed by: INTERNAL MEDICINE

## 2021-07-07 PROCEDURE — 85027 COMPLETE CBC AUTOMATED: CPT

## 2021-07-07 PROCEDURE — 3609010300 HC COLONOSCOPY W/BIOPSY SINGLE/MULTIPLE: Performed by: INTERNAL MEDICINE

## 2021-07-07 PROCEDURE — 94640 AIRWAY INHALATION TREATMENT: CPT

## 2021-07-07 PROCEDURE — 6360000002 HC RX W HCPCS: Performed by: INTERNAL MEDICINE

## 2021-07-07 PROCEDURE — 80069 RENAL FUNCTION PANEL: CPT

## 2021-07-07 PROCEDURE — 2500000003 HC RX 250 WO HCPCS: Performed by: NURSE ANESTHETIST, CERTIFIED REGISTERED

## 2021-07-07 PROCEDURE — 3700000000 HC ANESTHESIA ATTENDED CARE: Performed by: INTERNAL MEDICINE

## 2021-07-07 PROCEDURE — 6360000002 HC RX W HCPCS: Performed by: NURSE ANESTHETIST, CERTIFIED REGISTERED

## 2021-07-07 PROCEDURE — 3700000001 HC ADD 15 MINUTES (ANESTHESIA): Performed by: INTERNAL MEDICINE

## 2021-07-07 PROCEDURE — 0DBN8ZX EXCISION OF SIGMOID COLON, VIA NATURAL OR ARTIFICIAL OPENING ENDOSCOPIC, DIAGNOSTIC: ICD-10-PCS | Performed by: INTERNAL MEDICINE

## 2021-07-07 PROCEDURE — 2700000000 HC OXYGEN THERAPY PER DAY

## 2021-07-07 PROCEDURE — 7100000010 HC PHASE II RECOVERY - FIRST 15 MIN: Performed by: INTERNAL MEDICINE

## 2021-07-07 RX ORDER — PROPOFOL 10 MG/ML
INJECTION, EMULSION INTRAVENOUS PRN
Status: DISCONTINUED | OUTPATIENT
Start: 2021-07-07 | End: 2021-07-07 | Stop reason: SDUPTHER

## 2021-07-07 RX ORDER — MEPERIDINE HYDROCHLORIDE 50 MG/ML
12.5 INJECTION INTRAMUSCULAR; INTRAVENOUS; SUBCUTANEOUS EVERY 5 MIN PRN
Status: DISCONTINUED | OUTPATIENT
Start: 2021-07-07 | End: 2021-07-07 | Stop reason: HOSPADM

## 2021-07-07 RX ORDER — LIDOCAINE HYDROCHLORIDE 20 MG/ML
INJECTION, SOLUTION INFILTRATION; PERINEURAL PRN
Status: DISCONTINUED | OUTPATIENT
Start: 2021-07-07 | End: 2021-07-07 | Stop reason: SDUPTHER

## 2021-07-07 RX ORDER — MORPHINE SULFATE 2 MG/ML
1 INJECTION, SOLUTION INTRAMUSCULAR; INTRAVENOUS EVERY 5 MIN PRN
Status: DISCONTINUED | OUTPATIENT
Start: 2021-07-07 | End: 2021-07-07 | Stop reason: HOSPADM

## 2021-07-07 RX ORDER — HYDRALAZINE HYDROCHLORIDE 20 MG/ML
5 INJECTION INTRAMUSCULAR; INTRAVENOUS EVERY 10 MIN PRN
Status: DISCONTINUED | OUTPATIENT
Start: 2021-07-07 | End: 2021-07-07 | Stop reason: HOSPADM

## 2021-07-07 RX ORDER — ONDANSETRON 2 MG/ML
4 INJECTION INTRAMUSCULAR; INTRAVENOUS
Status: DISCONTINUED | OUTPATIENT
Start: 2021-07-07 | End: 2021-07-07 | Stop reason: HOSPADM

## 2021-07-07 RX ORDER — PROMETHAZINE HYDROCHLORIDE 25 MG/ML
6.25 INJECTION, SOLUTION INTRAMUSCULAR; INTRAVENOUS
Status: DISCONTINUED | OUTPATIENT
Start: 2021-07-07 | End: 2021-07-07 | Stop reason: HOSPADM

## 2021-07-07 RX ORDER — MORPHINE SULFATE 2 MG/ML
2 INJECTION, SOLUTION INTRAMUSCULAR; INTRAVENOUS EVERY 5 MIN PRN
Status: DISCONTINUED | OUTPATIENT
Start: 2021-07-07 | End: 2021-07-07 | Stop reason: HOSPADM

## 2021-07-07 RX ORDER — SODIUM CHLORIDE, SODIUM LACTATE, POTASSIUM CHLORIDE, CALCIUM CHLORIDE 600; 310; 30; 20 MG/100ML; MG/100ML; MG/100ML; MG/100ML
INJECTION, SOLUTION INTRAVENOUS CONTINUOUS PRN
Status: DISCONTINUED | OUTPATIENT
Start: 2021-07-07 | End: 2021-07-07 | Stop reason: SDUPTHER

## 2021-07-07 RX ORDER — OXYCODONE HYDROCHLORIDE AND ACETAMINOPHEN 5; 325 MG/1; MG/1
1 TABLET ORAL PRN
Status: DISCONTINUED | OUTPATIENT
Start: 2021-07-07 | End: 2021-07-07 | Stop reason: HOSPADM

## 2021-07-07 RX ORDER — DIPHENHYDRAMINE HYDROCHLORIDE 50 MG/ML
12.5 INJECTION INTRAMUSCULAR; INTRAVENOUS
Status: DISCONTINUED | OUTPATIENT
Start: 2021-07-07 | End: 2021-07-07 | Stop reason: HOSPADM

## 2021-07-07 RX ORDER — OXYCODONE HYDROCHLORIDE AND ACETAMINOPHEN 5; 325 MG/1; MG/1
2 TABLET ORAL PRN
Status: DISCONTINUED | OUTPATIENT
Start: 2021-07-07 | End: 2021-07-07 | Stop reason: HOSPADM

## 2021-07-07 RX ORDER — LABETALOL HYDROCHLORIDE 5 MG/ML
5 INJECTION, SOLUTION INTRAVENOUS EVERY 10 MIN PRN
Status: DISCONTINUED | OUTPATIENT
Start: 2021-07-07 | End: 2021-07-07 | Stop reason: HOSPADM

## 2021-07-07 RX ORDER — PREDNISONE 20 MG/1
20 TABLET ORAL 2 TIMES DAILY
Qty: 10 TABLET | Refills: 0 | Status: SHIPPED | OUTPATIENT
Start: 2021-07-07 | End: 2021-07-12

## 2021-07-07 RX ADMIN — LOSARTAN POTASSIUM 100 MG: 100 TABLET, FILM COATED ORAL at 07:53

## 2021-07-07 RX ADMIN — TIOTROPIUM BROMIDE INHALATION SPRAY 2 PUFF: 3.12 SPRAY, METERED RESPIRATORY (INHALATION) at 08:11

## 2021-07-07 RX ADMIN — PROPOFOL 100 MG: 10 INJECTION, EMULSION INTRAVENOUS at 07:00

## 2021-07-07 RX ADMIN — METHYLPREDNISOLONE SODIUM SUCCINATE 20 MG: 40 INJECTION, POWDER, FOR SOLUTION INTRAMUSCULAR; INTRAVENOUS at 07:55

## 2021-07-07 RX ADMIN — ATORVASTATIN CALCIUM 40 MG: 10 TABLET, FILM COATED ORAL at 07:53

## 2021-07-07 RX ADMIN — METOPROLOL SUCCINATE 50 MG: 50 TABLET, EXTENDED RELEASE ORAL at 07:53

## 2021-07-07 RX ADMIN — SODIUM CHLORIDE: 9 INJECTION, SOLUTION INTRAVENOUS at 05:50

## 2021-07-07 RX ADMIN — FOLIC ACID 1 MG: 1 TABLET ORAL at 07:53

## 2021-07-07 RX ADMIN — SODIUM CHLORIDE, SODIUM LACTATE, POTASSIUM CHLORIDE, AND CALCIUM CHLORIDE: .6; .31; .03; .02 INJECTION, SOLUTION INTRAVENOUS at 07:00

## 2021-07-07 RX ADMIN — GABAPENTIN 300 MG: 300 CAPSULE ORAL at 14:24

## 2021-07-07 RX ADMIN — DULOXETINE HYDROCHLORIDE 60 MG: 60 CAPSULE, DELAYED RELEASE ORAL at 07:53

## 2021-07-07 RX ADMIN — LIDOCAINE HYDROCHLORIDE 60 MG: 20 INJECTION, SOLUTION INFILTRATION; PERINEURAL at 07:00

## 2021-07-07 RX ADMIN — Medication 2 PUFF: at 08:11

## 2021-07-07 RX ADMIN — LEVOTHYROXINE SODIUM 50 MCG: 0.05 TABLET ORAL at 05:49

## 2021-07-07 RX ADMIN — GABAPENTIN 300 MG: 300 CAPSULE ORAL at 07:54

## 2021-07-07 NOTE — PLAN OF CARE
Problem: Falls - Risk of:  Goal: Will remain free from falls  Description: Will remain free from falls  Outcome: Ongoing  Note: Pt remains safe. Uses call light appropriately to call out for assistance. Bed locked in lowest position; side rails 2/4; call light and bedside table within reach of pt.

## 2021-07-07 NOTE — ANESTHESIA PRE PROCEDURE
Department of Anesthesiology  Preprocedure Note       Name:  Magalie Adames   Age:  62 y.o.  :  1960                                          MRN:  [id number]         Date:  2021      Surgeon: Alonzo Webb):  Cole Hemphill MD    Procedure: Procedure(s):  COLONOSCOPY DIAGNOSTIC    Medications prior to admission:   Prior to Admission medications    Medication Sig Start Date End Date Taking? Authorizing Provider   mirtazapine (REMERON) 15 MG tablet Take 15 mg by mouth nightly   Yes Historical Provider, MD Davis Bump 100-62.5-25 MCG/INH AEPB INHALE 1 PUFF INTO THE LUNGS DAILY 21  Yes Justin Gregg MD   losartan (COZAAR) 100 MG tablet Take 1 tablet by mouth daily 5/10/21  Yes Iraj Tripp MD   amLODIPine (NORVASC) 5 MG tablet Take 1 tablet by mouth nightly 5/10/21  Yes Iraj Tripp MD   atorvastatin (LIPITOR) 40 MG tablet Take 1 tablet by mouth daily 5/10/21 5/5/22 Yes Iraj Tripp MD   metoprolol succinate (TOPROL XL) 50 MG extended release tablet Take 1 tablet by mouth daily 21 Yes Iraj Tripp MD   Cholecalciferol (VITAMIN D) 50 MCG (2000 UT) CAPS capsule Take by mouth    Yes Historical Provider, MD   benzonatate (TESSALON) 100 MG capsule Take 100 mg by mouth 3 times daily as needed for Cough   Yes Historical Provider, MD   albuterol sulfate  (90 Base) MCG/ACT inhaler Inhale 1 puff into the lungs as needed for Wheezing or Shortness of Breath 20  Yes Justin Gregg MD   pantoprazole (PROTONIX) 40 MG tablet Take 40 mg by mouth daily 10/16/20  Yes Historical Provider, MD   FLORASTOR 250 MG capsule Take 250 mg by mouth daily 20  Yes Historical Provider, MD   gabapentin (NEURONTIN) 300 MG capsule Take 1 capsule by mouth 4 times daily for 30 days.  20  Yes Irene Abdul MD   aspirin-acetaminophen-caffeine (EXCEDRIN MIGRAINE) 230-661-39 MG per tablet Take 1 tablet by mouth every 6 hours as needed for Headaches   Yes Historical Provider, MD levothyroxine (SYNTHROID) tablet 50 mcg  50 mcg Oral Daily Margareth Hobbs MD   50 mcg at 07/07/21 0549    losartan (COZAAR) tablet 100 mg  100 mg Oral Daily Margareth Hobbs MD   100 mg at 07/06/21 1004    metoprolol succinate (TOPROL XL) extended release tablet 50 mg  50 mg Oral Daily Margareth Hobbs MD   50 mg at 07/06/21 1007    mirtazapine (REMERON) tablet 15 mg  15 mg Oral Nightly Margareth Hobbs MD   15 mg at 07/06/21 2237    sodium chloride flush 0.9 % injection 5-40 mL  5-40 mL Intravenous 2 times per day Margareth Hobbs MD   10 mL at 07/05/21 0939    sodium chloride flush 0.9 % injection 5-40 mL  5-40 mL Intravenous PRN Margareth Hobbs MD        0.9 % sodium chloride infusion  25 mL Intravenous PRN Margareth Hobbs MD        ondansetron (ZOFRAN-ODT) disintegrating tablet 4 mg  4 mg Oral Q8H PRN Margareth Hobbs MD        Or    ondansetron [facility]) injection 4 mg  4 mg Intravenous Q6H PRN Margareth Hobbs MD   4 mg at 07/05/21 1703    acetaminophen (TYLENOL) tablet 650 mg  650 mg Oral Q6H PRN Margareth Hobbs MD        Or    acetaminophen (TYLENOL) suppository 650 mg  650 mg Rectal Q6H PRN Margareth Hobbs MD        0.9 % sodium chloride infusion   Intravenous Continuous Asael Jarvis  mL/hr at 07/07/21 0550 New Bag at 07/07/21 0550    budesonide-formoterol (SYMBICORT) 160-4.5 MCG/ACT inhaler 2 puff  2 puff Inhalation BID Margareth Hobbs MD   2 puff at 07/06/21 2020    tiotropium (SPIRIVA RESPIMAT) 2.5 MCG/ACT inhaler 2 puff  2 puff Inhalation Daily Margareth Hobbs MD   2 puff at 07/06/21 1206       Allergies:  No Known Allergies    Problem List:    Patient Active Problem List   Diagnosis Code    Essential hypertension I10    Chest pain R07.9    Moderate COPD (chronic obstructive pulmonary disease) ([facility] [address] J44.9    Heterozygous alpha 1-antitrypsin deficiency ([facility] E88.01    COPD exacerbation ([facility] J44.1    Gastroesophageal reflux disease without esophagitis K21.9    Anxiety and depression F41.9, F32.9    Neuropathy of left lower extremity G57.92    Acquired hypothyroidism E03.9    COPD with acute exacerbation ([facility]) J44.1    Diarrhea R19.7    Adrenal nodule ([facility]) E27.8    Hypercalcemia E83.52    NSTEMI (non-ST elevated myocardial infarction) ([facility]) I21.4    Non-ischemic cardiomyopathy ([facility]) I42.8    Menopause Z78.0    Other osteoporosis without current pathological fracture M81.8    Ulcerative colitis with rectal bleeding ([facility]) K51.911    Moderate malnutrition ([facility]) E44.0       Past Medical History:        Diagnosis Date    Back pain     COPD (chronic obstructive pulmonary disease) ([facility])     Fatigue     Hypertension     Syncope and collapse 2019    Thyroid disease     Ulcerative colitis, unspecified, without complications ([facility] [address]        Past Surgical History:        Procedure Laterality Date    BACK SURGERY      L4-L5 rods in    HYSTERECTOMY, VAGINAL  2000    SPLENECTOMY  2019    due to a fall    TONSILLECTOMY         Social History:    Social History     Tobacco Use    Smoking status: Former Smoker     Packs/day: 0.10     Years: 32.00     Pack years: 3.20     Types: Cigarettes     Quit date: 2020     Years since quittin.3    Smokeless tobacco: Never Used   Substance Use Topics    Alcohol use: Never                                Counseling given: Not Answered      Vital Signs (Current):   Vitals:    21 1356 21 2028 21 2234 21 2240   BP:   137/86 137/86   Pulse: 72   72   Resp:    16   Temp:    98.6 °F (37 °C)   TempSrc:    Oral   SpO2: 94% 95%  96%   Weight:       Height:                                                  BP Readings from Last 3 Encounters:   21 137/86   21 (!) 160/92   21 127/83       NPO Status: Time of last liquid consumption: 2300                        Time of last solid consumption: 1800                        Date of last liquid consumption: 07/06/21                        Date of last solid food consumption: 07/04/21    BMI:   Wt Readings from Last 3 Encounters:   07/01/21 97 lb 3.2 oz (44.1 kg)   06/09/21 98 lb (44.5 kg)   05/26/21 98 lb (44.5 kg)     Body mass index is 16.68 kg/m². CBC:   Lab Results   Component Value Date    WBC 14.4 07/07/2021    RBC 3.89 07/07/2021    HGB 11.9 07/07/2021    HCT 35.9 07/07/2021    MCV 92.3 07/07/2021    RDW 13.9 07/07/2021     07/07/2021       CMP:   Lab Results   Component Value Date     07/07/2021    K 3.2 07/07/2021    K 4.0 07/02/2021     07/07/2021    CO2 31 07/07/2021    BUN 13 07/07/2021    CREATININE <0.5 07/07/2021    GFRAA >60 07/07/2021    AGRATIO 1.3 07/01/2021    LABGLOM >60 07/07/2021    GLUCOSE 132 07/07/2021    PROT 7.2 07/01/2021    CALCIUM 8.9 07/07/2021    BILITOT 1.1 07/01/2021    ALKPHOS 87 07/01/2021    AST 24 07/01/2021    ALT 14 07/01/2021       POC Tests: No results for input(s): POCGLU, POCNA, POCK, POCCL, POCBUN, POCHEMO, POCHCT in the last 72 hours.     Coags:   Lab Results   Component Value Date    PROTIME 15.3 06/11/2019    INR 1.34 06/11/2019    APTT 29.8 03/22/2021       HCG (If Applicable): No results found for: PREGTESTUR, PREGSERUM, HCG, HCGQUANT     ABGs: No results found for: PHART, PO2ART, PAV0HYI, RTD8PGA, BEART, G5NLIRRW     Type & Screen (If Applicable):  No results found for: LABABO, LABRH    Drug/Infectious Status (If Applicable):  No results found for: HIV, HEPCAB    COVID-19 Screening (If Applicable):   Lab Results   Component Value Date    COVID19 Not Detected 12/22/2020           Anesthesia Evaluation   no history of anesthetic complications:   Airway: Mallampati: II  TM distance: >3 FB   Neck ROM: limited  Mouth opening: > = 3 FB Dental:          Pulmonary:   (+) COPD:                            ROS comment: H/o tob   Cardiovascular:    (+) hypertension:, past MI: > 6 months, CAD:,     (-)  angina Neuro/Psych:   (+) neuromuscular disease:, psychiatric history:depression/anxiety             GI/Hepatic/Renal:   (+) GERD: well controlled, PUD,           Endo/Other:    (+) hypothyroidism::., .                 Abdominal:             Vascular: negative vascular ROS. Other Findings:             Anesthesia Plan      general     ASA 3     (Risks, benefits and alternatives of GA discussed with pt. Questions answered. Willing to proceed.)  Induction: intravenous. Anesthetic plan and risks discussed with patient.                       Raffy Martin MD   7/7/2021

## 2021-07-07 NOTE — PROGRESS NOTES
sounds. Musculoskeletal: No clubbing, cyanosis or edema bilaterally. .  Skin:  Warm and dry   Neurologic:   Alert, speech clear with no overt facial droop  Psychiatric: Alert and oriented, thought content appropriate, normal insight      Labs:   Recent Labs     07/04/21  0517 07/05/21  0518 07/06/21  0540   WBC 19.9* 16.7* 17.4*   HGB 12.5 12.3 12.9   HCT 38.2 37.0 38.7    447 507*     Recent Labs     07/04/21  0517 07/05/21  0518 07/06/21  0540    143 141   K 3.6 3.7 3.6    106 103   CO2 25 30 27   BUN 9 13 14   CREATININE <0.5* <0.5* <0.5*   CALCIUM 9.3 9.2 9.5   PHOS 1.7* 2.7 2.7     No results for input(s): AST, ALT, BILIDIR, BILITOT, ALKPHOS in the last 72 hours. No results for input(s): INR in the last 72 hours. No results for input(s): Buffy Swapnil in the last 72 hours. Assessment/Plan:    -Severe ulcerative colitis flare: CT showed severe diffuse colitis. GI assisting. GI pathogens by PCR negative, empiric abx d/slim. Continue IV Solu-Medrol. GI considering starting remicade inpatient versus outpatient. KUB was non acute. Continue supportive care   - Await TB skin test and Hep B S Ag before considering Remicade  - An unprepped limited colonoscopy to assess disease severity and to r/o CMV will be scheduled 7/7     -GI bleed/hematochezia due to severe colitis. resolved. Continue to monitor H&H for acute blood loss anemia        -Acquired hypothyroidism-continue levothyroxine     -Essential HTN-controlled-continue amlodipine, losartan and metoprolol     -HLD-continue statin     -BEJY-ldyddm-iyrmtkeh inhaled steroids and bronchodilators     Chronic resp failure: stable. Pt on 2L chronically at baseline per pt which she remains on currently. DVT Prophylaxis: SCDs  Diet: ADULT DIET;  Clear Liquid  Adult Oral Nutrition Supplement; Clear Liquid Oral Supplement  Diet NPO Exceptions are: Ice Chips  Code Status: Full Code    Dispo: hard to say, pending clinical course     Katia Londono Yony Brooks CNP

## 2021-07-07 NOTE — FLOWSHEET NOTE
visited pt per referral of neighbor/. Pt is awaiting reports from morning tests that will give adame idea of what is ahead. Pt expresses that she feels \"lonely\" in her circumstances. Has support of spouse, children's families very busy. Pt enjoys painting and being outdoors--some past time living in [facility]. Conversation about her spiritual journey. Pt welcomed prayer and is thankful especially for new found friendship with neighbor/. 07/07/21 1343   Encounter Summary   Services provided to: Patient   Referral/Consult From: Clergy/   Support System Spouse   Place of [place]    Continue Visiting   (7/7 - Spiritual support and prayer)   Complexity of Encounter Moderate   Length of Encounter 30 minutes   Spiritual Assessment Completed Yes   Spiritual/Yarsanism   Type Spiritual support   Assessment Approachable; Anxious   Intervention Explored feelings, thoughts, concerns;Nurtured hope;Prayer;Discussed relationship with God   Outcome Expressed gratitude; Connection/belonging;Engaged in conversation; Shared life review;Receptive;Venting emotion

## 2021-07-07 NOTE — OP NOTE
INCOMPLETE UNPREPPED Colonoscopy Note    Patient:   Yadiel Mazno    YOB: 1960    Facility:   [facility] [Inpatient]  Referring/PCP: Genet Granados DO  Procedure:   Colonoscopy --diagnostic  Date:     7/7/2021  Endoscopist:  Karen Castaneda MD, MD    Preoperative Diagnosis: Colitis w persistent diarrhea. Postoperative Diagnosis:  Small ulcer at 20 cm    Anesthesia:Propofol  Estimated blood loss: < 5 ml  Complications:  None    Description of Procedure:  Informed consent was obtained from the patient after explanation of the procedure including indications, description of the procedure,  benefits and possible risks and complications of the procedure, and alternatives. Questions were answered. The patient's history was reviewed and a directed physical examination was performed prior to the procedure. Patient was monitored throughout the procedure with pulse oximetry and periodic assessment of vital signs. Patient was sedated as noted above. The Nursing staff and I performed a time out. With the patient initially in the left lateral decubitus position, a digital rectal examination was performed and revealed negative without mass, lesions or tenderness. The Olympus video colonoscope was placed in the patient's rectum and advanced without difficulty  to the descending colon 35 cm, which was identified by the ileocecal valve and appendiceal orifice. Photographs were taken. The prep was inadequate. Examination of the mucosa was performed during both introduction and withdrawal of the colonoscope. Retroflexed view of the rectum was performed. Findings:     1. Erythema in the sigmoid, biopsied (scope stopped at 35 cm due to prep)  2. Small ulcer at 20 cm, biopsied, possibly CD instead of UC  3. Sigmoid diverticulosis     Recommendations:  Await pathology.   Will advance diet     Karen Castaneda MD       [phone]) [phone]          Karen Castaneda MD, MD

## 2021-07-07 NOTE — PROGRESS NOTES
Pt has had no more diarrhea since this AM. Dr. Tod Engel called back and stated pt can d/c from GI standpoint and follow up outpatient. Dr. Ritika Dsouza notified that GI said pt could leave. Pt eager to leave. Will continue to monitor.

## 2021-07-07 NOTE — H&P
Pre-sedation Assessment    History and Physical / Pre-Sedation Assessment  Patient:  Tamica Iglesias   :   1960     Intended Procedure: colonoscopy      HPI: 61 yo F admitted with flare of pan-UC. Symptoms improving very slowly.   - Continue clears  - Await TB skin test and Hep B S Ag before considering Remicade  - An unprepped limited colonoscopy to assess disease severity and to r/o CMV     Current Facility-Administered Medications   Medication Dose Route Frequency Provider Last Rate Last Admin    ppd (tuberculin skin test) read   Does not apply Once Julieta Anderson MD        morphine (PF) injection 2 mg  2 mg Intravenous Q3H PRN Yanet Moreno MD   2 mg at 21 223    methylPREDNISolone sodium (SOLU-MEDROL) injection 20 mg  20 mg Intravenous Q8H Julieta Anderson MD   20 mg at 21 2348    albuterol (PROVENTIL) nebulizer solution 2.5 mg  2.5 mg Nebulization Q6H PRN John Desouza MD        amLODIPine (NORVASC) tablet 5 mg  5 mg Oral Nightly Johnradha Desouza MD   5 mg at 21    atorvastatin (LIPITOR) tablet 40 mg  40 mg Oral Daily Johnradha Desouza MD   40 mg at 21 100    DULoxetine (CYMBALTA) extended release capsule 60 mg  60 mg Oral Daily Johnradha Desouza MD   60 mg at /52 60    folic acid (FOLVITE) tablet 1 mg  1 mg Oral Daily Johnradha Desouza MD   1 mg at 21 100    gabapentin (NEURONTIN) capsule 300 mg  300 mg Oral 4x Daily Johnradha Desouza MD   300 mg at 21    levothyroxine (SYNTHROID) tablet 50 mcg  50 mcg Oral Daily Johnoziel Desouza MD   50 mcg at 21 0549    losartan (COZAAR) tablet 100 mg  100 mg Oral Daily Johnradha Desouza MD   100 mg at 21 1004    metoprolol succinate (TOPROL XL) extended release tablet 50 mg  50 mg Oral Daily Johnoziel Desouza MD   50 mg at 21 1007    mirtazapine (REMERON) tablet 15 mg  15 mg Oral Nightly Johnoziel Desouza MD   15 mg at 21 8184  sodium chloride flush 0.9 % injection 5-40 mL  5-40 mL Intravenous 2 times per day Shadia Mitchell MD   10 mL at 07/05/21 0939    sodium chloride flush 0.9 % injection 5-40 mL  5-40 mL Intravenous PRN Shadia Mitchell MD        0.9 % sodium chloride infusion  25 mL Intravenous PRN Shadia Mitchell MD        ondansetron (ZOFRAN-ODT) disintegrating tablet 4 mg  4 mg Oral Q8H PRN Shadia Mitchell MD        Or    [facility]ron [facility] injection 4 mg  4 mg Intravenous Q6H PRN Shadia Mitchell MD   4 mg at 07/05/21 1703    acetaminophen (TYLENOL) tablet 650 mg  650 mg Oral Q6H PRN Shadia Mitchell MD        Or    acetaminophen (TYLENOL) suppository 650 mg  650 mg Rectal Q6H PRN Shadia Mitchell MD        0.9 % sodium chloride infusion   Intravenous Continuous Mali Schmitt  mL/hr at 07/07/21 0550 New Bag at 07/07/21 0550    budesonide-formoterol (SYMBICORT) 160-4.5 MCG/ACT inhaler 2 puff  2 puff Inhalation BID Shadia Mitchell MD   2 puff at 07/06/21 2020    tiotropium (SPIRIVA RESPIMAT) 2.5 MCG/ACT inhaler 2 puff  2 puff Inhalation Daily Shadia Mitchell MD   2 puff at 07/06/21 1206     Past Medical History:   Diagnosis Date    Back pain     [facility]) [facility] [address]     Fatigue     Hypertension     Syncope and collapse 03/2019    Thyroid disease     Ulcerative colitis, unspecified, without complications ([facility] [address]      Past Surgical History:   Procedure Laterality Date    BACK SURGERY      L4-L5 rods in    HYSTERECTOMY, VAGINAL  2000    SPLENECTOMY  2019    due to a fall    TONSILLECTOMY         Nurses notes reviewed and agreed.   Medications reviewed  Allergies: No Known Allergies        Physical Exam:  Vital Signs: /86   Pulse 72   Temp 98.6 °F (37 °C) (Oral)   Resp 16   Ht 5' 4\" (1.626 m)   Wt 97 lb 3.2 oz (44.1 kg)   SpO2 96%   BMI 16.68 kg/m²  Body mass index is 16.68 kg/m². Airway:Normal  Cardiac:Normal  Pulmonary:Normal  Abdomen:Normal  Specific to procedure: none      Pre-Procedure Assessment/Plan:  ASA 3 - Patient with moderate systemic disease with functional limitations  Mallampati I  Level of Sedation Plan:Deep sedation    Post Procedure plan: Return to same level of care    I assessed the patient and find that the patient is in satisfactory condition to proceed with the planned procedure and sedation plan. I have explained the risk, benefits, and alternatives to the procedure. The patient understands and agrees to proceed.   Yes    Sibyl Goltz, MD       (O) [phone]          Sibyl Goltz, MD  6:46 AM 7/7/2021

## 2021-07-07 NOTE — PROGRESS NOTES
Pt a/o. Pt stated that Dr. Irene Almonte said she could probably discharge today. Pt had an episode of diarrhea this AM. No nausea or pain medication needed per pt. No emesis. Pt's PIV occluded, removed, and heat pack applied. Pt refused to let nursing staff put in another PIV. Pt wanting to go home and pacing hallways. Dr. Irene Almonte paged and MAC Borges notified of of pt's wishes. Call light within reach; will continue to monitor.

## 2021-07-07 NOTE — PROGRESS NOTES
Dr. Jose Lofton returned page to Treasure Hernandez, [address] who relayed the information to pt and pt's . Discharge instructions given to pt and pt's  (both aware that biopsy pending before Remicade to possibly start and both aware to call GI office tomorrow morning to scheduled appointment). Pt has belongings and discharge paperwork. Pt discharged home in stable condition.

## 2021-07-07 NOTE — PLAN OF CARE
Problem: Pain:  Goal: Control of acute pain  Description: Control of acute pain  Outcome: Ongoing     Pt is rating pain 7/10. Pt medicated with PRN Morphine as ordered. Will continue to monitor.

## 2021-07-07 NOTE — ANESTHESIA POSTPROCEDURE EVALUATION
Department of Anesthesiology  Postprocedure Note    Patient: Jack Rizvi  MRN: [id number]  YOB: 1960  Date of evaluation: 7/7/2021  Time:  11:01 AM     Procedure Summary     Date: 07/07/21 Room / Location: [facility]  / [facility]    Anesthesia Start: 4708 Anesthesia Stop: 8681    Procedure: COLONOSCOPY WITH BIOPSY (N/A ) Diagnosis: (ulcerative colitis)    Surgeons: Eden De Dios MD Responsible Provider: Pili Lopez MD    Anesthesia Type: general ASA Status: 3          Anesthesia Type: general    Roseline Phase I: Roseline Score: 9    Roseline Phase II: Roseline Score: 10    Last vitals: Reviewed and per EMR flowsheets. Anesthesia Post Evaluation    Patient location during evaluation: PACU  Patient participation: complete - patient participated  Level of consciousness: awake and alert  Airway patency: patent  Nausea & Vomiting: no nausea and no vomiting  Complications: no  Cardiovascular status: blood pressure returned to baseline  Respiratory status: acceptable  Hydration status: euvolemic  Comments: VSS on transfer to phase 2 recovery. No anesthetic complications.

## 2021-07-08 ENCOUNTER — APPOINTMENT (OUTPATIENT)
Dept: CT IMAGING | Age: 61
End: 2021-07-08
Payer: MEDICARE

## 2021-07-08 ENCOUNTER — HOSPITAL ENCOUNTER (OUTPATIENT)
Age: 61
Setting detail: OBSERVATION
Discharge: HOME | End: 2021-07-10
Attending: EMERGENCY MEDICINE | Admitting: INTERNAL MEDICINE
Payer: MEDICARE

## 2021-07-08 DIAGNOSIS — R10.30 LOWER ABDOMINAL PAIN: Primary | ICD-10-CM

## 2021-07-08 DIAGNOSIS — K52.9 COLITIS: ICD-10-CM

## 2021-07-08 DIAGNOSIS — K51.011 ULCERATIVE PANCOLITIS WITH RECTAL BLEEDING (HCC): ICD-10-CM

## 2021-07-08 PROBLEM — R10.9 ABDOMINAL PAIN: Status: ACTIVE | Noted: 2021-07-08

## 2021-07-08 LAB
A/G RATIO: 1.4 (ref 1.1–2.2)
ALBUMIN SERPL-MCNC: 4 G/DL (ref 3.4–5)
ALP BLD-CCNC: 102 U/L (ref 40–129)
ALT SERPL-CCNC: 24 U/L (ref 10–40)
ANION GAP SERPL CALCULATED.3IONS-SCNC: 12 MMOL/L (ref 3–16)
ANISOCYTOSIS: ABNORMAL
AST SERPL-CCNC: 29 U/L (ref 15–37)
ATYPICAL LYMPHOCYTE RELATIVE PERCENT: 3 % (ref 0–6)
BANDED NEUTROPHILS RELATIVE PERCENT: 7 % (ref 0–7)
BASOPHILS ABSOLUTE: 0 K/UL (ref 0–0.2)
BASOPHILS RELATIVE PERCENT: 0 %
BILIRUB SERPL-MCNC: 0.4 MG/DL (ref 0–1)
BUN BLDV-MCNC: 20 MG/DL (ref 7–20)
CALCIUM SERPL-MCNC: 9.9 MG/DL (ref 8.3–10.6)
CHLORIDE BLD-SCNC: 98 MMOL/L (ref 99–110)
CO2: 25 MMOL/L (ref 21–32)
CREAT SERPL-MCNC: 0.7 MG/DL (ref 0.6–1.2)
EOSINOPHILS ABSOLUTE: 0 K/UL (ref 0–0.6)
EOSINOPHILS RELATIVE PERCENT: 0 %
GFR AFRICAN AMERICAN: >60
GFR NON-AFRICAN AMERICAN: >60
GLOBULIN: 2.8 G/DL
GLUCOSE BLD-MCNC: 108 MG/DL (ref 70–99)
HCT VFR BLD CALC: 43.5 % (ref 36–48)
HEMATOLOGY PATH CONSULT: NO
HEMOGLOBIN: 14.5 G/DL (ref 12–16)
LIPASE: 29 U/L (ref 13–60)
LYMPHOCYTES ABSOLUTE: 8 K/UL (ref 1–5.1)
LYMPHOCYTES RELATIVE PERCENT: 23 %
MAGNESIUM: 1.6 MG/DL (ref 1.8–2.4)
MCH RBC QN AUTO: 30.2 PG (ref 26–34)
MCHC RBC AUTO-ENTMCNC: 33.4 G/DL (ref 31–36)
MCV RBC AUTO: 90.5 FL (ref 80–100)
MONOCYTES ABSOLUTE: 3.1 K/UL (ref 0–1.3)
MONOCYTES RELATIVE PERCENT: 10 %
NEUTROPHILS ABSOLUTE: 19.7 K/UL (ref 1.7–7.7)
NEUTROPHILS RELATIVE PERCENT: 57 %
NUCLEATED RED BLOOD CELLS: 1 /100 WBC
PDW BLD-RTO: 13.8 % (ref 12.4–15.4)
PLATELET # BLD: 472 K/UL (ref 135–450)
PLATELET SLIDE REVIEW: ABNORMAL
PMV BLD AUTO: 7.9 FL (ref 5–10.5)
POTASSIUM REFLEX MAGNESIUM: 3.4 MMOL/L (ref 3.5–5.1)
RBC # BLD: 4.81 M/UL (ref 4–5.2)
REASON FOR REJECTION: NORMAL
REJECTED TEST: NORMAL
SLIDE REVIEW: ABNORMAL
SODIUM BLD-SCNC: 135 MMOL/L (ref 136–145)
TARGET CELLS: ABNORMAL
TOTAL PROTEIN: 6.8 G/DL (ref 6.4–8.2)
TROPONIN: <0.01 NG/ML
WBC # BLD: 30.8 K/UL (ref 4–11)

## 2021-07-08 PROCEDURE — 6360000002 HC RX W HCPCS: Performed by: EMERGENCY MEDICINE

## 2021-07-08 PROCEDURE — 96375 TX/PRO/DX INJ NEW DRUG ADDON: CPT

## 2021-07-08 PROCEDURE — 6370000000 HC RX 637 (ALT 250 FOR IP): Performed by: INTERNAL MEDICINE

## 2021-07-08 PROCEDURE — 6360000002 HC RX W HCPCS: Performed by: INTERNAL MEDICINE

## 2021-07-08 PROCEDURE — 6370000000 HC RX 637 (ALT 250 FOR IP): Performed by: NURSE PRACTITIONER

## 2021-07-08 PROCEDURE — 80053 COMPREHEN METABOLIC PANEL: CPT

## 2021-07-08 PROCEDURE — 83690 ASSAY OF LIPASE: CPT

## 2021-07-08 PROCEDURE — 96374 THER/PROPH/DIAG INJ IV PUSH: CPT

## 2021-07-08 PROCEDURE — 74177 CT ABD & PELVIS W/CONTRAST: CPT

## 2021-07-08 PROCEDURE — 99284 EMERGENCY DEPT VISIT MOD MDM: CPT

## 2021-07-08 PROCEDURE — 84484 ASSAY OF TROPONIN QUANT: CPT

## 2021-07-08 PROCEDURE — G0378 HOSPITAL OBSERVATION PER HR: HCPCS

## 2021-07-08 PROCEDURE — 36415 COLL VENOUS BLD VENIPUNCTURE: CPT

## 2021-07-08 PROCEDURE — 85025 COMPLETE CBC W/AUTO DIFF WBC: CPT

## 2021-07-08 PROCEDURE — 2580000003 HC RX 258: Performed by: INTERNAL MEDICINE

## 2021-07-08 PROCEDURE — 83735 ASSAY OF MAGNESIUM: CPT

## 2021-07-08 PROCEDURE — 6360000004 HC RX CONTRAST MEDICATION: Performed by: EMERGENCY MEDICINE

## 2021-07-08 PROCEDURE — 96376 TX/PRO/DX INJ SAME DRUG ADON: CPT

## 2021-07-08 RX ORDER — PROMETHAZINE HYDROCHLORIDE 25 MG/1
12.5 TABLET ORAL EVERY 6 HOURS PRN
Status: DISCONTINUED | OUTPATIENT
Start: 2021-07-08 | End: 2021-07-10 | Stop reason: HOSPADM

## 2021-07-08 RX ORDER — SUCRALFATE 1 G/1
1 TABLET ORAL
Status: ON HOLD | COMMUNITY
Start: 2021-04-23 | End: 2021-10-03 | Stop reason: ALTCHOICE

## 2021-07-08 RX ORDER — SODIUM CHLORIDE 0.9 % (FLUSH) 0.9 %
10 SYRINGE (ML) INJECTION PRN
Status: DISCONTINUED | OUTPATIENT
Start: 2021-07-08 | End: 2021-07-10 | Stop reason: HOSPADM

## 2021-07-08 RX ORDER — MIRTAZAPINE 15 MG/1
15 TABLET, FILM COATED ORAL NIGHTLY
Status: DISCONTINUED | OUTPATIENT
Start: 2021-07-08 | End: 2021-07-10 | Stop reason: HOSPADM

## 2021-07-08 RX ORDER — MAGNESIUM SULFATE IN WATER 40 MG/ML
2000 INJECTION, SOLUTION INTRAVENOUS PRN
Status: DISCONTINUED | OUTPATIENT
Start: 2021-07-08 | End: 2021-07-10 | Stop reason: HOSPADM

## 2021-07-08 RX ORDER — OXYCODONE HYDROCHLORIDE 5 MG/1
10 TABLET ORAL EVERY 4 HOURS PRN
Status: COMPLETED | OUTPATIENT
Start: 2021-07-08 | End: 2021-07-08

## 2021-07-08 RX ORDER — ACETAMINOPHEN 650 MG/1
650 SUPPOSITORY RECTAL EVERY 6 HOURS PRN
Status: DISCONTINUED | OUTPATIENT
Start: 2021-07-08 | End: 2021-07-10 | Stop reason: HOSPADM

## 2021-07-08 RX ORDER — PANTOPRAZOLE SODIUM 40 MG/1
40 TABLET, DELAYED RELEASE ORAL
Status: DISCONTINUED | OUTPATIENT
Start: 2021-07-08 | End: 2021-07-10 | Stop reason: HOSPADM

## 2021-07-08 RX ORDER — SODIUM CHLORIDE 0.9 % (FLUSH) 0.9 %
10 SYRINGE (ML) INJECTION EVERY 12 HOURS SCHEDULED
Status: DISCONTINUED | OUTPATIENT
Start: 2021-07-08 | End: 2021-07-10 | Stop reason: HOSPADM

## 2021-07-08 RX ORDER — ACETAMINOPHEN 325 MG/1
650 TABLET ORAL EVERY 6 HOURS PRN
Status: DISCONTINUED | OUTPATIENT
Start: 2021-07-08 | End: 2021-07-10 | Stop reason: HOSPADM

## 2021-07-08 RX ORDER — METHYLPREDNISOLONE SODIUM SUCCINATE 40 MG/ML
40 INJECTION, POWDER, LYOPHILIZED, FOR SOLUTION INTRAMUSCULAR; INTRAVENOUS EVERY 12 HOURS
Status: DISCONTINUED | OUTPATIENT
Start: 2021-07-08 | End: 2021-07-09

## 2021-07-08 RX ORDER — MORPHINE SULFATE 4 MG/ML
4 INJECTION, SOLUTION INTRAMUSCULAR; INTRAVENOUS ONCE
Status: COMPLETED | OUTPATIENT
Start: 2021-07-08 | End: 2021-07-08

## 2021-07-08 RX ORDER — OXYCODONE HYDROCHLORIDE 5 MG/1
5 TABLET ORAL EVERY 4 HOURS PRN
Status: DISCONTINUED | OUTPATIENT
Start: 2021-07-08 | End: 2021-07-10 | Stop reason: HOSPADM

## 2021-07-08 RX ORDER — SACCHAROMYCES BOULARDII 250 MG
250 CAPSULE ORAL 2 TIMES DAILY
Status: DISCONTINUED | OUTPATIENT
Start: 2021-07-08 | End: 2021-07-10 | Stop reason: HOSPADM

## 2021-07-08 RX ORDER — ONDANSETRON 2 MG/ML
4 INJECTION INTRAMUSCULAR; INTRAVENOUS EVERY 6 HOURS PRN
Status: DISCONTINUED | OUTPATIENT
Start: 2021-07-08 | End: 2021-07-10 | Stop reason: HOSPADM

## 2021-07-08 RX ORDER — SODIUM CHLORIDE 9 MG/ML
25 INJECTION, SOLUTION INTRAVENOUS PRN
Status: DISCONTINUED | OUTPATIENT
Start: 2021-07-08 | End: 2021-07-10 | Stop reason: HOSPADM

## 2021-07-08 RX ORDER — PANTOPRAZOLE SODIUM 40 MG/1
40 TABLET, DELAYED RELEASE ORAL
Status: DISCONTINUED | OUTPATIENT
Start: 2021-07-09 | End: 2021-07-08 | Stop reason: SDUPTHER

## 2021-07-08 RX ORDER — POTASSIUM CHLORIDE 7.45 MG/ML
10 INJECTION INTRAVENOUS PRN
Status: DISCONTINUED | OUTPATIENT
Start: 2021-07-08 | End: 2021-07-10 | Stop reason: HOSPADM

## 2021-07-08 RX ORDER — DICYCLOMINE HCL 20 MG
20 TABLET ORAL
Status: DISCONTINUED | OUTPATIENT
Start: 2021-07-08 | End: 2021-07-10 | Stop reason: HOSPADM

## 2021-07-08 RX ADMIN — ONDANSETRON 4 MG: 2 INJECTION INTRAMUSCULAR; INTRAVENOUS at 10:34

## 2021-07-08 RX ADMIN — OXYCODONE 10 MG: 5 TABLET ORAL at 20:37

## 2021-07-08 RX ADMIN — DICYCLOMINE HYDROCHLORIDE 20 MG: 20 TABLET ORAL at 12:50

## 2021-07-08 RX ADMIN — Medication 250 MG: at 20:37

## 2021-07-08 RX ADMIN — METHYLPREDNISOLONE SODIUM SUCCINATE 40 MG: 40 INJECTION, POWDER, FOR SOLUTION INTRAMUSCULAR; INTRAVENOUS at 22:47

## 2021-07-08 RX ADMIN — PANTOPRAZOLE SODIUM 40 MG: 40 TABLET, DELAYED RELEASE ORAL at 15:59

## 2021-07-08 RX ADMIN — Medication 10 ML: at 20:38

## 2021-07-08 RX ADMIN — METHYLPREDNISOLONE SODIUM SUCCINATE 40 MG: 40 INJECTION, POWDER, FOR SOLUTION INTRAMUSCULAR; INTRAVENOUS at 12:51

## 2021-07-08 RX ADMIN — MIRTAZAPINE 15 MG: 15 TABLET, FILM COATED ORAL at 20:37

## 2021-07-08 RX ADMIN — OXYCODONE 10 MG: 5 TABLET ORAL at 07:48

## 2021-07-08 RX ADMIN — OXYCODONE 10 MG: 5 TABLET ORAL at 12:51

## 2021-07-08 RX ADMIN — Medication 10 ML: at 10:34

## 2021-07-08 RX ADMIN — MORPHINE SULFATE 4 MG: 4 INJECTION, SOLUTION INTRAMUSCULAR; INTRAVENOUS at 03:46

## 2021-07-08 RX ADMIN — Medication 250 MG: at 15:59

## 2021-07-08 RX ADMIN — DICYCLOMINE HYDROCHLORIDE 20 MG: 20 TABLET ORAL at 16:00

## 2021-07-08 RX ADMIN — IOPAMIDOL 75 ML: 755 INJECTION, SOLUTION INTRAVENOUS at 05:30

## 2021-07-08 ASSESSMENT — PAIN SCALES - GENERAL
PAINLEVEL_OUTOF10: 6
PAINLEVEL_OUTOF10: 5
PAINLEVEL_OUTOF10: 5
PAINLEVEL_OUTOF10: 7
PAINLEVEL_OUTOF10: 8
PAINLEVEL_OUTOF10: 10

## 2021-07-08 ASSESSMENT — PAIN DESCRIPTION - LOCATION: LOCATION: ABDOMEN

## 2021-07-08 ASSESSMENT — PAIN DESCRIPTION - PAIN TYPE: TYPE: ACUTE PAIN

## 2021-07-08 ASSESSMENT — PAIN DESCRIPTION - ORIENTATION: ORIENTATION: LEFT

## 2021-07-08 NOTE — PROGRESS NOTES
Comprehensive Nutrition Assessment    Type and Reason for Visit:  Initial, Positive Nutrition Screen (Positive nutrition screen for wt loss and poor appetite/intake)    Nutrition Recommendations/Plan:   1. Continue full liquids. Further advancement per GI. Recommend advancement to low fiber diet. 2. RD to add ensure clear and magic cups with meals  3. RD to order new updated weight  4. Monitor diet tolerance, intakes, wt changes    Nutrition Assessment:  Positive nutrition screen for wt loss and poor appetite/intake: 61 yo w HTN, CAD, COPD, GERD and h/o UC re-admitted w 1 month of Lt-sided abd pain/diarrhea/hematochezia and wt loss and suggested to have diffuse colitis on CT. Currently on full liquid diet. Pt reports poor PO intake PTA and since admission. Assisted pt in ordering dinner tonight, pt willing to trial ONS, RD to add ensure clear and magic cups with meals. Reports UBW of 114 lb around 6 months ago (-15% wt change x 6 months). RD to order new weight to better assess weight changes. Recommend monitoring for further diet progression, tolerance, vs need for alternative nutrition support. Malnutrition Assessment:  Malnutrition Status:   Moderate malnutrition    Context:  Acute Illness     Findings of the 6 clinical characteristics of malnutrition:  Energy Intake:  1 - 75% or less of estimated energy requirements for 7 or more days  Weight Loss:  No significant weight loss (16% over 6 months)     Body Fat Loss:  7 - Moderate body fat loss Orbital, Triceps   Muscle Mass Loss:  7 - Moderate muscle mass loss Temples (temporalis), Thigh (quadraceps)    Estimated Daily Nutrient Needs:  Energy (kcal):  4828-4610 kcals/day; Weight Used for Energy Requirements:  Ideal (55 kg)     Protein (g):  55-66 g/day; Weight Used for Protein Requirements:  Ideal (1-1.2 g/kg)        Fluid (ml/day):  3082-0713 ml/day; Method Used for Fluid Requirements:  1 ml/kcal      Nutrition Related Findings:  Na low 135 mmol/L, K+ and magnesium low. Reports frequent diarrhea and nausea. Some difficulties swallowing. Wounds:  None       Current Nutrition Therapies:    ADULT DIET; Full Liquid    Anthropometric Measures:  · Height: 5' 4\" (162.6 cm)  · Current Body Weight: 95 lb (43.1 kg)    · Ideal Body Weight: 120 lbs; % Ideal Body Weight 79.2 %   · BMI: 16.3  · BMI Categories: Underweight (BMI less than 18.5)       Nutrition Diagnosis:   · Moderate malnutrition related to pain, early satiety as evidenced by intake 0-25%, poor intake prior to admission, BMI, weight loss greater than or equal to 10% in 6 months, moderate loss of subcutaneous fat, moderate muscle loss    Nutrition Interventions:   Food and/or Nutrient Delivery:  Continue Current Diet, Start Oral Nutrition Supplement (Advance per MD)  Nutrition Education/Counseling:  No recommendation at this time   Coordination of Nutrition Care:  Continue to monitor while inpatient    Goals: Tolerate diet advancement and consume 50% or greater of 3 meals per day and ONS       Nutrition Monitoring and Evaluation:   Behavioral-Environmental Outcomes:  None Identified   Food/Nutrient Intake Outcomes:  Diet Advancement/Tolerance, Food and Nutrient Intake, Supplement Intake  Physical Signs/Symptoms Outcomes:  Biochemical Data, Chewing or Swallowing, Diarrhea, Nausea or Vomiting, Meal Time Behavior, Nutrition Focused Physical Findings, Weight     Discharge Planning:     Too soon to determine     Electronically signed by Matty Randolph MS, RD, LD on 7/8/21 at 3:00 PM EDT    Contact: Office: [phone]; [address]: [phone]

## 2021-07-08 NOTE — PROGRESS NOTES
Following perfect serve sent:    \"Hello. Pt states she takes mirtazapine 15mg nightly (it is on her home med list) but does not have it ordered. Can you please place an order? Thanks! \"

## 2021-07-08 NOTE — PLAN OF CARE
Nutrition Problem #1: Moderate malnutrition  Intervention: Food and/or Nutrient Delivery: Continue Current Diet, Start Oral Nutrition Supplement (Advance per MD)  Nutritional Goals:  Tolerate diet advancement and consume 50% or greater of 3 meals per day and ONS

## 2021-07-08 NOTE — FLOWSHEET NOTE
saw pt yesterday, discharged, but readmitted overnight. Pt is somewhat anxious, awaiting reports from recent testing and a developing plan for treatment. Sherly Mcgowan of pt's [employer] requested visit, and is also pt's neighbor. Chelly Carcamo also attends same [employer] as pt. Continuing conversation with pt about her chronic health challenge and what might be ahead. Pt also had nabeel related questions she desired to process with chap. Pt welcomed prayer and , Marilyn Blizzard, arrived as chap was preparing to leave. Brief conversation. 07/08/21 1615   Encounter Summary   Services provided to: Patient;Patient and family together  (Husb arrived for brief encounter at close of visit)   Referral/Consult From: Clergy/Evangelical Pal Last of [address] Christina Briceño Visiting   (7/8 - Spiritual support, conversation, prayer)   Complexity of Encounter Moderate   Length of Encounter 45 minutes   Spiritual Assessment Completed Yes   Spiritual/Evangelical   Type Spiritual support   Assessment Calm; Approachable; Anxious   Intervention Active listening;Explored feelings, thoughts, concerns;Prayer;Sustaining presence/ Ministry of presence; Discussed relationship with God   Outcome Connection/belonging;Expressed gratitude;Engaged in conversation; Shared life review;Encouraged;Receptive

## 2021-07-08 NOTE — PROGRESS NOTES
Hospitalist Progress Note      PCP: Christina Werner DO    Date of Admission: 7/8/2021      Subjective:     EMR and notes reviewed pt seen and examined. No new complaints     Medications:  Reviewed    Infusion Medications    sodium chloride       Scheduled Medications    sodium chloride flush  10 mL Intravenous 2 times per day    methylPREDNISolone  40 mg Intravenous Q12H    [START ON 7/9/2021] pantoprazole  40 mg Oral QAM AC    dicyclomine  20 mg Oral TID AC     PRN Meds: oxyCODONE, oxyCODONE, sodium chloride flush, sodium chloride, potassium chloride, magnesium sulfate, promethazine **OR** ondansetron, acetaminophen **OR** acetaminophen    No intake or output data in the 24 hours ending 07/08/21 1106    Exam:    /84   Pulse 58   Temp 97.8 °F (36.6 °C) (Oral)   Resp 16   Ht 5' 4\" (1.626 m)   Wt 95 lb (43.1 kg)   SpO2 94%   BMI 16.31 kg/m²     General appearance: No apparent distress, appears stated age and cooperative. HEENT: Pupils equal, round, Conjunctivae/corneas clear. Neck: Supple, with full range of motion. No jugular venous distention. Trachea midline. Respiratory:  Normal respiratory effort. Clear to auscultation, bilaterally without Rales/Wheezes/Rhonchi. Cardiovascular: Regular rate and rhythm with normal S1/S2 without murmurs, rubs or gallops. Abdomen: Soft, LLQ pain with no rebound or guarding , non-distended with normal bowel sounds. Musculoskeletal: No clubbing, cyanosis or edema bilaterally.   .  Skin:  Warm and dry   Neurologic:   Alert, speech clear with no overt facial droop  Psychiatric: Alert and oriented, thought content appropriate, normal insight      Labs:   Recent Labs     07/06/21  0540 07/07/21  0440 07/08/21  0358   WBC 17.4* 14.4* 30.8*   HGB 12.9 11.9* 14.5   HCT 38.7 35.9* 43.5   * 457* 472*     Recent Labs     07/06/21  0540 07/07/21  0440 07/08/21  0358    142 135*   K 3.6 3.2* 3.4*    106 98*   CO2 27 31 25   BUN 14 13 20   CREATININE <0.5* <0.5* 0.7   CALCIUM 9.5 8.9 9.9   PHOS 2.7 2.4*  --      Recent Labs     07/08/21  0358   AST 29   ALT 24   BILITOT 0.4   ALKPHOS 102     No results for input(s): INR in the last 72 hours. Recent Labs     07/08/21  0358   TROPONINI <0.01       Assessment/Plan:    -Severe ulcerative colitis flare: CT showed severe diffuse colitis. GI assisting. GI pathogens by PCR negative, empiric abx d/slim. Continue IV Solu-Medrol. GI considering starting remicade inpatient versus outpatient. KUB was non acute. Continue supportive care   - Await TB skin test and Hep B S Ag before considering Remicade  - An unprepped limited colonoscopy to assess disease severity and to r/o CMV. Repeat scope completed with Bx taken, Pt tolerating some PO, wants to go home. THis was OK with GI. PT to Be Dc'd   - DC to home with PO steroids to f/u with GI      -GI bleed/hematochezia due to severe colitis. resolved. Continue to monitor H&H for acute blood loss anemia        -Acquired hypothyroidism-continue levothyroxine     -Essential HTN-controlled-continue amlodipine, losartan and metoprolol     -HLD-continue statin     -[id number] inhaled steroids and bronchodilators     Chronic resp failure: stable. Pt on 2L chronically at baseline per pt which she remains on currently. DVT Prophylaxis: SCDs  Diet: ADULT DIET;  Full Liquid  Code Status: Prior    Dispo: home today     [place], APRN - CNP

## 2021-07-08 NOTE — ED PROVIDER NOTES
[address] [address] TELE/MED SURG/ONC  EMERGENCY DEPARTMENT ENCOUNTER      Pt Name: Pricila Huerta  MRN: [id number]  [place] 1960  Date of evaluation: 7/8/2021  Provider: Barak Hurtado MD    CHIEF COMPLAINT       Chief Complaint   Patient presents with    Abdominal Pain     reports abd bloating since colonosopy yesterday. pt also reports \"leaking stool\" from vagina         HISTORY OF PRESENT ILLNESS   (Location/Symptom, Timing/Onset, Context/Setting, Quality, Duration, Modifying Factors, Severity)  Note limiting factors. Pricila Huerta is a 62 y.o. female with past medical history of hypertension, COPD, and reported ulcerative colitis here today for abdominal pain    Patient was just discharged from the hospital yesterday afternoon after admission for severe ulcerative colitis flare with pancolitis. She had a colonoscopy performed yesterday and since she has been home she has been passing \"very foul gas\" been having regular cramping generalized and primarily left lower quadrant abdominal pain associated with no nausea or vomiting. She denies fevers or chills. Denies dysuria or hematuria. States she has been somewhat incontinent of stool and is unsure if she might be having some stool come out of her vagina. She is had no prior history of a rectovaginal fistula. Her pain is moderate to severe with no alleviating factors. She has been on steroids and is being considered for Remicade but has not started this treatment as of yet. She was previously on antibiotics but those were discontinued. [facility]    Nursing Notes were reviewed. REVIEW OF SYSTEMS    (2-9 systems for level 4, 10 or more for level 5)     Review of Systems    Please see HPI for pertinent positive and negative review of system findings. A full 10 system ROS was performed and otherwise negative.         PAST MEDICAL HISTORY     Past Medical History:   Diagnosis Date    Back pain     COPD (chronic obstructive pulmonary disease) ([facility] [place] [address]     Associated Diagnoses: Moderate COPD (chronic obstructive pulmonary disease) ([facility])      pantoprazole (PROTONIX) 40 MG tablet Take 40 mg by mouth daily      FLORASTOR 250 MG capsule Take 250 mg by mouth daily      gabapentin (NEURONTIN) 300 MG capsule Take 1 capsule by mouth 4 times daily for 30 days. Qty: 120 capsule, Refills: 0    Associated Diagnoses: Neuropathy of left lower extremity      aspirin-acetaminophen-caffeine (EXCEDRIN MIGRAINE) 250-250-65 MG per tablet Take 1 tablet by mouth every 6 hours as needed for Headaches      albuterol (PROVENTIL) (2.5 MG/3ML) 0.083% nebulizer solution Take 3 mLs by nebulization every 6 hours as needed for Wheezing  Qty: 120 each, Refills: 3    Associated Diagnoses: Moderate COPD (chronic obstructive pulmonary disease) ([facility])      DULoxetine (CYMBALTA) 60 MG extended release capsule Take 60 mg by mouth       folic acid (FOLVITE) 1 MG tablet Take 1 tablet by mouth daily  Qty: 30 tablet, Refills: 11    Associated Diagnoses: Ulcerative pancolitis with complication ([facility])      Acetaminophen (TYLENOL) 325 MG CAPS Take 975 mg by mouth as needed      levothyroxine (SYNTHROID) 50 MCG tablet Take 50 mcg by mouth Daily      ondansetron (ZOFRAN-ODT) 8 MG TBDP disintegrating tablet Place 8 mg under the tongue every 8 hours as needed for Nausea or Vomiting             ALLERGIES     Patient has no known allergies.     FAMILY HISTORY       Family History   Problem Relation Age of Onset    Heart Disease Father     High Blood Pressure Sister           SOCIAL HISTORY       Social History     Socioeconomic History    Marital status:      Spouse name: None    Number of children: None    Years of education: None    Highest education level: None   Occupational History    None   Tobacco Use    Smoking status: Former Smoker     Packs/day: 0.10     Years: 32.00     Pack years: 3.20     Types: Cigarettes     Quit date: 2020     Years since quittin.4    Smokeless tobacco: Never Used   Vaping Use    Vaping Use: Former    Substances: Always   Substance and Sexual Activity    Alcohol use: Never    Drug use: Yes     Types: Marijuana    Sexual activity: None   Other Topics Concern    None   Social History Narrative    None     Social Determinants of Health     Financial Resource Strain:     Difficulty of Paying Living Expenses:    Food Insecurity:     Worried About Running Out of Food in the Last Year:     [address] in the Last Year:    Transportation Needs:     Lack of Transportation (Medical):  Lack of Transportation (Non-Medical):    Physical Activity:     Days of Exercise per Week:     Minutes of Exercise per Session:    Stress:     Feeling of Stress :    Social Connections:     Frequency of Communication with Friends and Family:     Frequency of Social Gatherings with Friends and Family:     Attends [employer] Services:     Active Member of Clubs or Organizations:     Attends Club or Organization Meetings:     Marital Status:    Intimate Partner Violence:     Fear of Current or Ex-Partner:     Emotionally Abused:     Physically Abused:     Sexually Abused:        SCREENINGS    [place] Coma Scale  Eye Opening: Spontaneous  Best Verbal Response: Oriented  Best Motor Response: Obeys commands  [place] Coma Scale Score: 15          PHYSICAL EXAM    (up to 7 for level 4, 8 or more for level 5)     ED Triage Vitals [07/08/21 0316]   BP Temp Temp src Pulse Resp SpO2 Height Weight   (!) 167/112 98.4 °F (36.9 °C) -- 79 18 99 % 5' 4\" (1.626 m) 95 lb (43.1 kg)       Physical Exam    General appearance:  Cooperative. Frail slightly cachectic and uncomfortable appearing  Skin:  Warm. Dry. Eye:  Extraocular movements intact. Ears, nose, mouth and throat:  Oral mucosa moist,  Neck:  Trachea midline. Heart:  Regular rate and rhythm  Perfusion:  intact  Respiratory:  Lungs clear to auscultation bilaterally. Respirations nonlabored. Abdominal:   Non distended. Mild generalized tenderness with no rebound or guarding. Slight abdominal distention  : Normal-appearing external female genitalia. Vaginal mucosa pink and moist.  No vaginal discharge present. Patient has had hysterectomy. Vaginal cuff intact. Rectal exam: Some small amount of anal leakage with brown stool present. No blood in the stool  Neurological:  Alert and oriented x 3.   Moves all extremities spontaneously  Musculoskeletal:   Normal ROM, no deformities          Psychiatric:  Normal mood      DIAGNOSTIC RESULTS       Labs Reviewed   COMPREHENSIVE METABOLIC PANEL W/ REFLEX TO MG FOR LOW K - Abnormal; Notable for the following components:       Result Value    Sodium 135 (*)     Potassium reflex Magnesium 3.4 (*)     Chloride 98 (*)     Glucose 108 (*)     All other components within normal limits    Narrative:     Performed at:  [address], [address]   Phone [phone]   CBC WITH AUTO DIFFERENTIAL - Abnormal; Notable for the following components:    WBC 30.8 (*)     Platelets 816 (*)     Neutrophils Absolute 19.7 (*)     Lymphocytes Absolute 8.0 (*)     Monocytes Absolute 3.1 (*)     nRBC 1 (*)     Anisocytosis Occasional (*)     Target Cells Occasional (*)     All other components within normal limits    Narrative:     Performed at:  [address], [address]   Phone [phone]   MAGNESIUM - Abnormal; Notable for the following components:    Magnesium 1.60 (*)     All other components within normal limits    Narrative:     Performed at:  [address], [address]   Phone [phone]   C DIFF TOXIN/ANTIGEN   SPECIMEN REJECTION    Narrative:     [facility]  [id number],  Rejected Test Name/Called to: cbc cmp lipase Angella Dawson, 07/08/2021  03:52, by Elaine Munoz CANCELLED 07/08/2021 03:48, Rejected: Quantity not sufficient. Performed at:  [facility] EMERGENCY [facility]  [address],  [place], [address]   Phone [phone]   LIPASE    Narrative:     Performed at:  [facility]  [address],  [place], [address]   Phone [phone]   TROPONIN    Narrative:     Performed at:  [facility]  [address],  [place], [address]   Phone [phone]   URINE RT REFLEX TO CULTURE   COMPREHENSIVE METABOLIC PANEL W/ REFLEX TO MG FOR LOW K   CBC WITH AUTO DIFFERENTIAL       Interpretation per the Radiologist below, if obtained/available at the time of this note:    CT ABDOMEN PELVIS W IV CONTRAST Additional Contrast? None   Final Result   1. Near complete resolution of previously demonstrated large bowel wall   thickening   2. Mild pancreatic ductal dilatation to 5 mm. Recommend further evaluation   with nonemergent pancreas protocol MRI. All other labs/imaging were within normal range or not returned as of this dictation. EMERGENCY DEPARTMENT COURSE and DIFFERENTIAL DIAGNOSIS/MDM:   Vitals:    Vitals:    07/08/21 1148 07/08/21 1332 07/08/21 1930 07/09/21 0010   BP: 136/70 134/76 127/71 127/77   Pulse: 63 62 58 57   Resp: 17 18 16 16   Temp: 98.1 °F (36.7 °C) 98.1 °F (36.7 °C) 98.7 °F (37.1 °C) 97.7 °F (36.5 °C)   TempSrc: Oral Oral Oral Oral   SpO2: (!) 89% (!) 80% 100% 100%   Weight:       Height:           Patient presents emergency department today with abdominal pain. Just recently admitted for the same where she was found to have pancolitis as a result of underlying inflammatory bowel disease. Had a colonoscopy performed yesterday. Here has a marked leukocytosis. She is on steroids, but has been on them for the last week and her white blood cell count has been trending down until today where it is nearly doubled. Afebrile. Tender throughout the abdomen.   CT scan performed shows near complete resolution of her pancolitis. Discussed the case with Dr. Marleni Cowart from gastroenterology who felt that given her significant worsening leukocytosis she should be admitted for observation. He did asked that we hold off on antibiotics at present. MDM    CONSULTS     IP CONSULT TO GI  IP CONSULT TO HOSPITALIST    Critical Care:   None    REASSESSMENT          PROCEDURE     Unless otherwise noted below, none     Procedures      FINAL IMPRESSION      1. Lower abdominal pain    2. Colitis            DISPOSITION/PLAN   DISPOSITION Admitted 07/08/2021 06:45:56 AM        PATIENT REFERRED TO:  No follow-up provider specified. DISCHARGE MEDICATIONS:  Current Discharge Medication List        Controlled Substances Monitoring:     No flowsheet data found.     (Please note that portions of this note were completed with a voice recognition program.  Efforts were made to edit the dictations but occasionally words are mis-transcribed.)    Good Solorio MD (electronically signed)  Attending Emergency Physician            Trudy Stewart MD  07/09/21 1765

## 2021-07-08 NOTE — ED NOTES
Call placed to [facility] @ 0548  Re: abd pain per Dr. Caleb Escobar  07/08/21 4814

## 2021-07-08 NOTE — DISCHARGE SUMMARY
Hospital Medicine Discharge Summary    Patient ID: Burgess Denis      Patient's PCP: Natali Cee DO    Admit Date: 7/1/21     Discharge Date:   7/7/21    Admitting Physician: Katerina Duval MD     Discharge Physician: Chetna Fink APRN - CNP     Discharge Diagnoses: Active Hospital Problems    Diagnosis     Abdominal pain [R10.9]        The patient was seen and examined on day of discharge and this discharge summary is in conjunction with any daily progress note from day of discharge. Hospital Course:     -Severe ulcerative colitis flare: CT showed severe diffuse colitis. GI assisting. GI pathogens by PCR negative, empiric abx d/slim. Continue IV Solu-Medrol. GI considering starting remicade inpatient versus outpatient. KUB was non acute. Continue supportive care   - Await TB skin test and Hep B S Ag before considering Remicade  - An unprepped limited colonoscopy to assess disease severity and to r/o CMV. Repeat scope completed with Bx taken, Pt tolerating some PO, wants to go home. THis was OK with GI. PT to Be Dc'd   - DC to home with PO steroids to f/u with GI      -GI bleed/hematochezia due to severe colitis. resolved. Continue to monitor H&H for acute blood loss anemia        -Acquired hypothyroidism-continue levothyroxine     -Essential HTN-controlled-continue amlodipine, losartan and metoprolol     -HLD-continue statin     -NEYI-frsqpu-[id number] inhaled steroids and bronchodilators      Chronic resp failure: stable. Pt on 2L chronically at baseline per pt which she remains on currently. Physical Exam Performed:     /84   Pulse 58   Temp 97.8 °F (36.6 °C) (Oral)   Resp 16   Ht 5' 4\" (1.626 m)   Wt 95 lb (43.1 kg)   SpO2 94%   BMI 16.31 kg/m²       General appearance:  No apparent distress, appears stated age and cooperative. HEENT:  Normal cephalic, atraumatic without obvious deformity. Pupils equal, round, and reactive to light. Extra ocular muscles intact. Conjunctivae/corneas clear. Neck: Supple, with full range of motion. No jugular venous distention. Trachea midline. Respiratory:  Normal respiratory effort. Clear to auscultation, bilaterally without Rales/Wheezes/Rhonchi. Cardiovascular:  Regular rate and rhythm with normal S1/S2 without murmurs, rubs or gallops. Abdomen: Soft, non-tender, non-distended with normal bowel sounds. Musculoskeletal:  No clubbing, cyanosis or edema bilaterally. Full range of motion without deformity. Skin: Skin color, texture, turgor normal.  No rashes or lesions. Neurologic:  Neurovascularly intact without any focal sensory/motor deficits. Cranial nerves: II-XII intact, grossly non-focal.  Psychiatric:  Alert and oriented, thought content appropriate, normal insight  Capillary Refill: Brisk,< 3 seconds   Peripheral Pulses: +2 palpable, equal bilaterally       Labs: For convenience and continuity at follow-up the following most recent labs are provided:      CBC:    Lab Results   Component Value Date    WBC 30.8 07/08/2021    HGB 14.5 07/08/2021    HCT 43.5 07/08/2021     07/08/2021       Renal:    Lab Results   Component Value Date     07/08/2021    K 3.4 07/08/2021    CL 98 07/08/2021    CO2 25 07/08/2021    BUN 20 07/08/2021    CREATININE 0.7 07/08/2021    CALCIUM 9.9 07/08/2021    PHOS 2.4 07/07/2021         Significant Diagnostic Studies    Radiology:   CT ABDOMEN PELVIS W IV CONTRAST Additional Contrast? None   Final Result   1. Near complete resolution of previously demonstrated large bowel wall   thickening   2. Mild pancreatic ductal dilatation to 5 mm. Recommend further evaluation   with nonemergent pancreas protocol MRI.                 Consults:     IP CONSULT TO GI  IP CONSULT TO HOSPITALIST    Disposition:  Home     Condition at Discharge: Stable    Discharge Instructions/Follow-up:  GI call for appointment     Code Status:  Prior     Activity: activity as tolerated    Diet: regular diet      Discharge Medications:     Current Discharge Medication List           Details   sucralfate (CARAFATE) 1 GM tablet Take 1 g by mouth 4 times daily (after meals and at bedtime) Pt takes PRN      predniSONE (DELTASONE) 20 MG tablet Take 1 tablet by mouth 2 times daily for 5 days  Qty: 10 tablet, Refills: 0      mirtazapine (REMERON) 15 MG tablet Take 15 mg by mouth nightly      TRELEGY ELLIPTA 100-62.5-25 MCG/INH AEPB INHALE 1 PUFF INTO THE LUNGS DAILY  Qty: 60 each, Refills: 5      losartan (COZAAR) 100 MG tablet Take 1 tablet by mouth daily  Qty: 90 tablet, Refills: 3    Associated Diagnoses: Essential hypertension      amLODIPine (NORVASC) 5 MG tablet Take 1 tablet by mouth nightly  Qty: 90 tablet, Refills: 3      atorvastatin (LIPITOR) 40 MG tablet Take 1 tablet by mouth daily  Qty: 90 tablet, Refills: 3      metoprolol succinate (TOPROL XL) 50 MG extended release tablet Take 1 tablet by mouth daily  Qty: 90 tablet, Refills: 3    Associated Diagnoses: Essential hypertension      aspirin 81 MG chewable tablet Take 1 tablet by mouth daily  Qty: 30 tablet, Refills: 0      Cholecalciferol (VITAMIN D) 50 MCG (2000 UT) CAPS capsule Take by mouth       benzonatate (TESSALON) 100 MG capsule Take 100 mg by mouth 3 times daily as needed for Cough      albuterol sulfate  (90 Base) MCG/ACT inhaler Inhale 1 puff into the lungs as needed for Wheezing or Shortness of Breath  Qty: 1 Inhaler, Refills: 5    Associated Diagnoses: Moderate COPD (chronic obstructive pulmonary disease) ([facility])      pantoprazole (PROTONIX) 40 MG tablet Take 40 mg by mouth daily      FLORASTOR 250 MG capsule Take 250 mg by mouth daily      gabapentin (NEURONTIN) 300 MG capsule Take 1 capsule by mouth 4 times daily for 30 days.   Qty: 120 capsule, Refills: 0    Associated Diagnoses: Neuropathy of left lower extremity      aspirin-acetaminophen-caffeine (EXCEDRIN MIGRAINE) 250-250-65 MG per tablet Take 1 tablet by mouth every 6 hours as needed for Headaches albuterol (PROVENTIL) (2.5 MG/3ML) 0.083% nebulizer solution Take 3 mLs by nebulization every 6 hours as needed for Wheezing  Qty: 120 each, Refills: 3    Associated Diagnoses: Moderate COPD (chronic obstructive pulmonary disease) ([facility])      DULoxetine (CYMBALTA) 60 MG extended release capsule Take 60 mg by mouth       folic acid (FOLVITE) 1 MG tablet Take 1 tablet by mouth daily  Qty: 30 tablet, Refills: 11    Associated Diagnoses: Ulcerative pancolitis with complication ([facility])      Acetaminophen (TYLENOL) 325 MG CAPS Take 975 mg by mouth as needed      levothyroxine (SYNTHROID) 50 MCG tablet Take 50 mcg by mouth Daily      ondansetron (ZOFRAN-ODT) 8 MG TBDP disintegrating tablet Place 8 mg under the tongue every 8 hours as needed for Nausea or Vomiting             Time Spent on discharge is more than 30 minutes in the examination, evaluation, counseling and review of medications and discharge plan. Signed:    MARYCARMEN South - CNP   7/8/2021      Thank you Vishal Seay DO for the opportunity to be involved in this patient's care. If you have any questions or concerns please feel free to contact me at [phone].

## 2021-07-08 NOTE — H&P
Hospital Medicine History & Physical      PCP: Pete Rodriges DO    Date of Admission: 7/8/2021    Date of Service: Pt seen/examined on 7/8/21  and Admitted to Inpatient with expected LOS greater than two midnights due to medical therapy. Chief Complaint:    Abdominal Pain        reports abd bloating since colonosopy yesterday. pt also reports \"leaking stool\" from vagina           History Of Present Illness:    62 y.o. female who presented to [facility] ED. Patient was just discharged from the hospital yesterday afternoon after admission for severe ulcerative colitis flare with pancolitis. She had a colonoscopy performed yesterday and since she has been home she has been passing \"very foul gas\" been having regular cramping generalized and primarily left lower quadrant abdominal pain associated with no nausea or vomiting. The pt was insistent on leaving yesterday. GI was consulted and she was readmitted for continued evaluation and treatment         Past Medical History:          Diagnosis Date    Back pain     COPD (chronic obstructive pulmonary disease) ([address]     Fatigue     Hypertension     Syncope and collapse 03/2019    Thyroid disease     Ulcerative colitis, unspecified, without complications ([address]        Past Surgical History:          Procedure Laterality Date    BACK SURGERY      L4-L5 rods in    COLONOSCOPY N/A 7/7/2021    COLONOSCOPY WITH BIOPSY performed by Sharia Bloch, MD at [address] [place] [address]  2019    due to a fall    TONSILLECTOMY         Medications Prior to Admission:      Prior to Admission medications    Medication Sig Start Date End Date Taking?  Authorizing Provider   sucralfate (CARAFATE) 1 GM tablet Take 1 g by mouth 4 times daily (after meals and at bedtime) Pt takes PRN 4/23/21   Historical Provider, MD   predniSONE (DELTASONE) 20 MG tablet Take 1 tablet by mouth 2 times daily for 5 days 7/7/21 7/12/21  Linn Partida APRN - CNP   mirtazapine (REMERON) 15 MG tablet Take 15 mg by mouth nightly    Historical Provider, MD [address]-62.5-25 MCG/INH AEPB INHALE 1 PUFF INTO THE LUNGS DAILY 5/11/21   Boyd Luna MD   losartan (COZAAR) 100 MG tablet Take 1 tablet by mouth daily 5/10/21   Leanne Díaz MD   amLODIPine (NORVASC) 5 MG tablet Take 1 tablet by mouth nightly 5/10/21   Leanne Díaz MD   atorvastatin (LIPITOR) 40 MG tablet Take 1 tablet by mouth daily 5/10/21 5/5/22  Leanne Díaz MD   metoprolol succinate (TOPROL XL) 50 MG extended release tablet Take 1 tablet by mouth daily 4/7/21 4/2/22  Leanne Díaz MD   aspirin 81 MG chewable tablet Take 1 tablet by mouth daily 3/24/21 5/10/21  Rene Bonner MD   Cholecalciferol (VITAMIN D) 50 MCG (2000 UT) CAPS capsule Take by mouth     Historical Provider, MD   benzonatate (TESSALON) 100 MG capsule Take 100 mg by mouth 3 times daily as needed for Cough    Historical Provider, MD   albuterol sulfate  (90 Base) MCG/ACT inhaler Inhale 1 puff into the lungs as needed for Wheezing or Shortness of Breath 11/25/20   Boyd Luna MD   pantoprazole (PROTONIX) 40 MG tablet Take 40 mg by mouth daily 10/16/20   Historical Provider, MD   FLORASTOR 250 MG capsule Take 250 mg by mouth daily 9/17/20   Historical Provider, MD   gabapentin (NEURONTIN) 300 MG capsule Take 1 capsule by mouth 4 times daily for 30 days.  11/5/20   Radha Abdul MD   aspirin-acetaminophen-caffeine (EXCEDRIN MIGRAINE) 121-790-32 MG per tablet Take 1 tablet by mouth every 6 hours as needed for Headaches    Historical Provider, MD   albuterol (PROVENTIL) (2.5 MG/3ML) 0.083% nebulizer solution Take 3 mLs by nebulization every 6 hours as needed for Wheezing 6/23/20   Boyd Luna MD   DULoxetine (CYMBALTA) 60 MG extended release capsule Take 60 mg by mouth     Historical Provider, MD   folic acid (FOLVITE) 1 MG tablet Take 1 tablet by mouth daily 11/11/19   Shyam Murcia MD Acetaminophen (TYLENOL) 325 MG CAPS Take 975 mg by mouth as needed 6/15/19   Historical Provider, MD   levothyroxine (SYNTHROID) 50 MCG tablet Take 50 mcg by mouth Daily    Historical Provider, MD   ondansetron (ZOFRAN-ODT) 8 MG TBDP disintegrating tablet Place 8 mg under the tongue every 8 hours as needed for Nausea or Vomiting    Historical Provider, MD       Allergies:  Patient has no known allergies. Social History:      The patient currently lives with spouse     TOBACCO:   reports that she quit smoking about 16 months ago. Her smoking use included cigarettes. She has a 3.20 pack-year smoking history. She has never used smokeless tobacco.  ETOH:   reports no history of alcohol use. E-Cigarettes/Vaping Use     Questions Responses    E-Cigarette/Vaping Use Former User    Start Date     Passive Exposure     Quit Date     Counseling Given     Comments             Family History:       Reviewed in detail and negative for DM, CAD, Cancer, CVA. Positive as follows:        Problem Relation Age of Onset    Heart Disease Father     High Blood Pressure Sister        REVIEW OF SYSTEMS COMPLETED:   Pertinent positives as noted in the HPI. All other systems reviewed and negative. PHYSICAL EXAM PERFORMED:    /84   Pulse 58   Temp 97.8 °F (36.6 °C) (Oral)   Resp 16   Ht 5' 4\" (1.626 m)   Wt 95 lb (43.1 kg)   SpO2 94%   BMI 16.31 kg/m²     General appearance:  No apparent distress, appears stated age and cooperative. HEENT:  Normal cephalic, atraumatic without obvious deformity. Pupils equal, round, and reactive to light. Extra ocular muscles intact. Conjunctivae/corneas clear. Neck: Supple, with full range of motion. No jugular venous distention. Trachea midline. Respiratory:  Normal respiratory effort. Clear to auscultation, bilaterally without Rales/Wheezes/Rhonchi. Cardiovascular:  Regular rate and rhythm with normal S1/S2 without murmurs, rubs or gallops.   Abdomen: Soft, tender, non-distended with normal bowel sounds. Musculoskeletal:  No clubbing, cyanosis or edema bilaterally. Full range of motion without deformity. Skin: Skin color, texture, turgor normal.  No rashes or lesions. Neurologic:  Neurovascularly intact without any focal sensory/motor deficits. Cranial nerves: II-XII intact, grossly non-focal.  Psychiatric:  Alert and oriented, thought content appropriate, normal insight  Capillary Refill: Brisk,3 seconds, normal  Peripheral Pulses: +2 palpable, equal bilaterally       Labs:     Recent Labs     07/06/21 0540 07/07/21 0440 07/08/21 0358   WBC 17.4* 14.4* 30.8*   HGB 12.9 11.9* 14.5   HCT 38.7 35.9* 43.5   * 457* 472*     Recent Labs     07/06/21 0540 07/07/21 0440 07/08/21 0358    142 135*   K 3.6 3.2* 3.4*    106 98*   CO2 27 31 25   BUN 14 13 20   CREATININE <0.5* <0.5* 0.7   CALCIUM 9.5 8.9 9.9   PHOS 2.7 2.4*  --      Recent Labs     07/08/21 0358   AST 29   ALT 24   BILITOT 0.4   ALKPHOS 102     No results for input(s): INR in the last 72 hours. Recent Labs     07/08/21 0358   TROPONINI <0.01       Urinalysis:      Lab Results   Component Value Date    NITRU Negative 07/01/2021    WBCUA 10-20 07/01/2021    BACTERIA Rare 03/23/2021    RBCUA 11-20 07/01/2021    BLOODU LARGE 07/01/2021    SPECGRAV 1.010 07/01/2021    GLUCOSEU Negative 07/01/2021       Radiology:     CT ABDOMEN PELVIS W IV CONTRAST Additional Contrast? None   Final Result   1. Near complete resolution of previously demonstrated large bowel wall   thickening   2. Mild pancreatic ductal dilatation to 5 mm. Recommend further evaluation   with nonemergent pancreas protocol MRI.              ASSESSMENT:    Active Hospital Problems    Diagnosis Date Noted    Abdominal pain [R10.9] 07/08/2021         PLAN:    Severe Ulcerative Colitis/pan colitis   - Followed and managed by GI   - currently lower dose steroids, Bentyl, PPI repeat C Dif since WBC in 30 suspect steroid induced    - Awaiting Bx result and Adrian Daft before deciding on Biologics    Leukocytosis: suspect from steroids but has almost double since DC yesterday   - agree with repeat C dif   - add florastor    Moderate Malnutrition   - supplements and dietary education     GI bleed/hematochezia due to severe colitis. resolved. Continue to monitor H&H for acute blood loss anemia        -Acquired hypothyroidism-continue levothyroxine     -Essential HTN-controlled-continue amlodipine, losartan and metoprolol     -HLD-continue statin     -OIQB-hydupd-zqijgohg inhaled steroids and bronchodilators      Chronic resp failure: stable. Pt on 2L chronically at baseline per pt which she remains on currently. DVT Prophylaxis: SCD's only   Diet: ADULT DIET; Full Liquid  Code Status: Prior    PT/OT Eval Status: NA    Dispo - pending GI        MARYCARMEN Romo - CNP    Thank you Silvana Abdalla DO for the opportunity to be involved in this patient's care. If you have any questions or concerns please feel free to contact me at [phone].

## 2021-07-08 NOTE — PROGRESS NOTES
Pt admitted to C3 in stable condition. Oriented to nurse, room, call light. Assessment completed as charted. Pt c/o abdominal pain. Medicated per orders. Denies additional needs. Will continue to monitor. Call light in reach.

## 2021-07-09 LAB
A/G RATIO: 1.5 (ref 1.1–2.2)
ALBUMIN SERPL-MCNC: 4 G/DL (ref 3.4–5)
ALP BLD-CCNC: 92 U/L (ref 40–129)
ALT SERPL-CCNC: 21 U/L (ref 10–40)
ANION GAP SERPL CALCULATED.3IONS-SCNC: 13 MMOL/L (ref 3–16)
AST SERPL-CCNC: 18 U/L (ref 15–37)
BASOPHILS ABSOLUTE: 0.1 K/UL (ref 0–0.2)
BASOPHILS RELATIVE PERCENT: 0.5 %
BILIRUB SERPL-MCNC: 0.4 MG/DL (ref 0–1)
BILIRUBIN URINE: NEGATIVE
BLOOD, URINE: NEGATIVE
BUN BLDV-MCNC: 21 MG/DL (ref 7–20)
CALCIUM SERPL-MCNC: 9.9 MG/DL (ref 8.3–10.6)
CHLORIDE BLD-SCNC: 94 MMOL/L (ref 99–110)
CLARITY: CLEAR
CO2: 32 MMOL/L (ref 21–32)
COLOR: YELLOW
CREAT SERPL-MCNC: 0.7 MG/DL (ref 0.6–1.2)
EOSINOPHILS ABSOLUTE: 0 K/UL (ref 0–0.6)
EOSINOPHILS RELATIVE PERCENT: 0 %
GFR AFRICAN AMERICAN: >60
GFR NON-AFRICAN AMERICAN: >60
GLOBULIN: 2.6 G/DL
GLUCOSE BLD-MCNC: 145 MG/DL (ref 70–99)
GLUCOSE URINE: NEGATIVE MG/DL
HCT VFR BLD CALC: 40.3 % (ref 36–48)
HEMOGLOBIN: 13.3 G/DL (ref 12–16)
KETONES, URINE: NEGATIVE MG/DL
LEUKOCYTE ESTERASE, URINE: NEGATIVE
LYMPHOCYTES ABSOLUTE: 1.2 K/UL (ref 1–5.1)
LYMPHOCYTES RELATIVE PERCENT: 7.1 %
MCH RBC QN AUTO: 29.9 PG (ref 26–34)
MCHC RBC AUTO-ENTMCNC: 33.1 G/DL (ref 31–36)
MCV RBC AUTO: 90.4 FL (ref 80–100)
MICROSCOPIC EXAMINATION: NORMAL
MONOCYTES ABSOLUTE: 0.5 K/UL (ref 0–1.3)
MONOCYTES RELATIVE PERCENT: 2.8 %
NEUTROPHILS ABSOLUTE: 15.6 K/UL (ref 1.7–7.7)
NEUTROPHILS RELATIVE PERCENT: 89.6 %
NITRITE, URINE: NEGATIVE
PDW BLD-RTO: 13.8 % (ref 12.4–15.4)
PH UA: 6 (ref 5–8)
PLATELET # BLD: 460 K/UL (ref 135–450)
PMV BLD AUTO: 8.5 FL (ref 5–10.5)
POTASSIUM REFLEX MAGNESIUM: 4.1 MMOL/L (ref 3.5–5.1)
PROTEIN UA: NEGATIVE MG/DL
RBC # BLD: 4.46 M/UL (ref 4–5.2)
SODIUM BLD-SCNC: 139 MMOL/L (ref 136–145)
SPECIFIC GRAVITY UA: 1.02 (ref 1–1.03)
TOTAL PROTEIN: 6.6 G/DL (ref 6.4–8.2)
URINE REFLEX TO CULTURE: NORMAL
URINE TYPE: NORMAL
UROBILINOGEN, URINE: 0.2 E.U./DL
WBC # BLD: 17.4 K/UL (ref 4–11)

## 2021-07-09 PROCEDURE — 81003 URINALYSIS AUTO W/O SCOPE: CPT

## 2021-07-09 PROCEDURE — 6370000000 HC RX 637 (ALT 250 FOR IP): Performed by: NURSE PRACTITIONER

## 2021-07-09 PROCEDURE — 6360000002 HC RX W HCPCS: Performed by: NURSE PRACTITIONER

## 2021-07-09 PROCEDURE — G0378 HOSPITAL OBSERVATION PER HR: HCPCS

## 2021-07-09 PROCEDURE — 36415 COLL VENOUS BLD VENIPUNCTURE: CPT

## 2021-07-09 PROCEDURE — 6370000000 HC RX 637 (ALT 250 FOR IP): Performed by: INTERNAL MEDICINE

## 2021-07-09 PROCEDURE — 85025 COMPLETE CBC W/AUTO DIFF WBC: CPT

## 2021-07-09 PROCEDURE — 80053 COMPREHEN METABOLIC PANEL: CPT

## 2021-07-09 PROCEDURE — 2580000003 HC RX 258: Performed by: INTERNAL MEDICINE

## 2021-07-09 PROCEDURE — 96372 THER/PROPH/DIAG INJ SC/IM: CPT

## 2021-07-09 RX ORDER — PREDNISONE 20 MG/1
20 TABLET ORAL DAILY
Status: DISCONTINUED | OUTPATIENT
Start: 2021-07-09 | End: 2021-07-10 | Stop reason: HOSPADM

## 2021-07-09 RX ADMIN — DICYCLOMINE HYDROCHLORIDE 20 MG: 20 TABLET ORAL at 06:04

## 2021-07-09 RX ADMIN — Medication 250 MG: at 20:18

## 2021-07-09 RX ADMIN — ENOXAPARIN SODIUM 40 MG: 40 INJECTION SUBCUTANEOUS at 14:14

## 2021-07-09 RX ADMIN — DICYCLOMINE HYDROCHLORIDE 20 MG: 20 TABLET ORAL at 16:53

## 2021-07-09 RX ADMIN — PANTOPRAZOLE SODIUM 40 MG: 40 TABLET, DELAYED RELEASE ORAL at 08:43

## 2021-07-09 RX ADMIN — Medication 250 MG: at 08:43

## 2021-07-09 RX ADMIN — DICYCLOMINE HYDROCHLORIDE 20 MG: 20 TABLET ORAL at 11:09

## 2021-07-09 RX ADMIN — PREDNISONE 20 MG: 20 TABLET ORAL at 08:43

## 2021-07-09 RX ADMIN — MIRTAZAPINE 15 MG: 15 TABLET, FILM COATED ORAL at 20:18

## 2021-07-09 RX ADMIN — OXYCODONE 5 MG: 5 TABLET ORAL at 22:03

## 2021-07-09 RX ADMIN — Medication 10 ML: at 08:43

## 2021-07-09 ASSESSMENT — PAIN DESCRIPTION - LOCATION: LOCATION: ABDOMEN

## 2021-07-09 ASSESSMENT — PAIN SCALES - GENERAL
PAINLEVEL_OUTOF10: 5

## 2021-07-09 ASSESSMENT — PAIN DESCRIPTION - ORIENTATION: ORIENTATION: LEFT;LOWER

## 2021-07-09 ASSESSMENT — PAIN DESCRIPTION - PAIN TYPE: TYPE: ACUTE PAIN

## 2021-07-09 NOTE — PROGRESS NOTES
Hospitalist Progress Note      PCP: Garett Goodwin DO    Date of Admission: 7/8/2021    Chief Complaint:   Abdominal Pain         reports abd bloating since colonosopy yesterday. pt also reports \"leaking stool\" from vagina       Hospital Course: 62 y.o. female who presented to [facility] ED. Patient was just discharged from the hospital yesterday afternoon after admission for severe ulcerative colitis flare with pancolitis. Jose Dsouza had a colonoscopy performed yesterday and since she has been home she has been passing \"very foul gas\" been having regular cramping generalized and primarily left lower quadrant abdominal pain associated with no nausea or vomiting. The pt was insistent on leaving yesterday. GI was consulted and she was readmitted for continued evaluation and treatment           Subjective: Pt reports feeling improved, no further diarrhea       Medications:  Reviewed    Infusion Medications    sodium chloride       Scheduled Medications    predniSONE  20 mg Oral Daily    enoxaparin  40 mg Subcutaneous Daily    sodium chloride flush  10 mL Intravenous 2 times per day    dicyclomine  20 mg Oral TID AC    pantoprazole  40 mg Oral QAM AC    saccharomyces boulardii  250 mg Oral BID    mirtazapine  15 mg Oral Nightly     PRN Meds: oxyCODONE, sodium chloride flush, sodium chloride, potassium chloride, magnesium sulfate, promethazine **OR** ondansetron, acetaminophen **OR** acetaminophen      Intake/Output Summary (Last 24 hours) at 7/9/2021 1803  Last data filed at 7/9/2021 1513  Gross per 24 hour   Intake 840 ml   Output --   Net 840 ml       Physical Exam Performed:    /78   Pulse 66   Temp 98.9 °F (37.2 °C) (Oral)   Resp 16   Ht 5' 4\" (1.626 m)   Wt 93 lb 8 oz (42.4 kg)   SpO2 98%   BMI 16.05 kg/m²     General appearance: No apparent distress, appears stated age and cooperative. HEENT: Pupils equal, round, and reactive to light. Conjunctivae/corneas clear.   Neck: Supple, with full range of motion. No jugular venous distention. Trachea midline. Respiratory:  Normal respiratory effort. Clear to auscultation, bilaterally without Rales/Wheezes/Rhonchi. Cardiovascular: Regular rate and rhythm with normal S1/S2 without murmurs, rubs or gallops. Abdomen: Soft, non-tender, non-distended with normal bowel sounds. Musculoskeletal: No clubbing, cyanosis or edema bilaterally. Full range of motion without deformity. Skin: Skin color, texture, turgor normal.  No rashes or lesions. Neurologic:  Neurovascularly intact without any focal sensory/motor deficits. Cranial nerves: II-XII intact, grossly non-focal.  Psychiatric: Alert and oriented, thought content appropriate, normal insight  Capillary Refill: Brisk,3 seconds, normal   Peripheral Pulses: +2 palpable, equal bilaterally       Labs:   Recent Labs     07/07/21 0440 07/08/21 0358 07/09/21 0627   WBC 14.4* 30.8* 17.4*   HGB 11.9* 14.5 13.3   HCT 35.9* 43.5 40.3   * 472* 460*     Recent Labs     07/07/21 0440 07/08/21 0358 07/09/21 0627    135* 139   K 3.2* 3.4* 4.1    98* 94*   CO2 31 25 32   BUN 13 20 21*   CREATININE <0.5* 0.7 0.7   CALCIUM 8.9 9.9 9.9   PHOS 2.4*  --   --      Recent Labs     07/08/21 0358 07/09/21 0627   AST 29 18   ALT 24 21   BILITOT 0.4 0.4   ALKPHOS 102 92     No results for input(s): INR in the last 72 hours. Recent Labs     07/08/21 0358   TROPONINI <0.01       Urinalysis:      Lab Results   Component Value Date    NITRU Negative 07/09/2021    WBCUA 10-20 07/01/2021    BACTERIA Rare 03/23/2021    RBCUA 11-20 07/01/2021    BLOODU Negative 07/09/2021    SPECGRAV 1.025 07/09/2021    GLUCOSEU Negative 07/09/2021       Radiology:  CT ABDOMEN PELVIS W IV CONTRAST Additional Contrast? None   Final Result   1. Near complete resolution of previously demonstrated large bowel wall   thickening   2. Mild pancreatic ductal dilatation to 5 mm.   Recommend further evaluation   with nonemergent pancreas protocol MRI. Assessment/Plan:    Active Hospital Problems    Diagnosis     Abdominal pain [R10.9]      Severe Ulcerative Colitis/pan colitis   - Followed and managed by GI   - currently lower dose steroids, Bentyl, PPI repeat C Dif since WBC in 30 suspect steroid induced    - Awaiting Bx result and Quantiferron before deciding on Biologics     Leukocytosis: suspect from steroids but has almost double since DC yesterday   - agree with repeat C dif   - add florastor  - 7/9 WBC back down to 17 today perhaps elevation was contractual - reassuring      Moderate Malnutrition   - supplements and dietary education      GI bleed/hematochezia due to severe colitis. resolved. Continue to monitor H&H for acute blood loss anemia        -Acquired hypothyroidism-continue levothyroxine     -Essential HTN-controlled-continue amlodipine, losartan and metoprolol     -HLD-continue statin     -SGAC-cddjka-olmnmulh inhaled steroids and bronchodilators      Chronic resp failure: stable. Pt on 2L chronically at baseline per pt which she remains on currently.      DVT Prophylaxis: SCD's only   Diet: ADULT DIET;  Full Liquid  Code Status: Prior     PT/OT Eval Status: NA     Dispo - pending GI       MARYCARMEN Perez - CNP

## 2021-07-09 NOTE — PLAN OF CARE
Problem: Falls - Risk of:  Goal: Will remain free from falls  Description: Will remain free from falls  7/9/2021 1215 by Servando Doe RN  Outcome: Ongoing    Problem: Falls - Risk of:  Goal: Absence of physical injury  Description: Absence of physical injury  7/9/2021 1215 by Servando Doe RN  Outcome: Ongoing

## 2021-07-09 NOTE — CARE COORDINATION
CASE MANAGEMENT INITIAL ASSESSMENT      Reviewed chart and completed assessment with:patient  Explained Case Management role/services. Primary contact information:[facility] Decision Maker :   Primary Decision Maker: [address] [facility] [address] [phone]    Primary Decision Maker: Dorothy Mariscal - Spouse - [phone]    Secondary Decision Maker: Ellen Meza - [phone]    Secondary Decision Maker: Mariajose Ruiz - Child - [phone]    Supplemental (Other) Decision Maker: Vinny Syed - [phone]          Can this person be reached and be able to respond quickly, such as within a few minutes or hours? Yes    Admit date/status:7/8/21  Diagnosis:abd pain   Is this a Readmission?:  Yes      Insurance:humana   Precert required for SNF: Yes       3 night stay required: No    Living arrangements, Adls, care needs, prior to admission:lives in ranch style home with spouse. 2 step entry    [address] at home:  Walker_x_Cane_x_RTS__ BSC__Shower Chair__  02_3LNC HS/2.5Lprn in daytime [employer]_ HHN_x_ CPAP__  BiPap__  Hospital Bed__ W/C___ Other__________    Services in the home and/or outpatient, prior to admission:[facility] non skilled services. Dialysis Facility (if applicable)   · Name:  · Address:  · Dialysis Schedule:  · Phone:  · Fax:    PT/OT recs:none    Hospital Exemption Notification (HEN):needed for SNF    Barriers to discharge:none    Plan/comments:spoke with patient. Plans on returning home at discharge. Reported from home with spouse.  Stated should have no DCP needs will resume aide services at d/c. Klaudia Clarke RN      ECOC on chart for MD signature

## 2021-07-09 NOTE — PROGRESS NOTES
Progress Note  Date:2021       Room:[address] [place] [address]  Patient Name:Maranda Burns     Date of Birth:     Age:61 y.o. Subjective    Subjective:  Symptoms:  Stable. Pain:  She reports no pain. Review of Systems  Objective         Vitals Last 24 Hours:  TEMPERATURE:  Temp  Av.1 °F (36.7 °C)  Min: 97.7 °F (36.5 °C)  Max: 98.7 °F (37.1 °C)  RESPIRATIONS RANGE: Resp  Av.6  Min: 16  Max: 18  PULSE OXIMETRY RANGE: SpO2  Av.6 %  Min: 80 %  Max: 100 %  PULSE RANGE: Pulse  Av.8  Min: 57  Max: 89  BLOOD PRESSURE RANGE: Systolic (39LXM), WOS:679 , Min:127 , DGP:170   ; Diastolic (06OZS), CWR:06, Min:70, Max:81    I/O (24Hr): Intake/Output Summary (Last 24 hours) at 2021 0840  Last data filed at 2021  Gross per 24 hour   Intake 340 ml   Output --   Net 340 ml     Objective:  General Appearance:  Not in pain. Vital signs: (most recent): Blood pressure (!) 149/81, pulse 89, temperature 98 °F (36.7 °C), temperature source Oral, resp. rate 16, height 5' 4\" (1.626 m), weight 93 lb 8 oz (42.4 kg), SpO2 99 %, not currently breastfeeding. Abdomen: Abdomen is soft. Bowel sounds are normal.   There is left lower quadrant tenderness. Labs/Imaging/Diagnostics    Labs:  CBC:  Recent Labs     21   WBC 14.4* 30.8* 17.4*   RBC 3.89* 4.81 4.46   HGB 11.9* 14.5 13.3   HCT 35.9* 43.5 40.3   MCV 92.3 90.5 90.4   RDW 13.9 13.8 13.8   * 472* 460*     CHEMISTRIES:  Recent Labs     21    135* 139   K 3.2* 3.4* 4.1    98* 94*   CO2 31 25 32   BUN 13 20 21*   CREATININE <0.5* 0.7 0.7   GLUCOSE 132* 108* 145*   PHOS 2.4*  --   --    MG  --  1.60*  --      PT/INR:No results for input(s): PROTIME, INR in the last 72 hours. APTT:No results for input(s): APTT in the last 72 hours.   LIVER PROFILE:  Recent Labs     21  0358 21  0627   AST 29 18   ALT 24 21   BILITOT 0.4 0.4 ALKPHOS 102 92       Imaging Last 24 Hours:  CT ABDOMEN PELVIS W IV CONTRAST Additional Contrast? None    Result Date: 7/8/2021  EXAMINATION: CT OF THE ABDOMEN AND PELVIS WITH CONTRAST 7/7/2021 10:29 pm TECHNIQUE: CT of the abdomen and pelvis was performed with the administration of intravenous contrast. Multiplanar reformatted images are provided for review. Dose modulation, iterative reconstruction, and/or weight based adjustment of the mA/kV was utilized to reduce the radiation dose to as low as reasonably achievable. COMPARISON: 07/01/2021 HISTORY: ORDERING SYSTEM PROVIDED HISTORY: abd pain s/p colonoscopy yesterday TECHNOLOGIST PROVIDED HISTORY: Additional Contrast?->None Reason for exam:->abd pain s/p colonoscopy yesterday Decision Support Exception - unselect if not a suspected or confirmed emergency medical condition->Emergency Medical Condition (MA) Reason for Exam: Abdominal Pain (reports abd bloating since colonosopy yesterday. pt also reports \"leaking stool\" from vagina) FINDINGS: Lower Chest: Partially imaged emphysema. Organs: Liver is normal in contour and enhancement. Gallbladder is unremarkable without biliary ductal dilatation. Mild pancreatic ductal dilatation to 5 mm. Adrenals are unremarkable. Spleen is normal in size. No renal calculi or hydronephrosis. Moderate calcific atherosclerosis. GI/Bowel: Near complete resolution of previously demonstrated large bowel wall thickening. Appendix is normal. Pelvis: Unremarkable. Peritoneum/Retroperitoneum:No free fluid, free air, organized fluid collection or lymphadenopathy. Bones: Diffuse osteopenia with multilevel degenerative disc disease. 1. Near complete resolution of previously demonstrated large bowel wall thickening 2. Mild pancreatic ductal dilatation to 5 mm. Recommend further evaluation with nonemergent pancreas protocol MRI.      Assessment//Plan           Hospital Problems         Last Modified POA    Abdominal pain 7/8/2021 Yes Assessment & Plan  63 yo w HTN, CAD, COPD, GERD and h/o UC/?CD re-admitted w 1 month of Lt-sided abd pain/diarrhea/hematochezia and wt loss and suggested to have diffuse colitis on CT, which has markedly improved on F/U CTon steroids/Abx's. C diff is neg. Flex Sig revealed poor prep and small ulceration at 20cm, biopsied, eventhough she had a neg. Colonoscopy in 2019. CD IS A MORE LIKELY POSSIBILITY THAN UC, BUT SHE SEEMS TO BE HAVING COLONIC SPASM AS WELL. Bx came back non specific, but I still suspect CD. HBsAg is neg.     Plan:   1. Will switch IV to PO Prednisone 20 mg   2. Destiny Duncan before deciding on Biologics  3. Continue Bentyl 20 mg tid for colon spasm and Protonix for her GERD/nausea  4. Advancing diet   5. Consider repeating C diff  6.  Anticipate D/C in am if stable     Dee Norris MD       [phone]) [phone]    Electronically signed by Dee Norris MD on 7/9/21 at 8:40 AM EDT

## 2021-07-09 NOTE — PROGRESS NOTES
A/O x4. Makes needs known, calls out appropriately. Shift assessment completed and charted. C/O LLQ abdominal pain, meds as ordered. Denies any additional needs. Call light within reach.

## 2021-07-10 VITALS
WEIGHT: 94.7 LBS | TEMPERATURE: 98.3 F | DIASTOLIC BLOOD PRESSURE: 84 MMHG | HEART RATE: 72 BPM | BODY MASS INDEX: 16.17 KG/M2 | SYSTOLIC BLOOD PRESSURE: 143 MMHG | OXYGEN SATURATION: 98 % | RESPIRATION RATE: 18 BRPM | HEIGHT: 64 IN

## 2021-07-10 LAB
A/G RATIO: 1.6 (ref 1.1–2.2)
ACANTHOCYTES: ABNORMAL
ALBUMIN SERPL-MCNC: 3.8 G/DL (ref 3.4–5)
ALP BLD-CCNC: 85 U/L (ref 40–129)
ALT SERPL-CCNC: 17 U/L (ref 10–40)
ANION GAP SERPL CALCULATED.3IONS-SCNC: 9 MMOL/L (ref 3–16)
ANISOCYTOSIS: ABNORMAL
AST SERPL-CCNC: 15 U/L (ref 15–37)
ATYPICAL LYMPHOCYTE RELATIVE PERCENT: 7 % (ref 0–6)
BANDED NEUTROPHILS RELATIVE PERCENT: 10 % (ref 0–7)
BASOPHILS ABSOLUTE: 0 K/UL (ref 0–0.2)
BASOPHILS RELATIVE PERCENT: 0 %
BILIRUB SERPL-MCNC: 0.4 MG/DL (ref 0–1)
BUN BLDV-MCNC: 18 MG/DL (ref 7–20)
CALCIUM SERPL-MCNC: 9.6 MG/DL (ref 8.3–10.6)
CHLORIDE BLD-SCNC: 98 MMOL/L (ref 99–110)
CO2: 35 MMOL/L (ref 21–32)
CREAT SERPL-MCNC: 0.7 MG/DL (ref 0.6–1.2)
EOSINOPHILS ABSOLUTE: 1.6 K/UL (ref 0–0.6)
EOSINOPHILS RELATIVE PERCENT: 7 %
GFR AFRICAN AMERICAN: >60
GFR NON-AFRICAN AMERICAN: >60
GLOBULIN: 2.4 G/DL
GLUCOSE BLD-MCNC: 102 MG/DL (ref 70–99)
HCT VFR BLD CALC: 38.9 % (ref 36–48)
HEMATOLOGY PATH CONSULT: NO
HEMOGLOBIN: 13 G/DL (ref 12–16)
LYMPHOCYTES ABSOLUTE: 7 K/UL (ref 1–5.1)
LYMPHOCYTES RELATIVE PERCENT: 24 %
MACROCYTES: ABNORMAL
MAGNESIUM: 2 MG/DL (ref 1.8–2.4)
MCH RBC QN AUTO: 30.2 PG (ref 26–34)
MCHC RBC AUTO-ENTMCNC: 33.3 G/DL (ref 31–36)
MCV RBC AUTO: 90.6 FL (ref 80–100)
MICROCYTES: ABNORMAL
MONOCYTES ABSOLUTE: 0.5 K/UL (ref 0–1.3)
MONOCYTES RELATIVE PERCENT: 2 %
NEUTROPHILS ABSOLUTE: 13.6 K/UL (ref 1.7–7.7)
NEUTROPHILS RELATIVE PERCENT: 50 %
PDW BLD-RTO: 13.9 % (ref 12.4–15.4)
PLATELET # BLD: 479 K/UL (ref 135–450)
PMV BLD AUTO: 8.4 FL (ref 5–10.5)
POIKILOCYTES: ABNORMAL
POTASSIUM REFLEX MAGNESIUM: 2.9 MMOL/L (ref 3.5–5.1)
RBC # BLD: 4.29 M/UL (ref 4–5.2)
SCHISTOCYTES: ABNORMAL
SODIUM BLD-SCNC: 142 MMOL/L (ref 136–145)
STOMATOCYTES: ABNORMAL
TARGET CELLS: ABNORMAL
TOTAL PROTEIN: 6.2 G/DL (ref 6.4–8.2)
TOXIC GRANULATION: PRESENT
WBC # BLD: 22.7 K/UL (ref 4–11)

## 2021-07-10 PROCEDURE — 36415 COLL VENOUS BLD VENIPUNCTURE: CPT

## 2021-07-10 PROCEDURE — 80053 COMPREHEN METABOLIC PANEL: CPT

## 2021-07-10 PROCEDURE — G0378 HOSPITAL OBSERVATION PER HR: HCPCS

## 2021-07-10 PROCEDURE — 6360000002 HC RX W HCPCS: Performed by: NURSE PRACTITIONER

## 2021-07-10 PROCEDURE — 6370000000 HC RX 637 (ALT 250 FOR IP): Performed by: INTERNAL MEDICINE

## 2021-07-10 PROCEDURE — 85025 COMPLETE CBC W/AUTO DIFF WBC: CPT

## 2021-07-10 PROCEDURE — 96372 THER/PROPH/DIAG INJ SC/IM: CPT

## 2021-07-10 PROCEDURE — 83735 ASSAY OF MAGNESIUM: CPT

## 2021-07-10 PROCEDURE — 6370000000 HC RX 637 (ALT 250 FOR IP): Performed by: NURSE PRACTITIONER

## 2021-07-10 RX ORDER — POTASSIUM CHLORIDE 20 MEQ/1
60 TABLET, EXTENDED RELEASE ORAL ONCE
Status: COMPLETED | OUTPATIENT
Start: 2021-07-10 | End: 2021-07-10

## 2021-07-10 RX ORDER — DICYCLOMINE HCL 20 MG
20 TABLET ORAL
Qty: 120 TABLET | Refills: 3 | Status: ON HOLD | OUTPATIENT
Start: 2021-07-10 | End: 2021-10-03 | Stop reason: SINTOL

## 2021-07-10 RX ADMIN — PREDNISONE 20 MG: 20 TABLET ORAL at 08:12

## 2021-07-10 RX ADMIN — POTASSIUM CHLORIDE 60 MEQ: 1500 TABLET, EXTENDED RELEASE ORAL at 09:17

## 2021-07-10 RX ADMIN — DICYCLOMINE HYDROCHLORIDE 20 MG: 20 TABLET ORAL at 06:31

## 2021-07-10 RX ADMIN — Medication 250 MG: at 08:12

## 2021-07-10 RX ADMIN — ENOXAPARIN SODIUM 40 MG: 40 INJECTION SUBCUTANEOUS at 08:12

## 2021-07-10 RX ADMIN — PANTOPRAZOLE SODIUM 40 MG: 40 TABLET, DELAYED RELEASE ORAL at 08:12

## 2021-07-10 NOTE — DISCHARGE INSTR - COC
Continuity of Care Form    Patient Name: Gustavo Wang   :  1960  MRN:  [id number]    Admit date:  2021  Discharge date:  ***    Code Status Order: Full Code   Advance Directives:   Advance Care Flowsheet Documentation       Date/Time Healthcare Directive Type of Healthcare Directive Copy in [address] Agent's Name Healthcare Agent's Phone Number    21 [phone]  --  Durable power of  for health care;Living will  No, copy requested from family  Spouse  --  --            Admitting Physician:  Heather Aguillon MD  PCP: Rajani Castellanos DO    Discharging Nurse: [facility] Unit/Room#: 1923/7899-89  Discharging Unit Phone Number: ***    Emergency Contact:   Extended Emergency Contact Information  Primary Emergency Contact: Fidelina Gray  Address: [place]/ [address] Phone: [phone]  Mobile Phone: [phone]  Relation: Spouse   needed? No  Secondary Emergency Contact: [address], [address] Phone: [phone]  Mobile Phone: [phone]  Relation: Grandchild  Preferred language: English   needed?  No    Past Surgical History:  Past Surgical History:   Procedure Laterality Date    BACK SURGERY      L4-L5 rods in    COLONOSCOPY N/A 2021    COLONOSCOPY WITH BIOPSY performed by Dyan Ruiz MD at [place] [address]  2019    due to a fall    TONSILLECTOMY         Immunization History:   Immunization History   Administered Date(s) Administered    HIB PRP-T (ActHIB, Hiberix) 06/15/2019    Influenza, Quadv, IM, PF (6 mo and older Fluzone, Flulaval, Fluarix, and 3 yrs and older Afluria) 2020    Meningococcal ACWY Vaccine 2019    Meningococcal B, OMV (Bexsero) 06/15/2019, 2019    Meningococcal B, Recombinant Volodymyr Samm) 2019, 2019, 2019, 2019    Meningococcal MCV4O (Menveo) 06/15/2019, 08/10/2019    Meningococcal MCV4P (Menactra) 06/15/2019    Meningococcal Vac Of Unknown Formula And Unknown Serogroup 07/13/2019    PPD Test 07/05/2021    Pneumococcal Conjugate 13-valent (Ara Simmonds) 06/15/2020, 07/17/2020    Pneumococcal Polysaccharide ([id number]) 06/15/2019, 06/15/2019    Tdap (Boostrix, Adacel) 07/17/2020       Active Problems:  Patient Active Problem List   Diagnosis Code    Essential hypertension I10    Chest pain R07.9    Moderate COPD (chronic obstructive pulmonary disease) ([facility]) J44.9    Heterozygous alpha 1-antitrypsin deficiency ([facility]) E88.01    COPD exacerbation ([facility]) J44.1    Gastroesophageal reflux disease without esophagitis K21.9    Anxiety and depression F41.9, F32.9    Neuropathy of left lower extremity G57.92    Acquired hypothyroidism E03.9    COPD with acute exacerbation ([facility]) J44.1    Diarrhea R19.7    Adrenal nodule ([facility]) E27.8    Hypercalcemia E83.52    NSTEMI (non-ST elevated myocardial infarction) ([facility] [address] I21.4    Non-ischemic cardiomyopathy ([facility]) I42.8    Menopause Z78.0    Other osteoporosis without current pathological fracture M81.8    Ulcerative colitis with rectal bleeding ([facility]) K51.911    Moderate malnutrition ([facility]) E44.0    Abdominal pain R10.9       Isolation/Infection:   Isolation            C Diff Contact          Patient Infection Status       Infection Onset Added Last Indicated Last Indicated By Review Planned Expiration Resolved Resolved By    C-diff Rule Out 07/08/21 07/08/21 07/08/21 Clostridium difficile toxin/antigen (Ordered)        Resolved    C-diff Rule Out 07/01/21 07/01/21 07/01/21 Gastrointestinal Panel, Molecular (Ordered)   07/01/21 Rule-Out Test Resulted    C-diff Rule Out 12/22/20 12/22/20 12/23/20 Clostridium difficile toxin/antigen (Ordered)   12/23/20 Rule-Out Test Resulted    COVID-19 Rule Out 12/22/20 12/22/20 12/22/20 COVID-19 (Ordered)   12/22/20 Rule-Out Test Resulted            Nurse Assessment:  Last Vital Signs: BP (!) 143/84   Pulse INTERVENTION:[id number]}  Weight Bearing Status/Restrictions: 50Annia SHEIKH Weight Bearin:::0}  Other Medical Equipment (for information only, NOT a DME order):  {EQUIPMENT:775834074}  Other Treatments: ***    Patient's personal belongings (please select all that are sent with patient):  {CHP DME Belongings:522773111:::0}    RN SIGNATURE:  {Esignature:784268732:::0}    CASE MANAGEMENT/SOCIAL WORK SECTION    Inpatient Status Date: ***    Readmission Risk Assessment Score:  Readmission Risk              Risk of Unplanned Readmission:  0           Discharging to Facility/ Agency   Name:   Address:  Phone:  Fax:    Dialysis Facility (if applicable)   Name:  Address:  Dialysis Schedule:  Phone:  Fax:    / signature: {Esignature:540620567:::0}    PHYSICIAN SECTION    Prognosis: {Prognosis:8949331635:::0}    Condition at Discharge: Odette Saucedo Patient Condition:202606260:::0}    Rehab Potential (if transferring to Rehab): {Prognosis:6364584802:::0}    Recommended Labs or Other Treatments After Discharge: ***    Physician Certification: I certify the above information and transfer of Magalie Adames  is necessary for the continuing treatment of the diagnosis listed and that she requires {Admit to Appropriate Level of Care:48376:::0} for {GREATER/LESS:168062443} 30 days.      Update Admission H&P: {CHP DME Changes in HandP:236008087:::0}    PHYSICIAN SIGNATURE:  {Esignature:058265971:::0}

## 2021-07-10 NOTE — PROGRESS NOTES
PROGRESS NOTE  S:61 yrs Patient  admitted on 7/8/2021 with Abdominal pain [R10.9] . Today she feels better. Tolerating diet. complains of Abdominal Pain    Exam:   Vitals:    07/10/21 0808   BP: (!) 143/84   Pulse: 72   Resp: 18   Temp: 98.3 °F (36.8 °C)   SpO2: 98%      General appearance: alert, appears stated age and cooperative  HEENT: Neck supple with midline trachea  Neck: no adenopathy  Lungs: clear to auscultation bilaterally  Heart: regular rate and rhythm, S1, S2 normal, no murmur, click, rub or gallop  Abdomen: soft, non-tender; bowel sounds normal; no masses,  no organomegaly  Extremities: extremities normal, atraumatic, no cyanosis or edema        Medications: Reviewed    Labs:  CBC:   Recent Labs     07/08/21  0358 07/09/21  0627 07/10/21  0554   WBC 30.8* 17.4* 22.7*   HGB 14.5 13.3 13.0   HCT 43.5 40.3 38.9   MCV 90.5 90.4 90.6   * 460* 479*     BMP:   Recent Labs     07/08/21 0358 07/09/21  0627 07/10/21  0554   * 139 142   K 3.4* 4.1 2.9*   CL 98* 94* 98*   CO2 25 32 35*   BUN 20 21* 18   CREATININE 0.7 0.7 0.7     LIVER PROFILE:   Recent Labs     07/08/21  0358 07/09/21  0627 07/10/21  0554   AST 29 18 15   ALT 24 21 17   LIPASE 29.0  --   --    PROT 6.8 6.6 6.2*   BILITOT 0.4 0.4 0.4   ALKPHOS 80 92 80       Impression:62year old female with history of HTN, CAD, CoPD, GERD, admitted with suspected crohn's disease    Recommendation:  1. Continue supportivve care  2. Continue prednisone 20mg daily  3. Low fiber diet  4. Ambulate TID  5. Ok to d/c from GI standpoint  6.  Follow up in office to discuss Blanca Pena MD, MD  10:22 AM 7/10/2021

## 2021-07-10 NOTE — DISCHARGE SUMMARY
Hospital Medicine Discharge Summary    Patient ID: Magalie Adames      Patient's PCP: Gloria Denise DO    Admit Date: 7/8/2021     Discharge Date:   ***    Admitting Physician: Leonardo Stewart MD     Discharge Physician: Caridad Underwood APRN - CNP     Discharge Diagnoses: Active Hospital Problems    Diagnosis     Abdominal pain [R10.9]        The patient was seen and examined on day of discharge and this discharge summary is in conjunction with any daily progress note from day of discharge. Hospital Course: ***          Physical Exam Performed:     BP (!) 143/84   Pulse 72   Temp 98.3 °F (36.8 °C) (Oral)   Resp 18   Ht 5' 4\" (1.626 m)   Wt 94 lb 11.2 oz (43 kg)   SpO2 98%   BMI 16.26 kg/m²       General appearance:  No apparent distress, appears stated age and cooperative. HEENT:  Normal cephalic, atraumatic without obvious deformity. Pupils equal, round, and reactive to light. Extra ocular muscles intact. Conjunctivae/corneas clear. Neck: Supple, with full range of motion. No jugular venous distention. Trachea midline. Respiratory:  Normal respiratory effort. Clear to auscultation, bilaterally without Rales/Wheezes/Rhonchi. Cardiovascular:  Regular rate and rhythm with normal S1/S2 without murmurs, rubs or gallops. Abdomen: Soft, non-tender, non-distended with normal bowel sounds. Musculoskeletal:  No clubbing, cyanosis or edema bilaterally. Full range of motion without deformity. Skin: Skin color, texture, turgor normal.  No rashes or lesions. Neurologic:  Neurovascularly intact without any focal sensory/motor deficits. Cranial nerves: II-XII intact, grossly non-focal.  Psychiatric:  Alert and oriented, thought content appropriate, normal insight  Capillary Refill: Brisk,< 3 seconds   Peripheral Pulses: +2 palpable, equal bilaterally       Labs:  For convenience and continuity at follow-up the following most recent labs are provided:      CBC:    Lab Results   Component Value Date    WBC 22.7 07/10/2021    HGB 13.0 07/10/2021    HCT 38.9 07/10/2021     07/10/2021       Renal:    Lab Results   Component Value Date     07/10/2021    K 2.9 07/10/2021    CL 98 07/10/2021    CO2 35 07/10/2021    BUN 18 07/10/2021    CREATININE 0.7 07/10/2021    CALCIUM 9.6 07/10/2021    PHOS 2.4 07/07/2021         Significant Diagnostic Studies    Radiology:   CT ABDOMEN PELVIS W IV CONTRAST Additional Contrast? None   Final Result   1. Near complete resolution of previously demonstrated large bowel wall   thickening   2. Mild pancreatic ductal dilatation to 5 mm. Recommend further evaluation   with nonemergent pancreas protocol MRI.                 Consults:     IP CONSULT TO GI  IP CONSULT TO HOSPITALIST    Disposition:  ***     Condition at Discharge: Odette Saucedo Patient Condition:[id number]}    Discharge Instructions/Follow-up:  ***    Code Status:  Full Code ***    Activity: activity as tolerated    Diet: {diet:24099}      Discharge Medications:     Current Discharge Medication List           Details   dicyclomine (BENTYL) 20 MG tablet Take 1 tablet by mouth 3 times daily (before meals)  Qty: 120 tablet, Refills: 3              Details   sucralfate (CARAFATE) 1 GM tablet Take 1 g by mouth 4 times daily (after meals and at bedtime) Pt takes PRN      predniSONE (DELTASONE) 20 MG tablet Take 1 tablet by mouth 2 times daily for 5 days  Qty: 10 tablet, Refills: 0      mirtazapine (REMERON) 15 MG tablet Take 15 mg by mouth nightly      TRELEGY ELLIPTA 100-62.5-25 MCG/INH AEPB INHALE 1 PUFF INTO THE LUNGS DAILY  Qty: 60 each, Refills: 5      atorvastatin (LIPITOR) 40 MG tablet Take 1 tablet by mouth daily  Qty: 90 tablet, Refills: 3      aspirin 81 MG chewable tablet Take 1 tablet by mouth daily  Qty: 30 tablet, Refills: 0      Cholecalciferol (VITAMIN D) 50 MCG (2000 UT) CAPS capsule Take by mouth       benzonatate (TESSALON) 100 MG capsule Take 100 mg by mouth 3 times daily as needed for Cough albuterol sulfate  (90 Base) MCG/ACT inhaler Inhale 1 puff into the lungs as needed for Wheezing or Shortness of Breath  Qty: 1 Inhaler, Refills: 5    Associated Diagnoses: Moderate COPD (chronic obstructive pulmonary disease) ([facility])      pantoprazole (PROTONIX) 40 MG tablet Take 40 mg by mouth daily      FLORASTOR 250 MG capsule Take 250 mg by mouth daily      gabapentin (NEURONTIN) 300 MG capsule Take 1 capsule by mouth 4 times daily for 30 days. Qty: 120 capsule, Refills: 0    Associated Diagnoses: Neuropathy of left lower extremity      aspirin-acetaminophen-caffeine (EXCEDRIN MIGRAINE) 250-250-65 MG per tablet Take 1 tablet by mouth every 6 hours as needed for Headaches      albuterol (PROVENTIL) (2.5 MG/3ML) 0.083% nebulizer solution Take 3 mLs by nebulization every 6 hours as needed for Wheezing  Qty: 120 each, Refills: 3    Associated Diagnoses: Moderate COPD (chronic obstructive pulmonary disease) ([facility])      DULoxetine (CYMBALTA) 60 MG extended release capsule Take 60 mg by mouth       folic acid (FOLVITE) 1 MG tablet Take 1 tablet by mouth daily  Qty: 30 tablet, Refills: 11    Associated Diagnoses: Ulcerative pancolitis with complication ([facility])      Acetaminophen (TYLENOL) 325 MG CAPS Take 975 mg by mouth as needed      levothyroxine (SYNTHROID) 50 MCG tablet Take 50 mcg by mouth Daily      ondansetron (ZOFRAN-ODT) 8 MG TBDP disintegrating tablet Place 8 mg under the tongue every 8 hours as needed for Nausea or Vomiting             Time Spent on discharge is more than {Time; 15 min - 8 hours:69915} in the examination, evaluation, counseling and review of medications and discharge plan. Signed:    Sandra Smith, APRN - CNP   7/10/2021      Thank you Eliza Brown DO for the opportunity to be involved in this patient's care. If you have any questions or concerns please feel free to contact me at [phone].

## 2021-07-10 NOTE — PROGRESS NOTES
Pt axo. Assessment completed as charted. Pt states no BM, no diarrhea. States still has some nausea and cramping but it is improved. Tolerating diet. Denies additional needs. Will continue to monitor. Call light in reach.

## 2021-07-10 NOTE — PROGRESS NOTES
Pt demanding to leave. States she is an adult and can do what she wants. Pt states \"she walked into her on her own and she can walk out\". Attempted to explain to pt need to contact treatment team for input, d/c meds, instructions, etc. Pt continues to demand to leave. NP paged.

## 2021-07-10 NOTE — PROGRESS NOTES
Pt assessment completed and charted. VSS. Pt a/o x4. Pt independent on ambulation. Medications given per MAR. Pt tolerating diet. Pt states she has no pain. Bed in lowest position and wheels locked. Call light within reach. Bedside table within reach. Non-skid footwear in place. Pt denies any other needs at this time. Pt calls out appropriately. Will continue to monitor.

## 2021-07-15 ENCOUNTER — HOSPITAL ENCOUNTER (OUTPATIENT)
Age: 61
Setting detail: OBSERVATION
Discharge: HOME | End: 2021-07-16
Attending: EMERGENCY MEDICINE | Admitting: INTERNAL MEDICINE
Payer: MEDICARE

## 2021-07-15 ENCOUNTER — APPOINTMENT (OUTPATIENT)
Dept: CT IMAGING | Age: 61
End: 2021-07-15
Payer: MEDICARE

## 2021-07-15 ENCOUNTER — APPOINTMENT (OUTPATIENT)
Dept: GENERAL RADIOLOGY | Age: 61
End: 2021-07-15
Payer: MEDICARE

## 2021-07-15 DIAGNOSIS — D72.829 LEUKOCYTOSIS, UNSPECIFIED TYPE: ICD-10-CM

## 2021-07-15 DIAGNOSIS — R53.1 ACUTE LEFT-SIDED WEAKNESS: Primary | ICD-10-CM

## 2021-07-15 DIAGNOSIS — R47.01 APHASIA: ICD-10-CM

## 2021-07-15 DIAGNOSIS — R10.9 ABDOMINAL PAIN, UNSPECIFIED ABDOMINAL LOCATION: ICD-10-CM

## 2021-07-15 DIAGNOSIS — K59.00 CONSTIPATION, UNSPECIFIED CONSTIPATION TYPE: ICD-10-CM

## 2021-07-15 PROBLEM — R47.1 DYSARTHRIA: Status: ACTIVE | Noted: 2021-07-15

## 2021-07-15 LAB
A/G RATIO: 1.5 (ref 1.1–2.2)
ALBUMIN SERPL-MCNC: 4.1 G/DL (ref 3.4–5)
ALP BLD-CCNC: 106 U/L (ref 40–129)
ALT SERPL-CCNC: 18 U/L (ref 10–40)
ANION GAP SERPL CALCULATED.3IONS-SCNC: 10 MMOL/L (ref 3–16)
AST SERPL-CCNC: 18 U/L (ref 15–37)
BASOPHILS ABSOLUTE: 0.2 K/UL (ref 0–0.2)
BASOPHILS RELATIVE PERCENT: 0.6 %
BILIRUB SERPL-MCNC: 0.5 MG/DL (ref 0–1)
BUN BLDV-MCNC: 9 MG/DL (ref 7–20)
CALCIUM SERPL-MCNC: 10.3 MG/DL (ref 8.3–10.6)
CHLORIDE BLD-SCNC: 98 MMOL/L (ref 99–110)
CO2: 30 MMOL/L (ref 21–32)
CREAT SERPL-MCNC: 0.8 MG/DL (ref 0.6–1.2)
EKG ATRIAL RATE: 93 BPM
EKG DIAGNOSIS: NORMAL
EKG P AXIS: 81 DEGREES
EKG P-R INTERVAL: 138 MS
EKG Q-T INTERVAL: 346 MS
EKG QRS DURATION: 80 MS
EKG QTC CALCULATION (BAZETT): 430 MS
EKG R AXIS: 78 DEGREES
EKG T AXIS: 73 DEGREES
EKG VENTRICULAR RATE: 93 BPM
EOSINOPHILS ABSOLUTE: 0.3 K/UL (ref 0–0.6)
EOSINOPHILS RELATIVE PERCENT: 1 %
GFR AFRICAN AMERICAN: >60
GFR NON-AFRICAN AMERICAN: >60
GLOBULIN: 2.8 G/DL
GLUCOSE BLD-MCNC: 100 MG/DL (ref 70–99)
GLUCOSE BLD-MCNC: 94 MG/DL (ref 70–99)
HCT VFR BLD CALC: 40.3 % (ref 36–48)
HEMATOLOGY PATH CONSULT: NO
HEMOGLOBIN: 13.2 G/DL (ref 12–16)
LYMPHOCYTES ABSOLUTE: 6.5 K/UL (ref 1–5.1)
LYMPHOCYTES RELATIVE PERCENT: 20.4 %
MCH RBC QN AUTO: 30 PG (ref 26–34)
MCHC RBC AUTO-ENTMCNC: 32.9 G/DL (ref 31–36)
MCV RBC AUTO: 91.1 FL (ref 80–100)
MONOCYTES ABSOLUTE: 3.4 K/UL (ref 0–1.3)
MONOCYTES RELATIVE PERCENT: 10.7 %
NEUTROPHILS ABSOLUTE: 21.4 K/UL (ref 1.7–7.7)
NEUTROPHILS RELATIVE PERCENT: 67.3 %
PDW BLD-RTO: 14.3 % (ref 12.4–15.4)
PERFORMED ON: NORMAL
PLATELET # BLD: 458 K/UL (ref 135–450)
PMV BLD AUTO: 8.3 FL (ref 5–10.5)
POTASSIUM REFLEX MAGNESIUM: 4.1 MMOL/L (ref 3.5–5.1)
RBC # BLD: 4.42 M/UL (ref 4–5.2)
SODIUM BLD-SCNC: 138 MMOL/L (ref 136–145)
SPECIMEN STATUS: NORMAL
TOTAL PROTEIN: 6.9 G/DL (ref 6.4–8.2)
TROPONIN: 0.01 NG/ML
WBC # BLD: 31.8 K/UL (ref 4–11)

## 2021-07-15 PROCEDURE — G0378 HOSPITAL OBSERVATION PER HR: HCPCS

## 2021-07-15 PROCEDURE — 74177 CT ABD & PELVIS W/CONTRAST: CPT

## 2021-07-15 PROCEDURE — 6360000004 HC RX CONTRAST MEDICATION: Performed by: INTERNAL MEDICINE

## 2021-07-15 PROCEDURE — 85025 COMPLETE CBC W/AUTO DIFF WBC: CPT

## 2021-07-15 PROCEDURE — 6370000000 HC RX 637 (ALT 250 FOR IP): Performed by: INTERNAL MEDICINE

## 2021-07-15 PROCEDURE — 80053 COMPREHEN METABOLIC PANEL: CPT

## 2021-07-15 PROCEDURE — 6360000004 HC RX CONTRAST MEDICATION: Performed by: EMERGENCY MEDICINE

## 2021-07-15 PROCEDURE — 74018 RADEX ABDOMEN 1 VIEW: CPT

## 2021-07-15 PROCEDURE — 84484 ASSAY OF TROPONIN QUANT: CPT

## 2021-07-15 PROCEDURE — 2580000003 HC RX 258: Performed by: INTERNAL MEDICINE

## 2021-07-15 PROCEDURE — 6370000000 HC RX 637 (ALT 250 FOR IP): Performed by: EMERGENCY MEDICINE

## 2021-07-15 PROCEDURE — 71045 X-RAY EXAM CHEST 1 VIEW: CPT

## 2021-07-15 PROCEDURE — 93005 ELECTROCARDIOGRAM TRACING: CPT | Performed by: EMERGENCY MEDICINE

## 2021-07-15 PROCEDURE — 2580000003 HC RX 258: Performed by: EMERGENCY MEDICINE

## 2021-07-15 PROCEDURE — 70450 CT HEAD/BRAIN W/O DYE: CPT

## 2021-07-15 PROCEDURE — 6360000002 HC RX W HCPCS: Performed by: EMERGENCY MEDICINE

## 2021-07-15 PROCEDURE — 99284 EMERGENCY DEPT VISIT MOD MDM: CPT

## 2021-07-15 PROCEDURE — 70498 CT ANGIOGRAPHY NECK: CPT

## 2021-07-15 PROCEDURE — 96374 THER/PROPH/DIAG INJ IV PUSH: CPT

## 2021-07-15 PROCEDURE — 96375 TX/PRO/DX INJ NEW DRUG ADDON: CPT

## 2021-07-15 PROCEDURE — 93010 ELECTROCARDIOGRAM REPORT: CPT | Performed by: INTERNAL MEDICINE

## 2021-07-15 RX ORDER — METOPROLOL SUCCINATE 50 MG/1
50 TABLET, EXTENDED RELEASE ORAL DAILY
COMMUNITY

## 2021-07-15 RX ORDER — ALBUTEROL SULFATE 2.5 MG/3ML
2.5 SOLUTION RESPIRATORY (INHALATION) EVERY 6 HOURS PRN
Status: DISCONTINUED | OUTPATIENT
Start: 2021-07-15 | End: 2021-07-16 | Stop reason: HOSPADM

## 2021-07-15 RX ORDER — ACETAMINOPHEN 325 MG/1
650 TABLET ORAL EVERY 6 HOURS PRN
Status: DISCONTINUED | OUTPATIENT
Start: 2021-07-15 | End: 2021-07-16 | Stop reason: HOSPADM

## 2021-07-15 RX ORDER — MIRTAZAPINE 15 MG/1
15 TABLET, FILM COATED ORAL NIGHTLY
Status: DISCONTINUED | OUTPATIENT
Start: 2021-07-15 | End: 2021-07-16 | Stop reason: HOSPADM

## 2021-07-15 RX ORDER — ASPIRIN 81 MG/1
324 TABLET, CHEWABLE ORAL ONCE
Status: COMPLETED | OUTPATIENT
Start: 2021-07-15 | End: 2021-07-15

## 2021-07-15 RX ORDER — ASPIRIN 81 MG/1
81 TABLET, CHEWABLE ORAL DAILY
Status: DISCONTINUED | OUTPATIENT
Start: 2021-07-16 | End: 2021-07-16 | Stop reason: HOSPADM

## 2021-07-15 RX ORDER — POLYETHYLENE GLYCOL 3350 17 G/17G
17 POWDER, FOR SOLUTION ORAL DAILY
Status: DISCONTINUED | OUTPATIENT
Start: 2021-07-15 | End: 2021-07-16 | Stop reason: HOSPADM

## 2021-07-15 RX ORDER — BUDESONIDE AND FORMOTEROL FUMARATE DIHYDRATE 160; 4.5 UG/1; UG/1
2 AEROSOL RESPIRATORY (INHALATION) 2 TIMES DAILY
Status: DISCONTINUED | OUTPATIENT
Start: 2021-07-15 | End: 2021-07-16 | Stop reason: HOSPADM

## 2021-07-15 RX ORDER — SODIUM CHLORIDE 0.9 % (FLUSH) 0.9 %
5-40 SYRINGE (ML) INJECTION PRN
Status: DISCONTINUED | OUTPATIENT
Start: 2021-07-15 | End: 2021-07-16 | Stop reason: HOSPADM

## 2021-07-15 RX ORDER — BENZONATATE 100 MG/1
100 CAPSULE ORAL 3 TIMES DAILY PRN
Status: DISCONTINUED | OUTPATIENT
Start: 2021-07-15 | End: 2021-07-16 | Stop reason: HOSPADM

## 2021-07-15 RX ORDER — PANTOPRAZOLE SODIUM 40 MG/1
40 TABLET, DELAYED RELEASE ORAL DAILY
Status: DISCONTINUED | OUTPATIENT
Start: 2021-07-15 | End: 2021-07-16 | Stop reason: HOSPADM

## 2021-07-15 RX ORDER — LEVOTHYROXINE SODIUM 0.05 MG/1
50 TABLET ORAL DAILY
Status: DISCONTINUED | OUTPATIENT
Start: 2021-07-16 | End: 2021-07-16 | Stop reason: HOSPADM

## 2021-07-15 RX ORDER — SACCHAROMYCES BOULARDII 250 MG
250 CAPSULE ORAL DAILY
Status: DISCONTINUED | OUTPATIENT
Start: 2021-07-15 | End: 2021-07-16 | Stop reason: HOSPADM

## 2021-07-15 RX ORDER — SODIUM CHLORIDE 9 MG/ML
25 INJECTION, SOLUTION INTRAVENOUS PRN
Status: DISCONTINUED | OUTPATIENT
Start: 2021-07-15 | End: 2021-07-16 | Stop reason: HOSPADM

## 2021-07-15 RX ORDER — LORAZEPAM 2 MG/ML
0.5 INJECTION INTRAMUSCULAR ONCE
Status: COMPLETED | OUTPATIENT
Start: 2021-07-15 | End: 2021-07-15

## 2021-07-15 RX ORDER — ONDANSETRON 2 MG/ML
4 INJECTION INTRAMUSCULAR; INTRAVENOUS ONCE
Status: COMPLETED | OUTPATIENT
Start: 2021-07-15 | End: 2021-07-15

## 2021-07-15 RX ORDER — SODIUM CHLORIDE 0.9 % (FLUSH) 0.9 %
5-40 SYRINGE (ML) INJECTION EVERY 12 HOURS SCHEDULED
Status: DISCONTINUED | OUTPATIENT
Start: 2021-07-15 | End: 2021-07-16 | Stop reason: HOSPADM

## 2021-07-15 RX ORDER — FOLIC ACID 1 MG/1
1 TABLET ORAL DAILY
Status: DISCONTINUED | OUTPATIENT
Start: 2021-07-15 | End: 2021-07-16 | Stop reason: HOSPADM

## 2021-07-15 RX ORDER — SODIUM CHLORIDE, SODIUM LACTATE, POTASSIUM CHLORIDE, CALCIUM CHLORIDE 600; 310; 30; 20 MG/100ML; MG/100ML; MG/100ML; MG/100ML
1000 INJECTION, SOLUTION INTRAVENOUS ONCE
Status: COMPLETED | OUTPATIENT
Start: 2021-07-15 | End: 2021-07-15

## 2021-07-15 RX ORDER — DULOXETIN HYDROCHLORIDE 60 MG/1
60 CAPSULE, DELAYED RELEASE ORAL DAILY
Status: DISCONTINUED | OUTPATIENT
Start: 2021-07-15 | End: 2021-07-16 | Stop reason: HOSPADM

## 2021-07-15 RX ORDER — DICYCLOMINE HCL 20 MG
20 TABLET ORAL
Status: CANCELLED | OUTPATIENT
Start: 2021-07-15

## 2021-07-15 RX ORDER — GABAPENTIN 300 MG/1
300 CAPSULE ORAL 4 TIMES DAILY
Status: DISCONTINUED | OUTPATIENT
Start: 2021-07-15 | End: 2021-07-16 | Stop reason: HOSPADM

## 2021-07-15 RX ORDER — PROCHLORPERAZINE EDISYLATE 5 MG/ML
10 INJECTION INTRAMUSCULAR; INTRAVENOUS EVERY 6 HOURS PRN
Status: DISCONTINUED | OUTPATIENT
Start: 2021-07-15 | End: 2021-07-16 | Stop reason: HOSPADM

## 2021-07-15 RX ORDER — SUCRALFATE 1 G/1
1 TABLET ORAL
Status: DISCONTINUED | OUTPATIENT
Start: 2021-07-15 | End: 2021-07-16 | Stop reason: HOSPADM

## 2021-07-15 RX ORDER — ATORVASTATIN CALCIUM 40 MG/1
40 TABLET, FILM COATED ORAL DAILY
Status: DISCONTINUED | OUTPATIENT
Start: 2021-07-15 | End: 2021-07-16 | Stop reason: HOSPADM

## 2021-07-15 RX ORDER — POLYETHYLENE GLYCOL 3350 17 G/17G
17 POWDER, FOR SOLUTION ORAL DAILY PRN
Status: DISCONTINUED | OUTPATIENT
Start: 2021-07-15 | End: 2021-07-16 | Stop reason: HOSPADM

## 2021-07-15 RX ORDER — LOSARTAN POTASSIUM 100 MG/1
100 TABLET ORAL DAILY
Status: ON HOLD | COMMUNITY
End: 2021-07-16 | Stop reason: HOSPADM

## 2021-07-15 RX ORDER — ACETAMINOPHEN 650 MG/1
650 SUPPOSITORY RECTAL EVERY 6 HOURS PRN
Status: DISCONTINUED | OUTPATIENT
Start: 2021-07-15 | End: 2021-07-16 | Stop reason: HOSPADM

## 2021-07-15 RX ORDER — MORPHINE SULFATE 4 MG/ML
4 INJECTION, SOLUTION INTRAMUSCULAR; INTRAVENOUS ONCE
Status: COMPLETED | OUTPATIENT
Start: 2021-07-15 | End: 2021-07-15

## 2021-07-15 RX ORDER — TRAMADOL HYDROCHLORIDE 50 MG/1
25 TABLET ORAL EVERY 4 HOURS PRN
Status: DISCONTINUED | OUTPATIENT
Start: 2021-07-15 | End: 2021-07-16 | Stop reason: HOSPADM

## 2021-07-15 RX ORDER — ONDANSETRON 4 MG/1
8 TABLET, ORALLY DISINTEGRATING ORAL EVERY 8 HOURS PRN
Status: DISCONTINUED | OUTPATIENT
Start: 2021-07-15 | End: 2021-07-16 | Stop reason: HOSPADM

## 2021-07-15 RX ORDER — ACETAMINOPHEN, ASPIRIN AND CAFFEINE 250; 250; 65 MG/1; MG/1; MG/1
1 TABLET, FILM COATED ORAL EVERY 6 HOURS PRN
Status: DISCONTINUED | OUTPATIENT
Start: 2021-07-15 | End: 2021-07-16 | Stop reason: HOSPADM

## 2021-07-15 RX ADMIN — IOPAMIDOL 75 ML: 755 INJECTION, SOLUTION INTRAVENOUS at 14:02

## 2021-07-15 RX ADMIN — DULOXETINE HYDROCHLORIDE 60 MG: 60 CAPSULE, DELAYED RELEASE ORAL at 20:56

## 2021-07-15 RX ADMIN — IOPAMIDOL 75 ML: 755 INJECTION, SOLUTION INTRAVENOUS at 23:45

## 2021-07-15 RX ADMIN — GABAPENTIN 300 MG: 300 CAPSULE ORAL at 20:57

## 2021-07-15 RX ADMIN — IOHEXOL 50 ML: 240 INJECTION, SOLUTION INTRATHECAL; INTRAVASCULAR; INTRAVENOUS; ORAL at 20:51

## 2021-07-15 RX ADMIN — SUCRALFATE 1 G: 1 TABLET ORAL at 20:56

## 2021-07-15 RX ADMIN — FOLIC ACID 1 MG: 1 TABLET ORAL at 20:57

## 2021-07-15 RX ADMIN — ASPIRIN 324 MG: 81 TABLET, CHEWABLE ORAL at 17:01

## 2021-07-15 RX ADMIN — LORAZEPAM 0.5 MG: 2 INJECTION INTRAMUSCULAR; INTRAVENOUS at 17:01

## 2021-07-15 RX ADMIN — Medication 10 ML: at 21:00

## 2021-07-15 RX ADMIN — PANTOPRAZOLE SODIUM 40 MG: 40 TABLET, DELAYED RELEASE ORAL at 20:57

## 2021-07-15 RX ADMIN — MIRTAZAPINE 15 MG: 15 TABLET, FILM COATED ORAL at 23:32

## 2021-07-15 RX ADMIN — Medication 250 MG: at 20:56

## 2021-07-15 RX ADMIN — MORPHINE SULFATE 4 MG: 4 INJECTION, SOLUTION INTRAMUSCULAR; INTRAVENOUS at 15:52

## 2021-07-15 RX ADMIN — ATORVASTATIN CALCIUM 40 MG: 40 TABLET, FILM COATED ORAL at 20:56

## 2021-07-15 RX ADMIN — TRAMADOL HYDROCHLORIDE 25 MG: 50 TABLET, FILM COATED ORAL at 21:12

## 2021-07-15 RX ADMIN — MIRTAZAPINE 15 MG: 15 TABLET, FILM COATED ORAL at 23:48

## 2021-07-15 RX ADMIN — ONDANSETRON 4 MG: 2 INJECTION INTRAMUSCULAR; INTRAVENOUS at 15:52

## 2021-07-15 RX ADMIN — SODIUM CHLORIDE, POTASSIUM CHLORIDE, SODIUM LACTATE AND CALCIUM CHLORIDE 1000 ML: 600; 310; 30; 20 INJECTION, SOLUTION INTRAVENOUS at 15:53

## 2021-07-15 ASSESSMENT — PAIN DESCRIPTION - LOCATION: LOCATION: ABDOMEN

## 2021-07-15 ASSESSMENT — PAIN SCALES - GENERAL
PAINLEVEL_OUTOF10: 8
PAINLEVEL_OUTOF10: 10

## 2021-07-15 ASSESSMENT — PAIN DESCRIPTION - PAIN TYPE: TYPE: ACUTE PAIN

## 2021-07-15 NOTE — H&P
dicyclomine (BENTYL) 20 MG tablet Take 1 tablet by mouth 3 times daily (before meals) 7/10/21   Micki Acharya APRN - CNP   sucralfate (CARAFATE) 1 GM tablet Take 1 g by mouth 4 times daily (after meals and at bedtime) Pt takes PRN 4/23/21   Historical Provider, MD   mirtazapine (REMERON) 15 MG tablet Take 15 mg by mouth nightly    Historical Provider, MD   Marj Genta 100-62.5-25 MCG/INH AEPB INHALE 1 PUFF INTO THE LUNGS DAILY 5/11/21   James Gray MD   atorvastatin (LIPITOR) 40 MG tablet Take 1 tablet by mouth daily 5/10/21 5/5/22  Lemuel Hart MD   aspirin 81 MG chewable tablet Take 1 tablet by mouth daily 3/24/21 5/10/21  Kristina Washington MD   Cholecalciferol (VITAMIN D) 50 MCG (2000 UT) CAPS capsule Take by mouth     Historical Provider, MD   benzonatate (TESSALON) 100 MG capsule Take 100 mg by mouth 3 times daily as needed for Cough    Historical Provider, MD   albuterol sulfate  (90 Base) MCG/ACT inhaler Inhale 1 puff into the lungs as needed for Wheezing or Shortness of Breath 11/25/20   James Gray MD   pantoprazole (PROTONIX) 40 MG tablet Take 40 mg by mouth daily 10/16/20   Historical Provider, MD   FLORASTOR 250 MG capsule Take 250 mg by mouth daily 9/17/20   Historical Provider, MD   gabapentin (NEURONTIN) 300 MG capsule Take 1 capsule by mouth 4 times daily for 30 days.  11/5/20   Kim Abdul MD   aspirin-acetaminophen-caffeine (EXCEDRIN MIGRAINE) 257-489-20 MG per tablet Take 1 tablet by mouth every 6 hours as needed for Headaches    Historical Provider, MD   albuterol (PROVENTIL) (2.5 MG/3ML) 0.083% nebulizer solution Take 3 mLs by nebulization every 6 hours as needed for Wheezing 6/23/20   James Gray MD   DULoxetine (CYMBALTA) 60 MG extended release capsule Take 60 mg by mouth     Historical Provider, MD   folic acid (FOLVITE) 1 MG tablet Take 1 tablet by mouth daily 11/11/19   Simona Machuca MD   Acetaminophen (TYLENOL) 325 MG CAPS Take 975 mg by mouth as needed 6/15/19   Historical Provider, MD   levothyroxine (SYNTHROID) 50 MCG tablet Take 50 mcg by mouth Daily    Historical Provider, MD   ondansetron (ZOFRAN-ODT) 8 MG TBDP disintegrating tablet Place 8 mg under the tongue every 8 hours as needed for Nausea or Vomiting    Historical Provider, MD       Allergies:  Patient has no known allergies. Social History:      The patient currently lives at home    TOBACCO:   reports that she quit smoking about 17 months ago. Her smoking use included cigarettes. She has a 3.20 pack-year smoking history. She has never used smokeless tobacco.  ETOH:   reports no history of alcohol use. E-Cigarettes/Vaping Use     Questions Responses    E-Cigarette/Vaping Use Former User    Start Date     Passive Exposure     Quit Date     Counseling Given     Comments             Family History:      Reviewed in detail and negative for ESRD. Positive as follows:        Problem Relation Age of Onset    Heart Disease Father     High Blood Pressure Sister        REVIEW OF SYSTEMS COMPLETED:   Pertinent positives as noted in the HPI. All other systems reviewed and negative. PHYSICAL EXAM PERFORMED:    /79   Pulse 97   Temp 98.1 °F (36.7 °C) (Oral) Comment: pt requested  Resp 24   Ht 5' 4\" (1.626 m)   Wt 95 lb (43.1 kg)   SpO2 98%   BMI 16.31 kg/m²       General appearance:  No apparent distress, appears stated age and cooperative. HEENT:  Normal cephalic, atraumatic without obvious deformity. Pupils equal, round, and reactive to light. Extra ocular muscles intact. Conjunctivae/corneas clear. Neck: Supple, with full range of motion. No jugular venous distention. Trachea midline. Respiratory:  Normal respiratory effort. Clear to auscultation, bilaterally without Rales/Wheezes/Rhonchi. Cardiovascular:  Regular rate and rhythm with normal S1/S2 without murmurs, rubs or gallops. Abdomen: Soft, mild diffuse tenderness, non-distended with normal bowel sounds.   Musculoskeletal:  No clubbing, cyanosis or edema bilaterally. Full range of motion without deformity. Skin: Skin color, texture, turgor normal.  No rashes or lesions. Neurologic:  4/5 LLE weakness. She does have some word-finding difficulty, but the severity of this waxes and wanes substantially. Cannot appreciate any sensory deficit. Psychiatric:  Alert and oriented, thought content appropriate, normal insight. Anxious. Capillary Refill: Brisk,3 seconds, normal  Peripheral Pulses: +2 palpable, equal bilaterally       Labs:     Recent Labs     07/15/21  1225   WBC 31.8*   HGB 13.2   HCT 40.3   *     Recent Labs     07/15/21  1225      K 4.1   CL 98*   CO2 30   BUN 9   CREATININE 0.8   CALCIUM 10.3     Recent Labs     07/15/21  1225   AST 18   ALT 18   BILITOT 0.5   ALKPHOS 106     No results for input(s): INR in the last 72 hours. Recent Labs     07/15/21  1225   TROPONINI 0.01       Urinalysis:      Lab Results   Component Value Date    NITRU Negative 07/09/2021    WBCUA 10-20 07/01/2021    BACTERIA Rare 03/23/2021    RBCUA 11-20 07/01/2021    BLOODU Negative 07/09/2021    SPECGRAV 1.025 07/09/2021    GLUCOSEU Negative 07/09/2021       Radiology:     CXR: I have reviewed the CXR with the following interpretation: none  EKG:  I have reviewed the EKG with the following interpretation: nonspecific abnormalities    XR ABDOMEN (KUB) (SINGLE AP VIEW)   Final Result   Mild retained stool throughout the colon. Nonspecific bowel-gas pattern. CT HEAD WO CONTRAST   Final Result   No acute intracranial abnormality. CTA HEAD NECK W CONTRAST   Final Result   Unremarkable CTA of the head and neck. ASSESSMENT:    Active Hospital Problems    Diagnosis Date Noted    Left-sided weakness [R53.1] 07/15/2021    Dysarthria [R47.1] 07/15/2021         PLAN:      Expressive aphasia, L hemiparesthesias, and LLE plegia  - CT head negative.   F/u MRI brain.  - CTA head and neck without any significant stenoses. - recent TTE without source of emboli. No events on tele.  - PT/OT  - she is already on aspirin and statin. - the patient says that she has been under lots of stress lately due to her colitis issues, and she thinks this might be contributing to her symptoms. Cannot r/o conversion disorder. Recent colitis, thought to be crohn's colitis. - f/u abd/pelvic CT  - cautious miralax for constipation    COPD. Inhaled bronchodilators. She has quit cigarettes, encouraged her to quit marijuana as well. Hypothyroidism. Clinically euthyroid. Continue home dose of levothyroxine. Chronic leukocytosis. Likely due to h/o splenectomy. Worse lately because of the recent steroids. F/u CXR and UA for completeness. DVT Prophylaxis: SCDs  Diet: No diet orders on file  Code Status: Prior    PT/OT Eval Status: eval ordered    Dispo - perhaps 7/16, pending MRI brain and CT abd. She was IPTA, walks with a walker, she might have needs from . Maciej Sanchez MD    Thank you Alfredo Plata DO for the opportunity to be involved in this patient's care. If you have any questions or concerns please feel free to contact me at [phone].

## 2021-07-15 NOTE — ED NOTES

## 2021-07-15 NOTE — ED PROVIDER NOTES
CHIEF COMPLAINT  Aphasia (pt reports slurred speech and L sided weakness that started 2 days ago. )      HISTORY OF PRESENT ILLNESS  Kavon Qiu is a 62 y.o. female with a history of ulcerative colitis, hypothyroidism, hypertension, COPD on 2 L nasal cannula who presents to the ED complaining of difficulty speaking. According to the patient and her , difficulty speaking started about 2 days ago. She states that she feels generally weak however there is no specific arm or leg weakness. She states that she was recently hospitalized for severe colitis flare. She is not currently on any anticoagulation. She denies any chest pain or shortness of breath. She denies previous history of stroke. She denies headache, changes in vision. No other complaints, modifying factors or associated symptoms. I have reviewed the following from the nursing documentation.     Past Medical History:   Diagnosis Date    Back pain     COPD (chronic obstructive pulmonary disease) (HCC)     Fatigue     Hypertension     Syncope and collapse 03/2019    Thyroid disease     Ulcerative colitis, unspecified, without complications ([address]      Past Surgical History:   Procedure Laterality Date    BACK SURGERY      L4-L5 rods in    COLONOSCOPY N/A 7/7/2021    COLONOSCOPY WITH BIOPSY performed by Lesa Lira MD at [address] [facility] [address] [facility]  2019    due to a fall    TONSILLECTOMY       Family History   Problem Relation Age of Onset    Heart Disease Father     High Blood Pressure Sister      Social History     Socioeconomic History    Marital status:      Spouse name: Not on file    Number of children: Not on file    Years of education: Not on file    Highest education level: Not on file   Occupational History    Not on file   Tobacco Use    Smoking status: Former Smoker     Packs/day: 0.10     Years: 32.00     Pack years: 3.20     Types: Cigarettes     Quit date: 2020     Years since quittin.4    Smokeless tobacco: Never Used   Vaping Use    Vaping Use: Former    Substances: Always   Substance and Sexual Activity    Alcohol use: Never    Drug use: Yes     Types: Marijuana    Sexual activity: Not on file   Other Topics Concern    Not on file   Social History Narrative    Not on file     Social Determinants of Health     Financial Resource Strain:     Difficulty of Paying Living Expenses:    Food Insecurity:     Worried About Running Out of Food in the Last Year:     [address] in the Last Year:    Transportation Needs:     Lack of Transportation (Medical):      Lack of Transportation (Non-Medical):    Physical Activity:     Days of Exercise per Week:     Minutes of Exercise per Session:    Stress:     Feeling of Stress :    Social Connections:     Frequency of Communication with Friends and Family:     Frequency of Social Gatherings with Friends and Family:     Attends Gnosticism Services:     Active Member of Clubs or Organizations:     Attends Club or Organization Meetings:     Marital Status:    Intimate Partner Violence:     Fear of Current or Ex-Partner:     Emotionally Abused:     Physically Abused:     Sexually Abused:      Current Facility-Administered Medications   Medication Dose Route Frequency Provider Last Rate Last Admin    lactated ringers infusion 1,000 mL  1,000 mL Intravenous Once Rosa Love MD         Current Outpatient Medications   Medication Sig Dispense Refill    dicyclomine (BENTYL) 20 MG tablet Take 1 tablet by mouth 3 times daily (before meals) 120 tablet 3    sucralfate (CARAFATE) 1 GM tablet Take 1 g by mouth 4 times daily (after meals and at bedtime) Pt takes PRN      mirtazapine (REMERON) 15 MG tablet Take 15 mg by mouth nightly      TRELEGY ELLIPTA 100-62.5-25 MCG/INH AEPB INHALE 1 PUFF INTO THE LUNGS DAILY 60 each 5    atorvastatin (LIPITOR) 40 MG tablet Take 1 tablet by mouth daily 90 tablet 3  aspirin 81 MG chewable tablet Take 1 tablet by mouth daily 30 tablet 0    Cholecalciferol (VITAMIN D) 50 MCG (2000 UT) CAPS capsule Take by mouth       benzonatate (TESSALON) 100 MG capsule Take 100 mg by mouth 3 times daily as needed for Cough      albuterol sulfate  (90 Base) MCG/ACT inhaler Inhale 1 puff into the lungs as needed for Wheezing or Shortness of Breath 1 Inhaler 5    pantoprazole (PROTONIX) 40 MG tablet Take 40 mg by mouth daily      FLORASTOR 250 MG capsule Take 250 mg by mouth daily      gabapentin (NEURONTIN) 300 MG capsule Take 1 capsule by mouth 4 times daily for 30 days. 120 capsule 0    aspirin-acetaminophen-caffeine (EXCEDRIN MIGRAINE) 250-250-65 MG per tablet Take 1 tablet by mouth every 6 hours as needed for Headaches      albuterol (PROVENTIL) (2.5 MG/3ML) 0.083% nebulizer solution Take 3 mLs by nebulization every 6 hours as needed for Wheezing 120 each 3    DULoxetine (CYMBALTA) 60 MG extended release capsule Take 60 mg by mouth       folic acid (FOLVITE) 1 MG tablet Take 1 tablet by mouth daily 30 tablet 11    Acetaminophen (TYLENOL) 325 MG CAPS Take 975 mg by mouth as needed      levothyroxine (SYNTHROID) 50 MCG tablet Take 50 mcg by mouth Daily      ondansetron (ZOFRAN-ODT) 8 MG TBDP disintegrating tablet Place 8 mg under the tongue every 8 hours as needed for Nausea or Vomiting       No Known Allergies    REVIEW OF SYSTEMS  10 systems reviewed, pertinent positives per HPI otherwise noted to be negative. PHYSICAL EXAM  BP (!) 151/78   Pulse 124   Resp 20   Ht 5' 4\" (1.626 m)   Wt 95 lb (43.1 kg)   SpO2 96%   BMI 16.31 kg/m²   GENERAL APPEARANCE: Awake and alert. Cooperative. No acute distress. HEAD: Normocephalic. Atraumatic. There is no midline C-spine tenderness  EYES: PERRL. EOM's grossly intact. ENT: Mucous membranes are moist.   NECK: Supple, trachea midline. No significant lymphadenopathy  HEART: RRR. No harsh murmurs.  Intact radial pulses 2+ bilaterally. LUNGS: Respirations unlabored without accessory muscle use. CTAB. Good air exchange. No wheezes, rales, or rhonchi. Speaking comfortably in full sentences. ABDOMEN: Soft. Non-distended. Non-tender. No guarding or rebound. EXTREMITIES: No peripheral edema. No acute deformities. SKIN: Warm and dry. No acute rashes. NEUROLOGICAL: Neuro:  CN I not assessed. Pupils are equal, round and reactive to light bilaterally. Extraocular motions are full and intact bilaterally. Facial sensation to light touch in the trigeminal distribution is intact bilaterally. Patient able to raise bilateral eyebrows. Symmetric smile. Tongue protrudes midline. Able to puff out cheeks bilaterally. Shrugs shoulders bilaterally. Speech is clear and fluent. No difficulty with word finding. Test of skew: negative. Truncal ataxia is not present. Strength is 5/5 in the bilateral upper extremities (flexion and extension at elbow, wrist, intact and strong  of the hands). Strength is 5/5 in the bilateral lower extremities (flexion and extension at hip, knee and plantarflexion and dorsiflexion at the ankle), intact sensation to light touch in all four extremities. There is no pronator drift. Finger to nose testing intact. Rapid alternating hand movements intact. Gait is stable. NIHSS: 1.     PSYCHIATRIC: Normal mood and affect. LABS  I have reviewed all labs for this visit.    Results for orders placed or performed during the hospital encounter of 07/15/21   CBC Auto Differential   Result Value Ref Range    WBC 31.8 (H) 4.0 - 11.0 K/uL    RBC 4.42 4.00 - 5.20 M/uL    Hemoglobin 13.2 12.0 - 16.0 g/dL    Hematocrit 40.3 36.0 - 48.0 %    MCV 91.1 80.0 - 100.0 fL    MCH 30.0 26.0 - 34.0 pg    MCHC 32.9 31.0 - 36.0 g/dL    RDW 14.3 12.4 - 15.4 %    Platelets 337 (H) 621 - 450 K/uL    MPV 8.3 5.0 - 10.5 fL    Path Consult No     Neutrophils % 67.3 %    Lymphocytes % 20.4 %    Monocytes % 10.7 %    Eosinophils % 1.0 %    Basophils % 0.6 %    Neutrophils Absolute 21.4 (H) 1.7 - 7.7 K/uL    Lymphocytes Absolute 6.5 (H) 1.0 - 5.1 K/uL    Monocytes Absolute 3.4 (H) 0.0 - 1.3 K/uL    Eosinophils Absolute 0.3 0.0 - 0.6 K/uL    Basophils Absolute 0.2 0.0 - 0.2 K/uL   Comprehensive Metabolic Panel w/ Reflex to MG   Result Value Ref Range    Sodium 138 136 - 145 mmol/L    Potassium reflex Magnesium 4.1 3.5 - 5.1 mmol/L    Chloride 98 (L) 99 - 110 mmol/L    CO2 30 21 - 32 mmol/L    Anion Gap 10 3 - 16    Glucose 100 (H) 70 - 99 mg/dL    BUN 9 7 - 20 mg/dL    CREATININE 0.8 0.6 - 1.2 mg/dL    GFR Non-African American >60 >60    GFR African American >60 >60    Calcium 10.3 8.3 - 10.6 mg/dL    Total Protein 6.9 6.4 - 8.2 g/dL    Albumin 4.1 3.4 - 5.0 g/dL    Albumin/Globulin Ratio 1.5 1.1 - 2.2    Total Bilirubin 0.5 0.0 - 1.0 mg/dL    Alkaline Phosphatase 106 40 - 129 U/L    ALT 18 10 - 40 U/L    AST 18 15 - 37 U/L    Globulin 2.8 g/dL   Sample possible blood bank testing   Result Value Ref Range    Specimen Status MORENO    Troponin   Result Value Ref Range    Troponin 0.01 <0.01 ng/mL   POCT Glucose   Result Value Ref Range    POC Glucose 94 70 - 99 mg/dl    Performed on ACCU-CHEK    EKG 12 Lead   Result Value Ref Range    Ventricular Rate 93 BPM    Atrial Rate 93 BPM    P-R Interval 138 ms    QRS Duration 80 ms    Q-T Interval 346 ms    QTc Calculation (Bazett) 430 ms    P Axis 81 degrees    R Axis 78 degrees    T Axis 73 degrees    Diagnosis       Normal sinus rhythmNormal ECGWhen compared with ECG of 21-MAR-2021 13:56,ST no longer elevated in Anterior leads       EKG  The Ekg interpreted by myself  normal sinus rhythm with a rate of 93 BPM  Axis is   Normal  QTc is  normal  Intervals and Durations are unremarkable. No specific ST-T wave changes appreciated. No evidence of acute ischemia. No significant change from prior EKG dated 3/21/21      RADIOLOGY  X-RAYS:  I have reviewed radiologic plain film image(s).   ALL OTHER NON-PLAIN FILM IMAGES SUCH AS CT, ULTRASOUND AND MRI HAVE BEEN READ BY THE RADIOLOGIST. CT HEAD WO CONTRAST   Final Result   No acute intracranial abnormality. CTA HEAD NECK W CONTRAST    (Results Pending)            ED COURSE/MDM  Patient seen and evaluated. Old records reviewed. Labs and imaging reviewed and results discussed with patient. This is a 61-year-old female presents for evaluation of aphasia that has occurred over the past several days. She is outside of TPA window for intervention. Patient arrives with stable vital signs. She has mild aphasia that makes conversation difficult however she is able to be understood and is able to provide the majority of the history. On exam, patient has no focal logical deficits except for mild aphasia. She has a NIH stroke scale of 1 secondary to the mild aphasia. CT head was unremarkable for focal process. Laboratory evaluation reveals leukocytosis of 31. She recently had Crohn's colitis flare. She was tested negative for C. difficile at that time. She currently has a benign abdominal exam and I do not suspect intra-abdominal process. Patient will be signed out pending results of CT angiogram and I recommend admission for MRI brain for stroke evaluation. Patient is agreeable with this plan. Patient was given scripts for the following medications. I counseled patient how to take these medications. New Prescriptions    No medications on file       CLINICAL IMPRESSION  No diagnosis found. Blood pressure (!) 151/78, pulse 124, resp. rate 20, height 5' 4\" (1.626 m), weight 95 lb (43.1 kg), SpO2 96 %, not currently breastfeeding. Tresia Burkitt English was signed out to Dr. Colletta Ridge pending results of CT angiogram and admission.      Gavin Littlejohn MD  07/15/21 [id number]

## 2021-07-15 NOTE — ED PROVIDER NOTES
Patient was signed out to me by Dr. Prashanth Randhawa at  to follow-up on CT results and to admit the patient. Briefly, the patient reports having left-sided weakness to the arm and leg starting yesterday morning although worsened today along with increasing trouble with speech. She was just admitted to the hospital twice this month and stated that she was worried about returning to the hospital in case she would have to be readmitted which is why it took her until today to come to the ED. She denies any prior history of stroke. No chest pain or shortness of breath. Patient speech has started to improve while in the ED. She denies any headache or neck pain. No reported falls. No fever. Physical:   Gen: No acute distress. AOx3. Psych: Depressed mood and affect  HEENT: NCAT, EOMI, PERRL, MMM, no facial droop   Neck: supple, normal ROM, NTTP  Cardiac: RRR, pulses 2+ in upper extremities  Lungs: C2AB, no R/R/W  Abdomen: soft and nontender with no R/D/G  Neuro: Mild aphasia noted, 4+ out of 5 strength with leg extension into the left leg, no pronator drift bilaterally, normal sensation, no dysarthria, no extinction    The Ekg interpreted by me shows  normal sinus rhythm with a rate of 93  Axis is   Normal  QTc is  normal  Intervals and Durations are unremarkable. ST Segments: no acute change and nonspecific changes  No significant change from prior EKG dated - 3/21/21  No STEMI       MDM: Patient was evaluated due to developing trouble with speech starting yesterday morning along with increasing weakness on the left side of her body that acutely worsened today when she woke up but never returned to normal yesterday and symptoms being greater than 24 hours from the start without a return to baseline. Based on this, she was not a candidate for code stroke. She did report having some constipation concerns although abdominal exam was benign.   Due to concern for possible acute stroke, we did decide to admit the patient and I spoke to Dr. Edelmira Sheffield at 0188 for admission. Patient and  felt comfortable with this plan. In regards to her leukocytosis, she may benefit from hematology consultation.      Negro Valentin MD  07/15/21 [id number]    Impression: Acute left-sided weakness, aphasia, leukocytosis, constipation       Negro Valentin MD  07/15/21 [id number]

## 2021-07-16 ENCOUNTER — APPOINTMENT (OUTPATIENT)
Dept: MRI IMAGING | Age: 61
End: 2021-07-16
Payer: MEDICARE

## 2021-07-16 VITALS
DIASTOLIC BLOOD PRESSURE: 60 MMHG | OXYGEN SATURATION: 94 % | HEIGHT: 64 IN | BODY MASS INDEX: 16.22 KG/M2 | SYSTOLIC BLOOD PRESSURE: 97 MMHG | WEIGHT: 95 LBS | TEMPERATURE: 98.4 F | HEART RATE: 89 BPM | RESPIRATION RATE: 16 BRPM

## 2021-07-16 PROBLEM — E43 SEVERE MALNUTRITION (HCC): Status: ACTIVE | Noted: 2021-07-16

## 2021-07-16 LAB
ANION GAP SERPL CALCULATED.3IONS-SCNC: 8 MMOL/L (ref 3–16)
BASOPHILS ABSOLUTE: 0 K/UL (ref 0–0.2)
BASOPHILS RELATIVE PERCENT: 0 %
BILIRUBIN URINE: NEGATIVE
BLOOD, URINE: NEGATIVE
BUN BLDV-MCNC: 6 MG/DL (ref 7–20)
CALCIUM SERPL-MCNC: 9 MG/DL (ref 8.3–10.6)
CHLORIDE BLD-SCNC: 102 MMOL/L (ref 99–110)
CLARITY: CLEAR
CO2: 31 MMOL/L (ref 21–32)
COLOR: NORMAL
CREAT SERPL-MCNC: 0.6 MG/DL (ref 0.6–1.2)
EOSINOPHILS ABSOLUTE: 0.5 K/UL (ref 0–0.6)
EOSINOPHILS RELATIVE PERCENT: 3 %
GFR AFRICAN AMERICAN: >60
GFR NON-AFRICAN AMERICAN: >60
GLUCOSE BLD-MCNC: 100 MG/DL (ref 70–99)
GLUCOSE BLD-MCNC: 67 MG/DL (ref 70–99)
GLUCOSE URINE: NEGATIVE MG/DL
HCT VFR BLD CALC: 38.7 % (ref 36–48)
HEMATOLOGY PATH CONSULT: NO
HEMOGLOBIN: 12.8 G/DL (ref 12–16)
KETONES, URINE: NEGATIVE MG/DL
LEUKOCYTE ESTERASE, URINE: NEGATIVE
LYMPHOCYTES ABSOLUTE: 4.3 K/UL (ref 1–5.1)
LYMPHOCYTES RELATIVE PERCENT: 24 %
MACROCYTES: ABNORMAL
MCH RBC QN AUTO: 30.7 PG (ref 26–34)
MCHC RBC AUTO-ENTMCNC: 33.1 G/DL (ref 31–36)
MCV RBC AUTO: 92.6 FL (ref 80–100)
MICROSCOPIC EXAMINATION: NORMAL
MONOCYTES ABSOLUTE: 1.6 K/UL (ref 0–1.3)
MONOCYTES RELATIVE PERCENT: 9 %
NEUTROPHILS ABSOLUTE: 11.5 K/UL (ref 1.7–7.7)
NEUTROPHILS RELATIVE PERCENT: 64 %
NITRITE, URINE: NEGATIVE
PDW BLD-RTO: 14.3 % (ref 12.4–15.4)
PERFORMED ON: ABNORMAL
PH UA: 7 (ref 5–8)
PLATELET # BLD: 368 K/UL (ref 135–450)
PLATELET SLIDE REVIEW: ABNORMAL
PMV BLD AUTO: 8.6 FL (ref 5–10.5)
POTASSIUM REFLEX MAGNESIUM: 4.2 MMOL/L (ref 3.5–5.1)
PROTEIN UA: NEGATIVE MG/DL
RBC # BLD: 4.19 M/UL (ref 4–5.2)
SLIDE REVIEW: ABNORMAL
SODIUM BLD-SCNC: 141 MMOL/L (ref 136–145)
SPECIFIC GRAVITY UA: <=1.005 (ref 1–1.03)
TOXIC GRANULATION: PRESENT
URINE TYPE: NORMAL
UROBILINOGEN, URINE: 0.2 E.U./DL
WBC # BLD: 18 K/UL (ref 4–11)

## 2021-07-16 PROCEDURE — 97166 OT EVAL MOD COMPLEX 45 MIN: CPT

## 2021-07-16 PROCEDURE — 97116 GAIT TRAINING THERAPY: CPT

## 2021-07-16 PROCEDURE — 94640 AIRWAY INHALATION TREATMENT: CPT

## 2021-07-16 PROCEDURE — G0378 HOSPITAL OBSERVATION PER HR: HCPCS

## 2021-07-16 PROCEDURE — 2580000003 HC RX 258: Performed by: INTERNAL MEDICINE

## 2021-07-16 PROCEDURE — 6370000000 HC RX 637 (ALT 250 FOR IP): Performed by: INTERNAL MEDICINE

## 2021-07-16 PROCEDURE — 70553 MRI BRAIN STEM W/O & W/DYE: CPT

## 2021-07-16 PROCEDURE — 97162 PT EVAL MOD COMPLEX 30 MIN: CPT

## 2021-07-16 PROCEDURE — 36415 COLL VENOUS BLD VENIPUNCTURE: CPT

## 2021-07-16 PROCEDURE — 85025 COMPLETE CBC W/AUTO DIFF WBC: CPT

## 2021-07-16 PROCEDURE — 97530 THERAPEUTIC ACTIVITIES: CPT

## 2021-07-16 PROCEDURE — A9579 GAD-BASE MR CONTRAST NOS,1ML: HCPCS | Performed by: INTERNAL MEDICINE

## 2021-07-16 PROCEDURE — 80048 BASIC METABOLIC PNL TOTAL CA: CPT

## 2021-07-16 PROCEDURE — 81003 URINALYSIS AUTO W/O SCOPE: CPT

## 2021-07-16 PROCEDURE — 6360000004 HC RX CONTRAST MEDICATION: Performed by: INTERNAL MEDICINE

## 2021-07-16 RX ORDER — TRAMADOL HYDROCHLORIDE 50 MG/1
25 TABLET ORAL EVERY 4 HOURS PRN
Qty: 5 TABLET | Refills: 0 | Status: SHIPPED | OUTPATIENT
Start: 2021-07-16 | End: 2021-07-19

## 2021-07-16 RX ADMIN — Medication 10 ML: at 10:35

## 2021-07-16 RX ADMIN — PANTOPRAZOLE SODIUM 40 MG: 40 TABLET, DELAYED RELEASE ORAL at 10:34

## 2021-07-16 RX ADMIN — Medication 2 PUFF: at 08:19

## 2021-07-16 RX ADMIN — SUCRALFATE 1 G: 1 TABLET ORAL at 10:32

## 2021-07-16 RX ADMIN — LEVOTHYROXINE SODIUM 50 MCG: 0.05 TABLET ORAL at 06:41

## 2021-07-16 RX ADMIN — ASPIRIN 81 MG: 81 TABLET, CHEWABLE ORAL at 10:33

## 2021-07-16 RX ADMIN — TIOTROPIUM BROMIDE INHALATION SPRAY 2 PUFF: 3.12 SPRAY, METERED RESPIRATORY (INHALATION) at 08:19

## 2021-07-16 RX ADMIN — Medication 250 MG: at 10:33

## 2021-07-16 RX ADMIN — SUCRALFATE 1 G: 1 TABLET ORAL at 13:31

## 2021-07-16 RX ADMIN — FOLIC ACID 1 MG: 1 TABLET ORAL at 10:33

## 2021-07-16 RX ADMIN — GADOTERIDOL 8 ML: 279.3 INJECTION, SOLUTION INTRAVENOUS at 10:16

## 2021-07-16 RX ADMIN — DULOXETINE HYDROCHLORIDE 60 MG: 60 CAPSULE, DELAYED RELEASE ORAL at 10:32

## 2021-07-16 RX ADMIN — GABAPENTIN 300 MG: 300 CAPSULE ORAL at 13:31

## 2021-07-16 RX ADMIN — ATORVASTATIN CALCIUM 40 MG: 40 TABLET, FILM COATED ORAL at 10:34

## 2021-07-16 RX ADMIN — POLYETHYLENE GLYCOL 3350 17 G: 17 POWDER, FOR SOLUTION ORAL at 10:34

## 2021-07-16 RX ADMIN — GABAPENTIN 300 MG: 300 CAPSULE ORAL at 10:33

## 2021-07-16 ASSESSMENT — PAIN DESCRIPTION - FREQUENCY: FREQUENCY: INTERMITTENT

## 2021-07-16 ASSESSMENT — PAIN - FUNCTIONAL ASSESSMENT: PAIN_FUNCTIONAL_ASSESSMENT: PREVENTS OR INTERFERES SOME ACTIVE ACTIVITIES AND ADLS

## 2021-07-16 ASSESSMENT — PAIN DESCRIPTION - LOCATION
LOCATION: ABDOMEN
LOCATION: ABDOMEN

## 2021-07-16 ASSESSMENT — PAIN DESCRIPTION - DESCRIPTORS
DESCRIPTORS: ACHING
DESCRIPTORS: ACHING

## 2021-07-16 ASSESSMENT — PAIN DESCRIPTION - PROGRESSION: CLINICAL_PROGRESSION: NOT CHANGED

## 2021-07-16 ASSESSMENT — PAIN SCALES - GENERAL
PAINLEVEL_OUTOF10: 7
PAINLEVEL_OUTOF10: 7

## 2021-07-16 ASSESSMENT — PAIN DESCRIPTION - PAIN TYPE
TYPE: CHRONIC PAIN
TYPE: CHRONIC PAIN

## 2021-07-16 ASSESSMENT — PAIN DESCRIPTION - ONSET: ONSET: ON-GOING

## 2021-07-16 NOTE — PLAN OF CARE
Problem: Nutrition  Goal: Optimal nutrition therapy  Outcome: Ongoing  Note: Nutrition Problem #1: Severe malnutrition  Intervention: Food and/or Nutrient Delivery: Continue Current Diet, Start Oral Nutrition Supplement  Nutritional Goals:  Tolerate and consume 50% or more of meals and ONS this admission w/o further weight loss

## 2021-07-16 NOTE — PROGRESS NOTES
Physical Therapy    Facility/Department: [facility] - MED SURG[facility]THO  Initial Assessment/Treatment    NAME: Rachel Hicks  : 1960  MRN: [id number]    Date of Service: 2021    Discharge Recommendations:  IP Rehab   PT Equipment Recommendations  Other: Defer. If pt to return home will require RW. Assessment   Body structures, Functions, Activity limitations: Decreased functional mobility ; Decreased strength;Decreased endurance;Decreased sensation;Decreased balance;Decreased coordination  Assessment: Pt is a 62year old previously independent female with PMH including COPD, HTN, and back sx who presented 7/15 with difficulty speaking, LLE weakness and numbness. Of note, pt was recently admitted and treated for Chron's Colitis. She continues to have abdominal pain. Imaging was (-) for acute infarct. Per physician, pt reports she had been under a lot of stress lately and Conversion Disorder is a possibility. UPon evaluation, pt presented with impaired L-sided strength, sensation, coordination and proprioception. At baseline, she is independent without a device. She required mod A to ambulate with a walker due to LLE ataxia and 2 person assist to ambulate without AD this date. Pt would benefit from skilled PT while admitted to maximize functional independence and safety. Pt is a good candidate for IPR given current functional deficits, independence at baseline, motivation and ability to tolerate 3hrs therapy/day. Current barriers to discharge include: current assist level, high fall risk, and stairs to enter.   Treatment Diagnosis: Impaired balance and mobility  Prognosis: Good  Decision Making: Medium Complexity  PT Education: Goals;Transfer Training;Disease Specific Education;PT Role;Functional Mobility Training;Plan of Care;General Safety;Gait Training  Patient Education: Pt was educated regarding importance of participating in therapy in order to promote motor return and improved coordination - changed  Functional Pain Assessment: Prevents or interferes some active activities and ADLs  Non-Pharmaceutical Pain Intervention(s): Ambulation/Increased Activity; Emotional support;Repositioned  Vital Signs  Pulse: 89  Heart Rate Source: Monitor  BP: 97/60  BP Location: Left upper arm  Patient Position: Lying right side  Patient Currently in Pain: Yes  Oxygen Therapy  SpO2: 94 %  Pulse Oximeter Device Mode: Intermittent  O2 Device: None (Room air)       Orientation  Orientation  Overall Orientation Status: Within Functional Limits  Social/Functional History  Social/Functional History  Lives With: Spouse  Type of Home: House  Home Layout: One level  Home Access: Stairs to enter without rails  Entrance Stairs - Number of Steps: 2  Bathroom Shower/Tub: Tub/Shower unit  Bathroom Toilet: Standard  Bathroom Equipment: Shower chair  Home Equipment: 4 wheeled walker  ADL Assistance: Independent  Homemaking Assistance: Needs assistance (Split with spouse)  Ambulation Assistance: Independent  Transfer Assistance: Independent  Active : Yes  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 Leisure & Hobbies: Painting  Additional Comments: Denies falls. Objective          AROM RLE (degrees)  RLE AROM: WFL  AROM LLE (degrees)  LLE AROM : WFL  AROM RUE (degrees)  RUE AROM : WFL  AROM LUE (degrees)  LUE AROM : WFL  Strength RLE  Strength RLE: WFL  Comment: Grossly 5/5 in sitting  Strength LLE  Strength LLE: Exception  L Hip Flexion: 3+/5  L Knee Extension: 4/5  L Ankle Dorsiflexion: 4/5  Tone RLE  RLE Tone: Normotonic  Tone LLE  LLE Tone: Normotonic  Motor Control  Gross Motor?: Exceptions  Comments: Ataxic L foot placement  Coordination  Heel to Shin: Ataxic  Sensation  Overall Sensation Status: Impaired (REports numbness/tingling in LLE.  ABle to feel light touch 100% of time but reports \"different\" from RLE , though unable to describe further)  Bed mobility  Supine to Sit: Stand by assistance (Pt with 1 episode of L lateral LOB, able to recover without assist)  Sit to Supine: Unable to assess (Pt seated in recliner at end of session)  Transfers  Sit to Stand: Minimal Assistance  Stand to sit: Minimal Assistance  Comment: Immediately upon standing, pt with wide SAURAV with BLE shaking and L knee instability. Ambulation  Ambulation?: Yes  More Ambulation?: Yes  Ambulation 1  Surface: level tile  Device: Rolling Walker  Assistance: Moderate assistance  Quality of Gait: Pt ambulated with use of RW initially for safety. Demonstrates ataxic step and poor proprioception LLE, increased use of BUEs to offweight and avoid L knee buckling, unsteady at trunk requiring overall mod A. Also required assist to manage RW. Distance: 10ft  Ambulation 2  Surface - 2: level tile  Device 2:  (Hand held assist B)  Assistance 2: Moderate assistance;2 Person assistance  Quality of Gait 2: Without use of AD, pt required bilateral hand held assist to safely complete 5ft. Continues to demonstrate ataxic LLE step, poor proprioception, increased L knee instability with stance phase though no overt buckling, required second person for safety due to decreased balance without AD. Distance: 5ft     Balance  Posture: Fair  Sitting - Static: Good  Sitting - Dynamic: Good;-  Standing - Static: Poor;+  Standing - Dynamic: Poor;-        Plan   Plan  Times per week: 4-6x/wk  Times per day: Daily  Current Treatment Recommendations: Strengthening, Transfer Training, Endurance Training, Patient/Caregiver Education & Training, Balance Training, Gait Training, Safety Education & Training, Stair training, Functional Mobility Training  Safety Devices  Type of devices:  All fall risk precautions in place, Patient at risk for falls, Left in chair, Call light within reach, Chair alarm in place, Gait belt, Nurse notified  Restraints  Initially in place: No    AM-PAC Score  AM-PAC Inpatient Mobility Raw Score : 15 (07/16/21 1256)  AM-PAC Inpatient T-Scale Score : 39.45 (07/16/21 1256)  Mobility Inpatient CMS 0-100% Score: 57.7 (07/16/21 1256)  Mobility Inpatient CMS G-Code Modifier : CK (07/16/21 1256)          Goals  Short term goals  Time Frame for Short term goals: 1 week, 7/23/21 unless otherwise noted. Short term goal 1: Pt will perform sit<>stand transfer with LRAD and SBA  Short term goal 2: Pt will ambulate 50ft with LRAD and min A  Short term goal 3: Pt will ascend/descend 2 steps with hand rails and min A to simulate entry into home  Short term goal 4: By 7/19, pt will tolerate x 8 minutes dynamic balance training  Patient Goals   Patient goals : \"To be able to walk normal\"       Therapy Time   Individual Concurrent Group Co-treatment   Time In 1100         Time Out 1124         Minutes 24         Timed Code Treatment Minutes: 14 Minutes (10 minute evaluation)       Brigido Ocampo PT    If pt is unable to be seen after this session, please let this note serve as discharge summary. Please see case management note for discharge disposition. Thank you.

## 2021-07-16 NOTE — PROGRESS NOTES
Patient and family given discharge instructions. All questions and concerns were addressed. IV removed with no complications. Pt to  prescription at outpatient pharmacy. Patient wheeled to car with all patient belongings.

## 2021-07-16 NOTE — DISCHARGE SUMMARY
Hospital Medicine Discharge Summary    Patient ID: Saad Villanueva      Patient's PCP: Pete Rodriges DO    Admit Date: 7/15/2021     Discharge Date:   07/16/21     Admitting Physician: Kei Brunner MD     Discharge Physician: Bipin Fermin MD     Discharge Diagnoses: Active Hospital Problems    Diagnosis     Severe malnutrition ([address] [E43]     Left-sided weakness [R53.1]     Dysarthria [R47.1]        The patient was seen and examined on day of discharge and this discharge summary is in conjunction with any daily progress note from day of discharge. Hospital Course: The patient is a pleasant 62 Y F with a h/o COPD, HTN, and splenectomy a few years ago due to trauma. She recently presented here on 7/1 with hematochezia, and CT showed pancolitis. A colonoscopy was suggestive of crohn's colitis, but the biopsy of a small colonic ulcer at 20 cm only showed nonspecific findings. The patient was discharged with 5 days of prednisone and was supposed to f/u with GI, which she has yet to do. She hasn't really felt any better, still with generalized abdominal pain. She hasn't had a bowel movement since she left the hospital 5 days ago and she is feeling bloated. Starting on the AM of 7/14, the patient noticed that she was having trouble speaking. She couldn't find the right words to say and was stuttering intermittently. She also noticed her LLE was very weak, and that the whole left side of her body felt \"warm, numb, and tingly. \"  After waiting for after 24 hours, the symptoms persisted so she came to the ED to get checked out. Expressive aphasia, L hemiparesthesias (including the face), and LLE plegia  - CT head negative. MRI brain with and without contrast was unremarkable. - CTA head and neck without any significant stenoses. - recent TTE without source of emboli. No events on tele.  - PT/OT input is pending at the time of this note  - she is already on aspirin and statin.   - the patient says that she has been under lots of stress lately due to her colitis issues, and she thinks this might be contributing to her symptoms. Conversion disorder seems possible.     Recent colitis, thought to be crohn's colitis. - repeat abd/pelvic CT was reassuring  - cautious miralax for constipation     COPD. Inhaled bronchodilators. She has quit cigarettes, encouraged her to quit marijuana as well.     Hypothyroidism. Clinically euthyroid. Continue home dose of levothyroxine.     Chronic leukocytosis. Likely due to h/o splenectomy. Worse lately because of the recent steroids. No apparent infections. HTN. BP fairly controlled here without either of her meds. Will stop losartan upon discharge and resume metoprolol since BP was intermittently elevated. Patient's HR concerns her, especially when she ambulates. This tachycardia is likely due to COPD and muscular deconditioning. Physical Exam Performed:     BP 97/60   Pulse 89   Temp 98.4 °F (36.9 °C) (Oral)   Resp 16   Ht 5' 4\" (1.626 m)   Wt 95 lb (43.1 kg)   SpO2 94%   BMI 16.31 kg/m²       General appearance:  No apparent distress, appears stated age and cooperative. HEENT:  Normal cephalic, atraumatic without obvious deformity. Pupils equal, round, and reactive to light. Extra ocular muscles intact. Conjunctivae/corneas clear. Neck: Supple, with full range of motion. No jugular venous distention. Trachea midline. Respiratory:  Normal respiratory effort. Clear to auscultation, bilaterally without Rales/Wheezes/Rhonchi. Cardiovascular:  Regular rate and rhythm with normal S1/S2 without murmurs, rubs or gallops. Abdomen: Soft, mild diffuse tenderness, non-distended with normal bowel sounds. Musculoskeletal:  No clubbing, cyanosis or edema bilaterally. Full range of motion without deformity. Skin: Skin color, texture, turgor normal.  No rashes or lesions. Neurologic:  4/5 LLE weakness.   She does have some word-finding difficulty, but the severity of this waxes and wanes. Psychiatric:  Alert and oriented, thought content appropriate, normal insight. Anxious. Capillary Refill: Brisk,3 seconds, normal  Peripheral Pulses: +2 palpable, equal bilaterally       Labs: For convenience and continuity at follow-up the following most recent labs are provided:      CBC:    Lab Results   Component Value Date    WBC 18.0 07/16/2021    HGB 12.8 07/16/2021    HCT 38.7 07/16/2021     07/16/2021       Renal:    Lab Results   Component Value Date     07/16/2021    K 4.2 07/16/2021     07/16/2021    CO2 31 07/16/2021    BUN 6 07/16/2021    CREATININE 0.6 07/16/2021    CALCIUM 9.0 07/16/2021    PHOS 2.4 07/07/2021         Significant Diagnostic Studies    Radiology:   MRI BRAIN W WO CONTRAST   Final Result   1. Patient motion limits evaluation. 2. No convincing acute intracranial abnormality. No acute infarct. 3. Mild-to-moderate chronic microvascular ischemic changes. CT ABDOMEN PELVIS W IV CONTRAST Additional Contrast? Oral   Final Result   No acute finding in the abdomen and pelvis. XR CHEST PORTABLE   Final Result   No acute cardiopulmonary disease. Emphysematous changes. XR ABDOMEN (KUB) (SINGLE AP VIEW)   Final Result   Mild retained stool throughout the colon. Nonspecific bowel-gas pattern. CT HEAD WO CONTRAST   Final Result   No acute intracranial abnormality. CTA HEAD NECK W CONTRAST   Final Result   Unremarkable CTA of the head and neck. Consults:     IP CONSULT TO HOSPITALIST  IP CONSULT TO HOME CARE NEEDS    Disposition:  Home with HHC/PT/OT     Condition at Discharge: Stable    Discharge Instructions/Follow-up:  Follow up with PCP within 1-2 weeks. Follow up with endocrinology as planned. If your weakness and warm/tingly feelings persist, then follow up with Dr. Marzena Edwards in the neurology clinic.      Code Status:  Full Code     Activity: activity as tolerated    Diet: regular diet      Discharge Medications:     Current Discharge Medication List           Details   traMADol (ULTRAM) 50 MG tablet Take 0.5 tablets by mouth every 4 hours as needed for Pain for up to 3 days. Qty: 5 tablet, Refills: 0    Comments: Reduce doses taken as pain becomes manageable  Associated Diagnoses: Abdominal pain, unspecified abdominal location              Details   metoprolol succinate (TOPROL XL) 50 MG extended release tablet Take 50 mg by mouth daily      dicyclomine (BENTYL) 20 MG tablet Take 1 tablet by mouth 3 times daily (before meals)  Qty: 120 tablet, Refills: 3      sucralfate (CARAFATE) 1 GM tablet Take 1 g by mouth 4 times daily (after meals and at bedtime) Pt takes PRN      mirtazapine (REMERON) 15 MG tablet Take 15 mg by mouth nightly      TRELEGY ELLIPTA 100-62.5-25 MCG/INH AEPB INHALE 1 PUFF INTO THE LUNGS DAILY  Qty: 60 each, Refills: 5      atorvastatin (LIPITOR) 40 MG tablet Take 1 tablet by mouth daily  Qty: 90 tablet, Refills: 3      aspirin 81 MG chewable tablet Take 1 tablet by mouth daily  Qty: 30 tablet, Refills: 0      Cholecalciferol (VITAMIN D) 50 MCG (2000 UT) CAPS capsule Take by mouth daily       benzonatate (TESSALON) 100 MG capsule Take 100 mg by mouth 3 times daily as needed for Cough      albuterol sulfate  (90 Base) MCG/ACT inhaler Inhale 1 puff into the lungs as needed for Wheezing or Shortness of Breath  Qty: 1 Inhaler, Refills: 5    Associated Diagnoses: Moderate COPD (chronic obstructive pulmonary disease) ([facility])      pantoprazole (PROTONIX) 40 MG tablet Take 40 mg by mouth daily      FLORASTOR 250 MG capsule Take 250 mg by mouth daily      gabapentin (NEURONTIN) 300 MG capsule Take 1 capsule by mouth 4 times daily for 30 days.   Qty: 120 capsule, Refills: 0    Associated Diagnoses: Neuropathy of left lower extremity      aspirin-acetaminophen-caffeine (EXCEDRIN MIGRAINE) 250-250-65 MG per tablet Take 1 tablet by mouth every 6 hours as needed for Headaches      albuterol (PROVENTIL) (2.5 MG/3ML) 0.083% nebulizer solution Take 3 mLs by nebulization every 6 hours as needed for Wheezing  Qty: 120 each, Refills: 3    Associated Diagnoses: Moderate COPD (chronic obstructive pulmonary disease) ([facility])      DULoxetine (CYMBALTA) 60 MG extended release capsule Take 60 mg by mouth daily       folic acid (FOLVITE) 1 MG tablet Take 1 tablet by mouth daily  Qty: 30 tablet, Refills: 11    Associated Diagnoses: Ulcerative pancolitis with complication ([facility])      Acetaminophen (TYLENOL) 325 MG CAPS Take 975 mg by mouth as needed      levothyroxine (SYNTHROID) 50 MCG tablet Take 50 mcg by mouth Daily      ondansetron (ZOFRAN-ODT) 8 MG TBDP disintegrating tablet Place 8 mg under the tongue every 8 hours as needed for Nausea or Vomiting               Time Spent on discharge is more than 30 minutes in the examination, evaluation, counseling and review of medications and discharge plan. Signed:    Maral Aiken MD   7/16/2021      Thank you Víctor Carvalho DO for the opportunity to be involved in this patient's care. If you have any questions or concerns please feel free to contact me at [phone].

## 2021-07-16 NOTE — DISCHARGE INSTR - COC
(Boostrix, Adacel) 07/17/2020       Active Problems:  Patient Active Problem List   Diagnosis Code    Essential hypertension I10    Chest pain R07.9    Moderate COPD (chronic obstructive pulmonary disease) ([facility]) J44.9    Heterozygous alpha 1-antitrypsin deficiency ([facility] [address] E88.01    COPD exacerbation ([facility]) J44.1    Gastroesophageal reflux disease without esophagitis K21.9    Anxiety and depression F41.9, F32.9    Neuropathy of left lower extremity G57.92    Acquired hypothyroidism E03.9    COPD with acute exacerbation ([facility] [address] J44.1    Diarrhea R19.7    Adrenal nodule ([facility]) E27.8    Hypercalcemia E83.52    NSTEMI (non-ST elevated myocardial infarction) ([facility] [address] I21.4    Non-ischemic cardiomyopathy ([facility]) I42.8    Menopause Z78.0    Other osteoporosis without current pathological fracture M81.8    Ulcerative colitis with rectal bleeding ([facility]) K51.911    Moderate malnutrition ([facility] [address] E44.0    Abdominal pain R10.9    Left-sided weakness R53.1    Dysarthria R47.1    Severe malnutrition ([facility]) E43       Isolation/Infection:   Isolation            No Isolation          Patient Infection Status       Infection Onset Added Last Indicated Last Indicated By Review Planned Expiration Resolved Resolved By    None active    Resolved    C-diff Rule Out 07/08/21 07/08/21 07/08/21 Clostridium difficile toxin/antigen (Ordered)   07/16/21 Adelaida Hamilton RN    C-diff Rule Out 07/01/21 07/01/21 07/01/21 Gastrointestinal Panel, Molecular (Ordered)   07/01/21 Rule-Out Test Resulted    C-diff Rule Out 12/22/20 12/22/20 12/23/20 Clostridium difficile toxin/antigen (Ordered)   12/23/20 Rule-Out Test Resulted    COVID-19 Rule Out 12/22/20 12/22/20 12/22/20 COVID-19 (Ordered)   12/22/20 Rule-Out Test Resulted            Nurse Assessment:  Last Vital Signs: BP 97/60   Pulse 89   Temp 98.4 °F (36.9 °C) (Oral)   Resp 16   Ht 5' 4\" (1.626 m)   Wt 95 lb (43.1 kg)   SpO2 94%   BMI 16.31 kg/m²     Last documented pain score (0-10 Schedule:  · Phone:  · Fax:    / signature: {Esignature:[id number]:::0}    PHYSICIAN SECTION    Prognosis: Good    Condition at Discharge: Stable    Rehab Potential (if transferring to Rehab): Good    Recommended Labs or Other Treatments After Discharge: Follow up with PCP within 1-2 weeks. Follow up with endocrinology as planned. If your weakness and warm/tingly feelings persist, then follow up with Dr. Fabio Noe in the neurology clinic. Physician Certification: I certify the above information and transfer of Mariaelena Huffman  is necessary for the continuing treatment of the diagnosis listed and that she requires Home Care for less 30 days.      Update Admission H&P: No change in H&P    PHYSICIAN SIGNATURE:  Electronically signed by Isidro Jiménez MD on 7/16/21 at 12:30 PM EDT

## 2021-07-16 NOTE — PROGRESS NOTES
Occupational Therapy   Occupational Therapy Initial Assessment/Treatment   Date: 2021   Patient Name: Gustavo Wang  MRN: [id number]     : 1960    Date of Service: 2021    Discharge Recommendations:  IP Rehab       Assessment   Performance deficits / Impairments: Decreased functional mobility ; Decreased safe awareness;Decreased ADL status; Decreased strength;Decreased cognition;Decreased endurance;Decreased balance;Decreased sensation;Decreased high-level IADLs;Decreased coordination  Assessment: Pt [facility] to [facility] with L side weakness. Pt reports living at home with spouse, independent with all ADLs, transfers, and mobility prior to admission. Pt presents with the following deficits listed above. Pt requires MIN A x 2 during ambulation, pt demos poor navigation of RW, buckling of LLE and poor coordination on L side. Pt is functioning far from baseline level of function and would benefit from increased skilled OT in order to achieve therapy goals. Treatment Diagnosis: Weakness  Prognosis: Good  Decision Making: Medium Complexity  OT Education: OT Role;ADL Adaptive Strategies;Transfer Training;Orientation  REQUIRES OT FOLLOW UP: Yes  Activity Tolerance  Activity Tolerance: Patient Tolerated treatment well  Activity Tolerance: /64, HR 89, O2 94%  Safety Devices  Safety Devices in place: Yes  Type of devices: Left in chair;Chair alarm in place;Call light within reach;Nurse notified           Patient Diagnosis(es): The primary encounter diagnosis was Acute left-sided weakness. Diagnoses of Constipation, unspecified constipation type, Aphasia, Leukocytosis, unspecified type, and Abdominal pain, unspecified abdominal location were also pertinent to this visit. has a past medical history of Back pain, COPD (chronic obstructive pulmonary disease) ([facility]), Fatigue, Hypertension, Syncope and collapse, Thyroid disease, and Ulcerative colitis, unspecified, without complications ([facility] [address]    has a past surgical history that includes Splenectomy (2019); Tonsillectomy; back surgery; Hysterectomy, vaginal (2000); and Colonoscopy (N/A, 7/7/2021). Treatment Diagnosis: Weakness      Restrictions  Restrictions/Precautions  Restrictions/Precautions: Up as Tolerated  Position Activity Restriction  Other position/activity restrictions: Up with assist, fall risk    Subjective   General  Patient assessed for rehabilitation services?: Yes  Family / Caregiver Present: No  Patient Currently in Pain: Yes  Pain Assessment  Pain Assessment: 0-10  Pain Level: 7  Pain Type: Chronic pain  Pain Location: Abdomen  Pain Descriptors: Aching  Non-Pharmaceutical Pain Intervention(s): Ambulation/Increased Activity;Repositioned  Response to Pain Intervention: Patient Satisfied  Vital Signs  Patient Currently in Pain: Yes  Social/Functional History  Social/Functional History  Lives With: Spouse  Type of Home: House  Home Layout: One level  Home Access: Stairs to enter without rails  Entrance Stairs - Number of Steps: 2  Bathroom Shower/Tub: Tub/Shower unit  Bathroom Toilet: Standard  Bathroom Equipment: Shower chair  Home Equipment: 4 wheeled walker  ADL Assistance: Independent  Homemaking Assistance: Needs assistance (Split with spouse)  Ambulation Assistance: Independent  Transfer Assistance: Independent  Active : Yes  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 Leisure & Hobbies: Painting  Additional Comments: Denies falls. Objective        Orientation  Overall Orientation Status: Within Functional Limits     Balance  Sitting Balance: Stand by assistance (period of CGA upon pt scoot self to EOB, pt demos poor balance)  Standing Balance:  Moderate assistance (MIN A x 2 for maintaining balance with RW, pt was trialed without AD which pt demos poor balance and near LOB)  Standing Balance  Time: 2 minutes  Activity: transfers, ambulation within room  Comment: with and without AD  Functional Mobility  Functional - Mobility Device: Rolling Walker  Activity: Other (ambulation within room)  Assist Level: Moderate assistance (MIN  A x 2)  ADL  LE Dressing: Maximum assistance (demos poor coordination and balance in order to rosaline socks on feet)  Tone RUE  RUE Tone: Normotonic  Tone LUE  LUE Tone: Normotonic  Coordination  Movements Are Fluid And Coordinated: No  Coordination and Movement description: Fine motor impairments; Left UE;Decreased accuracy; Decreased speed        Transfers  Sit to stand: Minimal assistance  Stand to sit: Minimal assistance     Cognition  Overall Cognitive Status: Exceptions  Arousal/Alertness: Delayed responses to stimuli  Following Commands: Follows one step commands with increased time  Attention Span: Difficulty attending to directions  Memory: Appears intact  Safety Judgement: Decreased awareness of need for assistance;Decreased awareness of need for safety  Problem Solving: Assistance required to generate solutions;Assistance required to identify errors made  Insights: Decreased awareness of deficits  Initiation: Requires cues for some  Sequencing: Requires cues for some        Sensation  Overall Sensation Status: Impaired (REports numbness/tingling in LLE.  ABle to feel light touch 100% of time but reports \"different\" from RLE , though unable to describe further)        LUE PROM (degrees)  LUE PROM: WFL  LUE AROM (degrees)  LUE AROM : WFL  Left Hand PROM (degrees)  Left Hand PROM: WFL  Left Hand AROM (degrees)  Left Hand AROM: WFL  RUE PROM (degrees)  RUE PROM: WFL  RUE AROM (degrees)  RUE AROM : WFL  Right Hand PROM (degrees)  Right Hand PROM: WFL  Right Hand AROM (degrees)  Right Hand AROM: WFL  LUE Strength  Gross LUE Strength: WFL  RUE Strength  Gross RUE Strength: WFL                   Plan   Plan  Times per week: 4-6x  Current Treatment Recommendations: Strengthening, Balance Training, Functional Mobility Training, Endurance Training, Safety Education & Training      AM-PAC Score        AM-PAC Inpatient Daily Activity Raw Score: 17 (07/16/21 1446)  AM-PAC Inpatient ADL T-Scale Score : 37.26 (07/16/21 1446)  ADL Inpatient CMS 0-100% Score: 50.11 (07/16/21 1446)  ADL Inpatient CMS G-Code Modifier : CK (07/16/21 1446)    Goals  Short term goals  Time Frame for Short term goals: 7 days 7/23  Short term goal 1: Pt will complete toileting with SBA  Short term goal 2: Pt will complete grooming at sink with SBA  Short term goal 3: Pt will be able to manage transfers with CGA  Patient Goals   Patient goals :  To be independent with all ADLs       Therapy Time   Individual Concurrent Group Co-treatment   Time In 1035         Time Out 1100         Minutes 25         Timed Code Treatment Minutes: 15 Minutes (10 minutes for eval)       Steven Ellington, OT

## 2021-07-16 NOTE — PROGRESS NOTES
4 Eyes Skin Assessment     The patient is being assess for   Admission    I agree that 2 RN's have performed a thorough Head to Toe Skin Assessment on the patient. ALL assessment sites listed below have been assessed. Areas assessed for pressure by both nurses:   [x]   Head, Face, and Ears   [x]   Shoulders, Back, and Chest, Abdomen  [x]   Arms, Elbows, and Hands   [x]   Coccyx, Sacrum, and Ischium  [x]   Legs, Feet, and Heels        Scattered bruising    Skin Assessed Under all Medical Devices by both nurses:  N/A              All Mepilex Borders were peeled back and area peeked at by both nurses:  No: N/A  Please list where Mepilex Borders are located:  N/A             **SHARE this note so that the co-signing nurse is able to place an eSignature**    Co-signer eSignature: Electronically signed by William Freeman on 7/16/21 at 12:37 AM EDT    Does the Patient have Skin Breakdown related to pressure?   No          Narciso Prevention initiated:  No   Wound Care Orders initiated:  NA      [facility] nurse consulted for Pressure Injury (Stage 3,4, Unstageable, DTI, NWPT, Complex wounds)and New or Established Ostomies:  NA      Primary Nurse eSignature: Electronically signed by Bowen Diaz RN on 7/16/21 at 12:04 AM EDT uri

## 2021-07-16 NOTE — PLAN OF CARE
Problem: Pain:  Goal: Pain level will decrease  Description: Pain level will decrease  Outcome: Ongoing  Goal: Control of acute pain  Description: Control of acute pain  Outcome: Ongoing  Goal: Control of chronic pain  Description: Control of chronic pain  Outcome: Ongoing     Pt scoring pain on 0-10 scale. Pain medications given per MAR. Pt instructed to call out when pain level increasing. Call light within reach. Nurse will continue to reassess and monitor.

## 2021-07-16 NOTE — PROGRESS NOTES
Comprehensive Nutrition Assessment    Type and Reason for Visit:  Initial, RD Nutrition Re-Screen/LOS    Nutrition Recommendations/Plan:   1. Continue low fiber diet   2. Add Magic Cups with meals   3. Encourage PO intakes as tolerated   4. Monitor diet education needs. Consult dietitian if nutrition questions arise. 5. Will continue to monitor    Nutrition Assessment:  63 yo female admitted with left sided weakness and expressive aphasia. Hx of COPD, HTN and UC. Pt has had several recent admissions with abdominal pain. Severe malnutrition AEB decreased PO intakes and physical findings. Pt reports appetite improving since admission. C/o decreased taste sensation. Tolerated breakfast this AM. Assisted pt with ordering lunch this afternoon. Pt favorable to adding ONS to promote PO intakes this admission. No current nutrition questions, pt reports likely will have some when feeling better. Monitor nutrition education needs. Will continue to monitor. Malnutrition Assessment:  Malnutrition Status:  Severe malnutrition    Context:  Acute Illness     Findings of the 6 clinical characteristics of malnutrition:  Energy Intake:  7 - 50% or less of estimated energy requirements for 5 or more days  Body Fat Loss:  7 - Moderate body fat loss Orbital, Buccal region   Muscle Mass Loss:  7 - Moderate muscle mass loss Temples (temporalis), Clavicles (pectoralis & deltoids)    Estimated Daily Nutrient Needs:  Energy (kcal):  9256-5781 kcal; Weight Used for Energy Requirements:  Current (43 kg)     Protein (g):  65-78 g; Weight Used for Protein Requirements:  Current (1.5-1.8 g/kg)        Fluid (ml/day):   ; Method Used for Fluid Requirements:  1 ml/kcal      Nutrition Related Findings:  Appears frail. Active BS. Wounds:  None       Current Nutrition Therapies:    ADULT DIET;  Regular; Low Fiber    Anthropometric Measures:  · Height: 5' 4\" (162.6 cm)  · Current Body Weight: 95 lb (43.1 kg)   · Usual Body Weight: 101 lb (45.8 kg) (standing scale 3/21/21)     · Ideal Body Weight: 120 lbs; % Ideal Body Weight 79.2 %   · BMI: 16.3  · BMI Categories: Underweight (BMI less than 18.5)       Nutrition Diagnosis:   · Severe malnutrition related to inadequate protein-energy intake, altered GI function as evidenced by poor intake prior to admission, weight loss, moderate loss of subcutaneous fat, moderate muscle loss      Nutrition Interventions:   Food and/or Nutrient Delivery:  Continue Current Diet, Start Oral Nutrition Supplement  Nutrition Education/Counseling:  No recommendation at this time   Coordination of Nutrition Care:  Continue to monitor while inpatient    Goals:   Tolerate and consume 50% or more of meals and ONS this admission w/o further weight loss       Nutrition Monitoring and Evaluation:   Behavioral-Environmental Outcomes:  None Identified   Food/Nutrient Intake Outcomes:  Food and Nutrient Intake, Supplement Intake, Diet Advancement/Tolerance  Physical Signs/Symptoms Outcomes:  GI Status, Nutrition Focused Physical Findings, Weight     Discharge Planning:    Continue current diet, Continue Oral Nutrition Supplement     Electronically signed by Pieter Garcia, MS, RD, LD on 7/16/21 at 11:32 AM EDT    Contact: [phone]

## 2021-07-25 NOTE — DISCHARGE SUMMARY
Hospital Medicine Discharge Summary    Patient ID: Alyx Smith      Patient's PCP: Mary Saavedra DO    Admit Date: 7/1/2021     Discharge Date: 7/7/2021      Admitting Physician: Paul Brown MD     Discharge Physician: MARYCARMEN Rodrigez - CNP     Discharge Diagnoses: Active Hospital Problems    Diagnosis     Moderate malnutrition ([address] [E44.0]     Ulcerative colitis with rectal bleeding (HCC) [K51.911]     Acquired hypothyroidism [E03.9]     Moderate COPD (chronic obstructive pulmonary disease) (HCC) [J44.9]     Heterozygous alpha 1-antitrypsin deficiency ([address] [E88.01]     Essential hypertension [I10]        The patient was seen and examined on day of discharge and this discharge summary is in conjunction with any daily progress note from day of discharge. Hospital Course:     62 y.o. female who presented to [facility] ED. Patient was just discharged from the hospital yesterday afternoon after admission for severe ulcerative colitis flare with pancolitis. Selvin Yost had a colonoscopy performed yesterday and since she has been home she has been passing \"very foul gas\" been having regular cramping generalized and primarily left lower quadrant abdominal pain associated with no nausea or vomiting. The pt was insistent on leaving yesterday.  GI was consulted and she was readmitted for continued evaluation and treatment      Severe Ulcerative Colitis/pan colitis   - Followed and managed by GI   - currently lower dose steroids, Bentyl, PPI repeat C Dif since WBC in 30 suspect steroid induced    - Awaiting Bx result and Quantiferron before deciding on Biologics     Leukocytosis: suspect from steroids but has almost double since DC yesterday   - agree with repeat C dif - no further stools   - add florastor  - 7/9 WBC back down to 17 today perhaps elevation was contractual - reassuring      Moderate Malnutrition   - supplements and dietary education      GI bleed/hematochezia due to severe colitis. resolved. Continue to monitor H&H for acute blood loss anemia        -Acquired hypothyroidism-continue levothyroxine     -Essential HTN-controlled-continue amlodipine, losartan and metoprolol     -HLD-continue statin     -YFWX-mwoieq-sztujlhe inhaled steroids and bronchodilators      Chronic resp failure: stable. Pt on 2L chronically at baseline per pt which she remains on currently.          Labs: For convenience and continuity at follow-up the following most recent labs are provided:      CBC:    Lab Results   Component Value Date    WBC 18.0 07/16/2021    HGB 12.8 07/16/2021    HCT 38.7 07/16/2021     07/16/2021       Renal:    Lab Results   Component Value Date     07/16/2021    K 4.2 07/16/2021     07/16/2021    CO2 31 07/16/2021    BUN 6 07/16/2021    CREATININE 0.6 07/16/2021    CALCIUM 9.0 07/16/2021    PHOS 2.4 07/07/2021         Significant Diagnostic Studies    Radiology:   XR ABDOMEN (KUB) (SINGLE AP VIEW)   Final Result   Nonspecific, nonobstructive bowel gas pattern. CT ABDOMEN PELVIS W IV CONTRAST Additional Contrast? None   Final Result   Findings consistent with severe diffuse acute colitis.                 Consults:     IP CONSULT TO GI    Disposition:  Home     Condition at Discharge: Stable    Discharge Instructions/Follow-up:  Hem/ONC     Code Status:  Prior     Activity: activity as tolerated    Diet: regular diet      Discharge Medications:     Discharge Medication List as of 7/7/2021  2:42 PM           Details   predniSONE (DELTASONE) 20 MG tablet Take 1 tablet by mouth 2 times daily for 5 days, Disp-10 tablet, R-0Normal              Details   mirtazapine (REMERON) 15 MG tablet Take 15 mg by mouth nightlyHistorical Med      TRELEGY ELLIPTA 100-62.5-25 MCG/INH AEPB INHALE 1 PUFF INTO THE LUNGS DAILY, Disp-60 each, R-5Normal      atorvastatin (LIPITOR) 40 MG tablet Take 1 tablet by mouth daily, Disp-90 tablet, R-3Normal      losartan (COZAAR) 100 MG tablet Take 1 tablet by mouth daily, Disp-90 tablet, R-3Normal      amLODIPine (NORVASC) 5 MG tablet Take 1 tablet by mouth nightly, Disp-90 tablet, R-3Normal      metoprolol succinate (TOPROL XL) 50 MG extended release tablet Take 1 tablet by mouth daily, Disp-90 tablet, R-3Normal      aspirin 81 MG chewable tablet Take 1 tablet by mouth daily, Disp-30 tablet, R-0Normal      Cholecalciferol (VITAMIN D) 50 MCG (2000 UT) CAPS capsule Take by mouth Historical Med      benzonatate (TESSALON) 100 MG capsule Take 100 mg by mouth 3 times daily as needed for CoughHistorical Med      albuterol sulfate  (90 Base) MCG/ACT inhaler Inhale 1 puff into the lungs as needed for Wheezing or Shortness of Breath, Disp-1 Inhaler,R-5Normal      pantoprazole (PROTONIX) 40 MG tablet Take 40 mg by mouth dailyHistorical Med      FLORASTOR 250 MG capsule Take 250 mg by mouth daily, DAWHistorical Med      gabapentin (NEURONTIN) 300 MG capsule Take 1 capsule by mouth 4 times daily for 30 days. , Disp-120 capsule, R-0Normal      aspirin-acetaminophen-caffeine (EXCEDRIN MIGRAINE) 250-250-65 MG per tablet Take 1 tablet by mouth every 6 hours as needed for HeadachesHistorical Med      albuterol (PROVENTIL) (2.5 MG/3ML) 0.083% nebulizer solution Take 3 mLs by nebulization every 6 hours as needed for Wheezing, Disp-120 each, R-3Normal      DULoxetine (CYMBALTA) 60 MG extended release capsule Take 60 mg by mouth Historical Med      folic acid (FOLVITE) 1 MG tablet Take 1 tablet by mouth daily, Disp-30 tablet, R-11Normal      Acetaminophen (TYLENOL) 325 MG CAPS Take 975 mg by mouth as neededHistorical Med      levothyroxine (SYNTHROID) 50 MCG tablet Take 50 mcg by mouth DailyHistorical Med      ondansetron (ZOFRAN-ODT) 8 MG TBDP disintegrating tablet Place 8 mg under the tongue every 8 hours as needed for Nausea or VomitingHistorical Med             Time Spent on discharge is more than 30 minutes in the examination, evaluation, counseling and review of medications and discharge plan. Signed:    MARYCARMEN Mcgowan CNP   7/25/2021      Thank you Tony Galicia DO for the opportunity to be involved in this patient's care. If you have any questions or concerns please feel free to contact me at [phone].

## 2021-08-05 RX ORDER — AMLODIPINE BESYLATE 5 MG/1
5 TABLET ORAL DAILY
Qty: 90 TABLET | Refills: 3 | Status: SHIPPED | OUTPATIENT
Start: 2021-08-05

## 2021-08-05 RX ORDER — LOSARTAN POTASSIUM 100 MG/1
100 TABLET ORAL DAILY
Qty: 90 TABLET | Refills: 3 | Status: SHIPPED | OUTPATIENT
Start: 2021-08-05 | End: 2022-09-12

## 2021-08-05 NOTE — TELEPHONE ENCOUNTER
Pt stated her pharmacy told her all her cardiac medications had been \"suspended\". Her pharmacy is [facility] @ [phone]. Pt is completely out of Metoprolol Succinate 50 mg. Please call pt once this is figured out.

## 2021-09-01 ENCOUNTER — OFFICE VISIT (OUTPATIENT)
Dept: ENDOCRINOLOGY | Age: 61
End: 2021-09-01
Payer: MEDICARE

## 2021-09-01 VITALS
HEART RATE: 68 BPM | SYSTOLIC BLOOD PRESSURE: 140 MMHG | WEIGHT: 98 LBS | TEMPERATURE: 98 F | HEIGHT: 64 IN | OXYGEN SATURATION: 97 % | DIASTOLIC BLOOD PRESSURE: 77 MMHG | RESPIRATION RATE: 14 BRPM | BODY MASS INDEX: 16.73 KG/M2

## 2021-09-01 DIAGNOSIS — E83.52 HYPERCALCEMIA: ICD-10-CM

## 2021-09-01 DIAGNOSIS — E27.8 ADRENAL NODULE (HCC): ICD-10-CM

## 2021-09-01 DIAGNOSIS — E03.9 ACQUIRED HYPOTHYROIDISM: Primary | ICD-10-CM

## 2021-09-01 DIAGNOSIS — M81.8 OTHER OSTEOPOROSIS WITHOUT CURRENT PATHOLOGICAL FRACTURE: ICD-10-CM

## 2021-09-01 DIAGNOSIS — Z78.0 MENOPAUSE: ICD-10-CM

## 2021-09-01 DIAGNOSIS — I10 ESSENTIAL HYPERTENSION: ICD-10-CM

## 2021-09-01 PROCEDURE — 3017F COLORECTAL CA SCREEN DOC REV: CPT | Performed by: INTERNAL MEDICINE

## 2021-09-01 PROCEDURE — 99214 OFFICE O/P EST MOD 30 MIN: CPT | Performed by: INTERNAL MEDICINE

## 2021-09-01 PROCEDURE — G8427 DOCREV CUR MEDS BY ELIG CLIN: HCPCS | Performed by: INTERNAL MEDICINE

## 2021-09-01 PROCEDURE — G8419 CALC BMI OUT NRM PARAM NOF/U: HCPCS | Performed by: INTERNAL MEDICINE

## 2021-09-01 PROCEDURE — 1036F TOBACCO NON-USER: CPT | Performed by: INTERNAL MEDICINE

## 2021-09-01 RX ORDER — MESALAMINE 1.2 G/1
TABLET, DELAYED RELEASE ORAL
Status: ON HOLD | COMMUNITY
Start: 2021-08-04 | End: 2021-10-03 | Stop reason: ALTCHOICE

## 2021-09-01 RX ORDER — CHLORHEXIDINE GLUCONATE 0.12 MG/ML
RINSE ORAL
Status: ON HOLD | COMMUNITY
Start: 2021-08-17 | End: 2021-10-03 | Stop reason: ALTCHOICE

## 2021-09-01 RX ORDER — BUSPIRONE HYDROCHLORIDE 5 MG/1
TABLET ORAL
COMMUNITY
Start: 2021-06-11

## 2021-09-01 NOTE — PROGRESS NOTES
SUBJECTIVE:  Pat Castillo is a 62 y.o. female who is being evaluated for adrenal disease. H.    1. Acquired hypothyroidism  Hypothyroidism diagnosed in 2015  On levothyroxine 0.05 mg daily  TSH 2.25  Has fatigue    Current complaints: headaches, nausea, fatigue, weakness, left flank pain  Left flank pain all the time, 7 out of 10    History of obstructive symptoms: difficulty swallowing No, changes in voice/hoarseness Yes. History of radiation to patient's neck: No  Resent iodine exposure: No  Family history includes mother had thyroid abnormalities. Family history of thyroid cancer: No    2. Essential hypertension  This started in 2007. Patient history significant for hypertension, COPD, respiratory failure, ulcerative colitis, hypothyroidism, hyperlipidemia. Patient has been experiencing spells of headache, nausea, sweating and hypertension. Blood pressure was very high during the spells, in 200s. Patient is currently on Lozartan, amlodipine, metoprolol  Patient was hospitalized in September 2019 for chest pain and very high blood pressure, was seen and evaluated by cardiology. Plasma metanephrines and urine metanephrines are in the indeterminate range. Has headaches, severe, exedrin does not help, worse     3. Hypercalcemia  Calcium 10.7 in 2019  No kidney stones  No numbness or tingling    4. Adrenal nodule (HCC)  CT of abdomen in June 2019 showed 2 cm right adrenal adenoma with HU 52  MRI in 8/2019 showed 2 cm right adrenal adenoma consistent with benign adenoma  3/8/2021 CT adrenals  The adrenal glands are again noted for a lipid rich   adrenal adenoma on the right mildly increased in size measuring approximately   17 x 28 mm, previously 13 x 25 mm. Left flank pain all the time, 7 out of 10    5. Menopause  No hot flashes    6.   Osteoporosis  No personal history of fractures  Mother had osteoporosis  Father had hip replacement, but unclear what was the cause  No RA  No current smoking  On and   subcortical white matter as well as the lorene, which are nonspecific, but may   represent chronic microvascular ischemic change.   The ventricles and sulci   are normal in size and configuration.  The sellar/suprasellar regions appear   unremarkable.  The normal signal voids within the major intracranial vessels   appear maintained.  No abnormal focus of enhancement is seen within the brain.       ORBITS: The visualized portion of the orbits demonstrate no acute abnormality.       SINUSES: The visualized paranasal sinuses and mastoid air cells are well   aerated.       BONES/SOFT TISSUES: The bone marrow signal intensity appears normal. The soft   tissues demonstrate no acute abnormality.           Past Medical History:   Diagnosis Date    Back pain     COPD (chronic obstructive pulmonary disease) ([address]     Fatigue     Hypertension     Syncope and collapse 03/2019    Thyroid disease     Ulcerative colitis, unspecified, without complications ([address]      Patient Active Problem List    Diagnosis Date Noted    Severe malnutrition ([address] 07/16/2021    Left-sided weakness 07/15/2021    Dysarthria 07/15/2021    Abdominal pain 07/08/2021    Moderate malnutrition ([address] 07/02/2021    Ulcerative colitis with rectal bleeding ([address] 07/01/2021    Other osteoporosis without current pathological fracture 06/05/2021    Menopause 05/26/2021    Non-ischemic cardiomyopathy ([address]     NSTEMI (non-ST elevated myocardial infarction) ([address]     Adrenal nodule ([address] 03/01/2021    Hypercalcemia 03/01/2021    Diarrhea     COPD with acute exacerbation ([address] 12/22/2020    Gastroesophageal reflux disease without esophagitis 11/05/2020    Anxiety and depression 11/05/2020    Neuropathy of left lower extremity 11/05/2020    Acquired hypothyroidism 11/05/2020    COPD exacerbation ([address] 11/30/2019    Moderate COPD (chronic obstructive pulmonary disease) ([address] 10/10/2019    Heterozygous alpha 1-antitrypsin deficiency ([address] 10/10/2019    Essential hypertension 2019    Chest pain 2019     Past Surgical History:   Procedure Laterality Date    BACK SURGERY      L4-L5 rods in    COLONOSCOPY N/A 2021    [facility]      due to a fall    TONSILLECTOMY       Family History   Problem Relation Age of Onset    Heart Disease Father     High Blood Pressure Sister      Social History     Socioeconomic History    Marital status:      Spouse name: None    Number of children: None    Years of education: None    Highest education level: None   Occupational History    None   Tobacco Use    Smoking status: Former Smoker     Packs/day: 0.10     Years: 32.00     Pack years: 3.20     Types: Cigarettes     Quit date: 2020     Years since quittin.5    Smokeless tobacco: Never Used   Vaping Use    Vaping Use: Former    Substances: Always   Substance and Sexual Activity    Alcohol use: Never    Drug use: Yes     Types: Marijuana    Sexual activity: None   Other Topics Concern    None   Social History Narrative    None     Social Determinants of Health     Financial Resource Strain:     Difficulty of Paying Living Expenses:    Food Insecurity:     Worried About Running Out of Food in the Last Year:     Ran Out of Food in the Last Year:    Transportation Needs:     Lack of Transportation (Medical):      Lack of Transportation (Non-Medical):    Physical Activity:     Days of Exercise per Week:     Minutes of Exercise per Session:    Stress:     Feeling of Stress :    Social Connections:     Frequency of Communication with Friends and Family:     Frequency of Social Gatherings with Friends and Family:     Attends Ca[employer] Services:     Active Member of Clubs or Organizations:     Attends Club or Organization Meetings:     Marital Status:    Intimate Partner Violence:     Fear of Current or Ex-Partner:     Emotionally Abused:     Physically Abused:     Sexually Abused:      Current Outpatient Medications   Medication Sig Dispense Refill    busPIRone (BUSPAR) 5 MG tablet TAKE 1 TABLET BY MOUTH ONCE DAILY FOR 1 WEEK THEN TAKE 1 TABLET TWICE DAILY.  chlorhexidine (PERIDEX) 0.12 % solution       mesalamine (LIALDA) 1.2 g EC tablet TAKE ONE TABLET BY MOUTH TWICE DAILY      losartan (COZAAR) 100 MG tablet Take 1 tablet by mouth daily 90 tablet 3    amLODIPine (NORVASC) 5 MG tablet Take 1 tablet by mouth daily 90 tablet 3    metoprolol succinate (TOPROL XL) 50 MG extended release tablet Take 50 mg by mouth daily      mirtazapine (REMERON) 15 MG tablet Take 15 mg by mouth nightly      TRELEGY ELLIPTA 100-62.5-25 MCG/INH AEPB INHALE 1 PUFF INTO THE LUNGS DAILY 60 each 5    atorvastatin (LIPITOR) 40 MG tablet Take 1 tablet by mouth daily 90 tablet 3    Cholecalciferol (VITAMIN D) 50 MCG (2000 UT) CAPS capsule Take by mouth daily       benzonatate (TESSALON) 100 MG capsule Take 100 mg by mouth 3 times daily as needed for Cough      albuterol sulfate  (90 Base) MCG/ACT inhaler Inhale 1 puff into the lungs as needed for Wheezing or Shortness of Breath 1 Inhaler 5    pantoprazole (PROTONIX) 40 MG tablet Take 40 mg by mouth daily      FLORASTOR 250 MG capsule Take 250 mg by mouth daily      gabapentin (NEURONTIN) 300 MG capsule Take 1 capsule by mouth 4 times daily for 30 days.  120 capsule 0    aspirin-acetaminophen-caffeine (EXCEDRIN MIGRAINE) 250-250-65 MG per tablet Take 1 tablet by mouth every 6 hours as needed for Headaches      albuterol (PROVENTIL) (2.5 MG/3ML) 0.083% nebulizer solution Take 3 mLs by nebulization every 6 hours as needed for Wheezing 120 each 3    DULoxetine (CYMBALTA) 60 MG extended release capsule Take 60 mg by mouth daily       folic acid (FOLVITE) 1 MG tablet Take 1 tablet by mouth daily 30 tablet 11    Acetaminophen (TYLENOL) 325 MG CAPS Take rashes, no skin lesions, no itching, no dry skin  Neurological: no numbness, no tingling, has weakness, no confusion, has headaches, no dizziness, no fainting, no tremors, no decrease in memory, no balance problems  Psychiatric: no anxiety, no depression, has insomnia  Hematologic/Lymphatic: no tendency for easy bleeding, no swollen lymph nodes, no tendency for easy bruising  Immunology: no seasonal allergies, no frequent infections, no frequent illnesses  Endocrine: no temperature intolerance    BP (!) 140/77   Pulse 68   Temp 98 °F (36.7 °C)   Resp 14   Ht 5' 4\" (1.626 m)   Wt 98 lb (44.5 kg)   SpO2 97%   BMI 16.82 kg/m²    Wt Readings from Last 3 Encounters:   09/01/21 98 lb (44.5 kg)   07/15/21 95 lb (43.1 kg)   07/10/21 94 lb 11.2 oz (43 kg)     Body mass index is 16.82 kg/m².     OBJECTIVE:  Constitutional: no acute distress, well appearing and well nourished  Psychiatric: oriented to person, place and time, judgement and insight and normal, recent and remote memory and intact and mood and affect are normal  Skin: skin and subcutaneous tissue is normal without mass, normal turgor  Head and Face: examination of head and face revealed no abnormalities  Eyes: no lid or conjunctival swelling, erythema or discharge, pupils are normal, equal, round, reactive to light  Ears/Nose: external inspection of ears and nose revealed no abnormalities, hearing is grossly normal  Oropharynx/Mouth/Face: lips, tongue and gums are normal with no lesions, the voice quality was normal  Neck: neck is supple and symmetric, with midline trachea and no masses, thyroid is enlarged  Lymphatics: normal cervical lymph nodes, normal supraclavicular nodes  Pulmonary: no increased work of breathing or signs of respiratory distress, lungs are clear to auscultation  Cardiovascular: normal heart rate and rhythm, normal S1 and S2, no murmurs and pedal pulses and 2+ bilaterally, No edema  Abdomen: abdomen is soft, non-tender with no masses  Musculoskeletal: normal gait and station and exam of the digits and nails are normal  Neurological: normal coordination and normal general cortical function      Lab Review:    Lab Results   Component Value Date    WBC 18.0 07/16/2021    HGB 12.8 07/16/2021    HCT 38.7 07/16/2021    MCV 92.6 07/16/2021     07/16/2021     Lab Results   Component Value Date     07/16/2021    K 4.2 07/16/2021     07/16/2021    CO2 31 07/16/2021    BUN 6 07/16/2021    CREATININE 0.6 07/16/2021    GLUCOSE 67 07/16/2021    CALCIUM 9.0 07/16/2021    PROT 6.9 07/15/2021    LABALBU 4.1 07/15/2021    BILITOT 0.5 07/15/2021    ALKPHOS 106 07/15/2021    AST 18 07/15/2021    ALT 18 07/15/2021    LABGLOM >60 07/16/2021    GFRAA >60 07/16/2021    AGRATIO 1.5 07/15/2021    GLOB 2.8 07/15/2021     Lab Results   Component Value Date    TSH 1.81 04/02/2021    FT3 2.7 03/08/2021     Lab Results   Component Value Date    LABA1C 6.2 03/22/2021     Lab Results   Component Value Date    .2 03/22/2021     Lab Results   Component Value Date    CHOL 215 03/22/2021     Lab Results   Component Value Date    TRIG 71 03/22/2021     Lab Results   Component Value Date    HDL 79 03/22/2021     Lab Results   Component Value Date    LDLCALC 122 03/22/2021     Lab Results   Component Value Date    LABVLDL 14 03/22/2021    VLDL 19 04/11/2018     No results found for: [facility]  Lab Results   Component Value Date    LABMICR Not Indicated 07/16/2021     Lab Results   Component Value Date    VITD25 30.6 03/08/2021        ASSESSMENT/PLAN:  1. Acquired hypothyroidism  TSH 0.651.81  Continue levothyroxine 0.05 mg daily  - T3, Free; Future  - T4, Free; Future  - TSH without Reflex; Future    2. Essential hypertension  Metoprolol  - losartan (COZAAR) 50 MG tablet; Take 50 mg by mouth 2 times daily  - amlodipine  Free normetanephrine and plasma elevated 1.143.362.19  Aldosterone 10.3    3. Adrenal nodule ([address]     Patient is very concerned. She does not feel well. Saw  Endocrinologist, undergoing more testing. Patient will continue to follow with you see endocrinologist for evaluation and treatment. Repeated 24-hour dopamine, norepinephrine and epinephrine normal  Repeated 24-hour urine metanephrines and normetanephrines normal  24-hour urine cortisol 16.31, normal  ACTH less than 5, now 17  On inhaled steroid, but stopped for testing  Elevated free plasma normetanephrines 1.143.362.19  1 mg dexamethasone suppression test showed normal results in 10/2019          7/2021      CT abdomen  A 2.1 cm right adrenal adenoma. 5/4/2021 CT abdomen  Organs: The liver, spleen, adrenal glands, kidneys, and pancreas demonstrate   no acute abnormality. 4/26/2021 MRI abdomen  2.2 cm lipid rich benign right adrenal adenoma again demonstrated. Benign right adrenal adenoma again demonstrated, for which no imaging   follow-up is necessary. 3/8/2021 CT abdomen  The adrenal glands are again noted for a lipid rich   adrenal adenoma on the right mildly increased in size measuring approximately    17 x 28 mm, previously 13 x 25 mm. Lipid rich right adrenal adenoma mildly increased in size from the previous   exam of 04/10/2020.   4/10/2020 CT abdomen  2.5 cm low-density right adrenal mass.    Right adrenal adenoma  8/27/2019 CT abdomen  Stable   right adrenal adenoma. 8/12/2019 MRI abdomen  There is redemonstration of the previously described nodule in the right   adrenal gland.  This measures approximately 2.0 cm medial-lateral.  This   loses signal on out of phase imaging. , compatible with adenoma       4.  Hypercalcemia  Differential diagnosis includes primary hyperparathyroidism versus normocalcemic hyperparathyroidism   No persistent hypercalcemia at this time, though calcium is upper limit of normal  PTH 34.5  25 hydroxy vitamin D 30.6  Calcium 10.110.510.69.010.0, upper limit normal 10.6  Serum protein electrophoresis and immunofixation normal  24-hour urine calcium 165  24-hour urine sodium 42  - Calcium Ionized Serum; Future  - PTH, Intact; Future  - Phosphorus; Future  - Vitamin D 25 Hydroxy; Future  - Comprehensive Metabolic Panel; Future    5. Menopause  No hot flashes    6. Osteoporosis  Counseled patient on treatment options  No personal history of fractures  Mother had osteoporosis  Father had hip replacement, but unclear what was the cause  No RA  No current smoking  On inhaled steroid  Worst T score -3.6 in lumbar spine  Has abdominal pain  Has indigestation  Very sensitive stomach  Not on supplements  Not a candidate for oral biphosphonates    Reviewed and/or ordered clinical lab results Yes  Reviewed and/or ordered radiology tests Yes   Reviewed and/or ordered other diagnostic tests No  Discussed test results with performing physician No  Independently reviewed image, tracing, or specimen No  Made a decision to obtain old records No  Reviewed and summarized old records Yes   50-year-old female experiencing spells of headaches, sweating, high blood pressure  Found to have 2 cm adrenal adenoma  Plasma and urine metanephrines were found in indeterminate range  Blood pressure controlled on current medications  Aldosterone, renin, 1 mg dexamethasone suppression test showed normal results in 10/2019  Obtained history from other than patient No    Sergo Burns was counseled regarding symptoms of adrenal nodule, hypothyroidism, hypercalcemia diagnosis, course and complications of disease if inadequately treated, side effects of medications, diagnosis, treatment options, and prognosis, risks, benefits, complications, and alternatives of treatment, labs, imaging and other studies and treatment targets and goals. She understands instructions and counseling. Total time I spent for this encounter 30 minutes    No follow-ups on file.   Patient will continue to follow with you see endocrinologist for evaluation and treatment.     Electronically signed by Mary Benitez MD on 9/1/2021 at 1:50 PM

## 2021-09-15 ENCOUNTER — HOSPITAL ENCOUNTER (OUTPATIENT)
Dept: GENERAL RADIOLOGY | Age: 61
Discharge: HOME | End: 2021-09-15
Payer: MEDICARE

## 2021-09-15 ENCOUNTER — HOSPITAL ENCOUNTER (OUTPATIENT)
Age: 61
Discharge: HOME | End: 2021-09-15
Payer: MEDICARE

## 2021-09-15 DIAGNOSIS — R06.02 SHORTNESS OF BREATH: ICD-10-CM

## 2021-09-15 PROCEDURE — 71046 X-RAY EXAM CHEST 2 VIEWS: CPT

## 2021-10-02 ENCOUNTER — HOSPITAL ENCOUNTER (INPATIENT)
Age: 61
LOS: 2 days | Discharge: HOME | DRG: 189 | End: 2021-10-05
Attending: EMERGENCY MEDICINE | Admitting: INTERNAL MEDICINE
Payer: MEDICARE

## 2021-10-02 ENCOUNTER — APPOINTMENT (OUTPATIENT)
Dept: GENERAL RADIOLOGY | Age: 61
DRG: 189 | End: 2021-10-02
Payer: MEDICARE

## 2021-10-02 DIAGNOSIS — J96.21 ACUTE ON CHRONIC RESPIRATORY FAILURE WITH HYPOXIA AND HYPERCAPNIA (HCC): Primary | ICD-10-CM

## 2021-10-02 DIAGNOSIS — J96.22 ACUTE ON CHRONIC RESPIRATORY FAILURE WITH HYPOXIA AND HYPERCAPNIA (HCC): Primary | ICD-10-CM

## 2021-10-02 LAB
A/G RATIO: 2 (ref 1.1–2.2)
ALBUMIN SERPL-MCNC: 5.1 G/DL (ref 3.4–5)
ALP BLD-CCNC: 144 U/L (ref 40–129)
ALT SERPL-CCNC: 16 U/L (ref 10–40)
ANION GAP SERPL CALCULATED.3IONS-SCNC: 15 MMOL/L (ref 3–16)
ANISOCYTOSIS: ABNORMAL
AST SERPL-CCNC: 23 U/L (ref 15–37)
BANDED NEUTROPHILS RELATIVE PERCENT: 1 % (ref 0–7)
BASE EXCESS VENOUS: 2.6 MMOL/L (ref -3–3)
BASE EXCESS VENOUS: 3.2 MMOL/L (ref -3–3)
BASOPHILS ABSOLUTE: 0.5 K/UL (ref 0–0.2)
BASOPHILS RELATIVE PERCENT: 2 %
BILIRUB SERPL-MCNC: 0.8 MG/DL (ref 0–1)
BUN BLDV-MCNC: 8 MG/DL (ref 7–20)
CALCIUM SERPL-MCNC: 10.8 MG/DL (ref 8.3–10.6)
CARBOXYHEMOGLOBIN: 1 % (ref 0–1.5)
CARBOXYHEMOGLOBIN: 1.4 % (ref 0–1.5)
CHLORIDE BLD-SCNC: 97 MMOL/L (ref 99–110)
CO2: 27 MMOL/L (ref 21–32)
CREAT SERPL-MCNC: <0.5 MG/DL (ref 0.6–1.2)
EOSINOPHILS ABSOLUTE: 2.5 K/UL (ref 0–0.6)
EOSINOPHILS RELATIVE PERCENT: 11 %
GFR AFRICAN AMERICAN: >60
GFR NON-AFRICAN AMERICAN: >60
GLOBULIN: 2.5 G/DL
GLUCOSE BLD-MCNC: 139 MG/DL (ref 70–99)
HCO3 VENOUS: 29.6 MMOL/L (ref 23–29)
HCO3 VENOUS: 31.3 MMOL/L (ref 23–29)
HCT VFR BLD CALC: 44.9 % (ref 36–48)
HEMATOLOGY PATH CONSULT: YES
HEMOGLOBIN: 14.4 G/DL (ref 12–16)
LACTIC ACID: 0.9 MMOL/L (ref 0.4–2)
LYMPHOCYTES ABSOLUTE: 3 K/UL (ref 1–5.1)
LYMPHOCYTES RELATIVE PERCENT: 13 %
MCH RBC QN AUTO: 30.7 PG (ref 26–34)
MCHC RBC AUTO-ENTMCNC: 32 G/DL (ref 31–36)
MCV RBC AUTO: 96 FL (ref 80–100)
METHEMOGLOBIN VENOUS: 0.1 %
METHEMOGLOBIN VENOUS: 0.3 %
MONOCYTES ABSOLUTE: 1.4 K/UL (ref 0–1.3)
MONOCYTES RELATIVE PERCENT: 6 %
NEUTROPHILS ABSOLUTE: 15.6 K/UL (ref 1.7–7.7)
NEUTROPHILS RELATIVE PERCENT: 67 %
O2 SAT, VEN: 72 %
O2 SAT, VEN: 99 %
O2 THERAPY: ABNORMAL
O2 THERAPY: ABNORMAL
PCO2, VEN: 51.6 MMHG (ref 40–50)
PCO2, VEN: 66.5 MMHG (ref 40–50)
PDW BLD-RTO: 14.2 % (ref 12.4–15.4)
PH VENOUS: 7.29 (ref 7.35–7.45)
PH VENOUS: 7.38 (ref 7.35–7.45)
PLATELET # BLD: 420 K/UL (ref 135–450)
PLATELET SLIDE REVIEW: ADEQUATE
PMV BLD AUTO: 9.1 FL (ref 5–10.5)
PO2, VEN: 186.7 MMHG (ref 25–40)
PO2, VEN: 43 MMHG (ref 25–40)
POTASSIUM REFLEX MAGNESIUM: 4.2 MMOL/L (ref 3.5–5.1)
PRO-BNP: 154 PG/ML (ref 0–124)
PROCALCITONIN: 0.04 NG/ML (ref 0–0.15)
RBC # BLD: 4.68 M/UL (ref 4–5.2)
SARS-COV-2, NAAT: NOT DETECTED
SLIDE REVIEW: ABNORMAL
SODIUM BLD-SCNC: 139 MMOL/L (ref 136–145)
TCO2 CALC VENOUS: 31 MMOL/L
TCO2 CALC VENOUS: 33 MMOL/L
TOTAL PROTEIN: 7.6 G/DL (ref 6.4–8.2)
TROPONIN: <0.01 NG/ML
WBC # BLD: 23 K/UL (ref 4–11)

## 2021-10-02 PROCEDURE — 80053 COMPREHEN METABOLIC PANEL: CPT

## 2021-10-02 PROCEDURE — 83880 ASSAY OF NATRIURETIC PEPTIDE: CPT

## 2021-10-02 PROCEDURE — 94660 CPAP INITIATION&MGMT: CPT

## 2021-10-02 PROCEDURE — 85025 COMPLETE CBC W/AUTO DIFF WBC: CPT

## 2021-10-02 PROCEDURE — 71046 X-RAY EXAM CHEST 2 VIEWS: CPT

## 2021-10-02 PROCEDURE — 6360000002 HC RX W HCPCS: Performed by: EMERGENCY MEDICINE

## 2021-10-02 PROCEDURE — 83605 ASSAY OF LACTIC ACID: CPT

## 2021-10-02 PROCEDURE — 84145 PROCALCITONIN (PCT): CPT

## 2021-10-02 PROCEDURE — 93005 ELECTROCARDIOGRAM TRACING: CPT | Performed by: PHYSICIAN ASSISTANT

## 2021-10-02 PROCEDURE — 82803 BLOOD GASES ANY COMBINATION: CPT

## 2021-10-02 PROCEDURE — 84484 ASSAY OF TROPONIN QUANT: CPT

## 2021-10-02 PROCEDURE — 87635 SARS-COV-2 COVID-19 AMP PRB: CPT

## 2021-10-02 PROCEDURE — 6370000000 HC RX 637 (ALT 250 FOR IP): Performed by: EMERGENCY MEDICINE

## 2021-10-02 PROCEDURE — 94761 N-INVAS EAR/PLS OXIMETRY MLT: CPT

## 2021-10-02 PROCEDURE — 99285 EMERGENCY DEPT VISIT HI MDM: CPT

## 2021-10-02 PROCEDURE — 6360000002 HC RX W HCPCS: Performed by: PHYSICIAN ASSISTANT

## 2021-10-02 PROCEDURE — 96374 THER/PROPH/DIAG INJ IV PUSH: CPT

## 2021-10-02 PROCEDURE — 2700000000 HC OXYGEN THERAPY PER DAY

## 2021-10-02 RX ORDER — DEXAMETHASONE SODIUM PHOSPHATE 10 MG/ML
10 INJECTION INTRAMUSCULAR; INTRAVENOUS ONCE
Status: COMPLETED | OUTPATIENT
Start: 2021-10-02 | End: 2021-10-02

## 2021-10-02 RX ORDER — METHYLPREDNISOLONE SODIUM SUCCINATE 125 MG/2ML
60 INJECTION, POWDER, LYOPHILIZED, FOR SOLUTION INTRAMUSCULAR; INTRAVENOUS DAILY
Status: DISCONTINUED | OUTPATIENT
Start: 2021-10-02 | End: 2021-10-02

## 2021-10-02 RX ORDER — IPRATROPIUM BROMIDE AND ALBUTEROL SULFATE 2.5; .5 MG/3ML; MG/3ML
2 SOLUTION RESPIRATORY (INHALATION) ONCE
Status: COMPLETED | OUTPATIENT
Start: 2021-10-02 | End: 2021-10-02

## 2021-10-02 RX ORDER — IPRATROPIUM BROMIDE AND ALBUTEROL SULFATE 2.5; .5 MG/3ML; MG/3ML
1 SOLUTION RESPIRATORY (INHALATION) ONCE
Status: DISCONTINUED | OUTPATIENT
Start: 2021-10-02 | End: 2021-10-02

## 2021-10-02 RX ORDER — ALBUTEROL SULFATE 2.5 MG/3ML
2.5 SOLUTION RESPIRATORY (INHALATION) ONCE
Status: COMPLETED | OUTPATIENT
Start: 2021-10-02 | End: 2021-10-02

## 2021-10-02 RX ADMIN — IPRATROPIUM BROMIDE AND ALBUTEROL SULFATE 2 AMPULE: .5; 3 SOLUTION RESPIRATORY (INHALATION) at 18:19

## 2021-10-02 RX ADMIN — ALBUTEROL SULFATE 2.5 MG: 2.5 SOLUTION RESPIRATORY (INHALATION) at 18:18

## 2021-10-02 RX ADMIN — DEXAMETHASONE SODIUM PHOSPHATE 10 MG: 10 INJECTION INTRAMUSCULAR; INTRAVENOUS at 18:19

## 2021-10-02 ASSESSMENT — PAIN SCALES - GENERAL: PAINLEVEL_OUTOF10: 5

## 2021-10-02 NOTE — PROGRESS NOTES
10/02/21 1950   NIV Type   $NIV $Daily Charge   Skin Assessment Clean, dry, & intact   Skin Protection for O2 Device Yes   Location Nose   NIV Started/Stopped On   Equipment Type V60   Mode Bilevel   Mask Type Full face mask   Mask Size Small   Settings/Measurements   IPAP 12 cmH20   CPAP/EPAP 5 cmH2O   Rate Ordered 14   Resp 15   Insp Rise Time (%) 2 %   FiO2  40 %   I Time/ I Time % 0.9 s   Vt Exhaled 534 mL   Minute Volume 8.3 Liters   Mask Leak (lpm) 7 lpm   Comfort Level Good   Using Accessory Muscles No   SpO2 100   Alarm Settings   Alarms On Y   Press Low Alarm 5 cmH2O   High Pressure Alarm 35 cmH2O   Delay Alarm 20 sec(s)   Resp Rate Low Alarm 14   High Respiratory Rate 40 br/min

## 2021-10-02 NOTE — ED PROVIDER NOTES
[address] ED  EMERGENCY DEPARTMENT ENCOUNTER        Pt Name: Sujey Andino  MRN: [id number]  [place] 1960  Date of evaluation: 10/2/2021  Provider: ADITYA Garcia  PCP: Pilar Puente,     This patient was seen and evaluated by the attending physician Alexis Mattson MD.    [facility]       Chief Complaint   Patient presents with    Shortness of Breath     x 3 weeks. Hx COPD. 2.5L @baseline. HISTORY OF PRESENT ILLNESS   (Location/Symptom, Timing/Onset, Context/Setting, Quality, Duration, Modifying Factors, Severity)  Note limiting factors. Sujey Andino is a 62 y.o. female who presents for shortness of breath. ED Course as of Oct 02 1926   Sat Oct 02, 2021   1728 She notes anytime she moves in anyway she feels that she cannot breath. She was put on steroids and abx 2.5 weeks ago without improvement. She notes productive cough. No hemoptyis. s. She usually uses 2.5 L at home, currently is requiring 3.5. She notes with any ambulation her O2 sats drop and she feels lightheaded. She has been having chest pain for the last 4 days, it is tight and pressure. She has been using albuterol nebilizer q4 hours without impvement. She denies fevers. She has had congestion and runny nose. She notes some headaches no sore throat. She denies GI symptoms. She sees Dr. Michoacano Lynn. She has an appt with him on Tuesday but she came today because she felt she could not wait any longer. She turned up her O2 to 3 at home.       [CS]      ED Course User Index  [CS] ADITYA Garcia       Nursing Notes were all reviewed and agreed with or any disagreements were addressed  in the HPI. Pt was seen during the [place] pandemic. Appropriate PPE worn by ME during patient encounters. Pt seen during a time with constrained hospital bed capacity and other potential inpatient and outpatient resources were constrained due to the viral pandemic.      REVIEW OF SYSTEMS    (2-9 systems for level 4, 10 or more for level 5)     Review of Systems    Positives and Pertinent negatives as per HPI. Except as noted abovein the ROS, all other systems were reviewed and negative. PAST MEDICAL HISTORY     Past Medical History:   Diagnosis Date    Back pain     COPD (chronic obstructive pulmonary disease) [facility] [address]     Fatigue     Hypertension     Syncope and collapse 03/2019    Thyroid disease     Ulcerative colitis, unspecified, without complications ([facility] [address]          SURGICAL HISTORY     Past Surgical History:   Procedure Laterality Date    BACK SURGERY      L4-L5 rods in    COLONOSCOPY N/A 7/7/2021    COLONOSCOPY WITH BIOPSY performed by Marlo Velásquez MD at [address] [place] [address]  2019    due to a fall    TONSILLECTOMY           CURRENTMEDICATIONS       Previous Medications    ACETAMINOPHEN (TYLENOL) 325 MG CAPS    Take 975 mg by mouth as needed    ALBUTEROL (PROVENTIL) (2.5 MG/3ML) 0.083% NEBULIZER SOLUTION    Take 3 mLs by nebulization every 6 hours as needed for Wheezing    ALBUTEROL SULFATE  (90 BASE) MCG/ACT INHALER    Inhale 1 puff into the lungs as needed for Wheezing or Shortness of Breath    AMITRIPTYLINE HCL PO    Take by mouth    AMLODIPINE (NORVASC) 5 MG TABLET    Take 1 tablet by mouth daily    ASPIRIN 81 MG CHEWABLE TABLET    Take 1 tablet by mouth daily    ASPIRIN-ACETAMINOPHEN-CAFFEINE (EXCEDRIN MIGRAINE) 250-250-65 MG PER TABLET    Take 1 tablet by mouth every 6 hours as needed for Headaches    ATORVASTATIN (LIPITOR) 40 MG TABLET    Take 1 tablet by mouth daily    BENZONATATE (TESSALON) 100 MG CAPSULE    Take 100 mg by mouth 3 times daily as needed for Cough    BUSPIRONE (BUSPAR) 5 MG TABLET    TAKE 1 TABLET BY MOUTH ONCE DAILY FOR 1 WEEK THEN TAKE 1 TABLET TWICE DAILY.     CHLORHEXIDINE (PERIDEX) 0.12 % SOLUTION        CHOLECALCIFEROL (VITAMIN D) 50 MCG (2000 UT) CAPS CAPSULE    Take by mouth daily     DICYCLOMINE (BENTYL) 20 MG TABLET Take 1 tablet by mouth 3 times daily (before meals)    DULOXETINE (CYMBALTA) 60 MG EXTENDED RELEASE CAPSULE    Take 60 mg by mouth daily     FLORASTOR 250 MG CAPSULE    Take 250 mg by mouth daily    FOLIC ACID (FOLVITE) 1 MG TABLET    Take 1 tablet by mouth daily    GABAPENTIN (NEURONTIN) 300 MG CAPSULE    Take 1 capsule by mouth 4 times daily for 30 days. LEVOTHYROXINE (SYNTHROID) 50 MCG TABLET    Take 50 mcg by mouth Daily    LOSARTAN (COZAAR) 100 MG TABLET    Take 1 tablet by mouth daily    MESALAMINE (LIALDA) 1.2 G EC TABLET    TAKE ONE TABLET BY MOUTH TWICE DAILY    METOPROLOL SUCCINATE (TOPROL XL) 50 MG EXTENDED RELEASE TABLET    Take 50 mg by mouth daily    MIRTAZAPINE (REMERON) 15 MG TABLET    Take 15 mg by mouth nightly    ONDANSETRON (ZOFRAN-ODT) 8 MG TBDP DISINTEGRATING TABLET    Place 8 mg under the tongue every 8 hours as needed for Nausea or Vomiting    PANTOPRAZOLE (PROTONIX) 40 MG TABLET    Take 40 mg by mouth daily    SUCRALFATE (CARAFATE) 1 GM TABLET    Take 1 g by mouth 4 times daily (after meals and at bedtime) Pt takes PRN    TRELEGY ELLIPTA 100-62.5-25 MCG/INH AEPB    INHALE 1 PUFF INTO THE LUNGS DAILY         ALLERGIES     Patient has no known allergies.     FAMILYHISTORY       Family History   Problem Relation Age of Onset    Heart Disease Father     High Blood Pressure Sister           SOCIAL HISTORY       Social History     Socioeconomic History    Marital status:      Spouse name: Not on file    Number of children: Not on file    Years of education: Not on file    Highest education level: Not on file   Occupational History    Not on file   Tobacco Use    Smoking status: Former Smoker     Packs/day: 0.10     Years: 32.00     Pack years: 3.20     Types: Cigarettes     Quit date: 2020     Years since quittin.6    Smokeless tobacco: Never Used   Vaping Use    Vaping Use: Former    Substances: Always   Substance and Sexual Activity    Alcohol use: Never    Drug use: Yes     Types: Marijuana    Sexual activity: Not on file   Other Topics Concern    Not on file   Social History Narrative    Not on file     Social Determinants of Health     Financial Resource Strain:     Difficulty of Paying Living Expenses:    Food Insecurity:     Worried About Running Out of Food in the Last Year:     [address] in the Last Year:    Transportation Needs:     Lack of Transportation (Medical):  Lack of Transportation (Non-Medical):    Physical Activity:     Days of Exercise per Week:     Minutes of Exercise per Session:    Stress:     Feeling of Stress :    Social Connections:     Frequency of Communication with Friends and Family:     Frequency of Social Gatherings with Friends and Family:     Attends Jew Services:     Active Member of Clubs or Organizations:     Attends Club or Organization Meetings:     Marital Status:    Intimate Partner Violence:     Fear of Current or Ex-Partner:     Emotionally Abused:     Physically Abused:     Sexually Abused:        SCREENINGS             PHYSICAL EXAM    (up to 7 for level 4, 8 or more for level 5)     ED Triage Vitals [10/02/21 1716]   BP Temp Temp Source Pulse Resp SpO2 Height Weight   (!) 165/92 97 °F (36.1 °C) Oral 90 30 100 % 5' 4\" (1.626 m) 100 lb (45.4 kg)       Physical Exam  PHYSICAL EXAM  BP (!) 155/96   Pulse 90   Temp 97 °F (36.1 °C) (Oral)   Resp 28   Ht 5' 4\" (1.626 m)   Wt 100 lb (45.4 kg)   SpO2 100%   BMI 17.16 kg/m²   GENERAL APPEARANCE: Awake and alert. Cooperative. Cachectic adult female sitting upright in exam bed, she is nondiaphoretic, she is on 4 L nasal cannula with conversational dyspnea  HEAD: Normocephalic. Atraumatic. EYES: PERRL. EOM's grossly intact. ENT: Mucous membranes are moist, oropharynx nonerythematous nonedematous uvula midline. Lisa Stands NECK: Supple. Trachea midline. HEART: RRR. No murmurs.   Radial pulses 2+ symmetric, PT pulses 2+, symmetric  LUNGS: Diminished breath sounds, faint inspiratory and expiratory wheezes appreciated to upper lung fields, diffuse nonproductive cough, conversational dyspnea, supraclavicular retractions. ABDOMEN: Soft. Non-distended. Non-tender. No guarding or rebound. EXTREMITIES: No peripheral edema. Moves all extremities equally. All extremities neurovascularly intact. SKIN: Warm and dry. No acute rashes. NEUROLOGICAL: Alert and oriented. CN 2-9, 11, 12 intact. No gross facial drooping. Power intact to UE and LE, sensation intact x 4. No tremors or ataxia. Gait intact. PSYCHIATRIC: Normal mood and affect.     DIAGNOSTIC RESULTS   LABS:    Labs Reviewed   CBC WITH AUTO DIFFERENTIAL - Abnormal; Notable for the following components:       Result Value    WBC 23.0 (*)     Neutrophils Absolute 15.6 (*)     Monocytes Absolute 1.4 (*)     Eosinophils Absolute 2.5 (*)     Basophils Absolute 0.5 (*)     Anisocytosis 1+ (*)     All other components within normal limits    Narrative:     Performed at:  [place] [address],  [employer] [place] [employer]   Phone [phone]   COMPREHENSIVE METABOLIC PANEL W/ REFLEX TO MG FOR LOW K - Abnormal; Notable for the following components:    Chloride 97 (*)     Glucose 139 (*)     CREATININE <0.5 (*)     Calcium 10.8 (*)     Albumin 5.1 (*)     Alkaline Phosphatase 144 (*)     All other components within normal limits    Narrative:     Performed at:  [facility]) [place] [address],  [place]   Phone [phone]   BRAIN NATRIURETIC PEPTIDE - Abnormal; Notable for the following components:    Pro- (*)     All other components within normal limits    Narrative:     Performed at:  [place] [address],  [employer], [email or web address]   Phone [phone]   BLOOD GAS, VENOUS - Abnormal; Notable for the following components:    pH, Michael 7.291 (*)     pCO2, Michael 66.5 (*)     pO2, Michael 43.0 (*) HCO3, Venous 31.3 (*)     All other components within normal limits    Narrative:     Performed at:  [facility],  [place], [facility]   Phone [phone], RAPID    Narrative:     Performed at:  [facility],  [place], [facility]   Phone [phone]   TROPONIN    Narrative:     Performed at:  [place] [address],  [place], [facility]   Phone [phone]   LACTIC ACID, PLASMA    Narrative:     Performed at:  [facility],  [place], [facility]   Phone [phone]   PROCALCITONIN    Narrative:     Performed at:  [facility]) [facility],  [place], [facility]   Phone [phone]   BLOOD GAS, VENOUS       All other labs were within normal range or not returned as of this dictation. EKG: All EKG's are interpreted by the Emergency Department Physician who either signs orCo-signs this chart in the absence of a cardiologist.  Please see their note for interpretation of EKG. RADIOLOGY:   Non-plain film images such as CT, Ultrasound and MRI are read by the radiologist. Danica Bellow radiographic images are visualized andpreliminarily interpreted by the  ED Provider with the below findings:        Interpretation perthe Radiologist below, if available at the time of this note:    XR CHEST (2 VW)   Final Result   Stable chronic changes with no acute abnormality seen.            XR CHEST (2 VW)    Result Date: 10/2/2021  EXAMINATION: TWO XRAY VIEWS OF THE CHEST 10/2/2021 5:58 pm COMPARISON: 09/15/2021 HISTORY: ORDERING SYSTEM PROVIDED HISTORY: SOB TECHNOLOGIST PROVIDED HISTORY: Reason for exam:->SOB Reason for Exam: increased shortness of breath upon exertion Acuity: Unknown Type of Exam: Unknown FINDINGS: The heart is normal.  The pulmonary vessels are normal.  The lungs are mildly hyperinflated and emphysematous. There is mild linear scarring along the upper lobes and apices which is unchanged. No consolidation or effusion is seen. The bones are intact. Stable chronic changes with no acute abnormality seen. PROCEDURES   Unless otherwise noted below, none     Procedures    CRITICAL CARE TIME   The total critical care time spent while evaluating and treating this patient was 45 minutes. This excludes time spent doing separately billable procedures. This includes time at the bedside, data interpretation, medication management, obtaining critical history from collateral sources if the patient is unable to provide it directly, and physician consultation. Specifics of interventions taken and potentially life-threatening diagnostic considerations are listed above in the medical decision making.           CONSULTS:  IP CONSULT TO HOSPITALIST        EMERGENCY DEPARTMENT COURSE and DIFFERENTIALDIAGNOSIS/MDM:   Vitals:    Vitals:    10/02/21 1716 10/02/21 1800   BP: (!) 165/92 (!) 155/96   Pulse: 90 90   Resp: 30 28   Temp: 97 °F (36.1 °C)    TempSrc: Oral    SpO2: 100% 100%   Weight: 100 lb (45.4 kg)    Height: 5' 4\" (1.626 m)        Patient was given thefollowing medications:  Medications   albuterol (PROVENTIL) nebulizer solution 2.5 mg (2.5 mg Nebulization Given 10/2/21 1818)   dexamethasone (DECADRON) injection 10 mg (10 mg IntraVENous Given 10/2/21 1819)   ipratropium-albuterol (DUONEB) nebulizer solution 2 ampule (2 ampules Inhalation Given 10/2/21 1819)       PDMP Monitoring:    Last PDMP Surendra as Reviewed [facility]):  Review User Review Instant Review Result            Urine Drug Screenings (1 yr)     Drug screen multi urine  Collected: 6/11/2019 10:05 PM (Final result)    Narrative: Performed at:  [facility]) [address], [address] [place] [address]   Phone [phone]    Complete Results              Medication Contract and Consent for Opioid Use Documents Filed     Patient Documents       Type of Document Status Date Received Received By Description     Medication Contract Signed 11/5/2020 12:50 PM HOSEA Bernal SIGNED CONTROL SUBSTANCE AGREEMENT                MDM:   Patient seen and evaluated. Old records reviewed. Diagnostic testing reviewed and results discussed. 81-year-old female, presents with shortness of breath in context of known COPD. In acute respiratory distress, breathing treatments via nebulizer were administered and initiated. Blood gas reveals respiratory acidosis likely secondary to hyperventilation. Patient started on BiPAP. She has an acute leukocytosis however believe this is likely secondary to recent steroid use as chest x-ray is negative for sign of pneumonia, lactic and pro Estuardo within normal limits I have low concern for sepsis or acute toxicity or acute infection. Repeat Gas significantly improved hypercapnia. Bipap dc'd. She will be admitted for acute on chronic respiratory failure. All information including ED workup, results, treatment, diagnosis has been reviewed and discussed with ED attending physician and directly discussed with Hospitalist who is the admitting physician. Pt will be admitted in stable condition. Pt advised of admission and is in full agreement. FINAL IMPRESSION    Acute on chronic respiratory failure with hypercapnia and hypoxia      DISPOSITION/PLAN   DISPOSITION        PATIENT REFERREDTO:  No follow-up provider specified.     DISCHARGE MEDICATIONS:  New Prescriptions    No medications on file       DISCONTINUED MEDICATIONS:  Discontinued Medications    No medications on file              (Please note that portions ofthis note were completed with a voice recognition program.  Efforts were made to edit the dictations but occasionally words are mis-transcribed.)    Addison Bueno (electronically signed)        Addison Bueno  10/05/21 1455

## 2021-10-03 PROBLEM — J96.21 ACUTE ON CHRONIC RESPIRATORY FAILURE WITH HYPOXIA AND HYPERCAPNIA (HCC): Status: ACTIVE | Noted: 2021-10-03

## 2021-10-03 PROBLEM — I25.10 CORONARY ARTERY DISEASE INVOLVING NATIVE CORONARY ARTERY OF NATIVE HEART WITHOUT ANGINA PECTORIS: Status: ACTIVE | Noted: 2021-10-03

## 2021-10-03 PROBLEM — J96.22 ACUTE ON CHRONIC RESPIRATORY FAILURE WITH HYPOXIA AND HYPERCAPNIA (HCC): Status: ACTIVE | Noted: 2021-10-03

## 2021-10-03 LAB
A/G RATIO: 1.5 (ref 1.1–2.2)
ALBUMIN SERPL-MCNC: 4.6 G/DL (ref 3.4–5)
ALP BLD-CCNC: 132 U/L (ref 40–129)
ALT SERPL-CCNC: 14 U/L (ref 10–40)
ANION GAP SERPL CALCULATED.3IONS-SCNC: 13 MMOL/L (ref 3–16)
AST SERPL-CCNC: 20 U/L (ref 15–37)
BASOPHILS ABSOLUTE: 0.1 K/UL (ref 0–0.2)
BASOPHILS RELATIVE PERCENT: 0.6 %
BILIRUB SERPL-MCNC: 0.5 MG/DL (ref 0–1)
BUN BLDV-MCNC: 13 MG/DL (ref 7–20)
CALCIUM SERPL-MCNC: 10.5 MG/DL (ref 8.3–10.6)
CHLORIDE BLD-SCNC: 100 MMOL/L (ref 99–110)
CO2: 26 MMOL/L (ref 21–32)
CREAT SERPL-MCNC: <0.5 MG/DL (ref 0.6–1.2)
EKG ATRIAL RATE: 97 BPM
EKG DIAGNOSIS: NORMAL
EKG P AXIS: 88 DEGREES
EKG P-R INTERVAL: 208 MS
EKG Q-T INTERVAL: 358 MS
EKG QRS DURATION: 94 MS
EKG QTC CALCULATION (BAZETT): 454 MS
EKG R AXIS: 93 DEGREES
EKG T AXIS: 75 DEGREES
EKG VENTRICULAR RATE: 97 BPM
EOSINOPHILS ABSOLUTE: 0 K/UL (ref 0–0.6)
EOSINOPHILS RELATIVE PERCENT: 0.1 %
GFR AFRICAN AMERICAN: >60
GFR NON-AFRICAN AMERICAN: >60
GLOBULIN: 3.1 G/DL
GLUCOSE BLD-MCNC: 148 MG/DL (ref 70–99)
HCT VFR BLD CALC: 42.4 % (ref 36–48)
HEMOGLOBIN: 14 G/DL (ref 12–16)
LYMPHOCYTES ABSOLUTE: 1.2 K/UL (ref 1–5.1)
LYMPHOCYTES RELATIVE PERCENT: 9.9 %
MCH RBC QN AUTO: 31.5 PG (ref 26–34)
MCHC RBC AUTO-ENTMCNC: 32.9 G/DL (ref 31–36)
MCV RBC AUTO: 95.7 FL (ref 80–100)
MONOCYTES ABSOLUTE: 0.3 K/UL (ref 0–1.3)
MONOCYTES RELATIVE PERCENT: 2.8 %
NEUTROPHILS ABSOLUTE: 10.4 K/UL (ref 1.7–7.7)
NEUTROPHILS RELATIVE PERCENT: 86.6 %
PDW BLD-RTO: 14.6 % (ref 12.4–15.4)
PLATELET # BLD: 410 K/UL (ref 135–450)
PMV BLD AUTO: 9.1 FL (ref 5–10.5)
POTASSIUM REFLEX MAGNESIUM: 4.2 MMOL/L (ref 3.5–5.1)
RBC # BLD: 4.43 M/UL (ref 4–5.2)
SODIUM BLD-SCNC: 139 MMOL/L (ref 136–145)
TOTAL PROTEIN: 7.7 G/DL (ref 6.4–8.2)
TSH REFLEX: 0.21 UIU/ML (ref 0.27–4.2)
WBC # BLD: 12 K/UL (ref 4–11)

## 2021-10-03 PROCEDURE — 93010 ELECTROCARDIOGRAM REPORT: CPT | Performed by: INTERNAL MEDICINE

## 2021-10-03 PROCEDURE — 6360000002 HC RX W HCPCS: Performed by: INTERNAL MEDICINE

## 2021-10-03 PROCEDURE — 36415 COLL VENOUS BLD VENIPUNCTURE: CPT

## 2021-10-03 PROCEDURE — 2500000003 HC RX 250 WO HCPCS: Performed by: INTERNAL MEDICINE

## 2021-10-03 PROCEDURE — 80053 COMPREHEN METABOLIC PANEL: CPT

## 2021-10-03 PROCEDURE — 94640 AIRWAY INHALATION TREATMENT: CPT

## 2021-10-03 PROCEDURE — 94669 MECHANICAL CHEST WALL OSCILL: CPT

## 2021-10-03 PROCEDURE — 6370000000 HC RX 637 (ALT 250 FOR IP): Performed by: INTERNAL MEDICINE

## 2021-10-03 PROCEDURE — 2580000003 HC RX 258: Performed by: INTERNAL MEDICINE

## 2021-10-03 PROCEDURE — 99222 1ST HOSP IP/OBS MODERATE 55: CPT | Performed by: INTERNAL MEDICINE

## 2021-10-03 PROCEDURE — 85025 COMPLETE CBC W/AUTO DIFF WBC: CPT

## 2021-10-03 PROCEDURE — 2060000000 HC ICU INTERMEDIATE R&B

## 2021-10-03 PROCEDURE — 84439 ASSAY OF FREE THYROXINE: CPT

## 2021-10-03 PROCEDURE — 2700000000 HC OXYGEN THERAPY PER DAY

## 2021-10-03 PROCEDURE — 94761 N-INVAS EAR/PLS OXIMETRY MLT: CPT

## 2021-10-03 PROCEDURE — 84443 ASSAY THYROID STIM HORMONE: CPT

## 2021-10-03 PROCEDURE — 99223 1ST HOSP IP/OBS HIGH 75: CPT | Performed by: INTERNAL MEDICINE

## 2021-10-03 PROCEDURE — 84480 ASSAY TRIIODOTHYRONINE (T3): CPT

## 2021-10-03 RX ORDER — SODIUM CHLORIDE 9 MG/ML
25 INJECTION, SOLUTION INTRAVENOUS PRN
Status: DISCONTINUED | OUTPATIENT
Start: 2021-10-03 | End: 2021-10-05 | Stop reason: HOSPADM

## 2021-10-03 RX ORDER — AMLODIPINE BESYLATE 5 MG/1
5 TABLET ORAL DAILY
Status: DISCONTINUED | OUTPATIENT
Start: 2021-10-03 | End: 2021-10-05 | Stop reason: HOSPADM

## 2021-10-03 RX ORDER — MIRTAZAPINE 15 MG/1
15 TABLET, FILM COATED ORAL NIGHTLY
Status: DISCONTINUED | OUTPATIENT
Start: 2021-10-03 | End: 2021-10-05 | Stop reason: HOSPADM

## 2021-10-03 RX ORDER — ALBUTEROL SULFATE 2.5 MG/3ML
2.5 SOLUTION RESPIRATORY (INHALATION) EVERY 4 HOURS PRN
Status: DISCONTINUED | OUTPATIENT
Start: 2021-10-03 | End: 2021-10-05 | Stop reason: HOSPADM

## 2021-10-03 RX ORDER — LOSARTAN POTASSIUM 100 MG/1
100 TABLET ORAL DAILY
Status: DISCONTINUED | OUTPATIENT
Start: 2021-10-03 | End: 2021-10-05 | Stop reason: HOSPADM

## 2021-10-03 RX ORDER — ONDANSETRON 2 MG/ML
4 INJECTION INTRAMUSCULAR; INTRAVENOUS EVERY 6 HOURS PRN
Status: DISCONTINUED | OUTPATIENT
Start: 2021-10-03 | End: 2021-10-05 | Stop reason: HOSPADM

## 2021-10-03 RX ORDER — PREDNISONE 20 MG/1
40 TABLET ORAL
Status: DISCONTINUED | OUTPATIENT
Start: 2021-10-05 | End: 2021-10-05 | Stop reason: HOSPADM

## 2021-10-03 RX ORDER — SODIUM CHLORIDE 0.9 % (FLUSH) 0.9 %
10 SYRINGE (ML) INJECTION PRN
Status: DISCONTINUED | OUTPATIENT
Start: 2021-10-03 | End: 2021-10-05 | Stop reason: HOSPADM

## 2021-10-03 RX ORDER — ASPIRIN 81 MG/1
81 TABLET, CHEWABLE ORAL DAILY
Status: DISCONTINUED | OUTPATIENT
Start: 2021-10-03 | End: 2021-10-05 | Stop reason: HOSPADM

## 2021-10-03 RX ORDER — ATORVASTATIN CALCIUM 40 MG/1
40 TABLET, FILM COATED ORAL DAILY
Status: DISCONTINUED | OUTPATIENT
Start: 2021-10-03 | End: 2021-10-05 | Stop reason: HOSPADM

## 2021-10-03 RX ORDER — ONDANSETRON 4 MG/1
4 TABLET, ORALLY DISINTEGRATING ORAL EVERY 8 HOURS PRN
Status: DISCONTINUED | OUTPATIENT
Start: 2021-10-03 | End: 2021-10-05 | Stop reason: HOSPADM

## 2021-10-03 RX ORDER — SODIUM CHLORIDE 0.9 % (FLUSH) 0.9 %
10 SYRINGE (ML) INJECTION EVERY 12 HOURS SCHEDULED
Status: DISCONTINUED | OUTPATIENT
Start: 2021-10-03 | End: 2021-10-05 | Stop reason: HOSPADM

## 2021-10-03 RX ORDER — PANTOPRAZOLE SODIUM 40 MG/1
40 TABLET, DELAYED RELEASE ORAL DAILY
Status: DISCONTINUED | OUTPATIENT
Start: 2021-10-03 | End: 2021-10-05 | Stop reason: HOSPADM

## 2021-10-03 RX ORDER — SODIUM CHLORIDE 9 MG/ML
INJECTION, SOLUTION INTRAVENOUS CONTINUOUS
Status: DISCONTINUED | OUTPATIENT
Start: 2021-10-03 | End: 2021-10-04

## 2021-10-03 RX ORDER — FOLIC ACID 1 MG/1
1 TABLET ORAL DAILY
Status: DISCONTINUED | OUTPATIENT
Start: 2021-10-03 | End: 2021-10-05 | Stop reason: HOSPADM

## 2021-10-03 RX ORDER — ACETAMINOPHEN 325 MG/1
650 TABLET ORAL EVERY 6 HOURS PRN
Status: DISCONTINUED | OUTPATIENT
Start: 2021-10-03 | End: 2021-10-05 | Stop reason: HOSPADM

## 2021-10-03 RX ORDER — GUAIFENESIN 600 MG/1
600 TABLET, EXTENDED RELEASE ORAL 2 TIMES DAILY
Status: DISCONTINUED | OUTPATIENT
Start: 2021-10-03 | End: 2021-10-05 | Stop reason: HOSPADM

## 2021-10-03 RX ORDER — METOPROLOL SUCCINATE 50 MG/1
50 TABLET, EXTENDED RELEASE ORAL DAILY
Status: DISCONTINUED | OUTPATIENT
Start: 2021-10-03 | End: 2021-10-05 | Stop reason: HOSPADM

## 2021-10-03 RX ORDER — METHYLPREDNISOLONE SODIUM SUCCINATE 40 MG/ML
40 INJECTION, POWDER, LYOPHILIZED, FOR SOLUTION INTRAMUSCULAR; INTRAVENOUS EVERY 12 HOURS
Status: COMPLETED | OUTPATIENT
Start: 2021-10-03 | End: 2021-10-04

## 2021-10-03 RX ORDER — MESALAMINE 400 MG/1
400 CAPSULE, DELAYED RELEASE ORAL 3 TIMES DAILY
Status: DISCONTINUED | OUTPATIENT
Start: 2021-10-03 | End: 2021-10-05 | Stop reason: HOSPADM

## 2021-10-03 RX ORDER — GUAIFENESIN/DEXTROMETHORPHAN 100-10MG/5
5 SYRUP ORAL EVERY 4 HOURS PRN
Status: DISCONTINUED | OUTPATIENT
Start: 2021-10-03 | End: 2021-10-03

## 2021-10-03 RX ORDER — IPRATROPIUM BROMIDE AND ALBUTEROL SULFATE 2.5; .5 MG/3ML; MG/3ML
1 SOLUTION RESPIRATORY (INHALATION)
Status: DISCONTINUED | OUTPATIENT
Start: 2021-10-03 | End: 2021-10-05 | Stop reason: HOSPADM

## 2021-10-03 RX ORDER — POLYETHYLENE GLYCOL 3350 17 G/17G
17 POWDER, FOR SOLUTION ORAL DAILY PRN
Status: DISCONTINUED | OUTPATIENT
Start: 2021-10-03 | End: 2021-10-05 | Stop reason: HOSPADM

## 2021-10-03 RX ORDER — LEVOTHYROXINE SODIUM 0.05 MG/1
50 TABLET ORAL DAILY
Status: DISCONTINUED | OUTPATIENT
Start: 2021-10-03 | End: 2021-10-05 | Stop reason: HOSPADM

## 2021-10-03 RX ORDER — SACCHAROMYCES BOULARDII 250 MG
250 CAPSULE ORAL DAILY
Status: DISCONTINUED | OUTPATIENT
Start: 2021-10-03 | End: 2021-10-05 | Stop reason: HOSPADM

## 2021-10-03 RX ORDER — ACETAMINOPHEN 650 MG/1
650 SUPPOSITORY RECTAL EVERY 6 HOURS PRN
Status: DISCONTINUED | OUTPATIENT
Start: 2021-10-03 | End: 2021-10-05 | Stop reason: HOSPADM

## 2021-10-03 RX ORDER — BUSPIRONE HYDROCHLORIDE 5 MG/1
5 TABLET ORAL 2 TIMES DAILY
Status: DISCONTINUED | OUTPATIENT
Start: 2021-10-03 | End: 2021-10-05 | Stop reason: HOSPADM

## 2021-10-03 RX ORDER — BENZONATATE 100 MG/1
100 CAPSULE ORAL 3 TIMES DAILY PRN
Status: DISCONTINUED | OUTPATIENT
Start: 2021-10-03 | End: 2021-10-04

## 2021-10-03 RX ORDER — GABAPENTIN 300 MG/1
300 CAPSULE ORAL 4 TIMES DAILY
Status: DISCONTINUED | OUTPATIENT
Start: 2021-10-03 | End: 2021-10-05 | Stop reason: HOSPADM

## 2021-10-03 RX ORDER — DULOXETIN HYDROCHLORIDE 60 MG/1
60 CAPSULE, DELAYED RELEASE ORAL DAILY
Status: DISCONTINUED | OUTPATIENT
Start: 2021-10-03 | End: 2021-10-05 | Stop reason: HOSPADM

## 2021-10-03 RX ADMIN — METHYLPREDNISOLONE SODIUM SUCCINATE 40 MG: 40 INJECTION, POWDER, FOR SOLUTION INTRAMUSCULAR; INTRAVENOUS at 22:29

## 2021-10-03 RX ADMIN — RDII 250 MG CAPSULE 250 MG: at 09:00

## 2021-10-03 RX ADMIN — GUAIFENESIN 600 MG: 600 TABLET, EXTENDED RELEASE ORAL at 15:35

## 2021-10-03 RX ADMIN — BENZONATATE 100 MG: 100 CAPSULE ORAL at 22:35

## 2021-10-03 RX ADMIN — MESALAMINE 400 MG: 400 CAPSULE, DELAYED RELEASE ORAL at 22:29

## 2021-10-03 RX ADMIN — IPRATROPIUM BROMIDE AND ALBUTEROL SULFATE 1 AMPULE: .5; 2.5 SOLUTION RESPIRATORY (INHALATION) at 11:56

## 2021-10-03 RX ADMIN — LEVOTHYROXINE SODIUM 50 MCG: 50 TABLET ORAL at 06:15

## 2021-10-03 RX ADMIN — Medication 10 ML: at 22:30

## 2021-10-03 RX ADMIN — DOXYCYCLINE 100 MG: 100 INJECTION, POWDER, LYOPHILIZED, FOR SOLUTION INTRAVENOUS at 02:08

## 2021-10-03 RX ADMIN — BUSPIRONE HYDROCHLORIDE 5 MG: 5 TABLET ORAL at 09:01

## 2021-10-03 RX ADMIN — SODIUM CHLORIDE: 9 INJECTION, SOLUTION INTRAVENOUS at 15:35

## 2021-10-03 RX ADMIN — ENOXAPARIN SODIUM 40 MG: 40 INJECTION SUBCUTANEOUS at 09:01

## 2021-10-03 RX ADMIN — ASPIRIN 81 MG: 81 TABLET, CHEWABLE ORAL at 09:01

## 2021-10-03 RX ADMIN — GABAPENTIN 300 MG: 300 CAPSULE ORAL at 09:00

## 2021-10-03 RX ADMIN — GUAIFENESIN 600 MG: 600 TABLET, EXTENDED RELEASE ORAL at 22:29

## 2021-10-03 RX ADMIN — PANTOPRAZOLE SODIUM 40 MG: 40 TABLET, DELAYED RELEASE ORAL at 06:15

## 2021-10-03 RX ADMIN — DOXYCYCLINE 100 MG: 100 INJECTION, POWDER, LYOPHILIZED, FOR SOLUTION INTRAVENOUS at 12:27

## 2021-10-03 RX ADMIN — GABAPENTIN 300 MG: 300 CAPSULE ORAL at 17:34

## 2021-10-03 RX ADMIN — METHYLPREDNISOLONE SODIUM SUCCINATE 40 MG: 40 INJECTION, POWDER, FOR SOLUTION INTRAMUSCULAR; INTRAVENOUS at 09:00

## 2021-10-03 RX ADMIN — DULOXETINE HYDROCHLORIDE 60 MG: 60 CAPSULE, DELAYED RELEASE ORAL at 09:01

## 2021-10-03 RX ADMIN — GABAPENTIN 300 MG: 300 CAPSULE ORAL at 22:29

## 2021-10-03 RX ADMIN — GUAIFENESIN SYRUP AND DEXTROMETHORPHAN 5 ML: 100; 10 SYRUP ORAL at 09:12

## 2021-10-03 RX ADMIN — IPRATROPIUM BROMIDE AND ALBUTEROL SULFATE 1 AMPULE: .5; 2.5 SOLUTION RESPIRATORY (INHALATION) at 19:35

## 2021-10-03 RX ADMIN — MIRTAZAPINE 15 MG: 15 TABLET, FILM COATED ORAL at 02:09

## 2021-10-03 RX ADMIN — IPRATROPIUM BROMIDE AND ALBUTEROL SULFATE 1 AMPULE: .5; 2.5 SOLUTION RESPIRATORY (INHALATION) at 14:42

## 2021-10-03 RX ADMIN — LOSARTAN POTASSIUM 100 MG: 100 TABLET, FILM COATED ORAL at 09:00

## 2021-10-03 RX ADMIN — MESALAMINE 400 MG: 400 CAPSULE, DELAYED RELEASE ORAL at 15:35

## 2021-10-03 RX ADMIN — IPRATROPIUM BROMIDE AND ALBUTEROL SULFATE 1 AMPULE: .5; 2.5 SOLUTION RESPIRATORY (INHALATION) at 23:01

## 2021-10-03 RX ADMIN — GABAPENTIN 300 MG: 300 CAPSULE ORAL at 12:24

## 2021-10-03 RX ADMIN — MIRTAZAPINE 15 MG: 15 TABLET, FILM COATED ORAL at 22:29

## 2021-10-03 RX ADMIN — IPRATROPIUM BROMIDE AND ALBUTEROL SULFATE 1 AMPULE: .5; 2.5 SOLUTION RESPIRATORY (INHALATION) at 07:24

## 2021-10-03 RX ADMIN — AMLODIPINE BESYLATE 5 MG: 5 TABLET ORAL at 09:00

## 2021-10-03 RX ADMIN — SODIUM CHLORIDE: 9 INJECTION, SOLUTION INTRAVENOUS at 01:23

## 2021-10-03 RX ADMIN — METOPROLOL SUCCINATE 50 MG: 50 TABLET, EXTENDED RELEASE ORAL at 09:00

## 2021-10-03 RX ADMIN — FOLIC ACID 1 MG: 1 TABLET ORAL at 09:00

## 2021-10-03 RX ADMIN — ATORVASTATIN CALCIUM 40 MG: 40 TABLET, FILM COATED ORAL at 09:01

## 2021-10-03 RX ADMIN — BUSPIRONE HYDROCHLORIDE 5 MG: 5 TABLET ORAL at 22:29

## 2021-10-03 RX ADMIN — GABAPENTIN 300 MG: 300 CAPSULE ORAL at 02:09

## 2021-10-03 ASSESSMENT — PAIN SCALES - GENERAL
PAINLEVEL_OUTOF10: 0
PAINLEVEL_OUTOF10: 5
PAINLEVEL_OUTOF10: 0

## 2021-10-03 ASSESSMENT — PAIN DESCRIPTION - ONSET: ONSET: ON-GOING

## 2021-10-03 ASSESSMENT — PAIN DESCRIPTION - LOCATION: LOCATION: BACK

## 2021-10-03 ASSESSMENT — PAIN DESCRIPTION - FREQUENCY: FREQUENCY: CONTINUOUS

## 2021-10-03 ASSESSMENT — PAIN DESCRIPTION - ORIENTATION: ORIENTATION: MID;LOWER

## 2021-10-03 NOTE — H&P
History and Physical      Chief Complaint   Patient presents with    Shortness of Breath     x 3 weeks. Hx COPD. 2.5L @baseline. HISTORY OF PRESENT ILLNESS:     Patient is a pleasant 26-year-old white female with past medical history of COPD, chronic respiratory failure on 2 L of oxygen, hypertension, history of ulcerative colitis, history of back pain who came to emergency room with increasing shortness of breath. Patient has been sick for the last 2 to 3 weeks. She has had cough with some sputum which is brown in color. No hemoptysis. She has had wheezing. She was started on steroids by PCP and given antibiotics but did not feel better. At home she is on 2 L. At present she is requiring 4 L. In the emergency room she was placed on noninvasive ventilation. She improved. Unfortunately she continues to smoke. When I saw the patient she was feeling tired. She was still feeling short of breath. She had not had much sleep through the night. She denied any chest pain. Patient denies any GI symptoms. She has been vaccinated against COVID-19. Patient has No Known Allergies.     Past Medical History:   Diagnosis Date    Back pain     COPD (chronic obstructive pulmonary disease) (HCC)     Fatigue     Hypertension     Syncope and collapse 03/2019    Thyroid disease     Ulcerative colitis, unspecified, without complications ([facility] [address]        Past Surgical History:   Procedure Laterality Date    BACK SURGERY      L4-L5 rods in    COLONOSCOPY N/A 7/7/2021    COLONOSCOPY WITH BIOPSY performed by Severino Blanco MD at [address]  2019    due to a fall    TONSILLECTOMY         Scheduled Meds:   amLODIPine  5 mg Oral Daily    aspirin  81 mg Oral Daily    atorvastatin  40 mg Oral Daily    busPIRone  5 mg Oral BID    DULoxetine  60 mg Oral Daily    saccharomyces boulardii  250 mg Oral Daily    folic acid  1 mg Oral Daily    gabapentin 300 mg Oral 4x Daily    levothyroxine  50 mcg Oral Daily    losartan  100 mg Oral Daily    mesalamine  400 mg Oral TID    metoprolol succinate  50 mg Oral Daily    mirtazapine  15 mg Oral Nightly    pantoprazole  40 mg Oral Daily    sodium chloride flush  10 mL IntraVENous 2 times per day    enoxaparin  40 mg SubCUTAneous Daily    ipratropium-albuterol  1 ampule Inhalation Q4H WA    doxycycline (VIBRAMYCIN) IV  100 mg IntraVENous Q12H    methylPREDNISolone  40 mg IntraVENous Q12H    Followed by   [facility] ON 10/5/2021] predniSONE  40 mg Oral Daily with breakfast       Continuous Infusions:   sodium chloride 75 mL/hr at 10/03/21 0123    sodium chloride         PRN Meds:  benzonatate, guaiFENesin-dextromethorphan, sodium chloride flush, sodium chloride, ondansetron **OR** ondansetron, polyethylene glycol, acetaminophen **OR** acetaminophen, albuterol       reports that she has been smoking cigarettes. She has a 8.00 pack-year smoking history.  She has never used smokeless tobacco.    Family History   Problem Relation Age of Onset    Heart Disease Father     High Blood Pressure Sister        Social History     Socioeconomic History    Marital status:      Spouse name: None    Number of children: None    Years of education: None    Highest education level: None   Occupational History    None   Tobacco Use    Smoking status: Current Every Day Smoker     Packs/day: 0.25     Years: 32.00     Pack years: 8.00     Types: Cigarettes    Smokeless tobacco: Never Used   Vaping Use    Vaping Use: Former    Substances: Always   Substance and Sexual Activity    Alcohol use: Never    Drug use: Yes     Types: Marijuana    Sexual activity: None   Other Topics Concern    None   Social History Narrative    None     Social Determinants of Health     Financial Resource Strain:     Difficulty of Paying Living Expenses:    Food Insecurity:     Worried About Running Out of Food in the Last Year:     Ran Out of Food in the Last Year:    Transportation Needs:     Lack of Transportation (Medical):  Lack of Transportation (Non-Medical):    Physical Activity:     Days of Exercise per Week:     Minutes of Exercise per Session:    Stress:     Feeling of Stress :    Social Connections:     Frequency of Communication with Friends and Family:     Frequency of Social Gatherings with Friends and Family:     Attends Taoist Services:     Active Member of Clubs or Organizations:     Attends Club or Organization Meetings:     Marital Status:    Intimate Partner Violence:     Fear of Current or Ex-Partner:     Emotionally Abused:     Physically Abused:     Sexually Abused:      REVIEW OF SYSTEMS:   Constitutional: Negative for fever   HENT: Negative for sore throat   Eyes: Negative for redness   Respiratory: + for dyspnea, cough   Cardiovascular: Negative for chest pain   Gastrointestinal: Negative for vomiting, diarrhea   Genitourinary: Negative for hematuria   Musculoskeletal: Negative for arthralgias   Skin: Negative for rash   Neurological: Negative for syncope   Hematological: Negative for adenopathy   Psychiatric/Behavorial: Negative for anxiety    VS:   BP (!) 147/96   Pulse 100   Temp 97.4 °F (36.3 °C) (Oral)   Resp 19   Ht 5' 4\" (1.626 m)   Wt 93 lb 4.8 oz (42.3 kg)   SpO2 97%   BMI 16.01 kg/m²     Gen: She was sleepy when I saw her but woke up and answered all questions. She is in mild distress and ill-appearing  Eyes: PERRL. No sclera icterus. No conjunctival injection. ENT: No discharge. Pharynx clear. Neck: Trachea midline. Normal thyroid. Resp: + accessory muscle use. No crackles. Bilateral expiratory wheezes. No rhonchi. No dullness on percussion. CV: Regular rate. Regular rhythm. No murmur or rub. No edema. GI: Non-tender. Non-distended. No masses. No organomegaly. Normal bowel sounds. No hernia. Skin: Warm and dry. No nodule on exposed extremities.  No rash on exposed extremities. Lymph: No cervical LAD. No supraclavicular LAD. M/S: No cyanosis. No joint deformity. No clubbing. Neuro: Sleepy but wakes up to answer questions. Reflexes 2+ symmetric bilaterally. Moves all 4 extremities, non focal  Psych: Oriented x 3. No anxiety or agitation. CBC:   Recent Labs     10/02/21  1815 10/03/21  0440   WBC 23.0* 12.0*   HGB 14.4 14.0   HCT 44.9 42.4   MCV 96.0 95.7    410     BMP:   Recent Labs     10/02/21  1815 10/03/21  0440    139   K 4.2 4.2   CL 97* 100   CO2 27 26   BUN 8 13   CREATININE <0.5* <0.5*     LIVER PROFILE:   Recent Labs     10/02/21  1815 10/03/21  0440   AST 23 20   ALT 16 14   BILITOT 0.8 0.5   ALKPHOS 144* 132*     Results for Lalo Cadena (MRN [id number]) as of 10/3/2021 09:40   Ref. Range 10/2/2021 18:20   SARS-CoV-2, NAAT Latest Ref Range: Not Detected  Not Detected       XR CHEST (2 VW)   Final Result   Stable chronic changes with no acute abnormality seen. ASSESSMENT:    Principal Problem:    Acute on chronic respiratory failure with hypoxia and hypercapnia (HCC)  Active Problems:    Essential hypertension    Heterozygous alpha 1-antitrypsin deficiency (HCC)    COPD exacerbation (HCC)    Gastroesophageal reflux disease without esophagitis    Anxiety and depression    Acquired hypothyroidism    Nonischemic cardiomyopathy (HCC)    Severe malnutrition (HCC)  Resolved Problems:    * No resolved hospital problems. *      PLAN:    #Acute on chronic respiratory failure. Patient came emergency room with cough and shortness of breath. She was treated with antibiotics and steroids as an outpatient. She did not improve. She usually uses 2 L of oxygen at home but is on 4 L of oxygen. She is tachypneic and ill-appearing. She is admitted to hospital.  Supplemental oxygen as needed. Use BiPAP if needed. She was on BiPAP in the ER. Pulmonary consultation. #Acute exacerbation of COPD. Use handheld nebulizers and steroids.   IV doxycycline. COVID-19 negative. She has heterozygous alpha-1 antitrypsin deficiency. #Acquired hypothyroidism. Continue Synthroid 50 mcg daily. #History of adrenal nodule. Stable. #Nonischemic cardiomyopathy. Appears to be  stable. No chest pain. #Hypertension. Continue home medications. These include losartan, Norvasc and metoprolol. #History of ulcerative colitis. Continue with mesalamine. #Counseled to stop smoking. #Severe protein calorie malnutrition. Use supplements as needed. #Continue Remeron for anxiety and depression. #GERD on Protonix. Lovenox for DVT prophylaxis. IMPORTANT: Please note that some portions of this note may have been created using Dragon voice recognition software. Some \"sound-alike\" and totally wrong word substitutions may have taken place due to known inherent limitations of any such software, including this voice recognition software. In spite of efforts to eliminate such errors, some may not have been corrected. So please read the note with this in mind and recognize such mistakes and understand the correct version using the  context. If there are still uncertainties in the mind of the medical provider reading this note about any aspect of the note, the provider can feel free to contact me. Thanks.        Malachi Cronin MD 10/3/2021 9:36 AM

## 2021-10-03 NOTE — PROGRESS NOTES
RESPIRATORY THERAPY ASSESSMENT    Name:  Candace Curtis  Medical Record Number:  [id number]  Age: 62 y.o. Gender: female  : 1960  Today's Date:  10/3/2021  Room:  /0322-01    Assessment     Is the patient being admitted for a COPD or Asthma exacerbation? No   (If yes the patient will be seen every 4 hours for the first 24 hours and then reassessed)    Patient Admission Diagnosis      Allergies  No Known Allergies    Minimum Predicted Vital Capacity:               Actual Vital Capacity:                    Pulmonary History:COPD  Home Oxygen Therapy:  3 liters/min via nasal cannula  Home Respiratory Therapy:Albuterol and trelegy   Current Respiratory Therapy:  duoneb Q4WA          Respiratory Severity Index(RSI)   Patients with orders for inhalation medications, oxygen, or any therapeutic treatment modality will be placed on Respiratory Protocol. They will be assessed with the first treatment and at least every 72 hours thereafter. The following severity scale will be used to determine frequency of treatment intervention.     Smoking History: Mild Exacerbation = 3    Social History  Social History     Tobacco Use    Smoking status: Current Every Day Smoker     Packs/day: 0.25     Years: 32.00     Pack years: 8.00     Types: Cigarettes    Smokeless tobacco: Never Used   Vaping Use    Vaping Use: Former    Substances: Always   Substance Use Topics    Alcohol use: Never    Drug use: Yes     Types: Marijuana       Recent Surgical History: None = 0  Past Surgical History  Past Surgical History:   Procedure Laterality Date    BACK SURGERY      L4-L5 rods in    COLONOSCOPY N/A 2021    COLONOSCOPY WITH BIOPSY performed by Delmer Scott MD at [address] [place] [address]  2019    due to a fall    TONSILLECTOMY         Level of Consciousness: Alert, Oriented, and Cooperative = 0    Level of Activity: Mostly sedentary, minimal walking = 2    Respiratory Pattern: Dyspnea with exertion;Irregular pattern;or RR less than 6 = 2    Breath Sounds: Diminshed bilaterally and/or crackles = 2    Sputum   ,  ,    Cough: Strong, spontaneous, non-productive = 0    Vital Signs   /84   Pulse 87   Temp 97.4 °F (36.3 °C) (Oral)   Resp 22   Ht 5' 4\" (1.626 m)   Wt 93 lb 4.8 oz (42.3 kg)   SpO2 97%   BMI 16.01 kg/m²   SPO2 (COPD values may differ): 88-89% on room air or greater than 92% on FiO2 28- 35% = 2    Peak Flow (asthma only): not applicable = 0    RSI: 71-97 = Q6H or QID and Q4HPRN for dyspnea        Plan       Goals: medication delivery and improve oxygenation    Patient/caregiver was educated on the proper method of use for Respiratory Care Devices:  Yes      Level of patient/caregiver understanding able to:   ? Verbalize understanding   ? Demonstrate understanding       ? Teach back        ? Needs reinforcement       ? No available caregiver               ? Other:     Response to education:  Very Good     Is patient being placed on Home Treatment Regimen? Yes     Does the patient have everything they need prior to discharge? NA     Comments: patient chart reviewed, patient assessed. Plan of Care: continue patient on duoneb Q4WA    Electronically signed by Tamara Rizvi RCP on 10/3/2021 at 2:54 AM    Respiratory Protocol Guidelines     1. Assessment and treatment by Respiratory Therapy will be initiated for medication and therapeutic interventions upon initiation of aerosolized medication. 2. Physician will be contacted for respiratory rate (RR) greater than 35 breaths per minute. Therapy will be held for heart rate (HR) greater than 140 beats per minute, pending direction from physician. 3. Bronchodilators will be administered via Metered Dose Inhaler (MDI) with spacer when the following criteria are met:  a. Alert and cooperative     b. HR < 140 bpm  c. RR < 30 bpm                d. Can demonstrate a 2-3 second inspiratory hold  4.  Bronchodilators will be administered via Hand Held Nebulizer [facility]) to patients when ANY of the following criteria are met  a. Incognizant or uncooperative          b. Patients treated with HHN at Home        c. Unable to demonstrate proper use of MDI with spacer     d. RR > 30 bpm   5. Bronchodilators will be delivered via Metered Dose Inhaler (MDI), HHN, Aerogen to intubated patients on mechanical ventilation. 6. Inhalation medication orders will be delivered and/or substituted as outlined below. Aerosolized Medications Ordering and Administration Guidelines:    1. All Medications will be ordered by a physician, and their frequency and/or modality will be adjusted as defined by the patients Respiratory Severity Index (RSI) score. 2. If the patient does not have documented COPD, consider discontinuing anticholinergics when RSI is less than 9.  3. If the bronchospasm worsens (increased RSI), then the bronchodilator frequency can be increased to a maximum of every 4 hours. If greater than every 4 hours is required, the physician will be contacted. 4. If the bronchospasm improves, the frequency of the bronchodilator can be decreased, based on the patient's RSI, but not less than home treatment regimen frequency. 5. Bronchodilator(s) will be discontinued if patient has a RSI less than 9 and has received no scheduled or as needed treatment for 72  Hrs. Patients Ordered on a Mucolytic Agent:    1. Must always be administered with a bronchodilator. 2. Discontinue if patient experiences worsened bronchospasm, or secretions have lessened to the point that the patient is able to clear them with a cough. Anti-inflammatory and Combination Medications:    1. If the patient lacks prior history of lung disease, is not using inhaled anti-inflammatory medication at home, and lacks wheezing by examination or by history for at least 24 hours, contact physician for possible discontinuation.

## 2021-10-03 NOTE — PLAN OF CARE
63 yo F w/ hx of COPD p/w SOB. Initially on NIV but weaned off in ED. On 2.5L at home. Currently on 3L. Pt of Dr. Mary Lawton. She has recently been on steroids/ABX w/o improvement. Acute on chr resp fail w/ hypoxia and hypercapnia  AeCOPD  Leukocytosis.

## 2021-10-03 NOTE — ED NOTES
Bi-pap d/c'd per order and pt placed back on home level of 3LO2 via NC.       Joy Curiel RN  10/02/21 [id number]

## 2021-10-03 NOTE — CONSULTS
succinate  50 mg Oral Daily    mirtazapine  15 mg Oral Nightly    pantoprazole  40 mg Oral Daily    sodium chloride flush  10 mL IntraVENous 2 times per day    enoxaparin  40 mg SubCUTAneous Daily    ipratropium-albuterol  1 ampule Inhalation Q4H WA    doxycycline (VIBRAMYCIN) IV  100 mg IntraVENous Q12H    methylPREDNISolone  40 mg IntraVENous Q12H    Followed by   Jetty Porch ON 10/5/2021] predniSONE  40 mg Oral Daily with breakfast     Continuous Infusions:   sodium chloride 75 mL/hr at 10/03/21 0123    sodium chloride       PRN Meds:  benzonatate, guaiFENesin-dextromethorphan, sodium chloride flush, sodium chloride, ondansetron **OR** ondansetron, polyethylene glycol, acetaminophen **OR** acetaminophen, albuterol    ALLERGIES:  Patient has No Known Allergies. REVIEW OF SYSTEMS:  Constitutional: Negative for fever  HENT: Negative for sore throat  Eyes: Negative for redness   Respiratory:+ dyspnea, cough  Cardiovascular: Negative for chest pain  Gastrointestinal: Negative for vomiting, diarrhea   Genitourinary: Negative for hematuria   Musculoskeletal: Negative for arthralgias   Skin: Negative for rash  Neurological: Negative for syncope  Hematological: Negative for adenopathy  Psychiatric/Behavorial: Negative for anxiety    PHYSICAL EXAM:  Blood pressure 127/84, pulse 87, temperature 97.4 °F (36.3 °C), temperature source Oral, resp. rate 18, height 5' 4\" (1.626 m), weight 93 lb 4.8 oz (42.3 kg), SpO2 99 %, not currently breastfeeding.' on 3 L  Gen: + Distress. Ill-appearing  Eyes: PERRL. No sclera icterus. No conjunctival injection. ENT: No discharge. Pharynx clear. Neck: Trachea midline. No obvious mass. Resp: + Accessory muscle use. No crackles. Bilateral wheezes. No rhonchi. No dullness on percussion. CV: Regular rate. Regular rhythm. No murmur or rub. No edema. GI: Non-tender. Non-distended. No hernia. Skin: Warm and dry. No nodule on exposed extremities. Lymph: No cervical LAD.  No supraclavicular LAD. M/S: No cyanosis. No joint deformity. No clubbing. Neuro: Awake. Alert. Moves all four extremities. Psych: Oriented x 3. No anxiety. LABS:  CBC:   Recent Labs     10/02/21  1815 10/03/21  0440   WBC 23.0* 12.0*   HGB 14.4 14.0   HCT 44.9 42.4   MCV 96.0 95.7    410     BMP:   Recent Labs     10/02/21  1815 10/03/21  0440    139   K 4.2 4.2   CL 97* 100   CO2 27 26   BUN 8 13   CREATININE <0.5* <0.5*     LIVER PROFILE:   Recent Labs     10/02/21  1815 10/03/21  0440   AST 23 20   ALT 16 14   BILITOT 0.8 0.5   ALKPHOS 144* 132*     PT/INR: No results for input(s): PROTIME, INR in the last 72 hours. APTT: No results for input(s): APTT in the last 72 hours. UA:No results for input(s): NITRITE, COLORU, PHUR, LABCAST, WBCUA, RBCUA, MUCUS, TRICHOMONAS, YEAST, BACTERIA, CLARITYU, SPECGRAV, LEUKOCYTESUR, UROBILINOGEN, BILIRUBINUR, BLOODU, GLUCOSEU, AMORPHOUS in the last 72 hours. Invalid input(s): KETONESU  No results for input(s): PHART, RLC4XNP, PO2ART in the last 72 hours. Chest x-ray 10/2 imaging was reviewed by me and showed  Stable chronic changes with no acute abnormality seen      ASSESSMENT:  · Acute hypoxemic hypercapnic respiratory failure  · Moderate COPD with acute exacerbation  · Tracheobronchitis  · Leukocytosis-uses steroids at home  · Heterozygous alpha-1 antitrypsin deficiency    PLAN:  Supplemental oxygen to maintain SaO2 >92%; wean as tolerated  Intensive inhaled bronchodilator therapy  IV solumedrol 40 mg IV Q12 hrs.  Plan to switch to oral prednisone taper when improved  Doxycycline  Acapella and Mucinex  Smoking cessation counseling

## 2021-10-03 NOTE — PROGRESS NOTES
Patient admitted to room 322 from ED. Patient oriented to room, call light, bed rails, phone, lights and bathroom. Patient instructed about the schedule of the day including: vital sign frequency, lab draws, possible tests, frequency of MD and staff rounds, daily weights, I &O's and prescribed diet. no bed alarm in place, patient aware of placement and reason. Telemetry box in place, patient aware of placement and reason. Bed locked, in lowest position, side rails up 2/4, call light within reach. Recliner Assessment  Patient is able to demonstrate the ability to move from a reclining position to an upright position within the recliner. 4 Eyes Skin Assessment     The patient is being assess for   Admission    I agree that 2 RN's have performed a thorough Head to Toe Skin Assessment on the patient. ALL assessment sites listed below have been assessed. Areas assessed for pressure by both nurses:   [x]   Head, Face, and Ears   [x]   Shoulders, Back, and Chest, Abdomen  [x]   Arms, Elbows, and Hands   [x]   Coccyx, Sacrum, and Ischium  [x]   Legs, Feet, and Heels   Scattered bruising and abrasions      Skin Assessed Under all Medical Devices by both nurses:  O2 device tubing              All Mepilex Borders were peeled back and area peeked at by both nurses:  No: none  Please list where Mepilex Borders are located:  none             **SHARE this note so that the co-signing nurse is able to place an eSignature**    Co-signer eSignature: Electronically signed by Yecenia Gutiérrez RN on 10/3/21 at 7:04 AM EDT    Does the Patient have Skin Breakdown related to pressure?   No     (Insert Photo here)         Narciso Prevention initiated:  NA   Wound Care Orders initiated:  NA      [facility] nurse consulted for Pressure Injury (Stage 3,4, Unstageable, DTI, NWPT, Complex wounds)and New or Established Ostomies:  NA      Primary Nurse eSignature: Electronically signed by Juanjose Guerrero RN on 10/3/21 at 4:17 AM EDT

## 2021-10-04 ENCOUNTER — TELEPHONE (OUTPATIENT)
Dept: PULMONOLOGY | Age: 61
End: 2021-10-04

## 2021-10-04 LAB — HEMATOLOGY PATH CONSULT: NORMAL

## 2021-10-04 PROCEDURE — 97162 PT EVAL MOD COMPLEX 30 MIN: CPT

## 2021-10-04 PROCEDURE — 97530 THERAPEUTIC ACTIVITIES: CPT

## 2021-10-04 PROCEDURE — 97166 OT EVAL MOD COMPLEX 45 MIN: CPT

## 2021-10-04 PROCEDURE — 97535 SELF CARE MNGMENT TRAINING: CPT

## 2021-10-04 PROCEDURE — 99233 SBSQ HOSP IP/OBS HIGH 50: CPT | Performed by: INTERNAL MEDICINE

## 2021-10-04 PROCEDURE — 94761 N-INVAS EAR/PLS OXIMETRY MLT: CPT

## 2021-10-04 PROCEDURE — 2580000003 HC RX 258: Performed by: INTERNAL MEDICINE

## 2021-10-04 PROCEDURE — 6370000000 HC RX 637 (ALT 250 FOR IP): Performed by: INTERNAL MEDICINE

## 2021-10-04 PROCEDURE — 94640 AIRWAY INHALATION TREATMENT: CPT

## 2021-10-04 PROCEDURE — 2060000000 HC ICU INTERMEDIATE R&B

## 2021-10-04 PROCEDURE — 2500000003 HC RX 250 WO HCPCS: Performed by: INTERNAL MEDICINE

## 2021-10-04 PROCEDURE — 99232 SBSQ HOSP IP/OBS MODERATE 35: CPT | Performed by: INTERNAL MEDICINE

## 2021-10-04 PROCEDURE — 6360000002 HC RX W HCPCS: Performed by: INTERNAL MEDICINE

## 2021-10-04 PROCEDURE — 94669 MECHANICAL CHEST WALL OSCILL: CPT

## 2021-10-04 PROCEDURE — 2700000000 HC OXYGEN THERAPY PER DAY

## 2021-10-04 RX ORDER — BENZONATATE 100 MG/1
200 CAPSULE ORAL 3 TIMES DAILY
Status: DISCONTINUED | OUTPATIENT
Start: 2021-10-04 | End: 2021-10-05 | Stop reason: HOSPADM

## 2021-10-04 RX ORDER — LEVOFLOXACIN 5 MG/ML
500 INJECTION, SOLUTION INTRAVENOUS EVERY 24 HOURS
Status: DISCONTINUED | OUTPATIENT
Start: 2021-10-04 | End: 2021-10-05 | Stop reason: HOSPADM

## 2021-10-04 RX ADMIN — Medication 10 ML: at 20:52

## 2021-10-04 RX ADMIN — IPRATROPIUM BROMIDE AND ALBUTEROL SULFATE 1 AMPULE: .5; 2.5 SOLUTION RESPIRATORY (INHALATION) at 15:08

## 2021-10-04 RX ADMIN — BENZONATATE 200 MG: 100 CAPSULE ORAL at 20:51

## 2021-10-04 RX ADMIN — LOSARTAN POTASSIUM 100 MG: 100 TABLET, FILM COATED ORAL at 09:44

## 2021-10-04 RX ADMIN — ENOXAPARIN SODIUM 40 MG: 40 INJECTION SUBCUTANEOUS at 09:44

## 2021-10-04 RX ADMIN — DULOXETINE HYDROCHLORIDE 60 MG: 60 CAPSULE, DELAYED RELEASE ORAL at 09:46

## 2021-10-04 RX ADMIN — METOPROLOL SUCCINATE 50 MG: 50 TABLET, EXTENDED RELEASE ORAL at 09:45

## 2021-10-04 RX ADMIN — RDII 250 MG CAPSULE 250 MG: at 09:44

## 2021-10-04 RX ADMIN — IPRATROPIUM BROMIDE AND ALBUTEROL SULFATE 1 AMPULE: .5; 2.5 SOLUTION RESPIRATORY (INHALATION) at 19:41

## 2021-10-04 RX ADMIN — BENZONATATE 200 MG: 100 CAPSULE ORAL at 09:44

## 2021-10-04 RX ADMIN — GABAPENTIN 300 MG: 300 CAPSULE ORAL at 13:37

## 2021-10-04 RX ADMIN — PANTOPRAZOLE SODIUM 40 MG: 40 TABLET, DELAYED RELEASE ORAL at 05:46

## 2021-10-04 RX ADMIN — ATORVASTATIN CALCIUM 40 MG: 40 TABLET, FILM COATED ORAL at 09:45

## 2021-10-04 RX ADMIN — GABAPENTIN 300 MG: 300 CAPSULE ORAL at 20:51

## 2021-10-04 RX ADMIN — MESALAMINE 400 MG: 400 CAPSULE, DELAYED RELEASE ORAL at 13:37

## 2021-10-04 RX ADMIN — GUAIFENESIN 600 MG: 600 TABLET, EXTENDED RELEASE ORAL at 20:51

## 2021-10-04 RX ADMIN — GABAPENTIN 300 MG: 300 CAPSULE ORAL at 09:45

## 2021-10-04 RX ADMIN — AMLODIPINE BESYLATE 5 MG: 5 TABLET ORAL at 09:45

## 2021-10-04 RX ADMIN — BENZONATATE 200 MG: 100 CAPSULE ORAL at 13:37

## 2021-10-04 RX ADMIN — MESALAMINE 400 MG: 400 CAPSULE, DELAYED RELEASE ORAL at 20:51

## 2021-10-04 RX ADMIN — SODIUM CHLORIDE: 9 INJECTION, SOLUTION INTRAVENOUS at 05:48

## 2021-10-04 RX ADMIN — MESALAMINE 400 MG: 400 CAPSULE, DELAYED RELEASE ORAL at 09:45

## 2021-10-04 RX ADMIN — GUAIFENESIN 600 MG: 600 TABLET, EXTENDED RELEASE ORAL at 09:45

## 2021-10-04 RX ADMIN — MIRTAZAPINE 15 MG: 15 TABLET, FILM COATED ORAL at 20:51

## 2021-10-04 RX ADMIN — BUSPIRONE HYDROCHLORIDE 5 MG: 5 TABLET ORAL at 09:45

## 2021-10-04 RX ADMIN — METHYLPREDNISOLONE SODIUM SUCCINATE 40 MG: 40 INJECTION, POWDER, FOR SOLUTION INTRAMUSCULAR; INTRAVENOUS at 09:44

## 2021-10-04 RX ADMIN — BUSPIRONE HYDROCHLORIDE 5 MG: 5 TABLET ORAL at 20:51

## 2021-10-04 RX ADMIN — DOXYCYCLINE 100 MG: 100 INJECTION, POWDER, LYOPHILIZED, FOR SOLUTION INTRAVENOUS at 01:59

## 2021-10-04 RX ADMIN — IPRATROPIUM BROMIDE AND ALBUTEROL SULFATE 1 AMPULE: .5; 2.5 SOLUTION RESPIRATORY (INHALATION) at 11:22

## 2021-10-04 RX ADMIN — GABAPENTIN 300 MG: 300 CAPSULE ORAL at 16:26

## 2021-10-04 RX ADMIN — LEVOTHYROXINE SODIUM 50 MCG: 50 TABLET ORAL at 05:46

## 2021-10-04 RX ADMIN — Medication 10 ML: at 09:44

## 2021-10-04 RX ADMIN — LEVOFLOXACIN 500 MG: 500 INJECTION, SOLUTION INTRAVENOUS at 09:44

## 2021-10-04 RX ADMIN — ASPIRIN 81 MG: 81 TABLET, CHEWABLE ORAL at 09:45

## 2021-10-04 RX ADMIN — METHYLPREDNISOLONE SODIUM SUCCINATE 40 MG: 40 INJECTION, POWDER, FOR SOLUTION INTRAMUSCULAR; INTRAVENOUS at 20:51

## 2021-10-04 RX ADMIN — IPRATROPIUM BROMIDE AND ALBUTEROL SULFATE 1 AMPULE: .5; 2.5 SOLUTION RESPIRATORY (INHALATION) at 08:17

## 2021-10-04 RX ADMIN — FOLIC ACID 1 MG: 1 TABLET ORAL at 09:44

## 2021-10-04 NOTE — PROGRESS NOTES
Pm assessment completed. See flow sheet. PRN tessalon given for cough. Pt c/o muscle pain in ribs from coughing, denies tylenol. Call light within reach.

## 2021-10-04 NOTE — PROGRESS NOTES
Pulmonary Progress Note    CC: COPD exacerbation    Subjective:   2 L O2  Shortness of breath and cough still  Brown sputum production        Intake/Output Summary (Last 24 hours) at 10/4/2021 0719  Last data filed at 10/3/2021 1540  Gross per 24 hour   Intake 340 ml   Output --   Net 340 ml       Exam:   /80   Pulse 84   Temp 97.1 °F (36.2 °C) (Oral)   Resp 16   Ht 5' 4\" (1.626 m)   Wt 94 lb 14.4 oz (43 kg)   SpO2 100%   BMI 16.29 kg/m²  on 2 L  Gen:  + Distress. Ill-appearing  Eyes: PERRL. No sclera icterus. No conjunctival injection. ENT: No discharge. Pharynx clear. Neck: Trachea midline. No obvious mass. Resp:  + Accessory muscle use. No crackles. Bilateral wheezes. No rhonchi. No dullness on percussion. CV: Regular rate. Regular rhythm. No murmur or rub. No edema. GI: Non-tender. Non-distended. No hernia. Skin: Warm and dry. No nodule on exposed extremities. Lymph: No cervical LAD. No supraclavicular LAD. M/S: No cyanosis. No joint deformity. No clubbing. Neuro: Awake. Alert. Moves all four extremities. Psych: Oriented x 3.  No anxiety    Scheduled Meds:   amLODIPine  5 mg Oral Daily    aspirin  81 mg Oral Daily    atorvastatin  40 mg Oral Daily    busPIRone  5 mg Oral BID    DULoxetine  60 mg Oral Daily    saccharomyces boulardii  250 mg Oral Daily    folic acid  1 mg Oral Daily    gabapentin  300 mg Oral 4x Daily    levothyroxine  50 mcg Oral Daily    losartan  100 mg Oral Daily    mesalamine  400 mg Oral TID    metoprolol succinate  50 mg Oral Daily    mirtazapine  15 mg Oral Nightly    pantoprazole  40 mg Oral Daily    sodium chloride flush  10 mL IntraVENous 2 times per day    enoxaparin  40 mg SubCUTAneous Daily    ipratropium-albuterol  1 ampule Inhalation Q4H WA    doxycycline (VIBRAMYCIN) IV  100 mg IntraVENous Q12H    methylPREDNISolone  40 mg IntraVENous Q12H    Followed by   Solomon Eugene ON 10/5/2021] predniSONE  40 mg Oral Daily with breakfast    guaiFENesin  600 mg Oral BID     Continuous Infusions:   sodium chloride 75 mL/hr at 10/04/21 0548    sodium chloride       PRN Meds:  benzonatate, sodium chloride flush, sodium chloride, ondansetron **OR** ondansetron, polyethylene glycol, acetaminophen **OR** acetaminophen, albuterol    Labs:  CBC:   Recent Labs     10/02/21  1815 10/03/21  0440   WBC 23.0* 12.0*   HGB 14.4 14.0   HCT 44.9 42.4   MCV 96.0 95.7    410     BMP:   Recent Labs     10/02/21  1815 10/03/21  0440    139   K 4.2 4.2   CL 97* 100   CO2 27 26   BUN 8 13   CREATININE <0.5* <0.5*     LIVER PROFILE:   Recent Labs     10/02/21  1815 10/03/21  0440   AST 23 20   ALT 16 14   BILITOT 0.8 0.5   ALKPHOS 144* 132*     PT/INR: No results for input(s): PROTIME, INR in the last 72 hours. APTT: No results for input(s): APTT in the last 72 hours. UA:No results for input(s): NITRITE, COLORU, PHUR, LABCAST, WBCUA, RBCUA, MUCUS, TRICHOMONAS, YEAST, BACTERIA, CLARITYU, SPECGRAV, LEUKOCYTESUR, UROBILINOGEN, BILIRUBINUR, BLOODU, GLUCOSEU, AMORPHOUS in the last 72 hours. Invalid input(s): KETONESU  No results for input(s): PHART, [id number], PO2ART in the last 72 hours. Cultures:   10/2 COVID-19 not detected    Films:  Chest x-ray 10/2 imaging was reviewed by me and showed  Stable chronic changes with no acute abnormality seen        ASSESSMENT:  · Acute hypoxemic hypercapnic respiratory failure  · Moderate COPD with acute exacerbation  · Tracheobronchitis  · Leukocytosis-uses steroids at home  · Heterozygous alpha-1 antitrypsin deficiency     PLAN:  · Supplemental oxygen to maintain SaO2 >92%; wean as tolerated  · Intensive inhaled bronchodilator therapy  · IV solumedrol 40 mg IV Q12 hrs.  Plan to switch to oral prednisone taper when improved  · Levaquin and DC doxycycline  · Acapella and Mucinex  · Smoking cessation counseling

## 2021-10-04 NOTE — PROGRESS NOTES
Inpatient Occupational Therapy  Evaluation and Treatment    Unit: PCU  Date:  10/4/2021  Patient Name:    Janee Carroll  Admitting diagnosis:  Acute on chronic respiratory failure with hypoxia and [facility] [J96.21, J96.22]  Admit Date:  10/2/2021  Precautions/Restrictions/WB Status/ Lines/ Wounds/ Oxygen: Fall risk, Lines -IV and Supplemental O2 (2) and Continuous pulse oximetry    Treatment Time:  4556-1515  Treatment Number: 1   Timed code treatment minutes 30 minutes   Total Treatment minutes:   40  minutes    Patient Goals for Therapy:  \" not stated  \"      Discharge Recommendations: Home PRN assist   DME needs for discharge: Needs Met       Therapy recommendations for staff:   Supervision with use of rolling walker (RW) for all transfers and ambulation to/from bathroom    History of Present Illness: H&P per Manolo Haywood MD   62years old with history of COPD presented with shortness of breath for 3 weeks. Severe at times. Dyspnea worse with exertion and better with resting. Cough with brown sputum. No hemoptysis. Associated with wheezes. Seen by PCP and given steroids and Abx. Requiring 4 L O2, uses normally 2 L at home. In ED found to be hypercapnic in respiratory distress. Placed on BiPAP. Chest x-ray showed no acute infiltrates. Smoker still 1 ci/day. Patient is compliant with inhaled bronchodilators and O2- 2LPM at night and PRN during the day. Home Health S4 Level Recommendation:  NA  AM-PAC Score: 23    Preadmission Environment    Pt. Lives with spouse-pt  works part time at [employer] ,  works part time   Home environment:  one story home  Steps to enter first floor: 2 steps to enter no railing   Steps to second floor: N/A  Bathroom: tub/shower unit and standard height commode, shower chair   Equipment owned: rollator, 02 at 3 liters , shower seat, reacher , pulse ox, nebulizer     Preadmission Status:  Pt. Able to drive: Yes  Pt Fully independent with ADLs: Yes  Pt.  Required assistance from family for: Cleaning, Cooking and Laundry and pt helps   Pt. independent for transfers and gait and walked with Rollator when needed   History of falls No    Pain  No      Cognition    A&O x4   Able to follow 2 step commands    Subjective  Patient lying supine in bed with no family present. Pt agreeable to this OT eval & tx. Upper Extremity ROM:    WFL    Upper Extremity Strength:    Rt UE - WFL   Left shoulder - 4/5    Upper Extremity Sensation    Diminished on the left   Pt states she drops items with the left hand   Upper Extremity Proprioception:  WFL    Coordination and Tone  WFL    Balance  Functional Sitting Balance:  WFL  Functional Standing Balance:Diminished    Bed mobility:    Supine to sit: Independent  Sit to supine:   Not Tested  Rolling:    Not Tested  Scooting in sitting:  Independent  Scooting to head of bed:   Not Tested    Bridging:   Not Tested    Transfers:    Sit to stand:  CGA  Stand to sit:  CGA  Bed to chair:   CGA with no AD, Pt then ambulated with RW with improved balance and CGA   Standard toilet: Not Tested  Bed to [facility]:  Not Tested    Dressing:      UE:   Not Tested  LE:    Independent pulling up socks     Bathing:    UE:  Not Tested  LE:  Not Tested    Eating:   Independent    Toileting:  Not Tested    Activity Tolerance   Pt completed therapy session with SOB , coughing and decreased balance   Sp02 99% HR97 -  Sitting on 2 liters   Positioning Needs:   Pt up in chair, no alarm needed, positioned in proper neutral alignment and pressure relief provided. Exercise / Activities Initiated:   N/A    Patient/Family Education:   Role of OT    Assessment of Deficits: Pt seen for Occupational therapy evaluation in acute care setting. Pt demonstrated decreased Activity tolerance, ADLs, Balance , Strength and Transfers. Pt functioning below baseline and will likely benefit from skilled occupational therapy services to maximize safety and independence.      Goal(s) : To be met in 3 Visits:  1). Bed to toilet/BSC: Supervision with AD as needed     To be met in 5 Visits:  1). Supine to/from Sit:  Independent  2). Upper Body Bathing:   Independent  3). Lower Body Bathing:   Supervision  4). Upper Body Dressing:  Independent  5). Lower Body Dressing:  Independent  6). Pt to demonstrate UE exs x 15 reps with minimal cues    Rehabilitation Potential:  Good for goals listed above. Strengths for achieving goals include: Pt cooperative  Barriers to achieving goals include:  Complexity of condition     Plan: To be seen 3-5 x/wk while in acute care setting for therapeutic exercises, bed mobility, transfers, dressing, bathing, family/patient education, ADL/IADL retraining, energy conservation training.      Jose Luna OTR/L [id number]          If patient discharges from this facility prior to next visit, this note will serve as the Discharge Summary

## 2021-10-04 NOTE — CARE COORDINATION
Case Management Assessment  Initial Evaluation      Patient Name: Chyna Molina  YOB: 1960  Diagnosis: Acute on chronic respiratory failure with hypoxia and hypercapnia [facility] [J96.21, J96.22]  Date / Time: 10/2/2021  5:24 PM    Admission status/Date: 10/03/2021 Inpatient   Chart Reviewed: Yes      Patient Interviewed: Yes   Family Interviewed:  No      Hospitalization in the last 30 days:  No      Health Care Decision Maker :   Primary Decision Maker: Radha Gold - Spouse - [phone]    (CM - must 1st enter selection under Navigator - emergency contact- [place] Relationship and pick relationship)   Who do you trust or have selected to make healthcare decisions for you      Met with: pt   Interview conducted  (bedside/phone): bedside    Current PCP: Loree Briggs required for SNF : Y          3 night stay required - N    ADLS  Support Systems/Care Needs:    Transportation:     Meal Preparation:     Housing  Living Arrangements: pt lives at home with her  who works part time  Steps: 1  Intent for return to present living arrangements: Yes  Identified Issues: [id number] [address] [facility] with [address] : No Agency:(Services)     Passport/Waiver : No  :                      Phone Number:    Passport/Waiver Services: n/a          Durable Medical Equiptment   DME Provider: Lucho Booth  Equipment:   Walker__x (PRN)_Cane_X (PRN)__RTS___ BSC___Shower Chair___Hospital Bed___W/C____Other________  02 at __3__Liter(s)---wears(frequency)_at night and PRN during the ady______  - [facility] ___ CPAP___ BiPap___   N/A____      Home O2 Use :  Yes    If No for home O2---if presently on O2 during hospitalization:  Yes  if yes CM to follow for potential DC O2 need  Informed of need for care provider to bring portable home O2 tank on day of discharge for nursing to connect prior to leaving:   Yes  Verbalized agreement/Understanding:   Yes    Community Service Affiliation  Dialysis:  No    · Agency:  · Location:  · Dialysis Schedule:  · Phone:   · Fax: Other Community Services: n/a    DISCHARGE PLAN: Explained Case Management role/services. Chart review completed. Met with pt at bedside. Pt stated her  helps her with her ADL's. She stated she may need HHC. She stated she wants a POC for her o2 and she is aware that this often requires additional testing in the community and she stated understanding. She was encouraged to cancel her appointment with her pulmonologist as she stated it is scheduled for tomorrow and she inquire. She denied needs or questions for CM. CM will follow. Please notify CM if needs or concerns arise.

## 2021-10-04 NOTE — FLOWSHEET NOTE
Consult to discuss DNR/Full Code. Discussed DNR information, Code protocols in a general sense; pt stated she would be interested in likely changing to a DNR, would like more information about the different levels of DNR. I informed Rodney Young to request DNR form be completed and signed by physician to be official.    Spiritual Assessment: pt is [place], enjoys volunteering for her [employer] and being part of the community. Discussed welcoming from  of [employer] who is her neighbor. Rodney Young just celebrated 15 years  to her , he also has health issues, back surgeries and difficulty walking. Pt states 'he hopes we get to 20 years.'    Rodney Young reflected on her journey of nabeel, her difficulty breathing and 'not knowing when the breath she takes will be her last.'  Validated fears, stressors, care for her family and desire to 'just be back with my kids and grandkids. I know if I go, they'll be ok, but I have a hard time trusting that sometimes.'      Prayed as requested for her, her grandkids and her . Stated that she felt comforted while praying, 'as if a floral fragrance came into the room while praying.'  Thanked me for visit.     Jennifer Bay  [phone]

## 2021-10-04 NOTE — FLOWSHEET NOTE
10/04/21 0941   Vital Signs   Temp 97.2 °F (36.2 °C)   Temp Source Oral   Pulse 114   Resp 16   BP (!) 136/92   BP Location Right upper arm   Level of Consciousness Alert (0)   MEWS Score 3   Oxygen Therapy   SpO2 100 %   O2 Device Nasal cannula   O2 Flow Rate (L/min) 2 L/min   Patient resting quietly in bed. No s/s of distress noted. Shift assessment complete, see flow sheet. Denies needs at this time. Call light in reach. Will monitor.

## 2021-10-04 NOTE — ACP (ADVANCE CARE PLANNING)
Advance Care Planning   Healthcare Decision Maker:    Primary Decision Maker: Sydneyaaron Gardnerels [facility] - [phone]    Click here to complete Healthcare Decision Makers including selection of the Healthcare Decision Maker Relationship (ie \"Primary\").

## 2021-10-04 NOTE — PROGRESS NOTES
Inpatient Physical Therapy Evaluation and Treatment    Unit: PCU  Date:  10/4/2021  Patient Name:    Ray Adjutant  Admitting diagnosis:  Acute on chronic respiratory failure with hypoxia and [facility]capnia [facility] [J96.21, J96.22]  Admit Date:  10/2/2021  Precautions/Restrictions/WB Status/ Lines/ Wounds/ Oxygen: Fall risk, Bed/chair alarm, Lines -IV and Supplemental O2 (2.5L), Telemetry and Continuous pulse oximetry    Treatment Time:  1130-12:05  Treatment Number:  1   Timed Code Treatment Minutes: 25 minutes  Total Treatment Minutes:  35  minutes    Patient Goals for Therapy: \" go home \"          Discharge Recommendations: Home PRN assist   DME needs for discharge: Needs Met       Therapy recommendation for EMS Transport: can transport by wheelchair    Therapy recommendations for staff:   Stand by assist with use of no device for transfers to chair or BSC; use rolling walker (RW) for all ambulation to/from bathroom    History of Present Illness: Per H&P Dr. Kelsi Elizalde 10/03: \"Patient is a pleasant 81-year-old white female with past medical history of COPD, chronic respiratory failure on 2 L of oxygen, hypertension, history of ulcerative colitis, history of back pain who came to emergency room with increasing shortness of breath. Patient has been sick for the last 2 to 3 weeks. She has had cough with some sputum which is brown in color. No hemoptysis. She has had wheezing. She was started on steroids by PCP and given antibiotics but did not feel better. At home she is on 2 L. At present she is requiring 4 L. In the emergency room she was placed on noninvasive ventilation. She improved. Unfortunately she continues to smoke. When I saw the patient she was feeling tired. She was still feeling short of breath. She had not had much sleep through the night. She denied any chest pain. \"    Admitted to [facility] in July 2021 for symptoms of aphasia, L sided numbness/weakness; clinical workup (-) for acute infarct. PT/OT notes griselda'ed ARU; pt was discharged home. Home Health S4 Level Recommendation:  NA  AM-PAC Mobility Score       AM-PAC Inpatient Mobility without Stair Climbing Raw Score : 20    Preadmission Environment    Pt. Lives with spouse-pt  works part time at [employer] ,  works part time   Home environment:    one story home  Steps to enter first floor: 2 steps to enter no railing   Steps to second floor: N/A  Bathroom: tub/shower unit and standard height commode, shower chair   Equipment owned: rollator, 02 at 3 liters , shower seat, reacher , pulse ox, nebulizer      Preadmission Status:  Pt. Able to drive: Yes  Pt Fully independent with ADLs: Yes  Pt. Required assistance from family for: Cleaning, Cooking and Laundry and pt helps   Pt. independent for transfers and gait and walked with Rollator when needed   History of falls No    Pain   No  Location: N/A  Rating: NA /10  Pain Medicine Status: No request made    Cognition    A&O x4   Able to follow 2 step commands    Subjective  Patient lying supine in bed with no family present. Pt agreeable to this PT eval & tx. Upper Extremity ROM/Strength  Please see OT evaluation. Lower Extremity ROM / Strength   AROM WFL: Yes    Strength Assessment (measured on a 0-5 scale):  R LE   Quad   4+   Ant Tib  4+   Hamstring 4+   Iliopsoas 4  L LE  Quad   4-   Ant Tib  4-   Hamstring 4-   Iliopsoas 3+    Lower Extremity Sensation    Diminished to light touch L foot compared to R     Lower Extremity Proprioception:   WFL    Coordination and Tone  WFL    Balance  Sitting:  Normal; Independent  Comments: at EOB ~10 minutes during examination and vitals assessment. Standing: Good - ; Supervision  Comments: Pt has adequate balance for standing and light UE reach activities without use of the walker but benefits from steadying support of walker for more taxing activities.      Bed Mobility   Supine to Sit:    Modified Independent  Sit to Supine:   Not Tested  Rolling:   Not Tested  Scooting in sitting: Independent  Scooting in supine:  Not Tested    Transfer Training     Sit to stand:   Supervision  Stand to sit:   Supervision  Bed to Chair:   Supervision with use of No AD    Gait gait completed as indicated below  Distance:      20 ft + 6 ft backwards + 20 ft  Deviations (firm surface/linoleum):  decreased jasmin and decreased eccentric control during swing phase bilat (more pronounced with LLE)  Assistive Device Used:    rolling walker (RW)  Level of Assist:    Supervision  Comment: steady throughout    Stair Training deferred, will attempt NV. No concerns with pt's ability to complete 1 step for home entry. # of Steps:   N/A  Level of Assist:  N/A  UE Support:  NA  Assistive Device:  N/A  Pattern:   N/A  Comments: N/A    Activity Tolerance   Pt completed therapy session with No adverse symptoms noted w/activity    BP (mmHg)  HR (bpm)  SpO2 (%)    EOB   108 96% on 2.5 L   After transfer to chair   98 99% on 2.5 L   After ambulation   Did not Record 99% on 2.5 L       Positioning Needs   Pt up in chair, alarm set, positioned in proper neutral alignment and pressure relief provided. Call light provided and all needs within reach    Exercises Initiated  Yovany deferred secondary to treatment focus on functional mobility  NA    Other  None. Patient/Family Education   Pt educated on role of inpatient PT, POC, importance of continued activity, energy conservation, pacing activity and calling for assist with mobility. Assessment  Pt seen for Physical Therapy evaluation in acute care setting. Pt demonstrated decreased Activity tolerance and Strength as well as decreased independence with Ambulation and Transfers. Upon evaluation she completes all mobility including household distance ambulation with SBA/Supervision and maintains O2 sats on her baseline 2.5L. She does have intermittent dry coughing spells during activity.  Pt will benefit from skilled PT in

## 2021-10-04 NOTE — PROGRESS NOTES
Progress Note    Admit Date:  10/2/2021    Subjective:  Ms. Boone is feeling some better but still short of breath. No fever. Feels weak overall. No chest pain. On 2 L of oxygen which Is her baseline. Objective:   BP (!) 136/92   Pulse 114   Temp 97.2 °F (36.2 °C) (Oral)   Resp 16   Ht 5' 4\" (1.626 m)   Wt 94 lb 14.4 oz (43 kg)   SpO2 100%   BMI 16.29 kg/m²        Intake/Output Summary (Last 24 hours) at 10/4/2021 1114  Last data filed at 10/3/2021 1540  Gross per 24 hour   Intake 220 ml   Output --   Net 220 ml       Physical Exam:    General appearance: alert, appears stated age and cooperative  Head: Normocephalic, without obvious abnormality, atraumatic  Eyes: conjunctivae/corneas clear. PERRL, EOM's intact. Neck: no adenopathy, no carotid bruit, no JVD, supple, symmetrical, trachea midline and thyroid not enlarged, symmetric, no tenderness/mass/nodules  Lungs: diminished BS with bilateral expiratory wheezes. No rhonchi.   Heart: increased rate and rhythm, S1, S2 normal, no murmur, click, rub or gallop  Abdomen: soft, non-tender; bowel sounds normal; no masses,  no organomegaly  Extremities: extremities normal, atraumatic, no cyanosis or edema  Pulses: 2+ and symmetric  Skin: Skin color, texture, turgor normal. No rashes or lesions  Neurologic: Grossly normal    Scheduled Meds:   benzonatate  200 mg Oral TID    levofloxacin  500 mg IntraVENous Q24H    amLODIPine  5 mg Oral Daily    aspirin  81 mg Oral Daily    atorvastatin  40 mg Oral Daily    busPIRone  5 mg Oral BID    DULoxetine  60 mg Oral Daily    saccharomyces boulardii  250 mg Oral Daily    folic acid  1 mg Oral Daily    gabapentin  300 mg Oral 4x Daily    levothyroxine  50 mcg Oral Daily    losartan  100 mg Oral Daily    mesalamine  400 mg Oral TID    metoprolol succinate  50 mg Oral Daily    mirtazapine  15 mg Oral Nightly    pantoprazole  40 mg Oral Daily    sodium chloride flush  10 mL IntraVENous 2 times per day    enoxaparin  40 mg SubCUTAneous Daily    ipratropium-albuterol  1 ampule Inhalation Q4H WA    methylPREDNISolone  40 mg IntraVENous Q12H    Followed by   Faisal Huynh ON 10/5/2021] predniSONE  40 mg Oral Daily with breakfast    guaiFENesin  600 mg Oral BID       Continuous Infusions:   sodium chloride 75 mL/hr at 10/04/21 0548    sodium chloride         PRN Meds:  sodium chloride flush, sodium chloride, ondansetron **OR** ondansetron, polyethylene glycol, acetaminophen **OR** acetaminophen, albuterol      Data:  CBC:   Recent Labs     10/02/21  1815 10/03/21  0440   WBC 23.0* 12.0*   HGB 14.4 14.0   HCT 44.9 42.4   MCV 96.0 95.7    410     BMP:   Recent Labs     10/02/21  1815 10/03/21  0440    139   K 4.2 4.2   CL 97* 100   CO2 27 26   BUN 8 13   CREATININE <0.5* <0.5*     LIVER PROFILE:   Recent Labs     10/02/21  1815 10/03/21  0440   AST 23 20   ALT 16 14   BILITOT 0.8 0.5   ALKPHOS 144* 132*     PT/INR: No results for input(s): PROTIME, INR in the last 72 hours. Cultures:   Results for Abbey Horvath (MRN [id number]) as of 10/3/2021 09:40    Ref. Range 10/2/2021 18:20   SARS-CoV-2, NAAT Latest Ref Range: Not Detected  Not Detected          XR CHEST (2 VW)   Final Result   Stable chronic changes with no acute abnormality seen. Assessment:  Principal Problem:    Acute on chronic respiratory failure with hypoxia and hypercapnia (HCC)  Active Problems:    Essential hypertension    Heterozygous alpha 1-antitrypsin deficiency (HCC)    COPD exacerbation (HCC)    Gastroesophageal reflux disease without esophagitis    Anxiety and depression    Acquired hypothyroidism    Nonischemic cardiomyopathy (HCC)    Severe malnutrition (HCC)    Acute hypoxemic respiratory failure (HCC)    Tracheobronchitis    Leukocytosis  Resolved Problems:    * No resolved hospital problems. *      Plan:    #Acute on chronic respiratory failure. Patient came emergency room with cough and shortness of breath.   She was treated with antibiotics and steroids as an outpatient. She did not improve. She usually uses 2 L of oxygen at home but is on 4 L of oxygen. She was tachypneic and ill-appearing. She was admitted to hospital.  Supplemental oxygen as needed. Use BiPAP if needed. She was on BiPAP in the ER. Pulmonary consultation. She is some better. Continue treatment.      #Acute exacerbation of COPD. Use handheld nebulizers and steroids. IV doxycycline. COVID-19 negative. She has heterozygous alpha-1 antitrypsin deficiency.        #Acquired hypothyroidism. Continue Synthroid 50 mcg daily.     #History of adrenal nodule. Stable.     #Nonischemic cardiomyopathy. Appears to be  stable. No chest pain.     #Hypertension. Continue home medications. These include losartan, Norvasc and metoprolol.     #History of ulcerative colitis. Continue with mesalamine.     #Counseled to stop smoking.     #Severe protein calorie malnutrition. Use supplements as needed.     #Continue Remeron for anxiety and depression.     #GERD on Protonix.     Lovenox for DVT prophylaxis. PT and OT consult. Discharge planning may need SNF.     Naheed Verma MD, MD 10/4/2021 11:14 AM

## 2021-10-04 NOTE — TELEPHONE ENCOUNTER
Patient cancelled appointment on 10/5/2021 with  for PFT follow up. Reason: patient is in the hospital     Patient did not reschedule appointment. Appointment rescheduled for n/a. 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 Doxy x 1 week  · Continue Trelegy and albuterol nebs  · Pulmonary rehab  - restart  · She uses home O2 at night and PRN that was prescribe in [place]  · Using NRT gum  · F/u with PFT/6WT in October  · LDCT due 3/2022  · Screening CT scan was considered in a lung cancer screening counseling and shared decision making visit today that included the following elements:   · Eligibility: [place] are no signs or symptoms of lung cancer.  Tobacco History 32 pack-years, quit  1 years ago  · Verbal counseling has been performed by me to include benefits and harms of screening, follow-up diagnostic testing, over-diagnosis, false positive rate, and total radiation exposure;   · I have counseled on the importance of adherence to annual lung cancer LDCT screening, the impact of comorbidities and patient is willing to undergo diagnosis and treatment;   · I have provided counseling on the importance of maintaining cigarette smoking abstinence if former smoker; or the importance of smoking cessation if current smoker and, if appropriate, furnishing of information about tobacco cessation interventions; and   · I have furnished a written order for lung cancer screening with LDCT.

## 2021-10-05 VITALS
HEART RATE: 89 BPM | RESPIRATION RATE: 18 BRPM | DIASTOLIC BLOOD PRESSURE: 87 MMHG | HEIGHT: 64 IN | OXYGEN SATURATION: 99 % | SYSTOLIC BLOOD PRESSURE: 123 MMHG | BODY MASS INDEX: 16.22 KG/M2 | WEIGHT: 95 LBS | TEMPERATURE: 98 F

## 2021-10-05 LAB
T3 TOTAL: 0.95 NG/ML (ref 0.8–2)
T4 FREE: 1.4 NG/DL (ref 0.9–1.8)

## 2021-10-05 PROCEDURE — 2580000003 HC RX 258: Performed by: INTERNAL MEDICINE

## 2021-10-05 PROCEDURE — 6370000000 HC RX 637 (ALT 250 FOR IP): Performed by: INTERNAL MEDICINE

## 2021-10-05 PROCEDURE — 99233 SBSQ HOSP IP/OBS HIGH 50: CPT | Performed by: INTERNAL MEDICINE

## 2021-10-05 PROCEDURE — 94640 AIRWAY INHALATION TREATMENT: CPT

## 2021-10-05 PROCEDURE — 94669 MECHANICAL CHEST WALL OSCILL: CPT

## 2021-10-05 PROCEDURE — 6360000002 HC RX W HCPCS: Performed by: INTERNAL MEDICINE

## 2021-10-05 PROCEDURE — 99238 HOSP IP/OBS DSCHRG MGMT 30/<: CPT | Performed by: INTERNAL MEDICINE

## 2021-10-05 RX ORDER — LEVOFLOXACIN 500 MG/1
500 TABLET, FILM COATED ORAL DAILY
Qty: 4 TABLET | Refills: 0 | Status: SHIPPED | OUTPATIENT
Start: 2021-10-05 | End: 2021-10-09

## 2021-10-05 RX ORDER — PREDNISONE 10 MG/1
TABLET ORAL
Qty: 30 TABLET | Refills: 0 | Status: SHIPPED | OUTPATIENT
Start: 2021-10-05

## 2021-10-05 RX ADMIN — GABAPENTIN 300 MG: 300 CAPSULE ORAL at 09:19

## 2021-10-05 RX ADMIN — Medication 10 ML: at 09:19

## 2021-10-05 RX ADMIN — ATORVASTATIN CALCIUM 40 MG: 40 TABLET, FILM COATED ORAL at 09:19

## 2021-10-05 RX ADMIN — DULOXETINE HYDROCHLORIDE 60 MG: 60 CAPSULE, DELAYED RELEASE ORAL at 09:19

## 2021-10-05 RX ADMIN — MESALAMINE 400 MG: 400 CAPSULE, DELAYED RELEASE ORAL at 09:19

## 2021-10-05 RX ADMIN — PREDNISONE 40 MG: 20 TABLET ORAL at 09:19

## 2021-10-05 RX ADMIN — METOPROLOL SUCCINATE 50 MG: 50 TABLET, EXTENDED RELEASE ORAL at 09:19

## 2021-10-05 RX ADMIN — IPRATROPIUM BROMIDE AND ALBUTEROL SULFATE 1 AMPULE: .5; 2.5 SOLUTION RESPIRATORY (INHALATION) at 00:20

## 2021-10-05 RX ADMIN — LOSARTAN POTASSIUM 100 MG: 100 TABLET, FILM COATED ORAL at 09:19

## 2021-10-05 RX ADMIN — GUAIFENESIN 600 MG: 600 TABLET, EXTENDED RELEASE ORAL at 09:19

## 2021-10-05 RX ADMIN — RDII 250 MG CAPSULE 250 MG: at 09:19

## 2021-10-05 RX ADMIN — IPRATROPIUM BROMIDE AND ALBUTEROL SULFATE 1 AMPULE: .5; 2.5 SOLUTION RESPIRATORY (INHALATION) at 07:18

## 2021-10-05 RX ADMIN — LEVOTHYROXINE SODIUM 50 MCG: 50 TABLET ORAL at 06:16

## 2021-10-05 RX ADMIN — FOLIC ACID 1 MG: 1 TABLET ORAL at 09:18

## 2021-10-05 RX ADMIN — IPRATROPIUM BROMIDE AND ALBUTEROL SULFATE 1 AMPULE: .5; 2.5 SOLUTION RESPIRATORY (INHALATION) at 11:11

## 2021-10-05 RX ADMIN — ASPIRIN 81 MG: 81 TABLET, CHEWABLE ORAL at 09:19

## 2021-10-05 RX ADMIN — AMLODIPINE BESYLATE 5 MG: 5 TABLET ORAL at 09:19

## 2021-10-05 RX ADMIN — BENZONATATE 200 MG: 100 CAPSULE ORAL at 09:18

## 2021-10-05 RX ADMIN — PANTOPRAZOLE SODIUM 40 MG: 40 TABLET, DELAYED RELEASE ORAL at 06:16

## 2021-10-05 RX ADMIN — BUSPIRONE HYDROCHLORIDE 5 MG: 5 TABLET ORAL at 09:19

## 2021-10-05 RX ADMIN — ENOXAPARIN SODIUM 40 MG: 40 INJECTION SUBCUTANEOUS at 09:18

## 2021-10-05 RX ADMIN — LEVOFLOXACIN 500 MG: 500 INJECTION, SOLUTION INTRAVENOUS at 09:21

## 2021-10-05 NOTE — CARE COORDINATION
DISCHARGE ORDER  Date/Time 10/5/2021 2:03 PM  Completed by: Bipin Dougherty RN, Case Management    Patient Name: Mali Mccullough      : 1960  Admitting Diagnosis: Acute on chronic respiratory failure with hypoxia and hypercapnia ([facility] [address] [F43.64, J96.22]      Admit order Date and Status:10/2/2021  (verify MD's last order for status of admission)      Noted discharge order. If applicable PT/OT recommendation at Discharge: home with PRN assist  DME recommendation by PT/OT:n/a  Confirmed discharge plan  (pt): Yes  with whom____________pt___  If pt confirmed DC plan does family need to be contacted by CM No if yes who__n/a____  Discharge Plan: Reviewed chart. Role of discharge planner explained and patient verbalized understanding. Discharge order is noted. Pt is being d/c'd to home today, as she lives with her . Pt's O2 sats are 99% on 2L. Pt is active with [employer] at 3L baseline. Pt declines HC. No further discharge needs needed or noted. Reviewed chart. Role of discharge planner explained and patient verbalized understanding. Discharge order is noted. Has Home O2 in place on admit: yes  Informed of need to bring portable home O2 tank on day of discharge for nursing to connect prior to leaving:   Yes  Verbalized agreement/Understanding:   Yes    Discharge timeout done with Alton Contreras RN. All discharge needs and concerns addressed.

## 2021-10-05 NOTE — PROGRESS NOTES
Pulmonary Progress Note    CC: COPD exacerbation    Subjective:   Appears comfortable  2 L O2  Room air        Intake/Output Summary (Last 24 hours) at 10/5/2021 0728  Last data filed at 10/4/2021 1706  Gross per 24 hour   Intake 2735.75 ml   Output --   Net 2735.75 ml       Exam:   /74   Pulse 81   Temp 97 °F (36.1 °C) (Oral)   Resp 20   Ht 5' 4\" (1.626 m)   Wt 95 lb (43.1 kg)   SpO2 94%   BMI 16.31 kg/m²  on 2 L  Gen:  No distress. Ill-appearing  Eyes: PERRL. No sclera icterus. No conjunctival injection. ENT: No discharge. Pharynx clear. Neck: Trachea midline. No obvious mass. Resp:  No accessory muscle use. No crackles. Few wheezes. No rhonchi. No dullness on percussion. CV: Regular rate. Regular rhythm. No murmur or rub. No edema. GI: Non-tender. Non-distended. No hernia. Skin: Warm and dry. No nodule on exposed extremities. Lymph: No cervical LAD. No supraclavicular LAD. M/S: No cyanosis. No joint deformity. No clubbing. Neuro: Awake. Alert. Moves all four extremities. Psych: Oriented x 3.  No anxiety      Scheduled Meds:   benzonatate  200 mg Oral TID    levofloxacin  500 mg IntraVENous Q24H    amLODIPine  5 mg Oral Daily    aspirin  81 mg Oral Daily    atorvastatin  40 mg Oral Daily    busPIRone  5 mg Oral BID    DULoxetine  60 mg Oral Daily    saccharomyces boulardii  250 mg Oral Daily    folic acid  1 mg Oral Daily    gabapentin  300 mg Oral 4x Daily    levothyroxine  50 mcg Oral Daily    losartan  100 mg Oral Daily    mesalamine  400 mg Oral TID    metoprolol succinate  50 mg Oral Daily    mirtazapine  15 mg Oral Nightly    pantoprazole  40 mg Oral Daily    sodium chloride flush  10 mL IntraVENous 2 times per day    enoxaparin  40 mg SubCUTAneous Daily    ipratropium-albuterol  1 ampule Inhalation Q4H WA    predniSONE  40 mg Oral Daily with breakfast    guaiFENesin  600 mg Oral BID     Continuous Infusions:   sodium chloride       PRN Meds:  sodium chloride flush, sodium chloride, ondansetron **OR** ondansetron, polyethylene glycol, acetaminophen **OR** acetaminophen, albuterol    Labs:  CBC:   Recent Labs     10/02/21  1815 10/03/21  0440   WBC 23.0* 12.0*   HGB 14.4 14.0   HCT 44.9 42.4   MCV 96.0 95.7    410     BMP:   Recent Labs     10/02/21  1815 10/03/21  0440    139   K 4.2 4.2   CL 97* 100   CO2 27 26   BUN 8 13   CREATININE <0.5* <0.5*     LIVER PROFILE:   Recent Labs     10/02/21  1815 10/03/21  0440   AST 23 20   ALT 16 14   BILITOT 0.8 0.5   ALKPHOS 144* 132*     PT/INR: No results for input(s): PROTIME, INR in the last 72 hours. APTT: No results for input(s): APTT in the last 72 hours. UA:No results for input(s): NITRITE, COLORU, PHUR, LABCAST, WBCUA, RBCUA, MUCUS, TRICHOMONAS, YEAST, BACTERIA, CLARITYU, SPECGRAV, LEUKOCYTESUR, UROBILINOGEN, BILIRUBINUR, BLOODU, GLUCOSEU, AMORPHOUS in the last 72 hours. Invalid input(s): KETONESU  No results for input(s): PHART, VNA4MBE, PO2ART in the last 72 hours.     Cultures:   10/2 COVID-19 not detected    Films:  Chest x-ray 10/2 imaging was reviewed by me and showed  Stable chronic changes with no acute abnormality seen        ASSESSMENT:  · Acute hypoxemic hypercapnic respiratory failure  · Moderate COPD with acute exacerbation  · Tracheobronchitis  · Leukocytosis-uses steroids at home  · Heterozygous alpha-1 antitrypsin deficiency     PLAN:  · Supplemental oxygen to maintain SaO2 >92%; wean as tolerated  · Intensive inhaled bronchodilator therapy  · D/C Solu Medrol and start Prednisone taper   · Levaquin day #2/5  · Acapella and Mucinex  · Smoking cessation counseling  · DC planning is okay with pulmonary  · Discussed with internal medicine

## 2021-10-05 NOTE — PROGRESS NOTES
RT Inhaler-Nebulizer Bronchodilator Protocol Note    There is a bronchodilator order in the chart from a provider indicating to follow the RT Bronchodilator Protocol and there is an Initiate RT Inhaler-Nebulizer Bronchodilator Protocol order as well (see protocol at bottom of note). CXR Findings:  No results found. The findings from the last RT Protocol Assessment were as follows:   History Pulmonary Disease: (P) Chronic pulmonary disease  Respiratory Pattern: (P) Dyspnea on exertion or RR 21-25 bpm  Breath Sounds: (P) Inspiratory and expiratory or bilateral wheezing and/or rhonchi  Cough: (P) Strong, spontaneous, non-productive  Indication for Bronchodilator Therapy: (P) Wheezing associated with pulm disorder  Bronchodilator Assessment Score: (P) 10    Aerosolized bronchodilator medication orders have been revised according to the RT Inhaler-Nebulizer Bronchodilator Protocol below. Respiratory Therapist to perform RT Therapy Protocol Assessment initially then follow the protocol. Repeat RT Therapy Protocol Assessment PRN for score 0-3 or on second treatment, BID, and PRN for scores above 3. No Indications - adjust the frequency to every 6 hours PRN wheezing or bronchospasm, if no treatments needed after 48 hours then discontinue using Per Protocol order mode. If indication present, adjust the RT bronchodilator orders based on the Bronchodilator Assessment Score as indicated below. Use Inhaler orders unless patient has one or more of the following: on home nebulizer, not able to hold breath for 10 seconds, is not alert and oriented, cannot activate and use MDI correctly, or respiratory rate 25 breaths per minute or more, then use the equivalent nebulizer order(s) with same Frequency and PRN reasons based on the score. If a patient is on this medication at home then do not decrease Frequency below that used at home.     0-3 - enter or revise RT bronchodilator order(s) to equivalent RT Bronchodilator order with Frequency of every 4 hours PRN for wheezing or increased work of breathing using Per Protocol order mode. 4-6 - enter or revise RT Bronchodilator order(s) to two equivalent RT bronchodilator orders with one order with BID Frequency and one order with Frequency of every 4 hours PRN wheezing or increased work of breathing using Per Protocol order mode. 7-10 - enter or revise RT Bronchodilator order(s) to two equivalent RT bronchodilator orders with one order with TID Frequency and one order with Frequency of every 4 hours PRN wheezing or increased work of breathing using Per Protocol order mode. 11-13 - enter or revise RT Bronchodilator order(s) to one equivalent RT bronchodilator order with QID Frequency and an Albuterol order with Frequency of every 4 hours PRN wheezing or increased work of breathing using Per Protocol order mode. Greater than 13 - enter or revise RT Bronchodilator order(s) to one equivalent RT bronchodilator order with every 4 hours Frequency and an Albuterol order with Frequency of every 2 hours PRN wheezing or increased work of breathing using Per Protocol order mode.          Electronically signed by Mariah Rosenbaum RCP on 10/5/2021 at 2:31 AM

## 2021-10-05 NOTE — PROGRESS NOTES
RT Inhaler-Nebulizer Bronchodilator Protocol Note    There is a bronchodilator order in the chart from a provider indicating to follow the RT Bronchodilator Protocol and there is an Initiate RT Inhaler-Nebulizer Bronchodilator Protocol order as well (see protocol at bottom of note). CXR Findings:  No results found. The findings from the last RT Protocol Assessment were as follows:   History Pulmonary Disease: (P) Chronic pulmonary disease  Respiratory Pattern: (P) Dyspnea on exertion or RR 21-25 bpm  Breath Sounds: (P) Inspiratory and expiratory or bilateral wheezing and/or rhonchi  Cough: (P) Strong, spontaneous, non-productive  Indication for Bronchodilator Therapy: (P) Wheezing associated with pulm disorder  Bronchodilator Assessment Score: (P) 10    Aerosolized bronchodilator medication orders have been revised according to the RT Inhaler-Nebulizer Bronchodilator Protocol below. Respiratory Therapist to perform RT Therapy Protocol Assessment initially then follow the protocol. Repeat RT Therapy Protocol Assessment PRN for score 0-3 or on second treatment, BID, and PRN for scores above 3. No Indications - adjust the frequency to every 6 hours PRN wheezing or bronchospasm, if no treatments needed after 48 hours then discontinue using Per Protocol order mode. If indication present, adjust the RT bronchodilator orders based on the Bronchodilator Assessment Score as indicated below. Use Inhaler orders unless patient has one or more of the following: on home nebulizer, not able to hold breath for 10 seconds, is not alert and oriented, cannot activate and use MDI correctly, or respiratory rate 25 breaths per minute or more, then use the equivalent nebulizer order(s) with same Frequency and PRN reasons based on the score. If a patient is on this medication at home then do not decrease Frequency below that used at home.     0-3 - enter or revise RT bronchodilator order(s) to equivalent RT Bronchodilator order with Frequency of every 4 hours PRN for wheezing or increased work of breathing using Per Protocol order mode. 4-6 - enter or revise RT Bronchodilator order(s) to two equivalent RT bronchodilator orders with one order with BID Frequency and one order with Frequency of every 4 hours PRN wheezing or increased work of breathing using Per Protocol order mode. 7-10 - enter or revise RT Bronchodilator order(s) to two equivalent RT bronchodilator orders with one order with TID Frequency and one order with Frequency of every 4 hours PRN wheezing or increased work of breathing using Per Protocol order mode. 11-13 - enter or revise RT Bronchodilator order(s) to one equivalent RT bronchodilator order with QID Frequency and an Albuterol order with Frequency of every 4 hours PRN wheezing or increased work of breathing using Per Protocol order mode. Greater than 13 - enter or revise RT Bronchodilator order(s) to one equivalent RT bronchodilator order with every 4 hours Frequency and an Albuterol order with Frequency of every 2 hours PRN wheezing or increased work of breathing using Per Protocol order mode. RT to enter RT Home Evaluation for COPD & MDI Assessment order using Per Protocol order mode. pt takes Q4WA at home    Electronically signed by Ashley Voss RCP on 10/5/2021 at 7:22 AM

## 2021-10-05 NOTE — PROGRESS NOTES
Shift assessment complete, see flowsheets. Medications given,see MAR. Pt denies any needs or discomforts at this time. Remains on 2L NC. Call light in easy reach, bedside table in easy reach, and bed in lowest position.   Luba Aguilar RN

## 2021-10-05 NOTE — DISCHARGE SUMMARY
Name:  Ellis Camejo  Room:  /2965-33  MRN:    [id number]    Discharge Summary      This discharge summary is in conjunction with a complete physical exam done on the day of discharge. Discharging Physician: Malachi Cronin MD      Admit: 10/2/2021  Discharge: 10/5/2021      Diagnoses this Admission    Principal Problem:    Acute on chronic respiratory failure with hypoxia and hypercapnia (HCC)  Active Problems:    Essential hypertension    Heterozygous alpha 1-antitrypsin deficiency (HCC)    COPD exacerbation (HCC)    Gastroesophageal reflux disease without esophagitis    Anxiety and depression    Acquired hypothyroidism    Nonischemic cardiomyopathy (HCC)    Severe malnutrition (HCC)    Acute hypoxemic respiratory failure (HCC)    Tracheobronchitis    Leukocytosis  Resolved Problems:    * No resolved hospital problems. *          Procedures (Please Review Full Report for Details)  N/A    Consults    IP CONSULT TO HOSPITALIST  IP CONSULT TO PULMONOLOGY  IP CONSULT TO SPIRITUAL SERVICES      HPI:    Patient is a pleasant 62-year-old white female with past medical history of COPD, chronic respiratory failure on 2 L of oxygen, hypertension, history of ulcerative colitis, history of back pain who came to emergency room with increasing shortness of breath. Patient has been sick for the last 2 to 3 weeks. She has had cough with some sputum which is brown in color. No hemoptysis. She has had wheezing. She was started on steroids by PCP and given antibiotics but did not feel better. At home she is on 2 L. At present she is requiring 4 L. In the emergency room she was placed on noninvasive ventilation. She improved. Unfortunately she continues to smoke. When I saw the patient she was feeling tired. She was still feeling short of breath. She had not had much sleep through the night. She denied any chest pain.     Patient denies any GI symptoms.   She has been vaccinated against COVID-19. Physical Exam at Discharge:  /87   Pulse 89   Temp 98 °F (36.7 °C) (Oral)   Resp 18   Ht 5' 4\" (1.626 m)   Wt 95 lb (43.1 kg)   SpO2 99%   BMI 16.31 kg/m²     General appearance: alert, appears stated age and cooperative  Head: Normocephalic, without obvious abnormality, atraumatic  Eyes: conjunctivae/corneas clear. PERRL, EOM's intact. Neck: no adenopathy, no carotid bruit, no JVD, supple, symmetrical, trachea midline and thyroid not enlarged, symmetric, no tenderness/mass/nodules  Lungs: diminished BS with few expiratory wheezes. No rhonchi. Heart: regular rate and rhythm, S1, S2 normal, no murmur, click, rub or gallop  Abdomen: soft, non-tender; bowel sounds normal; no masses,  no organomegaly  Extremities: extremities normal, atraumatic, no cyanosis or edema  Pulses: 2+ and symmetric  Skin: Skin color, texture, turgor normal. No rashes or lesions  Neurologic: Grossly normal      Hospital Course    #Acute on chronic respiratory failure.  Patient came emergency room with cough and shortness of breath.  She was treated with antibiotics and steroids as an outpatient.  She did not improve.  She usually uses 2 L of oxygen at home but is on 4 L of oxygen.  She was tachypneic and ill-appearing.  She was admitted to hospital.  Supplemental oxygen as needed.  Use BiPAP if needed.  She was on BiPAP in the ER.  Pulmonary consultation. She is some better. Continue treatment. Discharge on Levaquin and Prednisone.     #Acute exacerbation of COPD.  Use handheld nebulizers and steroids.  COVID-19 negative.  She has heterozygous alpha-1 antitrypsin deficiency.        #Acquired hypothyroidism.  Continue Synthroid 50 mcg daily. Op follow up with endocrinology.     #History of adrenal nodule.  Stable.  OP follow up with endocrinology.     #Nonischemic cardiomyopathy.  Appears to be  stable.  No chest pain.     #Hypertension.  Continue home medications.  These include losartan, Norvasc and metoprolol.     #History of ulcerative colitis.  Finished with mesalamine.     #Counseled to stop smoking.     #Severe protein calorie malnutrition.  Use supplements as needed.     #Continue Remeron for anxiety and depression.     #GERD on Protonix.     Lovenox for DVT prophylaxis.       CBC:   Recent Labs     10/02/21  1815 10/03/21  0440   WBC 23.0* 12.0*   HGB 14.4 14.0   HCT 44.9 42.4   MCV 96.0 95.7    410     BMP:   Recent Labs     10/02/21  1815 10/03/21  0440    139   K 4.2 4.2   CL 97* 100   CO2 27 26   BUN 8 13   CREATININE <0.5* <0.5*     LIVER PROFILE:   Recent Labs     10/02/21  1815 10/03/21  0440   AST 23 20   ALT 16 14   BILITOT 0.8 0.5   ALKPHOS 144* 132*     Results for Laureano Nicole (MRN [id number]) as of 10/3/2021 09:40    Ref. Range 10/2/2021 18:20   SARS-CoV-2, NAAT Latest Ref Range: Not Detected  Not Detected              XR CHEST (2 VW)   Final Result   Stable chronic changes with no acute abnormality seen.                 Discharge Medications     Medication List      START taking these medications    levoFLOXacin 500 MG tablet  Commonly known as: LEVAQUIN  Take 1 tablet by mouth daily for 4 days     predniSONE 10 MG tablet  Commonly known as: DELTASONE  4 tabs for 3 days 3 tabs for 3 days 2 tabs for 3 days 1 tabs for 3 days        CHANGE how you take these medications    amLODIPine 5 MG tablet  Commonly known as: NORVASC  Take 1 tablet by mouth daily  What changed: when to take this        CONTINUE taking these medications    * albuterol (2.5 MG/3ML) 0.083% nebulizer solution  Commonly known as: PROVENTIL  Take 3 mLs by nebulization every 6 hours as needed for Wheezing     * albuterol sulfate  (90 Base) MCG/ACT inhaler  Inhale 1 puff into the lungs as needed for Wheezing or Shortness of Breath     AMITRIPTYLINE HCL PO     aspirin 81 MG chewable tablet  Take 1 tablet by mouth daily     atorvastatin 40 MG tablet  Commonly known as: LIPITOR  Take 1 tablet by mouth daily tablet           Discharge Condition/Location: Stable/home    Follow Up: Follow up with PCP.         Phil Ibanez MD 10/5/2021 11:16 AM

## 2021-10-19 ENCOUNTER — HOSPITAL ENCOUNTER (OUTPATIENT)
Dept: GENERAL RADIOLOGY | Age: 61
Discharge: HOME | End: 2021-10-19
Payer: MEDICARE

## 2021-10-19 ENCOUNTER — HOSPITAL ENCOUNTER (OUTPATIENT)
Age: 61
Discharge: HOME | End: 2021-10-19
Payer: MEDICARE

## 2021-10-19 DIAGNOSIS — R10.9 STOMACH ACHE: ICD-10-CM

## 2021-10-19 PROCEDURE — 74019 RADEX ABDOMEN 2 VIEWS: CPT

## 2021-11-19 ENCOUNTER — HOSPITAL ENCOUNTER (OUTPATIENT)
Dept: MAMMOGRAPHY | Age: 61
Discharge: HOME | End: 2021-11-24

## 2021-11-19 DIAGNOSIS — Z12.31 VISIT FOR SCREENING MAMMOGRAM: ICD-10-CM

## 2022-01-04 ENCOUNTER — HOSPITAL ENCOUNTER (OUTPATIENT)
Age: 62
Discharge: HOME | End: 2022-01-04
Payer: MEDICARE

## 2022-01-04 ENCOUNTER — HOSPITAL ENCOUNTER (OUTPATIENT)
Dept: GENERAL RADIOLOGY | Age: 62
Discharge: HOME | End: 2022-01-04
Payer: MEDICARE

## 2022-01-04 DIAGNOSIS — M79.601 RIGHT ARM PAIN: ICD-10-CM

## 2022-01-04 PROCEDURE — 73110 X-RAY EXAM OF WRIST: CPT

## 2022-01-05 DIAGNOSIS — J44.9 MODERATE COPD (CHRONIC OBSTRUCTIVE PULMONARY DISEASE) (HCC): ICD-10-CM

## 2022-01-05 RX ORDER — ALBUTEROL SULFATE 90 UG/1
AEROSOL, METERED RESPIRATORY (INHALATION)
Qty: 6.7 G | Refills: 5 | Status: SHIPPED | OUTPATIENT
Start: 2022-01-05

## 2022-01-11 ENCOUNTER — TELEPHONE (OUTPATIENT)
Dept: PULMONOLOGY | Age: 62
End: 2022-01-11

## 2022-01-11 NOTE — TELEPHONE ENCOUNTER
I called Tony Foley today and LM requesting she call back to reschedule PFT and OV on 1/26/22 with Dr. Josephine Starr since the PFT lab is closed for the remainder of January.

## 2022-01-19 NOTE — TELEPHONE ENCOUNTER
PFT and appt with Dr Geraldine Dubin cancelled; spke with patient she does not wish to lex either appt at this time.

## 2022-03-12 ENCOUNTER — HOSPITAL ENCOUNTER (EMERGENCY)
Age: 62
Discharge: HOME | End: 2022-03-12
Attending: EMERGENCY MEDICINE
Payer: MEDICARE

## 2022-03-12 VITALS
WEIGHT: 101 LBS | RESPIRATION RATE: 25 BRPM | HEIGHT: 64 IN | BODY MASS INDEX: 17.24 KG/M2 | DIASTOLIC BLOOD PRESSURE: 69 MMHG | SYSTOLIC BLOOD PRESSURE: 101 MMHG | TEMPERATURE: 97.9 F | HEART RATE: 77 BPM | OXYGEN SATURATION: 100 %

## 2022-03-12 DIAGNOSIS — T78.40XA ALLERGIC REACTION TO DRUG, INITIAL ENCOUNTER: Primary | ICD-10-CM

## 2022-03-12 PROCEDURE — 99284 EMERGENCY DEPT VISIT MOD MDM: CPT

## 2022-03-12 PROCEDURE — 96375 TX/PRO/DX INJ NEW DRUG ADDON: CPT

## 2022-03-12 PROCEDURE — 2500000003 HC RX 250 WO HCPCS: Performed by: EMERGENCY MEDICINE

## 2022-03-12 PROCEDURE — 6360000002 HC RX W HCPCS: Performed by: EMERGENCY MEDICINE

## 2022-03-12 PROCEDURE — 96374 THER/PROPH/DIAG INJ IV PUSH: CPT

## 2022-03-12 RX ORDER — DIPHENHYDRAMINE HYDROCHLORIDE 50 MG/ML
25 INJECTION INTRAMUSCULAR; INTRAVENOUS ONCE
Status: COMPLETED | OUTPATIENT
Start: 2022-03-12 | End: 2022-03-12

## 2022-03-12 RX ORDER — METHYLPREDNISOLONE SODIUM SUCCINATE 40 MG/ML
40 INJECTION, POWDER, LYOPHILIZED, FOR SOLUTION INTRAMUSCULAR; INTRAVENOUS ONCE
Status: COMPLETED | OUTPATIENT
Start: 2022-03-12 | End: 2022-03-12

## 2022-03-12 RX ADMIN — DIPHENHYDRAMINE HYDROCHLORIDE 25 MG: 50 INJECTION, SOLUTION INTRAMUSCULAR; INTRAVENOUS at 01:16

## 2022-03-12 RX ADMIN — FAMOTIDINE 20 MG: 10 INJECTION, SOLUTION INTRAVENOUS at 01:16

## 2022-03-12 RX ADMIN — METHYLPREDNISOLONE SODIUM SUCCINATE 40 MG: 40 INJECTION, POWDER, LYOPHILIZED, FOR SOLUTION INTRAMUSCULAR; INTRAVENOUS at 01:16

## 2022-03-12 ASSESSMENT — PAIN SCALES - GENERAL: PAINLEVEL_OUTOF10: 4

## 2022-03-12 ASSESSMENT — PAIN - FUNCTIONAL ASSESSMENT: PAIN_FUNCTIONAL_ASSESSMENT: 0-10

## 2022-03-12 NOTE — ED PROVIDER NOTES
[facility]  ED  EMERGENCY DEPARTMENT ENCOUNTER      Pt Name: Karyn Larkin  MRN: [id number]  [place] 1960  Date of evaluation: 3/12/2022  Provider: Sharon Bernal MD    CHIEF COMPLAINT       Chief Complaint   Patient presents with    Allergic Reaction     allergic reaction to muscle relaxer; pt unsure of name of muscle relaxer; started at 23:00         HISTORY OF PRESENT ILLNESS   (Location/Symptom, Timing/Onset, Context/Setting, Quality, Duration, Modifying Factors, Severity)  Note limiting factors. Karyn Larkin is a 62 y.o. female with past medical history of hypertension, COPD on chronic 2 to 3 L nasal cannula oxygen, coronary artery disease, ulcerative colitis here today with allergic reaction    Patient states she has a history of some chronic back pain was recently prescribed tizanidine for this. She notes she took a pill yesterday and began to have hives. She states she felt like her throat was closing and was having increased shortness of breath and cough with mild nausea. She called 911 and was given Benadryl in the ambulance notes she is starting to have symptomatic improvement. No diarrhea. States that tightness in her throat is improving. No chest pain. HPI    Nursing Notes were reviewed. REVIEW OF SYSTEMS    (2-9 systems for level 4, 10 or more for level 5)     Review of Systems    Please see HPI for pertinent positive and negative review of system findings. A full 10 system ROS was performed and otherwise negative.         PAST MEDICAL HISTORY     Past Medical History:   Diagnosis Date    Back pain     COPD (chronic obstructive pulmonary disease) ([address]     Fatigue     Hypertension     Syncope and collapse 03/2019    Thyroid disease     Ulcerative colitis, unspecified, without complications ([address]          SURGICAL HISTORY       Past Surgical History:   Procedure Laterality Date    BACK SURGERY      L4-L5 rods in    COLONOSCOPY N/A 7/7/2021    COLONOSCOPY WITH BIOPSY performed by Julissa Neil MD at [address]  2019    due to a fall   [facility]       Discharge Medication List as of 3/12/2022  3:56 AM      CONTINUE these medications which have NOT CHANGED    Details   albuterol sulfate  (90 Base) MCG/ACT inhaler INHALE 1-2 PUFFS EVERY SIX HOURS INTO THE LUNGS AS NEEDED FOR WHEEZING OR SHORTNESS OF BREATH, Disp-6.7 g, R-5Normal      predniSONE (DELTASONE) 10 MG tablet 4 tabs for 3 days 3 tabs for 3 days 2 tabs for 3 days 1 tabs for 3 days, Disp-30 tablet, R-0Normal      busPIRone (BUSPAR) 5 MG tablet TAKE 1 TABLET BY MOUTH ONCE DAILY FOR 1 WEEK THEN TAKE 1 TABLET TWICE DAILY. Historical Med      AMITRIPTYLINE HCL PO Take 5 mg by mouth Historical Med      losartan (COZAAR) 100 MG tablet Take 1 tablet by mouth daily, Disp-90 tablet, R-3Normal      amLODIPine (NORVASC) 5 MG tablet Take 1 tablet by mouth daily, Disp-90 tablet, R-3Normal      metoprolol succinate (TOPROL XL) 50 MG extended release tablet Take 50 mg by mouth dailyHistorical Med      mirtazapine (REMERON) 15 MG tablet Take 15 mg by mouth nightlyHistorical Med      TRELEGY ELLIPTA 100-62.5-25 MCG/INH AEPB INHALE 1 PUFF INTO THE LUNGS DAILY, Disp-60 each, R-5Normal      atorvastatin (LIPITOR) 40 MG tablet Take 1 tablet by mouth daily, Disp-90 tablet, R-3Normal      aspirin 81 MG chewable tablet Take 1 tablet by mouth daily, Disp-30 tablet, R-0Normal      Cholecalciferol (VITAMIN D) 50 MCG (2000 UT) CAPS capsule Take by mouth daily Historical Med      benzonatate (TESSALON) 100 MG capsule Take 100 mg by mouth 3 times daily as needed for CoughHistorical Med      pantoprazole (PROTONIX) 40 MG tablet Take 40 mg by mouth dailyHistorical Med      FLORASTOR 250 MG capsule Take 250 mg by mouth daily, DAWHistorical Med      gabapentin (NEURONTIN) 300 MG capsule Take 1 capsule by mouth 4 times daily for 30 days. , Disp-120 capsule, R-0Normal      aspirin-acetaminophen-caffeine (EXCEDRIN MIGRAINE) 250-250-65 MG per tablet Take 1 tablet by mouth every 6 hours as needed for HeadachesHistorical Med      albuterol (PROVENTIL) (2.5 MG/3ML) 0.083% nebulizer solution Take 3 mLs by nebulization every 6 hours as needed for Wheezing, Disp-120 each, R-3Normal      DULoxetine (CYMBALTA) 60 MG extended release capsule Take 60 mg by mouth daily Historical Med      folic acid (FOLVITE) 1 MG tablet Take 1 tablet by mouth daily, Disp-30 tablet, R-11Normal      Acetaminophen (TYLENOL) 325 MG CAPS Take 975 mg by mouth as neededHistorical Med      levothyroxine (SYNTHROID) 50 MCG tablet Take 50 mcg by mouth DailyHistorical Med      ondansetron (ZOFRAN-ODT) 8 MG TBDP disintegrating tablet Place 8 mg under the tongue every 8 hours as needed for Nausea or VomitingHistorical Med             ALLERGIES     Patient has no known allergies.     FAMILY HISTORY       Family History   Problem Relation Age of Onset    Heart Disease Father     High Blood Pressure Sister           SOCIAL HISTORY       Social History     Socioeconomic History    Marital status:      Spouse name: None    Number of children: None    Years of education: None    Highest education level: None   Occupational History    None   Tobacco Use    Smoking status: Current Every Day Smoker     Packs/day: 0.25     Years: 32.00     Pack years: 8.00     Types: Cigarettes    Smokeless tobacco: Never Used   Vaping Use    Vaping Use: Former    Substances: Always   Substance and Sexual Activity    Alcohol use: Never    Drug use: Yes     Types: Marijuana Cristian Ohara    Sexual activity: None   Other Topics Concern    None   Social History Narrative    None     Social Determinants of Health     Financial Resource Strain:     Difficulty of Paying Living Expenses: Not on file   Food Insecurity:     Worried About Running Out of Food in the Last Year: Not on file    [address] [place] the [place]  Neurological:  Alert and oriented x 3. Moves all extremities spontaneously  Musculoskeletal:   Normal ROM, no deformities          Psychiatric:  Normal mood      DIAGNOSTIC RESULTS       Labs Reviewed - No data to display    Interpretation per the Radiologist below, if obtained/available at the time of this note:    No orders to display       All other labs/imaging were within normal range or not returned as of this dictation. EMERGENCY DEPARTMENT COURSE and DIFFERENTIAL DIAGNOSIS/MDM:   Vitals:    Vitals:    03/12/22 0220 03/12/22 0235 03/12/22 0250 03/12/22 0320   BP: (!) 107/53 112/69 (!) 112/56 101/69   Pulse: 80 77 80 77   Resp:  24  25   Temp:       TempSrc:       SpO2: 100% 100% 100% 100%   Weight:       Height:           Patient presents emergency department today with concern of allergic reaction after taking tizanidine. Describes a mild possible anaphylactic reaction at home but received Benadryl prior to arrival has had near complete symptomatic resolution but was given additional dose of Benadryl here with Pepcid and steroids. Monitored for over 2 hours and has had complete resolution of symptoms feeling much improved. Rash resolved. Will be discharged home with strict return precautions and instructions to discontinue use of tizanidine    MDM    CONSULTS     None    Critical Care:   None    REASSESSMENT          PROCEDURE     Unless otherwise noted below, none     Procedures      FINAL IMPRESSION      1. Allergic reaction to drug, initial encounter            DISPOSITION/PLAN   DISPOSITION Decision To Discharge 03/12/2022 03:55:55 AM        PATIENT REFERRED TO:  Sergio Burciaga [place] [address]  [phone]    Schedule an appointment as soon as possible for a visit         DISCHARGE MEDICATIONS:  Discharge Medication List as of 3/12/2022  3:56 AM        Controlled Substances Monitoring:     No flowsheet data found.     (Please note that portions of this note were completed with a voice recognition program.  Efforts were made to edit the dictations but occasionally words are mis-transcribed.)    Cassie Klein MD (electronically signed)  Attending Emergency Physician            María Bautista MD  03/13/22 2212

## 2022-03-12 NOTE — ED NOTES
Discharge instructions explained by ED provider. Patient verbalized understanding and denies any other concerns or complaints at this time. Patient vital signs stable and no acute signs or symptoms of distress noted at discharge. Patient deemed clinically stable. Patient d/c to lobby in wheelchair with O2 to await  arrival with personal O2.      Ulises Obrien RN  03/12/22 [id number]

## 2022-03-27 ENCOUNTER — TELEPHONE (OUTPATIENT)
Dept: CASE MANAGEMENT | Age: 62
End: 2022-03-27

## 2022-03-27 NOTE — TELEPHONE ENCOUNTER
Patient due for annual CT Lung Screening. Reminder letter mailed.     Ashley Cruz [address] [facility] Navigator  [phone]

## 2022-04-04 ENCOUNTER — TELEPHONE (OUTPATIENT)
Dept: CASE MANAGEMENT | Age: 62
End: 2022-04-04

## 2022-04-04 NOTE — TELEPHONE ENCOUNTER
Patient due for annual CT Lung Screening. Reminder letter mailed.     Ashley Cruz [address] [facility] Navigator  [phone]

## 2022-04-10 ENCOUNTER — TELEPHONE (OUTPATIENT)
Dept: CASE MANAGEMENT | Age: 62
End: 2022-04-10

## 2022-04-10 NOTE — TELEPHONE ENCOUNTER
Patient due for annual CT Lung Screening. Third and final reminder letter mailed.     Ashley Cruz [address] Lung Navigator  [phone]

## 2022-05-12 ENCOUNTER — HOSPITAL ENCOUNTER (EMERGENCY)
Age: 62
Discharge: HOME | End: 2022-05-12
Payer: MEDICARE

## 2022-05-12 VITALS
BODY MASS INDEX: 16.83 KG/M2 | DIASTOLIC BLOOD PRESSURE: 79 MMHG | WEIGHT: 95 LBS | HEART RATE: 75 BPM | OXYGEN SATURATION: 99 % | SYSTOLIC BLOOD PRESSURE: 131 MMHG | TEMPERATURE: 98.2 F | RESPIRATION RATE: 18 BRPM | HEIGHT: 63 IN

## 2022-05-12 DIAGNOSIS — M79.89 SOFT TISSUE MASS: Primary | ICD-10-CM

## 2022-05-12 PROCEDURE — 99284 EMERGENCY DEPT VISIT MOD MDM: CPT

## 2022-05-12 RX ORDER — SULFAMETHOXAZOLE AND TRIMETHOPRIM 400; 80 MG/1; MG/1
1 TABLET ORAL DAILY
Qty: 7 TABLET | Refills: 0 | Status: SHIPPED | OUTPATIENT
Start: 2022-05-12 | End: 2022-05-19

## 2022-05-12 ASSESSMENT — ENCOUNTER SYMPTOMS
VOMITING: 0
ROS SKIN COMMENTS: LUMP UNDER SKIN

## 2022-05-12 ASSESSMENT — PAIN SCALES - GENERAL: PAINLEVEL_OUTOF10: 5

## 2022-05-12 ASSESSMENT — PAIN DESCRIPTION - PAIN TYPE: TYPE: ACUTE PAIN

## 2022-05-12 ASSESSMENT — PAIN - FUNCTIONAL ASSESSMENT: PAIN_FUNCTIONAL_ASSESSMENT: 0-10

## 2022-05-12 ASSESSMENT — PAIN DESCRIPTION - DESCRIPTORS: DESCRIPTORS: SHARP;STABBING

## 2022-05-12 ASSESSMENT — PAIN DESCRIPTION - LOCATION: LOCATION: COCCYX

## 2022-05-12 NOTE — ED TRIAGE NOTES
Chief Complaint   Patient presents with    Abscess     pt states has lump in coccyx area, states is warm and swollen, first noticed it today

## 2022-05-12 NOTE — ED PROVIDER NOTES
[address] ED  EMERGENCY DEPARTMENT ENCOUNTER        Pt Name: Kristen Bah  MRN: [id number]  [place] 1960  Date of evaluation: 5/12/2022  Provider: Kole Best PA-C  PCP: Cecil Anderson, DO    Shared Visit or Autonomous Visit: GISELE. I have evaluated this patient. My supervising physician was available for consultation. CHIEF COMPLAINT       Chief Complaint   Patient presents with    Abscess     pt states has lump in coccyx area, states is warm and swollen, first noticed it today       HISTORY OF PRESENT ILLNESS   (Location/Symptom, Timing/Onset, Context/Setting, Quality, Duration, Modifying Factors, Severity)  Note limiting factors. Kristen Bah is a 62 y.o. female presenting to the emergency department for evaluation of lump near her tailbone she just noticed it this morning. States she was down at [facility] yesterday she is in cardiac rehab did not have any lumps or problems yesterday area is painful and feels warm to her. No redness. No fever. The history is provided by the patient. Nursing Notes were reviewed    REVIEW OF SYSTEMS    (2-9 systems for level 4, 10 or more for level 5)     Review of Systems   Constitutional: Negative for fever. Gastrointestinal: Negative for vomiting. Skin:        Lump under skin       Positives and Pertinent negatives as per HPI.        PAST MEDICAL HISTORY     Past Medical History:   Diagnosis Date    Back pain     COPD (chronic obstructive pulmonary disease) (HCC)     Fatigue     Hypertension     Syncope and collapse 03/2019    Thyroid disease     Ulcerative colitis, unspecified, without complications ([facility] [address]          SURGICAL HISTORY     Past Surgical History:   Procedure Laterality Date    BACK SURGERY      L4-L5 rods in    COLONOSCOPY N/A 7/7/2021    COLONOSCOPY WITH BIOPSY performed by Modesta Sutherland MD at [address] [place] [address]  2019    due to a fall    TONSILLECTOMY CURRENTMEDICATIONS       Previous Medications    ACETAMINOPHEN (TYLENOL) 325 MG CAPS    Take 975 mg by mouth as needed    ALBUTEROL (PROVENTIL) (2.5 MG/3ML) 0.083% NEBULIZER SOLUTION    Take 3 mLs by nebulization every 6 hours as needed for Wheezing    ALBUTEROL SULFATE  (90 BASE) MCG/ACT INHALER    INHALE 1-2 PUFFS EVERY SIX HOURS INTO THE LUNGS AS NEEDED FOR WHEEZING OR SHORTNESS OF BREATH    AMITRIPTYLINE HCL PO    Take 5 mg by mouth     AMLODIPINE (NORVASC) 5 MG TABLET    Take 1 tablet by mouth daily    ASPIRIN 81 MG CHEWABLE TABLET    Take 1 tablet by mouth daily    ASPIRIN-ACETAMINOPHEN-CAFFEINE (EXCEDRIN MIGRAINE) 250-250-65 MG PER TABLET    Take 1 tablet by mouth every 6 hours as needed for Headaches    ATORVASTATIN (LIPITOR) 40 MG TABLET    Take 1 tablet by mouth daily    BENZONATATE (TESSALON) 100 MG CAPSULE    Take 100 mg by mouth 3 times daily as needed for Cough    BUSPIRONE (BUSPAR) 5 MG TABLET    TAKE 1 TABLET BY MOUTH ONCE DAILY FOR 1 WEEK THEN TAKE 1 TABLET TWICE DAILY. CHOLECALCIFEROL (VITAMIN D) 50 MCG (2000 UT) CAPS CAPSULE    Take by mouth daily     DULOXETINE (CYMBALTA) 60 MG EXTENDED RELEASE CAPSULE    Take 60 mg by mouth daily     FLORASTOR 250 MG CAPSULE    Take 250 mg by mouth daily    FOLIC ACID (FOLVITE) 1 MG TABLET    Take 1 tablet by mouth daily    GABAPENTIN (NEURONTIN) 300 MG CAPSULE    Take 1 capsule by mouth 4 times daily for 30 days.     LEVOTHYROXINE (SYNTHROID) 50 MCG TABLET    Take 50 mcg by mouth Daily    LOSARTAN (COZAAR) 100 MG TABLET    Take 1 tablet by mouth daily    METOPROLOL SUCCINATE (TOPROL XL) 50 MG EXTENDED RELEASE TABLET    Take 50 mg by mouth daily    MIRTAZAPINE (REMERON) 15 MG TABLET    Take 15 mg by mouth nightly    ONDANSETRON (ZOFRAN-ODT) 8 MG TBDP DISINTEGRATING TABLET    Place 8 mg under the tongue every 8 hours as needed for Nausea or Vomiting    PANTOPRAZOLE (PROTONIX) 40 MG TABLET    Take 40 mg by mouth daily    PREDNISONE (DELTASONE) 10 MG TABLET    4 tabs for 3 days 3 tabs for 3 days 2 tabs for 3 days 1 tabs for 3 days    TRELEGY ELLIPTA 100-62.5-25 MCG/INH AEPB    INHALE 1 PUFF INTO THE LUNGS DAILY         ALLERGIES     Patient has no known allergies. FAMILYHISTORY       Family History   Problem Relation Age of Onset    Heart Disease Father     High Blood Pressure Sister           SOCIAL HISTORY       Social History     Socioeconomic History    Marital status:      Spouse name: None    Number of children: None    Years of education: None    Highest education level: None   Occupational History    None   Tobacco Use    Smoking status: Former Smoker     Packs/day: 0.25     Years: 32.00     Pack years: 8.00     Types: Cigarettes    Smokeless tobacco: Never Used   Vaping Use    Vaping Use: Former    Substances: Always   Substance and Sexual Activity    Alcohol use: Never    Drug use: Not Currently     Types: Marijuana Gaynelle [place])    Sexual activity: None   Other Topics Concern    None   Social History Narrative    None     Social Determinants of Health     Financial Resource Strain:     Difficulty of Paying Living Expenses: Not on file   Food Insecurity:     Worried About Running Out of Food in the Last Year: Not on file    Latasha of Food in the Last Year: Not on file   Transportation Needs:     Lack of Transportation (Medical): Not on file    Lack of Transportation (Non-Medical):  Not on file   Physical Activity:     Days of Exercise per Week: Not on file    Minutes of Exercise per Session: Not on file   Stress:     Feeling of Stress : Not on file   Social Connections:     Frequency of Communication with Friends and Family: Not on file    Frequency of Social Gatherings with Friends and Family: Not on file    Attends [employer] Services: Not on file    Active Member of Clubs or Organizations: Not on file    Attends Club or Organization Meetings: Not on file    Marital Status: Not on file   Intimate Partner Violence:  Fear of Current or Ex-Partner: Not on file    Emotionally Abused: Not on file    Physically Abused: Not on file    Sexually Abused: Not on file   Housing Stability:     Unable to Pay for Housing in the Last Year: Not on file    Number of [place] in the Last Year: Not on file    Unstable Housing in the Last Year: Not on file       SCREENINGS             PHYSICAL EXAM    (up to 7 for level 4, 8 or more for level 5)     ED Triage Vitals [05/12/22 1411]   BP Temp Temp Source Pulse Resp SpO2 Height Weight   131/79 98.2 °F (36.8 °C) Oral 75 18 99 % 5' 3\" (1.6 m) 95 lb (43.1 kg)       Physical Exam  Vitals and nursing note reviewed. Constitutional:       Appearance: She is well-developed. She is not ill-appearing or toxic-appearing. Interventions: Nasal cannula in place. Comments: 2-3cm palpable soft tissue mass to left posterior pelvis tender to palpation. No skin changes. No redness. No midline spine tenderness. HENT:      Head: Normocephalic and atraumatic. Pulmonary:      Effort: Pulmonary effort is normal. No respiratory distress. Skin:     General: Skin is warm and dry. Neurological:      Mental Status: She is alert and oriented to person, place, and time. Motor: No abnormal muscle tone. Psychiatric:         Behavior: Behavior normal.         DIAGNOSTIC RESULTS   LABS:    Labs Reviewed - No data to display    All other labs were within normal range or not returned as of this dictation. EKG: All EKG's are interpreted by the Emergency Department Physician in the absence of a cardiologist.  Please see their note for interpretation of EKG.       RADIOLOGY:   Non-plain film images such as CT, Ultrasound and MRI are read by the radiologist. Plain radiographic images are visualized andpreliminarily interpreted by the  ED Provider with the below findings:        Interpretation perthe Radiologist below, if available at the time of this note:    No orders to display     No results found.        PROCEDURES   Unless otherwise noted below, none     Procedures    CRITICAL CARE TIME   N/A    CONSULTS:  None      EMERGENCY DEPARTMENT COURSE and DIFFERENTIAL DIAGNOSIS/MDM:   Vitals:    Vitals:    05/12/22 1411   BP: 131/79   Pulse: 75   Resp: 18   Temp: 98.2 °F (36.8 °C)   TempSrc: Oral   SpO2: 99%   Weight: 95 lb (43.1 kg)   Height: 5' 3\" (1.6 m)       Patient was given thefollowing medications:  Medications - No data to display        ED course  Patient presented to the ER for evaluation of lump near her tailbone she noticed this morning is painful came in for evaluation. On exam she has a 2 to 3 cm palpable soft tissue mass to the left posterior pelvis with tenderness. Not at coccyx not pilonidal. There is no redness or any skin changes. Appears most like lipoma on exam but with abrupt onset and pain I did discuss possible early infection we will cover her with antibiotics. There is no abscess no fluctuance no drainage no skin changes. We did discuss also possibly of a hematoma although she does not recall any injury. Discussed sitting on pillows Tylenol for pain close follow-up with her doctor to be seen within 1 week for reevaluation and to return to the ER for any worsening symptoms. She understands and agrees. I estimate there is LOW risk for SEVERE CELLULITIS, SEPSIS OR NECROTIZING FASCIITIS,  thus I consider the discharge disposition reasonable. FINAL IMPRESSION      1.  Soft tissue mass          DISPOSITION/PLAN   DISPOSITION Decision to Discharge    PATIENT REFERREDTO:  Gloria Denise [place] [address]  [phone]    Call in 1 day  Call to arrange follow-up appointment from ER visit to be seen within 1 week    [facility]  [address] [place]  [phone]    If symptoms worsen      DISCHARGE MEDICATIONS:  New Prescriptions    SULFAMETHOXAZOLE-TRIMETHOPRIM (BACTRIM) 400-80 MG PER TABLET    Take 1 tablet by mouth daily for 7 days       DISCONTINUED MEDICATIONS:  Discontinued Medications    No medications on file              (Please note that portions ofthis note were completed with a voice recognition program.  Efforts were made to edit the dictations but occasionally words are mis-transcribed.)    Wade Griffith PA-C (electronically signed)           Chapin Tang PA-C  05/12/22 3055

## 2022-05-17 ENCOUNTER — HOSPITAL ENCOUNTER (OUTPATIENT)
Dept: GENERAL RADIOLOGY | Age: 62
Discharge: HOME | End: 2022-05-17
Payer: MEDICARE

## 2022-05-17 ENCOUNTER — HOSPITAL ENCOUNTER (OUTPATIENT)
Age: 62
Discharge: HOME | End: 2022-05-17
Payer: MEDICARE

## 2022-05-17 DIAGNOSIS — M53.3 SI (SACROILIAC) PAIN: ICD-10-CM

## 2022-05-17 PROCEDURE — 73502 X-RAY EXAM HIP UNI 2-3 VIEWS: CPT

## 2022-05-25 RX ORDER — FLUTICASONE FUROATE, UMECLIDINIUM BROMIDE AND VILANTEROL TRIFENATATE 100; 62.5; 25 UG/1; UG/1; UG/1
1 POWDER RESPIRATORY (INHALATION) DAILY
Refills: 5 | OUTPATIENT
Start: 2022-05-25

## 2022-05-26 ENCOUNTER — HOSPITAL ENCOUNTER (OUTPATIENT)
Dept: ULTRASOUND IMAGING | Age: 62
Discharge: HOME | End: 2022-05-26
Payer: MEDICARE

## 2022-05-26 DIAGNOSIS — R22.9 LOCALIZED SOFT TISSUE SWELLING: ICD-10-CM

## 2022-05-26 PROCEDURE — 76882 US LMTD JT/FCL EVL NVASC XTR: CPT

## 2022-08-31 ENCOUNTER — HOSPITAL ENCOUNTER (OUTPATIENT)
Age: 62
Discharge: HOME | End: 2022-08-31
Payer: MEDICARE

## 2022-08-31 ENCOUNTER — HOSPITAL ENCOUNTER (OUTPATIENT)
Dept: GENERAL RADIOLOGY | Age: 62
Discharge: HOME | End: 2022-08-31
Payer: MEDICARE

## 2022-08-31 DIAGNOSIS — M25.572 ACUTE BILATERAL ANKLE PAIN: ICD-10-CM

## 2022-08-31 DIAGNOSIS — M25.571 ACUTE BILATERAL ANKLE PAIN: ICD-10-CM

## 2022-08-31 PROCEDURE — 73610 X-RAY EXAM OF ANKLE: CPT

## 2022-09-12 ENCOUNTER — TELEPHONE (OUTPATIENT)
Dept: CARDIOLOGY CLINIC | Age: 62
End: 2022-09-12

## 2022-09-12 RX ORDER — LOSARTAN POTASSIUM 100 MG/1
100 TABLET ORAL DAILY
Qty: 90 TABLET | Refills: 0 | Status: SHIPPED | OUTPATIENT
Start: 2022-09-12

## 2022-09-12 NOTE — TELEPHONE ENCOUNTER
09/12/22-Called phone number [phone], no answer lm to return call to [facility] to schedule an annual follow up appt . Letter sent as well.

## 2022-09-12 NOTE — TELEPHONE ENCOUNTER
Please contact pt for yearly appt/med refills appt [facility] [place] If the office can not get a hold of the patient for an appt please mail a letter to have them call and schedule. Thanks.

## 2022-09-12 NOTE — LETTER
[address] Cardiology - [address] [facility] [address] [facility]  Phone: [phone]  Fax: [phone]    Meena Mera MD        September 12, 2022    Janee Carroll  [address] [place] [id number]      Dear Maira Gomez: We have tried to contact you regarding an annual follow up appointment. We would like to check in with you as well as further your refills. Please contact out office to schedule this appointment. Take care.          Sincerely,        Meena Mera MD

## 2022-09-28 ENCOUNTER — APPOINTMENT (OUTPATIENT)
Dept: CT IMAGING | Age: 62
End: 2022-09-28
Payer: MEDICARE

## 2022-09-28 ENCOUNTER — APPOINTMENT (OUTPATIENT)
Dept: GENERAL RADIOLOGY | Age: 62
End: 2022-09-28
Payer: MEDICARE

## 2022-09-28 ENCOUNTER — HOSPITAL ENCOUNTER (EMERGENCY)
Age: 62
Discharge: HOME | End: 2022-09-28
Attending: STUDENT IN AN ORGANIZED HEALTH CARE EDUCATION/TRAINING PROGRAM
Payer: MEDICARE

## 2022-09-28 VITALS
OXYGEN SATURATION: 96 % | DIASTOLIC BLOOD PRESSURE: 85 MMHG | SYSTOLIC BLOOD PRESSURE: 156 MMHG | RESPIRATION RATE: 18 BRPM | WEIGHT: 103 LBS | HEART RATE: 78 BPM | BODY MASS INDEX: 18.25 KG/M2

## 2022-09-28 DIAGNOSIS — I10 PRIMARY HYPERTENSION: ICD-10-CM

## 2022-09-28 DIAGNOSIS — K04.7 DENTAL INFECTION: Primary | ICD-10-CM

## 2022-09-28 LAB
A/G RATIO: 1.5 (ref 1.1–2.2)
ALBUMIN SERPL-MCNC: 4.3 G/DL (ref 3.4–5)
ALP BLD-CCNC: 93 U/L (ref 40–129)
ALT SERPL-CCNC: 12 U/L (ref 10–40)
ANION GAP SERPL CALCULATED.3IONS-SCNC: 10 MMOL/L (ref 3–16)
AST SERPL-CCNC: 18 U/L (ref 15–37)
BASOPHILS ABSOLUTE: 0.2 K/UL (ref 0–0.2)
BASOPHILS RELATIVE PERCENT: 1.3 %
BILIRUB SERPL-MCNC: 0.4 MG/DL (ref 0–1)
BUN BLDV-MCNC: 8 MG/DL (ref 7–20)
CALCIUM SERPL-MCNC: 10.3 MG/DL (ref 8.3–10.6)
CHLORIDE BLD-SCNC: 99 MMOL/L (ref 99–110)
CO2: 31 MMOL/L (ref 21–32)
CREAT SERPL-MCNC: 0.6 MG/DL (ref 0.6–1.2)
EKG ATRIAL RATE: 70 BPM
EKG DIAGNOSIS: NORMAL
EKG P AXIS: 88 DEGREES
EKG P-R INTERVAL: 162 MS
EKG Q-T INTERVAL: 394 MS
EKG QRS DURATION: 84 MS
EKG QTC CALCULATION (BAZETT): 425 MS
EKG R AXIS: 83 DEGREES
EKG T AXIS: 84 DEGREES
EKG VENTRICULAR RATE: 70 BPM
EOSINOPHILS ABSOLUTE: 0.8 K/UL (ref 0–0.6)
EOSINOPHILS RELATIVE PERCENT: 6.2 %
GFR AFRICAN AMERICAN: >60
GFR NON-AFRICAN AMERICAN: >60
GLUCOSE BLD-MCNC: 119 MG/DL (ref 70–99)
HCT VFR BLD CALC: 40.3 % (ref 36–48)
HEMOGLOBIN: 13.2 G/DL (ref 12–16)
LYMPHOCYTES ABSOLUTE: 3 K/UL (ref 1–5.1)
LYMPHOCYTES RELATIVE PERCENT: 24.7 %
MCH RBC QN AUTO: 31.3 PG (ref 26–34)
MCHC RBC AUTO-ENTMCNC: 32.8 G/DL (ref 31–36)
MCV RBC AUTO: 95.4 FL (ref 80–100)
MONOCYTES ABSOLUTE: 1.1 K/UL (ref 0–1.3)
MONOCYTES RELATIVE PERCENT: 9 %
NEUTROPHILS ABSOLUTE: 7.2 K/UL (ref 1.7–7.7)
NEUTROPHILS RELATIVE PERCENT: 58.8 %
PDW BLD-RTO: 14 % (ref 12.4–15.4)
PLATELET # BLD: 472 K/UL (ref 135–450)
PMV BLD AUTO: 7.9 FL (ref 5–10.5)
POTASSIUM REFLEX MAGNESIUM: 3.6 MMOL/L (ref 3.5–5.1)
RBC # BLD: 4.22 M/UL (ref 4–5.2)
SODIUM BLD-SCNC: 140 MMOL/L (ref 136–145)
TOTAL PROTEIN: 7.1 G/DL (ref 6.4–8.2)
TROPONIN: 0.01 NG/ML
WBC # BLD: 12.2 K/UL (ref 4–11)

## 2022-09-28 PROCEDURE — 80053 COMPREHEN METABOLIC PANEL: CPT

## 2022-09-28 PROCEDURE — 71045 X-RAY EXAM CHEST 1 VIEW: CPT

## 2022-09-28 PROCEDURE — 70450 CT HEAD/BRAIN W/O DYE: CPT

## 2022-09-28 PROCEDURE — 85025 COMPLETE CBC W/AUTO DIFF WBC: CPT

## 2022-09-28 PROCEDURE — 6370000000 HC RX 637 (ALT 250 FOR IP): Performed by: STUDENT IN AN ORGANIZED HEALTH CARE EDUCATION/TRAINING PROGRAM

## 2022-09-28 PROCEDURE — 84484 ASSAY OF TROPONIN QUANT: CPT

## 2022-09-28 PROCEDURE — 70486 CT MAXILLOFACIAL W/O DYE: CPT

## 2022-09-28 PROCEDURE — 93005 ELECTROCARDIOGRAM TRACING: CPT | Performed by: STUDENT IN AN ORGANIZED HEALTH CARE EDUCATION/TRAINING PROGRAM

## 2022-09-28 PROCEDURE — 93010 ELECTROCARDIOGRAM REPORT: CPT | Performed by: INTERNAL MEDICINE

## 2022-09-28 PROCEDURE — 2580000003 HC RX 258: Performed by: STUDENT IN AN ORGANIZED HEALTH CARE EDUCATION/TRAINING PROGRAM

## 2022-09-28 PROCEDURE — 99285 EMERGENCY DEPT VISIT HI MDM: CPT

## 2022-09-28 PROCEDURE — 96374 THER/PROPH/DIAG INJ IV PUSH: CPT

## 2022-09-28 PROCEDURE — 96375 TX/PRO/DX INJ NEW DRUG ADDON: CPT

## 2022-09-28 PROCEDURE — 6360000002 HC RX W HCPCS: Performed by: STUDENT IN AN ORGANIZED HEALTH CARE EDUCATION/TRAINING PROGRAM

## 2022-09-28 RX ORDER — AMOXICILLIN 500 MG/1
500 CAPSULE ORAL ONCE
Status: COMPLETED | OUTPATIENT
Start: 2022-09-28 | End: 2022-09-28

## 2022-09-28 RX ORDER — METOCLOPRAMIDE HYDROCHLORIDE 5 MG/ML
10 INJECTION INTRAMUSCULAR; INTRAVENOUS ONCE
Status: COMPLETED | OUTPATIENT
Start: 2022-09-28 | End: 2022-09-28

## 2022-09-28 RX ORDER — DIPHENHYDRAMINE HYDROCHLORIDE 50 MG/ML
25 INJECTION INTRAMUSCULAR; INTRAVENOUS ONCE
Status: COMPLETED | OUTPATIENT
Start: 2022-09-28 | End: 2022-09-28

## 2022-09-28 RX ORDER — AMOXICILLIN 500 MG/1
500 CAPSULE ORAL 3 TIMES DAILY
Qty: 21 CAPSULE | Refills: 0 | Status: SHIPPED | OUTPATIENT
Start: 2022-09-28 | End: 2022-10-05

## 2022-09-28 RX ORDER — 0.9 % SODIUM CHLORIDE 0.9 %
1000 INTRAVENOUS SOLUTION INTRAVENOUS ONCE
Status: COMPLETED | OUTPATIENT
Start: 2022-09-28 | End: 2022-09-28

## 2022-09-28 RX ORDER — KETOROLAC TROMETHAMINE 30 MG/ML
15 INJECTION, SOLUTION INTRAMUSCULAR; INTRAVENOUS ONCE
Status: COMPLETED | OUTPATIENT
Start: 2022-09-28 | End: 2022-09-28

## 2022-09-28 RX ADMIN — KETOROLAC TROMETHAMINE 15 MG: 30 INJECTION, SOLUTION INTRAMUSCULAR at 20:02

## 2022-09-28 RX ADMIN — METOCLOPRAMIDE HYDROCHLORIDE 10 MG: 5 INJECTION INTRAMUSCULAR; INTRAVENOUS at 18:24

## 2022-09-28 RX ADMIN — AMOXICILLIN 500 MG: 500 CAPSULE ORAL at 20:02

## 2022-09-28 RX ADMIN — DIPHENHYDRAMINE HYDROCHLORIDE 25 MG: 50 INJECTION, SOLUTION INTRAMUSCULAR; INTRAVENOUS at 18:25

## 2022-09-28 RX ADMIN — SODIUM CHLORIDE 1000 ML: 9 INJECTION, SOLUTION INTRAVENOUS at 18:29

## 2022-09-28 NOTE — Clinical Note
Savanah Gallo was seen and treated in our emergency department on 9/28/2022. She may return to work on 09/29/2022. If you have any questions or concerns, please don't hesitate to call.       Timur Esteban, DO

## 2022-09-28 NOTE — ED PROVIDER NOTES
[address] ED      CHIEF COMPLAINT  Hypertension (Pt reports PCP sent her here bc her EKG showed changes, pt unable to report those changes, EKG being performed in triage. Pt reports HA x3 days and elevated BP, takes losartan and metoprolol. )       HISTORY OF PRESENT ILLNESS  Candace Curtis is a 65 y.o. female  who presents to the ED complaining of headaches over the last 3 days and elevated blood pressures as well as nausea and vomiting. Patient saw her PCP today who had an EKG obtained. She states that he told her that showed \"enlargement of the top part of my heart. \"  She also endorses nausea and vomiting with her headaches. Initially they were getting better with Excedrin, but now that is no longer helping. Has been eating and drinking less her last couple days because she has been laying around trying to improve her headache. 4 episodes of nonbloody nonbilious emesis last night. Denies any fevers, neck pain, rash. No new numbness/weakness/tingling. Denies dysuria, hematuria, diarrhea. Patient states that her doctor also sent her in for a scan of her head due to these headaches. No other complaints, modifying factors or associated symptoms. I have reviewed the following from the nursing documentation.     Past Medical History:   Diagnosis Date    Back pain     COPD (chronic obstructive pulmonary disease) (HCC)     Fatigue     Hypertension     Syncope and collapse 03/2019    Thyroid disease     Ulcerative colitis, unspecified, without complications [facility])      Past Surgical History:   Procedure Laterality Date    BACK SURGERY      L4-L5 rods in    COLONOSCOPY N/A 7/7/2021    COLONOSCOPY WITH BIOPSY performed by Delmer Scott MD at [address] [facility] [place] [address]  2019    due to a fall    TONSILLECTOMY       Family History   Problem Relation Age of Onset    Heart Disease Father     High Blood Pressure Sister      Social History     Socioeconomic History Marital status:      Spouse name: Not on file    Number of children: Not on file    Years of education: Not on file    Highest education level: Not on file   Occupational History    Not on file   Tobacco Use    Smoking status: Former     Packs/day: 0.25     Years: 32.00     Pack years: 8.00     Types: Cigarettes    Smokeless tobacco: Never   Vaping Use    Vaping Use: Former    Substances: Always   Substance and Sexual Activity    Alcohol use: Never    Drug use: Not Currently     Types: Marijuana Cortney Lux)    Sexual activity: Not on file   Other Topics Concern    Not on file   Social History Narrative    Not on file     Social Determinants of Health     Financial Resource Strain: Not on file   Food Insecurity: Not on file   Transportation Needs: Not on file   Physical Activity: Not on file   Stress: Not on file   Social Connections: Not on file   Intimate Partner Violence: Not on file   Housing Stability: Not on file     Current Facility-Administered Medications   Medication Dose Route Frequency Provider Last Rate Last Admin    0.9 % sodium chloride bolus  1,000 mL IntraVENous Once Tamsen Gallop, DO        metoclopramide (REGLAN) injection 10 mg  10 mg IntraVENous Once Tamsen Gallop, DO        diphenhydrAMINE (BENADRYL) injection 25 mg  25 mg IntraVENous Once Tamsen Gallop, DO         Current Outpatient Medications   Medication Sig Dispense Refill    losartan (COZAAR) 100 MG tablet TAKE 1 TABLET BY MOUTH DAILY 90 tablet 0    albuterol sulfate  (90 Base) MCG/ACT inhaler INHALE 1-2 PUFFS EVERY SIX HOURS INTO THE LUNGS AS NEEDED FOR WHEEZING OR SHORTNESS OF BREATH 6.7 g 5    predniSONE (DELTASONE) 10 MG tablet 4 tabs for 3 days 3 tabs for 3 days 2 tabs for 3 days 1 tabs for 3 days 30 tablet 0    busPIRone (BUSPAR) 5 MG tablet TAKE 1 TABLET BY MOUTH ONCE DAILY FOR 1 WEEK THEN TAKE 1 TABLET TWICE DAILY.       AMITRIPTYLINE HCL PO Take 5 mg by mouth       amLODIPine (NORVASC) 5 MG tablet Take 1 tablet by mouth daily (Patient taking differently: Take 5 mg by mouth nightly ) 90 tablet 3    metoprolol succinate (TOPROL XL) 50 MG extended release tablet Take 50 mg by mouth daily      mirtazapine (REMERON) 15 MG tablet Take 15 mg by mouth nightly      TRELEGY ELLIPTA 100-62.5-25 MCG/INH AEPB INHALE 1 PUFF INTO THE LUNGS DAILY 60 each 5    atorvastatin (LIPITOR) 40 MG tablet Take 1 tablet by mouth daily 90 tablet 3    aspirin 81 MG chewable tablet Take 1 tablet by mouth daily 30 tablet 0    Cholecalciferol (VITAMIN D) 50 MCG (2000 UT) CAPS capsule Take by mouth daily       benzonatate (TESSALON) 100 MG capsule Take 100 mg by mouth 3 times daily as needed for Cough      pantoprazole (PROTONIX) 40 MG tablet Take 40 mg by mouth daily      FLORASTOR 250 MG capsule Take 250 mg by mouth daily      gabapentin (NEURONTIN) 300 MG capsule Take 1 capsule by mouth 4 times daily for 30 days. 120 capsule 0    aspirin-acetaminophen-caffeine (EXCEDRIN MIGRAINE) 250-250-65 MG per tablet Take 1 tablet by mouth every 6 hours as needed for Headaches      albuterol (PROVENTIL) (2.5 MG/3ML) 0.083% nebulizer solution Take 3 mLs by nebulization every 6 hours as needed for Wheezing 120 each 3    DULoxetine (CYMBALTA) 60 MG extended release capsule Take 60 mg by mouth daily       folic acid (FOLVITE) 1 MG tablet Take 1 tablet by mouth daily 30 tablet 11    Acetaminophen (TYLENOL) 325 MG CAPS Take 975 mg by mouth as needed      levothyroxine (SYNTHROID) 50 MCG tablet Take 50 mcg by mouth Daily      ondansetron (ZOFRAN-ODT) 8 MG TBDP disintegrating tablet Place 8 mg under the tongue every 8 hours as needed for Nausea or Vomiting       No Known Allergies    REVIEW OF SYSTEMS  10 systems reviewed, pertinent positives per HPI otherwise noted to be negative. PHYSICAL EXAM  BP (!) 156/85   Pulse 78   Resp 18   Wt 103 lb (46.7 kg)   SpO2 96%   BMI 18.25 kg/m²    General: Appears well. Alert  HEENT: Head atraumatic, Eyes normal inspection, PERRL. Normal ENT inspection, Pharynx normal.  Mild tenderness over the left maxilla with soft tissue swelling. No signs of dehydration  NECK: Normal inspection  RESPIRATORY: Scattered wheezes. No chest wall tenderness. No respiratory distress  CVS: Heart rate and rhythm regular. No Murmurs  ABDOMEN/GI: Soft, Non-tender, No distention  BACK: Normal inspection  EXTREMITIES: Non-Tender. Full ROM. Normal appearance. No Pedal edema  NEURO: Alert and oriented. Sensation normal. Motor normal  PSYCH: Mood normal. Affect normal.  SKIN: Color normal. No rash. Warm, Dry    LABS  I have reviewed all labs for this visit. Results for orders placed or performed during the hospital encounter of 09/28/22   EKG 12 Lead   Result Value Ref Range    Ventricular Rate 70 BPM    Atrial Rate 70 BPM    P-R Interval 162 ms    QRS Duration 84 ms    Q-T Interval 394 ms    QTc Calculation (Bazett) 425 ms    P Axis 88 degrees    R Axis 83 degrees    T Axis 84 degrees    Diagnosis       Normal sinus rhythmBiatrial enlargementAbnormal ECGWhen compared with ECG of 02-OCT-2021 19:42,Nonspecific ST abnormalityConfirmed by JUHI Medel MD ([id number]) on 9/28/2022 5:30:40 PM       ECG  The Ekg interpreted by me shows  Sinus rhythm with a rate of 70 bpm.  Normal axis. No acute injury pattern. Biatrial enlargement noted. , QRS 84, QTc 425. Now with biatrial enlargement, previous EKG on 10/2/2021 showed right atrial enlargement. RADIOLOGY  CT Head W/O Contrast   Final Result   1. CT HEAD: No acute intracranial abnormality. 2.  White matter hypoattenuation described is typical of microvascular   ischemic disease or as sequela of dysmyelinating/demyelinating processes. 3.  Vascular calcifications are noted reflecting calcific atherosclerosis. 4. CT MAXILLOFACIAL BONES: No acute facial bone abnormality.    5. Several missing teeth, absent crown of the upper left molar, correlate   with dental exam.         CT FACIAL BONES WO CONTRAST   Final Result   1. CT HEAD: No acute intracranial abnormality. 2.  White matter hypoattenuation described is typical of microvascular   ischemic disease or as sequela of dysmyelinating/demyelinating processes. 3.  Vascular calcifications are noted reflecting calcific atherosclerosis. 4. CT MAXILLOFACIAL BONES: No acute facial bone abnormality. 5. Several missing teeth, absent crown of the upper left molar, correlate   with dental exam.         XR CHEST PORTABLE   Final Result   1. Chronic pulmonary changes with no acute abnormality on current exam.   2. Wire/mesh structure projecting over the right pulmonary hilum. Correlate   with any procedure or surgical history. A foreign body is considered less   likely but cannot be excluded. ED COURSE/MDM  Patient seen and evaluated. Old records reviewed. Labs and imaging reviewed and results discussed with patient. Cardiac work-up obtained. Lab work is reassuring. Due to severe headache CT head was obtained. This shows no acute process. It does show several missing teeth and absent crown over the left molar. On exam this area is erythematous and I am concerned for possible dental infection. She was treated with IV fluid, Reglan, Benadryl with mild improvement in her headache. She is also treated Toradol with near resolution of her headache. She given first dose amoxicillin for her dental infection. Given prescription for amoxicillin and follow-up to PCP in 2 to 3 days if not improving. Is this patient to be included in the SEP-1 Core Measure due to severe sepsis or septic shock? No   Exclusion criteria - the patient is NOT to be included for SEP-1 Core Measure due to:   Infection is not suspected    During the patient's ED course, the patient was given:  Medications   0.9 % sodium chloride bolus (0 mLs IntraVENous Stopped 9/28/22 1959)   metoclopramide (REGLAN) injection 10 mg (10 mg IntraVENous Given 9/28/22 1824)   diphenhydrAMINE (BENADRYL) injection 25 mg (25 mg IntraVENous Given 9/28/22 1825)   ketorolac (TORADOL) injection 15 mg (15 mg IntraVENous Given 9/28/22 2002)   amoxicillin (AMOXIL) capsule 500 mg (500 mg Oral Given 9/28/22 2002)        CLINICAL IMPRESSION  1. Dental infection    2. Primary hypertension        Blood pressure (!) 156/85, pulse 78, resp. rate 18, weight 103 lb (46.7 kg), SpO2 96 %, not currently breastfeeding. DISPOSITION  Jillian Burns was discharged to home in stable condition. Patient was given scripts for the following medications. I counseled patient how to take these medications. Discharge Medication List as of 9/28/2022  9:03 PM        START taking these medications    Details   amoxicillin (AMOXIL) 500 MG capsule Take 1 capsule by mouth 3 times daily for 7 days, Disp-21 capsule, R-0Normal             Follow-up with:  Regine Cook DO  [address]  [phone]    Call in 3 days  As needed    Your dentist    Call in 1 day      DISCLAIMER: This chart was created using Dragon dictation software. Efforts were made by me to ensure accuracy, however some errors may be present due to limitations of this technology and occasionally words are not transcribed correctly.       Ese Ahuja DO  09/29/22 0549

## 2022-09-29 NOTE — DISCHARGE INSTRUCTIONS
Return the nearest ED if you develop severe worsening headache, fevers, nausea and vomiting, other concerning symptoms. Follow-up with your PCP in 2 to 3 days. Follow-up with your dentist in 1 day.

## 2022-11-14 ENCOUNTER — HOSPITAL ENCOUNTER (OUTPATIENT)
Dept: CT IMAGING | Age: 62
Discharge: HOME | End: 2022-11-14
Payer: MEDICARE

## 2022-11-14 DIAGNOSIS — M86.9: ICD-10-CM

## 2022-11-14 PROCEDURE — 70486 CT MAXILLOFACIAL W/O DYE: CPT

## 2023-01-24 ENCOUNTER — OFFICE VISIT (OUTPATIENT)
Dept: FAMILY MEDICINE CLINIC | Age: 63
End: 2023-01-24
Payer: MEDICARE

## 2023-01-24 VITALS
OXYGEN SATURATION: 92 % | HEIGHT: 63 IN | HEART RATE: 97 BPM | BODY MASS INDEX: 18.39 KG/M2 | RESPIRATION RATE: 16 BRPM | WEIGHT: 103.8 LBS | DIASTOLIC BLOOD PRESSURE: 84 MMHG | SYSTOLIC BLOOD PRESSURE: 120 MMHG

## 2023-01-24 DIAGNOSIS — J44.1 COPD EXACERBATION (HCC): Primary | ICD-10-CM

## 2023-01-24 DIAGNOSIS — R05.3 CHRONIC COUGH: ICD-10-CM

## 2023-01-24 DIAGNOSIS — G57.92 NEUROPATHY OF LEFT LOWER EXTREMITY: ICD-10-CM

## 2023-01-24 PROCEDURE — 3017F COLORECTAL CA SCREEN DOC REV: CPT | Performed by: STUDENT IN AN ORGANIZED HEALTH CARE EDUCATION/TRAINING PROGRAM

## 2023-01-24 PROCEDURE — 3079F DIAST BP 80-89 MM HG: CPT | Performed by: STUDENT IN AN ORGANIZED HEALTH CARE EDUCATION/TRAINING PROGRAM

## 2023-01-24 PROCEDURE — G8427 DOCREV CUR MEDS BY ELIG CLIN: HCPCS | Performed by: STUDENT IN AN ORGANIZED HEALTH CARE EDUCATION/TRAINING PROGRAM

## 2023-01-24 PROCEDURE — 3074F SYST BP LT 130 MM HG: CPT | Performed by: STUDENT IN AN ORGANIZED HEALTH CARE EDUCATION/TRAINING PROGRAM

## 2023-01-24 PROCEDURE — G8484 FLU IMMUNIZE NO ADMIN: HCPCS | Performed by: STUDENT IN AN ORGANIZED HEALTH CARE EDUCATION/TRAINING PROGRAM

## 2023-01-24 PROCEDURE — G8419 CALC BMI OUT NRM PARAM NOF/U: HCPCS | Performed by: STUDENT IN AN ORGANIZED HEALTH CARE EDUCATION/TRAINING PROGRAM

## 2023-01-24 PROCEDURE — 3023F SPIROM DOC REV: CPT | Performed by: STUDENT IN AN ORGANIZED HEALTH CARE EDUCATION/TRAINING PROGRAM

## 2023-01-24 PROCEDURE — 99214 OFFICE O/P EST MOD 30 MIN: CPT | Performed by: STUDENT IN AN ORGANIZED HEALTH CARE EDUCATION/TRAINING PROGRAM

## 2023-01-24 PROCEDURE — 1036F TOBACCO NON-USER: CPT | Performed by: STUDENT IN AN ORGANIZED HEALTH CARE EDUCATION/TRAINING PROGRAM

## 2023-01-24 RX ORDER — PREDNISONE 50 MG/1
50 TABLET ORAL DAILY
Qty: 5 TABLET | Refills: 0 | Status: SHIPPED | OUTPATIENT
Start: 2023-01-24 | End: 2023-01-29

## 2023-01-24 RX ORDER — DOXYCYCLINE HYCLATE 100 MG
100 TABLET ORAL 2 TIMES DAILY
Qty: 20 TABLET | Refills: 0 | Status: SHIPPED | OUTPATIENT
Start: 2023-01-24 | End: 2023-02-03

## 2023-01-24 RX ORDER — GABAPENTIN 300 MG/1
300 CAPSULE ORAL 3 TIMES DAILY
Qty: 90 CAPSULE | Refills: 0 | Status: SHIPPED | OUTPATIENT
Start: 2023-01-24 | End: 2023-02-23

## 2023-01-24 RX ORDER — LOSARTAN POTASSIUM 100 MG/1
100 TABLET ORAL DAILY
Qty: 90 TABLET | Refills: 0 | Status: SHIPPED | OUTPATIENT
Start: 2023-01-24

## 2023-01-24 RX ORDER — ARIPIPRAZOLE 5 MG/1
5 TABLET ORAL DAILY
Qty: 30 TABLET | Refills: 3 | Status: SHIPPED | OUTPATIENT
Start: 2023-01-24

## 2023-01-24 RX ORDER — IPRATROPIUM BROMIDE AND ALBUTEROL SULFATE 2.5; .5 MG/3ML; MG/3ML
1 SOLUTION RESPIRATORY (INHALATION) ONCE
Status: COMPLETED | OUTPATIENT
Start: 2023-01-24 | End: 2023-01-24

## 2023-01-24 RX ADMIN — IPRATROPIUM BROMIDE AND ALBUTEROL SULFATE 1 AMPULE: 2.5; .5 SOLUTION RESPIRATORY (INHALATION) at 16:00

## 2023-01-24 ASSESSMENT — PATIENT HEALTH QUESTIONNAIRE - PHQ9
SUM OF ALL RESPONSES TO PHQ QUESTIONS 1-9: 0
3. TROUBLE FALLING OR STAYING ASLEEP: 0
4. FEELING TIRED OR HAVING LITTLE ENERGY: 0
6. FEELING BAD ABOUT YOURSELF - OR THAT YOU ARE A FAILURE OR HAVE LET YOURSELF OR YOUR FAMILY DOWN: 0
SUM OF ALL RESPONSES TO PHQ QUESTIONS 1-9: 0
SUM OF ALL RESPONSES TO PHQ9 QUESTIONS 1 & 2: 0
7. TROUBLE CONCENTRATING ON THINGS, SUCH AS READING THE NEWSPAPER OR WATCHING TELEVISION: 0
2. FEELING DOWN, DEPRESSED OR HOPELESS: 0
1. LITTLE INTEREST OR PLEASURE IN DOING THINGS: 0
SUM OF ALL RESPONSES TO PHQ QUESTIONS 1-9: 0
SUM OF ALL RESPONSES TO PHQ QUESTIONS 1-9: 0
5. POOR APPETITE OR OVEREATING: 0
9. THOUGHTS THAT YOU WOULD BE BETTER OFF DEAD, OR OF HURTING YOURSELF: 0
10. IF YOU CHECKED OFF ANY PROBLEMS, HOW DIFFICULT HAVE THESE PROBLEMS MADE IT FOR YOU TO DO YOUR WORK, TAKE CARE OF THINGS AT HOME, OR GET ALONG WITH OTHER PEOPLE: 0
8. MOVING OR SPEAKING SO SLOWLY THAT OTHER PEOPLE COULD HAVE NOTICED. OR THE OPPOSITE, BEING SO FIGETY OR RESTLESS THAT YOU HAVE BEEN MOVING AROUND A LOT MORE THAN USUAL: 0

## 2023-01-24 ASSESSMENT — ENCOUNTER SYMPTOMS
SORE THROAT: 1
RHINORRHEA: 1
COUGH: 1
SHORTNESS OF BREATH: 1

## 2023-01-24 NOTE — PROGRESS NOTES
[facility]  Establish care visit   2023    Karl Burns (:  1960) is a 65 y.o. female, here to establish care. Chief Complaint   Patient presents with    Establish Care     Pt is here to establish care,     Cough    Pharyngitis        ASSESSMENT/ PLAN  1. Neuropathy of left lower extremity  Refill gabapentin OARRS checked  - gabapentin (NEURONTIN) 300 MG capsule; Take 1 capsule by mouth 3 times daily for 30 days. Dispense: 90 capsule; Refill: 0    2. COPD exacerbation ([address]  On 3 L at home, 92% on 3 L here. - DuoNeb in office  - COVID test  - Doxycycline and prednisone  - ER for shortness of breath at rest       Return for  to establish care. HPI  Had runny nose and congestion starting Saturday. Since  cough has been productive of yellow sputum. Chills but no fever. Using rescue inhaler 6 times a day. Doing duoneb every 4 hours while awake. Has not had a recent course of high dose steroids or antibiotics. ROS  Review of Systems   Constitutional:  Positive for chills and fatigue. Negative for fever. HENT:  Positive for rhinorrhea and sore throat. Respiratory:  Positive for cough and shortness of breath.        HISTORIES  Current Outpatient Medications on File Prior to Visit   Medication Sig Dispense Refill    albuterol sulfate  (90 Base) MCG/ACT inhaler INHALE 1-2 PUFFS EVERY SIX HOURS INTO THE LUNGS AS NEEDED FOR WHEEZING OR SHORTNESS OF BREATH 6.7 g 5    predniSONE (DELTASONE) 10 MG tablet 4 tabs for 3 days 3 tabs for 3 days 2 tabs for 3 days 1 tabs for 3 days 30 tablet 0    AMITRIPTYLINE HCL PO Take 5 mg by mouth       amLODIPine (NORVASC) 5 MG tablet Take 1 tablet by mouth daily (Patient taking differently: Take 5 mg by mouth nightly) 90 tablet 3    metoprolol succinate (TOPROL XL) 50 MG extended release tablet Take 50 mg by mouth daily      mirtazapine (REMERON) 15 MG tablet Take 15 mg by mouth nightly      TRELEGY ELLIPTA 100-62.5-25 MCG/INH AEPB INHALE 1 PUFF INTO THE LUNGS DAILY 60 each 5    atorvastatin (LIPITOR) 40 MG tablet Take 1 tablet by mouth daily 90 tablet 3    aspirin 81 MG chewable tablet Take 1 tablet by mouth daily 30 tablet 0    pantoprazole (PROTONIX) 40 MG tablet Take 40 mg by mouth daily      FLORASTOR 250 MG capsule Take 250 mg by mouth daily      aspirin-acetaminophen-caffeine (EXCEDRIN MIGRAINE) 250-250-65 MG per tablet Take 1 tablet by mouth every 6 hours as needed for Headaches      albuterol (PROVENTIL) (2.5 MG/3ML) 0.083% nebulizer solution Take 3 mLs by nebulization every 6 hours as needed for Wheezing 120 each 3    DULoxetine (CYMBALTA) 60 MG extended release capsule Take 60 mg by mouth daily       Acetaminophen 325 MG CAPS Take 975 mg by mouth as needed      levothyroxine (SYNTHROID) 50 MCG tablet Take 50 mcg by mouth Daily      busPIRone (BUSPAR) 5 MG tablet TAKE 1 TABLET BY MOUTH ONCE DAILY FOR 1 WEEK THEN TAKE 1 TABLET TWICE DAILY. Cholecalciferol (VITAMIN D) 50 MCG (2000 UT) CAPS capsule Take by mouth daily       folic acid (FOLVITE) 1 MG tablet Take 1 tablet by mouth daily 30 tablet 11     No current facility-administered medications on file prior to visit.         No Known Allergies    Past Medical History:   Diagnosis Date    Back pain     COPD (chronic obstructive pulmonary disease) ([facility])     Fatigue     Hypertension     Syncope and collapse 03/2019    Thyroid disease     Ulcerative colitis, unspecified, without complications [facility])        Patient Active Problem List   Diagnosis    Essential hypertension    Chest pain    Moderate COPD (chronic obstructive pulmonary disease) (HCC)    Heterozygous alpha 1-antitrypsin deficiency (HCC)    COPD exacerbation (HCC)    Gastroesophageal reflux disease without esophagitis    Anxiety and depression    Neuropathy of left lower extremity    Acquired hypothyroidism    COPD with acute exacerbation (HCC)    Diarrhea    Adrenal nodule (HCC)    Hypercalcemia NSTEMI (non-ST elevated myocardial infarction) ([facility] [address]    Nonischemic cardiomyopathy (HCC)    Menopause    Other osteoporosis without current pathological fracture    Ulcerative colitis with rectal bleeding (HCC)    Moderate malnutrition (HCC)    Abdominal pain    Left-sided weakness    Dysarthria    Severe malnutrition (HCC)    Acute on chronic respiratory failure with hypoxia and hypercapnia (HCC)    Acute hypoxemic respiratory failure (HCC)    Tracheobronchitis    Leukocytosis       Past Surgical History:   Procedure Laterality Date    BACK SURGERY      L4-L5 rods in    COLONOSCOPY N/A 7/7/2021    COLONOSCOPY WITH BIOPSY performed by Jayda Martinez MD at [address] [place] [address]    SPLENECTOMY  2019    due to a fall    TONSILLECTOMY         Social History     Socioeconomic History    Marital status:      Spouse name: Not on file    Number of children: Not on file    Years of education: Not on file    Highest education level: Not on file   Occupational History    Not on file   Tobacco Use    Smoking status: Former     Packs/day: 0.25     Years: 32.00     Pack years: 8.00     Types: Cigarettes    Smokeless tobacco: Never   Vaping Use    Vaping Use: Former    Substances: Always   Substance and Sexual Activity    Alcohol use: Never    Drug use: Not Currently     Types: Marijuana Maryanne Eglin)    Sexual activity: Not on file   Other Topics Concern    Not on file   Social History Narrative    Not on file     Social Determinants of Health     Financial Resource Strain: Not on file   Food Insecurity: Not on file   Transportation Needs: Not on file   Physical Activity: Not on file   Stress: Not on file   Social Connections: Not on file   Intimate Partner Violence: Not on file   Housing Stability: Not on file        Family History   Problem Relation Age of Onset    Heart Disease Father     High Blood Pressure Sister        PE  Vitals:    01/24/23 1412   BP: 120/84   Site: Left Upper Arm   Position: Sitting   Pulse: 97   Resp: 16   SpO2: (!) 89%   Weight: 103 lb 12.8 oz (47.1 kg)   Height: 5' 3\" (1.6 m)     Estimated body mass index is 18.39 kg/m² as calculated from the following:    Height as of this encounter: 5' 3\" (1.6 m). Weight as of this encounter: 103 lb 12.8 oz (47.1 kg). Physical Exam  Constitutional:       General: She is not in acute distress. Appearance: Normal appearance. She is normal weight. HENT:      Head: Normocephalic and atraumatic. Right Ear: External ear normal.      Left Ear: External ear normal.      Nose: No rhinorrhea. Eyes:      Extraocular Movements: Extraocular movements intact. Conjunctiva/sclera: Conjunctivae normal.      Pupils: Pupils are equal, round, and reactive to light. Cardiovascular:      Rate and Rhythm: Normal rate and regular rhythm. Heart sounds: Normal heart sounds. No murmur heard. No friction rub. No gallop. Pulmonary:      Effort: Pulmonary effort is normal.      Breath sounds: Normal breath sounds. Comments: Expiratory wheezing and every lung field. No rales. Neurological:      General: No focal deficit present. Mental Status: She is alert.    Psychiatric:         Mood and Affect: Mood normal.       Immunization History   Administered Date(s) Administered    COVID-19, MODERNA BLUE border, Primary or Immunocompromised, (age 12y+), IM, 100 mcg/0.5mL 04/05/2021, 05/10/2021    HIB PRP-T (ActHIB, Hiberix) 06/15/2019    Influenza, FLUARIX, FLULAVAL, FLUZONE (age 10 mo+) AND AFLURIA, (age 1 y+), PF, 0.5mL 11/05/2020, 01/04/2022    Meningococcal ACWY Vaccine 07/13/2019    Meningococcal B, OMV (Bexsero) 06/15/2019, 07/13/2019    Meningococcal B, Recombinant Megha Shoulders) 08/06/2019, 08/06/2019, 09/11/2019, 09/11/2019    Meningococcal MCV4O (Menveo) 06/15/2019, 08/10/2019    Meningococcal MCV4P (Menactra) 06/15/2019    Meningococcal Vac Of Unknown Formula And Unknown Serogroup 07/13/2019, 08/10/2019    PPD Test 07/05/2021 Pneumococcal Conjugate 13-valent ([id number]) 06/15/2020, 07/17/2020    Pneumococcal Polysaccharide ([id number]) 06/15/2019, 06/15/2019    Tdap (Boostrix, Adacel) 07/17/2020       Health Maintenance   Topic Date Due    Depression Monitoring  Never done    HIV screen  Never done    Hepatitis C screen  Never done    Shingles vaccine (1 of 2) Never done    Breast cancer screen  Never done    Annual Wellness Visit (AWV)  Never done    Meningococcal (ACWY) vaccine (2 - Risk 2-dose series) 10/05/2019    Meningococcal B vaccine (3 of 4 - Increased Risk Bexsero 2-dose series) 09/11/2020    COVID-19 Vaccine (3 - Booster for Moderna series) 07/05/2021    A1C test (Diabetic or Prediabetic)  03/22/2022    Lipids  03/22/2022    Flu vaccine (1) 08/01/2022    Pneumococcal 0-64 years Vaccine (3 - PPSV23 if available, else PCV20) 06/15/2024    DTaP/Tdap/Td vaccine (2 - Td or Tdap) 07/17/2030    Colorectal Cancer Screen  07/07/2031    Hib vaccine  Completed    Hepatitis A vaccine  Aged Out       PSH, PMH, SH and FH reviewed and noted. Recent and past labs, tests and consults also reviewed. Recent or new meds also reviewed. Rickey Diaz DO    This dictation was generated by voice recognition computer software. Although all attempts are made to edit the dictation for accuracy, there may be errors in the transcription that are not intended.

## 2023-01-24 NOTE — ADDENDUM NOTE
Addended by: Lizbet Guerrero on: 1/24/2023 03:11 PM     Modules accepted: Orders Attending Attestation (For Attendings USE Only)...

## 2023-01-25 LAB — SARS-COV-2: NOT DETECTED

## 2023-02-03 DIAGNOSIS — J44.9 MODERATE COPD (CHRONIC OBSTRUCTIVE PULMONARY DISEASE) (HCC): ICD-10-CM

## 2023-02-06 ENCOUNTER — OFFICE VISIT (OUTPATIENT)
Dept: FAMILY MEDICINE CLINIC | Age: 63
End: 2023-02-06
Payer: MEDICARE

## 2023-02-06 ENCOUNTER — HOSPITAL ENCOUNTER (OUTPATIENT)
Age: 63
Discharge: HOME | End: 2023-02-06
Payer: MEDICARE

## 2023-02-06 ENCOUNTER — HOSPITAL ENCOUNTER (OUTPATIENT)
Dept: GENERAL RADIOLOGY | Age: 63
Discharge: HOME | End: 2023-02-06
Payer: MEDICARE

## 2023-02-06 VITALS
WEIGHT: 105.2 LBS | BODY MASS INDEX: 18.64 KG/M2 | OXYGEN SATURATION: 90 % | DIASTOLIC BLOOD PRESSURE: 72 MMHG | SYSTOLIC BLOOD PRESSURE: 110 MMHG | HEIGHT: 63 IN | HEART RATE: 81 BPM

## 2023-02-06 DIAGNOSIS — R06.02 SHORTNESS OF BREATH: Primary | ICD-10-CM

## 2023-02-06 DIAGNOSIS — R06.02 SHORTNESS OF BREATH: ICD-10-CM

## 2023-02-06 DIAGNOSIS — J44.1 COPD EXACERBATION (HCC): ICD-10-CM

## 2023-02-06 PROCEDURE — G8420 CALC BMI NORM PARAMETERS: HCPCS | Performed by: STUDENT IN AN ORGANIZED HEALTH CARE EDUCATION/TRAINING PROGRAM

## 2023-02-06 PROCEDURE — G8427 DOCREV CUR MEDS BY ELIG CLIN: HCPCS | Performed by: STUDENT IN AN ORGANIZED HEALTH CARE EDUCATION/TRAINING PROGRAM

## 2023-02-06 PROCEDURE — 1036F TOBACCO NON-USER: CPT | Performed by: STUDENT IN AN ORGANIZED HEALTH CARE EDUCATION/TRAINING PROGRAM

## 2023-02-06 PROCEDURE — 3023F SPIROM DOC REV: CPT | Performed by: STUDENT IN AN ORGANIZED HEALTH CARE EDUCATION/TRAINING PROGRAM

## 2023-02-06 PROCEDURE — 99214 OFFICE O/P EST MOD 30 MIN: CPT | Performed by: STUDENT IN AN ORGANIZED HEALTH CARE EDUCATION/TRAINING PROGRAM

## 2023-02-06 PROCEDURE — 3074F SYST BP LT 130 MM HG: CPT | Performed by: STUDENT IN AN ORGANIZED HEALTH CARE EDUCATION/TRAINING PROGRAM

## 2023-02-06 PROCEDURE — 71046 X-RAY EXAM CHEST 2 VIEWS: CPT

## 2023-02-06 PROCEDURE — 3017F COLORECTAL CA SCREEN DOC REV: CPT | Performed by: STUDENT IN AN ORGANIZED HEALTH CARE EDUCATION/TRAINING PROGRAM

## 2023-02-06 PROCEDURE — G8484 FLU IMMUNIZE NO ADMIN: HCPCS | Performed by: STUDENT IN AN ORGANIZED HEALTH CARE EDUCATION/TRAINING PROGRAM

## 2023-02-06 PROCEDURE — 3078F DIAST BP <80 MM HG: CPT | Performed by: STUDENT IN AN ORGANIZED HEALTH CARE EDUCATION/TRAINING PROGRAM

## 2023-02-06 RX ORDER — ALBUTEROL SULFATE 90 UG/1
AEROSOL, METERED RESPIRATORY (INHALATION)
Qty: 6.7 G | Refills: 5 | OUTPATIENT
Start: 2023-02-06

## 2023-02-06 RX ORDER — LEVOFLOXACIN 500 MG/1
500 TABLET, FILM COATED ORAL DAILY
Qty: 7 TABLET | Refills: 0 | Status: SHIPPED | OUTPATIENT
Start: 2023-02-06 | End: 2023-02-13

## 2023-02-06 SDOH — ECONOMIC STABILITY: FOOD INSECURITY: WITHIN THE PAST 12 MONTHS, YOU WORRIED THAT YOUR FOOD WOULD RUN OUT BEFORE YOU GOT MONEY TO BUY MORE.: NEVER TRUE

## 2023-02-06 SDOH — ECONOMIC STABILITY: INCOME INSECURITY: HOW HARD IS IT FOR YOU TO PAY FOR THE VERY BASICS LIKE FOOD, HOUSING, MEDICAL CARE, AND HEATING?: NOT HARD AT ALL

## 2023-02-06 SDOH — ECONOMIC STABILITY: FOOD INSECURITY: WITHIN THE PAST 12 MONTHS, THE FOOD YOU BOUGHT JUST DIDN'T LAST AND YOU DIDN'T HAVE MONEY TO GET MORE.: NEVER TRUE

## 2023-02-06 SDOH — ECONOMIC STABILITY: HOUSING INSECURITY
IN THE LAST 12 MONTHS, WAS THERE A TIME WHEN YOU DID NOT HAVE A STEADY PLACE TO SLEEP OR SLEPT IN A SHELTER (INCLUDING NOW)?: NO

## 2023-02-21 ENCOUNTER — OFFICE VISIT (OUTPATIENT)
Dept: FAMILY MEDICINE CLINIC | Age: 63
End: 2023-02-21
Payer: MEDICARE

## 2023-02-21 VITALS
SYSTOLIC BLOOD PRESSURE: 120 MMHG | BODY MASS INDEX: 18.18 KG/M2 | HEIGHT: 63 IN | DIASTOLIC BLOOD PRESSURE: 93 MMHG | WEIGHT: 102.6 LBS | HEART RATE: 72 BPM

## 2023-02-21 DIAGNOSIS — G89.29 CHRONIC BILATERAL LOW BACK PAIN WITH LEFT-SIDED SCIATICA: Primary | ICD-10-CM

## 2023-02-21 DIAGNOSIS — M54.42 CHRONIC BILATERAL LOW BACK PAIN WITH LEFT-SIDED SCIATICA: Primary | ICD-10-CM

## 2023-02-21 DIAGNOSIS — Z00.00 INITIAL MEDICARE ANNUAL WELLNESS VISIT: ICD-10-CM

## 2023-02-21 PROCEDURE — G8484 FLU IMMUNIZE NO ADMIN: HCPCS | Performed by: STUDENT IN AN ORGANIZED HEALTH CARE EDUCATION/TRAINING PROGRAM

## 2023-02-21 PROCEDURE — 3074F SYST BP LT 130 MM HG: CPT | Performed by: STUDENT IN AN ORGANIZED HEALTH CARE EDUCATION/TRAINING PROGRAM

## 2023-02-21 PROCEDURE — 3080F DIAST BP >= 90 MM HG: CPT | Performed by: STUDENT IN AN ORGANIZED HEALTH CARE EDUCATION/TRAINING PROGRAM

## 2023-02-21 PROCEDURE — 3017F COLORECTAL CA SCREEN DOC REV: CPT | Performed by: STUDENT IN AN ORGANIZED HEALTH CARE EDUCATION/TRAINING PROGRAM

## 2023-02-21 PROCEDURE — G0438 PPPS, INITIAL VISIT: HCPCS | Performed by: STUDENT IN AN ORGANIZED HEALTH CARE EDUCATION/TRAINING PROGRAM

## 2023-02-21 RX ORDER — BUSPIRONE HYDROCHLORIDE 5 MG/1
5 TABLET ORAL 2 TIMES DAILY
Qty: 60 TABLET | Refills: 2 | Status: SHIPPED | OUTPATIENT
Start: 2023-02-21 | End: 2023-03-23

## 2023-02-21 RX ORDER — PREDNISONE 10 MG/1
10 TABLET ORAL DAILY
Qty: 30 TABLET | Refills: 1 | Status: SHIPPED | OUTPATIENT
Start: 2023-02-21 | End: 2023-03-03

## 2023-02-21 ASSESSMENT — PATIENT HEALTH QUESTIONNAIRE - PHQ9
5. POOR APPETITE OR OVEREATING: 3
8. MOVING OR SPEAKING SO SLOWLY THAT OTHER PEOPLE COULD HAVE NOTICED. OR THE OPPOSITE, BEING SO FIGETY OR RESTLESS THAT YOU HAVE BEEN MOVING AROUND A LOT MORE THAN USUAL: 3
SUM OF ALL RESPONSES TO PHQ QUESTIONS 1-9: 9
1. LITTLE INTEREST OR PLEASURE IN DOING THINGS: 0
7. TROUBLE CONCENTRATING ON THINGS, SUCH AS READING THE NEWSPAPER OR WATCHING TELEVISION: 0
6. FEELING BAD ABOUT YOURSELF - OR THAT YOU ARE A FAILURE OR HAVE LET YOURSELF OR YOUR FAMILY DOWN: 0
SUM OF ALL RESPONSES TO PHQ QUESTIONS 1-9: 9
10. IF YOU CHECKED OFF ANY PROBLEMS, HOW DIFFICULT HAVE THESE PROBLEMS MADE IT FOR YOU TO DO YOUR WORK, TAKE CARE OF THINGS AT HOME, OR GET ALONG WITH OTHER PEOPLE: 0
SUM OF ALL RESPONSES TO PHQ QUESTIONS 1-9: 9
SUM OF ALL RESPONSES TO PHQ QUESTIONS 1-9: 9
2. FEELING DOWN, DEPRESSED OR HOPELESS: 0
3. TROUBLE FALLING OR STAYING ASLEEP: 0
SUM OF ALL RESPONSES TO PHQ9 QUESTIONS 1 & 2: 0
9. THOUGHTS THAT YOU WOULD BE BETTER OFF DEAD, OR OF HURTING YOURSELF: 0
4. FEELING TIRED OR HAVING LITTLE ENERGY: 3

## 2023-02-21 ASSESSMENT — ENCOUNTER SYMPTOMS
RHINORRHEA: 0
SHORTNESS OF BREATH: 0
COUGH: 0

## 2023-02-21 ASSESSMENT — LIFESTYLE VARIABLES
HOW MANY STANDARD DRINKS CONTAINING ALCOHOL DO YOU HAVE ON A TYPICAL DAY: PATIENT DOES NOT DRINK
HOW OFTEN DO YOU HAVE A DRINK CONTAINING ALCOHOL: NEVER

## 2023-02-21 NOTE — PATIENT INSTRUCTIONS
Learning About Mindfulness for Stress  What are mindfulness and stress? Stress is what you feel when you have to handle more than you are used to. A lot of things can cause stress. You may feel stress when you go on a job interview, take a test, or run a race. This kind of short-term stress is normal and even useful. It can help you if you need to work hard or react quickly. Stress also can last a long time. Long-term stress is caused by stressful situations or events. Examples of long-term stress include long-term health problems, ongoing problems at work, and conflicts in your family. Long-term stress can harm your health. Mindfulness is a focus only on things happening in the present moment. It's a process of purposefully paying attention to and being aware of your surroundings, your emotions, your thoughts, and how your body feels. You are aware of these things, but you aren't judging these experiences as \"good\" or \"bad. \" Mindfulness can help you learn to calm your mind and body to help you cope with illness, pain, and stress. How does mindfulness help to relieve stress? Mindfulness can help quiet your mind and relax your body. Studies show that it can help some people sleep better, feel less anxious, and bring their blood pressure down. And it's been shown to help some people live and cope better with certain health problems like heart disease, depression, chronic pain, and cancer. How do you practice mindfulness? To be mindful is to pay attention, to be present, and to be accepting. When you're mindful, you do just one thing and you pay close attention to that one thing. For example, you may sit quietly and notice your emotions or how your food tastes and smells. When you're present, you focus on the things that are happening right now. You let go of your thoughts about the past and the future. When you dwell on the past or the future, you miss moments that can heal and strengthen you.  You may miss moments like hearing a child laugh or seeing a friendly face when you think you're all alone. When you're accepting, you don't  the present moment. Instead you accept your thoughts and feelings as they come. You can practice anytime, anywhere, and in any way you choose. You can practice in many ways. Here are a few ideas:  While doing your chores, like washing the dishes, let your mind focus on what's in your hand. What does the dish feel like? Is the water warm or cold? Go outside and take a few deep breaths. What is the air like? Is it warm or cold? When you can, take some time at the start of your day to sit alone and think. Take a slow walk by yourself. Count your steps while you breathe in and out. Try yoga breathing exercises, stretches, and poses to strengthen and relax your muscles. At work, if you can, try to stop for a few moments each hour. Note how your body feels. Let yourself regroup and let your mind settle before you return to what you were doing. If you struggle with anxiety or \"worry thoughts,\" imagine your mind as a blue sharita and your worry thoughts as clouds. Now imagine those worry thoughts floating across your mind's sharita. Just let them pass by as you watch. Follow-up care is a key part of your treatment and safety. Be sure to make and go to all appointments, and call your doctor if you are having problems. It's also a good idea to know your test results and keep a list of the medicines you take. Where can you learn more? Go to http://www.chiang.com/ and enter M676 to learn more about \"Learning About Mindfulness for Stress. \"  Current as of: February 9, 2022               Content Version: 13.5  © 2006-2022 Healthwise, Incorporated. Care instructions adapted under license by [facility] ([facility]).  If you have questions about a medical condition or this instruction, always ask your healthcare professional. [address] any warranty or liability for your use of this information. Fatigue: Care Instructions  Your Care Instructions     Fatigue is a feeling of tiredness, exhaustion, or lack of energy. You may feel fatigue because of too much or not enough activity. It can also come from stress, lack of sleep, boredom, and poor diet. Many medical problems, such as viral infections, can cause fatigue. Emotional problems, especially depression, are often the cause of fatigue. Fatigue is most often a symptom of another problem. Treatment for fatigue depends on the cause. For example, if you have fatigue because you have a certain health problem, treating this problem also treats your fatigue. If depression or anxiety is the cause, treatment may help. Follow-up care is a key part of your treatment and safety. Be sure to make and go to all appointments, and call your doctor if you are having problems. It's also a good idea to know your test results and keep a list of the medicines you take. How can you care for yourself at home? Get regular exercise. But don't overdo it. Go back and forth between rest and exercise. Get plenty of rest.  Eat a healthy diet. Do not skip meals, especially breakfast.  Reduce your use of caffeine, tobacco, and alcohol. Caffeine is most often found in coffee, tea, cola drinks, and chocolate. Limit medicines that can cause fatigue. This includes tranquilizers and cold and allergy medicines. When should you call for help? Watch closely for changes in your health, and be sure to contact your doctor if:    You have new symptoms such as fever or a rash.     Your fatigue gets worse.     You have been feeling down, depressed, or hopeless. Or you may have lost interest in things that you usually enjoy.     You are not getting better as expected. Where can you learn more? Go to http://www.woods.com/ and enter Z339 to learn more about \"Fatigue: Care Instructions. \"  Current as of: February 9, 3426               RANJANA Version: 13.5  © 6103-0825 Healthwise, Incorporated. Care instructions adapted under license by [facility] ([facility]). If you have questions about a medical condition or this instruction, always ask your healthcare professional. Jose L Estevez any warranty or liability for your use of this information. For more information on your local Area Agency on Aging or [employer] A[employer] please visit the appropriate web site below:    [place]: MobileCycles.pl    [facility]: https://aging. [place].gov/    [facility]: https://aging.[place].gov/    [place]: InsuranceSquad.es           Learning About Vision Tests  What are vision tests? The four most common vision tests are visual acuity tests, refraction, visual field tests, and color vision tests. Visual acuity (sharpness) tests  These tests are used: To see if you need glasses or contact lenses. To monitor an eye problem. To check an eye injury. Visual acuity tests are done as part of routine exams. You may also have this test when you get your 's license or apply for some types of jobs. Visual field tests  These tests are used: To check for vision loss in any area of your range of vision. To screen for certain eye diseases. To look for nerve damage after a stroke, head injury, or other problem that could reduce blood flow to the brain. Refraction and color tests  A refraction test is done to find the right prescription for glasses and contact lenses. A color vision test is done to check for color blindness. Color vision is often tested as part of a routine exam. You may also have this test when you apply for a job where recognizing different colors is important, such as , electronics, or the [employer]. How are vision tests done? Visual acuity test   You cover one eye at a time. You read aloud from a wall chart across the room.   You read aloud from a small card that you hold in your hand.  Refraction   You look into a special device. The device puts lenses of different strengths in front of each eye to see how strong your glasses or contact lenses need to be. Visual field tests   Your doctor may have you look through special machines. Or your doctor may simply have you stare straight ahead while they move a finger into and out of your field of vision. Color vision test   You look at pieces of printed test patterns in various colors. You say what number or symbol you see. Your doctor may have you trace the number or symbol using a pointer. How do these tests feel? There is very little chance of having a problem from this test. If dilating drops are used for a vision test, they may make the eyes sting and cause a medicine taste in the mouth. Follow-up care is a key part of your treatment and safety. Be sure to make and go to all appointments, and call your doctor if you are having problems. It's also a good idea to know your test results and keep a list of the medicines you take. Where can you learn more? Go to http://Training Advisor.chiang.com/ and enter G551 to learn more about \"Learning About Vision Tests. \"  Current as of: October 12, 2022               Content Version: 13.5  © 2769-1533 Healthwise, [employer]. Care instructions adapted under license by [facility] ([facility]). If you have questions about a medical condition or this instruction, always ask your healthcare professional. [place] [address] any warranty or liability for your use of this information. Learning About Activities of Daily Living  What are activities of daily living? Activities of daily living (ADLs) are the basic self-care tasks you do every day. As you age, and if you have health problems, you may find that it's harder to do these things for yourself. That's when you may need some help. Your doctor uses ADLs to measure how much help you need.  Knowing what you can and can't do for yourself is an important first step to getting help. And when you have the help you need, you can stay as independent as possible. Your doctor will want to know if you are able to do tasks such as: Take a bath or shower without help. Go to the bathroom by yourself. Dress and undress without help. Shave, comb your hair, and brush teeth on your own. Get in and out of bed or a chair without help. Feed yourself without help. If you are having trouble doing basic self-care tasks, talk with your doctor. You may want to bring a caregiver or family member who can help the doctor understand your needs and abilities. How will a doctor assess your ADLs? Asking about ADLs is part of a routine health checkup your doctor will likely do as you age. Your health check might be done in a doctor's office, in your home, or at a hospital. The goal is to find out if you are having any problems that could make your health problems worse or that make it unsafe for you to be on your own. To measure your ADLs, your doctor will ask how hard it is for you to do routine tasks. He or she may also want to know if you have changed the way you do a task because of a health problem. He or she may watch how you:  Walk back and forth. Keep your balance while you stand or walk. Move from sitting to standing or from a bed to a chair. Button or unbutton a shirt or sweater. Remove and put on your shoes. It's normal to feel a little worried or anxious if you find you can't do all the things you used to be able to do. Talking with your doctor about ADLs isn't a test that you either pass or fail. It's just a way to get more information about your health and safety. Follow-up care is a key part of your treatment and safety. Be sure to make and go to all appointments, and call your doctor if you are having problems. It's also a good idea to know your test results and keep a list of the medicines you take.   Current as of: October 6, 2021               Content Version: 13.5  © 3284-9727 Healthwise, "[employer]. Care instructions adapted under license by [facility] ([facility]). If you have questions about a medical condition or this instruction, always ask your healthcare professional. [address] any warranty or liability for your use of this information. Advance Directives: Care Instructions  Overview  An advance directive is a legal way to state your wishes at the end of your life. It tells your family and your doctor what to do if you can't say what you want. There are two main types of advance directives. You can change them any time your wishes change. Living will. This form tells your family and your doctor your wishes about life support and other treatment. The form is also called a declaration. Medical power of . This form lets you name a person to make treatment decisions for you when you can't speak for yourself. This person is called a health care agent (health care proxy, health care surrogate). The form is also called a durable power of  for health care. If you do not have an advance directive, decisions about your medical care may be made by a family member, or by a doctor or a  who doesn't know you. It may help to think of an advance directive as a gift to the people who care for you. If you have one, they won't have to make tough decisions by themselves. For more information, including forms for your state, see the [address] website (www.caringinfo.org/planning/advance-directives/). Follow-up care is a key part of your treatment and safety. Be sure to make and go to all appointments, and call your doctor if you are having problems. It's also a good idea to know your test results and keep a list of the medicines you take. What should you include in an advance directive? Many states have a unique advance directive form.  (It may ask you to address specific issues.) Or you might use a universal form that's approved by many states. If your form doesn't tell you what to address, it may be hard to know what to include in your advance directive. Use the questions below to help you get started. Who do you want to make decisions about your medical care if you are not able to? What life-support measures do you want if you have a serious illness that gets worse over time or can't be cured? What are you most afraid of that might happen? (Maybe you're afraid of having pain, losing your independence, or being kept alive by machines.)  Where would you prefer to die? (Your home? A hospital? A nursing home?)  Do you want to donate your organs when you die? Do you want certain Pent[employer] practices performed before you die? When should you call for help? Be sure to contact your doctor if you have any questions. Where can you learn more? Go to http://www.chiang.com/ and enter R264 to learn more about \"Advance Directives: Care Instructions. \"  Current as of: June 16, 2022               Content Version: 13.5  © 1055-0431 [employer]. Care instructions adapted under license by [facility] ([facility]). If you have questions about a medical condition or this instruction, always ask your healthcare professional. [address] any warranty or liability for your use of this information. A Healthy Heart: Care Instructions  Your Care Instructions     Coronary artery disease, also called heart disease, occurs when a substance called plaque builds up in the vessels that supply oxygen-rich blood to your heart muscle. This can narrow the blood vessels and reduce blood flow. A heart attack happens when blood flow is completely blocked. A high-fat diet, smoking, and other factors increase the risk of heart disease. Your doctor has found that you have a chance of having heart disease. You can do lots of things to keep your heart healthy.  It may not be easy, but you can change your diet, exercise more, and quit smoking. These steps really work to lower your chance of heart disease. Follow-up care is a key part of your treatment and safety. Be sure to make and go to all appointments, and call your doctor if you are having problems. It's also a good idea to know your test results and keep a list of the medicines you take. How can you care for yourself at home? Diet    Use less salt when you cook and eat. This helps lower your blood pressure. Taste food before salting. Add only a little salt when you think you need it. With time, your taste buds will adjust to less salt.     Eat fewer snack items, fast foods, canned soups, and other high-salt, high-fat, processed foods.     Read food labels and try to avoid saturated and trans fats. They increase your risk of heart disease by raising cholesterol levels.     Limit the amount of solid fat-butter, margarine, and shortening-you eat. Use olive, peanut, or canola oil when you cook. Bake, broil, and steam foods instead of frying them.     Eat a variety of fruit and vegetables every day. Dark green, deep orange, red, or yellow fruits and vegetables are especially good for you. Examples include spinach, carrots, peaches, and berries.     Foods high in fiber can reduce your cholesterol and provide important vitamins and minerals. High-fiber foods include whole-grain cereals and breads, oatmeal, beans, brown rice, citrus fruits, and apples.     Eat lean proteins. Heart-healthy proteins include seafood, lean meats and poultry, eggs, beans, peas, nuts, seeds, and soy products.     Limit drinks and foods with added sugar. These include candy, desserts, and soda pop. Lifestyle changes    If your doctor recommends it, get more exercise. Walking is a good choice. Bit by bit, increase the amount you walk every day. Try for at least 30 minutes on most days of the week.  You also may want to swim, bike, or do other activities.     Do not smoke. If you need help quitting, talk to your doctor about stop-smoking programs and medicines. These can increase your chances of quitting for good. Quitting smoking may be the most important step you can take to protect your heart. It is never too late to quit.     Limit alcohol to 2 drinks a day for men and 1 drink a day for women. Too much alcohol can cause health problems.     Manage other health problems such as diabetes, high blood pressure, and high cholesterol. If you think you may have a problem with alcohol or drug use, talk to your doctor. Medicines    Take your medicines exactly as prescribed. Call your doctor if you think you are having a problem with your medicine.     If your doctor recommends aspirin, take the amount directed each day. Make sure you take aspirin and not another kind of pain reliever, such as acetaminophen (Tylenol). When should you call for help? Call 911 if you have symptoms of a heart attack. These may include:    Chest pain or pressure, or a strange feeling in the chest.     Sweating.     Shortness of breath.     Pain, pressure, or a strange feeling in the back, neck, jaw, or upper belly or in one or both shoulders or arms.     Lightheadedness or sudden weakness.     A fast or irregular heartbeat. After you call 911, the  may tell you to chew 1 adult-strength or 2 to 4 low-dose aspirin. Wait for an ambulance. Do not try to drive yourself. Watch closely for changes in your health, and be sure to contact your doctor if you have any problems. Where can you learn more? Go to http://www.chiang.com/ and enter F075 to learn more about \"A Healthy Heart: Care Instructions. \"  Current as of: September 7, 2022               Content Version: 13.5  © 2207-4926 Healthwise, Incorporated. Care instructions adapted under license by [facility] ([facility]).  If you have questions about a medical condition or this instruction, always ask your healthcare professional. [employer], [facility] disclaims any warranty or liability for your use of this information. Personalized Preventive Plan for Gerardo Foster - 2/21/2023  Medicare offers a range of preventive health benefits. Some of the tests and screenings are paid in full while other may be subject to a deductible, co-insurance, and/or copay. Some of these benefits include a comprehensive review of your medical history including lifestyle, illnesses that may run in your family, and various assessments and screenings as appropriate. After reviewing your medical record and screening and assessments performed today your provider may have ordered immunizations, labs, imaging, and/or referrals for you. A list of these orders (if applicable) as well as your Preventive Care list are included within your After Visit Summary for your review. Other Preventive Recommendations:    A preventive eye exam performed by an eye specialist is recommended every 1-2 years to screen for glaucoma; cataracts, macular degeneration, and other eye disorders. A preventive dental visit is recommended every 6 months. Try to get at least 150 minutes of exercise per week or 10,000 steps per day on a pedometer . Order or download the FREE \"Exercise & Physical Activity: Your Everyday Guide\" from [employer]. Call 5-610.879.2439 or search [employer] online. You need 7348-0939 mg of calcium and 0140-5549 IU of vitamin D per day. It is possible to meet your calcium requirement with diet alone, but a vitamin D supplement is usually necessary to meet this goal.  When exposed to the sun, use a sunscreen that protects against both UVA and UVB radiation with an SPF of 30 or greater. Reapply every 2 to 3 hours or after sweating, drying off with a towel, or swimming. Always wear a seat belt when traveling in a car. Always wear a helmet when riding a bicycle or motorcycle.

## 2023-02-21 NOTE — PROGRESS NOTES
2023    Kostas Burns (:  1960) is a 65 y.o. female, here for a preventive medicine evaluation. Not doing regimented exercise program.  Does stretching program daily. Has chronic back pain with left sided sciatica. Open to PT and pain management. Denies memory concerns, hearing, or vision concerns. Doesn't get 2-3 servings of fruits and vegetables daily. Will go back to ensure drinks. Patient Active Problem List   Diagnosis    Essential hypertension    Chest pain    Moderate COPD (chronic obstructive pulmonary disease) (HCC)    Heterozygous alpha 1-antitrypsin deficiency (HCC)    COPD exacerbation (HCC)    Gastroesophageal reflux disease without esophagitis    Anxiety and depression    Neuropathy of left lower extremity    Acquired hypothyroidism    COPD with acute exacerbation (HCC)    Diarrhea    Adrenal nodule (HCC)    Hypercalcemia    NSTEMI (non-ST elevated myocardial infarction) ([facility] [address]    Nonischemic cardiomyopathy (HCC)    Menopause    Other osteoporosis without current pathological fracture    Ulcerative colitis with rectal bleeding (HCC)    Moderate malnutrition (HCC)    Abdominal pain    Left-sided weakness    Dysarthria    Severe malnutrition (HCC)    Acute on chronic respiratory failure with hypoxia and hypercapnia (HCC)    Acute hypoxemic respiratory failure (HCC)    Tracheobronchitis    Leukocytosis       Review of Systems   Constitutional:  Negative for chills and fever. HENT:  Negative for rhinorrhea. Respiratory:  Negative for cough and shortness of breath. Prior to Visit Medications    Medication Sig Taking? Authorizing Provider   losartan (COZAAR) 100 MG tablet Take 1 tablet by mouth daily Yes Drea Murrell, DO   ARIPiprazole (ABILIFY) 5 MG tablet Take 1 tablet by mouth daily Yes Drea Murrell, DO   gabapentin (NEURONTIN) 300 MG capsule Take 1 capsule by mouth 3 times daily for 30 days.  Yes Drea Murrell, DO   albuterol sulfate  (90 Base) MCG/ACT inhaler INHALE 1-2 PUFFS EVERY SIX HOURS INTO THE LUNGS AS NEEDED FOR WHEEZING OR SHORTNESS OF BREATH Yes Jose Pride MD   AMITRIPTYLINE HCL PO Take 5 mg by mouth  Yes Historical Provider, MD   amLODIPine (NORVASC) 5 MG tablet Take 1 tablet by mouth daily Yes Emi Gardner MD   metoprolol succinate (TOPROL XL) 50 MG extended release tablet Take 50 mg by mouth daily Yes Historical Provider, MD   mirtazapine (REMERON) 15 MG tablet Take 15 mg by mouth nightly Yes Historical Provider, MD Conor Petersen 100-62.5-25 MCG/INH AEPB INHALE 1 PUFF INTO THE LUNGS DAILY Yes Adolfo Davey MD   aspirin 81 MG chewable tablet Take 1 tablet by mouth daily Yes Kapil Parada MD   pantoprazole (PROTONIX) 40 MG tablet Take 40 mg by mouth daily Yes Historical Provider, MD   FLORASTOR 250 MG capsule Take 250 mg by mouth daily Yes Historical Provider, MD   aspirin-acetaminophen-caffeine (Verito Sherlyn) 250-561-64 MG per tablet Take 1 tablet by mouth every 6 hours as needed for Headaches Yes Historical Provider, MD   albuterol (PROVENTIL) (2.5 MG/3ML) 0.083% nebulizer solution Take 3 mLs by nebulization every 6 hours as needed for Wheezing Yes Adolfo Davey MD   DULoxetine (CYMBALTA) 60 MG extended release capsule Take 60 mg by mouth daily  Yes Historical Provider, MD   Acetaminophen 325 MG CAPS Take 975 mg by mouth as needed Yes Historical Provider, MD   levothyroxine (SYNTHROID) 50 MCG tablet Take 50 mcg by mouth Daily Yes Historical Provider, MD   predniSONE (DELTASONE) 10 MG tablet 4 tabs for 3 days 3 tabs for 3 days 2 tabs for 3 days 1 tabs for 3 days  Patient not taking: No sig reported  Gin Conway MD   busPIRone (BUSPAR) 5 MG tablet TAKE 1 TABLET BY MOUTH ONCE DAILY FOR 1 WEEK THEN TAKE 1 TABLET TWICE DAILY.   Patient not taking: No sig reported  Historical Provider, MD   atorvastatin (LIPITOR) 40 MG tablet Take 1 tablet by mouth daily  Emi Gardner MD   Cholecalciferol (VITAMIN D) 50 MCG ( UT) CAPS capsule Take by mouth daily   Patient not taking: Reported on 2023  Historical Provider, MD   folic acid (FOLVITE) 1 MG tablet Take 1 tablet by mouth daily  Patient not taking: Reported on 2023  Víctor Snyder MD        No Known Allergies    Past Medical History:   Diagnosis Date    Back pain     COPD (chronic obstructive pulmonary disease) ([facility] [address]     Fatigue     Hypertension     Syncope and collapse 2019    Thyroid disease     Ulcerative colitis, unspecified, without complications ([facility] [address]        Past Surgical History:   Procedure Laterality Date    BACK SURGERY      L4-L5 rods in    COLONOSCOPY N/A 2021    COLONOSCOPY WITH BIOPSY performed by Manju Gonsalves MD at [address] [place] [address], [facility] 2019    due to a fall    TONSILLECTOMY         Social History     Socioeconomic History    Marital status:      Spouse name: Not on file    Number of children: Not on file    Years of education: Not on file    Highest education level: Not on file   Occupational History    Not on file   Tobacco Use    Smoking status: Former     Packs/day: 0.25     Years: 32.00     Pack years: 8.00     Types: Cigarettes     Start date:      Quit date:      Years since quittin.1    Smokeless tobacco: Never   Vaping Use    Vaping Use: Former    Substances: Always   Substance and Sexual Activity    Alcohol use: Never    Drug use: Not Currently     Types: Marijuana Aloma Amparo)    Sexual activity: Not on file   Other Topics Concern    Not on file   Social History Narrative    Not on file     Social Determinants of Health     Financial Resource Strain: Low Risk     Difficulty of Paying Living Expenses: Not hard at all   Food Insecurity: No Food Insecurity    Worried About Running Out of Food in the Last Year: Never true    [address] in the Last Year: Never true   Transportation Needs: Unknown    Lack of Transportation (Medical):  Not on file    Lack of Transportation (Non-Medical): No   Physical Activity: Insufficiently Active    Days of Exercise per Week: 1 day    Minutes of Exercise per Session: 30 min   Stress: Not on file   Social Connections: Not on file   Intimate Partner Violence: Not on file   Housing Stability: Unknown    Unable to Pay for Housing in the Last Year: Not on file    Number of Places Lived in the Last Year: Not on file    Unstable Housing in the Last Year: No        Family History   Problem Relation Age of Onset    Heart Disease Father     High Blood Pressure Sister        ADVANCE DIRECTIVE: N, <no information>    Vitals:    02/21/23 1356   BP: (!) 120/93   Site: Left Wrist   Position: Sitting   Pulse: 72   Weight: 102 lb 9.6 oz (46.5 kg)   Height: 5' 3\" (1.6 m)     Estimated body mass index is 18.17 kg/m² as calculated from the following:    Height as of this encounter: 5' 3\" (1.6 m). Weight as of this encounter: 102 lb 9.6 oz (46.5 kg). Physical Exam  Constitutional:       General: She is not in acute distress. Appearance: Normal appearance. She is normal weight. HENT:      Head: Normocephalic and atraumatic. Right Ear: External ear normal.      Left Ear: External ear normal.      Nose: No rhinorrhea. Eyes:      Extraocular Movements: Extraocular movements intact. Conjunctiva/sclera: Conjunctivae normal.      Pupils: Pupils are equal, round, and reactive to light. Cardiovascular:      Rate and Rhythm: Normal rate and regular rhythm. Heart sounds: Normal heart sounds. No murmur heard. No friction rub. No gallop. Pulmonary:      Effort: Pulmonary effort is normal.      Breath sounds: Wheezing present. Neurological:      General: No focal deficit present. Mental Status: She is alert. Psychiatric:         Mood and Affect: Mood normal.       No flowsheet data found.     Lab Results   Component Value Date/Time    CHOL 215 03/22/2021 05:05 AM    CHOL 236 11/05/2020 12:41 PM    CHOL 200 04/11/2018 12:00 AM    TRIG 71 03/22/2021 05:05 AM    TRIG 107 11/05/2020 12:41 PM    TRIG 95 04/11/2018 12:00 AM    HDL 79 03/22/2021 05:05 AM    HDL 94 11/05/2020 12:41 PM    HDL 67 04/11/2018 12:00 AM    LDLCALC 122 03/22/2021 05:05 AM    LDLCALC 121 11/05/2020 12:41 PM    LDLCALC 114 04/11/2018 12:00 AM    GLUCOSE 119 09/28/2022 05:56 PM    LABA1C 6.2 03/22/2021 07:49 AM       The ASCVD Risk score (Mirta QUEVEDO, et al., 2019) failed to calculate for the following reasons:     The patient has a prior MI or stroke diagnosis    Immunization History   Administered Date(s) Administered    COVID-19, MODERNA BLUE border, Primary or Immunocompromised, (age 12y+), IM, 100 mcg/0.5mL 04/05/2021, 05/10/2021    HIB PRP-T (ActHIB, Hiberix) 06/15/2019    Influenza, FLUARIX, FLULAVAL, FLUZONE (age 10 mo+) AND AFLURIA, (age 1 y+), PF, 0.5mL 11/05/2020, 01/04/2022    Meningococcal ACWY Vaccine 07/13/2019    Meningococcal B, OMV (Bexsero) 06/15/2019, 07/13/2019    Meningococcal B, Recombinant Cody Summers) 08/06/2019, 08/06/2019, 09/11/2019, 09/11/2019    Meningococcal MCV4O (Menveo) 06/15/2019, 08/10/2019    Meningococcal MCV4P (Menactra) 06/15/2019    Meningococcal Vac Of Unknown Formula And Unknown Serogroup 07/13/2019, 08/10/2019    PPD Test 07/05/2021    Pneumococcal Conjugate 13-valent (Juliette Bottom) 06/15/2020, 07/17/2020    Pneumococcal Polysaccharide ([id number]) 06/15/2019, 06/15/2019    Tdap (Boostrix, Adacel) 07/17/2020       Health Maintenance   Topic Date Due    HIV screen  Never done    Hepatitis C screen  Never done    Shingles vaccine (1 of 2) Never done    Breast cancer screen  Never done    Annual Wellness Visit (AWV)  Never done    Meningococcal (ACWY) vaccine (2 - Risk 2-dose series) 10/05/2019    Meningococcal B vaccine (3 of 4 - Increased Risk Bexsero 2-dose series) 09/11/2020    COVID-19 Vaccine (3 - Booster for Moderna series) 07/05/2021    A1C test (Diabetic or Prediabetic)  03/22/2022    Flu vaccine (1) 08/01/2022    Depression Monitoring  01/24/2024    Pneumococcal 0-64 years Vaccine (3 - PPSV23 if available, else PCV20) 06/15/2024    Lipids  03/22/2026    DTaP/Tdap/Td vaccine (2 - Td or Tdap) 07/17/2030    Colorectal Cancer Screen  07/07/2031    Hib vaccine  Completed    Hepatitis A vaccine  Aged Out       Assessment & Plan   Chronic bilateral low back pain with left-sided sciatica  -     [facility] - Sudha Dumas MD, Pain Management, [facility]  No follow-ups on file. --Olyphantmauri Andrade, DO    Medicare Annual Wellness Visit    Gerardo Foster is here for Medicare AWV (Pt is here for AWV)    Assessment & Plan   Chronic bilateral low back pain with left-sided sciatica  -     [facility] - Sudha Dumas MD, Pain Management, [facility]  Initial Medicare annual wellness visit      Recommendations for Preventive Services Due: see orders and patient instructions/AVS.  Recommended screening schedule for the next 5-10 years is provided to the patient in written form: see Patient Instructions/AVS.     Return in about 4 weeks (around 3/21/2023) for Assess buspar. Subjective     Patient's complete Health Risk Assessment and screening values have been reviewed and are found in Flowsheets. The following problems were reviewed today and where indicated follow up appointments were made and/or referrals ordered.     Positive Risk Factor Screenings with Interventions:        Depression:  PHQ-2 Score: 0  PHQ-9 Total Score: 9    Interpretation:   1-4 = minimal  5-9 = mild  10-14 = moderate  15-19 = moderately severe  20-27 = severe  Interventions:            General HRA Questions:  Select all that apply: (!) New or Increased Fatigue    Fatigue Interventions:        Social and Emotional Support:  Do you get the social and emotional support that you need?: (!) No  Interventions:  Weight and Activity:  Physical Activity: Insufficiently Active    Days of Exercise per Week: 1 day    Minutes of Exercise per Session: 30 min     On average, how many days per week do you engage in moderate to strenuous exercise (like a brisk walk)?: 1 day  Have you lost any weight without trying in the past 3 months?: No  Body mass index: (!) 18.17    Underweight Interventions:        Dentist Screen:  Have you seen the dentist within the past year?: (!) No    Intervention:       Vision Screen:  Do you have difficulty driving, watching TV, or doing any of your daily activities because of your eyesight?: No  Have you had an eye exam within the past year?: (!) No  No results found. Interventions:       Safety:  Do you have any tripping hazards - loose or unsecured carpets or rugs?: (!) Yes  Do you have either shower bars, grab bars, non-slip mats or non-slip surfaces in your shower or bathtub?: (!) No  Interventions:      ADL's:   Patient reports needing help with:  Select all that apply: (!) Housekeeping, Laundry  Interventions:                    Objective   Vitals:    02/21/23 1356   BP: (!) 120/93   Site: Left Wrist   Position: Sitting   Pulse: 72   Weight: 102 lb 9.6 oz (46.5 kg)   Height: 5' 3\" (1.6 m)      Body mass index is 18.17 kg/m². No Known Allergies  Prior to Visit Medications    Medication Sig Taking? Authorizing Provider   busPIRone (BUSPAR) 5 MG tablet Take 1 tablet by mouth 2 times daily Yes Matthew Bell DO   predniSONE (DELTASONE) 10 MG tablet Take 1 tablet by mouth daily for 10 days Yes Matthew Bell DO   losartan (COZAAR) 100 MG tablet Take 1 tablet by mouth daily Yes Matthew Bell DO   ARIPiprazole (ABILIFY) 5 MG tablet Take 1 tablet by mouth daily Yes Matthew Bell DO   gabapentin (NEURONTIN) 300 MG capsule Take 1 capsule by mouth 3 times daily for 30 days.  Yes Matthew Bell DO   albuterol sulfate  (90 Base) MCG/ACT inhaler INHALE 1-2 PUFFS EVERY SIX HOURS INTO THE LUNGS AS NEEDED FOR WHEEZING OR SHORTNESS OF BREATH Yes Pat Valdez MD   AMITRIPTYLINE HCL PO Take 5 mg by mouth  Yes Historical Provider, MD   amLODIPine (NORVASC) 5 MG tablet Take 1 tablet by mouth daily Yes Dahiana Gupta MD   metoprolol succinate (TOPROL XL) 50 MG extended release tablet Take 50 mg by mouth daily Yes Historical Provider, MD   mirtazapine (REMERON) 15 MG tablet Take 15 mg by mouth nightly Yes Historical Provider, MD   Daun Hunger 100-62.5-25 MCG/INH AEPB INHALE 1 PUFF INTO THE LUNGS DAILY Yes Pat Valdez MD   aspirin 81 MG chewable tablet Take 1 tablet by mouth daily Yes Ken Mccord MD   pantoprazole (PROTONIX) 40 MG tablet Take 40 mg by mouth daily Yes Historical Provider, MD   FLORASTOR 250 MG capsule Take 250 mg by mouth daily Yes Historical Provider, MD   aspirin-acetaminophen-caffeine (EXCEDRIN MIGRAINE) 637-977-18 MG per tablet Take 1 tablet by mouth every 6 hours as needed for Headaches Yes Historical Provider, MD   albuterol (PROVENTIL) (2.5 MG/3ML) 0.083% nebulizer solution Take 3 mLs by nebulization every 6 hours as needed for Wheezing Yes Pat Valdez MD   DULoxetine (CYMBALTA) 60 MG extended release capsule Take 60 mg by mouth daily  Yes Historical Provider, MD   Acetaminophen 325 MG CAPS Take 975 mg by mouth as needed Yes Historical Provider, MD   levothyroxine (SYNTHROID) 50 MCG tablet Take 50 mcg by mouth Daily Yes Historical Provider, MD   atorvastatin (LIPITOR) 40 MG tablet Take 1 tablet by mouth daily  Dahiana Gupta MD       [facility] (Including outside providers/suppliers regularly involved in providing care):   Patient Care Team:  Meena Cleary DO as PCP - General (Family Medicine)  Meena Cleary DO as PCP - Empaneled Provider  Pat Valdez MD as Consulting Physician (Pulmonology)     Reviewed and updated this visit:  Tobacco  Allergies  Meds  Med Hx  Surg Hx  Soc Hx  Fam Hx             Meena Cleary DO

## 2023-02-22 ENCOUNTER — TELEPHONE (OUTPATIENT)
Dept: FAMILY MEDICINE CLINIC | Age: 63
End: 2023-02-22

## 2023-02-22 NOTE — TELEPHONE ENCOUNTER
Pharmacy called and stated that they needed medication clarification on the Prednisone 10mg. The script reads- take 1 tablet by mouth for 10 days but there is a quantity for 30 days.

## 2023-03-07 ENCOUNTER — OFFICE VISIT (OUTPATIENT)
Dept: FAMILY MEDICINE CLINIC | Age: 63
End: 2023-03-07

## 2023-03-07 VITALS
BODY MASS INDEX: 17.93 KG/M2 | HEART RATE: 77 BPM | HEIGHT: 63 IN | OXYGEN SATURATION: 91 % | SYSTOLIC BLOOD PRESSURE: 120 MMHG | RESPIRATION RATE: 16 BRPM | DIASTOLIC BLOOD PRESSURE: 86 MMHG | WEIGHT: 101.2 LBS

## 2023-03-07 DIAGNOSIS — J44.1 COPD EXACERBATION (HCC): Primary | ICD-10-CM

## 2023-03-07 DIAGNOSIS — M99.09 SOMATIC DYSFUNCTION OF BACK: ICD-10-CM

## 2023-03-07 DIAGNOSIS — F41.1 GAD (GENERALIZED ANXIETY DISORDER): ICD-10-CM

## 2023-03-07 RX ORDER — AZITHROMYCIN 250 MG/1
250 TABLET, FILM COATED ORAL SEE ADMIN INSTRUCTIONS
Qty: 6 TABLET | Refills: 0 | Status: SHIPPED | OUTPATIENT
Start: 2023-03-07 | End: 2023-03-12

## 2023-03-07 RX ORDER — BUSPIRONE HYDROCHLORIDE 10 MG/1
10 TABLET ORAL 3 TIMES DAILY
Qty: 90 TABLET | Refills: 0 | Status: SHIPPED | OUTPATIENT
Start: 2023-03-07 | End: 2023-04-06

## 2023-03-07 ASSESSMENT — PATIENT HEALTH QUESTIONNAIRE - PHQ9
SUM OF ALL RESPONSES TO PHQ QUESTIONS 1-9: 0
1. LITTLE INTEREST OR PLEASURE IN DOING THINGS: 0
2. FEELING DOWN, DEPRESSED OR HOPELESS: 0
SUM OF ALL RESPONSES TO PHQ QUESTIONS 1-9: 0
3. TROUBLE FALLING OR STAYING ASLEEP: 0
4. FEELING TIRED OR HAVING LITTLE ENERGY: 0
9. THOUGHTS THAT YOU WOULD BE BETTER OFF DEAD, OR OF HURTING YOURSELF: 0
SUM OF ALL RESPONSES TO PHQ9 QUESTIONS 1 & 2: 0
SUM OF ALL RESPONSES TO PHQ QUESTIONS 1-9: 0
5. POOR APPETITE OR OVEREATING: 0
8. MOVING OR SPEAKING SO SLOWLY THAT OTHER PEOPLE COULD HAVE NOTICED. OR THE OPPOSITE, BEING SO FIGETY OR RESTLESS THAT YOU HAVE BEEN MOVING AROUND A LOT MORE THAN USUAL: 0
10. IF YOU CHECKED OFF ANY PROBLEMS, HOW DIFFICULT HAVE THESE PROBLEMS MADE IT FOR YOU TO DO YOUR WORK, TAKE CARE OF THINGS AT HOME, OR GET ALONG WITH OTHER PEOPLE: 0
SUM OF ALL RESPONSES TO PHQ QUESTIONS 1-9: 0
6. FEELING BAD ABOUT YOURSELF - OR THAT YOU ARE A FAILURE OR HAVE LET YOURSELF OR YOUR FAMILY DOWN: 0
7. TROUBLE CONCENTRATING ON THINGS, SUCH AS READING THE NEWSPAPER OR WATCHING TELEVISION: 0

## 2023-03-07 ASSESSMENT — ENCOUNTER SYMPTOMS: COUGH: 1

## 2023-03-07 NOTE — PROGRESS NOTES
[facility]  3/7/2023    Susa Lundborg English (:  1960) is a 65 y.o. female, here for evaluation of the following medical concerns:    Chief Complaint   Patient presents with    COPD     Pt is here for 1 month follow up         ASSESSMENT/ PLAN  1. COPD exacerbation ([address]  Start Z-Nabeel. Patient may benefit from being on azithromycin 3 times weekly. She will discuss with her pulmonologist.  She was on [address] but insurance did not continue to pay for it. 2. ENRIQUETA (generalized anxiety disorder)  Increase BuSpar to 10 mg p.o. 3 times daily follow-up in 4 weeks. 3.  Somatic dysfunction of back  - BLT of the innominate's for left posterior innominate somatic dysfunction. Muscle energy for right, left backward sacral torsion    No follow-ups on file. HPI  Patient is here to follow-up on her breathing as well as her anxiety. She has been using her inhaler more often than normal.  She endorses increased sputum production increased thickness and color change. She has had increased back pain back pain for the past few weeks and would like osteopathic manipulation today. Since starting BuSpar her mood has improved even on days she did not have energy she was still in a good mood. ROS  Review of Systems   Constitutional:  Negative for chills and fever. Respiratory:  Positive for cough. HISTORIES  Current Outpatient Medications on File Prior to Visit   Medication Sig Dispense Refill    losartan (COZAAR) 100 MG tablet Take 1 tablet by mouth daily 90 tablet 0    ARIPiprazole (ABILIFY) 5 MG tablet Take 1 tablet by mouth daily 30 tablet 3    gabapentin (NEURONTIN) 300 MG capsule Take 1 capsule by mouth 3 times daily for 30 days.  90 capsule 0    albuterol sulfate  (90 Base) MCG/ACT inhaler INHALE 1-2 PUFFS EVERY SIX HOURS INTO THE LUNGS AS NEEDED FOR WHEEZING OR SHORTNESS OF BREATH 6.7 g 5    AMITRIPTYLINE HCL PO Take 5 mg by mouth       amLODIPine (NORVASC) 5 MG tablet Take 1 tablet by mouth daily 90 tablet 3    metoprolol succinate (TOPROL XL) 50 MG extended release tablet Take 50 mg by mouth daily      mirtazapine (REMERON) 15 MG tablet Take 15 mg by mouth nightly      TRELEGY ELLIPTA 100-62.5-25 MCG/INH AEPB INHALE 1 PUFF INTO THE LUNGS DAILY 60 each 5    atorvastatin (LIPITOR) 40 MG tablet Take 1 tablet by mouth daily 90 tablet 3    aspirin 81 MG chewable tablet Take 1 tablet by mouth daily 30 tablet 0    pantoprazole (PROTONIX) 40 MG tablet Take 40 mg by mouth daily      FLORASTOR 250 MG capsule Take 250 mg by mouth daily      aspirin-acetaminophen-caffeine (EXCEDRIN MIGRAINE) 250-250-65 MG per tablet Take 1 tablet by mouth every 6 hours as needed for Headaches      albuterol (PROVENTIL) (2.5 MG/3ML) 0.083% nebulizer solution Take 3 mLs by nebulization every 6 hours as needed for Wheezing 120 each 3    DULoxetine (CYMBALTA) 60 MG extended release capsule Take 60 mg by mouth daily       Acetaminophen 325 MG CAPS Take 975 mg by mouth as needed      levothyroxine (SYNTHROID) 50 MCG tablet Take 50 mcg by mouth Daily       No current facility-administered medications on file prior to visit.       Past Medical History:   Diagnosis Date    Back pain     COPD (chronic obstructive pulmonary disease) ([facility])     Fatigue     Hypertension     Syncope and collapse 03/2019    Thyroid disease     Ulcerative colitis, unspecified, without complications [facility])      Patient Active Problem List   Diagnosis    Essential hypertension    Chest pain    Moderate COPD (chronic obstructive pulmonary disease) (HCC)    Heterozygous alpha 1-antitrypsin deficiency (HCC)    COPD exacerbation (HCC)    Gastroesophageal reflux disease without esophagitis    Anxiety and depression    Neuropathy of left lower extremity    Acquired hypothyroidism    COPD with acute exacerbation ([facility])    Diarrhea    Adrenal nodule (HCC)    Hypercalcemia    NSTEMI (non-ST elevated myocardial infarction) ([facility] [address]    Nonischemic cardiomyopathy ([facility] [address]    Menopause    Other osteoporosis without current pathological fracture    Ulcerative colitis with rectal bleeding (HCC)    Moderate malnutrition (HCC)    Abdominal pain    Left-sided weakness    Dysarthria    Severe malnutrition (HCC)    Acute on chronic respiratory failure with hypoxia and hypercapnia (HCC)    Acute hypoxemic respiratory failure (HCC)    Tracheobronchitis    Leukocytosis       PE  Vitals:    03/07/23 1307   BP: 120/86   Site: Left Upper Arm   Position: Sitting   Pulse: 77   Resp: 16   SpO2: 91%   Weight: 101 lb 3.2 oz (45.9 kg)   Height: 5' 3\" (1.6 m)     Estimated body mass index is 17.93 kg/m² as calculated from the following:    Height as of this encounter: 5' 3\" (1.6 m). Weight as of this encounter: 101 lb 3.2 oz (45.9 kg). Physical Exam  Constitutional:       General: She is not in acute distress. Appearance: Normal appearance. She is normal weight. HENT:      Head: Normocephalic and atraumatic. Right Ear: External ear normal.      Left Ear: External ear normal.      Nose: No rhinorrhea. Eyes:      Extraocular Movements: Extraocular movements intact. Conjunctiva/sclera: Conjunctivae normal.      Pupils: Pupils are equal, round, and reactive to light. Cardiovascular:      Rate and Rhythm: Normal rate and regular rhythm. Heart sounds: Normal heart sounds. No murmur heard. No friction rub. No gallop. Pulmonary:      Effort: Pulmonary effort is normal.      Breath sounds: Wheezing present. Musculoskeletal:      Comments: Left posterior innominate  Right on left backward sacral torsion   Neurological:      General: No focal deficit present. Mental Status: She is alert. Psychiatric:         Mood and Affect: Mood normal.       Bouchra Matias DO    This dictation was generated by voice recognition computer software.   Although all attempts are made to edit the dictation for accuracy, there may be errors in the transcription that are not intended.

## 2023-03-08 ENCOUNTER — HOSPITAL ENCOUNTER (OUTPATIENT)
Dept: PHYSICAL THERAPY | Age: 63
Setting detail: THERAPIES SERIES
Discharge: HOME | End: 2023-03-08
Payer: MEDICARE

## 2023-03-08 PROCEDURE — G0283 ELEC STIM OTHER THAN WOUND: HCPCS | Performed by: PHYSICAL THERAPIST

## 2023-03-08 PROCEDURE — 97161 PT EVAL LOW COMPLEX 20 MIN: CPT | Performed by: PHYSICAL THERAPIST

## 2023-03-08 PROCEDURE — 97110 THERAPEUTIC EXERCISES: CPT | Performed by: PHYSICAL THERAPIST

## 2023-03-08 NOTE — PLAN OF CARE
[facility], [facility], [address], [address]  Phone: [phone]  Fax [phone]       Physical Therapy Certification    Dear  Dr. Sada Wilson,    We had the pleasure of evaluating the following patient for physical therapy services at [address]. A summary of our findings can be found in the initial assessment below. This includes our plan of care. If you have any questions or concerns regarding these findings, please do not hesitate to contact me at the office phone number checked above. Thank you for the referral.       Physician Signature:_______________________________Date:__________________  By signing above (or electronic signature), therapists plan is approved by physician    Patient: Cyndie Prince   : 1960   MRN: [id number]  Referring Physician: Jed Snellen, DO      Evaluation Date: 3/8/2023      Medical Diagnosis Information:  Chronic bilateral low back pain with left-sided sciatica [M54.42, G89.29]   LBP M54.42                                      Insurance information:  [employer]        Precautions/ Contra-indications: back sx x4 (L4-5 rods and  then removed) most recent sx  ; anxiety, depression, COPD on O2 as needed during the day and sleeps with O2, neuropathy LLE, MI, osteoporosis, spleen removed     C-SSRS Triggered by Intake questionnaire (Past 2 wk assessment):   [x] No, Questionnaire did not trigger screening.   [] Yes, Patient intake triggered further evaluation      [] C-SSRS Screening completed  [] PCP notified via Plan of Care  [] Emergency services notified     Latex Allergy:  [x]NO      []YES  Preferred Language for Healthcare:   [x]English       []other:    SUBJECTIVE: Patient stated complaint:Pt reports long hx of back problems, has had 4 back sx's. pt reports increased left low back pain for the past year with insidious onset.  She has had 4 previous back sx;s of putting rods in and then removing secondary to rods shifting to the L. Pt reports she is in the end stage of COPD.      Relevant Medical History:back sx (L4-5 rods); anxiety, depression, COPD, neuropathy LLE, MI, osteoporosis  Functional Disability Index/G-Codes:   modified oswestry 42%    Height 5 ft 3 inches  Weight 100lbs   Pain Scale:  6/10  Easing factors: gabbapetin, tylenol; ice ; lying flat on back   Provocative factors:  lifting, turning body; sitting and standing       Type: [x]Constant   []Intermittent  [x]Radiating to toes  []Localized []other:     Numbness/Tingling: L foot intermittent     Occupation/School: retired on disability for past 10 yrs secondary to back     Living Status/Prior Level of Function: Independent with ADLs and IADLs, lives with  in 1 story home with only 1 step to get in ; pt was able to take a shower standing 1 yr ago     OBJECTIVE:     ROM     LUMBAR FLEX Fingertips to mid shin  Increased L SI pain    LUMBAR EXT Wnl's  Feels good    Sidebend Fingertip to below patella, increased L LBP  Fingertip to patella, L LBP    Rotation      LEFT RIGHT   HIP ext   0   HIP Abd     HIP ER Wnl's  Wnl's    HIP IR Wnl's  Decreased    Knee Flex     Knee ext     Hamstring FLEX Tight  Tight    Piriformis                Strength  LEFT RIGHT   MfA     TrA     HIP Flexors 4 4   HIP Abductors 4- 4   HIP ER     Hip IR     Knee EXT (quad) 4 4   Knee Flex (HS) 4 4   Ankle DF     Ankle PF     Great Toe Ext       Reflexes/Sensation:    [x]Dermatomes/Myotomes intact    []UE Reflexes     []Normal []Hypo      []Hyper   []LE Reflexes     []Normal []Hypo      []Hyper   []Babinski/Clonus/Hoffmans:    []Other:    Joint mobility:    []Normal    [x]Hypo   []Hyper    Palpation: tender with palp to L SI; L QL and L gluts      Functional Mobility/Transfers: independent     Posture: scoliosis, increased thoracic kyphosis ; IC R>L ; ASIS R>L     Bandages/Dressings/Incisions: NA     Gait: FWBing without an AD (uses walker for bad days); B mild trendelenberg     Orthopedic Special Tests: SI March + R ; negative SLR                       [x] Patient history, allergies, meds reviewed. Medical chart reviewed. See intake form. Review Of Systems (ROS):  [x]Performed Review of systems (Integumentary, CardioPulmonary, Neurological) by intake and observation. Intake form has been scanned into medical record. Patient has been instructed to contact their primary care physician regarding ROS issues if not already being addressed at this time. Co-morbidities/Complexities (which will affect course of rehabilitation):   []None           Arthritic conditions   []Rheumatoid arthritis (M05.9)  []Osteoarthritis (M19.91)   Cardiovascular conditions   []Hypertension (I10)  []Hyperlipidemia (E78.5)  []Angina pectoris (I20)  []Atherosclerosis (I70)   Musculoskeletal conditions   []Disc pathology   []Congenital spine pathologies   [x]Prior surgical intervention  [x]Osteoporosis (M81.8)  []Osteopenia (M85.8)   Endocrine conditions   []Hypothyroid (E03.9)  []Hyperthyroid Gastrointestinal conditions   []Constipation (X16.98)   Metabolic conditions   []Morbid obesity (E66.01)  []Diabetes type 1(E10.65) or 2 (E11.65)   []Neuropathy (G60.9)     Pulmonary conditions   [x]Asthma (J45)  []Coughing   [x]COPD (J44.9)   Psychological Disorders  [x]Anxiety (F41.9)  [x]Depression (F32.9)   []Other:   []Other:          Barriers to/and or personal factors that will affect rehab potential:              []Age  []Sex              []Motivation/Lack of Motivation                        [x]Co-Morbidities              []Cognitive Function, education/learning barriers              []Environmental, home barriers              []profession/work barriers  []past PT/medical experience  []other:  Justification: end stage COPD, multiple back sx's     Falls Risk Assessment (30 days):   [x] Falls Risk assessed and no intervention required.   [] Falls Risk assessed and Patient requires intervention due to being higher risk   TUG score (>12s at risk):     [] Falls education provided, including       G-Codes:   modified oswestry     ASSESSMENT:   Functional Impairments:     [x]Noted lumbar/proximal hip hypomobility   []Noted lumbosacral and/or generalized hypermobility   [x]Decreased Lumbosacral/hip/LE functional ROM   [x]Decreased core/proximal hip strength and neuromuscular control    [x]Decreased LE functional strength    []Abnormal reflexes/sensation/myotomal/dermatomal deficits  [x]Reduced balance/proprioceptive control    []other:      Functional Activity Limitations (from functional questionnaire and intake)   [x]Reduced ability to tolerate prolonged functional positions   []Reduced ability or difficulty with changes of positions or transfers between positions   [x]Reduced ability to maintain good posture and demonstrate good body mechanics with sitting, bending, and lifting   [x]Reduced ability to sleep   [x] Reduced ability or tolerance with driving and/or computer work   [x]Reduced ability to perform lifting, reaching, carrying tasks   []Reduced ability to squat   [x]Reduced ability to forward bend   [x]Reduced ability to ambulate prolonged functional periods/distances/surfaces   [x]Reduced ability to ascend/descend stairs   []other:       Participation Restrictions   [x]Reduced participation in self care activities   [x]Reduced participation in home management activities   []Reduced participation in work activities   [x]Reduced participation in social activities. []Reduced participation in sport/recreation activities. Classification:   []Signs/symptoms consistent with Lumbar instability/stabilization subgroup. []Signs/symptoms consistent with Lumbar mobilization/manipulation subgroup, myotomes and dermatomes intact. Meets manipulation criteria.     []Signs/symptoms consistent with Lumbar direction specific/centralization subgroup   []Signs/symptoms consistent with Lumbar traction subgroup     [x]Signs/symptoms consistent with lumbar facet dysfunction   []Signs/symptoms consistent with lumbar stenosis type dysfunction   []Signs/symptoms consistent with nerve root involvement including myotome & dermatome dysfunction   []Signs/symptoms consistent with post-surgical status including: decreased ROM, strength and function. []signs/symptoms consistent with pathology which may benefit from Dry needling     []other:      Prognosis/Rehab Potential:      []Excellent   [x]Good    [x]Fair   []Poor    Tolerance of evaluation/treatment:    []Excellent   [x]Good    []Fair   []Poor  Physical Therapy Evaluation Complexity Justification  [x] A history of present problem with:  [] no personal factors and/or comorbidities that impact the plan of care;  []1-2 personal factors and/or comorbidities that impact the plan of care  [x]3 personal factors and/or comorbidities that impact the plan of care  [x] An examination of body systems using standardized tests and measures addressing any of the following: body structures and functions (impairments), activity limitations, and/or participation restrictions;:  [x] a total of 1-2 or more elements   [] a total of 3 or more elements   [] a total of 4 or more elements   [x] A clinical presentation with:  [x] stable and/or uncomplicated characteristics   [] evolving clinical presentation with changing characteristics  [] unstable and unpredictable characteristics;   [x] Clinical decision making of [x] low, [] moderate, [] high complexity using standardized patient assessment instrument and/or measurable assessment of functional outcome.     [x] EVAL (LOW) 20405 (typically 20 minutes face-to-face)  [] EVAL (MOD) 19586 (typically 30 minutes face-to-face)  [] EVAL (HIGH) 61799 (typically 45 minutes face-to-face)  [] RE-EVAL         PLAN: Begin PT focusing on: proximal hip mobilizations, LB mobs, LB core activation, proximal hip activation, and HEP    Frequency/Duration:  1-2 days per week for 8 Weeks:  Interventions:  [x]  Therapeutic exercise including: strength training, ROM, for LE, Glutes and core   [x]  NMR activation and proprioception for glutes , LE and Core   [x]  Manual therapy as indicated for Hip complex, LE and spine to include: Dry Needling/IASTM, STM, PROM, Gr I-IV mobilizations, manipulation. [x]  Modalities as needed that may include: thermal agents, E-stim, Biofeedback, US, iontophoresis as indicated  [x]  Patient education on joint protection, postural re-education, activity modification, progression of HEP. HEP instruction: (see flowsheet)    GOALS:  Patient stated goal: get pain relief   [] Progressing: [] Met: [] Not Met: [] Adjusted    Therapist goals for Patient:   Short Term Goals: To be achieved in: 2 weeks  1. Independent in HEP and progression per patient tolerance, in order to prevent re-injury. [] Progressing: [] Met: [] Not Met: [] Adjusted  2. Patient will have a decrease in pain to facilitate improvement in movement, function, and ADLs as indicated by Functional Deficits. [] Progressing: [] Met: [] Not Met: [] Adjusted      Long Term Goals: To be achieved in: 8 weeks  1. Disability index score of 25% or more per Modified Oswestry to assist with reaching prior level of function. [] Progressing: [] Met: [] Not Met: [] Adjusted  2. Patient will demonstrate increased AROM to WNL, good LS mobility, good hip ROM to allow for proper joint functioning as indicated by patients Functional Deficits. [] Progressing: [] Met: [] Not Met: [] Adjusted  3. Patient will demonstrate an increase in Strength to good proximal hip and core activation to allow for proper functional mobility as indicated by patients Functional Deficits. [] Progressing: [] Met: [] Not Met: [] Adjusted  4. Patient will return to being able to sit, stand, ambulate and perform functional activities without increased symptoms or restriction.    [] Progressing: [] Met: [] Not Met: [] Adjusted  5.  Pt able to do house hold chores without increased L LB symptoms   [] Progressing: [] Met: [] Not Met: [] Adjusted       Electronically signed by:  Evelyne Andre PT

## 2023-03-08 NOTE — FLOWSHEET NOTE
[facility] and Therapy[address]  Phone: [phone]  Fax [phone]     Physical Therapy Treatment Note/ Progress Report:           Date:  3/8/2023    Patient Name:  Becka Cage    :  1960  MRN: [id number]  Restrictions/Precautions:    Medical/Treatment Diagnosis Information:  Chronic bilateral low back pain with left-sided sciatica [M54.42, G89.29]  LBP X29.51  Insurance/Certification information:   gabriela lazcano   Physician Information:  Andi Summers DO  Has the plan of care been signed (Y/N):        []  Yes  [x]  No     Date of Patient follow up with Physician: 4/10/23      Is this a Progress Report:     []  Yes  [x]  No        If Yes:  Date Range for reporting period:  Beginning 3/8/23  Ending     Progress report will be due (10 Rx or 30 days whichever is less): 3/0/84       Recertification will be due (POC Duration  / 90 days whichever is less): 8 weeks         Visit # Insurance Allowable Auth Required   In-person 1 Check auth NV  [x]  Yes: cohere  []  No    Telehealth   []  Yes []  No    Total            Functional Scale: modified oswestry 42%     Date assessed:  3/8/23      Therapy Diagnosis/Practice Pattern: 4F       Number of Comorbidities:  []0     []1-2    [x]3+    Latex Allergy:  [x]NO      []YES  Preferred Language for Healthcare:   [x]English       []other:      Pain level:  6/10 L LB     SUBJECTIVE:  See eval    OBJECTIVE: See eval  Observation:   Test measurements:      RESTRICTIONS/PRECAUTIONS: back sx x4 (L4-5 rods then removed ); anxiety, depression, COPD end stage recent lung sx and will have another one on 3/21/23 on O2 as needed , neuropathy LLE, MI, osteoporosis    Exercises/Interventions:     Therapeutic Ex (40645) Sets/sec Reps Notes/CUES   Supine HS stretch  30\" x2   Hep    Supine LTR  5\" x10   Hep    Supine psoas stretch  30\"x2   Hep    Supine hip and with TrA and bridge BTB 5 x10   Hep                                                    Manual Intervention (16285) NV                                          NMR re-education (13488)   CUES NEEDED                                                         Therapeutic Activity (33398)                                              Therapeutic Exercise and NMR EXR  [] (72115) Provided verbal/tactile cueing for activities related to strengthening, flexibility, endurance, ROM  for improvements in proximal hip and core control with self care, mobility, lifting and ambulation.  [] (42717) Provided verbal/tactile cueing for activities related to improving balance, coordination, kinesthetic sense, posture, motor skill, proprioception  to assist with core control in self care, mobility, lifting, and ambulation.      Therapeutic Activities:    [] (60976 or 46367) Provided verbal/tactile cueing for activities related to improving balance, coordination, kinesthetic sense, posture, motor skill, proprioception and motor activation to allow for proper function  with self care and ADLs  [] (61299) Provided training and instruction to the patient for proper core and proximal hip recruitment and positioning with ambulation re-education     Home Exercise Program:    [x] (86537) Reviewed/Progressed HEP activities related to strengthening, flexibility, endurance, ROM of core, proximal hip and LE for functional self-care, mobility, lifting and ambulation   [] (54919) Reviewed/Progressed HEP activities related to improving balance, coordination, kinesthetic sense, posture, motor skill, proprioception of core, proximal hip and LE for self care, mobility, lifting, and ambulation      Manual Treatments:  PROM / STM / Oscillations-Mobs:  G-I, II, III, IV (PA's, Inf., Post.)  [] (06058) Provided manual therapy to mobilize proximal hip and LS spine soft tissue/joints for the purpose of modulating pain, promoting relaxation,  increasing ROM, reducing/eliminating soft tissue swelling/inflammation/restriction, improving soft tissue extensibility and allowing for proper ROM for normal function with self care, mobility, lifting and ambulation. Modalities:   ES PM with MHP to L LB with pt supine legs elevated     Charges  Timed Code Treatment Minutes: 20   Total Treatment Minutes: 60      Medicare total (add KX at $2000)         BWC time in/ time out:    TE time in /out    Manual time in/out    Neuro re ed time in/out    Untimed minutes        [x] EVAL (LOW) 37518   [] EVAL (MOD) 05237   [] EVAL (HIGH) 53502   [] RE-EVAL     [x] FY(63796) x1     [] IONTO  [] NMR (40703) x     [] VASO  [] Manual (50319) x      [] Other:  [] TA x      [] Mech Traction (26908)  [] ES(attended) (07239)      [x] ES (un) (48653):     GOALS:  Patient stated goal: get pain relief   [] Progressing: [] Met: [] Not Met: [] Adjusted    Therapist goals for Patient:   Short Term Goals: To be achieved in: 2 weeks  1. Independent in HEP and progression per patient tolerance, in order to prevent re-injury. [] Progressing: [] Met: [] Not Met: [] Adjusted  2. Patient will have a decrease in pain to facilitate improvement in movement, function, and ADLs as indicated by Functional Deficits. [] Progressing: [] Met: [] Not Met: [] Adjusted      Long Term Goals: To be achieved in: 8 weeks  1. Disability index score of 25% or more per Modified Oswestry to assist with reaching prior level of function. [] Progressing: [] Met: [] Not Met: [] Adjusted  2. Patient will demonstrate increased AROM to WNL, good LS mobility, good hip ROM to allow for proper joint functioning as indicated by patients Functional Deficits. [] Progressing: [] Met: [] Not Met: [] Adjusted  3. Patient will demonstrate an increase in Strength to good proximal hip and core activation to allow for proper functional mobility as indicated by patients Functional Deficits. [] Progressing: [] Met: [] Not Met: [] Adjusted  4.  Patient will return to being able to sit, stand, ambulate and perform functional activities without increased symptoms or restriction. [] Progressing: [] Met: [] Not Met: [] Adjusted  5. Pt able to do house hold chores without increased L LB symptoms   [] Progressing: [] Met: [] Not Met: [] Adjusted    Progression Towards Functional goals:  [] Patient is progressing as expected towards functional goals listed. [] Progression is slowed due to complexities listed. [] Progression has been slowed due to co-morbidities. [x] Plan just implemented, too soon to assess goals progression  [] Other:     Overall Progression Towards Functional goals/ Treatment Progress Update:  [] Patient is progressing as expected towards functional goals listed. [] Progression is slowed due to complexities/Impairments listed. [] Progression has been slowed due to co-morbidities. [x] Plan just implemented, too soon to assess goals progression <30days   [] Goals require adjustment due to lack of progress  [] Patient is not progressing as expected and requires additional follow up with physician  [] Other    Prognosis for POC: [x] Good [] Fair  [] Poor      Patient requires continued skilled intervention: [x] Yes  [] No    Treatment/Activity Tolerance:  [x] Patient able to complete treatment  [] Patient limited by fatigue  [] Patient limited by pain    [] Patient limited by other medical complications  [] Other:     ASSESSMENT: see eval     Return to Play: (if applicable)   []  Stage 1: Intro to Strength   []  Stage 2: Return to Run and Strength   []  Stage 3: Return to Jump and Strength   []  Stage 4: Dynamic Strength and Agility   []  Stage 5: Sport Specific Training     []  Ready to Return to Play, Meets All Above Stages   []  Not Ready for Return to Sports   Comments:                         Access Code: [email or web address]  URL: [email or web address]  Date: 03/08/2023  Prepared by: Kaiden Greene    Exercises  Supine Hamstring Stretch - 2 x daily - 7 x weekly - 1 sets - 3 reps  Supine Lower Trunk Rotation - 2 x daily - 7 x weekly - 1 sets - 10 reps - 5 hold  Supine Bridge with Resistance Band - 2 x daily - 7 x weekly - 2 sets - 10 reps - 5 hold  Hip Flexor Stretch at Edge of Bed - 2 x daily - 7 x weekly - 1 sets - 2 reps - 30 hold        PLAN: See eval  [] Continue per plan of care [] Alter current plan (see comments above)  [x] Plan of care initiated [] Hold pending MD visit [] Discharge      Electronically signed by:  Sidra Reis PT    Note: If patient does not return for scheduled/ recommended follow up visits, this note will serve as a discharge from care along with most recent update on progress.

## 2023-03-15 ENCOUNTER — HOSPITAL ENCOUNTER (OUTPATIENT)
Dept: PHYSICAL THERAPY | Age: 63
Setting detail: THERAPIES SERIES
Discharge: HOME | End: 2023-03-15
Payer: MEDICARE

## 2023-03-15 NOTE — FLOWSHEET NOTE
[facility],  [address], [address] [place] [address]        Physical Therapy  Cancellation/No-show Note  Patient Name:  Josr Coulter  :  1960   Date:  3/15/2023  Cancelled visits to date: 1  No-shows to date: 0    For today's appointment patient:  []  Cancelled  []  Rescheduled appointment  []  No-show     Reason given by patient:  [x]  Patient ill: strep throat   []  Conflicting appointment  []  No transportation    []  Conflict with work  []  No reason given  []  Other:     Comments:      Electronically signed by:  William Lopez PT

## 2023-03-22 ENCOUNTER — HOSPITAL ENCOUNTER (OUTPATIENT)
Dept: PHYSICAL THERAPY | Age: 63
Setting detail: THERAPIES SERIES
Discharge: HOME | End: 2023-03-22
Payer: MEDICARE

## 2023-03-22 NOTE — PROGRESS NOTES
[facility], [facility],  [address]  [place], [address]  Phone: [phone]  Fax [phone]          Physical Therapy  Cancellation/No-show Note  Patient Name:  Michael Quach  :  1960   Date:  3/22/2023  Cancelled visits to date: 1  No-shows to date: 1    For today's appointment patient:  []  Cancelled  []  Rescheduled appointment  [x]  No-show     Reason given by patient:  []  Patient ill: strep throat   []  Conflicting appointment  []  No transportation    []  Conflict with work  []  No reason given  []  Other:     Comments:      Electronically signed by:  Gunjan Ayala PT

## 2023-03-27 ENCOUNTER — OFFICE VISIT (OUTPATIENT)
Dept: CARDIOLOGY CLINIC | Age: 63
End: 2023-03-27
Payer: MEDICARE

## 2023-03-27 VITALS
HEIGHT: 63 IN | SYSTOLIC BLOOD PRESSURE: 132 MMHG | OXYGEN SATURATION: 100 % | HEART RATE: 56 BPM | DIASTOLIC BLOOD PRESSURE: 74 MMHG | BODY MASS INDEX: 17.36 KG/M2 | WEIGHT: 98 LBS

## 2023-03-27 DIAGNOSIS — I51.81 STRESS-INDUCED CARDIOMYOPATHY: ICD-10-CM

## 2023-03-27 DIAGNOSIS — I10 ESSENTIAL HYPERTENSION: Primary | ICD-10-CM

## 2023-03-27 DIAGNOSIS — R06.02 SOB (SHORTNESS OF BREATH): ICD-10-CM

## 2023-03-27 DIAGNOSIS — R07.82 INTERCOSTAL PAIN: ICD-10-CM

## 2023-03-27 PROCEDURE — G8427 DOCREV CUR MEDS BY ELIG CLIN: HCPCS | Performed by: INTERNAL MEDICINE

## 2023-03-27 PROCEDURE — 99214 OFFICE O/P EST MOD 30 MIN: CPT | Performed by: INTERNAL MEDICINE

## 2023-03-27 PROCEDURE — 1036F TOBACCO NON-USER: CPT | Performed by: INTERNAL MEDICINE

## 2023-03-27 PROCEDURE — 3017F COLORECTAL CA SCREEN DOC REV: CPT | Performed by: INTERNAL MEDICINE

## 2023-03-27 PROCEDURE — G8484 FLU IMMUNIZE NO ADMIN: HCPCS | Performed by: INTERNAL MEDICINE

## 2023-03-27 PROCEDURE — 3078F DIAST BP <80 MM HG: CPT | Performed by: INTERNAL MEDICINE

## 2023-03-27 PROCEDURE — 3075F SYST BP GE 130 - 139MM HG: CPT | Performed by: INTERNAL MEDICINE

## 2023-03-27 PROCEDURE — G8419 CALC BMI OUT NRM PARAM NOF/U: HCPCS | Performed by: INTERNAL MEDICINE

## 2023-03-27 RX ORDER — AMLODIPINE BESYLATE 5 MG/1
5 TABLET ORAL DAILY
Qty: 90 TABLET | Refills: 3 | Status: SHIPPED | OUTPATIENT
Start: 2023-03-27

## 2023-03-27 NOTE — PROGRESS NOTES
[facility]   Cardiac Followup    Referring Provider:  Sushant Sam DO     Chief Complaint   Patient presents with    Follow-up    Cardiomyopathy    Hypertension    Chest Pain     Comes and goes, not active    Shortness of Breath     Has copd      Eric Bursn   1960      History of Present Illness:    Lilton Goodell is a 65 y.o. female who is here today for follow up for a past medical history of HTN, thyroid disease, right adrenal adenoma (follows Dr. Riri Estrada), s/p   splenectomy, and moderate COPD. She was admitted to the hospital from 3/21-3/23/2021 due to chest pain and SOB. Troponin's found to be 0.53 and he was taken to the cath lab. No angiographic CAD and no culprit lesion found, LV gram has very small area of apical ballooning. An echocardiogram from 3/23/2021 showed an EF of 55-60%. At her last visit her Losartan was increased to 50 mg daily, Metoprolol was increased to 50 mg daily. Cardiac MRI from 4/30/2021 showed findings are consistent with resolving stress induced  cardiomyopathy. Last visit started Norvasc and Losartan, increased Lipitor to 40 mg daily. Today she states she has chest pain and SOB that has increased over the last 2 years. Blood pressure at home is running 150's for her SBP. She is not taking Norvasc, unsure why it was stopped. She will be having lung surgery through  at some point for her COPD. Patient currently denies any weight gain, edema, palpitations, dizziness, and syncope. Past Medical History:   has a past medical history of Back pain, COPD (chronic obstructive pulmonary disease) ([address] Fatigue, Hypertension, Syncope and collapse, Thyroid disease, and Ulcerative colitis, unspecified, without complications ([address] Surgical History:   has a past surgical history that includes Splenectomy (2019); Tonsillectomy; back surgery; Hysterectomy, vaginal (2000); and Colonoscopy (N/A, 7/7/2021).      Social History:   reports that she quit smoking about

## 2023-03-27 NOTE — PATIENT INSTRUCTIONS
Plan:  ~Start Norvasc, the other name for this medication is Amlodipine, 5 mg nightly for better blood pressure control. Call us if you start to have foot or ankle swelling as this can be a side effect from this medication. ~Fasting lipids and CMP- wait to have this blood work until you see Kitty Gandhi, DO   Cardiac medications reviewed including indications and pertinent side effects. Medication list updated at this visit. Check blood pressure and heart rate at home a few times per week- keep a log with dates and times and bring to office visit   Regular exercise and following a healthy diet encouraged   Follow up with me in 1 year       Your provider has ordered testing for further evaluation. An order/prescription has been included in your paper work. To schedule outpatient testing, contact Central Scheduling by calling [address] [place] [address] ([phone]).

## 2023-03-29 ENCOUNTER — HOSPITAL ENCOUNTER (OUTPATIENT)
Dept: PHYSICAL THERAPY | Age: 63
Setting detail: THERAPIES SERIES
Discharge: HOME | End: 2023-03-29
Payer: MEDICARE

## 2023-03-29 PROCEDURE — G0283 ELEC STIM OTHER THAN WOUND: HCPCS | Performed by: PHYSICAL THERAPIST

## 2023-03-29 PROCEDURE — 97110 THERAPEUTIC EXERCISES: CPT | Performed by: PHYSICAL THERAPIST

## 2023-03-29 PROCEDURE — 97140 MANUAL THERAPY 1/> REGIONS: CPT | Performed by: PHYSICAL THERAPIST

## 2023-03-29 NOTE — FLOWSHEET NOTE
Krystal  [facility]. [facility], [facility], [address] [facility], [address] [place] [address] Dr  Phone: [phone]  Fax [phone]     Physical Therapy Treatment Note/ Progress Report:           Date:  3/29/2023    Patient Name:  Carolyn Cota    :  1960  MRN: [id number]  Restrictions/Precautions:    Medical/Treatment Diagnosis Information:  Chronic bilateral low back pain with left-sided sciatica [M54.42, G89.29]  LBP H57.52  Insurance/Certification information:   gabriela lazcano   Physician Information:  Manuel Brown DO  Has the plan of care been signed (Y/N):        []  Yes  [x]  No     Date of Patient follow up with Physician: 4/10/23      Is this a Progress Report:     []  Yes  [x]  No        If Yes:  Date Range for reporting period:  Beginning 3/8/23  Ending     Progress report will be due (10 Rx or 30 days whichever is less): 41       Recertification will be due (POC Duration  / 90 days whichever is less): 8 weeks         Visit # Insurance Allowable Auth Required   In-person 2 10 through 23  [x]  Yes: cohere  []  No    Telehealth   []  Yes []  No    Total            Functional Scale: modified oswestry 42%     Date assessed:  3/8/23      Therapy Diagnosis/Practice Pattern: 4F       Number of Comorbidities:  []0     []1-2    [x]3+    Latex Allergy:  [x]NO      []YES  Preferred Language for Healthcare:   [x]English       []other:      Pain level:  5/10 L LB     SUBJECTIVE:  pt reports pain along the L side of her back. Pt reports increased pain with bending to do laundry. Pt reports she is going to have a lower lung removal in 3 months on the R side.      OBJECTIVE:   Observation:   Test measurements:      RESTRICTIONS/PRECAUTIONS: back sx x4 (L4-5 rods then removed ); anxiety, depression, COPD end stage recent lung sx and will have another one on 3/21/23 on O2 as needed , neuropathy LLE, MI, osteoporosis    Exercises/Interventions:     Therapeutic Ex

## 2023-04-10 PROBLEM — F41.1 GAD (GENERALIZED ANXIETY DISORDER): Status: ACTIVE | Noted: 2023-04-10

## 2023-04-13 ENCOUNTER — TELEPHONE (OUTPATIENT)
Dept: CARDIOLOGY CLINIC | Age: 63
End: 2023-04-13

## 2023-04-19 ENCOUNTER — HOSPITAL ENCOUNTER (OUTPATIENT)
Dept: PHYSICAL THERAPY | Age: 63
Setting detail: THERAPIES SERIES
Discharge: HOME | End: 2023-04-19
Payer: MEDICARE

## 2023-04-19 PROCEDURE — 97140 MANUAL THERAPY 1/> REGIONS: CPT | Performed by: PHYSICAL THERAPIST

## 2023-04-19 PROCEDURE — G0283 ELEC STIM OTHER THAN WOUND: HCPCS | Performed by: PHYSICAL THERAPIST

## 2023-04-19 PROCEDURE — 97110 THERAPEUTIC EXERCISES: CPT | Performed by: PHYSICAL THERAPIST

## 2023-04-19 PROCEDURE — 97112 NEUROMUSCULAR REEDUCATION: CPT | Performed by: PHYSICAL THERAPIST

## 2023-04-19 NOTE — FLOWSHEET NOTE
Krystal  79. and Therapy, [facility], [address] [facility], [address] [place] [address] Dr  Phone: [phone]  Fax [phone]     Physical Therapy Treatment Note/ Progress Report:           Date:  2023    Patient Name:  Lord Vasquez    :  1960  MRN: [id number]  Restrictions/Precautions:    Medical/Treatment Diagnosis Information:  Chronic bilateral low back pain with left-sided sciatica [M54.42, G89.29]  LBP A17.36  Insurance/Certification information:   gabriela lazcano   Physician Information:  Vanda Almaguer DO  Has the plan of care been signed (Y/N):        []  Yes  [x]  No     Date of Patient follow up with Physician:       Is this a Progress Report:     []  Yes  [x]  No        If Yes:  Date Range for reporting period:  Beginning 3/8/23  Ending     Progress report will be due (10 Rx or 30 days whichever is less):       Recertification will be due (POC Duration  / 90 days whichever is less): 8 weeks         Visit # Insurance Allowable Auth Required   In-person 4 10 through 23  [x]  Yes: cohere  []  No    Telehealth   []  Yes []  No    Total            Functional Scale: modified oswestry 42% 50 ; 26%    Date assessed:  3/8/23; 23       Therapy Diagnosis/Practice Pattern: 4F       Number of Comorbidities:  []0     []1-2    [x]3+    Latex Allergy:  [x]NO      []YES  Preferred Language for Healthcare:   [x]English       []other:      Pain level:  2/10 L LB     SUBJECTIVE:   pt reports her back is feeling pretty good. She has been able to walk a little bit more and having less pain .      OBJECTIVE:   Observation:   Test measurements:      RESTRICTIONS/PRECAUTIONS: back sx x4 (L4-5 rods then removed ); anxiety, depression, COPD end stage recent lung sx and will have another one on 3/21/23 on O2 as needed , neuropathy LLE, MI, osteoporosis    Exercises/Interventions:     Therapeutic Ex (56611) Sets/sec Reps Notes/CUES      Hep    Supine LTR

## 2023-04-26 ENCOUNTER — HOSPITAL ENCOUNTER (OUTPATIENT)
Dept: PHYSICAL THERAPY | Age: 63
Setting detail: THERAPIES SERIES
End: 2023-04-26
Payer: MEDICARE

## 2023-04-26 ENCOUNTER — HOSPITAL ENCOUNTER (OUTPATIENT)
Dept: PHYSICAL THERAPY | Age: 63
Setting detail: THERAPIES SERIES
Discharge: HOME | End: 2023-04-26
Payer: MEDICARE

## 2023-04-26 RX ORDER — LOSARTAN POTASSIUM 100 MG/1
100 TABLET ORAL DAILY
Qty: 90 TABLET | Refills: 0 | Status: SHIPPED | OUTPATIENT
Start: 2023-04-26

## 2023-04-26 NOTE — TELEPHONE ENCOUNTER
Refill Request     Last Seen: Last Seen Department: 4/10/2023  Last Seen by PCP: 4/10/2023    Last Written: 01/24/2023      Next Appointment:   Future Appointments   Date Time Provider Dieudonne Salmeron   5/1/2023  1:30 PM Carlos Whitfield, PT [facility]   7/10/2023  1:30 PM [facility]             Requested Prescriptions     Pending Prescriptions Disp Refills    losartan (COZAAR) 100 MG tablet [Pharmacy Med Name: LOSARTAN 100 MG  Tablet] 90 tablet 0     Sig: TAKE 1 TABLET BY MOUTH DAILY labor/delivery

## 2023-04-26 NOTE — FLOWSHEET NOTE
[place] [facility],  [address]        Physical Therapy  Cancellation/No-show Note  Patient Name:  Elva Pozo  :  1960   Date:  2023  Cancelled visits to date: 2  No-shows to date: 1    For today's appointment patient:  [x]  Cancelled  []  Rescheduled appointment  []  No-show     Reason given by patient:  []  Patient ill:   []  Conflicting appointment  []  No transportation    []  Conflict with work  []  No reason given  [x]  Other:  pt's  is in the ICU.  Pt rescheduled for Monday    Comments:      Electronically signed by:  Phyllis Sánchez PT

## 2023-04-29 NOTE — PROGRESS NOTES
Pt A&O x4. VSS. Denies dyspnea. Reports passing flatus. Right hand swollen from infiltrated IV, Right radial pulse +2, cap refill is immediate. Assessment is as charted. Call light and bedside table within reach, wheels locked, bed in lowest position, Pt instructed to call out for assistance and expressed understanding, calls out appropriately.     Electronically signed by Sangita Agosto RN on 7/2/2021 at 11:31 PM 4 = No assist / stand by assistance

## 2023-05-01 ENCOUNTER — HOSPITAL ENCOUNTER (OUTPATIENT)
Dept: PHYSICAL THERAPY | Age: 63
Setting detail: THERAPIES SERIES
Discharge: HOME | End: 2023-05-01
Payer: MEDICARE

## 2023-05-01 PROCEDURE — 97110 THERAPEUTIC EXERCISES: CPT | Performed by: PHYSICAL THERAPIST

## 2023-05-01 PROCEDURE — 97112 NEUROMUSCULAR REEDUCATION: CPT | Performed by: PHYSICAL THERAPIST

## 2023-05-01 PROCEDURE — 97140 MANUAL THERAPY 1/> REGIONS: CPT | Performed by: PHYSICAL THERAPIST

## 2023-05-01 PROCEDURE — G0283 ELEC STIM OTHER THAN WOUND: HCPCS | Performed by: PHYSICAL THERAPIST

## 2023-05-01 NOTE — DISCHARGE SUMMARY
Krystal  [facility]. and Therapy, [facility], [address] [place] [address], [address] [place] [address] Dr  Phone: [phone]  Fax [phone]     Physical Therapy Treatment Note/ Progress Report:           Date:  2023    Patient Name:  Clif Castillo    :  1960  MRN: [id number]  Restrictions/Precautions:    Medical/Treatment Diagnosis Information:  Chronic bilateral low back pain with left-sided sciatica [M54.42, G89.29]  LBP E82.68  Insurance/Certification information:   gabriela lazcano   Physician Information:  Josr Gonzalez DO  Has the plan of care been signed (Y/N):        []  Yes  [x]  No     Date of Patient follow up with Physician:       Is this a Progress Report:     []  Yes  [x]  No        If Yes:  Date Range for reporting period:  Beginning 3/8/23  Ending     Progress report will be due (10 Rx or 30 days whichever is less):       Recertification will be due (POC Duration  / 90 days whichever is less): 8 weeks         Visit # Insurance Allowable Auth Required   In-person 5 10 through 23  [x]  Yes: cohere  []  No    Telehealth   []  Yes []  No    Total            Functional Scale: modified oswestry 42% 21/50 ; 26%    Date assessed:  3/8/23; 23       Therapy Diagnosis/Practice Pattern: 4F       Number of Comorbidities:  []0     []1-2    [x]3+    Latex Allergy:  [x]NO      []YES  Preferred Language for Healthcare:   [x]English       []other:      Pain level:  2/10 L LB     SUBJECTIVE:  pt reports her back is cont to do better. She is able to walk further   .      OBJECTIVE:   Observation:   Test measurements:  trunk flex AROM fingertips to ankles     hip ext ROM  B +3   MMT hip flex B 5    abd B  4+  knee ext B 5  knee flex B 5     RESTRICTIONS/PRECAUTIONS: back sx x4 (L4-5 rods then removed ); anxiety, depression, COPD end stage recent lung sx and will have another one on 3/21/23 on O2 as needed , neuropathy LLE, MI,

## 2023-05-10 ENCOUNTER — APPOINTMENT (OUTPATIENT)
Dept: CT IMAGING | Age: 63
DRG: 194 | End: 2023-05-10
Payer: MEDICARE

## 2023-05-10 ENCOUNTER — APPOINTMENT (OUTPATIENT)
Dept: GENERAL RADIOLOGY | Age: 63
DRG: 194 | End: 2023-05-10
Payer: MEDICARE

## 2023-05-10 ENCOUNTER — HOSPITAL ENCOUNTER (INPATIENT)
Age: 63
LOS: 4 days | Discharge: HOME HEALTH | DRG: 194 | End: 2023-05-14
Attending: EMERGENCY MEDICINE | Admitting: INTERNAL MEDICINE
Payer: MEDICARE

## 2023-05-10 DIAGNOSIS — R65.20 SEVERE SEPSIS (HCC): ICD-10-CM

## 2023-05-10 DIAGNOSIS — J96.02 ACUTE RESPIRATORY FAILURE WITH HYPERCAPNIA (HCC): ICD-10-CM

## 2023-05-10 DIAGNOSIS — J44.1 COPD EXACERBATION (HCC): Primary | ICD-10-CM

## 2023-05-10 DIAGNOSIS — A41.9 SEVERE SEPSIS (HCC): ICD-10-CM

## 2023-05-10 PROBLEM — J96.11 CHRONIC RESPIRATORY FAILURE WITH HYPOXIA AND HYPERCAPNIA (HCC): Status: ACTIVE | Noted: 2023-05-10

## 2023-05-10 PROBLEM — J96.12 CHRONIC RESPIRATORY FAILURE WITH HYPOXIA AND HYPERCAPNIA (HCC): Status: ACTIVE | Noted: 2023-05-10

## 2023-05-10 PROBLEM — N10 ACUTE PYELONEPHRITIS: Status: ACTIVE | Noted: 2023-05-10

## 2023-05-10 LAB
ALBUMIN SERPL-MCNC: 3.3 G/DL (ref 3.4–5)
ALBUMIN/GLOB SERPL: 0.9 {RATIO} (ref 1.1–2.2)
ALP SERPL-CCNC: 136 U/L (ref 40–129)
ALT SERPL-CCNC: 15 U/L (ref 10–40)
ANION GAP SERPL CALCULATED.3IONS-SCNC: 12 MMOL/L (ref 3–16)
ANISOCYTOSIS BLD QL SMEAR: ABNORMAL
AST SERPL-CCNC: 17 U/L (ref 15–37)
BACTERIA URNS QL MICRO: ABNORMAL /HPF
BASE EXCESS BLDV CALC-SCNC: 3.6 MMOL/L (ref -3–3)
BASOPHILS # BLD: 0 K/UL (ref 0–0.2)
BASOPHILS NFR BLD: 0 %
BILIRUB SERPL-MCNC: 0.5 MG/DL (ref 0–1)
BILIRUB UR QL STRIP.AUTO: NEGATIVE
BUN SERPL-MCNC: 14 MG/DL (ref 7–20)
CALCIUM SERPL-MCNC: 10.4 MG/DL (ref 8.3–10.6)
CHLORIDE SERPL-SCNC: 94 MMOL/L (ref 99–110)
CLARITY UR: CLEAR
CO2 BLDV-SCNC: 33 MMOL/L
CO2 SERPL-SCNC: 29 MMOL/L (ref 21–32)
COHGB MFR BLDV: 3.2 % (ref 0–1.5)
COLOR UR: YELLOW
CREAT SERPL-MCNC: 0.6 MG/DL (ref 0.6–1.2)
D DIMER: 1.2 UG/ML FEU (ref 0–0.6)
DEPRECATED RDW RBC AUTO: 14.6 % (ref 12.4–15.4)
EKG ATRIAL RATE: 97 BPM
EKG DIAGNOSIS: NORMAL
EKG P AXIS: 80 DEGREES
EKG P-R INTERVAL: 132 MS
EKG Q-T INTERVAL: 340 MS
EKG QRS DURATION: 82 MS
EKG QTC CALCULATION (BAZETT): 431 MS
EKG R AXIS: 73 DEGREES
EKG T AXIS: 67 DEGREES
EKG VENTRICULAR RATE: 97 BPM
EOSINOPHIL # BLD: 1 K/UL (ref 0–0.6)
EOSINOPHIL NFR BLD: 4 %
EPI CELLS #/AREA URNS HPF: ABNORMAL /HPF (ref 0–5)
FLUAV RNA RESP QL NAA+PROBE: NOT DETECTED
FLUBV RNA RESP QL NAA+PROBE: NOT DETECTED
GFR SERPLBLD CREATININE-BSD FMLA CKD-EPI: >60 ML/MIN/{1.73_M2}
GLUCOSE SERPL-MCNC: 178 MG/DL (ref 70–99)
GLUCOSE UR STRIP.AUTO-MCNC: 100 MG/DL
HCO3 BLDV-SCNC: 31.5 MMOL/L (ref 23–29)
HCT VFR BLD AUTO: 40.9 % (ref 36–48)
HGB BLD-MCNC: 13 G/DL (ref 12–16)
HGB UR QL STRIP.AUTO: ABNORMAL
INR PPP: 1.16 (ref 0.84–1.16)
KETONES UR STRIP.AUTO-MCNC: NEGATIVE MG/DL
LACTATE BLDV-SCNC: 1.4 MMOL/L (ref 0.4–2)
LEUKOCYTE ESTERASE UR QL STRIP.AUTO: ABNORMAL
LYMPHOCYTES # BLD: 2.2 K/UL (ref 1–5.1)
LYMPHOCYTES NFR BLD: 9 %
MACROCYTES BLD QL SMEAR: ABNORMAL
MCH RBC QN AUTO: 30.3 PG (ref 26–34)
MCHC RBC AUTO-ENTMCNC: 31.8 G/DL (ref 31–36)
MCV RBC AUTO: 95.4 FL (ref 80–100)
METHGB MFR BLDV: 0.4 %
MICROCYTES BLD QL SMEAR: ABNORMAL
MONOCYTES # BLD: 1.7 K/UL (ref 0–1.3)
MONOCYTES NFR BLD: 7 %
NEUTROPHILS # BLD: 19.1 K/UL (ref 1.7–7.7)
NEUTROPHILS NFR BLD: 42 %
NEUTS BAND NFR BLD MANUAL: 38 % (ref 0–7)
NITRITE UR QL STRIP.AUTO: POSITIVE
O2 THERAPY: ABNORMAL
OVALOCYTES BLD QL SMEAR: ABNORMAL
PCO2 BLDV: 62.3 MMHG (ref 40–50)
PH BLDV: 7.32 [PH] (ref 7.35–7.45)
PH UR STRIP.AUTO: 6 [PH] (ref 5–8)
PLATELET # BLD AUTO: 408 K/UL (ref 135–450)
PLATELET BLD QL SMEAR: ADEQUATE
PMV BLD AUTO: 9.8 FL (ref 5–10.5)
PO2 BLDV: 133.8 MMHG (ref 25–40)
POIKILOCYTOSIS BLD QL SMEAR: ABNORMAL
POLYCHROMASIA BLD QL SMEAR: ABNORMAL
POTASSIUM SERPL-SCNC: 4.2 MMOL/L (ref 3.5–5.1)
PROCALCITONIN SERPL IA-MCNC: 0.17 NG/ML (ref 0–0.15)
PROT SERPL-MCNC: 6.9 G/DL (ref 6.4–8.2)
PROT UR STRIP.AUTO-MCNC: NEGATIVE MG/DL
PROTHROMBIN TIME: 14.8 SEC (ref 11.5–14.8)
RBC # BLD AUTO: 4.28 M/UL (ref 4–5.2)
RBC #/AREA URNS HPF: ABNORMAL /HPF (ref 0–4)
REASON FOR REJECTION: NORMAL
REJECTED TEST: NORMAL
SAO2 % BLDV: 99 %
SARS-COV-2 RNA RESP QL NAA+PROBE: NOT DETECTED
SLIDE REVIEW: ABNORMAL
SODIUM SERPL-SCNC: 135 MMOL/L (ref 136–145)
SP GR UR STRIP.AUTO: 1.02 (ref 1–1.03)
SPHEROCYTES BLD QL SMEAR: ABNORMAL
TROPONIN, HIGH SENSITIVITY: 50 NG/L (ref 0–14)
UA COMPLETE W REFLEX CULTURE PNL UR: ABNORMAL
UA DIPSTICK W REFLEX MICRO PNL UR: YES
URN SPEC COLLECT METH UR: ABNORMAL
UROBILINOGEN UR STRIP-ACNC: 0.2 E.U./DL
WBC # BLD AUTO: 23.9 K/UL (ref 4–11)
WBC #/AREA URNS HPF: ABNORMAL /HPF (ref 0–5)

## 2023-05-10 PROCEDURE — 82803 BLOOD GASES ANY COMBINATION: CPT

## 2023-05-10 PROCEDURE — 96367 TX/PROPH/DG ADDL SEQ IV INF: CPT

## 2023-05-10 PROCEDURE — 99285 EMERGENCY DEPT VISIT HI MDM: CPT

## 2023-05-10 PROCEDURE — 71260 CT THORAX DX C+: CPT

## 2023-05-10 PROCEDURE — 2580000003 HC RX 258: Performed by: EMERGENCY MEDICINE

## 2023-05-10 PROCEDURE — 2060000000 HC ICU INTERMEDIATE R&B

## 2023-05-10 PROCEDURE — 93010 ELECTROCARDIOGRAM REPORT: CPT | Performed by: INTERNAL MEDICINE

## 2023-05-10 PROCEDURE — 96365 THER/PROPH/DIAG IV INF INIT: CPT

## 2023-05-10 PROCEDURE — 93005 ELECTROCARDIOGRAM TRACING: CPT | Performed by: EMERGENCY MEDICINE

## 2023-05-10 PROCEDURE — 84145 PROCALCITONIN (PCT): CPT

## 2023-05-10 PROCEDURE — 83605 ASSAY OF LACTIC ACID: CPT

## 2023-05-10 PROCEDURE — 6360000002 HC RX W HCPCS: Performed by: EMERGENCY MEDICINE

## 2023-05-10 PROCEDURE — 85610 PROTHROMBIN TIME: CPT

## 2023-05-10 PROCEDURE — 96361 HYDRATE IV INFUSION ADD-ON: CPT

## 2023-05-10 PROCEDURE — 80053 COMPREHEN METABOLIC PANEL: CPT

## 2023-05-10 PROCEDURE — 6370000000 HC RX 637 (ALT 250 FOR IP): Performed by: INTERNAL MEDICINE

## 2023-05-10 PROCEDURE — 85025 COMPLETE CBC W/AUTO DIFF WBC: CPT

## 2023-05-10 PROCEDURE — 87086 URINE CULTURE/COLONY COUNT: CPT

## 2023-05-10 PROCEDURE — 96375 TX/PRO/DX INJ NEW DRUG ADDON: CPT

## 2023-05-10 PROCEDURE — 81001 URINALYSIS AUTO W/SCOPE: CPT

## 2023-05-10 PROCEDURE — 84484 ASSAY OF TROPONIN QUANT: CPT

## 2023-05-10 PROCEDURE — 85379 FIBRIN DEGRADATION QUANT: CPT

## 2023-05-10 PROCEDURE — 87636 SARSCOV2 & INF A&B AMP PRB: CPT

## 2023-05-10 PROCEDURE — 36415 COLL VENOUS BLD VENIPUNCTURE: CPT

## 2023-05-10 PROCEDURE — 71046 X-RAY EXAM CHEST 2 VIEWS: CPT

## 2023-05-10 PROCEDURE — 6360000004 HC RX CONTRAST MEDICATION: Performed by: EMERGENCY MEDICINE

## 2023-05-10 PROCEDURE — 87449 NOS EACH ORGANISM AG IA: CPT

## 2023-05-10 PROCEDURE — 6370000000 HC RX 637 (ALT 250 FOR IP): Performed by: EMERGENCY MEDICINE

## 2023-05-10 RX ORDER — 0.9 % SODIUM CHLORIDE 0.9 %
1000 INTRAVENOUS SOLUTION INTRAVENOUS ONCE
Status: COMPLETED | OUTPATIENT
Start: 2023-05-10 | End: 2023-05-10

## 2023-05-10 RX ORDER — IPRATROPIUM BROMIDE AND ALBUTEROL SULFATE 2.5; .5 MG/3ML; MG/3ML
3 SOLUTION RESPIRATORY (INHALATION)
Status: DISCONTINUED | OUTPATIENT
Start: 2023-05-10 | End: 2023-05-10

## 2023-05-10 RX ORDER — SODIUM CHLORIDE 0.9 % (FLUSH) 0.9 %
10 SYRINGE (ML) INJECTION PRN
Status: DISCONTINUED | OUTPATIENT
Start: 2023-05-10 | End: 2023-05-14 | Stop reason: HOSPADM

## 2023-05-10 RX ORDER — METHYLPREDNISOLONE SODIUM SUCCINATE 125 MG/2ML
125 INJECTION, POWDER, LYOPHILIZED, FOR SOLUTION INTRAMUSCULAR; INTRAVENOUS EVERY 6 HOURS
Status: DISCONTINUED | OUTPATIENT
Start: 2023-05-10 | End: 2023-05-10

## 2023-05-10 RX ORDER — POTASSIUM CHLORIDE 20 MEQ/1
40 TABLET, EXTENDED RELEASE ORAL PRN
Status: DISCONTINUED | OUTPATIENT
Start: 2023-05-10 | End: 2023-05-14 | Stop reason: HOSPADM

## 2023-05-10 RX ORDER — LANOLIN ALCOHOL/MO/W.PET/CERES
3 CREAM (GRAM) TOPICAL NIGHTLY PRN
Status: DISCONTINUED | OUTPATIENT
Start: 2023-05-11 | End: 2023-05-14 | Stop reason: HOSPADM

## 2023-05-10 RX ORDER — SODIUM CHLORIDE 9 MG/ML
INJECTION, SOLUTION INTRAVENOUS PRN
Status: DISCONTINUED | OUTPATIENT
Start: 2023-05-10 | End: 2023-05-14 | Stop reason: HOSPADM

## 2023-05-10 RX ORDER — LEVOTHYROXINE SODIUM 0.05 MG/1
50 TABLET ORAL DAILY
Status: DISCONTINUED | OUTPATIENT
Start: 2023-05-11 | End: 2023-05-14 | Stop reason: HOSPADM

## 2023-05-10 RX ORDER — ATORVASTATIN CALCIUM 40 MG/1
40 TABLET, FILM COATED ORAL DAILY
Status: DISCONTINUED | OUTPATIENT
Start: 2023-05-11 | End: 2023-05-14 | Stop reason: HOSPADM

## 2023-05-10 RX ORDER — METOPROLOL SUCCINATE 50 MG/1
50 TABLET, EXTENDED RELEASE ORAL DAILY
Status: DISCONTINUED | OUTPATIENT
Start: 2023-05-11 | End: 2023-05-14 | Stop reason: HOSPADM

## 2023-05-10 RX ORDER — ARIPIPRAZOLE 10 MG/1
5 TABLET ORAL DAILY
Status: DISCONTINUED | OUTPATIENT
Start: 2023-05-11 | End: 2023-05-14 | Stop reason: HOSPADM

## 2023-05-10 RX ORDER — ONDANSETRON 4 MG/1
4 TABLET, ORALLY DISINTEGRATING ORAL EVERY 8 HOURS PRN
Status: DISCONTINUED | OUTPATIENT
Start: 2023-05-10 | End: 2023-05-14 | Stop reason: HOSPADM

## 2023-05-10 RX ORDER — IPRATROPIUM BROMIDE AND ALBUTEROL SULFATE 2.5; .5 MG/3ML; MG/3ML
1 SOLUTION RESPIRATORY (INHALATION) 4 TIMES DAILY
Status: DISCONTINUED | OUTPATIENT
Start: 2023-05-10 | End: 2023-05-14

## 2023-05-10 RX ORDER — 0.9 % SODIUM CHLORIDE 0.9 %
750 INTRAVENOUS SOLUTION INTRAVENOUS ONCE
Status: COMPLETED | OUTPATIENT
Start: 2023-05-10 | End: 2023-05-10

## 2023-05-10 RX ORDER — MAGNESIUM SULFATE 1 G/100ML
1000 INJECTION INTRAVENOUS PRN
Status: DISCONTINUED | OUTPATIENT
Start: 2023-05-10 | End: 2023-05-13

## 2023-05-10 RX ORDER — PANTOPRAZOLE SODIUM 40 MG/1
40 TABLET, DELAYED RELEASE ORAL DAILY
Status: DISCONTINUED | OUTPATIENT
Start: 2023-05-11 | End: 2023-05-14 | Stop reason: HOSPADM

## 2023-05-10 RX ORDER — ACETAMINOPHEN 650 MG/1
650 SUPPOSITORY RECTAL EVERY 6 HOURS PRN
Status: DISCONTINUED | OUTPATIENT
Start: 2023-05-10 | End: 2023-05-14 | Stop reason: HOSPADM

## 2023-05-10 RX ORDER — CALCIUM CARBONATE 200(500)MG
1000 TABLET,CHEWABLE ORAL 3 TIMES DAILY PRN
Status: DISCONTINUED | OUTPATIENT
Start: 2023-05-10 | End: 2023-05-14 | Stop reason: HOSPADM

## 2023-05-10 RX ORDER — OXYCODONE AND ACETAMINOPHEN 10; 325 MG/1; MG/1
1 TABLET ORAL EVERY 6 HOURS PRN
Status: DISCONTINUED | OUTPATIENT
Start: 2023-05-10 | End: 2023-05-14 | Stop reason: HOSPADM

## 2023-05-10 RX ORDER — METHYLPREDNISOLONE SODIUM SUCCINATE 40 MG/ML
40 INJECTION, POWDER, LYOPHILIZED, FOR SOLUTION INTRAMUSCULAR; INTRAVENOUS EVERY 12 HOURS
Status: DISCONTINUED | OUTPATIENT
Start: 2023-05-11 | End: 2023-05-10

## 2023-05-10 RX ORDER — ACETAMINOPHEN 325 MG/1
650 TABLET ORAL EVERY 6 HOURS PRN
Status: DISCONTINUED | OUTPATIENT
Start: 2023-05-10 | End: 2023-05-14 | Stop reason: HOSPADM

## 2023-05-10 RX ORDER — POTASSIUM CHLORIDE 7.45 MG/ML
10 INJECTION INTRAVENOUS PRN
Status: DISCONTINUED | OUTPATIENT
Start: 2023-05-10 | End: 2023-05-14 | Stop reason: HOSPADM

## 2023-05-10 RX ORDER — GABAPENTIN 300 MG/1
300 CAPSULE ORAL 3 TIMES DAILY
Status: DISCONTINUED | OUTPATIENT
Start: 2023-05-10 | End: 2023-05-14 | Stop reason: HOSPADM

## 2023-05-10 RX ORDER — AMLODIPINE BESYLATE 5 MG/1
5 TABLET ORAL DAILY
Status: DISCONTINUED | OUTPATIENT
Start: 2023-05-11 | End: 2023-05-14 | Stop reason: HOSPADM

## 2023-05-10 RX ORDER — ONDANSETRON 2 MG/ML
4 INJECTION INTRAMUSCULAR; INTRAVENOUS EVERY 6 HOURS PRN
Status: DISCONTINUED | OUTPATIENT
Start: 2023-05-10 | End: 2023-05-14 | Stop reason: HOSPADM

## 2023-05-10 RX ORDER — DULOXETIN HYDROCHLORIDE 60 MG/1
60 CAPSULE, DELAYED RELEASE ORAL DAILY
Status: DISCONTINUED | OUTPATIENT
Start: 2023-05-11 | End: 2023-05-14 | Stop reason: HOSPADM

## 2023-05-10 RX ORDER — ENOXAPARIN SODIUM 100 MG/ML
30 INJECTION SUBCUTANEOUS DAILY
Status: DISCONTINUED | OUTPATIENT
Start: 2023-05-11 | End: 2023-05-14 | Stop reason: HOSPADM

## 2023-05-10 RX ORDER — ALBUTEROL SULFATE 2.5 MG/3ML
2.5 SOLUTION RESPIRATORY (INHALATION) EVERY 4 HOURS PRN
Status: DISCONTINUED | OUTPATIENT
Start: 2023-05-10 | End: 2023-05-14

## 2023-05-10 RX ADMIN — SODIUM CHLORIDE 750 ML: 9 INJECTION, SOLUTION INTRAVENOUS at 18:12

## 2023-05-10 RX ADMIN — METHYLPREDNISOLONE SODIUM SUCCINATE 125 MG: 125 INJECTION INTRAMUSCULAR; INTRAVENOUS at 13:52

## 2023-05-10 RX ADMIN — SODIUM CHLORIDE 1000 ML: 9 INJECTION, SOLUTION INTRAVENOUS at 15:40

## 2023-05-10 RX ADMIN — CEFTRIAXONE SODIUM 1000 MG: 1 INJECTION, POWDER, FOR SOLUTION INTRAMUSCULAR; INTRAVENOUS at 18:05

## 2023-05-10 RX ADMIN — AZITHROMYCIN DIHYDRATE 500 MG: 500 INJECTION, POWDER, LYOPHILIZED, FOR SOLUTION INTRAVENOUS at 18:31

## 2023-05-10 RX ADMIN — IOPAMIDOL 75 ML: 755 INJECTION, SOLUTION INTRAVENOUS at 21:12

## 2023-05-10 RX ADMIN — GABAPENTIN 300 MG: 300 CAPSULE ORAL at 23:22

## 2023-05-10 RX ADMIN — IPRATROPIUM BROMIDE AND ALBUTEROL SULFATE 3 AMPULE: 2.5; .5 SOLUTION RESPIRATORY (INHALATION) at 15:06

## 2023-05-10 RX ADMIN — OXYCODONE AND ACETAMINOPHEN 1 TABLET: 10; 325 TABLET ORAL at 22:13

## 2023-05-10 ASSESSMENT — PAIN DESCRIPTION - ONSET
ONSET: ON-GOING
ONSET: ON-GOING

## 2023-05-10 ASSESSMENT — PAIN DESCRIPTION - ORIENTATION
ORIENTATION: LOWER
ORIENTATION: MID

## 2023-05-10 ASSESSMENT — PAIN SCALES - GENERAL
PAINLEVEL_OUTOF10: 5
PAINLEVEL_OUTOF10: 3
PAINLEVEL_OUTOF10: 6

## 2023-05-10 ASSESSMENT — PAIN - FUNCTIONAL ASSESSMENT
PAIN_FUNCTIONAL_ASSESSMENT: ACTIVITIES ARE NOT PREVENTED
PAIN_FUNCTIONAL_ASSESSMENT: 0-10

## 2023-05-10 ASSESSMENT — PAIN DESCRIPTION - LOCATION
LOCATION: BACK
LOCATION: CHEST

## 2023-05-10 ASSESSMENT — PAIN DESCRIPTION - PAIN TYPE
TYPE: ACUTE PAIN
TYPE: CHRONIC PAIN

## 2023-05-10 ASSESSMENT — PAIN DESCRIPTION - FREQUENCY
FREQUENCY: CONTINUOUS
FREQUENCY: CONTINUOUS

## 2023-05-10 ASSESSMENT — PAIN DESCRIPTION - DESCRIPTORS
DESCRIPTORS: TIGHTNESS
DESCRIPTORS: ACHING;BURNING;DULL

## 2023-05-11 ENCOUNTER — APPOINTMENT (OUTPATIENT)
Dept: ULTRASOUND IMAGING | Age: 63
DRG: 194 | End: 2023-05-11
Payer: MEDICARE

## 2023-05-11 LAB
ANION GAP SERPL CALCULATED.3IONS-SCNC: 7 MMOL/L (ref 3–16)
BACTERIA UR CULT: NORMAL
BASOPHILS # BLD: 0 K/UL (ref 0–0.2)
BASOPHILS NFR BLD: 0.2 %
BUN SERPL-MCNC: 8 MG/DL (ref 7–20)
CALCIUM SERPL-MCNC: 10 MG/DL (ref 8.3–10.6)
CHLORIDE SERPL-SCNC: 99 MMOL/L (ref 99–110)
CO2 SERPL-SCNC: 32 MMOL/L (ref 21–32)
CREAT SERPL-MCNC: <0.5 MG/DL (ref 0.6–1.2)
DEPRECATED RDW RBC AUTO: 14.9 % (ref 12.4–15.4)
EOSINOPHIL # BLD: 0 K/UL (ref 0–0.6)
EOSINOPHIL NFR BLD: 0 %
GFR SERPLBLD CREATININE-BSD FMLA CKD-EPI: >60 ML/MIN/{1.73_M2}
GLUCOSE SERPL-MCNC: 138 MG/DL (ref 70–99)
HCT VFR BLD AUTO: 37.2 % (ref 36–48)
HGB BLD-MCNC: 11.8 G/DL (ref 12–16)
LEGIONELLA AG UR QL: NORMAL
LYMPHOCYTES # BLD: 1.2 K/UL (ref 1–5.1)
LYMPHOCYTES NFR BLD: 5.9 %
MAGNESIUM SERPL-MCNC: 2 MG/DL (ref 1.8–2.4)
MCH RBC QN AUTO: 30.5 PG (ref 26–34)
MCHC RBC AUTO-ENTMCNC: 31.6 G/DL (ref 31–36)
MCV RBC AUTO: 96.4 FL (ref 80–100)
MONOCYTES # BLD: 1.7 K/UL (ref 0–1.3)
MONOCYTES NFR BLD: 8.4 %
NEUTROPHILS # BLD: 16.9 K/UL (ref 1.7–7.7)
NEUTROPHILS NFR BLD: 85.5 %
PLATELET # BLD AUTO: 398 K/UL (ref 135–450)
PMV BLD AUTO: 8.8 FL (ref 5–10.5)
POTASSIUM SERPL-SCNC: 4.7 MMOL/L (ref 3.5–5.1)
RBC # BLD AUTO: 3.86 M/UL (ref 4–5.2)
SODIUM SERPL-SCNC: 138 MMOL/L (ref 136–145)
TROPONIN, HIGH SENSITIVITY: 40 NG/L (ref 0–14)
WBC # BLD AUTO: 19.7 K/UL (ref 4–11)

## 2023-05-11 PROCEDURE — 80048 BASIC METABOLIC PNL TOTAL CA: CPT

## 2023-05-11 PROCEDURE — 2580000003 HC RX 258: Performed by: INTERNAL MEDICINE

## 2023-05-11 PROCEDURE — 84484 ASSAY OF TROPONIN QUANT: CPT

## 2023-05-11 PROCEDURE — 2060000000 HC ICU INTERMEDIATE R&B

## 2023-05-11 PROCEDURE — 94640 AIRWAY INHALATION TREATMENT: CPT

## 2023-05-11 PROCEDURE — 36415 COLL VENOUS BLD VENIPUNCTURE: CPT

## 2023-05-11 PROCEDURE — 2700000000 HC OXYGEN THERAPY PER DAY

## 2023-05-11 PROCEDURE — 85025 COMPLETE CBC W/AUTO DIFF WBC: CPT

## 2023-05-11 PROCEDURE — 6370000000 HC RX 637 (ALT 250 FOR IP): Performed by: INTERNAL MEDICINE

## 2023-05-11 PROCEDURE — 94761 N-INVAS EAR/PLS OXIMETRY MLT: CPT

## 2023-05-11 PROCEDURE — 6360000002 HC RX W HCPCS: Performed by: INTERNAL MEDICINE

## 2023-05-11 PROCEDURE — 83735 ASSAY OF MAGNESIUM: CPT

## 2023-05-11 PROCEDURE — 76770 US EXAM ABDO BACK WALL COMP: CPT

## 2023-05-11 RX ADMIN — IPRATROPIUM BROMIDE AND ALBUTEROL SULFATE 1 AMPULE: 2.5; .5 SOLUTION RESPIRATORY (INHALATION) at 07:46

## 2023-05-11 RX ADMIN — IPRATROPIUM BROMIDE AND ALBUTEROL SULFATE 1 AMPULE: 2.5; .5 SOLUTION RESPIRATORY (INHALATION) at 11:55

## 2023-05-11 RX ADMIN — OXYCODONE AND ACETAMINOPHEN 1 TABLET: 10; 325 TABLET ORAL at 07:49

## 2023-05-11 RX ADMIN — DULOXETINE HYDROCHLORIDE 60 MG: 60 CAPSULE, DELAYED RELEASE ORAL at 07:49

## 2023-05-11 RX ADMIN — Medication 3 MG: at 20:58

## 2023-05-11 RX ADMIN — CEFTRIAXONE SODIUM 1000 MG: 1 INJECTION, POWDER, FOR SOLUTION INTRAMUSCULAR; INTRAVENOUS at 17:57

## 2023-05-11 RX ADMIN — OXYCODONE AND ACETAMINOPHEN 1 TABLET: 10; 325 TABLET ORAL at 17:48

## 2023-05-11 RX ADMIN — GABAPENTIN 300 MG: 300 CAPSULE ORAL at 13:39

## 2023-05-11 RX ADMIN — Medication 2 PUFF: at 20:06

## 2023-05-11 RX ADMIN — LEVOTHYROXINE SODIUM 50 MCG: 0.05 TABLET ORAL at 05:07

## 2023-05-11 RX ADMIN — GABAPENTIN 300 MG: 300 CAPSULE ORAL at 20:58

## 2023-05-11 RX ADMIN — METOPROLOL SUCCINATE 50 MG: 50 TABLET, EXTENDED RELEASE ORAL at 07:49

## 2023-05-11 RX ADMIN — AMLODIPINE BESYLATE 5 MG: 5 TABLET ORAL at 07:49

## 2023-05-11 RX ADMIN — IPRATROPIUM BROMIDE AND ALBUTEROL SULFATE 1 AMPULE: 2.5; .5 SOLUTION RESPIRATORY (INHALATION) at 15:36

## 2023-05-11 RX ADMIN — GABAPENTIN 300 MG: 300 CAPSULE ORAL at 07:49

## 2023-05-11 RX ADMIN — ARIPIPRAZOLE 5 MG: 10 TABLET ORAL at 07:49

## 2023-05-11 RX ADMIN — PANTOPRAZOLE SODIUM 40 MG: 40 TABLET, DELAYED RELEASE ORAL at 07:49

## 2023-05-11 RX ADMIN — IPRATROPIUM BROMIDE AND ALBUTEROL SULFATE 1 AMPULE: 2.5; .5 SOLUTION RESPIRATORY (INHALATION) at 20:06

## 2023-05-11 RX ADMIN — ENOXAPARIN SODIUM 30 MG: 100 INJECTION SUBCUTANEOUS at 07:50

## 2023-05-11 RX ADMIN — ATORVASTATIN CALCIUM 40 MG: 40 TABLET, FILM COATED ORAL at 07:49

## 2023-05-11 RX ADMIN — Medication 2 PUFF: at 07:46

## 2023-05-11 ASSESSMENT — PAIN SCALES - GENERAL
PAINLEVEL_OUTOF10: 0
PAINLEVEL_OUTOF10: 0
PAINLEVEL_OUTOF10: 2
PAINLEVEL_OUTOF10: 0

## 2023-05-12 LAB
ANION GAP SERPL CALCULATED.3IONS-SCNC: 5 MMOL/L (ref 3–16)
ANISOCYTOSIS BLD QL SMEAR: ABNORMAL
BASOPHILS # BLD: 0 K/UL (ref 0–0.2)
BASOPHILS NFR BLD: 0 %
BUN SERPL-MCNC: 9 MG/DL (ref 7–20)
CALCIUM SERPL-MCNC: 10 MG/DL (ref 8.3–10.6)
CHLORIDE SERPL-SCNC: 97 MMOL/L (ref 99–110)
CO2 SERPL-SCNC: 35 MMOL/L (ref 21–32)
CREAT SERPL-MCNC: <0.5 MG/DL (ref 0.6–1.2)
DEPRECATED RDW RBC AUTO: 14.9 % (ref 12.4–15.4)
EOSINOPHIL # BLD: 0.2 K/UL (ref 0–0.6)
EOSINOPHIL NFR BLD: 1 %
GFR SERPLBLD CREATININE-BSD FMLA CKD-EPI: >60 ML/MIN/{1.73_M2}
GLUCOSE SERPL-MCNC: 108 MG/DL (ref 70–99)
HCT VFR BLD AUTO: 33.9 % (ref 36–48)
HGB BLD-MCNC: 10.6 G/DL (ref 12–16)
LYMPHOCYTES # BLD: 2.9 K/UL (ref 1–5.1)
LYMPHOCYTES NFR BLD: 15 %
MACROCYTES BLD QL SMEAR: ABNORMAL
MAGNESIUM SERPL-MCNC: 1.7 MG/DL (ref 1.8–2.4)
MCH RBC QN AUTO: 30.2 PG (ref 26–34)
MCHC RBC AUTO-ENTMCNC: 31.4 G/DL (ref 31–36)
MCV RBC AUTO: 96.2 FL (ref 80–100)
MONOCYTES # BLD: 2.3 K/UL (ref 0–1.3)
MONOCYTES NFR BLD: 12 %
NEUTROPHILS # BLD: 13.8 K/UL (ref 1.7–7.7)
NEUTROPHILS NFR BLD: 59 %
NEUTS BAND NFR BLD MANUAL: 13 % (ref 0–7)
OVALOCYTES BLD QL SMEAR: ABNORMAL
PATH INTERP BLD-IMP: NORMAL
PATH INTERP BLD-IMP: YES
PLATELET # BLD AUTO: 462 K/UL (ref 135–450)
PLATELET BLD QL SMEAR: ABNORMAL
PMV BLD AUTO: 8.2 FL (ref 5–10.5)
POIKILOCYTOSIS BLD QL SMEAR: ABNORMAL
POLYCHROMASIA BLD QL SMEAR: ABNORMAL
POTASSIUM SERPL-SCNC: 3.9 MMOL/L (ref 3.5–5.1)
RBC # BLD AUTO: 3.52 M/UL (ref 4–5.2)
SLIDE REVIEW: ABNORMAL
SODIUM SERPL-SCNC: 137 MMOL/L (ref 136–145)
TARGETS BLD QL SMEAR: ABNORMAL
TROPONIN, HIGH SENSITIVITY: 35 NG/L (ref 0–14)
TROPONIN, HIGH SENSITIVITY: 77 NG/L (ref 0–14)
WBC # BLD AUTO: 19.2 K/UL (ref 4–11)

## 2023-05-12 PROCEDURE — 2060000000 HC ICU INTERMEDIATE R&B

## 2023-05-12 PROCEDURE — 2700000000 HC OXYGEN THERAPY PER DAY

## 2023-05-12 PROCEDURE — 83735 ASSAY OF MAGNESIUM: CPT

## 2023-05-12 PROCEDURE — 85025 COMPLETE CBC W/AUTO DIFF WBC: CPT

## 2023-05-12 PROCEDURE — 6370000000 HC RX 637 (ALT 250 FOR IP): Performed by: INTERNAL MEDICINE

## 2023-05-12 PROCEDURE — 36415 COLL VENOUS BLD VENIPUNCTURE: CPT

## 2023-05-12 PROCEDURE — 84484 ASSAY OF TROPONIN QUANT: CPT

## 2023-05-12 PROCEDURE — 94640 AIRWAY INHALATION TREATMENT: CPT

## 2023-05-12 PROCEDURE — 80048 BASIC METABOLIC PNL TOTAL CA: CPT

## 2023-05-12 PROCEDURE — 6360000002 HC RX W HCPCS: Performed by: INTERNAL MEDICINE

## 2023-05-12 PROCEDURE — 2580000003 HC RX 258: Performed by: INTERNAL MEDICINE

## 2023-05-12 PROCEDURE — 94761 N-INVAS EAR/PLS OXIMETRY MLT: CPT

## 2023-05-12 RX ORDER — PREDNISONE 20 MG/1
40 TABLET ORAL DAILY
Status: DISCONTINUED | OUTPATIENT
Start: 2023-05-12 | End: 2023-05-14 | Stop reason: HOSPADM

## 2023-05-12 RX ADMIN — IPRATROPIUM BROMIDE AND ALBUTEROL SULFATE 1 AMPULE: 2.5; .5 SOLUTION RESPIRATORY (INHALATION) at 15:44

## 2023-05-12 RX ADMIN — IPRATROPIUM BROMIDE AND ALBUTEROL SULFATE 1 AMPULE: 2.5; .5 SOLUTION RESPIRATORY (INHALATION) at 07:47

## 2023-05-12 RX ADMIN — ARIPIPRAZOLE 5 MG: 10 TABLET ORAL at 10:12

## 2023-05-12 RX ADMIN — AMLODIPINE BESYLATE 5 MG: 5 TABLET ORAL at 10:12

## 2023-05-12 RX ADMIN — DULOXETINE HYDROCHLORIDE 60 MG: 60 CAPSULE, DELAYED RELEASE ORAL at 10:11

## 2023-05-12 RX ADMIN — GABAPENTIN 300 MG: 300 CAPSULE ORAL at 17:14

## 2023-05-12 RX ADMIN — IPRATROPIUM BROMIDE AND ALBUTEROL SULFATE 1 AMPULE: 2.5; .5 SOLUTION RESPIRATORY (INHALATION) at 20:17

## 2023-05-12 RX ADMIN — Medication 2 PUFF: at 07:47

## 2023-05-12 RX ADMIN — OXYCODONE AND ACETAMINOPHEN 1 TABLET: 10; 325 TABLET ORAL at 20:38

## 2023-05-12 RX ADMIN — Medication 3 MG: at 22:30

## 2023-05-12 RX ADMIN — LEVOTHYROXINE SODIUM 50 MCG: 0.05 TABLET ORAL at 05:30

## 2023-05-12 RX ADMIN — GABAPENTIN 300 MG: 300 CAPSULE ORAL at 10:12

## 2023-05-12 RX ADMIN — ATORVASTATIN CALCIUM 40 MG: 40 TABLET, FILM COATED ORAL at 10:13

## 2023-05-12 RX ADMIN — Medication 10 ML: at 10:13

## 2023-05-12 RX ADMIN — PANTOPRAZOLE SODIUM 40 MG: 40 TABLET, DELAYED RELEASE ORAL at 10:13

## 2023-05-12 RX ADMIN — GABAPENTIN 300 MG: 300 CAPSULE ORAL at 20:38

## 2023-05-12 RX ADMIN — PREDNISONE 40 MG: 20 TABLET ORAL at 12:55

## 2023-05-12 RX ADMIN — ENOXAPARIN SODIUM 30 MG: 100 INJECTION SUBCUTANEOUS at 10:13

## 2023-05-12 RX ADMIN — Medication 2 PUFF: at 20:17

## 2023-05-12 RX ADMIN — CEFTRIAXONE SODIUM 1000 MG: 1 INJECTION, POWDER, FOR SOLUTION INTRAMUSCULAR; INTRAVENOUS at 20:53

## 2023-05-12 RX ADMIN — METOPROLOL SUCCINATE 50 MG: 50 TABLET, EXTENDED RELEASE ORAL at 10:13

## 2023-05-12 ASSESSMENT — PAIN SCALES - GENERAL
PAINLEVEL_OUTOF10: 8
PAINLEVEL_OUTOF10: 5
PAINLEVEL_OUTOF10: 0

## 2023-05-12 ASSESSMENT — PAIN DESCRIPTION - LOCATION: LOCATION: ABDOMEN

## 2023-05-12 ASSESSMENT — PAIN DESCRIPTION - ORIENTATION
ORIENTATION: UPPER
ORIENTATION: LEFT

## 2023-05-12 ASSESSMENT — PAIN DESCRIPTION - DESCRIPTORS: DESCRIPTORS: ACHING

## 2023-05-13 ENCOUNTER — APPOINTMENT (OUTPATIENT)
Dept: CT IMAGING | Age: 63
DRG: 194 | End: 2023-05-13
Payer: MEDICARE

## 2023-05-13 LAB
ANION GAP SERPL CALCULATED.3IONS-SCNC: 6 MMOL/L (ref 3–16)
ANISOCYTOSIS BLD QL SMEAR: ABNORMAL
BASOPHILS # BLD: 0 K/UL (ref 0–0.2)
BASOPHILS # BLD: 0.1 K/UL (ref 0–0.2)
BASOPHILS NFR BLD: 0 %
BASOPHILS NFR BLD: 0.4 %
BUN SERPL-MCNC: 10 MG/DL (ref 7–20)
CALCIUM SERPL-MCNC: 10.1 MG/DL (ref 8.3–10.6)
CHLORIDE SERPL-SCNC: 97 MMOL/L (ref 99–110)
CO2 SERPL-SCNC: 39 MMOL/L (ref 21–32)
CREAT SERPL-MCNC: <0.5 MG/DL (ref 0.6–1.2)
DEPRECATED RDW RBC AUTO: 14.4 % (ref 12.4–15.4)
DEPRECATED RDW RBC AUTO: 14.5 % (ref 12.4–15.4)
EOSINOPHIL # BLD: 0 K/UL (ref 0–0.6)
EOSINOPHIL # BLD: 0.1 K/UL (ref 0–0.6)
EOSINOPHIL NFR BLD: 0 %
EOSINOPHIL NFR BLD: 0.7 %
GFR SERPLBLD CREATININE-BSD FMLA CKD-EPI: >60 ML/MIN/{1.73_M2}
GLUCOSE SERPL-MCNC: 92 MG/DL (ref 70–99)
HCT VFR BLD AUTO: 34.5 % (ref 36–48)
HCT VFR BLD AUTO: 36.3 % (ref 36–48)
HGB BLD-MCNC: 10.8 G/DL (ref 12–16)
HGB BLD-MCNC: 11.7 G/DL (ref 12–16)
LACTATE BLDV-SCNC: 1.2 MMOL/L (ref 0.4–2)
LYMPHOCYTES # BLD: 2.2 K/UL (ref 1–5.1)
LYMPHOCYTES # BLD: 2.4 K/UL (ref 1–5.1)
LYMPHOCYTES NFR BLD: 14 %
LYMPHOCYTES NFR BLD: 15 %
MACROCYTES BLD QL SMEAR: ABNORMAL
MAGNESIUM SERPL-MCNC: 1.7 MG/DL (ref 1.8–2.4)
MCH RBC QN AUTO: 29.9 PG (ref 26–34)
MCH RBC QN AUTO: 30.7 PG (ref 26–34)
MCHC RBC AUTO-ENTMCNC: 31.4 G/DL (ref 31–36)
MCHC RBC AUTO-ENTMCNC: 32.3 G/DL (ref 31–36)
MCV RBC AUTO: 95 FL (ref 80–100)
MCV RBC AUTO: 95.2 FL (ref 80–100)
MONOCYTES # BLD: 0.8 K/UL (ref 0–1.3)
MONOCYTES # BLD: 1.4 K/UL (ref 0–1.3)
MONOCYTES NFR BLD: 5 %
MONOCYTES NFR BLD: 8.8 %
NEUTROPHILS # BLD: 11.9 K/UL (ref 1.7–7.7)
NEUTROPHILS # BLD: 12.6 K/UL (ref 1.7–7.7)
NEUTROPHILS NFR BLD: 75.1 %
NEUTROPHILS NFR BLD: 79 %
NEUTS BAND NFR BLD MANUAL: 2 % (ref 0–7)
PATH INTERP BLD-IMP: NO
PLATELET # BLD AUTO: 556 K/UL (ref 135–450)
PLATELET # BLD AUTO: 585 K/UL (ref 135–450)
PLATELET BLD QL SMEAR: ABNORMAL
PMV BLD AUTO: 7.8 FL (ref 5–10.5)
PMV BLD AUTO: 8.3 FL (ref 5–10.5)
POTASSIUM SERPL-SCNC: 3.6 MMOL/L (ref 3.5–5.1)
RBC # BLD AUTO: 3.63 M/UL (ref 4–5.2)
RBC # BLD AUTO: 3.82 M/UL (ref 4–5.2)
SLIDE REVIEW: ABNORMAL
SODIUM SERPL-SCNC: 142 MMOL/L (ref 136–145)
WBC # BLD AUTO: 15.6 K/UL (ref 4–11)
WBC # BLD AUTO: 15.9 K/UL (ref 4–11)

## 2023-05-13 PROCEDURE — 83735 ASSAY OF MAGNESIUM: CPT

## 2023-05-13 PROCEDURE — 6370000000 HC RX 637 (ALT 250 FOR IP): Performed by: INTERNAL MEDICINE

## 2023-05-13 PROCEDURE — 2700000000 HC OXYGEN THERAPY PER DAY

## 2023-05-13 PROCEDURE — 6360000002 HC RX W HCPCS: Performed by: INTERNAL MEDICINE

## 2023-05-13 PROCEDURE — 36415 COLL VENOUS BLD VENIPUNCTURE: CPT

## 2023-05-13 PROCEDURE — 94761 N-INVAS EAR/PLS OXIMETRY MLT: CPT

## 2023-05-13 PROCEDURE — 70450 CT HEAD/BRAIN W/O DYE: CPT

## 2023-05-13 PROCEDURE — 85025 COMPLETE CBC W/AUTO DIFF WBC: CPT

## 2023-05-13 PROCEDURE — 87040 BLOOD CULTURE FOR BACTERIA: CPT

## 2023-05-13 PROCEDURE — 2580000003 HC RX 258: Performed by: INTERNAL MEDICINE

## 2023-05-13 PROCEDURE — 80048 BASIC METABOLIC PNL TOTAL CA: CPT

## 2023-05-13 PROCEDURE — 94640 AIRWAY INHALATION TREATMENT: CPT

## 2023-05-13 PROCEDURE — 2060000000 HC ICU INTERMEDIATE R&B

## 2023-05-13 PROCEDURE — 83605 ASSAY OF LACTIC ACID: CPT

## 2023-05-13 RX ORDER — MAGNESIUM SULFATE IN WATER 40 MG/ML
2000 INJECTION, SOLUTION INTRAVENOUS ONCE
Status: COMPLETED | OUTPATIENT
Start: 2023-05-13 | End: 2023-05-13

## 2023-05-13 RX ADMIN — GABAPENTIN 300 MG: 300 CAPSULE ORAL at 09:38

## 2023-05-13 RX ADMIN — Medication 2 PUFF: at 08:27

## 2023-05-13 RX ADMIN — METOPROLOL SUCCINATE 50 MG: 50 TABLET, EXTENDED RELEASE ORAL at 09:39

## 2023-05-13 RX ADMIN — PANTOPRAZOLE SODIUM 40 MG: 40 TABLET, DELAYED RELEASE ORAL at 09:39

## 2023-05-13 RX ADMIN — ENOXAPARIN SODIUM 30 MG: 100 INJECTION SUBCUTANEOUS at 09:38

## 2023-05-13 RX ADMIN — ATORVASTATIN CALCIUM 40 MG: 40 TABLET, FILM COATED ORAL at 09:38

## 2023-05-13 RX ADMIN — ARIPIPRAZOLE 5 MG: 10 TABLET ORAL at 09:38

## 2023-05-13 RX ADMIN — IPRATROPIUM BROMIDE AND ALBUTEROL SULFATE 1 AMPULE: 2.5; .5 SOLUTION RESPIRATORY (INHALATION) at 19:30

## 2023-05-13 RX ADMIN — Medication 2 PUFF: at 19:35

## 2023-05-13 RX ADMIN — LEVOTHYROXINE SODIUM 50 MCG: 0.05 TABLET ORAL at 05:00

## 2023-05-13 RX ADMIN — IPRATROPIUM BROMIDE AND ALBUTEROL SULFATE 1 AMPULE: 2.5; .5 SOLUTION RESPIRATORY (INHALATION) at 08:25

## 2023-05-13 RX ADMIN — PREDNISONE 40 MG: 20 TABLET ORAL at 09:39

## 2023-05-13 RX ADMIN — DULOXETINE HYDROCHLORIDE 60 MG: 60 CAPSULE, DELAYED RELEASE ORAL at 09:39

## 2023-05-13 RX ADMIN — AMLODIPINE BESYLATE 5 MG: 5 TABLET ORAL at 09:39

## 2023-05-13 RX ADMIN — IPRATROPIUM BROMIDE AND ALBUTEROL SULFATE 1 AMPULE: 2.5; .5 SOLUTION RESPIRATORY (INHALATION) at 12:02

## 2023-05-13 RX ADMIN — MAGNESIUM SULFATE HEPTAHYDRATE 2000 MG: 40 INJECTION, SOLUTION INTRAVENOUS at 10:55

## 2023-05-13 RX ADMIN — GABAPENTIN 300 MG: 300 CAPSULE ORAL at 18:30

## 2023-05-13 RX ADMIN — CEFTRIAXONE SODIUM 1000 MG: 1 INJECTION, POWDER, FOR SOLUTION INTRAMUSCULAR; INTRAVENOUS at 18:28

## 2023-05-13 ASSESSMENT — PAIN DESCRIPTION - ORIENTATION: ORIENTATION: LOWER

## 2023-05-13 ASSESSMENT — PAIN SCALES - GENERAL
PAINLEVEL_OUTOF10: 6
PAINLEVEL_OUTOF10: 6
PAINLEVEL_OUTOF10: 3

## 2023-05-13 ASSESSMENT — PAIN DESCRIPTION - LOCATION: LOCATION: BACK

## 2023-05-13 ASSESSMENT — PAIN DESCRIPTION - PAIN TYPE: TYPE: CHRONIC PAIN

## 2023-05-14 VITALS
HEIGHT: 63 IN | BODY MASS INDEX: 16.82 KG/M2 | TEMPERATURE: 98.3 F | WEIGHT: 94.9 LBS | RESPIRATION RATE: 18 BRPM | HEART RATE: 85 BPM | OXYGEN SATURATION: 94 % | DIASTOLIC BLOOD PRESSURE: 76 MMHG | SYSTOLIC BLOOD PRESSURE: 147 MMHG

## 2023-05-14 LAB
BASE EXCESS BLDV CALC-SCNC: 6.7 MMOL/L (ref -3–3)
CO2 BLDV-SCNC: 31 MMOL/L
COHGB MFR BLDV: 4.4 % (ref 0–1.5)
EKG ATRIAL RATE: 71 BPM
EKG DIAGNOSIS: NORMAL
EKG P AXIS: 80 DEGREES
EKG P-R INTERVAL: 150 MS
EKG Q-T INTERVAL: 370 MS
EKG QRS DURATION: 86 MS
EKG QTC CALCULATION (BAZETT): 402 MS
EKG R AXIS: 79 DEGREES
EKG T AXIS: 68 DEGREES
EKG VENTRICULAR RATE: 71 BPM
HCO3 BLDV-SCNC: 29.9 MMOL/L (ref 23–29)
METHGB MFR BLDV: 0.5 %
O2 THERAPY: ABNORMAL
PCO2 BLDV: 37.5 MMHG (ref 40–50)
PH BLDV: 7.52 [PH] (ref 7.35–7.45)
PO2 BLDV: 174.8 MMHG (ref 25–40)
SAO2 % BLDV: 99 %
TROPONIN, HIGH SENSITIVITY: 45 NG/L (ref 0–14)

## 2023-05-14 PROCEDURE — 6370000000 HC RX 637 (ALT 250 FOR IP): Performed by: INTERNAL MEDICINE

## 2023-05-14 PROCEDURE — 94761 N-INVAS EAR/PLS OXIMETRY MLT: CPT

## 2023-05-14 PROCEDURE — 94640 AIRWAY INHALATION TREATMENT: CPT

## 2023-05-14 PROCEDURE — 36415 COLL VENOUS BLD VENIPUNCTURE: CPT

## 2023-05-14 PROCEDURE — 84484 ASSAY OF TROPONIN QUANT: CPT

## 2023-05-14 PROCEDURE — 6360000002 HC RX W HCPCS: Performed by: INTERNAL MEDICINE

## 2023-05-14 PROCEDURE — 93005 ELECTROCARDIOGRAM TRACING: CPT | Performed by: INTERNAL MEDICINE

## 2023-05-14 PROCEDURE — 82803 BLOOD GASES ANY COMBINATION: CPT

## 2023-05-14 PROCEDURE — 2700000000 HC OXYGEN THERAPY PER DAY

## 2023-05-14 PROCEDURE — 93010 ELECTROCARDIOGRAM REPORT: CPT | Performed by: INTERNAL MEDICINE

## 2023-05-14 RX ORDER — CEFUROXIME AXETIL 500 MG/1
500 TABLET ORAL 2 TIMES DAILY
Qty: 6 TABLET | Refills: 0 | Status: SHIPPED | OUTPATIENT
Start: 2023-05-14 | End: 2023-05-17

## 2023-05-14 RX ORDER — ALBUTEROL SULFATE 2.5 MG/3ML
2.5 SOLUTION RESPIRATORY (INHALATION) EVERY 6 HOURS PRN
Status: DISCONTINUED | OUTPATIENT
Start: 2023-05-14 | End: 2023-05-14 | Stop reason: HOSPADM

## 2023-05-14 RX ORDER — PREDNISONE 20 MG/1
40 TABLET ORAL DAILY
Qty: 4 TABLET | Refills: 0 | Status: SHIPPED | OUTPATIENT
Start: 2023-05-14 | End: 2023-05-16

## 2023-05-14 RX ADMIN — GABAPENTIN 300 MG: 300 CAPSULE ORAL at 09:54

## 2023-05-14 RX ADMIN — AMLODIPINE BESYLATE 5 MG: 5 TABLET ORAL at 09:54

## 2023-05-14 RX ADMIN — ARIPIPRAZOLE 5 MG: 10 TABLET ORAL at 09:54

## 2023-05-14 RX ADMIN — PANTOPRAZOLE SODIUM 40 MG: 40 TABLET, DELAYED RELEASE ORAL at 09:53

## 2023-05-14 RX ADMIN — ATORVASTATIN CALCIUM 40 MG: 40 TABLET, FILM COATED ORAL at 09:54

## 2023-05-14 RX ADMIN — DULOXETINE HYDROCHLORIDE 60 MG: 60 CAPSULE, DELAYED RELEASE ORAL at 09:54

## 2023-05-14 RX ADMIN — LEVOTHYROXINE SODIUM 50 MCG: 0.05 TABLET ORAL at 04:47

## 2023-05-14 RX ADMIN — ENOXAPARIN SODIUM 30 MG: 100 INJECTION SUBCUTANEOUS at 09:54

## 2023-05-14 RX ADMIN — PREDNISONE 40 MG: 20 TABLET ORAL at 09:54

## 2023-05-14 RX ADMIN — IPRATROPIUM BROMIDE AND ALBUTEROL SULFATE 1 AMPULE: 2.5; .5 SOLUTION RESPIRATORY (INHALATION) at 11:29

## 2023-05-14 RX ADMIN — METOPROLOL SUCCINATE 50 MG: 50 TABLET, EXTENDED RELEASE ORAL at 09:54

## 2023-05-15 ENCOUNTER — TELEPHONE (OUTPATIENT)
Dept: FAMILY MEDICINE CLINIC | Age: 63
End: 2023-05-15

## 2023-05-15 NOTE — TELEPHONE ENCOUNTER
Home health called stating that the patient just got out of the hospital and needs orders for home health to see her. They also wanted to know if she is supposed to be taking the Buspar, it was not on her med list from the hospital.     Perri Snowden is the home health nurse. - [phone]

## 2023-05-15 NOTE — TELEPHONE ENCOUNTER
Care Transitions Initial Follow Up Call    Outreach made within 2 business days of discharge: Yes    Patient: Emilia Ray Patient : 1960   MRN: [id number]  Reason for Admission: There are no discharge diagnoses documented for the most recent discharge. Discharge Date: 23       Spoke with: L/CHIDI for patient to return call   Discharge department/facility: [address] [place] [address] Patient Contact:  Was patient able to fill all prescriptions:   Was patient instructed to bring all medications to the follow-up visit:   Is patient taking all medications as directed in the discharge summary?    Does patient understand their discharge instructions:   Does patient have questions or concerns that need addressed prior to 7-14 day follow up office visit:     Scheduled appointment with PCP within 7-14 days    Follow Up  Future Appointments   Date Time Provider Dieudonne Salmeron   7/10/2023  1:30 PM Shonna Morris, [facility]

## 2023-05-16 ENCOUNTER — TELEPHONE (OUTPATIENT)
Dept: FAMILY MEDICINE CLINIC | Age: 63
End: 2023-05-16

## 2023-05-16 NOTE — TELEPHONE ENCOUNTER
Care Transitions Initial Follow Up Call    Outreach made within 2 business days of discharge: Yes    Patient: Wesley Gallegos Patient : 1960   MRN: [id number]  Reason for Admission: There are no discharge diagnoses documented for the most recent discharge. Discharge Date: 23       Spoke with: Francia Ying    Discharge department/facility: [facility] Patient Contact:  Was patient able to fill all prescriptions: Yes  Was patient instructed to bring all medications to the follow-up visit: Yes  Is patient taking all medications as directed in the discharge summary?  Yes  Does patient understand their discharge instructions: Yes  Does patient have questions or concerns that need addressed prior to 7-14 day follow up office visit: no    Scheduled appointment with PCP within 7-14 days    Follow Up  Future Appointments   Date Time Provider Dieudonne Salmeron   7/10/2023  1:30 PM Crystal Yee, [address] [facility]

## 2023-05-17 LAB — BACTERIA BLD CULT: NORMAL

## 2023-05-19 ENCOUNTER — TELEPHONE (OUTPATIENT)
Dept: FAMILY MEDICINE CLINIC | Age: 63
End: 2023-05-19

## 2023-05-19 ENCOUNTER — NURSE ONLY (OUTPATIENT)
Dept: FAMILY MEDICINE CLINIC | Age: 63
End: 2023-05-19
Payer: MEDICARE

## 2023-05-19 DIAGNOSIS — N30.01 ACUTE CYSTITIS WITH HEMATURIA: Primary | ICD-10-CM

## 2023-05-19 LAB
BILIRUBIN, POC: NEGATIVE
BLOOD URINE, POC: NORMAL
CLARITY, POC: NORMAL
COLOR, POC: NORMAL
GLUCOSE URINE, POC: NEGATIVE
KETONES, POC: NEGATIVE
LEUKOCYTE EST, POC: NORMAL
NITRITE, POC: NEGATIVE
PH, POC: 7
PROTEIN, POC: NEGATIVE
SPECIFIC GRAVITY, POC: 1.02
UROBILINOGEN, POC: 0.2

## 2023-05-19 PROCEDURE — 81002 URINALYSIS NONAUTO W/O SCOPE: CPT | Performed by: STUDENT IN AN ORGANIZED HEALTH CARE EDUCATION/TRAINING PROGRAM

## 2023-05-19 RX ORDER — NITROFURANTOIN 25; 75 MG/1; MG/1
100 CAPSULE ORAL 2 TIMES DAILY
Qty: 20 CAPSULE | Refills: 0 | Status: SHIPPED | OUTPATIENT
Start: 2023-05-19 | End: 2023-05-29

## 2023-05-22 LAB
BACTERIA UR CULT: ABNORMAL
BACTERIA UR CULT: ABNORMAL
ORGANISM: ABNORMAL

## 2023-05-22 RX ORDER — ONDANSETRON 4 MG/1
4 TABLET, FILM COATED ORAL EVERY 8 HOURS PRN
Qty: 20 TABLET | Refills: 0 | Status: SHIPPED | OUTPATIENT
Start: 2023-05-22

## 2023-05-24 ENCOUNTER — OFFICE VISIT (OUTPATIENT)
Dept: FAMILY MEDICINE CLINIC | Age: 63
End: 2023-05-24

## 2023-05-24 ENCOUNTER — TELEPHONE (OUTPATIENT)
Dept: CASE MANAGEMENT | Age: 63
End: 2023-05-24

## 2023-05-24 VITALS
SYSTOLIC BLOOD PRESSURE: 108 MMHG | DIASTOLIC BLOOD PRESSURE: 68 MMHG | HEART RATE: 89 BPM | HEIGHT: 63 IN | OXYGEN SATURATION: 91 % | BODY MASS INDEX: 16.3 KG/M2 | WEIGHT: 92 LBS

## 2023-05-24 DIAGNOSIS — N30.01 ACUTE CYSTITIS WITH HEMATURIA: Primary | ICD-10-CM

## 2023-05-24 DIAGNOSIS — R63.4 WEIGHT LOSS: Primary | ICD-10-CM

## 2023-05-24 DIAGNOSIS — D64.9 ANEMIA, UNSPECIFIED TYPE: ICD-10-CM

## 2023-05-24 DIAGNOSIS — Z09 HOSPITAL DISCHARGE FOLLOW-UP: ICD-10-CM

## 2023-05-24 DIAGNOSIS — R62.7 FAILURE TO THRIVE IN ADULT: ICD-10-CM

## 2023-05-24 DIAGNOSIS — J44.1 COPD EXACERBATION (HCC): ICD-10-CM

## 2023-05-24 DIAGNOSIS — E03.9 HYPOTHYROIDISM, UNSPECIFIED TYPE: ICD-10-CM

## 2023-05-24 DIAGNOSIS — E83.52 HYPERCALCEMIA: ICD-10-CM

## 2023-05-24 DIAGNOSIS — M54.50 LOW BACK PAIN, UNSPECIFIED BACK PAIN LATERALITY, UNSPECIFIED CHRONICITY, UNSPECIFIED WHETHER SCIATICA PRESENT: ICD-10-CM

## 2023-05-24 DIAGNOSIS — F41.1 GAD (GENERALIZED ANXIETY DISORDER): ICD-10-CM

## 2023-05-24 DIAGNOSIS — A41.9 SEPSIS WITHOUT ACUTE ORGAN DYSFUNCTION, DUE TO UNSPECIFIED ORGANISM (HCC): ICD-10-CM

## 2023-05-24 LAB
BILIRUBIN, POC: NEGATIVE
BLOOD URINE, POC: NEGATIVE
CLARITY, POC: NORMAL
COLOR, POC: NORMAL
GLUCOSE URINE, POC: NEGATIVE
KETONES, POC: NEGATIVE
LEUKOCYTE EST, POC: NORMAL
NITRITE, POC: NEGATIVE
PH, POC: 7.5
PROTEIN, POC: NORMAL
SPECIFIC GRAVITY, POC: 1.02
UROBILINOGEN, POC: 1

## 2023-05-24 RX ORDER — BUSPIRONE HYDROCHLORIDE 10 MG/1
10 TABLET ORAL 3 TIMES DAILY
Qty: 90 TABLET | Refills: 0 | Status: SHIPPED | OUTPATIENT
Start: 2023-05-24 | End: 2023-06-23

## 2023-05-24 ASSESSMENT — ENCOUNTER SYMPTOMS
COUGH: 1
RHINORRHEA: 0
SHORTNESS OF BREATH: 1

## 2023-05-24 NOTE — PROGRESS NOTES
[facility]  2023    Chris Burns (:  1960) is a 65 y.o. female, here for evaluation of the following medical concerns:    Chief Complaint   Patient presents with    Follow-Up from Hospital     Notes she might be feeling a little better        ASSESSMENT/ PLAN  1. Acute cystitis with hematuria  -Culture showed sensitive to Macrobid. Repeat urine dip showed decreased leukocytes from previous. Patient's symptoms are improving. Follow-up for new or worsening symptoms.  - POCT Urinalysis no Micro  - CBC with Auto Differential  - Basic Metabolic Panel  - TSH with Reflex  - Iron and TIBC  - Vitamin B12 & Folate    2. COPD exacerbation ([address]  -Resolving. Back to baseline oxygen. Productive cough has improved. 91% on room air.  - POCT Urinalysis no Micro  - CBC with Auto Differential  - Basic Metabolic Panel  - TSH with Reflex  - Iron and TIBC  - Vitamin B12 & Folate  - Vitamin B12 & Folate; Future  - Basic Metabolic Panel; Future  - CBC with Auto Differential; Future  - Iron and TIBC; Future  - TSH with Reflex; Future    3. Anemia, unspecified type  -Recheck CBC, iron, B12, and folate. - POCT Urinalysis no Micro  - CBC with Auto Differential  - Basic Metabolic Panel  - TSH with Reflex  - Iron and TIBC  - Vitamin B12 & Folate  - Vitamin B12 & Folate; Future  - Basic Metabolic Panel; Future  - CBC with Auto Differential; Future  - Iron and TIBC; Future  - TSH with Reflex; Future    4. Hospital discharge follow-up  -Patient had abnormal lung imaging which recommended serial follow-up with x-rays. She saw her pulmonologist yesterday and had a CT scan of the lungs which was stable from previous. No further recommended. She has surgery scheduled next month for lung volume reduction.  - POCT Urinalysis no Micro  - CBC with Auto Differential  - Basic Metabolic Panel  - TSH with Reflex  - Iron and TIBC  - Vitamin B12 & Folate    5.  Sepsis without acute organ dysfunction, due to

## 2023-05-25 DIAGNOSIS — E05.90 HYPERTHYROIDISM: ICD-10-CM

## 2023-05-25 DIAGNOSIS — M54.50 CHRONIC BILATERAL LOW BACK PAIN WITHOUT SCIATICA: ICD-10-CM

## 2023-05-25 DIAGNOSIS — R63.4 WEIGHT LOSS: ICD-10-CM

## 2023-05-25 DIAGNOSIS — E83.52 HYPERCALCEMIA: ICD-10-CM

## 2023-05-25 DIAGNOSIS — G89.29 CHRONIC BILATERAL LOW BACK PAIN WITHOUT SCIATICA: ICD-10-CM

## 2023-05-25 DIAGNOSIS — E05.90 HYPERTHYROIDISM: Primary | ICD-10-CM

## 2023-05-25 LAB
25(OH)D3 SERPL-MCNC: 30.6 NG/ML
ANION GAP SERPL CALCULATED.3IONS-SCNC: 14 MMOL/L (ref 3–16)
ANTI-THYROGLOB ABS: 18 IU/ML
BASOPHILS # BLD: 0.2 K/UL (ref 0–0.2)
BASOPHILS NFR BLD: 1 %
BUN SERPL-MCNC: 12 MG/DL (ref 7–20)
CALCIUM SERPL-MCNC: 11.2 MG/DL (ref 8.3–10.6)
CHLORIDE SERPL-SCNC: 94 MMOL/L (ref 99–110)
CO2 SERPL-SCNC: 34 MMOL/L (ref 21–32)
CREAT SERPL-MCNC: 0.6 MG/DL (ref 0.6–1.2)
DEPRECATED RDW RBC AUTO: 15.1 % (ref 12.4–15.4)
EOSINOPHIL # BLD: 0.5 K/UL (ref 0–0.6)
EOSINOPHIL NFR BLD: 3 %
FOLATE SERPL-MCNC: 6.95 NG/ML (ref 4.78–24.2)
GFR SERPLBLD CREATININE-BSD FMLA CKD-EPI: >60 ML/MIN/{1.73_M2}
GLUCOSE SERPL-MCNC: 131 MG/DL (ref 70–99)
HCT VFR BLD AUTO: 41.7 % (ref 36–48)
HGB BLD-MCNC: 13.1 G/DL (ref 12–16)
IRON SATN MFR SERPL: 20 % (ref 15–50)
IRON SERPL-MCNC: 72 UG/DL (ref 37–145)
LYMPHOCYTES # BLD: 4.3 K/UL (ref 1–5.1)
LYMPHOCYTES NFR BLD: 26 %
MCH RBC QN AUTO: 30.6 PG (ref 26–34)
MCHC RBC AUTO-ENTMCNC: 31.5 G/DL (ref 31–36)
MCV RBC AUTO: 97.2 FL (ref 80–100)
MONOCYTES # BLD: 1.8 K/UL (ref 0–1.3)
MONOCYTES NFR BLD: 11 %
NEUTROPHILS # BLD: 9.7 K/UL (ref 1.7–7.7)
NEUTROPHILS NFR BLD: 57 %
NEUTS BAND NFR BLD MANUAL: 2 % (ref 0–7)
PATH INTERP BLD-IMP: NO
PLATELET # BLD AUTO: 538 K/UL (ref 135–450)
PLATELET BLD QL SMEAR: ABNORMAL
PMV BLD AUTO: 9.2 FL (ref 5–10.5)
POTASSIUM SERPL-SCNC: 4.4 MMOL/L (ref 3.5–5.1)
PTH-INTACT SERPL-MCNC: 31.1 PG/ML (ref 14–72)
RBC # BLD AUTO: 4.29 M/UL (ref 4–5.2)
SCHISTOCYTES BLD QL SMEAR: ABNORMAL
SODIUM SERPL-SCNC: 142 MMOL/L (ref 136–145)
T4 FREE SERPL-MCNC: 2 NG/DL (ref 0.9–1.8)
THYROPEROXIDASE AB SERPL IA-ACNC: 9 IU/ML
TIBC SERPL-MCNC: 361 UG/DL (ref 260–445)
TSH SERPL DL<=0.005 MIU/L-ACNC: 0.12 UIU/ML (ref 0.27–4.2)
VIT B12 SERPL-MCNC: 507 PG/ML (ref 211–911)
WBC # BLD AUTO: 16.5 K/UL (ref 4–11)

## 2023-05-25 PROCEDURE — 36415 COLL VENOUS BLD VENIPUNCTURE: CPT | Performed by: STUDENT IN AN ORGANIZED HEALTH CARE EDUCATION/TRAINING PROGRAM

## 2023-05-26 LAB
ALBUMIN SERPL ELPH-MCNC: 3.7 G/DL (ref 3.1–4.9)
ALPHA1 GLOB SERPL ELPH-MCNC: 0.3 G/DL (ref 0.2–0.4)
ALPHA2 GLOB SERPL ELPH-MCNC: 1.2 G/DL (ref 0.4–1.1)
B-GLOBULIN SERPL ELPH-MCNC: 1.7 G/DL (ref 0.9–1.6)
GAMMA GLOB SERPL ELPH-MCNC: 0.9 G/DL (ref 0.6–1.8)
PROT SERPL-MCNC: 7.7 G/DL (ref 6.4–8.2)

## 2023-05-27 LAB — TSH RECEP AB SER-ACNC: <0.8 IU/L

## 2023-05-30 LAB — SPE/IFE INTERPRETATION: NORMAL

## 2023-06-07 ENCOUNTER — OFFICE VISIT (OUTPATIENT)
Dept: FAMILY MEDICINE CLINIC | Age: 63
End: 2023-06-07
Payer: MEDICARE

## 2023-06-07 VITALS
DIASTOLIC BLOOD PRESSURE: 88 MMHG | OXYGEN SATURATION: 92 % | WEIGHT: 90.6 LBS | SYSTOLIC BLOOD PRESSURE: 134 MMHG | BODY MASS INDEX: 16.05 KG/M2 | HEART RATE: 76 BPM | HEIGHT: 63 IN

## 2023-06-07 DIAGNOSIS — J44.9 CHRONIC OBSTRUCTIVE PULMONARY DISEASE, UNSPECIFIED COPD TYPE (HCC): ICD-10-CM

## 2023-06-07 DIAGNOSIS — N30.01 ACUTE CYSTITIS WITH HEMATURIA: Primary | ICD-10-CM

## 2023-06-07 PROCEDURE — G8419 CALC BMI OUT NRM PARAM NOF/U: HCPCS | Performed by: STUDENT IN AN ORGANIZED HEALTH CARE EDUCATION/TRAINING PROGRAM

## 2023-06-07 PROCEDURE — 3017F COLORECTAL CA SCREEN DOC REV: CPT | Performed by: STUDENT IN AN ORGANIZED HEALTH CARE EDUCATION/TRAINING PROGRAM

## 2023-06-07 PROCEDURE — G8427 DOCREV CUR MEDS BY ELIG CLIN: HCPCS | Performed by: STUDENT IN AN ORGANIZED HEALTH CARE EDUCATION/TRAINING PROGRAM

## 2023-06-07 PROCEDURE — 1036F TOBACCO NON-USER: CPT | Performed by: STUDENT IN AN ORGANIZED HEALTH CARE EDUCATION/TRAINING PROGRAM

## 2023-06-07 PROCEDURE — 99213 OFFICE O/P EST LOW 20 MIN: CPT | Performed by: STUDENT IN AN ORGANIZED HEALTH CARE EDUCATION/TRAINING PROGRAM

## 2023-06-07 PROCEDURE — 1111F DSCHRG MED/CURRENT MED MERGE: CPT | Performed by: STUDENT IN AN ORGANIZED HEALTH CARE EDUCATION/TRAINING PROGRAM

## 2023-06-07 PROCEDURE — 3075F SYST BP GE 130 - 139MM HG: CPT | Performed by: STUDENT IN AN ORGANIZED HEALTH CARE EDUCATION/TRAINING PROGRAM

## 2023-06-07 PROCEDURE — 3023F SPIROM DOC REV: CPT | Performed by: STUDENT IN AN ORGANIZED HEALTH CARE EDUCATION/TRAINING PROGRAM

## 2023-06-07 PROCEDURE — 3079F DIAST BP 80-89 MM HG: CPT | Performed by: STUDENT IN AN ORGANIZED HEALTH CARE EDUCATION/TRAINING PROGRAM

## 2023-06-07 RX ORDER — LEVOTHYROXINE SODIUM 0.03 MG/1
25 TABLET ORAL DAILY
Qty: 30 TABLET | Refills: 1 | Status: SHIPPED | OUTPATIENT
Start: 2023-06-07

## 2023-06-07 RX ORDER — ARIPIPRAZOLE 5 MG/1
5 TABLET ORAL DAILY
Qty: 30 TABLET | Refills: 3 | Status: SHIPPED | OUTPATIENT
Start: 2023-06-07

## 2023-06-07 NOTE — PROGRESS NOTES
3    pantoprazole (PROTONIX) 40 MG tablet Take 1 tablet by mouth daily      FLORASTOR 250 MG capsule Take 1 capsule by mouth daily      aspirin-acetaminophen-caffeine (EXCEDRIN MIGRAINE) 250-250-65 MG per tablet Take 1 tablet by mouth every 6 hours as needed for Headaches      albuterol (PROVENTIL) (2.5 MG/3ML) 0.083% nebulizer solution Take 3 mLs by nebulization every 6 hours as needed for Wheezing 120 each 3    Acetaminophen 325 MG CAPS Take 975 mg by mouth as needed       No current facility-administered medications on file prior to visit.       Past Medical History:   Diagnosis Date    Back pain     COPD (chronic obstructive pulmonary disease) (HCC)     Fatigue     Hypertension     Syncope and collapse 03/2019    Thyroid disease     Ulcerative colitis, unspecified, without complications [facility])      Patient Active Problem List   Diagnosis    Essential hypertension    Chest pain    Stage 4 very severe COPD by GOLD classification ([facility]    Heterozygous alpha 1-antitrypsin deficiency (HCC)    COPD exacerbation (HCC)    Gastroesophageal reflux disease without esophagitis    Anxiety and depression    Neuropathy of left lower extremity    Acquired hypothyroidism    COPD with acute exacerbation (HCC)    Diarrhea    Adrenal nodule (HCC)    Hypercalcemia    NSTEMI (non-ST elevated myocardial infarction) ([facility]    Nonischemic cardiomyopathy (HCC)    Menopause    Other osteoporosis without current pathological fracture    Ulcerative colitis with rectal bleeding (HCC)    Moderate malnutrition (HCC)    Abdominal pain    Left-sided weakness    Dysarthria    Severe malnutrition (HCC)    Acute on chronic respiratory failure with hypoxia and hypercapnia (HCC)    Acute hypoxemic respiratory failure (HCC)    Tracheobronchitis    Leukocytosis    SOB (shortness of breath)    ENRIQUETA (generalized anxiety disorder)    Chronic respiratory failure with hypoxia and hypercapnia (HCC)    Acute pyelonephritis    Anemia    Failure to thrive in adult

## 2023-06-08 ENCOUNTER — TELEPHONE (OUTPATIENT)
Dept: CARDIOLOGY CLINIC | Age: 63
End: 2023-06-08

## 2023-06-08 NOTE — TELEPHONE ENCOUNTER
[facility] is requesting cardiac clearance for upcoming 6/12/23 surgery with Dr. Ruben Underwood, right robotic thorascopic lung volume reduction. Please advise.   Fax letter [phone]

## 2023-06-08 NOTE — TELEPHONE ENCOUNTER
July Rojas from Dr. Geeta Dsouza office called for cardiac clearance on pt. Pts surgery is 06/12/2023. Last ov 03/27/2023 mgh. Pt is on Asprin 81mg.   number is [phone]

## 2023-06-08 NOTE — TELEPHONE ENCOUNTER
Patient has recovered from what was suspected to be a stress cardiomyopathy event. There is a risk of recurrence with physiologic or other stressors but risk is acceptably low. May proceed with surgery and hold aspirin 1 week prior.

## 2023-06-21 ENCOUNTER — TELEPHONE (OUTPATIENT)
Dept: FAMILY MEDICINE CLINIC | Age: 63
End: 2023-06-21

## 2023-06-21 NOTE — TELEPHONE ENCOUNTER
[facility] called on behalf of the patient. They stated that the patient just had surgery but the hospital did not send orders to resume home care. They would like to get a verbal order for Resumption of care orders. She stated that this can be left on Voicemail as the phone is HIPAA compliant.      [phone]

## 2023-06-26 ENCOUNTER — TELEPHONE (OUTPATIENT)
Dept: FAMILY MEDICINE CLINIC | Age: 63
End: 2023-06-26

## 2023-07-10 ENCOUNTER — OFFICE VISIT (OUTPATIENT)
Dept: FAMILY MEDICINE CLINIC | Age: 63
End: 2023-07-10
Payer: MEDICARE

## 2023-07-10 VITALS — SYSTOLIC BLOOD PRESSURE: 124 MMHG | WEIGHT: 93.2 LBS | DIASTOLIC BLOOD PRESSURE: 84 MMHG | BODY MASS INDEX: 16.51 KG/M2

## 2023-07-10 DIAGNOSIS — E87.6 HYPOKALEMIA: ICD-10-CM

## 2023-07-10 DIAGNOSIS — F41.1 GAD (GENERALIZED ANXIETY DISORDER): Primary | ICD-10-CM

## 2023-07-10 DIAGNOSIS — D64.9 ANEMIA, UNSPECIFIED TYPE: ICD-10-CM

## 2023-07-10 DIAGNOSIS — E03.9 HYPOTHYROIDISM, UNSPECIFIED TYPE: ICD-10-CM

## 2023-07-10 DIAGNOSIS — E83.42 HYPOMAGNESEMIA: ICD-10-CM

## 2023-07-10 DIAGNOSIS — L40.9 PSORIASIS: ICD-10-CM

## 2023-07-10 LAB
ANION GAP SERPL CALCULATED.3IONS-SCNC: 13 MMOL/L (ref 3–16)
BASOPHILS # BLD: 0.1 K/UL (ref 0–0.2)
BASOPHILS NFR BLD: 1.1 %
BUN SERPL-MCNC: 12 MG/DL (ref 7–20)
CALCIUM SERPL-MCNC: 10.6 MG/DL (ref 8.3–10.6)
CHLORIDE SERPL-SCNC: 97 MMOL/L (ref 99–110)
CO2 SERPL-SCNC: 30 MMOL/L (ref 21–32)
CREAT SERPL-MCNC: 0.6 MG/DL (ref 0.6–1.2)
DEPRECATED RDW RBC AUTO: 16.1 % (ref 12.4–15.4)
EOSINOPHIL # BLD: 1.8 K/UL (ref 0–0.6)
EOSINOPHIL NFR BLD: 14.4 %
GFR SERPLBLD CREATININE-BSD FMLA CKD-EPI: >60 ML/MIN/{1.73_M2}
GLUCOSE SERPL-MCNC: 89 MG/DL (ref 70–99)
HCT VFR BLD AUTO: 37.9 % (ref 36–48)
HGB BLD-MCNC: 12.1 G/DL (ref 12–16)
IRON SATN MFR SERPL: 16 % (ref 15–50)
IRON SERPL-MCNC: 57 UG/DL (ref 37–145)
LYMPHOCYTES # BLD: 3.9 K/UL (ref 1–5.1)
LYMPHOCYTES NFR BLD: 31.3 %
MAGNESIUM SERPL-MCNC: 1.8 MG/DL (ref 1.8–2.4)
MCH RBC QN AUTO: 30.9 PG (ref 26–34)
MCHC RBC AUTO-ENTMCNC: 31.9 G/DL (ref 31–36)
MCV RBC AUTO: 96.9 FL (ref 80–100)
MONOCYTES # BLD: 1.6 K/UL (ref 0–1.3)
MONOCYTES NFR BLD: 12.8 %
NEUTROPHILS # BLD: 5 K/UL (ref 1.7–7.7)
NEUTROPHILS NFR BLD: 40.4 %
PATH INTERP BLD-IMP: NO
PLATELET # BLD AUTO: 536 K/UL (ref 135–450)
PMV BLD AUTO: 9.3 FL (ref 5–10.5)
POTASSIUM SERPL-SCNC: 4.8 MMOL/L (ref 3.5–5.1)
RBC # BLD AUTO: 3.91 M/UL (ref 4–5.2)
SODIUM SERPL-SCNC: 140 MMOL/L (ref 136–145)
TIBC SERPL-MCNC: 352 UG/DL (ref 260–445)
TSH SERPL DL<=0.005 MIU/L-ACNC: 0.65 UIU/ML (ref 0.27–4.2)
WBC # BLD AUTO: 12.4 K/UL (ref 4–11)

## 2023-07-10 PROCEDURE — 3017F COLORECTAL CA SCREEN DOC REV: CPT | Performed by: STUDENT IN AN ORGANIZED HEALTH CARE EDUCATION/TRAINING PROGRAM

## 2023-07-10 PROCEDURE — G8427 DOCREV CUR MEDS BY ELIG CLIN: HCPCS | Performed by: STUDENT IN AN ORGANIZED HEALTH CARE EDUCATION/TRAINING PROGRAM

## 2023-07-10 PROCEDURE — 1036F TOBACCO NON-USER: CPT | Performed by: STUDENT IN AN ORGANIZED HEALTH CARE EDUCATION/TRAINING PROGRAM

## 2023-07-10 PROCEDURE — G8419 CALC BMI OUT NRM PARAM NOF/U: HCPCS | Performed by: STUDENT IN AN ORGANIZED HEALTH CARE EDUCATION/TRAINING PROGRAM

## 2023-07-10 PROCEDURE — 99214 OFFICE O/P EST MOD 30 MIN: CPT | Performed by: STUDENT IN AN ORGANIZED HEALTH CARE EDUCATION/TRAINING PROGRAM

## 2023-07-10 PROCEDURE — 36415 COLL VENOUS BLD VENIPUNCTURE: CPT | Performed by: STUDENT IN AN ORGANIZED HEALTH CARE EDUCATION/TRAINING PROGRAM

## 2023-07-10 PROCEDURE — 3074F SYST BP LT 130 MM HG: CPT | Performed by: STUDENT IN AN ORGANIZED HEALTH CARE EDUCATION/TRAINING PROGRAM

## 2023-07-10 PROCEDURE — 3079F DIAST BP 80-89 MM HG: CPT | Performed by: STUDENT IN AN ORGANIZED HEALTH CARE EDUCATION/TRAINING PROGRAM

## 2023-07-10 RX ORDER — BUSPIRONE HYDROCHLORIDE 10 MG/1
10 TABLET ORAL 3 TIMES DAILY
Qty: 270 TABLET | Refills: 1
Start: 2023-07-10 | End: 2024-01-06

## 2023-07-10 ASSESSMENT — ENCOUNTER SYMPTOMS
RHINORRHEA: 0
SHORTNESS OF BREATH: 0
COUGH: 0

## 2023-07-10 NOTE — PROGRESS NOTES
[address] [place] [address]  7/10/2023    Shiv Morse English (:  1960) is a 59 y.o. female, here for evaluation of the following medical concerns:    Chief Complaint   Patient presents with    Follow-up     3 month f/u         ASSESSMENT/ PLAN  1. ENRIQUETA (generalized anxiety disorder)  Anxiety is doing well on BuSpar and duloxetine. We will trial her of Abilify and follow-up in 1 month. She will cut down to 2.5 mg daily and if symptoms worsen she will let us know. If symptoms do not worsen she will trial stopping Abilify. She will follow-up for new or worsening symptoms.  - busPIRone (BUSPAR) 10 MG tablet; Take 1 tablet by mouth 3 times daily  Dispense: 270 tablet; Refill: 1  - TSH with Reflex; Future  - Basic Metabolic Panel; Future  - Magnesium; Future  - CBC with Auto Differential; Future  - Iron and TIBC; Future  - Iron and TIBC  - CBC with Auto Differential  - Magnesium  - Basic Metabolic Panel  - TSH with Reflex    2. Hypomagnesemia  -Recheck magnesium  - TSH with Reflex; Future  - Basic Metabolic Panel; Future  - Magnesium; Future  - CBC with Auto Differential; Future  - Iron and TIBC; Future  - Iron and TIBC  - CBC with Auto Differential  - Magnesium  - Basic Metabolic Panel  - TSH with Reflex    3. Hypokalemia  -Recheck potassium  - TSH with Reflex; Future  - Basic Metabolic Panel; Future  - Magnesium; Future  - CBC with Auto Differential; Future  - Iron and TIBC; Future  - Iron and TIBC  - CBC with Auto Differential  - Magnesium  - Basic Metabolic Panel  - TSH with Reflex    4. Anemia, unspecified type  -Recheck iron levels and CBC  - TSH with Reflex; Future  - Basic Metabolic Panel; Future  - Magnesium; Future  - CBC with Auto Differential; Future  - Iron and TIBC; Future  - Iron and TIBC  - CBC with Auto Differential  - Magnesium  - Basic Metabolic Panel  - TSH with Reflex    5. Hypothyroidism, unspecified type  - Recheck TSH and continue levothyroxine    6.   Psoriasis  - Start hydrocortisone

## 2023-08-10 RX ORDER — LOSARTAN POTASSIUM 100 MG/1
100 TABLET ORAL DAILY
Qty: 90 TABLET | Refills: 0 | Status: SHIPPED | OUTPATIENT
Start: 2023-08-10

## 2023-08-10 NOTE — TELEPHONE ENCOUNTER
Refill Request         Last Seen: Last Seen Department: 7/10/2023  Last Seen by PCP: 7/10/2023    Last Written: 04/26/2023       Next Appointment:   Future Appointments   Date Time Provider [facility] [address]   8/21/2023 11:30 AM DO daisy Levi             Requested Prescriptions     Pending Prescriptions Disp Refills    losartan (COZAAR) 100 MG tablet [Pharmacy Med Name: LOSARTAN 100 MG  Tablet] 90 tablet 0     Sig: TAKE 1 TABLET BY MOUTH DAILY

## 2023-08-21 ENCOUNTER — APPOINTMENT (OUTPATIENT)
Dept: GENERAL RADIOLOGY | Age: 63
End: 2023-08-21
Payer: MEDICARE

## 2023-08-21 ENCOUNTER — APPOINTMENT (OUTPATIENT)
Dept: CT IMAGING | Age: 63
End: 2023-08-21
Payer: MEDICARE

## 2023-08-21 ENCOUNTER — HOSPITAL ENCOUNTER (EMERGENCY)
Age: 63
Discharge: HOME | End: 2023-08-21
Payer: MEDICARE

## 2023-08-21 ENCOUNTER — OFFICE VISIT (OUTPATIENT)
Dept: FAMILY MEDICINE CLINIC | Age: 63
End: 2023-08-21
Payer: MEDICARE

## 2023-08-21 VITALS
HEIGHT: 63 IN | WEIGHT: 88.6 LBS | SYSTOLIC BLOOD PRESSURE: 130 MMHG | DIASTOLIC BLOOD PRESSURE: 88 MMHG | HEART RATE: 113 BPM | OXYGEN SATURATION: 83 % | BODY MASS INDEX: 15.7 KG/M2

## 2023-08-21 VITALS
DIASTOLIC BLOOD PRESSURE: 62 MMHG | TEMPERATURE: 98.2 F | OXYGEN SATURATION: 100 % | HEART RATE: 81 BPM | RESPIRATION RATE: 20 BRPM | SYSTOLIC BLOOD PRESSURE: 107 MMHG

## 2023-08-21 DIAGNOSIS — J44.1 COPD EXACERBATION (HCC): Primary | ICD-10-CM

## 2023-08-21 DIAGNOSIS — J96.01 ACUTE RESPIRATORY FAILURE WITH HYPOXIA (HCC): Primary | ICD-10-CM

## 2023-08-21 DIAGNOSIS — J44.1 COPD EXACERBATION (HCC): ICD-10-CM

## 2023-08-21 LAB
ALBUMIN SERPL-MCNC: 4.9 G/DL (ref 3.4–5)
ALBUMIN/GLOB SERPL: 1.8 {RATIO} (ref 1.1–2.2)
ALP SERPL-CCNC: 128 U/L (ref 40–129)
ALT SERPL-CCNC: 32 U/L (ref 10–40)
ANION GAP SERPL CALCULATED.3IONS-SCNC: 7 MMOL/L (ref 3–16)
ANISOCYTOSIS BLD QL SMEAR: ABNORMAL
AST SERPL-CCNC: 31 U/L (ref 15–37)
BASOPHILS # BLD: 0 K/UL (ref 0–0.2)
BASOPHILS NFR BLD: 0 %
BILIRUB SERPL-MCNC: 0.7 MG/DL (ref 0–1)
BUN SERPL-MCNC: 11 MG/DL (ref 7–20)
CALCIUM SERPL-MCNC: 11.2 MG/DL (ref 8.3–10.6)
CHLORIDE SERPL-SCNC: 93 MMOL/L (ref 99–110)
CO2 SERPL-SCNC: 42 MMOL/L (ref 21–32)
CREAT SERPL-MCNC: 0.6 MG/DL (ref 0.6–1.2)
DEPRECATED RDW RBC AUTO: 14.7 % (ref 12.4–15.4)
EKG ATRIAL RATE: 87 BPM
EKG DIAGNOSIS: NORMAL
EKG P AXIS: 84 DEGREES
EKG P-R INTERVAL: 158 MS
EKG Q-T INTERVAL: 356 MS
EKG QRS DURATION: 84 MS
EKG QTC CALCULATION (BAZETT): 428 MS
EKG R AXIS: 83 DEGREES
EKG T AXIS: 70 DEGREES
EKG VENTRICULAR RATE: 87 BPM
EOSINOPHIL # BLD: 1.3 K/UL (ref 0–0.6)
EOSINOPHIL NFR BLD: 11 %
GFR SERPLBLD CREATININE-BSD FMLA CKD-EPI: >60 ML/MIN/{1.73_M2}
GLUCOSE SERPL-MCNC: 129 MG/DL (ref 70–99)
HCT VFR BLD AUTO: 41.9 % (ref 36–48)
HGB BLD-MCNC: 13.1 G/DL (ref 12–16)
LYMPHOCYTES # BLD: 4 K/UL (ref 1–5.1)
LYMPHOCYTES NFR BLD: 21 %
MCH RBC QN AUTO: 30.1 PG (ref 26–34)
MCHC RBC AUTO-ENTMCNC: 31.2 G/DL (ref 31–36)
MCV RBC AUTO: 96.5 FL (ref 80–100)
MONOCYTES # BLD: 1 K/UL (ref 0–1.3)
MONOCYTES NFR BLD: 8 %
NEUTROPHILS # BLD: 5.9 K/UL (ref 1.7–7.7)
NEUTROPHILS NFR BLD: 44 %
NEUTS BAND NFR BLD MANUAL: 4 % (ref 0–7)
PATH INTERP BLD-IMP: YES
PLATELET # BLD AUTO: 367 K/UL (ref 135–450)
PMV BLD AUTO: 9.1 FL (ref 5–10.5)
POIKILOCYTOSIS BLD QL SMEAR: ABNORMAL
POTASSIUM SERPL-SCNC: 4.2 MMOL/L (ref 3.5–5.1)
PROT SERPL-MCNC: 7.6 G/DL (ref 6.4–8.2)
RBC # BLD AUTO: 4.35 M/UL (ref 4–5.2)
SODIUM SERPL-SCNC: 142 MMOL/L (ref 136–145)
STOMATOCYTES BLD QL SMEAR: ABNORMAL
TROPONIN, HIGH SENSITIVITY: 37 NG/L (ref 0–14)
TROPONIN, HIGH SENSITIVITY: 42 NG/L (ref 0–14)
VARIANT LYMPHS NFR BLD MANUAL: 12 % (ref 0–6)
WBC # BLD AUTO: 12.2 K/UL (ref 4–11)

## 2023-08-21 PROCEDURE — 71046 X-RAY EXAM CHEST 2 VIEWS: CPT

## 2023-08-21 PROCEDURE — 3078F DIAST BP <80 MM HG: CPT | Performed by: STUDENT IN AN ORGANIZED HEALTH CARE EDUCATION/TRAINING PROGRAM

## 2023-08-21 PROCEDURE — 1036F TOBACCO NON-USER: CPT | Performed by: STUDENT IN AN ORGANIZED HEALTH CARE EDUCATION/TRAINING PROGRAM

## 2023-08-21 PROCEDURE — 3023F SPIROM DOC REV: CPT | Performed by: STUDENT IN AN ORGANIZED HEALTH CARE EDUCATION/TRAINING PROGRAM

## 2023-08-21 PROCEDURE — 96374 THER/PROPH/DIAG INJ IV PUSH: CPT

## 2023-08-21 PROCEDURE — 80053 COMPREHEN METABOLIC PANEL: CPT

## 2023-08-21 PROCEDURE — 99285 EMERGENCY DEPT VISIT HI MDM: CPT

## 2023-08-21 PROCEDURE — 6360000002 HC RX W HCPCS: Performed by: PHYSICIAN ASSISTANT

## 2023-08-21 PROCEDURE — 3074F SYST BP LT 130 MM HG: CPT | Performed by: STUDENT IN AN ORGANIZED HEALTH CARE EDUCATION/TRAINING PROGRAM

## 2023-08-21 PROCEDURE — G8427 DOCREV CUR MEDS BY ELIG CLIN: HCPCS | Performed by: STUDENT IN AN ORGANIZED HEALTH CARE EDUCATION/TRAINING PROGRAM

## 2023-08-21 PROCEDURE — 84484 ASSAY OF TROPONIN QUANT: CPT

## 2023-08-21 PROCEDURE — 6360000004 HC RX CONTRAST MEDICATION: Performed by: PHYSICIAN ASSISTANT

## 2023-08-21 PROCEDURE — 3017F COLORECTAL CA SCREEN DOC REV: CPT | Performed by: STUDENT IN AN ORGANIZED HEALTH CARE EDUCATION/TRAINING PROGRAM

## 2023-08-21 PROCEDURE — 99214 OFFICE O/P EST MOD 30 MIN: CPT | Performed by: STUDENT IN AN ORGANIZED HEALTH CARE EDUCATION/TRAINING PROGRAM

## 2023-08-21 PROCEDURE — G8419 CALC BMI OUT NRM PARAM NOF/U: HCPCS | Performed by: STUDENT IN AN ORGANIZED HEALTH CARE EDUCATION/TRAINING PROGRAM

## 2023-08-21 PROCEDURE — 93005 ELECTROCARDIOGRAM TRACING: CPT | Performed by: PHYSICIAN ASSISTANT

## 2023-08-21 PROCEDURE — 85025 COMPLETE CBC W/AUTO DIFF WBC: CPT

## 2023-08-21 PROCEDURE — 6370000000 HC RX 637 (ALT 250 FOR IP): Performed by: PHYSICIAN ASSISTANT

## 2023-08-21 PROCEDURE — 93010 ELECTROCARDIOGRAM REPORT: CPT | Performed by: INTERNAL MEDICINE

## 2023-08-21 PROCEDURE — 71260 CT THORAX DX C+: CPT

## 2023-08-21 RX ORDER — IPRATROPIUM BROMIDE AND ALBUTEROL SULFATE 2.5; .5 MG/3ML; MG/3ML
3 SOLUTION RESPIRATORY (INHALATION)
Status: DISCONTINUED | OUTPATIENT
Start: 2023-08-21 | End: 2023-08-21 | Stop reason: SDUPTHER

## 2023-08-21 RX ORDER — IPRATROPIUM BROMIDE AND ALBUTEROL SULFATE 2.5; .5 MG/3ML; MG/3ML
3 SOLUTION RESPIRATORY (INHALATION)
Status: DISCONTINUED | OUTPATIENT
Start: 2023-08-21 | End: 2023-08-21

## 2023-08-21 RX ORDER — METHYLPREDNISOLONE SODIUM SUCCINATE 125 MG/2ML
125 INJECTION, POWDER, LYOPHILIZED, FOR SOLUTION INTRAMUSCULAR; INTRAVENOUS ONCE
Status: COMPLETED | OUTPATIENT
Start: 2023-08-21 | End: 2023-08-21

## 2023-08-21 RX ORDER — PREDNISONE 20 MG/1
20 TABLET ORAL 2 TIMES DAILY
Qty: 10 TABLET | Refills: 0 | Status: SHIPPED | OUTPATIENT
Start: 2023-08-21 | End: 2023-08-26

## 2023-08-21 RX ORDER — IPRATROPIUM BROMIDE AND ALBUTEROL SULFATE 2.5; .5 MG/3ML; MG/3ML
1 SOLUTION RESPIRATORY (INHALATION)
Status: DISCONTINUED | OUTPATIENT
Start: 2023-08-21 | End: 2023-08-21

## 2023-08-21 RX ORDER — AZITHROMYCIN 250 MG/1
250 TABLET, FILM COATED ORAL SEE ADMIN INSTRUCTIONS
Qty: 6 TABLET | Refills: 0 | Status: SHIPPED | OUTPATIENT
Start: 2023-08-21 | End: 2023-08-26

## 2023-08-21 RX ADMIN — METHYLPREDNISOLONE SODIUM SUCCINATE 125 MG: 125 INJECTION INTRAMUSCULAR; INTRAVENOUS at 12:47

## 2023-08-21 RX ADMIN — IPRATROPIUM BROMIDE AND ALBUTEROL SULFATE 1 DOSE: .5; 3 SOLUTION RESPIRATORY (INHALATION) at 12:47

## 2023-08-21 RX ADMIN — IOPAMIDOL 75 ML: 755 INJECTION, SOLUTION INTRAVENOUS at 14:00

## 2023-08-21 ASSESSMENT — ENCOUNTER SYMPTOMS
SHORTNESS OF BREATH: 1
COUGH: 1

## 2023-08-21 ASSESSMENT — PAIN - FUNCTIONAL ASSESSMENT: PAIN_FUNCTIONAL_ASSESSMENT: 0-10

## 2023-08-21 ASSESSMENT — LIFESTYLE VARIABLES
HOW OFTEN DO YOU HAVE A DRINK CONTAINING ALCOHOL: NEVER
HOW MANY STANDARD DRINKS CONTAINING ALCOHOL DO YOU HAVE ON A TYPICAL DAY: PATIENT DOES NOT DRINK

## 2023-08-21 ASSESSMENT — PAIN SCALES - GENERAL: PAINLEVEL_OUTOF10: 5

## 2023-08-21 ASSESSMENT — PAIN DESCRIPTION - LOCATION: LOCATION: CHEST

## 2023-08-21 NOTE — ED NOTES
Pt ambulated to BR at 100% O2 on 3L nasal cannula. Pt walked around ED and O2 went to 90%-93%. Patient back to bedside at 94-96% on 3L nasal cannula. Pt has no complaints with walking.       Regan Shields RN  08/21/23 [id number]

## 2023-08-21 NOTE — ED NOTES
Writer reviewed discharge instructions, medications, and follow-up with PCP; patient verbalized understanding. Patient's IV removed with no complications with dressing in place. Patient stable, in wheelchair, GCS 15, no signs/ symptoms of acute distress, respirations unlabored, and by self. Patient discharged with belongings.       Caryl Alberto RN  08/21/23 9979

## 2023-08-21 NOTE — ED PROVIDER NOTES
APPEARANCE: Awake and alert. Cooperative. In slight respiratory distress. Chronically ill appearing  HEAD: Normocephalic. Atraumatic. No dowling signs raccoon eyes. EYES: PERRL. EOM's grossly intact. No conjunctival injection or discharge. ENT: Mucous membranes are pink and moist.   NECK: Supple. Full range of motion with no neck pain or stiffness. HEART: RRR. No murmurs, rubs or gallops. Normal S1-S2. No S3 or S4. No tenderness palpation of chest wall. LUNGS: Slightly labored respirations. Poor air exchange with diminished breath sounds in all 4 lung fields. Faint diffuse expiratory wheezing in all 4 lung fields. Initially speaking in short choppy sentences. ABDOMEN: Soft. Non-distended. Non-tender. No guarding or rebound. No masses. No organomegaly. EXTREMITIES: No peripheral edema. Moves all extremities equally. All extremities neurovascularly intact. SKIN: Warm and dry. No acute rashes. NEUROLOGICAL: Alert and oriented. No gross facial drooping. PSYCHIATRIC: Normal mood and affect. RADIOLOGY  No results found. ED COURSE  This 56-year-old female shortness of breath ongoing for approximately 1 week. She does have a history of COPD, however has noticed that the shortness of breath is worsened. She is also developed a cough. She was initially being admitted bumped up to 5 L.upon arrival she did receive 1 DuoNeb laser treatment as well as 125 mg IM injection of Solu-Medrol. Initial troponin and repeat troponin were elevated, however she is chronically elevated and she is at her baseline. Slight leukocytosis noted with no other abnormality seen on CBC. Chloride slightly decreased at 93 CO2 is elevated to 42. She is hyperglycemic with a glucose of 129. Calcium is elevated 11.2. No other abnormality seen on CMP. CT PE rule out shows no findings of acute pulmonary embolism. Emphysematous changes as well as a healing seventh rib fracture on the right side.   Chest x-ray shows no

## 2023-08-22 LAB — PATH INTERP BLD-IMP: NORMAL

## 2023-08-25 DIAGNOSIS — F41.1 GAD (GENERALIZED ANXIETY DISORDER): ICD-10-CM

## 2023-08-25 RX ORDER — BUSPIRONE HYDROCHLORIDE 10 MG/1
10 TABLET ORAL 3 TIMES DAILY
Qty: 90 TABLET | Refills: 2 | Status: SHIPPED | OUTPATIENT
Start: 2023-08-25

## 2023-08-25 RX ORDER — LEVOTHYROXINE SODIUM 0.03 MG/1
25 TABLET ORAL DAILY
Qty: 90 TABLET | Refills: 1 | Status: SHIPPED | OUTPATIENT
Start: 2023-08-25

## 2023-08-25 NOTE — TELEPHONE ENCOUNTER
Refill Request     CONFIRM preferred pharmacy with the patient. If Mail Order Rx - Pend for 90 day refill. Last Seen: Last Seen Department: 8/21/2023  Last Seen by PCP: 8/21/2023    Last Written:   1) buspar: 07/10/2023, 270 tablets, 1 refill  2) synthroid: 06/07/2023, 30 tablets, 1 refill      Next Appointment:   No future appointments.         Requested Prescriptions     Pending Prescriptions Disp Refills    busPIRone (BUSPAR) 10 MG tablet [Pharmacy Med Name: BUSPIRONE HCL 10 MG TABLET 10 Tablet] 90 tablet 0     Sig: TAKE 1 TABLET BY MOUTH 3 TIMES DAILY    levothyroxine (SYNTHROID) 25 MCG tablet [Pharmacy Med Name: LEVOTHYROXINE 25 MCG TAB 25 Tablet] 30 tablet 1     Sig: TAKE 1 TABLET BY MOUTH DAILY

## 2023-08-30 ENCOUNTER — OFFICE VISIT (OUTPATIENT)
Dept: FAMILY MEDICINE CLINIC | Age: 63
End: 2023-08-30
Payer: MEDICARE

## 2023-08-30 VITALS
DIASTOLIC BLOOD PRESSURE: 84 MMHG | OXYGEN SATURATION: 92 % | SYSTOLIC BLOOD PRESSURE: 126 MMHG | WEIGHT: 90 LBS | HEART RATE: 57 BPM | BODY MASS INDEX: 15.94 KG/M2

## 2023-08-30 DIAGNOSIS — Z12.31 BREAST CANCER SCREENING BY MAMMOGRAM: ICD-10-CM

## 2023-08-30 DIAGNOSIS — S22.42XD: ICD-10-CM

## 2023-08-30 DIAGNOSIS — R79.9 ABNORMAL BLOOD CELL COUNT: ICD-10-CM

## 2023-08-30 DIAGNOSIS — J44.1 COPD WITH ACUTE EXACERBATION (HCC): Primary | ICD-10-CM

## 2023-08-30 PROCEDURE — 3023F SPIROM DOC REV: CPT

## 2023-08-30 PROCEDURE — 3017F COLORECTAL CA SCREEN DOC REV: CPT

## 2023-08-30 PROCEDURE — G8427 DOCREV CUR MEDS BY ELIG CLIN: HCPCS

## 2023-08-30 PROCEDURE — 1036F TOBACCO NON-USER: CPT

## 2023-08-30 PROCEDURE — 3074F SYST BP LT 130 MM HG: CPT

## 2023-08-30 PROCEDURE — 3079F DIAST BP 80-89 MM HG: CPT

## 2023-08-30 PROCEDURE — G8419 CALC BMI OUT NRM PARAM NOF/U: HCPCS

## 2023-08-30 PROCEDURE — 99203 OFFICE O/P NEW LOW 30 MIN: CPT

## 2023-08-30 RX ORDER — PREDNISONE 50 MG/1
50 TABLET ORAL DAILY
Qty: 5 TABLET | Refills: 0 | Status: SHIPPED | OUTPATIENT
Start: 2023-08-30 | End: 2023-09-04

## 2023-08-30 RX ORDER — CIPROFLOXACIN 500 MG/1
500 TABLET, FILM COATED ORAL 2 TIMES DAILY
Qty: 14 TABLET | Refills: 0 | Status: SHIPPED | OUTPATIENT
Start: 2023-08-30 | End: 2023-09-06

## 2023-08-30 ASSESSMENT — ENCOUNTER SYMPTOMS
DIARRHEA: 0
COUGH: 0
BLOOD IN STOOL: 0
ABDOMINAL PAIN: 0
WHEEZING: 0
CONSTIPATION: 0
COLOR CHANGE: 0
SORE THROAT: 0
SHORTNESS OF BREATH: 0

## 2023-08-30 NOTE — PROGRESS NOTES
is flat. Bowel sounds are normal.      Palpations: Abdomen is soft. Musculoskeletal:         General: Normal range of motion. Skin:     General: Skin is warm. Neurological:      General: No focal deficit present. Mental Status: She is alert. Psychiatric:         Mood and Affect: Mood normal.         Behavior: Behavior normal.         Judgment: Judgment normal.          This note was generated completely or in part utilizing Dragon dictation speech recognition software. Occasionally, words are mistranscribed and despite editing, the text may contain inaccuracies due to incorrect word recognition. If further clarification is needed please contact the office at [phone]. An electronic signature was used to authenticate this note.     --Davina Scheuermann, APRN - CNP

## 2023-09-05 ENCOUNTER — PATIENT MESSAGE (OUTPATIENT)
Dept: FAMILY MEDICINE CLINIC | Age: 63
End: 2023-09-05

## 2023-09-05 DIAGNOSIS — Z12.31 BREAST CANCER SCREENING BY MAMMOGRAM: Primary | ICD-10-CM

## 2023-09-14 NOTE — TELEPHONE ENCOUNTER
From: SOHEILA Coy  To: Odilia Burns  Sent: 9/5/2023 4:14 PM EDT  Subject: October is breast cancer awareness month    Hello,     Our records show you are due for a mammogram. If you have had this completed please reply and tell us where your mammogram was done so that I can update your health record. If you have not had your mammogram but wish to you can call [phone] to schedule (This is the [facility] central scheduling number)   When you call they can schedule your mammogram without an order. They will also schedule you for your mammogram at an imaging center close to your home. If you wish to have your mammogram at the [facility] walk in appointments are also welcome. we can also schedule it for you, just reply back to this message that you would like for us to schedule your mammogram for you   Please let the staff know if mornings or afternoons work best for you and if there is a certain day you can not go. Please also let the staff know what location you prefer if it is not the location at the [facility] office. If you have any additional questions please contact the office.      Thank you and have a great day,   [address]  [phone]

## 2023-09-20 ENCOUNTER — HOSPITAL ENCOUNTER (OUTPATIENT)
Dept: WOMENS IMAGING | Age: 63
Discharge: HOME | End: 2023-09-20
Payer: MEDICARE

## 2023-09-20 ENCOUNTER — OFFICE VISIT (OUTPATIENT)
Dept: FAMILY MEDICINE CLINIC | Age: 63
End: 2023-09-20
Payer: MEDICARE

## 2023-09-20 VITALS
SYSTOLIC BLOOD PRESSURE: 108 MMHG | WEIGHT: 91.2 LBS | OXYGEN SATURATION: 97 % | HEART RATE: 86 BPM | BODY MASS INDEX: 16.16 KG/M2 | DIASTOLIC BLOOD PRESSURE: 76 MMHG

## 2023-09-20 VITALS — BODY MASS INDEX: 15.95 KG/M2 | WEIGHT: 90 LBS | HEIGHT: 63 IN

## 2023-09-20 DIAGNOSIS — J44.1 COPD WITH ACUTE EXACERBATION (HCC): Primary | ICD-10-CM

## 2023-09-20 DIAGNOSIS — Z12.31 BREAST CANCER SCREENING BY MAMMOGRAM: ICD-10-CM

## 2023-09-20 DIAGNOSIS — Q89.01 ASPLENIA: ICD-10-CM

## 2023-09-20 PROCEDURE — 77063 BREAST TOMOSYNTHESIS BI: CPT

## 2023-09-20 PROCEDURE — 1036F TOBACCO NON-USER: CPT | Performed by: STUDENT IN AN ORGANIZED HEALTH CARE EDUCATION/TRAINING PROGRAM

## 2023-09-20 PROCEDURE — 3023F SPIROM DOC REV: CPT | Performed by: STUDENT IN AN ORGANIZED HEALTH CARE EDUCATION/TRAINING PROGRAM

## 2023-09-20 PROCEDURE — 3078F DIAST BP <80 MM HG: CPT | Performed by: STUDENT IN AN ORGANIZED HEALTH CARE EDUCATION/TRAINING PROGRAM

## 2023-09-20 PROCEDURE — G8419 CALC BMI OUT NRM PARAM NOF/U: HCPCS | Performed by: STUDENT IN AN ORGANIZED HEALTH CARE EDUCATION/TRAINING PROGRAM

## 2023-09-20 PROCEDURE — 99214 OFFICE O/P EST MOD 30 MIN: CPT | Performed by: STUDENT IN AN ORGANIZED HEALTH CARE EDUCATION/TRAINING PROGRAM

## 2023-09-20 PROCEDURE — G0008 ADMIN INFLUENZA VIRUS VAC: HCPCS | Performed by: STUDENT IN AN ORGANIZED HEALTH CARE EDUCATION/TRAINING PROGRAM

## 2023-09-20 PROCEDURE — 90620 MENB-4C VACCINE IM: CPT | Performed by: STUDENT IN AN ORGANIZED HEALTH CARE EDUCATION/TRAINING PROGRAM

## 2023-09-20 PROCEDURE — 3074F SYST BP LT 130 MM HG: CPT | Performed by: STUDENT IN AN ORGANIZED HEALTH CARE EDUCATION/TRAINING PROGRAM

## 2023-09-20 PROCEDURE — G8427 DOCREV CUR MEDS BY ELIG CLIN: HCPCS | Performed by: STUDENT IN AN ORGANIZED HEALTH CARE EDUCATION/TRAINING PROGRAM

## 2023-09-20 PROCEDURE — 3017F COLORECTAL CA SCREEN DOC REV: CPT | Performed by: STUDENT IN AN ORGANIZED HEALTH CARE EDUCATION/TRAINING PROGRAM

## 2023-09-20 PROCEDURE — 90674 CCIIV4 VAC NO PRSV 0.5 ML IM: CPT | Performed by: STUDENT IN AN ORGANIZED HEALTH CARE EDUCATION/TRAINING PROGRAM

## 2023-09-20 RX ORDER — ARIPIPRAZOLE 5 MG/1
5 TABLET ORAL DAILY
Qty: 30 TABLET | Refills: 3 | Status: SHIPPED | OUTPATIENT
Start: 2023-09-20

## 2023-09-20 RX ORDER — DOXYCYCLINE HYCLATE 100 MG
100 TABLET ORAL 2 TIMES DAILY
Qty: 20 TABLET | Refills: 0 | Status: SHIPPED | OUTPATIENT
Start: 2023-09-20 | End: 2023-09-30

## 2023-09-20 RX ORDER — PREDNISONE 10 MG/1
TABLET ORAL
Qty: 30 TABLET | Refills: 0 | Status: SHIPPED | OUTPATIENT
Start: 2023-09-20 | End: 2023-10-02

## 2023-09-20 NOTE — PROGRESS NOTES
[address] [place] [address]  2023    Hank Burns (:  1960) is a 59 y.o. female, here for evaluation of the following medical concerns:    Chief Complaint   Patient presents with    Follow-up     1 month f/u        ASSESSMENT/ PLAN  1. COPD with acute exacerbation ([address])  -Doxycycline and prednisone. We will do a longer taper due to symptoms not resolving. ER for shortness of breath at rest.    2.  Asplenia  - Update meningococcal B as it has been 3 years. No follow-ups on file. HPI  Patient is here for follow-up. She was recently treated for COPD exacerbation. She is still having a lingering cough and is waking up at night to use her nebulizer. Cough is increased in thickness of sputum and color is a enriquez yellow. She denies any fevers. She does not have any shortness of breath at rest.  No nausea, vomiting, diarrhea, or blood in her stool. Did have a splenic laceration 5 years ago and is status post splenectomy.   She had meningococcal vaccine series back in 2019 but is due for an updated meningococcal B.    ROS      HISTORIES  Current Outpatient Medications on File Prior to Visit   Medication Sig Dispense Refill    busPIRone (BUSPAR) 10 MG tablet Take 1 tablet by mouth 3 times daily 90 tablet 2    levothyroxine (SYNTHROID) 25 MCG tablet TAKE 1 TABLET BY MOUTH DAILY 90 tablet 1    losartan (COZAAR) 100 MG tablet TAKE 1 TABLET BY MOUTH DAILY 90 tablet 0    ondansetron (ZOFRAN) 4 MG tablet Take 1 tablet by mouth every 8 hours as needed for Nausea or Vomiting 20 tablet 0    DULoxetine (CYMBALTA) 60 MG extended release capsule Take 1 capsule by mouth daily 30 capsule 2    metoprolol succinate (TOPROL XL) 50 MG extended release tablet Take 1 tablet by mouth daily 90 tablet 0    amLODIPine (NORVASC) 5 MG tablet Take 1 tablet by mouth daily 90 tablet 3    albuterol sulfate  (90 Base) MCG/ACT inhaler INHALE 1-2 PUFFS EVERY SIX HOURS INTO THE LUNGS AS NEEDED FOR WHEEZING OR

## 2023-09-26 RX ORDER — DULOXETIN HYDROCHLORIDE 60 MG/1
CAPSULE, DELAYED RELEASE ORAL DAILY
Qty: 30 CAPSULE | Refills: 5 | Status: SHIPPED | OUTPATIENT
Start: 2023-09-26

## 2023-09-26 NOTE — TELEPHONE ENCOUNTER
Refill Request       Last Seen: Last Seen Department: 9/20/2023  Last Seen by PCP: 9/20/2023    Last Written: 04/10/2023        Next Appointment:   Future Appointments   Date Time Provider [facility] [address]   11/1/2023  1:00 PM DO daisy Mensah           Requested Prescriptions     Pending Prescriptions Disp Refills    DULoxetine (CYMBALTA) 60 MG extended release capsule [Pharmacy Med Name: DULOXETINE HCL DR 60MG CAP 60 Capsule] 30 capsule 5     Sig: TAKE 1 CAPSULE BY MOUTH EVERY DAY

## 2023-09-29 RX ORDER — METOPROLOL SUCCINATE 50 MG/1
50 TABLET, EXTENDED RELEASE ORAL DAILY
Qty: 90 TABLET | Refills: 0 | Status: SHIPPED | OUTPATIENT
Start: 2023-09-29

## 2023-09-29 NOTE — PROGRESS NOTES
Refill Request     Last Seen: Last Seen Department: 9/20/2023  Last Seen by PCP: Visit date not found    Last Written: 4/10/23    Next Appointment:   Future Appointments   Date Time Provider [facility] [address]   11/1/2023  1:00 PM DO daisy Casillas       Requested Prescriptions     Pending Prescriptions Disp Refills    metoprolol succinate (TOPROL XL) 50 MG extended release tablet 90 tablet 0     Sig: Take 1 tablet by mouth daily

## 2023-10-18 DIAGNOSIS — J44.9 MODERATE COPD (CHRONIC OBSTRUCTIVE PULMONARY DISEASE) (HCC): ICD-10-CM

## 2023-10-18 RX ORDER — ALBUTEROL SULFATE 2.5 MG/3ML
2.5 SOLUTION RESPIRATORY (INHALATION) EVERY 6 HOURS PRN
Qty: 120 EACH | Refills: 3 | Status: SHIPPED | OUTPATIENT
Start: 2023-10-18

## 2023-10-18 NOTE — TELEPHONE ENCOUNTER
Refill Request         Last Seen: Last Seen Department: 9/20/2023  Last Seen by PCP: 9/20/2023    Last Written: 06/23/2020         Next Appointment:   Future Appointments   Date Time Provider [facility] [address]   11/1/2023  1:00 PM DO daisy Ortiz         Requested Prescriptions     Pending Prescriptions Disp Refills    albuterol (PROVENTIL) (2.5 MG/3ML) 0.083% nebulizer solution 120 each 3     Sig: Take 3 mLs by nebulization every 6 hours as needed for Wheezing

## 2023-11-01 ENCOUNTER — HOSPITAL ENCOUNTER (OUTPATIENT)
Dept: GENERAL RADIOLOGY | Age: 63
Discharge: HOME | End: 2023-11-01
Attending: STUDENT IN AN ORGANIZED HEALTH CARE EDUCATION/TRAINING PROGRAM
Payer: MEDICARE

## 2023-11-01 ENCOUNTER — OFFICE VISIT (OUTPATIENT)
Dept: FAMILY MEDICINE CLINIC | Age: 63
End: 2023-11-01
Payer: MEDICARE

## 2023-11-01 VITALS
DIASTOLIC BLOOD PRESSURE: 84 MMHG | OXYGEN SATURATION: 98 % | HEART RATE: 77 BPM | BODY MASS INDEX: 16.34 KG/M2 | HEIGHT: 63 IN | SYSTOLIC BLOOD PRESSURE: 138 MMHG | WEIGHT: 92.2 LBS

## 2023-11-01 DIAGNOSIS — R05.9 COUGH, UNSPECIFIED TYPE: Primary | ICD-10-CM

## 2023-11-01 DIAGNOSIS — J44.1 COPD EXACERBATION (HCC): ICD-10-CM

## 2023-11-01 DIAGNOSIS — R07.89 OTHER CHEST PAIN: ICD-10-CM

## 2023-11-01 DIAGNOSIS — R06.02 SHORTNESS OF BREATH: ICD-10-CM

## 2023-11-01 DIAGNOSIS — R05.9 COUGH, UNSPECIFIED TYPE: ICD-10-CM

## 2023-11-01 PROCEDURE — G8482 FLU IMMUNIZE ORDER/ADMIN: HCPCS | Performed by: STUDENT IN AN ORGANIZED HEALTH CARE EDUCATION/TRAINING PROGRAM

## 2023-11-01 PROCEDURE — 3079F DIAST BP 80-89 MM HG: CPT | Performed by: STUDENT IN AN ORGANIZED HEALTH CARE EDUCATION/TRAINING PROGRAM

## 2023-11-01 PROCEDURE — G8419 CALC BMI OUT NRM PARAM NOF/U: HCPCS | Performed by: STUDENT IN AN ORGANIZED HEALTH CARE EDUCATION/TRAINING PROGRAM

## 2023-11-01 PROCEDURE — 71046 X-RAY EXAM CHEST 2 VIEWS: CPT

## 2023-11-01 PROCEDURE — 3017F COLORECTAL CA SCREEN DOC REV: CPT | Performed by: STUDENT IN AN ORGANIZED HEALTH CARE EDUCATION/TRAINING PROGRAM

## 2023-11-01 PROCEDURE — 99214 OFFICE O/P EST MOD 30 MIN: CPT | Performed by: STUDENT IN AN ORGANIZED HEALTH CARE EDUCATION/TRAINING PROGRAM

## 2023-11-01 PROCEDURE — 3023F SPIROM DOC REV: CPT | Performed by: STUDENT IN AN ORGANIZED HEALTH CARE EDUCATION/TRAINING PROGRAM

## 2023-11-01 PROCEDURE — 3075F SYST BP GE 130 - 139MM HG: CPT | Performed by: STUDENT IN AN ORGANIZED HEALTH CARE EDUCATION/TRAINING PROGRAM

## 2023-11-01 PROCEDURE — 1036F TOBACCO NON-USER: CPT | Performed by: STUDENT IN AN ORGANIZED HEALTH CARE EDUCATION/TRAINING PROGRAM

## 2023-11-01 PROCEDURE — G8427 DOCREV CUR MEDS BY ELIG CLIN: HCPCS | Performed by: STUDENT IN AN ORGANIZED HEALTH CARE EDUCATION/TRAINING PROGRAM

## 2023-11-01 RX ORDER — AZELASTINE 1 MG/ML
1 SPRAY, METERED NASAL 2 TIMES DAILY
Qty: 60 ML | Refills: 1 | Status: SHIPPED | OUTPATIENT
Start: 2023-11-01

## 2023-11-01 RX ORDER — AZITHROMYCIN 250 MG/1
250 TABLET, FILM COATED ORAL SEE ADMIN INSTRUCTIONS
Qty: 6 TABLET | Refills: 0 | Status: SHIPPED | OUTPATIENT
Start: 2023-11-01 | End: 2023-11-06

## 2023-11-01 ASSESSMENT — ENCOUNTER SYMPTOMS
COUGH: 1
SHORTNESS OF BREATH: 1
RHINORRHEA: 1

## 2023-11-01 NOTE — PROGRESS NOTES
[address] [place] [address]  2023    Odilia Burns (:  1960) is a 59 y.o. female, here for evaluation of the following medical concerns:    Chief Complaint   Patient presents with    Follow-up     Notes she gets real breathless to get up and walk a few feet        ASSESSMENT/ PLAN  1. Cough, unspecified type  -Did not improve on doxycycline or prednisone but has not worsened. 2. Shortness of breath  -Chest x-ray. Resolves with rest.  If does not resolve with rest go to the ER. 3. COPD exacerbation ([address])  -Start azithromycin. If pneumonia on chest x-ray at [facility]. 4.  Other chest pain  - Reproducible with palpation of patient's hand over her sternum. Likely costochondritis. Ice 5 minutes 4 times daily. Tylenol as needed for pain. ER if occurs at rest or does not resolve with rest.  Cath from 2021 without evidence of coronary artery disease. No follow-ups on file. HPI  Patient is here for follow-up on COPD exacerbation. She finished the doxycycline and prednisone and has not had any worsening of her cough but has also not had improvement. She is short of breath with exertion but it resolves with rest.  She has had some retrosternal pain. It seems to be associated with different positions and with taking a deep breath. She has not had the pain in the last 3 days. ROS  Review of Systems   Constitutional:  Negative for chills and fever. HENT:  Positive for rhinorrhea. Respiratory:  Positive for cough and shortness of breath. Cardiovascular:  Positive for chest pain. Psychiatric/Behavioral:  The patient is nervous/anxious.         HISTORIES  Current Outpatient Medications on File Prior to Visit   Medication Sig Dispense Refill    albuterol (PROVENTIL) (2.5 MG/3ML) 0.083% nebulizer solution Take 3 mLs by nebulization every 6 hours as needed for Wheezing 120 each 3    metoprolol succinate (TOPROL XL) 50 MG extended release tablet Take 1 tablet by mouth daily

## 2023-11-13 DIAGNOSIS — G57.92 NEUROPATHY OF LEFT LOWER EXTREMITY: ICD-10-CM

## 2023-11-13 DIAGNOSIS — J44.9 MODERATE COPD (CHRONIC OBSTRUCTIVE PULMONARY DISEASE) (HCC): ICD-10-CM

## 2023-11-13 RX ORDER — GABAPENTIN 300 MG/1
300 CAPSULE ORAL 3 TIMES DAILY
Qty: 90 CAPSULE | Refills: 0 | Status: SHIPPED | OUTPATIENT
Start: 2023-11-13 | End: 2023-12-13

## 2023-11-13 RX ORDER — LOSARTAN POTASSIUM 100 MG/1
100 TABLET ORAL DAILY
Qty: 90 TABLET | Refills: 0 | Status: SHIPPED | OUTPATIENT
Start: 2023-11-13

## 2023-11-13 NOTE — TELEPHONE ENCOUNTER
Dr. Padilla Velazquez patient    Refill Request     Last Seen: Last Seen Department: 11/1/2023  Last Seen by PCP: 11/1/2023    Last Written: gabapentin - 04/10/2023, 90 capsules, 2 refills      Next Appointment:   Future Appointments   Date Time Provider [address] [place] [address]   12/1/2023  2:00 PM Leena Millard DO [address]           Requested Prescriptions     Pending Prescriptions Disp Refills    gabapentin (NEURONTIN) 300 MG capsule [Pharmacy Med Name: GABAPENTIN 300 MG CAPSULE 300 Capsule] 90 capsule 2     Sig: Take 1 capsule by mouth 3 times daily for 30 days.

## 2023-11-13 NOTE — TELEPHONE ENCOUNTER
Alisha Carcamo patient    Refill Request     Last Seen: Last Seen Department: 11/1/2023  Last Seen by PCP: 11/1/2023    Last Written: losartan - 08/10/2023, 90 tablets, 0 refills      Next Appointment:   Future Appointments   Date Time Provider [address] [place] [address]   12/1/2023  2:00 PM DO daisy Antony           Requested Prescriptions     Pending Prescriptions Disp Refills    losartan (COZAAR) 100 MG tablet [Pharmacy Med Name: LOSARTAN 100 MG  Tablet] 90 tablet 0     Sig: TAKE 1 TABLET BY MOUTH DAILY

## 2023-11-14 RX ORDER — ALBUTEROL SULFATE 90 UG/1
AEROSOL, METERED RESPIRATORY (INHALATION)
Qty: 6.7 G | Refills: 5 | Status: SHIPPED | OUTPATIENT
Start: 2023-11-14

## 2023-12-01 ENCOUNTER — OFFICE VISIT (OUTPATIENT)
Dept: FAMILY MEDICINE CLINIC | Age: 63
End: 2023-12-01
Payer: MEDICARE

## 2023-12-01 VITALS
HEIGHT: 63 IN | WEIGHT: 89 LBS | RESPIRATION RATE: 16 BRPM | DIASTOLIC BLOOD PRESSURE: 86 MMHG | HEART RATE: 69 BPM | BODY MASS INDEX: 15.77 KG/M2 | SYSTOLIC BLOOD PRESSURE: 130 MMHG | OXYGEN SATURATION: 90 %

## 2023-12-01 DIAGNOSIS — J44.1 COPD WITH ACUTE EXACERBATION (HCC): ICD-10-CM

## 2023-12-01 DIAGNOSIS — L40.9 PSORIASIS: Primary | ICD-10-CM

## 2023-12-01 DIAGNOSIS — Q89.01 ASPLENIA: ICD-10-CM

## 2023-12-01 DIAGNOSIS — F33.1 MODERATE EPISODE OF RECURRENT MAJOR DEPRESSIVE DISORDER (HCC): ICD-10-CM

## 2023-12-01 PROCEDURE — G8419 CALC BMI OUT NRM PARAM NOF/U: HCPCS | Performed by: STUDENT IN AN ORGANIZED HEALTH CARE EDUCATION/TRAINING PROGRAM

## 2023-12-01 PROCEDURE — 1036F TOBACCO NON-USER: CPT | Performed by: STUDENT IN AN ORGANIZED HEALTH CARE EDUCATION/TRAINING PROGRAM

## 2023-12-01 PROCEDURE — G8427 DOCREV CUR MEDS BY ELIG CLIN: HCPCS | Performed by: STUDENT IN AN ORGANIZED HEALTH CARE EDUCATION/TRAINING PROGRAM

## 2023-12-01 PROCEDURE — 3017F COLORECTAL CA SCREEN DOC REV: CPT | Performed by: STUDENT IN AN ORGANIZED HEALTH CARE EDUCATION/TRAINING PROGRAM

## 2023-12-01 PROCEDURE — 3079F DIAST BP 80-89 MM HG: CPT | Performed by: STUDENT IN AN ORGANIZED HEALTH CARE EDUCATION/TRAINING PROGRAM

## 2023-12-01 PROCEDURE — 99214 OFFICE O/P EST MOD 30 MIN: CPT | Performed by: STUDENT IN AN ORGANIZED HEALTH CARE EDUCATION/TRAINING PROGRAM

## 2023-12-01 PROCEDURE — G8482 FLU IMMUNIZE ORDER/ADMIN: HCPCS | Performed by: STUDENT IN AN ORGANIZED HEALTH CARE EDUCATION/TRAINING PROGRAM

## 2023-12-01 PROCEDURE — 3075F SYST BP GE 130 - 139MM HG: CPT | Performed by: STUDENT IN AN ORGANIZED HEALTH CARE EDUCATION/TRAINING PROGRAM

## 2023-12-01 PROCEDURE — 90750 HZV VACC RECOMBINANT IM: CPT | Performed by: STUDENT IN AN ORGANIZED HEALTH CARE EDUCATION/TRAINING PROGRAM

## 2023-12-01 PROCEDURE — 90734 MENACWYD/MENACWYCRM VACC IM: CPT | Performed by: STUDENT IN AN ORGANIZED HEALTH CARE EDUCATION/TRAINING PROGRAM

## 2023-12-01 PROCEDURE — 90472 IMMUNIZATION ADMIN EACH ADD: CPT | Performed by: STUDENT IN AN ORGANIZED HEALTH CARE EDUCATION/TRAINING PROGRAM

## 2023-12-01 PROCEDURE — 3023F SPIROM DOC REV: CPT | Performed by: STUDENT IN AN ORGANIZED HEALTH CARE EDUCATION/TRAINING PROGRAM

## 2023-12-01 PROCEDURE — 90471 IMMUNIZATION ADMIN: CPT | Performed by: STUDENT IN AN ORGANIZED HEALTH CARE EDUCATION/TRAINING PROGRAM

## 2023-12-01 RX ORDER — AZITHROMYCIN 250 MG/1
250 TABLET, FILM COATED ORAL SEE ADMIN INSTRUCTIONS
Qty: 6 TABLET | Refills: 0 | Status: SHIPPED | OUTPATIENT
Start: 2023-12-01 | End: 2023-12-06

## 2023-12-01 RX ORDER — FLUTICASONE FUROATE, UMECLIDINIUM BROMIDE AND VILANTEROL TRIFENATATE 200; 62.5; 25 UG/1; UG/1; UG/1
1 POWDER RESPIRATORY (INHALATION) DAILY
Qty: 1 EACH | Refills: 5 | Status: SHIPPED | OUTPATIENT
Start: 2023-12-01

## 2023-12-01 RX ORDER — PREDNISONE 10 MG/1
TABLET ORAL
Qty: 30 TABLET | Refills: 0 | Status: SHIPPED | OUTPATIENT
Start: 2023-12-01 | End: 2023-12-13

## 2023-12-01 ASSESSMENT — ENCOUNTER SYMPTOMS
RHINORRHEA: 0
COUGH: 1
SHORTNESS OF BREATH: 1
BLOOD IN STOOL: 0

## 2023-12-01 NOTE — PROGRESS NOTES
[address] [place] [address]  2023    Alison Burns (:  1960) is a 59 y.o. female, here for evaluation of the following medical concerns:    Chief Complaint   Patient presents with    COPD     Pt is here for a 1 month follow up     Referral - General     Would like a referral to dermatology         ASSESSMENT/ PLAN  1. Psoriasis  -Prednisone taper, referral to dermatology. - Aftab Collins MD, Dermatology, [facility]    2. COPD with acute exacerbation (HCC)  -Azithromycin and prednisone. Follow-up for new or worsening symptoms or side effects. 3. Asplenia  -Update quadrivalent meningococcal vaccine    4. Moderate episode of recurrent major depressive disorder (HCC)  -Continue Abilify, duloxetine, and buspirone. It is currently tolerable. She denies any SI or HI. No follow-ups on file. HPI  Patient is here for follow-up on psoriasis. She has had increased rash on her bilateral lower extremities. It resolved for many years but she was also on treatment for psoriasis and Crohn's until the last 3 to 5 years. Lesions did resolve last time she was on a prednisone taper but have returned and her worse than normal.  Her breathing is also worse today than yesterday. She thinks it is likely due to the cold air. She has been using her nebulizer every 2 hours and her cough is productive of sputum that is thicker than normal and darker than normal.  She states that her mood is currently manageable. She denies any suicidal ideation or homicidal ideation. She did not have a good Thanksgiving. She went with her daughter to her daughter's in-laws house and she states nobody really spoke to her. She sat in a room alone watching football. ROS  Review of Systems   Constitutional:  Negative for chills and fever. HENT:  Negative for rhinorrhea. Respiratory:  Positive for cough and shortness of breath. Cardiovascular:  Negative for chest pain.    Gastrointestinal:

## 2024-01-03 RX ORDER — METOPROLOL SUCCINATE 50 MG/1
50 TABLET, EXTENDED RELEASE ORAL DAILY
Qty: 90 TABLET | Refills: 0 | Status: SHIPPED | OUTPATIENT
Start: 2024-01-03

## 2024-01-03 NOTE — TELEPHONE ENCOUNTER
Refill Request         Last Seen: Last Seen Department: 12/1/2023  Last Seen by PCP: 12/1/2023    Last Written: 09/29/2023      Next Appointment:   Future Appointments   Date Time Provider Department Center   1/5/2024  2:00 PM London Mcrae DO [facility] [place]             Requested Prescriptions     Pending Prescriptions Disp Refills    metoprolol succinate (TOPROL XL) 50 MG extended release tablet [Pharmacy Med Name: METOPROLOL SUCC ER 50MG TAB 50 Tablet] 90 tablet 0     Sig: TAKE 1 TABLET BY MOUTH DAILY

## 2024-01-15 DIAGNOSIS — G57.92 NEUROPATHY OF LEFT LOWER EXTREMITY: ICD-10-CM

## 2024-01-15 RX ORDER — GABAPENTIN 300 MG/1
300 CAPSULE ORAL 3 TIMES DAILY
Qty: 90 CAPSULE | Refills: 0 | Status: SHIPPED | OUTPATIENT
Start: 2024-01-15 | End: 2024-02-14

## 2024-01-15 NOTE — TELEPHONE ENCOUNTER
Refill Request          Last Seen: Last Seen Department: 12/1/2023  Last Seen by PCP: 8/30/2023    Last Written: 11/13/2023      Next Appointment:   Future Appointments   Date Time Provider Department Center   1/24/2024  2:00 PM London Mcrae DO [facility] [facility]           Requested Prescriptions     Pending Prescriptions Disp Refills    gabapentin (NEURONTIN) 300 MG capsule [Pharmacy Med Name: GABAPENTIN 300 MG CAPSULE 300 Capsule] 90 capsule 0     Sig: Take 1 capsule by mouth 3 times daily for 30 days.

## 2024-01-24 ENCOUNTER — OFFICE VISIT (OUTPATIENT)
Dept: FAMILY MEDICINE CLINIC | Age: 64
End: 2024-01-24
Payer: MEDICARE

## 2024-01-24 VITALS
HEART RATE: 64 BPM | BODY MASS INDEX: 17.08 KG/M2 | SYSTOLIC BLOOD PRESSURE: 106 MMHG | HEIGHT: 63 IN | WEIGHT: 96.4 LBS | DIASTOLIC BLOOD PRESSURE: 58 MMHG

## 2024-01-24 DIAGNOSIS — L40.9 PSORIASIS: ICD-10-CM

## 2024-01-24 DIAGNOSIS — Q89.01 ASPLENIA: ICD-10-CM

## 2024-01-24 DIAGNOSIS — E03.9 ACQUIRED HYPOTHYROIDISM: ICD-10-CM

## 2024-01-24 DIAGNOSIS — F33.1 MODERATE EPISODE OF RECURRENT MAJOR DEPRESSIVE DISORDER (HCC): ICD-10-CM

## 2024-01-24 DIAGNOSIS — E03.9 ACQUIRED HYPOTHYROIDISM: Primary | ICD-10-CM

## 2024-01-24 DIAGNOSIS — J43.1 PANLOBULAR EMPHYSEMA (HCC): ICD-10-CM

## 2024-01-24 LAB
BASOPHILS # BLD: 0.1 K/UL (ref 0–0.2)
BASOPHILS NFR BLD: 0.6 %
DEPRECATED RDW RBC AUTO: 14.1 % (ref 12.4–15.4)
EOSINOPHIL # BLD: 0.8 K/UL (ref 0–0.6)
EOSINOPHIL NFR BLD: 7.2 %
HCT VFR BLD AUTO: 41.4 % (ref 36–48)
HGB BLD-MCNC: 13.4 G/DL (ref 12–16)
LYMPHOCYTES # BLD: 3.8 K/UL (ref 1–5.1)
LYMPHOCYTES NFR BLD: 32.6 %
MCH RBC QN AUTO: 31.3 PG (ref 26–34)
MCHC RBC AUTO-ENTMCNC: 32.4 G/DL (ref 31–36)
MCV RBC AUTO: 96.8 FL (ref 80–100)
MONOCYTES # BLD: 1.2 K/UL (ref 0–1.3)
MONOCYTES NFR BLD: 10.5 %
NEUTROPHILS # BLD: 5.7 K/UL (ref 1.7–7.7)
NEUTROPHILS NFR BLD: 49.1 %
PLATELET # BLD AUTO: 416 K/UL (ref 135–450)
PMV BLD AUTO: 9.3 FL (ref 5–10.5)
RBC # BLD AUTO: 4.28 M/UL (ref 4–5.2)
WBC # BLD AUTO: 11.6 K/UL (ref 4–11)

## 2024-01-24 PROCEDURE — G8419 CALC BMI OUT NRM PARAM NOF/U: HCPCS | Performed by: STUDENT IN AN ORGANIZED HEALTH CARE EDUCATION/TRAINING PROGRAM

## 2024-01-24 PROCEDURE — 3023F SPIROM DOC REV: CPT | Performed by: STUDENT IN AN ORGANIZED HEALTH CARE EDUCATION/TRAINING PROGRAM

## 2024-01-24 PROCEDURE — G8427 DOCREV CUR MEDS BY ELIG CLIN: HCPCS | Performed by: STUDENT IN AN ORGANIZED HEALTH CARE EDUCATION/TRAINING PROGRAM

## 2024-01-24 PROCEDURE — 99214 OFFICE O/P EST MOD 30 MIN: CPT | Performed by: STUDENT IN AN ORGANIZED HEALTH CARE EDUCATION/TRAINING PROGRAM

## 2024-01-24 PROCEDURE — 3017F COLORECTAL CA SCREEN DOC REV: CPT | Performed by: STUDENT IN AN ORGANIZED HEALTH CARE EDUCATION/TRAINING PROGRAM

## 2024-01-24 PROCEDURE — 3078F DIAST BP <80 MM HG: CPT | Performed by: STUDENT IN AN ORGANIZED HEALTH CARE EDUCATION/TRAINING PROGRAM

## 2024-01-24 PROCEDURE — G8482 FLU IMMUNIZE ORDER/ADMIN: HCPCS | Performed by: STUDENT IN AN ORGANIZED HEALTH CARE EDUCATION/TRAINING PROGRAM

## 2024-01-24 PROCEDURE — 3074F SYST BP LT 130 MM HG: CPT | Performed by: STUDENT IN AN ORGANIZED HEALTH CARE EDUCATION/TRAINING PROGRAM

## 2024-01-24 PROCEDURE — 1036F TOBACCO NON-USER: CPT | Performed by: STUDENT IN AN ORGANIZED HEALTH CARE EDUCATION/TRAINING PROGRAM

## 2024-01-24 RX ORDER — BUSPIRONE HYDROCHLORIDE 15 MG/1
15 TABLET ORAL 3 TIMES DAILY
Qty: 90 TABLET | Refills: 2 | Status: SHIPPED | OUTPATIENT
Start: 2024-01-24

## 2024-01-24 ASSESSMENT — PATIENT HEALTH QUESTIONNAIRE - PHQ9
SUM OF ALL RESPONSES TO PHQ QUESTIONS 1-9: 9
SUM OF ALL RESPONSES TO PHQ QUESTIONS 1-9: 9
SUM OF ALL RESPONSES TO PHQ9 QUESTIONS 1 & 2: 0
2. FEELING DOWN, DEPRESSED OR HOPELESS: 0
8. MOVING OR SPEAKING SO SLOWLY THAT OTHER PEOPLE COULD HAVE NOTICED. OR THE OPPOSITE, BEING SO FIGETY OR RESTLESS THAT YOU HAVE BEEN MOVING AROUND A LOT MORE THAN USUAL: 0
5. POOR APPETITE OR OVEREATING: 3
9. THOUGHTS THAT YOU WOULD BE BETTER OFF DEAD, OR OF HURTING YOURSELF: 0
3. TROUBLE FALLING OR STAYING ASLEEP: 3
SUM OF ALL RESPONSES TO PHQ QUESTIONS 1-9: 9
10. IF YOU CHECKED OFF ANY PROBLEMS, HOW DIFFICULT HAVE THESE PROBLEMS MADE IT FOR YOU TO DO YOUR WORK, TAKE CARE OF THINGS AT HOME, OR GET ALONG WITH OTHER PEOPLE: 1
SUM OF ALL RESPONSES TO PHQ QUESTIONS 1-9: 9
7. TROUBLE CONCENTRATING ON THINGS, SUCH AS READING THE NEWSPAPER OR WATCHING TELEVISION: 0
1. LITTLE INTEREST OR PLEASURE IN DOING THINGS: 0
6. FEELING BAD ABOUT YOURSELF - OR THAT YOU ARE A FAILURE OR HAVE LET YOURSELF OR YOUR FAMILY DOWN: 0
4. FEELING TIRED OR HAVING LITTLE ENERGY: 3

## 2024-01-24 ASSESSMENT — ENCOUNTER SYMPTOMS
COUGH: 1
SHORTNESS OF BREATH: 0
BLOOD IN STOOL: 0
RHINORRHEA: 0

## 2024-01-24 NOTE — PROGRESS NOTES
Date    Back pain     COPD (chronic obstructive pulmonary disease) (HCC)     Fatigue     Hypertension     Syncope and collapse 03/2019    Thyroid disease     Ulcerative colitis, unspecified, without complications (HCC)      Patient Active Problem List   Diagnosis    Essential hypertension    Chest pain    Stage 4 very severe COPD by GOLD classification (HCC)    Heterozygous alpha 1-antitrypsin deficiency (HCC)    COPD exacerbation (HCC)    Gastroesophageal reflux disease without esophagitis    Anxiety and depression    Neuropathy of left lower extremity    Acquired hypothyroidism    COPD with acute exacerbation (HCC)    Diarrhea    Adrenal nodule (HCC)    Hypercalcemia    NSTEMI (non-ST elevated myocardial infarction) (HCC)    Nonischemic cardiomyopathy (HCC)    Menopause    Other osteoporosis without current pathological fracture    Ulcerative colitis with rectal bleeding (HCC)    Moderate malnutrition (HCC)    Abdominal pain    Left-sided weakness    Dysarthria    Severe malnutrition (HCC)    Acute on chronic respiratory failure with hypoxia and hypercapnia (HCC)    Acute hypoxemic respiratory failure (HCC)    Tracheobronchitis    Leukocytosis    SOB (shortness of breath)    ENRIQUETA (generalized anxiety disorder)    Chronic respiratory failure with hypoxia and hypercapnia (HCC)    Acute pyelonephritis    Anemia    Failure to thrive in adult       PE  Vitals:    01/24/24 1352   BP: (!) 106/58   Site: Left Upper Arm   Position: Sitting   Pulse: 64   Weight: 43.7 kg (96 lb 6.4 oz)   Height: 1.6 m (5' 3\")   PF: 97 L/min     Estimated body mass index is 17.08 kg/m² as calculated from the following:    Height as of this encounter: 1.6 m (5' 3\").    Weight as of this encounter: 43.7 kg (96 lb 6.4 oz).    Physical Exam  Constitutional:       General: She is not in acute distress.     Appearance: Normal appearance.   HENT:      Head: Normocephalic and atraumatic.      Right Ear: External ear normal.      Left Ear: External ear

## 2024-01-25 LAB
ALBUMIN SERPL-MCNC: 4.8 G/DL (ref 3.4–5)
ALBUMIN/GLOB SERPL: 1.5 {RATIO} (ref 1.1–2.2)
ALP SERPL-CCNC: 142 U/L (ref 40–129)
ALT SERPL-CCNC: 19 U/L (ref 10–40)
ANION GAP SERPL CALCULATED.3IONS-SCNC: 11 MMOL/L (ref 3–16)
AST SERPL-CCNC: 27 U/L (ref 15–37)
BILIRUB SERPL-MCNC: 0.3 MG/DL (ref 0–1)
BUN SERPL-MCNC: 10 MG/DL (ref 7–20)
CALCIUM SERPL-MCNC: 10.5 MG/DL (ref 8.3–10.6)
CHLORIDE SERPL-SCNC: 97 MMOL/L (ref 99–110)
CO2 SERPL-SCNC: 30 MMOL/L (ref 21–32)
CREAT SERPL-MCNC: <0.5 MG/DL (ref 0.6–1.2)
GFR SERPLBLD CREATININE-BSD FMLA CKD-EPI: >60 ML/MIN/{1.73_M2}
GLUCOSE SERPL-MCNC: 87 MG/DL (ref 70–99)
POTASSIUM SERPL-SCNC: 4.5 MMOL/L (ref 3.5–5.1)
PROT SERPL-MCNC: 7.9 G/DL (ref 6.4–8.2)
SODIUM SERPL-SCNC: 138 MMOL/L (ref 136–145)
TSH SERPL DL<=0.005 MIU/L-ACNC: 0.82 UIU/ML (ref 0.27–4.2)

## 2024-02-16 RX ORDER — LOSARTAN POTASSIUM 100 MG/1
100 TABLET ORAL DAILY
Qty: 90 TABLET | Refills: 0 | Status: SHIPPED | OUTPATIENT
Start: 2024-02-16

## 2024-02-16 NOTE — TELEPHONE ENCOUNTER
Last Seen: Last Seen Department: 1/24/2024  Last Seen by PCP: 8/30/2023    Last Written: 11/13/2023        Next Appointment:   Future Appointments   Date Time Provider Department Center   2/26/2024  3:00 PM London Mcrae DO [facility] [place] [facility]       Requested Prescriptions     Pending Prescriptions Disp Refills    losartan (COZAAR) 100 MG tablet [Pharmacy Med Name: LOSARTAN 100 MG  Tablet] 90 tablet 0     Sig: TAKE 1 TABLET BY MOUTH DAILY

## 2024-02-21 ENCOUNTER — APPOINTMENT (OUTPATIENT)
Dept: CT IMAGING | Age: 64
End: 2024-02-21
Payer: MEDICARE

## 2024-02-21 ENCOUNTER — APPOINTMENT (OUTPATIENT)
Dept: GENERAL RADIOLOGY | Age: 64
End: 2024-02-21
Payer: MEDICARE

## 2024-02-21 ENCOUNTER — HOSPITAL ENCOUNTER (EMERGENCY)
Age: 64
Discharge: HOME | End: 2024-02-21
Payer: MEDICARE

## 2024-02-21 VITALS
RESPIRATION RATE: 27 BRPM | DIASTOLIC BLOOD PRESSURE: 61 MMHG | TEMPERATURE: 98.4 F | WEIGHT: 92 LBS | OXYGEN SATURATION: 99 % | SYSTOLIC BLOOD PRESSURE: 109 MMHG | HEART RATE: 101 BPM | BODY MASS INDEX: 16.3 KG/M2 | HEIGHT: 63 IN

## 2024-02-21 DIAGNOSIS — J96.22 ACUTE ON CHRONIC RESPIRATORY FAILURE WITH HYPOXIA AND HYPERCAPNIA (HCC): Primary | ICD-10-CM

## 2024-02-21 DIAGNOSIS — J44.1 COPD EXACERBATION (HCC): ICD-10-CM

## 2024-02-21 DIAGNOSIS — J96.21 ACUTE ON CHRONIC RESPIRATORY FAILURE WITH HYPOXIA AND HYPERCAPNIA (HCC): Primary | ICD-10-CM

## 2024-02-21 DIAGNOSIS — R91.1 PULMONARY NODULE: ICD-10-CM

## 2024-02-21 LAB
ALBUMIN SERPL-MCNC: 4.5 G/DL (ref 3.4–5)
ALBUMIN/GLOB SERPL: 1.4 {RATIO} (ref 1.1–2.2)
ALP SERPL-CCNC: 131 U/L (ref 40–129)
ALT SERPL-CCNC: 10 U/L (ref 10–40)
ANION GAP SERPL CALCULATED.3IONS-SCNC: 8 MMOL/L (ref 3–16)
ANISOCYTOSIS BLD QL SMEAR: ABNORMAL
AST SERPL-CCNC: 19 U/L (ref 15–37)
BASOPHILS # BLD: 0 K/UL (ref 0–0.2)
BASOPHILS NFR BLD: 0 %
BILIRUB SERPL-MCNC: 0.4 MG/DL (ref 0–1)
BUN SERPL-MCNC: 13 MG/DL (ref 7–20)
CALCIUM SERPL-MCNC: 11.2 MG/DL (ref 8.3–10.6)
CHLORIDE SERPL-SCNC: 93 MMOL/L (ref 99–110)
CO2 SERPL-SCNC: 36 MMOL/L (ref 21–32)
CREAT SERPL-MCNC: 0.6 MG/DL (ref 0.6–1.2)
DEPRECATED RDW RBC AUTO: 13.7 % (ref 12.4–15.4)
EKG ATRIAL RATE: 104 BPM
EKG DIAGNOSIS: NORMAL
EKG P AXIS: 77 DEGREES
EKG P-R INTERVAL: 122 MS
EKG Q-T INTERVAL: 344 MS
EKG QRS DURATION: 82 MS
EKG QTC CALCULATION (BAZETT): 452 MS
EKG R AXIS: 86 DEGREES
EKG T AXIS: 69 DEGREES
EKG VENTRICULAR RATE: 104 BPM
EOSINOPHIL # BLD: 1.4 K/UL (ref 0–0.6)
EOSINOPHIL NFR BLD: 9 %
FLUAV RNA RESP QL NAA+PROBE: NOT DETECTED
FLUBV RNA RESP QL NAA+PROBE: NOT DETECTED
GFR SERPLBLD CREATININE-BSD FMLA CKD-EPI: >60 ML/MIN/{1.73_M2}
GLUCOSE SERPL-MCNC: 103 MG/DL (ref 70–99)
HCT VFR BLD AUTO: 42.6 % (ref 36–48)
HGB BLD-MCNC: 13.4 G/DL (ref 12–16)
LACTATE BLDV-SCNC: 1.8 MMOL/L (ref 0.4–2)
LYMPHOCYTES # BLD: 3.5 K/UL (ref 1–5.1)
LYMPHOCYTES NFR BLD: 17 %
MACROCYTES BLD QL SMEAR: ABNORMAL
MCH RBC QN AUTO: 30.8 PG (ref 26–34)
MCHC RBC AUTO-ENTMCNC: 31.6 G/DL (ref 31–36)
MCV RBC AUTO: 97.6 FL (ref 80–100)
MICROCYTES BLD QL SMEAR: ABNORMAL
MONOCYTES # BLD: 1.7 K/UL (ref 0–1.3)
MONOCYTES NFR BLD: 11 %
NEUTROPHILS # BLD: 8.6 K/UL (ref 1.7–7.7)
NEUTROPHILS NFR BLD: 55 %
NEUTS BAND NFR BLD MANUAL: 2 % (ref 0–7)
NEUTS VAC BLD QL SMEAR: PRESENT
NT-PROBNP SERPL-MCNC: 124 PG/ML (ref 0–124)
PATH INTERP BLD-IMP: NO
PLATELET # BLD AUTO: 441 K/UL (ref 135–450)
PLATELET BLD QL SMEAR: ADEQUATE
PMV BLD AUTO: 9.3 FL (ref 5–10.5)
POIKILOCYTOSIS BLD QL SMEAR: ABNORMAL
POLYCHROMASIA BLD QL SMEAR: ABNORMAL
POTASSIUM SERPL-SCNC: 3.7 MMOL/L (ref 3.5–5.1)
PROT SERPL-MCNC: 7.7 G/DL (ref 6.4–8.2)
RBC # BLD AUTO: 4.36 M/UL (ref 4–5.2)
REASON FOR REJECTION: NORMAL
REJECTED TEST: NORMAL
SARS-COV-2 RNA RESP QL NAA+PROBE: NOT DETECTED
SLIDE REVIEW: ABNORMAL
SODIUM SERPL-SCNC: 137 MMOL/L (ref 136–145)
SPECIMEN STATUS: NORMAL
SPHEROCYTES BLD QL SMEAR: ABNORMAL
STOMATOCYTES BLD QL SMEAR: ABNORMAL
TARGETS BLD QL SMEAR: ABNORMAL
TROPONIN, HIGH SENSITIVITY: 34 NG/L (ref 0–14)
VARIANT LYMPHS NFR BLD MANUAL: 6 % (ref 0–6)
WBC # BLD AUTO: 15 K/UL (ref 4–11)

## 2024-02-21 PROCEDURE — 96366 THER/PROPH/DIAG IV INF ADDON: CPT

## 2024-02-21 PROCEDURE — 83605 ASSAY OF LACTIC ACID: CPT

## 2024-02-21 PROCEDURE — 87040 BLOOD CULTURE FOR BACTERIA: CPT

## 2024-02-21 PROCEDURE — 6360000002 HC RX W HCPCS: Performed by: PHYSICIAN ASSISTANT

## 2024-02-21 PROCEDURE — 85025 COMPLETE CBC W/AUTO DIFF WBC: CPT

## 2024-02-21 PROCEDURE — 99285 EMERGENCY DEPT VISIT HI MDM: CPT

## 2024-02-21 PROCEDURE — 71045 X-RAY EXAM CHEST 1 VIEW: CPT

## 2024-02-21 PROCEDURE — 96365 THER/PROPH/DIAG IV INF INIT: CPT

## 2024-02-21 PROCEDURE — 6360000004 HC RX CONTRAST MEDICATION: Performed by: PHYSICIAN ASSISTANT

## 2024-02-21 PROCEDURE — 71260 CT THORAX DX C+: CPT

## 2024-02-21 PROCEDURE — 93010 ELECTROCARDIOGRAM REPORT: CPT | Performed by: INTERNAL MEDICINE

## 2024-02-21 PROCEDURE — 94640 AIRWAY INHALATION TREATMENT: CPT

## 2024-02-21 PROCEDURE — 93005 ELECTROCARDIOGRAM TRACING: CPT | Performed by: PHYSICIAN ASSISTANT

## 2024-02-21 PROCEDURE — 84484 ASSAY OF TROPONIN QUANT: CPT

## 2024-02-21 PROCEDURE — 80053 COMPREHEN METABOLIC PANEL: CPT

## 2024-02-21 PROCEDURE — 83880 ASSAY OF NATRIURETIC PEPTIDE: CPT

## 2024-02-21 PROCEDURE — 96375 TX/PRO/DX INJ NEW DRUG ADDON: CPT

## 2024-02-21 PROCEDURE — 87636 SARSCOV2 & INF A&B AMP PRB: CPT

## 2024-02-21 PROCEDURE — 2580000003 HC RX 258: Performed by: PHYSICIAN ASSISTANT

## 2024-02-21 PROCEDURE — 6370000000 HC RX 637 (ALT 250 FOR IP): Performed by: PHYSICIAN ASSISTANT

## 2024-02-21 RX ORDER — IPRATROPIUM BROMIDE AND ALBUTEROL SULFATE 2.5; .5 MG/3ML; MG/3ML
1 SOLUTION RESPIRATORY (INHALATION) ONCE
Status: COMPLETED | OUTPATIENT
Start: 2024-02-21 | End: 2024-02-21

## 2024-02-21 RX ORDER — AMOXICILLIN AND CLAVULANATE POTASSIUM 875; 125 MG/1; MG/1
1 TABLET, FILM COATED ORAL 2 TIMES DAILY
Qty: 20 TABLET | Refills: 0 | Status: SHIPPED | OUTPATIENT
Start: 2024-02-21 | End: 2024-03-02

## 2024-02-21 RX ORDER — 0.9 % SODIUM CHLORIDE 0.9 %
1000 INTRAVENOUS SOLUTION INTRAVENOUS ONCE
Status: COMPLETED | OUTPATIENT
Start: 2024-02-21 | End: 2024-02-21

## 2024-02-21 RX ORDER — PREDNISONE 20 MG/1
TABLET ORAL
Qty: 18 TABLET | Refills: 0 | Status: SHIPPED | OUTPATIENT
Start: 2024-02-21 | End: 2024-03-02

## 2024-02-21 RX ORDER — ALBUTEROL SULFATE 2.5 MG/3ML
2.5 SOLUTION RESPIRATORY (INHALATION) ONCE
Status: COMPLETED | OUTPATIENT
Start: 2024-02-21 | End: 2024-02-21

## 2024-02-21 RX ORDER — AZITHROMYCIN 250 MG/1
TABLET, FILM COATED ORAL
Qty: 6 TABLET | Refills: 0 | Status: SHIPPED | OUTPATIENT
Start: 2024-02-21 | End: 2024-03-02

## 2024-02-21 RX ADMIN — ALBUTEROL SULFATE 2.5 MG: 2.5 SOLUTION RESPIRATORY (INHALATION) at 18:25

## 2024-02-21 RX ADMIN — IOPAMIDOL 75 ML: 755 INJECTION, SOLUTION INTRAVENOUS at 18:11

## 2024-02-21 RX ADMIN — IPRATROPIUM BROMIDE AND ALBUTEROL SULFATE 1 DOSE: 2.5; .5 SOLUTION RESPIRATORY (INHALATION) at 18:25

## 2024-02-21 RX ADMIN — AZITHROMYCIN MONOHYDRATE 500 MG: 500 INJECTION, POWDER, LYOPHILIZED, FOR SOLUTION INTRAVENOUS at 18:51

## 2024-02-21 RX ADMIN — CEFTRIAXONE SODIUM 1000 MG: 1 INJECTION, POWDER, FOR SOLUTION INTRAMUSCULAR; INTRAVENOUS at 17:41

## 2024-02-21 RX ADMIN — SODIUM CHLORIDE 1000 ML: 9 INJECTION, SOLUTION INTRAVENOUS at 17:40

## 2024-02-21 RX ADMIN — WATER 125 MG: 1 INJECTION INTRAMUSCULAR; INTRAVENOUS; SUBCUTANEOUS at 17:41

## 2024-02-21 ASSESSMENT — PAIN SCALES - GENERAL: PAINLEVEL_OUTOF10: 0

## 2024-02-21 ASSESSMENT — PAIN - FUNCTIONAL ASSESSMENT: PAIN_FUNCTIONAL_ASSESSMENT: 0-10

## 2024-02-22 NOTE — ED PROVIDER NOTES
stated    P-R Interval 122 ms    QRS Duration 82 ms    Q-T Interval 344 ms    QTc Calculation (Bazett) 452 ms    P Axis 77 degrees    R Axis 86 degrees    T Axis 69 degrees    Diagnosis       Sinus tachycardiaMinimal voltage criteria for LVH, may be normal variantWhen compared with ECG of 21-AUG-2023 13:07,No significant change was foundConfirmed by NATACHA GIRALDO ([id number]) on 2/21/2024 7:29:49 PM       I estimate there is LOW risk for PULMONARY EMBOLISM, ACUTE CORONARY SYNDROME, OR THORACIC AORTIC DISSECTION, thus I consider the discharge disposition reasonable. Maranda Burns and I have discussed the diagnosis and risks, and we agree with discharging home to follow-up with their primary doctor. We also discussed returning to the Emergency Department immediately if new or worsening symptoms occur. We have discussed the symptoms which are most concerning (e.g., bloody sputum, fever, worsening pain or shortness of breath, vomiting) that necessitate immediate return.     FINAL Impression    1. Acute on chronic respiratory failure with hypoxia and hypercapnia (HCC)    2. COPD exacerbation (HCC)    3. Pulmonary nodule        Blood pressure 109/61, pulse (!) 101, temperature 98.4 °F (36.9 °C), temperature source Oral, resp. rate 27, height 1.6 m (5' 3\"), weight 41.7 kg (92 lb), SpO2 99 %, not currently breastfeeding.     I am the Primary Clinician of Record.    FINAL IMPRESSION      1. Acute on chronic respiratory failure with hypoxia and hypercapnia (HCC)    2. COPD exacerbation (HCC)    3. Pulmonary nodule          DISPOSITION/PLAN     DISPOSITION Decision To Discharge 02/21/2024 08:48:11 PM      PATIENT REFERRED TO:  London Mcrae,   [address] [facility] [address]  [phone]            DISCHARGE MEDICATIONS:  Discharge Medication List as of 2/21/2024  8:55 PM        START taking these medications    Details   amoxicillin-clavulanate (AUGMENTIN) 875-125 MG per tablet Take 1 tablet by mouth 2 times daily for 10

## 2024-02-22 NOTE — DISCHARGE INSTRUCTIONS
Continue to take mucinex and use your nebulizer as prescribed.   Take all steroids and antibiotics until you have completed the entire course.  Please do not hesitate to return for any new or worsening of symptoms.

## 2024-02-25 LAB
BACTERIA BLD CULT ORG #2: NORMAL
BACTERIA BLD CULT: NORMAL

## 2024-02-26 ENCOUNTER — OFFICE VISIT (OUTPATIENT)
Dept: FAMILY MEDICINE CLINIC | Age: 64
End: 2024-02-26
Payer: MEDICARE

## 2024-02-26 VITALS
OXYGEN SATURATION: 94 % | DIASTOLIC BLOOD PRESSURE: 62 MMHG | HEIGHT: 63 IN | SYSTOLIC BLOOD PRESSURE: 110 MMHG | HEART RATE: 82 BPM | BODY MASS INDEX: 17.5 KG/M2 | WEIGHT: 98.8 LBS

## 2024-02-26 DIAGNOSIS — L40.9 PSORIASIS: Primary | ICD-10-CM

## 2024-02-26 DIAGNOSIS — J44.1 COPD WITH ACUTE EXACERBATION (HCC): ICD-10-CM

## 2024-02-26 DIAGNOSIS — R91.1 LUNG NODULE: ICD-10-CM

## 2024-02-26 DIAGNOSIS — R19.7 DIARRHEA, UNSPECIFIED TYPE: ICD-10-CM

## 2024-02-26 PROCEDURE — 3017F COLORECTAL CA SCREEN DOC REV: CPT | Performed by: STUDENT IN AN ORGANIZED HEALTH CARE EDUCATION/TRAINING PROGRAM

## 2024-02-26 PROCEDURE — G8427 DOCREV CUR MEDS BY ELIG CLIN: HCPCS | Performed by: STUDENT IN AN ORGANIZED HEALTH CARE EDUCATION/TRAINING PROGRAM

## 2024-02-26 PROCEDURE — G8482 FLU IMMUNIZE ORDER/ADMIN: HCPCS | Performed by: STUDENT IN AN ORGANIZED HEALTH CARE EDUCATION/TRAINING PROGRAM

## 2024-02-26 PROCEDURE — 3074F SYST BP LT 130 MM HG: CPT | Performed by: STUDENT IN AN ORGANIZED HEALTH CARE EDUCATION/TRAINING PROGRAM

## 2024-02-26 PROCEDURE — 1036F TOBACCO NON-USER: CPT | Performed by: STUDENT IN AN ORGANIZED HEALTH CARE EDUCATION/TRAINING PROGRAM

## 2024-02-26 PROCEDURE — G8419 CALC BMI OUT NRM PARAM NOF/U: HCPCS | Performed by: STUDENT IN AN ORGANIZED HEALTH CARE EDUCATION/TRAINING PROGRAM

## 2024-02-26 PROCEDURE — 99214 OFFICE O/P EST MOD 30 MIN: CPT | Performed by: STUDENT IN AN ORGANIZED HEALTH CARE EDUCATION/TRAINING PROGRAM

## 2024-02-26 PROCEDURE — 3023F SPIROM DOC REV: CPT | Performed by: STUDENT IN AN ORGANIZED HEALTH CARE EDUCATION/TRAINING PROGRAM

## 2024-02-26 PROCEDURE — 3078F DIAST BP <80 MM HG: CPT | Performed by: STUDENT IN AN ORGANIZED HEALTH CARE EDUCATION/TRAINING PROGRAM

## 2024-02-26 SDOH — ECONOMIC STABILITY: INCOME INSECURITY: HOW HARD IS IT FOR YOU TO PAY FOR THE VERY BASICS LIKE FOOD, HOUSING, MEDICAL CARE, AND HEATING?: NOT HARD AT ALL

## 2024-02-26 SDOH — ECONOMIC STABILITY: FOOD INSECURITY: WITHIN THE PAST 12 MONTHS, THE FOOD YOU BOUGHT JUST DIDN'T LAST AND YOU DIDN'T HAVE MONEY TO GET MORE.: NEVER TRUE

## 2024-02-26 SDOH — ECONOMIC STABILITY: FOOD INSECURITY: WITHIN THE PAST 12 MONTHS, YOU WORRIED THAT YOUR FOOD WOULD RUN OUT BEFORE YOU GOT MONEY TO BUY MORE.: NEVER TRUE

## 2024-02-26 ASSESSMENT — ENCOUNTER SYMPTOMS
DIARRHEA: 1
COUGH: 1
RHINORRHEA: 0
NAUSEA: 0
VOMITING: 0
SHORTNESS OF BREATH: 1

## 2024-02-26 NOTE — PROGRESS NOTES
[facility]  2024    Maranda Burns (:  1960) is a 63 y.o. female, here for evaluation of the following medical concerns:    Chief Complaint   Patient presents with    1 Month Follow-Up        ASSESSMENT/ PLAN  1. Psoriasis  -Improving on steroids.  Would like a referral to a different dermatologist for second opinion.  - JULIAN - Paula Galvan, , Dermatology, [facility]    2. COPD with acute exacerbation (HCC)  -Continue Augmentin and prednisone.  She finished her last dose of azithromycin today.  Should continue to work for another few days due to long half-life.  Recommend starting Saccharomyces Boulardii.  Follow-up if diarrhea is not improving.    3. Diarrhea, unspecified type  Having an average of 9 loose stools daily.  Declining stool studies at this time for C. difficile.  Start hospital area and follow-up if symptoms do not improve.  ER for signs of dehydration such as not urinating at least every 12 hours minimum.    4. Lung nodule  -Referral to  pulmonology.  Patient is already established with them.  Has a new left-sided 9 mm lung nodule that was not seen on previous CT in 2023.  If they decide not to do biopsy consider repeat imaging in 3 months.       No follow-ups on file.    HPI  Patient is here for ER follow-up.  Since the ER visit her breathing has improved by approximately 50%.  She is using her DuoNeb 4 times daily.  Cough is still productive of gold colored sputum.  She is wearing oxygen with ambulation.  After her surgery she was no longer needing supplemental oxygen during the day or with ambulation.  She is finished with the azithromycin as of this morning.  She still has Augmentin and prednisone left.  She saw dermatology for psoriasis and her lesions have been improving on her steroid course.  She saw dermatology and was given topical medications to trial.  She would like a referral to a different dermatologist for second opinion to discuss

## 2024-03-03 ENCOUNTER — TELEPHONE (OUTPATIENT)
Dept: CASE MANAGEMENT | Age: 64
End: 2024-03-03

## 2024-03-11 RX ORDER — LEVOTHYROXINE SODIUM 0.03 MG/1
25 TABLET ORAL DAILY
Qty: 90 TABLET | Refills: 1 | Status: SHIPPED | OUTPATIENT
Start: 2024-03-11

## 2024-03-11 RX ORDER — ARIPIPRAZOLE 5 MG/1
5 TABLET ORAL DAILY
Qty: 30 TABLET | Refills: 3 | Status: SHIPPED | OUTPATIENT
Start: 2024-03-11

## 2024-03-11 NOTE — TELEPHONE ENCOUNTER
Refill Request     Last Seen: Last Seen Department: 2/26/2024  Last Seen by PCP: 2/26/2024    Last Written:8/25/23      Next Appointment:   Future Appointments   Date Time Provider Department Center   3/26/2024  2:30 PM London Mcrae DO [facility] [place] [facility]           Requested Prescriptions     Pending Prescriptions Disp Refills    ARIPiprazole (ABILIFY) 5 MG tablet [Pharmacy Med Name: ARIPIPRAZOLE 5 MG TABS 5 Tablet] 30 tablet 3     Sig: Take 1 tablet by mouth daily    levothyroxine (SYNTHROID) 25 MCG tablet [Pharmacy Med Name: LEVOTHYROXINE 25 MCG TAB 25 Tablet] 90 tablet 1     Sig: TAKE 1 TABLET BY MOUTH DAILY

## 2024-03-14 ENCOUNTER — APPOINTMENT (OUTPATIENT)
Dept: GENERAL RADIOLOGY | Age: 64
DRG: 190 | End: 2024-03-14
Payer: MEDICARE

## 2024-03-14 ENCOUNTER — HOSPITAL ENCOUNTER (INPATIENT)
Age: 64
LOS: 3 days | Discharge: HOME | DRG: 190 | End: 2024-03-17
Attending: STUDENT IN AN ORGANIZED HEALTH CARE EDUCATION/TRAINING PROGRAM | Admitting: INTERNAL MEDICINE
Payer: MEDICARE

## 2024-03-14 DIAGNOSIS — J44.9 MODERATE COPD (CHRONIC OBSTRUCTIVE PULMONARY DISEASE) (HCC): ICD-10-CM

## 2024-03-14 DIAGNOSIS — J44.1 COPD EXACERBATION (HCC): Primary | ICD-10-CM

## 2024-03-14 LAB
ALBUMIN SERPL-MCNC: 4.1 G/DL (ref 3.4–5)
ALBUMIN/GLOB SERPL: 1.2 {RATIO} (ref 1.1–2.2)
ALP SERPL-CCNC: 122 U/L (ref 40–129)
ALT SERPL-CCNC: 12 U/L (ref 10–40)
ANION GAP SERPL CALCULATED.3IONS-SCNC: 12 MMOL/L (ref 3–16)
AST SERPL-CCNC: 26 U/L (ref 15–37)
BACTERIA URNS QL MICRO: ABNORMAL /HPF
BASE EXCESS BLDV CALC-SCNC: -0.5 MMOL/L (ref -3–3)
BASE EXCESS BLDV CALC-SCNC: 3.2 MMOL/L (ref -3–3)
BASOPHILS # BLD: 0.1 K/UL (ref 0–0.2)
BASOPHILS NFR BLD: 0.9 %
BILIRUB SERPL-MCNC: 0.3 MG/DL (ref 0–1)
BILIRUB UR QL STRIP.AUTO: NEGATIVE
BUN SERPL-MCNC: 19 MG/DL (ref 7–20)
CALCIUM SERPL-MCNC: 10.5 MG/DL (ref 8.3–10.6)
CHLORIDE SERPL-SCNC: 95 MMOL/L (ref 99–110)
CLARITY UR: CLEAR
CO2 BLDV-SCNC: 27 MMOL/L
CO2 BLDV-SCNC: 35 MMOL/L
CO2 SERPL-SCNC: 30 MMOL/L (ref 21–32)
COHGB MFR BLDV: 4.9 % (ref 0–1.5)
COHGB MFR BLDV: 6.2 % (ref 0–1.5)
COLOR UR: YELLOW
CREAT SERPL-MCNC: 1 MG/DL (ref 0.6–1.2)
D DIMER: 0.59 UG/ML FEU (ref 0–0.6)
DEPRECATED RDW RBC AUTO: 13.8 % (ref 12.4–15.4)
EOSINOPHIL # BLD: 1.7 K/UL (ref 0–0.6)
EOSINOPHIL NFR BLD: 12.5 %
EPI CELLS #/AREA URNS HPF: ABNORMAL /HPF (ref 0–5)
FLUAV RNA RESP QL NAA+PROBE: NOT DETECTED
FLUBV RNA RESP QL NAA+PROBE: NOT DETECTED
GFR SERPLBLD CREATININE-BSD FMLA CKD-EPI: >60 ML/MIN/{1.73_M2}
GLUCOSE SERPL-MCNC: 110 MG/DL (ref 70–99)
GLUCOSE UR STRIP.AUTO-MCNC: NEGATIVE MG/DL
HCO3 BLDV-SCNC: 25.3 MMOL/L (ref 23–29)
HCO3 BLDV-SCNC: 32.3 MMOL/L (ref 23–29)
HCT VFR BLD AUTO: 43.9 % (ref 36–48)
HGB BLD-MCNC: 13.7 G/DL (ref 12–16)
HGB UR QL STRIP.AUTO: ABNORMAL
HYALINE CASTS #/AREA URNS LPF: ABNORMAL /LPF (ref 0–2)
KETONES UR STRIP.AUTO-MCNC: NEGATIVE MG/DL
LEUKOCYTE ESTERASE UR QL STRIP.AUTO: NEGATIVE
LYMPHOCYTES # BLD: 3.3 K/UL (ref 1–5.1)
LYMPHOCYTES NFR BLD: 24 %
MCH RBC QN AUTO: 30.9 PG (ref 26–34)
MCHC RBC AUTO-ENTMCNC: 31.1 G/DL (ref 31–36)
MCV RBC AUTO: 99.4 FL (ref 80–100)
METHGB MFR BLDV: 0.2 %
METHGB MFR BLDV: 0.3 %
MONOCYTES # BLD: 1.4 K/UL (ref 0–1.3)
MONOCYTES NFR BLD: 10.1 %
MUCOUS THREADS #/AREA URNS LPF: ABNORMAL /LPF
NEUTROPHILS # BLD: 7.1 K/UL (ref 1.7–7.7)
NEUTROPHILS NFR BLD: 52.5 %
NITRITE UR QL STRIP.AUTO: NEGATIVE
NT-PROBNP SERPL-MCNC: 182 PG/ML (ref 0–124)
O2 THERAPY: ABNORMAL
O2 THERAPY: ABNORMAL
PCO2 BLDV: 46.2 MMHG (ref 40–50)
PCO2 BLDV: 70 MMHG (ref 40–50)
PH BLDV: 7.28 [PH] (ref 7.35–7.45)
PH BLDV: 7.36 [PH] (ref 7.35–7.45)
PH UR STRIP.AUTO: 6 [PH] (ref 5–8)
PLATELET # BLD AUTO: 377 K/UL (ref 135–450)
PMV BLD AUTO: 9.6 FL (ref 5–10.5)
PO2 BLDV: 101.4 MMHG (ref 25–40)
PO2 BLDV: 56.4 MMHG (ref 25–40)
POTASSIUM SERPL-SCNC: 4.6 MMOL/L (ref 3.5–5.1)
PROCALCITONIN SERPL IA-MCNC: 0.03 NG/ML (ref 0–0.15)
PROT SERPL-MCNC: 7.5 G/DL (ref 6.4–8.2)
PROT UR STRIP.AUTO-MCNC: 30 MG/DL
RBC # BLD AUTO: 4.42 M/UL (ref 4–5.2)
RBC #/AREA URNS HPF: ABNORMAL /HPF (ref 0–4)
SAO2 % BLDV: 89 %
SAO2 % BLDV: 98 %
SARS-COV-2 RNA RESP QL NAA+PROBE: NOT DETECTED
SODIUM SERPL-SCNC: 137 MMOL/L (ref 136–145)
SP GR UR STRIP.AUTO: >=1.03 (ref 1–1.03)
TROPONIN, HIGH SENSITIVITY: 35 NG/L (ref 0–14)
TROPONIN, HIGH SENSITIVITY: 50 NG/L (ref 0–14)
UA DIPSTICK W REFLEX MICRO PNL UR: YES
URN SPEC COLLECT METH UR: ABNORMAL
UROBILINOGEN UR STRIP-ACNC: 0.2 E.U./DL
WBC # BLD AUTO: 13.6 K/UL (ref 4–11)
WBC #/AREA URNS HPF: ABNORMAL /HPF (ref 0–5)

## 2024-03-14 PROCEDURE — 84145 PROCALCITONIN (PCT): CPT

## 2024-03-14 PROCEDURE — 6370000000 HC RX 637 (ALT 250 FOR IP): Performed by: INTERNAL MEDICINE

## 2024-03-14 PROCEDURE — 81001 URINALYSIS AUTO W/SCOPE: CPT

## 2024-03-14 PROCEDURE — 6360000002 HC RX W HCPCS: Performed by: STUDENT IN AN ORGANIZED HEALTH CARE EDUCATION/TRAINING PROGRAM

## 2024-03-14 PROCEDURE — 83880 ASSAY OF NATRIURETIC PEPTIDE: CPT

## 2024-03-14 PROCEDURE — 85379 FIBRIN DEGRADATION QUANT: CPT

## 2024-03-14 PROCEDURE — 87636 SARSCOV2 & INF A&B AMP PRB: CPT

## 2024-03-14 PROCEDURE — 36415 COLL VENOUS BLD VENIPUNCTURE: CPT

## 2024-03-14 PROCEDURE — 99285 EMERGENCY DEPT VISIT HI MDM: CPT

## 2024-03-14 PROCEDURE — 87077 CULTURE AEROBIC IDENTIFY: CPT

## 2024-03-14 PROCEDURE — 71045 X-RAY EXAM CHEST 1 VIEW: CPT

## 2024-03-14 PROCEDURE — 2580000003 HC RX 258: Performed by: STUDENT IN AN ORGANIZED HEALTH CARE EDUCATION/TRAINING PROGRAM

## 2024-03-14 PROCEDURE — 93005 ELECTROCARDIOGRAM TRACING: CPT | Performed by: STUDENT IN AN ORGANIZED HEALTH CARE EDUCATION/TRAINING PROGRAM

## 2024-03-14 PROCEDURE — 87186 SC STD MICRODIL/AGAR DIL: CPT

## 2024-03-14 PROCEDURE — 84484 ASSAY OF TROPONIN QUANT: CPT

## 2024-03-14 PROCEDURE — 6360000002 HC RX W HCPCS: Performed by: INTERNAL MEDICINE

## 2024-03-14 PROCEDURE — 96374 THER/PROPH/DIAG INJ IV PUSH: CPT

## 2024-03-14 PROCEDURE — 85025 COMPLETE CBC W/AUTO DIFF WBC: CPT

## 2024-03-14 PROCEDURE — 80053 COMPREHEN METABOLIC PANEL: CPT

## 2024-03-14 PROCEDURE — 1200000000 HC SEMI PRIVATE

## 2024-03-14 PROCEDURE — 82103 ALPHA-1-ANTITRYPSIN TOTAL: CPT

## 2024-03-14 PROCEDURE — 87449 NOS EACH ORGANISM AG IA: CPT

## 2024-03-14 PROCEDURE — 6370000000 HC RX 637 (ALT 250 FOR IP): Performed by: STUDENT IN AN ORGANIZED HEALTH CARE EDUCATION/TRAINING PROGRAM

## 2024-03-14 PROCEDURE — 87086 URINE CULTURE/COLONY COUNT: CPT

## 2024-03-14 PROCEDURE — 2580000003 HC RX 258: Performed by: INTERNAL MEDICINE

## 2024-03-14 PROCEDURE — 82803 BLOOD GASES ANY COMBINATION: CPT

## 2024-03-14 RX ORDER — MONTELUKAST SODIUM 10 MG/1
10 TABLET ORAL NIGHTLY
Status: DISCONTINUED | OUTPATIENT
Start: 2024-03-14 | End: 2024-03-17 | Stop reason: HOSPADM

## 2024-03-14 RX ORDER — SENNOSIDES A AND B 8.6 MG/1
1 TABLET, FILM COATED ORAL DAILY PRN
Status: DISCONTINUED | OUTPATIENT
Start: 2024-03-14 | End: 2024-03-17 | Stop reason: HOSPADM

## 2024-03-14 RX ORDER — POTASSIUM CHLORIDE 7.45 MG/ML
10 INJECTION INTRAVENOUS PRN
Status: DISCONTINUED | OUTPATIENT
Start: 2024-03-14 | End: 2024-03-17 | Stop reason: HOSPADM

## 2024-03-14 RX ORDER — DULOXETIN HYDROCHLORIDE 60 MG/1
60 CAPSULE, DELAYED RELEASE ORAL DAILY
Status: DISCONTINUED | OUTPATIENT
Start: 2024-03-15 | End: 2024-03-17 | Stop reason: HOSPADM

## 2024-03-14 RX ORDER — ONDANSETRON 2 MG/ML
4 INJECTION INTRAMUSCULAR; INTRAVENOUS EVERY 6 HOURS PRN
Status: DISCONTINUED | OUTPATIENT
Start: 2024-03-14 | End: 2024-03-17 | Stop reason: HOSPADM

## 2024-03-14 RX ORDER — SACCHAROMYCES BOULARDII 250 MG
250 CAPSULE ORAL DAILY
Status: DISCONTINUED | OUTPATIENT
Start: 2024-03-15 | End: 2024-03-17 | Stop reason: HOSPADM

## 2024-03-14 RX ORDER — ACETYLCYSTEINE 200 MG/ML
600 SOLUTION ORAL; RESPIRATORY (INHALATION)
Status: DISCONTINUED | OUTPATIENT
Start: 2024-03-14 | End: 2024-03-17

## 2024-03-14 RX ORDER — BUSPIRONE HYDROCHLORIDE 15 MG/1
15 TABLET ORAL 3 TIMES DAILY
Status: DISCONTINUED | OUTPATIENT
Start: 2024-03-14 | End: 2024-03-17 | Stop reason: HOSPADM

## 2024-03-14 RX ORDER — ACETAMINOPHEN 325 MG/1
650 TABLET ORAL EVERY 6 HOURS PRN
Status: DISCONTINUED | OUTPATIENT
Start: 2024-03-14 | End: 2024-03-17 | Stop reason: HOSPADM

## 2024-03-14 RX ORDER — METOPROLOL SUCCINATE 50 MG/1
50 TABLET, EXTENDED RELEASE ORAL DAILY
Status: DISCONTINUED | OUTPATIENT
Start: 2024-03-15 | End: 2024-03-17 | Stop reason: HOSPADM

## 2024-03-14 RX ORDER — GUAIFENESIN 600 MG/1
600 TABLET, EXTENDED RELEASE ORAL 2 TIMES DAILY
Status: DISCONTINUED | OUTPATIENT
Start: 2024-03-14 | End: 2024-03-17 | Stop reason: HOSPADM

## 2024-03-14 RX ORDER — SODIUM CHLORIDE 9 MG/ML
INJECTION, SOLUTION INTRAVENOUS PRN
Status: DISCONTINUED | OUTPATIENT
Start: 2024-03-14 | End: 2024-03-17 | Stop reason: HOSPADM

## 2024-03-14 RX ORDER — LEVOTHYROXINE SODIUM 0.03 MG/1
25 TABLET ORAL DAILY
Status: DISCONTINUED | OUTPATIENT
Start: 2024-03-15 | End: 2024-03-17 | Stop reason: HOSPADM

## 2024-03-14 RX ORDER — MAGNESIUM SULFATE IN WATER 40 MG/ML
2000 INJECTION, SOLUTION INTRAVENOUS PRN
Status: DISCONTINUED | OUTPATIENT
Start: 2024-03-14 | End: 2024-03-17 | Stop reason: HOSPADM

## 2024-03-14 RX ORDER — IPRATROPIUM BROMIDE AND ALBUTEROL SULFATE 2.5; .5 MG/3ML; MG/3ML
1 SOLUTION RESPIRATORY (INHALATION)
Status: DISCONTINUED | OUTPATIENT
Start: 2024-03-15 | End: 2024-03-15

## 2024-03-14 RX ORDER — ACETAMINOPHEN 650 MG/1
650 SUPPOSITORY RECTAL EVERY 6 HOURS PRN
Status: DISCONTINUED | OUTPATIENT
Start: 2024-03-14 | End: 2024-03-17 | Stop reason: HOSPADM

## 2024-03-14 RX ORDER — AMLODIPINE BESYLATE 5 MG/1
5 TABLET ORAL DAILY
Status: DISCONTINUED | OUTPATIENT
Start: 2024-03-15 | End: 2024-03-17 | Stop reason: HOSPADM

## 2024-03-14 RX ORDER — SODIUM CHLORIDE 0.9 % (FLUSH) 0.9 %
10 SYRINGE (ML) INJECTION PRN
Status: DISCONTINUED | OUTPATIENT
Start: 2024-03-14 | End: 2024-03-17 | Stop reason: HOSPADM

## 2024-03-14 RX ORDER — GUAIFENESIN/DEXTROMETHORPHAN 100-10MG/5
10 SYRUP ORAL EVERY 4 HOURS PRN
Status: DISCONTINUED | OUTPATIENT
Start: 2024-03-14 | End: 2024-03-17 | Stop reason: HOSPADM

## 2024-03-14 RX ORDER — IPRATROPIUM BROMIDE AND ALBUTEROL SULFATE 2.5; .5 MG/3ML; MG/3ML
1 SOLUTION RESPIRATORY (INHALATION) ONCE
Status: COMPLETED | OUTPATIENT
Start: 2024-03-14 | End: 2024-03-14

## 2024-03-14 RX ORDER — ONDANSETRON 4 MG/1
4 TABLET, ORALLY DISINTEGRATING ORAL EVERY 8 HOURS PRN
Status: DISCONTINUED | OUTPATIENT
Start: 2024-03-14 | End: 2024-03-17 | Stop reason: HOSPADM

## 2024-03-14 RX ORDER — POTASSIUM CHLORIDE 20 MEQ/1
40 TABLET, EXTENDED RELEASE ORAL PRN
Status: DISCONTINUED | OUTPATIENT
Start: 2024-03-14 | End: 2024-03-17 | Stop reason: HOSPADM

## 2024-03-14 RX ORDER — LEVOFLOXACIN 5 MG/ML
750 INJECTION, SOLUTION INTRAVENOUS EVERY 24 HOURS
Status: DISCONTINUED | OUTPATIENT
Start: 2024-03-14 | End: 2024-03-17 | Stop reason: HOSPADM

## 2024-03-14 RX ORDER — SODIUM CHLORIDE 0.9 % (FLUSH) 0.9 %
10 SYRINGE (ML) INJECTION EVERY 12 HOURS SCHEDULED
Status: DISCONTINUED | OUTPATIENT
Start: 2024-03-14 | End: 2024-03-17 | Stop reason: HOSPADM

## 2024-03-14 RX ORDER — SODIUM CHLORIDE 9 MG/ML
INJECTION, SOLUTION INTRAVENOUS CONTINUOUS
Status: DISCONTINUED | OUTPATIENT
Start: 2024-03-14 | End: 2024-03-15

## 2024-03-14 RX ORDER — ARIPIPRAZOLE 10 MG/1
5 TABLET ORAL DAILY
Status: DISCONTINUED | OUTPATIENT
Start: 2024-03-15 | End: 2024-03-17 | Stop reason: HOSPADM

## 2024-03-14 RX ORDER — LOSARTAN POTASSIUM 100 MG/1
100 TABLET ORAL DAILY
Status: DISCONTINUED | OUTPATIENT
Start: 2024-03-15 | End: 2024-03-17 | Stop reason: HOSPADM

## 2024-03-14 RX ADMIN — WATER 125 MG: 1 INJECTION INTRAMUSCULAR; INTRAVENOUS; SUBCUTANEOUS at 17:07

## 2024-03-14 RX ADMIN — SODIUM CHLORIDE, PRESERVATIVE FREE 10 ML: 5 INJECTION INTRAVENOUS at 23:29

## 2024-03-14 RX ADMIN — ACETAMINOPHEN 650 MG: 325 TABLET ORAL at 23:28

## 2024-03-14 RX ADMIN — SODIUM CHLORIDE: 9 INJECTION, SOLUTION INTRAVENOUS at 23:27

## 2024-03-14 RX ADMIN — MONTELUKAST 10 MG: 10 TABLET, FILM COATED ORAL at 23:28

## 2024-03-14 RX ADMIN — GUAIFENESIN 600 MG: 600 TABLET, EXTENDED RELEASE ORAL at 21:06

## 2024-03-14 RX ADMIN — BUSPIRONE HYDROCHLORIDE 15 MG: 15 TABLET ORAL at 23:29

## 2024-03-14 RX ADMIN — IPRATROPIUM BROMIDE AND ALBUTEROL SULFATE 1 DOSE: 2.5; .5 SOLUTION RESPIRATORY (INHALATION) at 17:07

## 2024-03-14 RX ADMIN — LEVOFLOXACIN 750 MG: 5 INJECTION, SOLUTION INTRAVENOUS at 21:08

## 2024-03-14 ASSESSMENT — PAIN DESCRIPTION - ONSET: ONSET: ON-GOING

## 2024-03-14 ASSESSMENT — PAIN DESCRIPTION - ORIENTATION
ORIENTATION: MID
ORIENTATION: MID

## 2024-03-14 ASSESSMENT — PAIN DESCRIPTION - DESCRIPTORS
DESCRIPTORS: ACHING
DESCRIPTORS: SQUEEZING;TIGHTNESS

## 2024-03-14 ASSESSMENT — PAIN SCALES - GENERAL
PAINLEVEL_OUTOF10: 6
PAINLEVEL_OUTOF10: 5

## 2024-03-14 ASSESSMENT — PAIN DESCRIPTION - FREQUENCY: FREQUENCY: CONTINUOUS

## 2024-03-14 ASSESSMENT — PAIN - FUNCTIONAL ASSESSMENT: PAIN_FUNCTIONAL_ASSESSMENT: 0-10

## 2024-03-14 ASSESSMENT — PAIN DESCRIPTION - LOCATION
LOCATION: CHEST
LOCATION: CHEST

## 2024-03-14 NOTE — ED PROVIDER NOTES
10 mL (has no administration in time range)   ipratropium 0.5 mg-albuterol 2.5 mg (DUONEB) nebulizer solution 1 Dose (has no administration in time range)   levoFLOXacin (LEVAQUIN) 750 MG/150ML infusion 750 mg (has no administration in time range)   acetylcysteine (MUCOMYST) 20 % solution 600 mg (has no administration in time range)   ipratropium 0.5 mg-albuterol 2.5 mg (DUONEB) nebulizer solution 1 Dose (1 Dose Inhalation Given 3/14/24 1707)   methylPREDNISolone sodium succ (SOLU-MEDROL) 125 mg in sterile water 2 mL injection (125 mg IntraVENous Given 3/14/24 1707)        CONSULTS:   IP CONSULT TO PULMONOLOGY   Discussion with Other Professionals: None   {Social Determinants: None   Chronic Conditions:  COPD    Records Reviewed: ED visit from 2/21/2024      Disposition Considerations:    I am the Primary Clinician of Record.        FINAL IMPRESSION    1. COPD exacerbation (HCC)           DISPOSITION/PLAN:     Pt admitted to hospital for further workup.    PATIENT REFERRED TO:   No follow-up provider specified.     DISCHARGE MEDICATIONS:   New Prescriptions    No medications on file        DISCONTINUED MEDICATIONS:   Discontinued Medications    No medications on file              (Please note that portions of this note were completed with a voice recognition program.  Efforts were made to edit the dictations but occasionally words are mis-transcribed.)       Monica Hartley MD (electronically signed)             Monica Hartley MD  03/14/24 [id number]

## 2024-03-14 NOTE — H&P
ASSESSMENT / PLAN:     COPD with acute exacerbation  Chronic respiratory failure with O2 dependence  Anxiety / depression   Hypothyroid  Hypertension    Considerable coughing, wheezing without improvement following course of abx/steroids as OP.  Possibly viral.     - check procalcitonin, strep pneumo, legionella, d-dimer  - respi cx   - empiric IV levaquin  - gentle IVF overnight  - solumedrol 40mg q12h  - duonebs / mucomyst nebs   - mucinex 600mg BID  - incentive spirometry / chest physiotherapy  - titrate O2 to keep sats 88-92%  - pulmonology consulted    Dispo: Admit to Emergency med/surg.  Expected LOS greater than 2 midnights.  PPx:  lovenox  PT/OT:  Not indicated   Code status:  Full       Henry Escamilla MD  Hospitalist           
12-Mar-2022 18:20

## 2024-03-15 LAB
ALBUMIN SERPL-MCNC: 4 G/DL (ref 3.4–5)
ALP SERPL-CCNC: 102 U/L (ref 40–129)
ALT SERPL-CCNC: 9 U/L (ref 10–40)
ANION GAP SERPL CALCULATED.3IONS-SCNC: 8 MMOL/L (ref 3–16)
AST SERPL-CCNC: 17 U/L (ref 15–37)
BASOPHILS # BLD: 0.1 K/UL (ref 0–0.2)
BASOPHILS NFR BLD: 1.4 %
BILIRUB DIRECT SERPL-MCNC: <0.2 MG/DL (ref 0–0.3)
BILIRUB INDIRECT SERPL-MCNC: ABNORMAL MG/DL (ref 0–1)
BILIRUB SERPL-MCNC: <0.2 MG/DL (ref 0–1)
BUN SERPL-MCNC: 21 MG/DL (ref 7–20)
CALCIUM SERPL-MCNC: 10.1 MG/DL (ref 8.3–10.6)
CHLORIDE SERPL-SCNC: 97 MMOL/L (ref 99–110)
CO2 SERPL-SCNC: 32 MMOL/L (ref 21–32)
CREAT SERPL-MCNC: 0.7 MG/DL (ref 0.6–1.2)
DEPRECATED RDW RBC AUTO: 13.8 % (ref 12.4–15.4)
EKG ATRIAL RATE: 76 BPM
EKG DIAGNOSIS: NORMAL
EKG P AXIS: 89 DEGREES
EKG P-R INTERVAL: 156 MS
EKG Q-T INTERVAL: 384 MS
EKG QRS DURATION: 88 MS
EKG QTC CALCULATION (BAZETT): 432 MS
EKG R AXIS: 80 DEGREES
EKG T AXIS: 82 DEGREES
EKG VENTRICULAR RATE: 76 BPM
EOSINOPHIL # BLD: 0 K/UL (ref 0–0.6)
EOSINOPHIL NFR BLD: 0 %
GFR SERPLBLD CREATININE-BSD FMLA CKD-EPI: >60 ML/MIN/{1.73_M2}
GLUCOSE SERPL-MCNC: 102 MG/DL (ref 70–99)
HCT VFR BLD AUTO: 41.9 % (ref 36–48)
HGB BLD-MCNC: 13.4 G/DL (ref 12–16)
INR PPP: 1.06 (ref 0.84–1.16)
LYMPHOCYTES # BLD: 1.4 K/UL (ref 1–5.1)
LYMPHOCYTES NFR BLD: 19.6 %
MCH RBC QN AUTO: 31.6 PG (ref 26–34)
MCHC RBC AUTO-ENTMCNC: 31.9 G/DL (ref 31–36)
MCV RBC AUTO: 98.9 FL (ref 80–100)
MONOCYTES # BLD: 0.6 K/UL (ref 0–1.3)
MONOCYTES NFR BLD: 9 %
NEUTROPHILS # BLD: 5.1 K/UL (ref 1.7–7.7)
NEUTROPHILS NFR BLD: 70 %
PLATELET # BLD AUTO: 367 K/UL (ref 135–450)
PMV BLD AUTO: 9 FL (ref 5–10.5)
POTASSIUM SERPL-SCNC: 5.1 MMOL/L (ref 3.5–5.1)
PROT SERPL-MCNC: 7 G/DL (ref 6.4–8.2)
PROTHROMBIN TIME: 13.8 SEC (ref 11.5–14.8)
RBC # BLD AUTO: 4.24 M/UL (ref 4–5.2)
SODIUM SERPL-SCNC: 137 MMOL/L (ref 136–145)
WBC # BLD AUTO: 7.2 K/UL (ref 4–11)

## 2024-03-15 PROCEDURE — 2580000003 HC RX 258: Performed by: INTERNAL MEDICINE

## 2024-03-15 PROCEDURE — 6360000002 HC RX W HCPCS: Performed by: INTERNAL MEDICINE

## 2024-03-15 PROCEDURE — 6370000000 HC RX 637 (ALT 250 FOR IP): Performed by: INTERNAL MEDICINE

## 2024-03-15 PROCEDURE — 80076 HEPATIC FUNCTION PANEL: CPT

## 2024-03-15 PROCEDURE — 97116 GAIT TRAINING THERAPY: CPT

## 2024-03-15 PROCEDURE — 85025 COMPLETE CBC W/AUTO DIFF WBC: CPT

## 2024-03-15 PROCEDURE — 87205 SMEAR GRAM STAIN: CPT

## 2024-03-15 PROCEDURE — 97535 SELF CARE MNGMENT TRAINING: CPT

## 2024-03-15 PROCEDURE — 85610 PROTHROMBIN TIME: CPT

## 2024-03-15 PROCEDURE — 99223 1ST HOSP IP/OBS HIGH 75: CPT | Performed by: INTERNAL MEDICINE

## 2024-03-15 PROCEDURE — 94761 N-INVAS EAR/PLS OXIMETRY MLT: CPT

## 2024-03-15 PROCEDURE — 36415 COLL VENOUS BLD VENIPUNCTURE: CPT

## 2024-03-15 PROCEDURE — 97530 THERAPEUTIC ACTIVITIES: CPT

## 2024-03-15 PROCEDURE — 93010 ELECTROCARDIOGRAM REPORT: CPT | Performed by: INTERNAL MEDICINE

## 2024-03-15 PROCEDURE — 80048 BASIC METABOLIC PNL TOTAL CA: CPT

## 2024-03-15 PROCEDURE — 2700000000 HC OXYGEN THERAPY PER DAY

## 2024-03-15 PROCEDURE — 87070 CULTURE OTHR SPECIMN AEROBIC: CPT

## 2024-03-15 PROCEDURE — 94640 AIRWAY INHALATION TREATMENT: CPT

## 2024-03-15 PROCEDURE — 97166 OT EVAL MOD COMPLEX 45 MIN: CPT

## 2024-03-15 PROCEDURE — 1200000000 HC SEMI PRIVATE

## 2024-03-15 PROCEDURE — 97162 PT EVAL MOD COMPLEX 30 MIN: CPT

## 2024-03-15 PROCEDURE — 94669 MECHANICAL CHEST WALL OSCILL: CPT

## 2024-03-15 RX ORDER — MIDODRINE HYDROCHLORIDE 5 MG/1
5 TABLET ORAL ONCE
Status: COMPLETED | OUTPATIENT
Start: 2024-03-15 | End: 2024-03-15

## 2024-03-15 RX ORDER — 0.9 % SODIUM CHLORIDE 0.9 %
500 INTRAVENOUS SOLUTION INTRAVENOUS ONCE
Status: COMPLETED | OUTPATIENT
Start: 2024-03-15 | End: 2024-03-15

## 2024-03-15 RX ORDER — ENOXAPARIN SODIUM 100 MG/ML
30 INJECTION SUBCUTANEOUS DAILY
Status: DISCONTINUED | OUTPATIENT
Start: 2024-03-15 | End: 2024-03-17 | Stop reason: HOSPADM

## 2024-03-15 RX ADMIN — MONTELUKAST 10 MG: 10 TABLET, FILM COATED ORAL at 21:17

## 2024-03-15 RX ADMIN — GUAIFENESIN 600 MG: 600 TABLET, EXTENDED RELEASE ORAL at 07:55

## 2024-03-15 RX ADMIN — METHYLPREDNISOLONE SODIUM SUCCINATE 40 MG: 40 INJECTION INTRAMUSCULAR; INTRAVENOUS at 11:35

## 2024-03-15 RX ADMIN — ACETAMINOPHEN 650 MG: 325 TABLET ORAL at 21:17

## 2024-03-15 RX ADMIN — GUAIFENESIN 600 MG: 600 TABLET, EXTENDED RELEASE ORAL at 21:17

## 2024-03-15 RX ADMIN — ACETAMINOPHEN 650 MG: 325 TABLET ORAL at 16:25

## 2024-03-15 RX ADMIN — Medication 2 PUFF: at 20:28

## 2024-03-15 RX ADMIN — ACETYLCYSTEINE 600 MG: 200 INHALANT RESPIRATORY (INHALATION) at 20:28

## 2024-03-15 RX ADMIN — BUSPIRONE HYDROCHLORIDE 15 MG: 15 TABLET ORAL at 14:03

## 2024-03-15 RX ADMIN — DULOXETINE HYDROCHLORIDE 60 MG: 60 CAPSULE, DELAYED RELEASE ORAL at 07:55

## 2024-03-15 RX ADMIN — LEVOTHYROXINE SODIUM 25 MCG: 0.03 TABLET ORAL at 05:02

## 2024-03-15 RX ADMIN — Medication 2 PUFF: at 11:50

## 2024-03-15 RX ADMIN — BUSPIRONE HYDROCHLORIDE 15 MG: 15 TABLET ORAL at 21:17

## 2024-03-15 RX ADMIN — MIDODRINE HYDROCHLORIDE 5 MG: 5 TABLET ORAL at 16:25

## 2024-03-15 RX ADMIN — BUSPIRONE HYDROCHLORIDE 15 MG: 15 TABLET ORAL at 07:54

## 2024-03-15 RX ADMIN — ALBUTEROL SULFATE 2.5 MG: 2.5 SOLUTION RESPIRATORY (INHALATION) at 15:56

## 2024-03-15 RX ADMIN — IPRATROPIUM BROMIDE AND ALBUTEROL SULFATE 1 DOSE: 2.5; .5 SOLUTION RESPIRATORY (INHALATION) at 08:29

## 2024-03-15 RX ADMIN — AMLODIPINE BESYLATE 5 MG: 5 TABLET ORAL at 07:53

## 2024-03-15 RX ADMIN — ACETYLCYSTEINE 600 MG: 200 INHALANT RESPIRATORY (INHALATION) at 08:29

## 2024-03-15 RX ADMIN — LEVOFLOXACIN 750 MG: 5 INJECTION, SOLUTION INTRAVENOUS at 21:21

## 2024-03-15 RX ADMIN — Medication 250 MG: at 07:54

## 2024-03-15 RX ADMIN — TIOTROPIUM BROMIDE INHALATION SPRAY 2 PUFF: 3.12 SPRAY, METERED RESPIRATORY (INHALATION) at 11:50

## 2024-03-15 RX ADMIN — ARIPIPRAZOLE 5 MG: 10 TABLET ORAL at 07:53

## 2024-03-15 RX ADMIN — ENOXAPARIN SODIUM 30 MG: 100 INJECTION SUBCUTANEOUS at 07:56

## 2024-03-15 RX ADMIN — SODIUM CHLORIDE 500 ML: 9 INJECTION, SOLUTION INTRAVENOUS at 16:23

## 2024-03-15 RX ADMIN — SODIUM CHLORIDE, PRESERVATIVE FREE 10 ML: 5 INJECTION INTRAVENOUS at 21:20

## 2024-03-15 RX ADMIN — METHYLPREDNISOLONE SODIUM SUCCINATE 40 MG: 40 INJECTION INTRAMUSCULAR; INTRAVENOUS at 21:17

## 2024-03-15 ASSESSMENT — PAIN DESCRIPTION - LOCATION
LOCATION: CHEST

## 2024-03-15 ASSESSMENT — PAIN DESCRIPTION - ORIENTATION
ORIENTATION: MID
ORIENTATION: RIGHT;MID;LEFT

## 2024-03-15 ASSESSMENT — PAIN DESCRIPTION - DESCRIPTORS
DESCRIPTORS: DISCOMFORT;ACHING
DESCRIPTORS: SQUEEZING
DESCRIPTORS: SQUEEZING
DESCRIPTORS: CRUSHING

## 2024-03-15 ASSESSMENT — PAIN SCALES - GENERAL
PAINLEVEL_OUTOF10: 5
PAINLEVEL_OUTOF10: 3
PAINLEVEL_OUTOF10: 4
PAINLEVEL_OUTOF10: 5

## 2024-03-15 NOTE — CONSULTS
Consult Placed   Consult sent through perfect serve   Who: Cathy  Date: 3/15/2024   Time: 7:23     Electronically signed by Myra Obregon on 3/15/2024 at 7:23 AM

## 2024-03-15 NOTE — ED NOTES
[id number]  
Patient ambulated to bathroom and down hallway on baseline 3L nasal cannula. Patient Sp02 dropped to 82% with ambulation. Patient c/o SOB. Patient returned to bed and Sp02 at 98% on 3L nasal cannula.  
Report given to C3 RN at this time.   
Additional Contrast?->1 Reason for Exam: sob x 1 week, COPD Relevant Medical/Surgical History: lung reduction rt side, smoked 30 yrs. Quit 4 yrs ago FINDINGS: Pulmonary Arteries: Pulmonary arteries are adequately opacified for evaluation.  No evidence of intraluminal filling defect to suggest pulmonary embolism.  Main pulmonary artery is normal in caliber. Mediastinum: No axillary or mediastinal adenopathy. Great vessels are normal in position and size.  Atherosclerotic calcifications involving the thoracic aorta.  Possible left ventricular hypertrophy.  Small volume pericardial fluid. Lungs/pleura: Prior right-sided partial pneumonectomy. Prominent centrilobular emphysema. New area of nodularity in the peripheral aspect of the left upper lobe (series 2, image 97) which measures 8.8 mm in transverse. No infiltrate or pneumothorax. Scattered areas of mucous plugging throughout the lungs. Upper Abdomen: Simple appearing cortical cyst involving the left kidney. Nonobstructing 4.4 mm calculus involving the lower pole the left kidney. Contrast opacifying the left and right renal calices.  Spleen is not seen in is likely surgically absent.  Thickening of the left adrenal gland likely related to adrenal hyperplasia.  Stable appearing right adrenal 1.5 cm nodule which is outside of units of approximately 11.  Remainder of the imaged upper abdominal organs are unremarkable in appearance. Soft Tissues/Bones: Spondylosis of the thoracic spine.  No acute osseous abnormality identified.     Negative for findings of a pulmonary embolus. Scattered areas of mucous plugging within the lungs which may be infectious or inflammatory in etiology. Marked centrilobular emphysema.  Prior partial right-sided pneumonectomy. New area of nodularity within the left upper lobe. Additional findings noted above. RECOMMENDATIONS: 9 mm left solid pulmonary nodule within the upper lobe. Per Fleischner Society Guidelines, consider a non-contrast Chest

## 2024-03-15 NOTE — CONSULTS
PULMONARY AND CRITICAL CARE INPATIENT NOTE        Maranda Burns   : 1960  MRN: [id number]     Admitting Physician: Henry Escamilla MD  Attending Physician: Alejandro Blandon MD  PCP: London Mcrae DO    Admission: 3/14/2024   Date of Service: 3/15/2024    Chief Complaint   Patient presents with    Shortness of Breath     COPd, over the last week. 3L baseline. Cough. C/o pain in chest. 6/10 pain           ASSESSMENT & PLAN       63 y.o. pleasant  female patient with:    Hospital Problems             Last Modified POA    * (Principal) COPD with acute exacerbation (HCC) 3/14/2024 Yes        # Chest pain/shortness of breath  # Acute AECOPD.  Failed outpatient treatment.  Low suspicion for PE with negative D-dimer  # Ineffective airway clearance due to diaphragmatic malfunction from hyperinflation/flattening  # End-stage COPD with severe panlobular emphysema  # Heterozygous alpha 1 antitrypsin deficiency  # Chronic hypoxic respiratory failure on 3 L at baseline  # Protein-energy malnutrition.  Possibly from advanced COPD  # Peripheral eosinophilia on presentation  #  9 millimeter left upper lobe nodule on recent CT  # Tobacco abuse history.  More than 25 pack years.  Quit 3 months ago  Continue antimicrobials/Levaquin  Follow-up on micro work-up  Continue systemic steroids  Dulera and Spiriva with as needed nebs  No need for CTA with low probability and negative D-dimer  Congratulated the patient on her smoking cessation  Bronchial hygiene measures/flutter valve/vibrating vest/Mucinex  Will see if the patient can qualify for vibrating vest after discharge under diaphragmatic disorders  Okay to keep Singulair if the patient is not having anxiety/worsening depression or suicidal ideation  Aspiration precautions  Target blood sugar between 140 and 180 mg/dL  Continue to wean oxygen with target 90 to 94%.  DVT ppx measures.  PT/OT when patient is able to participate  Pulmonary rehab referral at discharge if

## 2024-03-16 LAB
BACTERIA UR CULT: ABNORMAL
BACTERIA UR CULT: ABNORMAL
LEGIONELLA AG UR QL: NORMAL
ORGANISM: ABNORMAL
S PNEUM AG UR QL: NORMAL

## 2024-03-16 PROCEDURE — 94761 N-INVAS EAR/PLS OXIMETRY MLT: CPT

## 2024-03-16 PROCEDURE — 2580000003 HC RX 258: Performed by: INTERNAL MEDICINE

## 2024-03-16 PROCEDURE — 94640 AIRWAY INHALATION TREATMENT: CPT

## 2024-03-16 PROCEDURE — 1200000000 HC SEMI PRIVATE

## 2024-03-16 PROCEDURE — 6360000002 HC RX W HCPCS: Performed by: INTERNAL MEDICINE

## 2024-03-16 PROCEDURE — 2700000000 HC OXYGEN THERAPY PER DAY

## 2024-03-16 PROCEDURE — 94669 MECHANICAL CHEST WALL OSCILL: CPT

## 2024-03-16 PROCEDURE — 6370000000 HC RX 637 (ALT 250 FOR IP): Performed by: INTERNAL MEDICINE

## 2024-03-16 PROCEDURE — 99232 SBSQ HOSP IP/OBS MODERATE 35: CPT | Performed by: INTERNAL MEDICINE

## 2024-03-16 RX ORDER — ALBUTEROL SULFATE 2.5 MG/3ML
2.5 SOLUTION RESPIRATORY (INHALATION)
Status: DISCONTINUED | OUTPATIENT
Start: 2024-03-16 | End: 2024-03-17 | Stop reason: HOSPADM

## 2024-03-16 RX ADMIN — Medication 2 PUFF: at 07:57

## 2024-03-16 RX ADMIN — TIOTROPIUM BROMIDE INHALATION SPRAY 2 PUFF: 3.12 SPRAY, METERED RESPIRATORY (INHALATION) at 07:57

## 2024-03-16 RX ADMIN — ALBUTEROL SULFATE 2.5 MG: 2.5 SOLUTION RESPIRATORY (INHALATION) at 07:57

## 2024-03-16 RX ADMIN — BUSPIRONE HYDROCHLORIDE 15 MG: 15 TABLET ORAL at 14:11

## 2024-03-16 RX ADMIN — ALBUTEROL SULFATE 2.5 MG: 2.5 SOLUTION RESPIRATORY (INHALATION) at 15:45

## 2024-03-16 RX ADMIN — ALBUTEROL SULFATE 2.5 MG: 2.5 SOLUTION RESPIRATORY (INHALATION) at 19:33

## 2024-03-16 RX ADMIN — Medication 2 PUFF: at 19:36

## 2024-03-16 RX ADMIN — ACETYLCYSTEINE 600 MG: 200 INHALANT RESPIRATORY (INHALATION) at 19:33

## 2024-03-16 RX ADMIN — GUAIFENESIN 600 MG: 600 TABLET, EXTENDED RELEASE ORAL at 07:48

## 2024-03-16 RX ADMIN — LEVOTHYROXINE SODIUM 25 MCG: 0.03 TABLET ORAL at 05:54

## 2024-03-16 RX ADMIN — METHYLPREDNISOLONE SODIUM SUCCINATE 40 MG: 40 INJECTION INTRAMUSCULAR; INTRAVENOUS at 09:46

## 2024-03-16 RX ADMIN — LEVOFLOXACIN 750 MG: 5 INJECTION, SOLUTION INTRAVENOUS at 20:03

## 2024-03-16 RX ADMIN — DULOXETINE HYDROCHLORIDE 60 MG: 60 CAPSULE, DELAYED RELEASE ORAL at 07:48

## 2024-03-16 RX ADMIN — BUSPIRONE HYDROCHLORIDE 15 MG: 15 TABLET ORAL at 20:02

## 2024-03-16 RX ADMIN — ACETAMINOPHEN 650 MG: 325 TABLET ORAL at 09:46

## 2024-03-16 RX ADMIN — ACETYLCYSTEINE 600 MG: 200 INHALANT RESPIRATORY (INHALATION) at 07:57

## 2024-03-16 RX ADMIN — Medication 250 MG: at 07:48

## 2024-03-16 RX ADMIN — MONTELUKAST 10 MG: 10 TABLET, FILM COATED ORAL at 20:02

## 2024-03-16 RX ADMIN — BUSPIRONE HYDROCHLORIDE 15 MG: 15 TABLET ORAL at 07:48

## 2024-03-16 RX ADMIN — GUAIFENESIN 600 MG: 600 TABLET, EXTENDED RELEASE ORAL at 20:02

## 2024-03-16 RX ADMIN — SODIUM CHLORIDE, PRESERVATIVE FREE 10 ML: 5 INJECTION INTRAVENOUS at 20:10

## 2024-03-16 RX ADMIN — ARIPIPRAZOLE 5 MG: 10 TABLET ORAL at 07:48

## 2024-03-16 RX ADMIN — AMLODIPINE BESYLATE 5 MG: 5 TABLET ORAL at 07:48

## 2024-03-16 RX ADMIN — ENOXAPARIN SODIUM 30 MG: 100 INJECTION SUBCUTANEOUS at 07:54

## 2024-03-16 RX ADMIN — SODIUM CHLORIDE, PRESERVATIVE FREE 10 ML: 5 INJECTION INTRAVENOUS at 09:00

## 2024-03-16 ASSESSMENT — PAIN DESCRIPTION - ORIENTATION: ORIENTATION: MID

## 2024-03-16 ASSESSMENT — PAIN SCALES - GENERAL: PAINLEVEL_OUTOF10: 5

## 2024-03-16 ASSESSMENT — PAIN DESCRIPTION - LOCATION: LOCATION: CHEST

## 2024-03-16 ASSESSMENT — PAIN DESCRIPTION - DESCRIPTORS: DESCRIPTORS: ACHING;CRAMPING;DISCOMFORT

## 2024-03-17 VITALS
WEIGHT: 93.25 LBS | HEART RATE: 110 BPM | OXYGEN SATURATION: 100 % | HEIGHT: 63 IN | TEMPERATURE: 98 F | RESPIRATION RATE: 18 BRPM | DIASTOLIC BLOOD PRESSURE: 70 MMHG | BODY MASS INDEX: 16.52 KG/M2 | SYSTOLIC BLOOD PRESSURE: 112 MMHG

## 2024-03-17 PROCEDURE — 94669 MECHANICAL CHEST WALL OSCILL: CPT

## 2024-03-17 PROCEDURE — 6360000002 HC RX W HCPCS: Performed by: INTERNAL MEDICINE

## 2024-03-17 PROCEDURE — 99232 SBSQ HOSP IP/OBS MODERATE 35: CPT | Performed by: INTERNAL MEDICINE

## 2024-03-17 PROCEDURE — 2700000000 HC OXYGEN THERAPY PER DAY

## 2024-03-17 PROCEDURE — 2580000003 HC RX 258: Performed by: INTERNAL MEDICINE

## 2024-03-17 PROCEDURE — 94761 N-INVAS EAR/PLS OXIMETRY MLT: CPT

## 2024-03-17 PROCEDURE — 6370000000 HC RX 637 (ALT 250 FOR IP): Performed by: INTERNAL MEDICINE

## 2024-03-17 PROCEDURE — 94640 AIRWAY INHALATION TREATMENT: CPT

## 2024-03-17 RX ORDER — FLUTICASONE FUROATE, UMECLIDINIUM BROMIDE AND VILANTEROL TRIFENATATE 200; 62.5; 25 UG/1; UG/1; UG/1
1 POWDER RESPIRATORY (INHALATION) DAILY
Qty: 1 EACH | Refills: 5 | Status: SHIPPED | OUTPATIENT
Start: 2024-03-17

## 2024-03-17 RX ORDER — GUAIFENESIN/DEXTROMETHORPHAN 100-10MG/5
10 SYRUP ORAL EVERY 4 HOURS PRN
Qty: 120 ML | Refills: 0 | Status: SHIPPED | OUTPATIENT
Start: 2024-03-17 | End: 2024-03-27

## 2024-03-17 RX ORDER — LEVOFLOXACIN 750 MG/1
750 TABLET, FILM COATED ORAL DAILY
Qty: 2 TABLET | Refills: 0 | Status: SHIPPED | OUTPATIENT
Start: 2024-03-17 | End: 2024-03-19

## 2024-03-17 RX ORDER — ALBUTEROL SULFATE 2.5 MG/3ML
2.5 SOLUTION RESPIRATORY (INHALATION) EVERY 6 HOURS PRN
Qty: 120 EACH | Refills: 3 | Status: SHIPPED | OUTPATIENT
Start: 2024-03-17

## 2024-03-17 RX ORDER — PREDNISONE 20 MG/1
TABLET ORAL
Qty: 7 TABLET | Refills: 0 | Status: SHIPPED | OUTPATIENT
Start: 2024-03-17 | End: 2024-03-23

## 2024-03-17 RX ADMIN — TIOTROPIUM BROMIDE INHALATION SPRAY 2 PUFF: 3.12 SPRAY, METERED RESPIRATORY (INHALATION) at 07:47

## 2024-03-17 RX ADMIN — ENOXAPARIN SODIUM 30 MG: 100 INJECTION SUBCUTANEOUS at 08:06

## 2024-03-17 RX ADMIN — ALBUTEROL SULFATE 2.5 MG: 2.5 SOLUTION RESPIRATORY (INHALATION) at 07:46

## 2024-03-17 RX ADMIN — Medication 250 MG: at 07:58

## 2024-03-17 RX ADMIN — BUSPIRONE HYDROCHLORIDE 15 MG: 15 TABLET ORAL at 07:59

## 2024-03-17 RX ADMIN — ACETYLCYSTEINE 600 MG: 200 INHALANT RESPIRATORY (INHALATION) at 07:46

## 2024-03-17 RX ADMIN — SODIUM CHLORIDE, PRESERVATIVE FREE 10 ML: 5 INJECTION INTRAVENOUS at 08:08

## 2024-03-17 RX ADMIN — WATER 40 MG: 1 INJECTION INTRAMUSCULAR; INTRAVENOUS; SUBCUTANEOUS at 08:00

## 2024-03-17 RX ADMIN — GUAIFENESIN AND DEXTROMETHORPHAN 10 ML: 100; 10 SYRUP ORAL at 03:00

## 2024-03-17 RX ADMIN — Medication 2 PUFF: at 07:47

## 2024-03-17 RX ADMIN — DULOXETINE HYDROCHLORIDE 60 MG: 60 CAPSULE, DELAYED RELEASE ORAL at 07:58

## 2024-03-17 RX ADMIN — AMLODIPINE BESYLATE 5 MG: 5 TABLET ORAL at 07:59

## 2024-03-17 RX ADMIN — ALBUTEROL SULFATE 2.5 MG: 2.5 SOLUTION RESPIRATORY (INHALATION) at 11:24

## 2024-03-17 RX ADMIN — LEVOTHYROXINE SODIUM 25 MCG: 0.03 TABLET ORAL at 06:11

## 2024-03-17 RX ADMIN — ACETAMINOPHEN 650 MG: 325 TABLET ORAL at 08:01

## 2024-03-17 RX ADMIN — GUAIFENESIN 600 MG: 600 TABLET, EXTENDED RELEASE ORAL at 07:59

## 2024-03-17 RX ADMIN — ARIPIPRAZOLE 5 MG: 10 TABLET ORAL at 07:58

## 2024-03-17 ASSESSMENT — PAIN DESCRIPTION - DESCRIPTORS: DESCRIPTORS: ACHING;CRAMPING

## 2024-03-17 ASSESSMENT — PAIN DESCRIPTION - LOCATION: LOCATION: CHEST

## 2024-03-17 ASSESSMENT — PAIN SCALES - GENERAL: PAINLEVEL_OUTOF10: 5

## 2024-03-17 ASSESSMENT — PAIN DESCRIPTION - ORIENTATION: ORIENTATION: MID

## 2024-03-17 NOTE — CARE COORDINATION
CM spoke with patient in regards to discharge needs. Patient declines need for home care at this time and her spouse will transport to home.   
so, who? No  Plans to Return to Present Housing: Yes  Other Identified Issues/Barriers to RETURNING to current housing: none  Potential Assistance needed at discharge: Home Care (no preference of agency)            Potential DME:    Patient expects to discharge to: House  Plan for transportation at discharge:      Financial    Payor: [employer] MEDICARE / Plan: [employer] GOLD PLUS HMO / Product Type: *No Product type* /     Does insurance require precert for SNF: Yes    Potential assistance Purchasing Medications: No  Meds-to-Beds request: Yes      [facility] [address] - P [phone] - F [phone]  [address]  [facility] [address]  Phone: [phone] Fax: [phone]    [facility] - [place], [place] - [address] - P [phone] - F [phone]  [address]  [facility] [address]  Phone: [phone] Fax: [phone]      Notes:    Factors facilitating achievement of predicted outcomes: Family support, Motivated, Cooperative, Pleasant, Sense of humor, Good insight into deficits, and Has needed Durable Medical Equipment at home    Barriers to discharge: Limited family support, Decreased endurance, and Long standing deficits    Additional Case Management Notes: spoke with patient. Reported lives in ranch style home with . 2STE. Stated has continuous O2 3LNC provided by [employer]. Has port concentrator for transport home.  assists with transportation to [facility]. IADL's. Open to [facility] no preference of agency.     The Plan for Transition of Care is related to the following treatment goals of COPD exacerbation (HCC) [J44.1]  COPD with acute exacerbation (HCC) [J44.1]    IF APPLICABLE: The Patient and/or patient representative Maranda and her family were provided with a choice of provider and agrees with the discharge plan. Freedom of choice list with basic dialogue that supports the patient's individualized plan of care/goals and shares

## 2024-03-17 NOTE — PLAN OF CARE
Problem: Pain  Goal: Verbalizes/displays adequate comfort level or baseline comfort level  3/16/2024 2250 by Delfino oT, RN  Outcome: Progressing     Problem: Safety - Adult  Goal: Free from fall injury  3/16/2024 2250 by Delfino To, RN  Outcome: Progressing     Problem: Respiratory - Adult  Goal: Achieves optimal ventilation and oxygenation  Outcome: Progressing  Flowsheets (Taken 3/16/2024 2250)  Achieves optimal ventilation and oxygenation:   Assess for changes in respiratory status   Oxygen supplementation based on oxygen saturation or arterial blood gases   Encourage broncho-pulmonary hygiene including cough, deep breathe, incentive spirometry   Assess and instruct to report shortness of breath or any respiratory difficulty   Respiratory therapy support as indicated

## 2024-03-17 NOTE — DISCHARGE SUMMARY
Discharge Summary    Name:  Maranda Burns /Age/Sex: 1960 (63 y.o. female)   Admit Date: 3/14/2024  Discharge Date: 3/17/24   MRN & CSN:  [id number] & [id number] Encounter Date and Time 3/17/24 11:20 AM EDT    Attending:  Ti Blandon MD Discharging Provider: TI BLANDON MD       Hospital Course:       \"Maranda Burns is a 63 y.o. female with a PMHx of HTN, COPD, thyroid disease, psoriasis, and ulcerative colitis who presented for evaluation of progressive worsening shortness of breath and wheezing above baseline over the past week.  She has a mildly productive brownish cough with some associated pain in the chest.  She also has some continued mild wheezing.  She was treated for COPD exacerbation with augmentin, azithromycin, and prednisone for 1 week at the end of February but does not feel much improved.  She wears 3L O2 at baseline.\"        AECOPD.  Steroids (taper for 6 days), inhaled bronchodilators, empiric levofloxacin for another two days.       9 mm MACIEL nodule.  Pulmonary planning outpatient PET-CT.     Chronic hypoxic respiratory failure.  Baseline 3LNC.     Urine sample was collected for unclear reasons.  It grew enterococcus, same as a year ago.  Patient does not have symptoms of UTI, no treatment indicated.  She did well without enterococcal coverage.      HTN.  Metoprolol.     Hypothyroidism.  Clinically euthyroid.  Continue home dose of levothyroxine.      Anxiety, depression.   Buspirone, duloxetine, aripiprazole.         Discharge Diagnosis:   COPD with acute exacerbation (HCC)      Discharge Instructions:     Follow up with PCP within 1-2 weeks.      Diet: ADULT DIET; Regular  ADULT ORAL NUTRITION SUPPLEMENT; Breakfast, Dinner; Standard High Calorie/High Protein Oral Supplement  ADULT ORAL NUTRITION SUPPLEMENT; Lunch, Dinner; Frozen Oral Supplement  Activity: activity as tolerated  Discharged to:  home  Condition on discharge: Stable    Objective Findings at Discharge:   BP

## 2024-03-17 NOTE — PROGRESS NOTES
Arrival from ED to room 334 safely and in stable condition. VSS, hypotensive - afebrile. Pt is alert and oriented x 4 with no history of falls. Assessment completed as charted. Bed is in lowest position with 2/4 bed rails raised, wheels locked and call light within reach - patient wearing non-skid socks and verbalizes understanding to call out for assistance. No further requests at this time. Will continue to monitor.     Vitals:    03/14/24 2241   BP: (!) 87/54   Pulse: 87   Resp: 20   Temp: 98.1 °F (36.7 °C)   SpO2: 99%       
BEKAH ADORNO,    FYI, BP 85/59 MAP of 67. Currently states she feels a little light headed. States she can't catch her breath, most likely related to her pain which is 5/10 in her chest. She is currently soft BP. Fluids were stopped in AM. She has been having some oral intake but minimal. She worked with PT/OT today trying to recover from copd exacerbation. Do you want to add anything for BP or monitor? Anything for her pain control? She only has tylenol PRN. Thank you!  
Comprehensive Nutrition Assessment    Type and Reason for Visit:  Initial, Positive Nutrition Screen    Nutrition Recommendations/Plan:   Continue regular diet and encourage PO intake   Continue vanilla ensure and magic cups   Encourage protein intake, pt likes cottage cheese  Monitor nutrition adequacy, pertinent labs, bowel habits, wt changes, and clinical progress     Malnutrition Assessment:  Malnutrition Status:  Severe malnutrition (03/15/24 1309)    Context:  Chronic Illness     Findings of the 6 clinical characteristics of malnutrition:  Energy Intake:  75% or less estimated energy requirements for 1 month or longer  Body Fat Loss:  Severe body fat loss Triceps, Orbital   Muscle Mass Loss:  Severe muscle mass loss Clavicles (pectoralis & deltoids), Temples (temporalis)    Nutrition Assessment:    Positive nutrition screen for wt loss and poor appetite: 63 y.o. f w/ PMHx of HTN, COPD, thyroid disease, psoriasis, and UC admitetd w/ SOB and wheezing. On regular diet. 0% intakes in EMR. Pt reports poor appetite, intake and 10 lb wt loss over the past few months to 1 year. Reports poor intake since admission, able to eat Romansh toast this morning. RD observed untouched lunch tray at . Pt reports she likes fruit and cottage cheese. No significant wt loss in EMR. Pt reports she likes vanilla ensure and magic cups- RD to add. Pt meets criteria for severe malnutrition d/t NFPE and poor intakes. Continue to enocurage PO intake, will continue to monitor.    Nutrition Related Findings:    No BM.  Labs reviewed. Wound Type: None       Current Nutrition Intake & Therapies:    Average Meal Intake: 0%  Average Supplements Intake: None Ordered  ADULT DIET; Regular  ADULT ORAL NUTRITION SUPPLEMENT; Breakfast, Dinner; Standard High Calorie/High Protein Oral Supplement  ADULT ORAL NUTRITION SUPPLEMENT; Lunch, Dinner; Frozen Oral Supplement    Anthropometric Measures:  Height: 160 cm (5' 3\")  Ideal Body Weight (IBW): 115 lbs 
Hospital Medicine Progress Note        Subjective:  She is feeling less dyspneic.  Still wheezing, not coughing so frequently anymore.        General appearance: No apparent distress, appears stated age.  HEENT: Pupils equal, round.  Conjunctivae/corneas clear.  Neck: No jugular venous distention.   Respiratory:  Normal respiratory effort, prolonged expiratory phase, pursed lips.  Bilaterally without rales/Rhonchi.  Wheezing badly.  Less frequent severe coughing fits.   Cardiovascular: Normal rate and regular rhythm with normal S1/S2 without murmurs, rubs or gallops.  Abdomen: Soft, non-tender, non-distended with normal bowel sounds.  Musculoskeletal: No cyanosis bilaterally.  No edema.  Without deformity.  Skin: No jaundice.  No rashes or lesions.  Neurologic:  Neurovascularly intact without any focal sensory/motor deficits. Cranial nerves: II-XII intact, grossly non-focal.  Psychiatric: Alert and oriented, normal insight.      Assessment and Plan:       \"Maranda Burns is a 63 y.o. female with a PMHx of HTN, COPD, thyroid disease, psoriasis, and ulcerative colitis who presented for evaluation of progressive worsening shortness of breath and wheezing above baseline over the past week.  She has a mildly productive brownish cough with some associated pain in the chest.  She also has some continued mild wheezing.  She was treated for COPD exacerbation with augmentin, azithromycin, and prednisone for 1 week at the end of February but does not feel much improved.  She wears 3L O2 at baseline.\"      AECOPD.  Steroids, inhaled bronchodilators, empiric levofloxacin.      9 mm MACIEL nodule.  Pulmonary planning outpatient PET-CT.    Chronic hypoxic respiratory failure.  Baseline 3LNC.    Urine sample was collected for unclear reasons.  It grew enterococcus, same as a year ago.  Patient does not have symptoms of UTI, no treatment indicated.     HTN.  Amlodipine, metoprolol.    Hypothyroidism.  Clinically euthyroid.  Continue 
Patient items gathered into backpack from home. O2 switched to home delivery machine. Powered on and functioning. Patient assisted by writer and  to car in front. Discharge instructions reviewed with patient. Highlighted pertinent information on d/c instructions. Patient without questions at this time. Left in stable condition.      IV REMOVED.  
Patient: Maranda Burns MRN: [id number]  Date of  Admission: 3/14/2024   YOB: 1960  Age: 63 y.o.  Sex: female    Unit: [place] [address] TELE/MED/ONC  Room/Bed: 0334/0334-01 Admitting Physician: MIGUEL A ALEJO    Attending Physician:  Alejandro Blandon MD         Pulmonary Service Note         HPI: 3/15/2024  63-year-old female patient with PMH of HTN, ulcerative colitis, thyroid disease, COPD who presented on 3/14/2024 with chest pain, shortness of breath, cough and brown sputum.  Patient was recently treated for AECOPD as outpatient with antibiotics and steroids without improvement.  Patient had no documented fever since admission and remained hemodynamically stable.  Oxygen needs remain at 3 L/min which is her baseline  Patient was started on her blood pressure medications, Cymbalta, Lovenox prophylaxis, DuoNebs, levothyroxine, Singulair, Mucinex, Levaquin, Mucomyst  Blood sugar remained within range      SUBJECTIVE:    Overall seems to feel little bit better and nausea vomiting  No dysuria or hematuria  No hemoptysis    Still having  No nausea vomiting  Still feeling short of breath    ROS:  A comprehensive review of systems was negative except for: Above    OBJECTIVE    Medications    Continuous Infusions:   sodium chloride         Scheduled Meds:   enoxaparin  30 mg SubCUTAneous Daily    methylPREDNISolone  40 mg IntraVENous Q12H    mometasone-formoterol  2 puff Inhalation BID RT    tiotropium  2 puff Inhalation Daily RT    montelukast  10 mg Oral Nightly    guaiFENesin  600 mg Oral BID    levofloxacin  750 mg IntraVENous Q24H    acetylcysteine  600 mg Inhalation BID RT    amLODIPine  5 mg Oral Daily    ARIPiprazole  5 mg Oral Daily    busPIRone  15 mg Oral TID    DULoxetine  60 mg Oral Daily    saccharomyces boulardii  250 mg Oral Daily    levothyroxine  25 mcg Oral Daily    [Held by provider] losartan  100 mg Oral Daily    metoprolol succinate  50 mg Oral Daily    sodium chloride flush  10 mL 
Physical Therapy  Facility/Department: [facility] C3 TELE/MED SURG/ONC  Physical Therapy Initial Assessment    Name: Maranda Burns  : 1960  MRN: [id number]  Date of Service: 3/15/2024    Discharge Recommendations:  24 hour supervision or assist (initial)   PT Equipment Recommendations  Equipment Needed: No      Patient Diagnosis(es): The encounter diagnosis was COPD exacerbation (HCC).  Past Medical History:  has a past medical history of Back pain, COPD (chronic obstructive pulmonary disease) (HCC), Fatigue, Hypertension, Syncope and collapse, Thyroid disease, and Ulcerative colitis, unspecified, without complications (HCC).  Past Surgical History:  has a past surgical history that includes Splenectomy (); Tonsillectomy; back surgery; Hysterectomy, vaginal (); Colonoscopy (N/A, 2021); Ovary removal; and Hysterectomy.    Assessment   Body Structures, Functions, Activity Limitations Requiring Skilled Therapeutic Intervention: Decreased functional mobility ;Decreased endurance;Decreased balance;Decreased strength  Assessment: Pt is a 63 year old female admitted with COPD exacerbation. Pt able to complete bed mobility with supervision, functional transfers with SBA, and ambulate community distance in hallway with SBA without AD. She does require several standing rest breaks during activity although demo's good awareness of energy conservation and need for rest breaks. She reports feels \"a little wobbly\" compared to baseline. Pt is currently functioning slightly below baseline as she was IND without AD prior to admission. At this time, anticipate pt will be safe to d/c home with initial 24 hr assist when medically ready.  Treatment Diagnosis: impaired activity tolerance  Therapy Prognosis: Good  Decision Making: Medium Complexity  Requires PT Follow-Up: Yes  Activity Tolerance  Activity Tolerance: Patient tolerated evaluation without incident;Patient limited by endurance     Plan   Physical Therapy 
Received patient in bed, alert oriented x4, VSS. Shift assessment done as charted. Medications administered per MAR.     -on oxygen support at 2lpm.  -ambulates independently  -tolerating PO intake well  -denies further needs at this time.    Nonskid footware on. Bed in low position, wheels locked, SR x2, call light and bedside table within reach.  
Dinner; Standard High Calorie/High Protein Oral Supplement  DVT Prophylaxis: enoxaparin  Appropriate for A1: no  Disposition: PT/OT eval ordered.  Ready when she is breathing more easily, perhaps 3/17, pending pulm input.       -----------------------------------------------------------------------                              PCP: London Mcrae DO    Date of Admission: 3/14/2024  Current Hospital Day: 2    Chief Admission Complaint: Shortness of Breath (COPd, over the last week. 3L baseline. Cough. C/o pain in chest. 6/10 pain)       COPD with acute exacerbation (HCC)    Medications:  Personally reviewed in detail in conjunction w/ labs as documented for evidence of drug toxicity.     Infusion Medications    sodium chloride       Scheduled Medications    enoxaparin  30 mg SubCUTAneous Daily    methylPREDNISolone  40 mg IntraVENous Q12H    mometasone-formoterol  2 puff Inhalation BID RT    tiotropium  2 puff Inhalation Daily RT    montelukast  10 mg Oral Nightly    guaiFENesin  600 mg Oral BID    levofloxacin  750 mg IntraVENous Q24H    acetylcysteine  600 mg Inhalation BID RT    amLODIPine  5 mg Oral Daily    ARIPiprazole  5 mg Oral Daily    busPIRone  15 mg Oral TID    DULoxetine  60 mg Oral Daily    saccharomyces boulardii  250 mg Oral Daily    levothyroxine  25 mcg Oral Daily    [Held by provider] losartan  100 mg Oral Daily    metoprolol succinate  50 mg Oral Daily    sodium chloride flush  10 mL IntraVENous 2 times per day     PRN Meds: albuterol, guaiFENesin-dextromethorphan, sodium chloride flush, sodium chloride, potassium chloride **OR** potassium alternative oral replacement **OR** potassium chloride, magnesium sulfate, ondansetron **OR** ondansetron, senna, acetaminophen **OR** acetaminophen      Intake/Output Summary (Last 24 hours) at 3/15/2024 1114  Last data filed at 3/15/2024 0757  Gross per 24 hour   Intake 673.96 ml   Output 0 ml   Net 673.96 ml       Labs: Personally reviewed and interpreted 
assistance  LE Dressing Skilled Clinical Factors: brief / pants changed at toilet, SBA when in stance pulling up/down over hips  Toileting: Stand by assistance  Toileting Skilled Clinical Factors: voided bladder seated on toilet, SBA for CM up/down  Functional Mobility: Stand by assistance;Increased time to complete  Functional Mobility Skilled Clinical Factors: functional mobility in room/ hallway free of AD w/ x2 standing rest breaks to prevent fatigue + manage SOB  Skin Care: Soap and water     Activity Tolerance  Activity Tolerance: Patient tolerated evaluation without incident;Patient limited by endurance        Vision  Vision: Impaired  Vision Exceptions: Wears glasses for reading  Hearing  Hearing: Within functional limits  Cognition  Overall Cognitive Status: WFL  Orientation  Overall Orientation Status: Within Functional Limits  Orientation Level: Oriented X4     Education Given To: Patient  Education Provided: Role of Therapy;Plan of Care;Precautions;Transfer Training;Fall Prevention Strategies  Education Provided Comments: disease specific: econ techniques, wearing O2 during bathing ADLs for safety  Education Method: Verbal  Barriers to Learning: None  Education Outcome: Verbalized understanding;Demonstrated understanding  Disease Specific Education: Pt educated on importance of OOB mobility, prevention of complications of bedrest, and general safety during hospitalization. Pt verbalized understanding    AM-PAC - ADL  AM-PAC Daily Activity - Inpatient   How much help is needed for putting on and taking off regular lower body clothing?: A Little  How much help is needed for bathing (which includes washing, rinsing, drying)?: A Little  How much help is needed for toileting (which includes using toilet, bedpan, or urinal)?: A Little  How much help is needed for putting on and taking off regular upper body clothing?: None  How much help is needed for taking care of personal grooming?: A Little  How much help 
daily-okay to continue for the next  Levaquin 750 every 24 hours-can complete 5 days and stop  Solu-Medrol 40 IV  daily-can change to prednisone and taper as an open  Dulera 200/5 2 puffs twice daily-can stop as patient is on Trelegy as an outpatient  Singulair 10 mg nightly-continue as an outpatient  Spiriva Respimat 2 puffs once daily can stop as an outpatient as the patient is on Trelegy      Would probably need pulmonary rehab as an outpatient, I would suggest that the patient have this as a outpatient set up      Patient on maximal therapy    Patient normally follows up with  pulmonary  Can follow-up with them on    CHEYENNE MARTINES MD    [facility], [facility]  [phone]      Please note that some or all of this record was generated using voice recognition software. If there are any questions about the content of this document, please contact the author as some errors in transcription may have occurred.

## 2024-03-18 ENCOUNTER — TELEPHONE (OUTPATIENT)
Dept: FAMILY MEDICINE CLINIC | Age: 64
End: 2024-03-18

## 2024-03-18 LAB
BACTERIA SPEC RESP CULT: NORMAL
GRAM STN SPEC: NORMAL

## 2024-03-18 NOTE — TELEPHONE ENCOUNTER
Care Transitions Initial Follow Up Call    Outreach made within 2 business days of discharge: Yes    Patient: Maranda Burns Patient : 1960   MRN: [id number]  Reason for Admission: There are no discharge diagnoses documented for the most recent discharge.  Discharge Date: 3/17/24       Spoke with: Left message for patient to return call     Discharge department/facility:     TCM Interactive Patient Contact:  Was patient able to fill all prescriptions:   Was patient instructed to bring all medications to the follow-up visit:   Is patient taking all medications as directed in the discharge summary?   Does patient understand their discharge instructions:   Does patient have questions or concerns that need addressed prior to 7-14 day follow up office visit:     Scheduled appointment with PCP within 7-14 days    Follow Up  Future Appointments   Date Time Provider Department Center   3/26/2024  2:30 PM London Mcrae DO [place] [facility]

## 2024-03-20 LAB — A1AT SERPL-MCNC: 116 MG/DL (ref 90–200)

## 2024-04-12 DIAGNOSIS — G57.92 NEUROPATHY OF LEFT LOWER EXTREMITY: ICD-10-CM

## 2024-04-12 RX ORDER — METOPROLOL SUCCINATE 50 MG/1
50 TABLET, EXTENDED RELEASE ORAL DAILY
Qty: 90 TABLET | Refills: 0 | Status: SHIPPED | OUTPATIENT
Start: 2024-04-12

## 2024-04-12 RX ORDER — GABAPENTIN 300 MG/1
300 CAPSULE ORAL 3 TIMES DAILY
Qty: 90 CAPSULE | Refills: 0 | Status: SHIPPED | OUTPATIENT
Start: 2024-04-12 | End: 2024-05-12

## 2024-04-16 RX ORDER — DULOXETIN HYDROCHLORIDE 60 MG/1
CAPSULE, DELAYED RELEASE ORAL DAILY
Qty: 30 CAPSULE | Refills: 5 | Status: SHIPPED | OUTPATIENT
Start: 2024-04-16

## 2024-04-16 NOTE — TELEPHONE ENCOUNTER
Refill Request       Last Seen: Last Seen Department: 2/26/2024  Last Seen by PCP: 2/26/2024    Last Written: 09/26/2023        Next Appointment:   Future Appointments   Date Time Provider Department Center   4/18/2024  2:40 PM Mamie Morgan MD [facility]         Requested Prescriptions     Pending Prescriptions Disp Refills    DULoxetine (CYMBALTA) 60 MG extended release capsule [Pharmacy Med Name: DULOXETINE HCL DR 60MG CAP 60 Capsule] 30 capsule 5     Sig: TAKE 1 CAPSULE BY MOUTH EVERY DAY

## 2024-04-18 ENCOUNTER — OFFICE VISIT (OUTPATIENT)
Dept: PULMONOLOGY | Age: 64
End: 2024-04-18
Payer: MEDICARE

## 2024-04-18 VITALS
SYSTOLIC BLOOD PRESSURE: 132 MMHG | OXYGEN SATURATION: 96 % | DIASTOLIC BLOOD PRESSURE: 80 MMHG | HEART RATE: 81 BPM | TEMPERATURE: 96.9 F | WEIGHT: 90.4 LBS | BODY MASS INDEX: 16.02 KG/M2 | HEIGHT: 63 IN | RESPIRATION RATE: 16 BRPM

## 2024-04-18 DIAGNOSIS — J43.1 PANLOBULAR EMPHYSEMA (HCC): Primary | ICD-10-CM

## 2024-04-18 PROCEDURE — 1036F TOBACCO NON-USER: CPT | Performed by: INTERNAL MEDICINE

## 2024-04-18 PROCEDURE — 99215 OFFICE O/P EST HI 40 MIN: CPT | Performed by: INTERNAL MEDICINE

## 2024-04-18 PROCEDURE — 3023F SPIROM DOC REV: CPT | Performed by: INTERNAL MEDICINE

## 2024-04-18 PROCEDURE — 3017F COLORECTAL CA SCREEN DOC REV: CPT | Performed by: INTERNAL MEDICINE

## 2024-04-18 PROCEDURE — 3079F DIAST BP 80-89 MM HG: CPT | Performed by: INTERNAL MEDICINE

## 2024-04-18 PROCEDURE — 94664 DEMO&/EVAL PT USE INHALER: CPT | Performed by: INTERNAL MEDICINE

## 2024-04-18 PROCEDURE — 3075F SYST BP GE 130 - 139MM HG: CPT | Performed by: INTERNAL MEDICINE

## 2024-04-18 PROCEDURE — G8419 CALC BMI OUT NRM PARAM NOF/U: HCPCS | Performed by: INTERNAL MEDICINE

## 2024-04-18 PROCEDURE — G8427 DOCREV CUR MEDS BY ELIG CLIN: HCPCS | Performed by: INTERNAL MEDICINE

## 2024-04-18 RX ORDER — AZITHROMYCIN 250 MG/1
TABLET, FILM COATED ORAL
Qty: 30 TABLET | Refills: 5 | Status: SHIPPED | OUTPATIENT
Start: 2024-04-18

## 2024-04-18 NOTE — PROGRESS NOTES
MA Communication:  The following orders are received by verbal communication from Mamie Morgan MD    Orders include:    HRCT   4 week follow up  Order faxed to pulmonary rehab.    Pt to have blood work done today  Will call with DME to have orders faxed for ONPO and vest    
Question:   Program     Answer:   Vibration     Order Specific Question:   Adjust for patient tolerance     Answer:   Yes    azithromycin (ZITHROMAX) 250 MG tablet     Sig: Take 1 tablet on Monday Wednesday and Fridays. EKG follow up is needed to monitor heart electricity     Dispense:  30 tablet     Refill:  5      ________________________________________________________  Electronically signed by:  Mamie Morgan MD,FACP    4/18/2024    3:01 PM.     [facility]  [address], [place] [address]   Phone (office): [phone]

## 2024-04-18 NOTE — PATIENT INSTRUCTIONS
Continue Trelegy Ellipta 200 mcg with mouth washing after use.  Continue albuterol nebulizer as needed  Inhaler education provided  Will add azithromycin 3 times a week to help decrease exacerbations.  QTc is within normal  Patient is not a candidate for Roflumilast with her progressive weight loss  Patient is not a candidate for Singulair with her severe anxiety  Continue oxygen 3 L/min day and night  Pulmonary rehab referral  Will order a vibrating vest for mucous plugging and ineffective mucus clearance with flutter valve.  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  Will do high-resolution CT scan to evaluate for bronchiectasis and follow-up on left lung nodule  ABG and overnight pulse oximetry on 3 dose of oxygen to see if the patient qualifies for noninvasive ventilation.  Follow-up in 4 weeks      Health Maintenance/Preventive measures:        >>  Avoid exposure to tobacco, irritants, allergens as possible as well as contact with patients with infectious respiratory illness.        >>  Stay up-to-date with influenza & pneumonia vaccines, RSV, & COVID-19 vaccine as recommended by the Advisory Committee on Immunization Practices (ACIP)        >>  Healthy diet and activity as able.        >>  Acid reflux precautions: Head of bed elevation, avoiding tight clothes, avoiding big meals or snacking 3 hours before bedtime, targeting healthy weight.        >>  Practice sleep hygiene measures. Avoid driving or operating heavy machines if tired or sleepy.

## 2024-04-19 ENCOUNTER — TELEPHONE (OUTPATIENT)
Dept: PULMONOLOGY | Age: 64
End: 2024-04-19

## 2024-04-29 ENCOUNTER — HOSPITAL ENCOUNTER (OUTPATIENT)
Dept: CT IMAGING | Age: 64
Discharge: HOME | End: 2024-04-29
Payer: MEDICARE

## 2024-04-29 DIAGNOSIS — J43.1 PANLOBULAR EMPHYSEMA (HCC): ICD-10-CM

## 2024-04-29 PROCEDURE — 71250 CT THORAX DX C-: CPT

## 2024-05-02 NOTE — PROGRESS NOTES
PULMONARY CLINIC NOTE      Maranda Burns   : 1960  MRN: [id number]     Date of Service: 2024    PCP: London Mcrae DO    Referring provider: No ref. provider found      Chief Complaint   Patient presents with    Results     HRCT results           ASSESSMENT & PLAN       63 y.o. pleasant  female patient with:    No diagnosis found.       # COPD, end-stage with panlobular emphysema and exacerbations.   Last admission 2024  Failed endobronchial valves.  Status post right lung reduction surgery at  in 2023.  PFTs 2023: FEV1 29% 0.6 L, , DC 37%  # Ineffective airway clearance due to diaphragmatic malfunction from hyperinflation/flattening  # Heterozygous alpha 1 antitrypsin deficiency.    Enzyme level adequate in 2024: 116  # Chronic hypoxic respiratory failure on 3 L at baseline  # Protein-energy malnutrition.    Possibly from advanced COPD  # Peripheral eosinophilia   #  9 millimeter left upper lobe nodule on recent CT  # Tobacco abuse history.    # Immunocompromised host, status post splenectomy post fall/trauma in   Continue Trelegy Ellipta with albuterol as needed  Continue pulmonary rehab  Start using BiPAP  with 3 L of oxygen for 15 minutes during the day while watching the TV then start using at night after a week.  Continue oxygen 3 L/min  Continue azithromycin 3 times a week.  QTc is okay  CT scan results reviewed.  Patient does not qualify for vibrating vest  Patient is not a candidate for Roflumilast with her progressive weight loss  Patient is not a candidate for Singulair with her severe anxiety  Dupixent can be considered in future visits  Follow-up in 2 months  # Vaccines: Reviewed.        Health Maintenance/Preventive measures:        >>  Avoid exposure to tobacco, irritants, allergens as possible as well as contact with patients with infectious respiratory illness.        >>  Stay up-to-date with influenza, pneumonia, RSV, & COVID-19 vaccines

## 2024-05-03 ENCOUNTER — HOSPITAL ENCOUNTER (OUTPATIENT)
Dept: CARDIAC REHAB | Age: 64
Setting detail: THERAPIES SERIES
Discharge: HOME | End: 2024-05-03
Attending: INTERNAL MEDICINE
Payer: MEDICARE

## 2024-05-03 ENCOUNTER — TELEPHONE (OUTPATIENT)
Dept: PULMONOLOGY | Age: 64
End: 2024-05-03

## 2024-05-03 NOTE — TELEPHONE ENCOUNTER
I receive an email from [employer] today regarding patient and the [employer]flo Vest order that was faxed to them on 4/22/24.    They said that in order to qualify for the vest we need the following:     New order placed with diagnosis being \"Bronchiectasis\".  Office notes talking about the CT and bronchiectasis.    Once you have this, please re-fax order to [employer].

## 2024-05-06 ENCOUNTER — HOSPITAL ENCOUNTER (OUTPATIENT)
Dept: CARDIAC REHAB | Age: 64
Setting detail: THERAPIES SERIES
Discharge: HOME | End: 2024-05-06
Attending: INTERNAL MEDICINE
Payer: MEDICARE

## 2024-05-06 PROCEDURE — 94625 PHY/QHP OP PULM RHB W/O MNTR: CPT

## 2024-05-08 ENCOUNTER — HOSPITAL ENCOUNTER (OUTPATIENT)
Age: 64
Discharge: HOME | End: 2024-05-08
Payer: MEDICARE

## 2024-05-08 ENCOUNTER — HOSPITAL ENCOUNTER (OUTPATIENT)
Dept: CARDIAC REHAB | Age: 64
Setting detail: THERAPIES SERIES
Discharge: HOME | End: 2024-05-08
Attending: INTERNAL MEDICINE
Payer: MEDICARE

## 2024-05-08 DIAGNOSIS — J43.1 PANLOBULAR EMPHYSEMA (HCC): ICD-10-CM

## 2024-05-08 LAB
BASE EXCESS BLDA CALC-SCNC: 7.6 MMOL/L (ref -3–3)
CO2 BLDA-SCNC: 36.7 MMOL/L
COHGB MFR BLDA: 2.5 % (ref 0–1.5)
HCO3 BLDA-SCNC: 34.8 MMOL/L (ref 21–29)
HGB BLDA-MCNC: 14.5 G/DL (ref 12–16)
METHGB MFR BLDA: 0.2 %
O2 THERAPY: ABNORMAL
PCO2 BLDA: 59.7 MMHG (ref 35–45)
PH BLDA: 7.38 [PH] (ref 7.35–7.45)
PO2 BLDA: 54.8 MMHG (ref 75–108)
SAO2 % BLDA: 89.8 %

## 2024-05-08 PROCEDURE — 82803 BLOOD GASES ANY COMBINATION: CPT

## 2024-05-08 PROCEDURE — 36415 COLL VENOUS BLD VENIPUNCTURE: CPT

## 2024-05-08 PROCEDURE — 94625 PHY/QHP OP PULM RHB W/O MNTR: CPT

## 2024-05-08 PROCEDURE — 36600 WITHDRAWAL OF ARTERIAL BLOOD: CPT

## 2024-05-10 ENCOUNTER — HOSPITAL ENCOUNTER (OUTPATIENT)
Dept: CARDIAC REHAB | Age: 64
Setting detail: THERAPIES SERIES
Discharge: HOME | End: 2024-05-10
Attending: INTERNAL MEDICINE
Payer: MEDICARE

## 2024-05-10 PROCEDURE — 94625 PHY/QHP OP PULM RHB W/O MNTR: CPT

## 2024-05-13 ENCOUNTER — HOSPITAL ENCOUNTER (OUTPATIENT)
Dept: CARDIAC REHAB | Age: 64
Setting detail: THERAPIES SERIES
Discharge: HOME | End: 2024-05-13
Attending: INTERNAL MEDICINE
Payer: MEDICARE

## 2024-05-13 PROCEDURE — 94625 PHY/QHP OP PULM RHB W/O MNTR: CPT

## 2024-05-15 ENCOUNTER — HOSPITAL ENCOUNTER (OUTPATIENT)
Dept: CARDIAC REHAB | Age: 64
Setting detail: THERAPIES SERIES
Discharge: HOME | End: 2024-05-15
Attending: INTERNAL MEDICINE
Payer: MEDICARE

## 2024-05-15 PROCEDURE — 94625 PHY/QHP OP PULM RHB W/O MNTR: CPT

## 2024-05-17 ENCOUNTER — HOSPITAL ENCOUNTER (OUTPATIENT)
Dept: CARDIAC REHAB | Age: 64
Setting detail: THERAPIES SERIES
Discharge: HOME | End: 2024-05-17
Attending: INTERNAL MEDICINE
Payer: MEDICARE

## 2024-05-17 PROCEDURE — 94625 PHY/QHP OP PULM RHB W/O MNTR: CPT

## 2024-05-20 ENCOUNTER — HOSPITAL ENCOUNTER (OUTPATIENT)
Dept: CARDIAC REHAB | Age: 64
Setting detail: THERAPIES SERIES
Discharge: HOME | End: 2024-05-20
Attending: INTERNAL MEDICINE
Payer: MEDICARE

## 2024-05-20 PROCEDURE — 94625 PHY/QHP OP PULM RHB W/O MNTR: CPT

## 2024-05-23 ENCOUNTER — OFFICE VISIT (OUTPATIENT)
Dept: PULMONOLOGY | Age: 64
End: 2024-05-23
Payer: MEDICARE

## 2024-05-23 VITALS
OXYGEN SATURATION: 99 % | HEART RATE: 85 BPM | WEIGHT: 88.2 LBS | TEMPERATURE: 96.9 F | BODY MASS INDEX: 15.63 KG/M2 | SYSTOLIC BLOOD PRESSURE: 104 MMHG | RESPIRATION RATE: 20 BRPM | HEIGHT: 63 IN | DIASTOLIC BLOOD PRESSURE: 70 MMHG

## 2024-05-23 DIAGNOSIS — J96.12 CHRONIC RESPIRATORY FAILURE WITH HYPOXIA AND HYPERCAPNIA (HCC): Primary | ICD-10-CM

## 2024-05-23 DIAGNOSIS — J96.11 CHRONIC RESPIRATORY FAILURE WITH HYPOXIA AND HYPERCAPNIA (HCC): Primary | ICD-10-CM

## 2024-05-23 PROCEDURE — 3074F SYST BP LT 130 MM HG: CPT | Performed by: INTERNAL MEDICINE

## 2024-05-23 PROCEDURE — 3017F COLORECTAL CA SCREEN DOC REV: CPT | Performed by: INTERNAL MEDICINE

## 2024-05-23 PROCEDURE — 3078F DIAST BP <80 MM HG: CPT | Performed by: INTERNAL MEDICINE

## 2024-05-23 PROCEDURE — 1036F TOBACCO NON-USER: CPT | Performed by: INTERNAL MEDICINE

## 2024-05-23 PROCEDURE — G8419 CALC BMI OUT NRM PARAM NOF/U: HCPCS | Performed by: INTERNAL MEDICINE

## 2024-05-23 PROCEDURE — G8427 DOCREV CUR MEDS BY ELIG CLIN: HCPCS | Performed by: INTERNAL MEDICINE

## 2024-05-23 PROCEDURE — 99215 OFFICE O/P EST HI 40 MIN: CPT | Performed by: INTERNAL MEDICINE

## 2024-05-23 NOTE — PATIENT INSTRUCTIONS
input is valuable to us.  We encourage you to complete and return your survey.  We hope you will choose us in the future for your healthcare needs.     Pt instructed of all future appointment dates & times, including radiology, labs, procedures & referrals. If procedures were scheduled preparation instructions provided. Instructions on future appointments with [facility] were given.

## 2024-05-24 ENCOUNTER — HOSPITAL ENCOUNTER (OUTPATIENT)
Dept: CARDIAC REHAB | Age: 64
Setting detail: THERAPIES SERIES
Discharge: HOME | End: 2024-05-24
Attending: INTERNAL MEDICINE
Payer: MEDICARE

## 2024-05-24 PROCEDURE — 94625 PHY/QHP OP PULM RHB W/O MNTR: CPT

## 2024-05-31 ENCOUNTER — HOSPITAL ENCOUNTER (OUTPATIENT)
Dept: CARDIAC REHAB | Age: 64
Setting detail: THERAPIES SERIES
Discharge: HOME | End: 2024-05-31
Attending: INTERNAL MEDICINE
Payer: MEDICARE

## 2024-05-31 PROCEDURE — 94625 PHY/QHP OP PULM RHB W/O MNTR: CPT

## 2024-06-03 ENCOUNTER — HOSPITAL ENCOUNTER (OUTPATIENT)
Dept: CARDIAC REHAB | Age: 64
Setting detail: THERAPIES SERIES
Discharge: HOME | End: 2024-06-03
Attending: INTERNAL MEDICINE
Payer: MEDICARE

## 2024-06-03 PROCEDURE — 94625 PHY/QHP OP PULM RHB W/O MNTR: CPT

## 2024-06-04 RX ORDER — LOSARTAN POTASSIUM 100 MG/1
100 TABLET ORAL DAILY
Qty: 90 TABLET | Refills: 0 | Status: SHIPPED | OUTPATIENT
Start: 2024-06-04

## 2024-06-04 NOTE — TELEPHONE ENCOUNTER
Refill Request     Last Seen: Last Seen Department: 2/26/2024  Last Seen by PCP: 2/26/2024        Next Appointment:   Future Appointments   Date Time Provider Department Center   6/5/2024  1:15 PM SCHEDULE, [facility] PULMON[facility]  [facility]   6/7/2024  1:15 PM SCHEDULE, [facility]UL[facility]  [facility]   6/10/2024  1:15 PM SCHEDULE, [facility]ULMON[facility]  [facility]   6/12/2024  1:15 PM SCHEDULE, [facility]ULMON[facility]  [facility]   6/14/2024  1:15 PM SCHEDULE, [facility] PULMON[facility]  [facility]   6/17/2024  1:15 PM SCHEDULE, [facility]UL[facility]  [facility]   6/19/2024  1:15 PM SCHEDULE, [facility] PULMON[facility]  [facility]   6/21/2024  1:15 PM SCHEDULE, [facility] PULMON[facility]  [facility]   6/24/2024  1:15 PM SCHEDULE, [facility] PULMON[facility]  [facility]   6/26/2024  1:15 PM SCHEDULE, [facility] PULMON[facility]  [facility]   6/28/2024  1:15 PM SCHEDULE, [facility] PULMON[facility]  [facility]   7/1/2024  1:15 PM SCHEDULE, [facility] PULMON[facility]  [facility]   7/3/2024  1:15 PM SCHEDULE, [facility] PULMONARY  RM [facility]   7/5/2024  1:15 PM SCHEDULE, [facility] PULMONARY  RM [facility]   7/8/2024  1:15 PM SCHEDULE, [facility] PULMONARY  RM [facility]   7/10/2024  1:15 PM SCHEDULE, [facility] PULMONARY  [facility]   7/12/2024  1:15 PM SCHEDULE, [facility] PULMON[facility]  RM [facility]   7/15/2024  1:15 PM SCHEDULE, [facility] PULMONARY  [facility]   7/17/2024  1:15 PM SCHEDULE, [facility] PULMONARY  RM [facility]   7/19/2024  1:15 PM SCHEDULE, [facility] PULMONARY  RM [facility]   7/22/2024  1:15 PM SCHEDULE, [facility] PULMONARY  RM [facility]   7/23/2024  2:00 PM Mamie Morgan MD AND PULM [facility]   7/24/2024  1:15 PM SCHEDULE, [facility] PULMONARY  RM [facility]   7/26/2024  1:15 PM SCHEDULE, [facility] PULMONARY  RM [facility]

## 2024-06-05 ENCOUNTER — HOSPITAL ENCOUNTER (OUTPATIENT)
Dept: CARDIAC REHAB | Age: 64
Setting detail: THERAPIES SERIES
Discharge: HOME | End: 2024-06-05
Attending: INTERNAL MEDICINE
Payer: MEDICARE

## 2024-06-05 PROCEDURE — 94625 PHY/QHP OP PULM RHB W/O MNTR: CPT

## 2024-06-07 ENCOUNTER — HOSPITAL ENCOUNTER (OUTPATIENT)
Dept: CARDIAC REHAB | Age: 64
Setting detail: THERAPIES SERIES
Discharge: HOME | End: 2024-06-07
Attending: INTERNAL MEDICINE
Payer: MEDICARE

## 2024-06-07 PROCEDURE — 94625 PHY/QHP OP PULM RHB W/O MNTR: CPT

## 2024-06-10 ENCOUNTER — HOSPITAL ENCOUNTER (OUTPATIENT)
Dept: CARDIAC REHAB | Age: 64
Setting detail: THERAPIES SERIES
Discharge: HOME | End: 2024-06-10
Attending: INTERNAL MEDICINE
Payer: MEDICARE

## 2024-06-10 PROCEDURE — 94625 PHY/QHP OP PULM RHB W/O MNTR: CPT

## 2024-06-12 ENCOUNTER — TELEPHONE (OUTPATIENT)
Dept: PHARMACY | Facility: CLINIC | Age: 64
End: 2024-06-12

## 2024-06-12 ENCOUNTER — HOSPITAL ENCOUNTER (OUTPATIENT)
Dept: CARDIAC REHAB | Age: 64
Setting detail: THERAPIES SERIES
Discharge: HOME | End: 2024-06-12
Attending: INTERNAL MEDICINE
Payer: MEDICARE

## 2024-06-12 ENCOUNTER — ENROLLMENT (OUTPATIENT)
Dept: PHARMACY | Facility: CLINIC | Age: 64
End: 2024-06-12

## 2024-06-12 PROCEDURE — 94625 PHY/QHP OP PULM RHB W/O MNTR: CPT

## 2024-06-12 NOTE — TELEPHONE ENCOUNTER
[facility] CLINICAL PHARMACY: STATIN THERAPY REVIEW  Identified statin use in persons with cardiovascular disease care gap per [employer]. Records dated: 6/10/24.     ASSESSMENT of Statin in Persons with Cardiovascular Disease Care Gap    Maranda Burns  has been identified as having a diagnosis for clinical ASCVD or event (e.g., inpatient hospitalization for MI, CABG, PCI or other revascularization procedures) in the measurement year and not currently filling a moderate or high intensity statin.   Patients included in this care gap are males age 21-75 and females age 40-75.     Currently Prescribed a statin: yes - atorvastatin 40 mg tablets  Per Reconcile Dispense History:     Dispensed Days Supply Quantity Provider Pharmacy    ATORVASTATIN 40 MG TABLET 04/09/2022 90 90 each ASIA FAJARDO MD [facility]   ATORVASTATIN 40 MG TABLET 01/05/2022 90 90 each ASIA FAJARDO [facility]   ATORVASTATIN 40 MG TABLET 09/29/2021 90 90 each TANA ADORNOASIA [facility]   ATORVASTATIN 40 MG TABLET 05/10/2021 90 90 each ASIA FAJARDO MD [facility]   ATORVASTATIN 20 MG TABLET 04/23/2021 90 90 each VIJAYA MIRZA [facility]   Atorvastatin Calcium 20 MG 03/23/2021 30 30 each JOSÉ MIGUEL CENTENO [facility]       Allergies   Allergen Reactions    Shrimp Flavor Anaphylaxis       Lab Results   Component Value Date/Time    CHOL 215 03/22/2021 05:05 AM    TRIG 71 03/22/2021 05:05 AM    HDL 79 03/22/2021 05:05 AM     03/22/2021 05:05 AM    VLDL 14 03/22/2021 05:05 AM    VLDL 19 04/11/2018 12:00 AM     ALT   Date Value Ref Range Status   03/15/2024 9 (L) 10 - 40 U/L Final     AST   Date Value Ref Range Status   03/15/2024 17 15 - 37 U/L Final       The ASCVD Risk score (Mirta QUEVEDO, et al., 2019) failed to calculate for the following reasons:    The patient has a prior MI or stroke diagnosis     PLAN  The

## 2024-06-14 ENCOUNTER — HOSPITAL ENCOUNTER (OUTPATIENT)
Dept: CARDIAC REHAB | Age: 64
Setting detail: THERAPIES SERIES
Discharge: HOME | End: 2024-06-14
Attending: INTERNAL MEDICINE
Payer: MEDICARE

## 2024-06-14 PROCEDURE — 94625 PHY/QHP OP PULM RHB W/O MNTR: CPT

## 2024-06-17 ENCOUNTER — HOSPITAL ENCOUNTER (OUTPATIENT)
Dept: CARDIAC REHAB | Age: 64
Setting detail: THERAPIES SERIES
Discharge: HOME | End: 2024-06-17
Attending: INTERNAL MEDICINE
Payer: MEDICARE

## 2024-06-17 PROCEDURE — 94625 PHY/QHP OP PULM RHB W/O MNTR: CPT

## 2024-06-19 ENCOUNTER — HOSPITAL ENCOUNTER (OUTPATIENT)
Dept: CARDIAC REHAB | Age: 64
Setting detail: THERAPIES SERIES
Discharge: HOME | End: 2024-06-19
Attending: INTERNAL MEDICINE
Payer: MEDICARE

## 2024-06-19 PROCEDURE — 94625 PHY/QHP OP PULM RHB W/O MNTR: CPT

## 2024-06-24 ENCOUNTER — HOSPITAL ENCOUNTER (OUTPATIENT)
Dept: CARDIAC REHAB | Age: 64
Setting detail: THERAPIES SERIES
Discharge: HOME | End: 2024-06-24
Attending: INTERNAL MEDICINE
Payer: MEDICARE

## 2024-06-24 PROCEDURE — 94625 PHY/QHP OP PULM RHB W/O MNTR: CPT

## 2024-06-26 ENCOUNTER — HOSPITAL ENCOUNTER (OUTPATIENT)
Dept: CARDIAC REHAB | Age: 64
Setting detail: THERAPIES SERIES
Discharge: HOME | End: 2024-06-26
Attending: INTERNAL MEDICINE
Payer: MEDICARE

## 2024-06-26 PROCEDURE — 94625 PHY/QHP OP PULM RHB W/O MNTR: CPT

## 2024-06-28 ENCOUNTER — HOSPITAL ENCOUNTER (OUTPATIENT)
Dept: CARDIAC REHAB | Age: 64
Setting detail: THERAPIES SERIES
Discharge: HOME | End: 2024-06-28
Attending: INTERNAL MEDICINE
Payer: MEDICARE

## 2024-06-28 PROCEDURE — 94625 PHY/QHP OP PULM RHB W/O MNTR: CPT

## 2024-07-01 ENCOUNTER — HOSPITAL ENCOUNTER (OUTPATIENT)
Dept: CARDIAC REHAB | Age: 64
Setting detail: THERAPIES SERIES
Discharge: HOME | End: 2024-07-01
Attending: INTERNAL MEDICINE
Payer: MEDICARE

## 2024-07-01 PROCEDURE — 94625 PHY/QHP OP PULM RHB W/O MNTR: CPT

## 2024-07-01 NOTE — TELEPHONE ENCOUNTER
Dr. Oscar MD,    Patient was Identified as a statin use in persons with cardiovascular disease care gap per [employer].   Note atorvastatin on medication list out of refills and last filled April 2022.  Appears patient due for updated labs and appointment with cardiology as you find appropriate.  I have attempted to contact patient regarding the above, unable to contact.    If you find appropriate, patient may be due for updated labs, appointment, and atorvastatin prescription.    Thank you,  Lore Alfredo, PharmD, [facility] Clinical Pharmacy   Department, toll free: [phone], option 1

## 2024-07-02 NOTE — TELEPHONE ENCOUNTER
Note message doned. Another attempt to contact patient. Unable to contact.    For Pharmacy Admin Tracking Only    Program: [employer]  CPA in place:  No  Gap Closed?: No   Time Spent (min): 30

## 2024-07-05 ENCOUNTER — APPOINTMENT (OUTPATIENT)
Dept: CARDIAC REHAB | Age: 64
End: 2024-07-05
Attending: INTERNAL MEDICINE
Payer: MEDICARE

## 2024-07-08 ENCOUNTER — HOSPITAL ENCOUNTER (OUTPATIENT)
Dept: CARDIAC REHAB | Age: 64
Setting detail: THERAPIES SERIES
Discharge: HOME | End: 2024-07-08
Attending: INTERNAL MEDICINE
Payer: MEDICARE

## 2024-07-08 PROCEDURE — 94625 PHY/QHP OP PULM RHB W/O MNTR: CPT

## 2024-07-10 ENCOUNTER — HOSPITAL ENCOUNTER (OUTPATIENT)
Dept: CARDIAC REHAB | Age: 64
Setting detail: THERAPIES SERIES
Discharge: HOME | End: 2024-07-10
Attending: INTERNAL MEDICINE
Payer: MEDICARE

## 2024-07-10 PROCEDURE — 94625 PHY/QHP OP PULM RHB W/O MNTR: CPT

## 2024-07-10 NOTE — PROGRESS NOTES
PULMONARY CLINIC NOTE      Maranda Burns   : 1960  MRN: [id number]     Date of Service: 2024    PCP: London Mcrae DO    Referring provider: No ref. provider found      Chief Complaint   Patient presents with    Follow-up     2 month    Sleep Apnea           ASSESSMENT & PLAN       64 y.o. pleasant  female patient with:    1. End stage COPD (HCC)    2. Chronic respiratory failure with hypoxia and hypercapnia (HCC)    3. Panlobular emphysema (HCC)           # COPD, end-stage with panlobular emphysema and exacerbations.   Last admission 2024  Failed endobronchial valves.  Status post right lung reduction surgery at  in 2023.  PFTs 2023: FEV1 29% 0.6 L, , DC 37%  # Ineffective airway clearance due to diaphragmatic malfunction from hyperinflation/flattening  # Heterozygous alpha 1 antitrypsin deficiency.  Adequate level in 2024: 116  # Chronic hypoxic respiratory failure on 3 L at baseline  # Protein-energy malnutrition.  Possibly from advanced COPD  # Peripheral eosinophilia   #  9 millimeter left upper lobe nodule on recent CT  # Tobacco abuse history.    # Immunocompromised host, status post splenectomy post fall/trauma in 2019  Continue Trelegy Ellipta 200 mcg with mouth washing after use.  Continue albuterol as needed  Continue azithromycin 3 times a week  Patient is not a candidate for Roflumilast with her progressive weight loss  Will order Dupixant with her eosinophilia and persistent shortness of breath despite standard therapy  FeNO today  Will give 5-day course of steroid to be available at home and use if patient has mild exacerbation  Continue exercises learned in the pulmonary rehab at home as able  BiPAP desensitization techniques provided.  Use during the day while watching the TV as possible  Continue oxygen 3 L/min during the day and bled into the BiPAP  Patient failed endobronchial valves previously at .  Repeat vital signs/heart rate before

## 2024-07-12 ENCOUNTER — HOSPITAL ENCOUNTER (OUTPATIENT)
Dept: CARDIAC REHAB | Age: 64
Setting detail: THERAPIES SERIES
Discharge: HOME | End: 2024-07-12
Attending: INTERNAL MEDICINE
Payer: MEDICARE

## 2024-07-12 PROCEDURE — 94625 PHY/QHP OP PULM RHB W/O MNTR: CPT

## 2024-07-15 ENCOUNTER — HOSPITAL ENCOUNTER (OUTPATIENT)
Dept: CARDIAC REHAB | Age: 64
Setting detail: THERAPIES SERIES
Discharge: HOME | End: 2024-07-15
Attending: INTERNAL MEDICINE
Payer: MEDICARE

## 2024-07-15 PROCEDURE — 94625 PHY/QHP OP PULM RHB W/O MNTR: CPT

## 2024-07-17 ENCOUNTER — HOSPITAL ENCOUNTER (OUTPATIENT)
Dept: CARDIAC REHAB | Age: 64
Setting detail: THERAPIES SERIES
Discharge: HOME | End: 2024-07-17
Attending: INTERNAL MEDICINE
Payer: MEDICARE

## 2024-07-17 PROCEDURE — 94625 PHY/QHP OP PULM RHB W/O MNTR: CPT

## 2024-07-19 ENCOUNTER — HOSPITAL ENCOUNTER (OUTPATIENT)
Dept: CARDIAC REHAB | Age: 64
Setting detail: THERAPIES SERIES
End: 2024-07-19
Attending: INTERNAL MEDICINE
Payer: MEDICARE

## 2024-07-19 PROCEDURE — 96374 THER/PROPH/DIAG INJ IV PUSH: CPT

## 2024-07-19 PROCEDURE — 96375 TX/PRO/DX INJ NEW DRUG ADDON: CPT

## 2024-07-19 PROCEDURE — 99284 EMERGENCY DEPT VISIT MOD MDM: CPT

## 2024-07-20 ENCOUNTER — APPOINTMENT (OUTPATIENT)
Dept: CT IMAGING | Age: 64
End: 2024-07-20
Payer: MEDICARE

## 2024-07-20 ENCOUNTER — HOSPITAL ENCOUNTER (EMERGENCY)
Age: 64
Discharge: HOME | End: 2024-07-20
Attending: STUDENT IN AN ORGANIZED HEALTH CARE EDUCATION/TRAINING PROGRAM
Payer: MEDICARE

## 2024-07-20 VITALS
SYSTOLIC BLOOD PRESSURE: 124 MMHG | RESPIRATION RATE: 18 BRPM | HEART RATE: 84 BPM | DIASTOLIC BLOOD PRESSURE: 76 MMHG | OXYGEN SATURATION: 97 % | TEMPERATURE: 98 F

## 2024-07-20 DIAGNOSIS — N20.0 LEFT NEPHROLITHIASIS: Primary | ICD-10-CM

## 2024-07-20 DIAGNOSIS — J96.11 CHRONIC HYPOXEMIC RESPIRATORY FAILURE (HCC): ICD-10-CM

## 2024-07-20 DIAGNOSIS — R31.0 GROSS HEMATURIA: ICD-10-CM

## 2024-07-20 DIAGNOSIS — R10.9 ACUTE LEFT FLANK PAIN: ICD-10-CM

## 2024-07-20 LAB
ALBUMIN SERPL-MCNC: 4 G/DL (ref 3.4–5)
ALBUMIN/GLOB SERPL: 1.3 {RATIO} (ref 1.1–2.2)
ALP SERPL-CCNC: 124 U/L (ref 40–129)
ALT SERPL-CCNC: 22 U/L (ref 10–40)
ANION GAP SERPL CALCULATED.3IONS-SCNC: 7 MMOL/L (ref 3–16)
AST SERPL-CCNC: 30 U/L (ref 15–37)
BASOPHILS # BLD: 0 K/UL (ref 0–0.2)
BASOPHILS NFR BLD: 0 %
BILIRUB SERPL-MCNC: 0.5 MG/DL (ref 0–1)
BILIRUB UR QL STRIP.AUTO: NEGATIVE
BUN SERPL-MCNC: 13 MG/DL (ref 7–20)
CALCIUM SERPL-MCNC: 10 MG/DL (ref 8.3–10.6)
CHARACTER UR: ABNORMAL
CHLORIDE SERPL-SCNC: 94 MMOL/L (ref 99–110)
CLARITY UR: ABNORMAL
CO2 SERPL-SCNC: 41 MMOL/L (ref 21–32)
COLOR UR: ABNORMAL
CREAT SERPL-MCNC: 0.6 MG/DL (ref 0.6–1.2)
DEPRECATED RDW RBC AUTO: 13.4 % (ref 12.4–15.4)
EOSINOPHIL # BLD: 1.3 K/UL (ref 0–0.6)
EOSINOPHIL NFR BLD: 8 %
GFR SERPLBLD CREATININE-BSD FMLA CKD-EPI: >90 ML/MIN/{1.73_M2}
GLUCOSE SERPL-MCNC: 116 MG/DL (ref 70–99)
GLUCOSE UR STRIP.AUTO-MCNC: NEGATIVE MG/DL
HCT VFR BLD AUTO: 38.5 % (ref 36–48)
HGB BLD-MCNC: 12 G/DL (ref 12–16)
HGB UR QL STRIP.AUTO: ABNORMAL
KETONES UR STRIP.AUTO-MCNC: NEGATIVE MG/DL
LACTATE BLDV-SCNC: 0.6 MMOL/L (ref 0.4–1.9)
LEUKOCYTE ESTERASE UR QL STRIP.AUTO: ABNORMAL
LYMPHOCYTES # BLD: 3 K/UL (ref 1–5.1)
LYMPHOCYTES NFR BLD: 18 %
MCH RBC QN AUTO: 30.3 PG (ref 26–34)
MCHC RBC AUTO-ENTMCNC: 31.1 G/DL (ref 31–36)
MCV RBC AUTO: 97.5 FL (ref 80–100)
MONOCYTES # BLD: 1.6 K/UL (ref 0–1.3)
MONOCYTES NFR BLD: 10 %
NEUTROPHILS # BLD: 10.5 K/UL (ref 1.7–7.7)
NEUTROPHILS NFR BLD: 64 %
NITRITE UR QL STRIP.AUTO: NEGATIVE
PATH INTERP BLD-IMP: YES
PH UR STRIP.AUTO: 6.5 [PH] (ref 5–8)
PLATELET # BLD AUTO: 233 K/UL (ref 135–450)
PLATELET BLD QL SMEAR: ADEQUATE
PMV BLD AUTO: 9.8 FL (ref 5–10.5)
POIKILOCYTOSIS BLD QL SMEAR: ABNORMAL
POTASSIUM SERPL-SCNC: 3.6 MMOL/L (ref 3.5–5.1)
PROT SERPL-MCNC: 7.1 G/DL (ref 6.4–8.2)
PROT UR STRIP.AUTO-MCNC: 100 MG/DL
RBC # BLD AUTO: 3.95 M/UL (ref 4–5.2)
RBC #/AREA URNS HPF: >100 /HPF (ref 0–4)
SCHISTOCYTES BLD QL SMEAR: ABNORMAL
SLIDE REVIEW: ABNORMAL
SODIUM SERPL-SCNC: 142 MMOL/L (ref 136–145)
SP GR UR STRIP.AUTO: 1.02 (ref 1–1.03)
STOMATOCYTES BLD QL SMEAR: ABNORMAL
UA COMPLETE W REFLEX CULTURE PNL UR: ABNORMAL
UA DIPSTICK W REFLEX MICRO PNL UR: YES
URN SPEC COLLECT METH UR: ABNORMAL
UROBILINOGEN UR STRIP-ACNC: 1 E.U./DL
WBC # BLD AUTO: 16.4 K/UL (ref 4–11)
WBC #/AREA URNS HPF: ABNORMAL /HPF (ref 0–5)

## 2024-07-20 PROCEDURE — 80053 COMPREHEN METABOLIC PANEL: CPT

## 2024-07-20 PROCEDURE — 81001 URINALYSIS AUTO W/SCOPE: CPT

## 2024-07-20 PROCEDURE — 96375 TX/PRO/DX INJ NEW DRUG ADDON: CPT

## 2024-07-20 PROCEDURE — 6370000000 HC RX 637 (ALT 250 FOR IP): Performed by: PHYSICIAN ASSISTANT

## 2024-07-20 PROCEDURE — 85025 COMPLETE CBC W/AUTO DIFF WBC: CPT

## 2024-07-20 PROCEDURE — 87040 BLOOD CULTURE FOR BACTERIA: CPT

## 2024-07-20 PROCEDURE — 96374 THER/PROPH/DIAG INJ IV PUSH: CPT

## 2024-07-20 PROCEDURE — 2580000003 HC RX 258: Performed by: PHYSICIAN ASSISTANT

## 2024-07-20 PROCEDURE — 36415 COLL VENOUS BLD VENIPUNCTURE: CPT

## 2024-07-20 PROCEDURE — 74176 CT ABD & PELVIS W/O CONTRAST: CPT

## 2024-07-20 PROCEDURE — 6360000002 HC RX W HCPCS: Performed by: STUDENT IN AN ORGANIZED HEALTH CARE EDUCATION/TRAINING PROGRAM

## 2024-07-20 PROCEDURE — 83605 ASSAY OF LACTIC ACID: CPT

## 2024-07-20 RX ORDER — CEPHALEXIN 250 MG/1
500 CAPSULE ORAL ONCE
Status: COMPLETED | OUTPATIENT
Start: 2024-07-20 | End: 2024-07-20

## 2024-07-20 RX ORDER — OXYCODONE HYDROCHLORIDE AND ACETAMINOPHEN 5; 325 MG/1; MG/1
1 TABLET ORAL EVERY 6 HOURS PRN
Qty: 12 TABLET | Refills: 0 | Status: SHIPPED | OUTPATIENT
Start: 2024-07-20 | End: 2024-07-23

## 2024-07-20 RX ORDER — 0.9 % SODIUM CHLORIDE 0.9 %
1000 INTRAVENOUS SOLUTION INTRAVENOUS ONCE
Status: COMPLETED | OUTPATIENT
Start: 2024-07-20 | End: 2024-07-20

## 2024-07-20 RX ORDER — TAMSULOSIN HYDROCHLORIDE 0.4 MG/1
0.4 CAPSULE ORAL DAILY
Qty: 5 CAPSULE | Refills: 0 | Status: SHIPPED | OUTPATIENT
Start: 2024-07-20 | End: 2024-07-25

## 2024-07-20 RX ORDER — ONDANSETRON 2 MG/ML
4 INJECTION INTRAMUSCULAR; INTRAVENOUS ONCE
Status: COMPLETED | OUTPATIENT
Start: 2024-07-20 | End: 2024-07-20

## 2024-07-20 RX ORDER — IBUPROFEN 600 MG/1
600 TABLET ORAL 4 TIMES DAILY PRN
Qty: 40 TABLET | Refills: 0 | Status: SHIPPED | OUTPATIENT
Start: 2024-07-20

## 2024-07-20 RX ORDER — KETOROLAC TROMETHAMINE 30 MG/ML
15 INJECTION, SOLUTION INTRAMUSCULAR; INTRAVENOUS ONCE
Status: COMPLETED | OUTPATIENT
Start: 2024-07-20 | End: 2024-07-20

## 2024-07-20 RX ORDER — ONDANSETRON 4 MG/1
4 TABLET, ORALLY DISINTEGRATING ORAL 3 TIMES DAILY PRN
Qty: 21 TABLET | Refills: 0 | Status: SHIPPED | OUTPATIENT
Start: 2024-07-20

## 2024-07-20 RX ADMIN — SODIUM CHLORIDE 1000 ML: 9 INJECTION, SOLUTION INTRAVENOUS at 01:32

## 2024-07-20 RX ADMIN — ONDANSETRON 4 MG: 2 INJECTION INTRAMUSCULAR; INTRAVENOUS at 02:50

## 2024-07-20 RX ADMIN — CEPHALEXIN 500 MG: 250 CAPSULE ORAL at 01:06

## 2024-07-20 RX ADMIN — KETOROLAC TROMETHAMINE 15 MG: 30 INJECTION, SOLUTION INTRAMUSCULAR at 02:50

## 2024-07-20 ASSESSMENT — PAIN SCALES - GENERAL
PAINLEVEL_OUTOF10: 6
PAINLEVEL_OUTOF10: 6

## 2024-07-20 ASSESSMENT — PAIN DESCRIPTION - LOCATION
LOCATION: BACK
LOCATION: BACK

## 2024-07-20 ASSESSMENT — PAIN - FUNCTIONAL ASSESSMENT: PAIN_FUNCTIONAL_ASSESSMENT: 0-10

## 2024-07-21 LAB — BACTERIA BLD CULT: NORMAL

## 2024-07-21 NOTE — ED PROVIDER NOTES
[facility]  ED  Emergency Department Encounter    Patient Name: Maranda Burns  MRN: [id number]  YOB: 1960  Date of Evaluation: 7/19/2024  Provider: London Mcrae DO  Note Started: 12:31 AM EDT 7/20/24    CHIEF COMPLAINT  Hematuria (Back pain and hematuria started around 930pm this evening.)    SHARED SERVICE VISIT  I have seen and evaluated this patient with my supervising physician, Dr. Ryan.     HISTORY OF PRESENT ILLNESS  Maranda Burns is a 64 y.o. female who presents to the ED several hour history of hematuria.  Patient reports some mild lower abdominal discomfort.  Increased urinary frequency with mild dysuria.  History of UTIs although states that they are not typically bloody.  She denies any fevers chills.  Has had some nausea without vomiting.  No diarrhea or constipation.  No back or flank pain.  She uses no blood thinners.  She denies headache, lightheadedness, dizziness or confusion..    No other complaints, modifying factors or associated symptoms.     Nursing notes reviewed were all reviewed and agreed with or any disagreements were addressed in the HPI.    PMH:  Past Medical History:   Diagnosis Date    Back pain     COPD (chronic obstructive pulmonary disease) ([facility])     Fatigue     Hypertension     Syncope and collapse 03/2019    Thyroid disease     Ulcerative colitis, unspecified, without complications ([facility])      Surgical History:  Past Surgical History:   Procedure Laterality Date    BACK SURGERY      L4-L5 rods in    COLONOSCOPY N/A 07/07/2021    COLONOSCOPY WITH BIOPSY performed by Hetal Larson MD at [facility] ENDOSCOPY    HYSTERECTOMY (CERVIX STATUS UNKNOWN)      HYSTERECTOMY, VAGINAL  2000    LUNG SURGERY Right 07/2023    OVARY REMOVAL      SPLENECTOMY  2019    due to a fall    TONSILLECTOMY       Family History:  Family History   Problem Relation Age of Onset    Heart Disease Father     High Blood Pressure Sister     Breast Cancer Maternal Aunt 74 
bolus 1,000 mL (0 mLs IntraVENous Stopped 7/20/24 0252)   ketorolac (TORADOL) injection 15 mg (15 mg IntraVENous Given 7/20/24 0250)   ondansetron (ZOFRAN) injection 4 mg (4 mg IntraVENous Given 7/20/24 0250)     CT ABDOMEN PELVIS WO CONTRAST Additional Contrast? None   Final Result   1. There is a 0.5 cm nonobstructing stone near the left ureteropelvic   junction.  No evidence of obstructive uropathy.   2. Moderate colonic stool burden.           Labs Reviewed   URINALYSIS WITH REFLEX TO CULTURE - Abnormal; Notable for the following components:       Result Value    Color, UA RED (*)     Clarity, UA TURBID (*)     Blood, Urine LARGE (*)     Protein,  (*)     Leukocyte Esterase, Urine SMALL (*)     All other components within normal limits   MICROSCOPIC URINALYSIS - Abnormal; Notable for the following components:    RBC, UA >100 (*)     All other components within normal limits   CBC WITH AUTO DIFFERENTIAL - Abnormal; Notable for the following components:    WBC 16.4 (*)     RBC 3.95 (*)     Neutrophils Absolute 10.5 (*)     Monocytes Absolute 1.6 (*)     Eosinophils Absolute 1.3 (*)     Poikilocytes Occasional (*)     Schistocytes Occasional (*)     Stomatocytes Occasional (*)     All other components within normal limits   COMPREHENSIVE METABOLIC PANEL - Abnormal; Notable for the following components:    Chloride 94 (*)     CO2 41 (*)     Glucose 116 (*)     All other components within normal limits   CULTURE, BLOOD 1    Narrative:     ORDER#: [id number]                          ORDERED BY: HOWIE SIEGEL  SOURCE: Blood No site given                COLLECTED:  07/20/24 01:35  ANTIBIOTICS AT [facility].:                      RECEIVED :  07/20/24 12:09  If child <=2 yrs old please draw pediatric bottle.~Blood Culture 1   LACTATE, SEPSIS         I, Dr. Ryan, am the primary clinician of record on the case.  I otherwise agree with the documentation performed by the resident/GISELE.    FINAL IMPRESSION      1. Left

## 2024-07-22 ENCOUNTER — APPOINTMENT (OUTPATIENT)
Dept: CARDIAC REHAB | Age: 64
End: 2024-07-22
Attending: INTERNAL MEDICINE
Payer: MEDICARE

## 2024-07-23 ENCOUNTER — OFFICE VISIT (OUTPATIENT)
Dept: PULMONOLOGY | Age: 64
End: 2024-07-23

## 2024-07-23 VITALS
DIASTOLIC BLOOD PRESSURE: 89 MMHG | SYSTOLIC BLOOD PRESSURE: 145 MMHG | TEMPERATURE: 96.9 F | HEART RATE: 116 BPM | WEIGHT: 88.6 LBS | RESPIRATION RATE: 16 BRPM | BODY MASS INDEX: 15.7 KG/M2 | HEIGHT: 63 IN | OXYGEN SATURATION: 96 %

## 2024-07-23 DIAGNOSIS — J43.1 PANLOBULAR EMPHYSEMA (HCC): ICD-10-CM

## 2024-07-23 DIAGNOSIS — J96.12 CHRONIC RESPIRATORY FAILURE WITH HYPOXIA AND HYPERCAPNIA (HCC): ICD-10-CM

## 2024-07-23 DIAGNOSIS — J44.9 END STAGE COPD (HCC): Primary | ICD-10-CM

## 2024-07-23 DIAGNOSIS — J96.11 CHRONIC RESPIRATORY FAILURE WITH HYPOXIA AND HYPERCAPNIA (HCC): ICD-10-CM

## 2024-07-23 LAB
FENO: 18 PPB
PATH INTERP BLD-IMP: NORMAL

## 2024-07-23 ASSESSMENT — PULMONARY FUNCTION TESTS: FENO: 18

## 2024-07-23 NOTE — PROGRESS NOTES
MA Communication:  The following orders are received by verbal communication from Mamie Morgan MD    Orders include:    Dupixent paperwork  [place]  Follow up 3 months

## 2024-07-23 NOTE — PATIENT INSTRUCTIONS
Continue Trelegy Ellipta 200 mcg with mouth washing after use.  Continue albuterol as needed  Continue azithromycin 3 times a week  Will order Dupixant with her eosinophilia and persistent shortness of breath despite standard therapy  FeNO today  Will give 5-day course of steroid to be available at home and use if patient has mild exacerbation  Continue exercises learned in the pulmonary rehab at home as able  BiPAP desensitization techniques provided.  Use during the day while watching the TV as possible  Repeat vital signs/heart rate before discharge from clinic  Follow-up in 3 months      Health Maintenance/Preventive measures:        >>  Avoid exposure to tobacco, irritants, allergens as possible as well as contact with patients with infectious respiratory illness.        >>  Stay up-to-date with influenza & pneumonia vaccines, RSV, & COVID-19 vaccine as recommended by the Advisory Committee on Immunization Practices (ACIP)        >>  Healthy diet and activity as able.        >>  Acid reflux precautions: Head of bed elevation, avoiding tight clothes, avoiding big meals or snacking 3 hours before bedtime, targeting healthy weight.        >>  Practice sleep hygiene measures. Avoid driving or operating heavy machines if tired or sleepy.

## 2024-07-24 ENCOUNTER — TELEPHONE (OUTPATIENT)
Dept: PULMONOLOGY | Age: 64
End: 2024-07-24

## 2024-07-24 ENCOUNTER — OFFICE VISIT (OUTPATIENT)
Dept: FAMILY MEDICINE CLINIC | Age: 64
End: 2024-07-24
Payer: MEDICARE

## 2024-07-24 ENCOUNTER — APPOINTMENT (OUTPATIENT)
Dept: CARDIAC REHAB | Age: 64
End: 2024-07-24
Attending: INTERNAL MEDICINE
Payer: MEDICARE

## 2024-07-24 VITALS — HEART RATE: 118 BPM | OXYGEN SATURATION: 90 %

## 2024-07-24 DIAGNOSIS — M54.6 CHRONIC LEFT-SIDED THORACIC BACK PAIN: ICD-10-CM

## 2024-07-24 DIAGNOSIS — R61 DIAPHORESIS: ICD-10-CM

## 2024-07-24 DIAGNOSIS — G89.29 CHRONIC LEFT-SIDED LOW BACK PAIN WITH LEFT-SIDED SCIATICA: ICD-10-CM

## 2024-07-24 DIAGNOSIS — G89.29 CHRONIC LEFT-SIDED THORACIC BACK PAIN: ICD-10-CM

## 2024-07-24 DIAGNOSIS — R79.89 ELEVATED PLASMA METANEPHRINES: ICD-10-CM

## 2024-07-24 DIAGNOSIS — R00.0 TACHYCARDIA: Primary | ICD-10-CM

## 2024-07-24 DIAGNOSIS — M54.42 CHRONIC LEFT-SIDED LOW BACK PAIN WITH LEFT-SIDED SCIATICA: ICD-10-CM

## 2024-07-24 DIAGNOSIS — I10 EPISODE OF HYPERTENSION: ICD-10-CM

## 2024-07-24 LAB — BACTERIA BLD CULT: NORMAL

## 2024-07-24 PROCEDURE — 99214 OFFICE O/P EST MOD 30 MIN: CPT | Performed by: STUDENT IN AN ORGANIZED HEALTH CARE EDUCATION/TRAINING PROGRAM

## 2024-07-24 PROCEDURE — 3017F COLORECTAL CA SCREEN DOC REV: CPT | Performed by: STUDENT IN AN ORGANIZED HEALTH CARE EDUCATION/TRAINING PROGRAM

## 2024-07-24 PROCEDURE — G8428 CUR MEDS NOT DOCUMENT: HCPCS | Performed by: STUDENT IN AN ORGANIZED HEALTH CARE EDUCATION/TRAINING PROGRAM

## 2024-07-24 PROCEDURE — 1036F TOBACCO NON-USER: CPT | Performed by: STUDENT IN AN ORGANIZED HEALTH CARE EDUCATION/TRAINING PROGRAM

## 2024-07-24 PROCEDURE — G8419 CALC BMI OUT NRM PARAM NOF/U: HCPCS | Performed by: STUDENT IN AN ORGANIZED HEALTH CARE EDUCATION/TRAINING PROGRAM

## 2024-07-24 RX ORDER — LIDOCAINE 50 MG/G
1 PATCH TOPICAL DAILY
Qty: 10 PATCH | Refills: 0 | Status: SHIPPED | OUTPATIENT
Start: 2024-07-24 | End: 2024-08-03

## 2024-07-24 NOTE — PROGRESS NOTES
[facility]  2024    Maranda Burns (:  1960) is a 64 y.o. female, here for evaluation of the following medical concerns:    No chief complaint on file.       ASSESSMENT/ PLAN  1. Tachycardia  -Previous history of isolated elevated metanephrines.  She is no longer followed by endocrinology.  Endocrinology was working her up for Sin's disease.  She has been feeling like she might pass out when she goes from sitting to standing.  She has also had episodic hypertension and hypertension.  She has had some headaches, breaking out in a sweat and left arm pain.  Will check TSH, metanephrines, and cortisol.  Recommend ER for chest pain or pressure with exercise that does not resolve with rest or that occurs at rest.  Unfortunately EKG in the office would not read today.  Will order an EKG to be performed at the hospital.  Recommend ER for heart rate above 140 or any other severe symptoms.  - Cortisol PM, Total; Future  - CBC with Auto Differential; Future  - Comprehensive Metabolic Panel; Future  - TSH with Reflex; Future  - METANEPHRINES PLASMA FREE; Future  - EKG 12 lead; Future    2. Elevated plasma metanephrines  -Recheck metanephrines.  - Cortisol PM, Total; Future  - CBC with Auto Differential; Future  - Comprehensive Metabolic Panel; Future  - TSH with Reflex; Future  - METANEPHRINES PLASMA FREE; Future  - EKG 12 lead; Future    3. Episode of hypertension  -Episodic hypotension and hypertension.  Check metanephrines and TSH.  Check cortisol.  Further pending lab results.  - Cortisol PM, Total; Future  - CBC with Auto Differential; Future  - Comprehensive Metabolic Panel; Future  - TSH with Reflex; Future  - METANEPHRINES PLASMA FREE; Future  - EKG 12 lead; Future    4. Diaphoresis  - Cortisol PM, Total; Future  - CBC with Auto Differential; Future  - Comprehensive Metabolic Panel; Future  - TSH with Reflex; Future  - METANEPHRINES PLASMA FREE; Future  - EKG 12 lead; Future    5.

## 2024-07-24 NOTE — TELEPHONE ENCOUNTER
Paperwork was filled out and signed by patient and Dr. Morgan for Dupixent.    I sent paperwork back to Dr. Morgan to add an appropriate diagnosis code.

## 2024-07-25 ENCOUNTER — HOSPITAL ENCOUNTER (OUTPATIENT)
Dept: GENERAL RADIOLOGY | Age: 64
Discharge: HOME | End: 2024-07-25
Payer: MEDICARE

## 2024-07-25 ENCOUNTER — HOSPITAL ENCOUNTER (OUTPATIENT)
Age: 64
Discharge: HOME | End: 2024-07-25
Payer: MEDICARE

## 2024-07-25 DIAGNOSIS — M54.42 CHRONIC LEFT-SIDED LOW BACK PAIN WITH LEFT-SIDED SCIATICA: ICD-10-CM

## 2024-07-25 DIAGNOSIS — R61 DIAPHORESIS: ICD-10-CM

## 2024-07-25 DIAGNOSIS — R79.89 ELEVATED PLASMA METANEPHRINES: ICD-10-CM

## 2024-07-25 DIAGNOSIS — G89.29 CHRONIC LEFT-SIDED LOW BACK PAIN WITH LEFT-SIDED SCIATICA: ICD-10-CM

## 2024-07-25 DIAGNOSIS — I10 EPISODE OF HYPERTENSION: ICD-10-CM

## 2024-07-25 DIAGNOSIS — M54.6 CHRONIC LEFT-SIDED THORACIC BACK PAIN: ICD-10-CM

## 2024-07-25 DIAGNOSIS — G89.29 CHRONIC LEFT-SIDED THORACIC BACK PAIN: ICD-10-CM

## 2024-07-25 DIAGNOSIS — R00.0 TACHYCARDIA: ICD-10-CM

## 2024-07-25 LAB
ALBUMIN SERPL-MCNC: 4.2 G/DL (ref 3.4–5)
ALBUMIN/GLOB SERPL: 1.4 {RATIO} (ref 1.1–2.2)
ALP SERPL-CCNC: 121 U/L (ref 40–129)
ALT SERPL-CCNC: 15 U/L (ref 10–40)
ANION GAP SERPL CALCULATED.3IONS-SCNC: 8 MMOL/L (ref 3–16)
AST SERPL-CCNC: 21 U/L (ref 15–37)
BASOPHILS # BLD: 0.1 K/UL (ref 0–0.2)
BASOPHILS NFR BLD: 0.8 %
BILIRUB SERPL-MCNC: 0.6 MG/DL (ref 0–1)
BUN SERPL-MCNC: 10 MG/DL (ref 7–20)
CALCIUM SERPL-MCNC: 10.4 MG/DL (ref 8.3–10.6)
CHLORIDE SERPL-SCNC: 97 MMOL/L (ref 99–110)
CO2 SERPL-SCNC: 35 MMOL/L (ref 21–32)
CORTIS PM SERPL-MCNC: 12.4 UG/DL (ref 3.1–16.7)
CREAT SERPL-MCNC: 0.7 MG/DL (ref 0.6–1.2)
DEPRECATED RDW RBC AUTO: 13.7 % (ref 12.4–15.4)
EOSINOPHIL # BLD: 0.8 K/UL (ref 0–0.6)
EOSINOPHIL NFR BLD: 6.3 %
GFR SERPLBLD CREATININE-BSD FMLA CKD-EPI: >90 ML/MIN/{1.73_M2}
GLUCOSE SERPL-MCNC: 131 MG/DL (ref 70–99)
HCT VFR BLD AUTO: 37.9 % (ref 36–48)
HGB BLD-MCNC: 11.9 G/DL (ref 12–16)
LYMPHOCYTES # BLD: 2.3 K/UL (ref 1–5.1)
LYMPHOCYTES NFR BLD: 18.7 %
MCH RBC QN AUTO: 29.9 PG (ref 26–34)
MCHC RBC AUTO-ENTMCNC: 31.5 G/DL (ref 31–36)
MCV RBC AUTO: 95 FL (ref 80–100)
MONOCYTES # BLD: 1.6 K/UL (ref 0–1.3)
MONOCYTES NFR BLD: 12.9 %
NEUTROPHILS # BLD: 7.6 K/UL (ref 1.7–7.7)
NEUTROPHILS NFR BLD: 61.3 %
PATH INTERP BLD-IMP: NO
PLATELET # BLD AUTO: 277 K/UL (ref 135–450)
PMV BLD AUTO: 10.9 FL (ref 5–10.5)
POTASSIUM SERPL-SCNC: 3.6 MMOL/L (ref 3.5–5.1)
PROT SERPL-MCNC: 7.1 G/DL (ref 6.4–8.2)
RBC # BLD AUTO: 3.98 M/UL (ref 4–5.2)
SODIUM SERPL-SCNC: 140 MMOL/L (ref 136–145)
TSH SERPL DL<=0.005 MIU/L-ACNC: 0.36 UIU/ML (ref 0.27–4.2)
WBC # BLD AUTO: 12.4 K/UL (ref 4–11)

## 2024-07-25 PROCEDURE — 84443 ASSAY THYROID STIM HORMONE: CPT

## 2024-07-25 PROCEDURE — 80053 COMPREHEN METABOLIC PANEL: CPT

## 2024-07-25 PROCEDURE — 82533 TOTAL CORTISOL: CPT

## 2024-07-25 PROCEDURE — 36415 COLL VENOUS BLD VENIPUNCTURE: CPT

## 2024-07-25 PROCEDURE — 72072 X-RAY EXAM THORAC SPINE 3VWS: CPT

## 2024-07-25 PROCEDURE — 83835 ASSAY OF METANEPHRINES: CPT

## 2024-07-25 PROCEDURE — 85025 COMPLETE CBC W/AUTO DIFF WBC: CPT

## 2024-07-25 PROCEDURE — 72100 X-RAY EXAM L-S SPINE 2/3 VWS: CPT

## 2024-07-25 NOTE — TELEPHONE ENCOUNTER
Paperwork faxed to both [employer] [facility] [employer]    PA submitted through [employer] for Dupixent.  Awaiting determination.

## 2024-07-26 ENCOUNTER — APPOINTMENT (OUTPATIENT)
Dept: CARDIAC REHAB | Age: 64
End: 2024-07-26
Attending: INTERNAL MEDICINE
Payer: MEDICARE

## 2024-07-29 ENCOUNTER — APPOINTMENT (OUTPATIENT)
Dept: CARDIAC REHAB | Age: 64
End: 2024-07-29
Attending: INTERNAL MEDICINE
Payer: MEDICARE

## 2024-07-29 DIAGNOSIS — M54.6 CHRONIC LEFT-SIDED THORACIC BACK PAIN: ICD-10-CM

## 2024-07-29 DIAGNOSIS — M54.42 CHRONIC LEFT-SIDED LOW BACK PAIN WITH LEFT-SIDED SCIATICA: Primary | ICD-10-CM

## 2024-07-29 DIAGNOSIS — G57.92 NEUROPATHY OF LEFT LOWER EXTREMITY: ICD-10-CM

## 2024-07-29 DIAGNOSIS — G89.29 CHRONIC LEFT-SIDED LOW BACK PAIN WITH LEFT-SIDED SCIATICA: Primary | ICD-10-CM

## 2024-07-29 DIAGNOSIS — G89.29 CHRONIC LEFT-SIDED THORACIC BACK PAIN: ICD-10-CM

## 2024-07-29 RX ORDER — METOPROLOL SUCCINATE 50 MG/1
50 TABLET, EXTENDED RELEASE ORAL DAILY
Qty: 90 TABLET | Refills: 0 | Status: SHIPPED | OUTPATIENT
Start: 2024-07-29

## 2024-07-29 RX ORDER — GABAPENTIN 300 MG/1
300 CAPSULE ORAL 3 TIMES DAILY
Qty: 90 CAPSULE | Refills: 0 | Status: SHIPPED | OUTPATIENT
Start: 2024-07-29 | End: 2024-08-28

## 2024-07-29 NOTE — TELEPHONE ENCOUNTER
Refill Request       Last Seen: Last Seen Department: 7/24/2024  Last Seen by PCP: 7/24/2024            Next Appointment:   Future Appointments   Date Time Provider Department Center   8/29/2024  1:30 PM London Mcrae DO [place] [facility]   10/23/2024  2:00 PM Mamie Morgan MD [facility]             Requested Prescriptions     Pending Prescriptions Disp Refills    gabapentin (NEURONTIN) 300 MG capsule 90 capsule 0     Sig: Take 1 capsule by mouth 3 times daily for 30 days.    metoprolol succinate (TOPROL XL) 50 MG extended release tablet 90 tablet 0     Sig: Take 1 tablet by mouth daily

## 2024-07-29 NOTE — TELEPHONE ENCOUNTER
[employer] DENIED the Dupixent.  Stated they did not have a qualifying diagnosis.  Diagnosis used by Dr. Morgan was Refractory COPD with Eosinophilia.    Please advise next step.  (Denial letter scanned in media)

## 2024-07-30 LAB
ANNOTATION COMMENT IMP: ABNORMAL
METANEPHS SERPL-SCNC: 0.52 NMOL/L (ref 0–0.49)
NORMETANEPHRINE SERPL-SCNC: 1.92 NMOL/L (ref 0–0.89)

## 2024-07-31 ENCOUNTER — APPOINTMENT (OUTPATIENT)
Dept: CARDIAC REHAB | Age: 64
End: 2024-07-31
Attending: INTERNAL MEDICINE
Payer: MEDICARE

## 2024-07-31 NOTE — TELEPHONE ENCOUNTER
Mamie Morgan MD  [facility] Clinical Staff17 hours ago (4:42 PM)       Insurance denial as expected.  Please let the patient know.     Left detailed message informing patient of this.

## 2024-08-01 ENCOUNTER — TELEPHONE (OUTPATIENT)
Dept: FAMILY MEDICINE CLINIC | Age: 64
End: 2024-08-01

## 2024-08-01 DIAGNOSIS — R82.5 HIGH CATECHOLAMINES: Primary | ICD-10-CM

## 2024-08-12 ENCOUNTER — OFFICE VISIT (OUTPATIENT)
Dept: ORTHOPEDIC SURGERY | Age: 64
End: 2024-08-12
Payer: MEDICARE

## 2024-08-12 VITALS — BODY MASS INDEX: 15.7 KG/M2 | WEIGHT: 88.63 LBS | HEIGHT: 63 IN

## 2024-08-12 DIAGNOSIS — M48.062 LUMBAR STENOSIS WITH NEUROGENIC CLAUDICATION: Primary | ICD-10-CM

## 2024-08-12 PROCEDURE — 3017F COLORECTAL CA SCREEN DOC REV: CPT | Performed by: ORTHOPAEDIC SURGERY

## 2024-08-12 PROCEDURE — 99203 OFFICE O/P NEW LOW 30 MIN: CPT | Performed by: ORTHOPAEDIC SURGERY

## 2024-08-12 PROCEDURE — G8427 DOCREV CUR MEDS BY ELIG CLIN: HCPCS | Performed by: ORTHOPAEDIC SURGERY

## 2024-08-12 PROCEDURE — G8419 CALC BMI OUT NRM PARAM NOF/U: HCPCS | Performed by: ORTHOPAEDIC SURGERY

## 2024-08-12 PROCEDURE — 1036F TOBACCO NON-USER: CPT | Performed by: ORTHOPAEDIC SURGERY

## 2024-08-12 NOTE — PROGRESS NOTES
New Patient: LUMBAR SPINE    Referring Provider:  London Mcrae DO    CHIEF COMPLAINT:    Chief Complaint   Patient presents with    Back Pain     NP Lumbar         HISTORY OF PRESENT ILLNESS:    Ms. Maranda Burns  is a pleasant 64 y.o. female who is status post lumbar fusion L4-L5 and SI fusion presents with an exacerbation of her chronic low back and left posterior thigh pain which she rates 7/10.  She reports numbness tingling weakness of her left leg.  She has had similar symptoms in the past She denies saddle anesthesia and bowel or bladder dysfunction.      Current/Past Treatment:   Physical Therapy: Yes  Chiropractic: No  Injection: Yes  Medications: Gabapentin and Motrin    Past Medical History:   Past Medical History:   Diagnosis Date    Back pain     COPD (chronic obstructive pulmonary disease) ([facility])     Fatigue     Hypertension     Syncope and collapse 03/2019    Thyroid disease     Ulcerative colitis, unspecified, without complications ([facility])       Past Surgical History:     Past Surgical History:   Procedure Laterality Date    BACK SURGERY      L4-L5 rods in    COLONOSCOPY N/A 07/07/2021    COLONOSCOPY WITH BIOPSY performed by Hetal Larson MD at [facility] ENDOSCOPY    HYSTERECTOMY (CERVIX STATUS UNKNOWN)      HYSTERECTOMY, VAGINAL  2000    LUNG SURGERY Right 07/2023    OVARY REMOVAL      SPLENECTOMY  2019    due to a fall    TONSILLECTOMY       Current Medications:     Current Outpatient Medications:     gabapentin (NEURONTIN) 300 MG capsule, Take 1 capsule by mouth 3 times daily for 30 days., Disp: 90 capsule, Rfl: 0    metoprolol succinate (TOPROL XL) 50 MG extended release tablet, Take 1 tablet by mouth daily, Disp: 90 tablet, Rfl: 0    ibuprofen (ADVIL;MOTRIN) 600 MG tablet, Take 1 tablet by mouth 4 times daily as needed for Pain, Disp: 40 tablet, Rfl: 0    tamsulosin (FLOMAX) 0.4 MG capsule, Take 1 capsule by mouth daily for 5 doses (Patient not taking: Reported on 7/23/2024), Disp: 5 capsule,

## 2024-08-28 RX ORDER — BUSPIRONE HYDROCHLORIDE 10 MG/1
10 TABLET ORAL 3 TIMES DAILY
Qty: 90 TABLET | Refills: 2 | Status: SHIPPED | OUTPATIENT
Start: 2024-08-28 | End: 2024-08-29

## 2024-08-29 ENCOUNTER — OFFICE VISIT (OUTPATIENT)
Dept: FAMILY MEDICINE CLINIC | Age: 64
End: 2024-08-29
Payer: MEDICARE

## 2024-08-29 VITALS
BODY MASS INDEX: 14.74 KG/M2 | HEART RATE: 72 BPM | OXYGEN SATURATION: 99 % | DIASTOLIC BLOOD PRESSURE: 82 MMHG | SYSTOLIC BLOOD PRESSURE: 114 MMHG | WEIGHT: 83.2 LBS | RESPIRATION RATE: 16 BRPM | HEIGHT: 63 IN

## 2024-08-29 DIAGNOSIS — J44.1 COPD EXACERBATION (HCC): ICD-10-CM

## 2024-08-29 DIAGNOSIS — M94.0 COSTOCHONDRITIS: ICD-10-CM

## 2024-08-29 DIAGNOSIS — D72.821 MONOCYTOSIS: ICD-10-CM

## 2024-08-29 DIAGNOSIS — F33.1 MODERATE EPISODE OF RECURRENT MAJOR DEPRESSIVE DISORDER (HCC): Primary | ICD-10-CM

## 2024-08-29 DIAGNOSIS — R73.03 PREDIABETES: ICD-10-CM

## 2024-08-29 LAB
BASOPHILS # BLD: 0.1 K/UL (ref 0–0.2)
BASOPHILS NFR BLD: 0.5 %
DEPRECATED RDW RBC AUTO: 13.4 % (ref 12.4–15.4)
EOSINOPHIL # BLD: 1.2 K/UL (ref 0–0.6)
EOSINOPHIL NFR BLD: 10 %
HCT VFR BLD AUTO: 33.7 % (ref 36–48)
HGB BLD-MCNC: 11 G/DL (ref 12–16)
LYMPHOCYTES # BLD: 3 K/UL (ref 1–5.1)
LYMPHOCYTES NFR BLD: 25.3 %
MCH RBC QN AUTO: 31.1 PG (ref 26–34)
MCHC RBC AUTO-ENTMCNC: 32.7 G/DL (ref 31–36)
MCV RBC AUTO: 95.2 FL (ref 80–100)
MONOCYTES # BLD: 1.5 K/UL (ref 0–1.3)
MONOCYTES NFR BLD: 12.8 %
NEUTROPHILS # BLD: 6.1 K/UL (ref 1.7–7.7)
NEUTROPHILS NFR BLD: 51.4 %
PATH INTERP BLD-IMP: NORMAL
PLATELET # BLD AUTO: 315 K/UL (ref 135–450)
PMV BLD AUTO: 10.4 FL (ref 5–10.5)
RBC # BLD AUTO: 3.54 M/UL (ref 4–5.2)
WBC # BLD AUTO: 11.8 K/UL (ref 4–11)

## 2024-08-29 PROCEDURE — 3074F SYST BP LT 130 MM HG: CPT | Performed by: STUDENT IN AN ORGANIZED HEALTH CARE EDUCATION/TRAINING PROGRAM

## 2024-08-29 PROCEDURE — G8419 CALC BMI OUT NRM PARAM NOF/U: HCPCS | Performed by: STUDENT IN AN ORGANIZED HEALTH CARE EDUCATION/TRAINING PROGRAM

## 2024-08-29 PROCEDURE — 36415 COLL VENOUS BLD VENIPUNCTURE: CPT | Performed by: STUDENT IN AN ORGANIZED HEALTH CARE EDUCATION/TRAINING PROGRAM

## 2024-08-29 PROCEDURE — 1036F TOBACCO NON-USER: CPT | Performed by: STUDENT IN AN ORGANIZED HEALTH CARE EDUCATION/TRAINING PROGRAM

## 2024-08-29 PROCEDURE — 3017F COLORECTAL CA SCREEN DOC REV: CPT | Performed by: STUDENT IN AN ORGANIZED HEALTH CARE EDUCATION/TRAINING PROGRAM

## 2024-08-29 PROCEDURE — 3079F DIAST BP 80-89 MM HG: CPT | Performed by: STUDENT IN AN ORGANIZED HEALTH CARE EDUCATION/TRAINING PROGRAM

## 2024-08-29 PROCEDURE — 3023F SPIROM DOC REV: CPT | Performed by: STUDENT IN AN ORGANIZED HEALTH CARE EDUCATION/TRAINING PROGRAM

## 2024-08-29 PROCEDURE — 90750 HZV VACC RECOMBINANT IM: CPT | Performed by: STUDENT IN AN ORGANIZED HEALTH CARE EDUCATION/TRAINING PROGRAM

## 2024-08-29 PROCEDURE — 90471 IMMUNIZATION ADMIN: CPT | Performed by: STUDENT IN AN ORGANIZED HEALTH CARE EDUCATION/TRAINING PROGRAM

## 2024-08-29 PROCEDURE — 99214 OFFICE O/P EST MOD 30 MIN: CPT | Performed by: STUDENT IN AN ORGANIZED HEALTH CARE EDUCATION/TRAINING PROGRAM

## 2024-08-29 PROCEDURE — G8427 DOCREV CUR MEDS BY ELIG CLIN: HCPCS | Performed by: STUDENT IN AN ORGANIZED HEALTH CARE EDUCATION/TRAINING PROGRAM

## 2024-08-29 RX ORDER — BUSPIRONE HYDROCHLORIDE 15 MG/1
15 TABLET ORAL 3 TIMES DAILY
Qty: 90 TABLET | Refills: 2 | Status: SHIPPED | OUTPATIENT
Start: 2024-08-29

## 2024-08-29 RX ORDER — PANTOPRAZOLE SODIUM 40 MG/1
40 TABLET, DELAYED RELEASE ORAL DAILY
Qty: 30 TABLET | Refills: 5 | Status: SHIPPED | OUTPATIENT
Start: 2024-08-29

## 2024-08-29 RX ORDER — PREDNISONE 50 MG/1
50 TABLET ORAL DAILY
Qty: 5 TABLET | Refills: 0 | Status: SHIPPED | OUTPATIENT
Start: 2024-08-29 | End: 2024-09-03

## 2024-08-29 RX ORDER — ARIPIPRAZOLE 5 MG/1
5 TABLET ORAL DAILY
Qty: 30 TABLET | Refills: 5 | Status: SHIPPED | OUTPATIENT
Start: 2024-08-29

## 2024-08-29 ASSESSMENT — ENCOUNTER SYMPTOMS
SHORTNESS OF BREATH: 0
COUGH: 1
RHINORRHEA: 0

## 2024-08-29 ASSESSMENT — PATIENT HEALTH QUESTIONNAIRE - PHQ9
SUM OF ALL RESPONSES TO PHQ QUESTIONS 1-9: 12
9. THOUGHTS THAT YOU WOULD BE BETTER OFF DEAD, OR OF HURTING YOURSELF: NOT AT ALL
3. TROUBLE FALLING OR STAYING ASLEEP: NEARLY EVERY DAY
8. MOVING OR SPEAKING SO SLOWLY THAT OTHER PEOPLE COULD HAVE NOTICED. OR THE OPPOSITE, BEING SO FIGETY OR RESTLESS THAT YOU HAVE BEEN MOVING AROUND A LOT MORE THAN USUAL: NOT AT ALL
2. FEELING DOWN, DEPRESSED OR HOPELESS: NEARLY EVERY DAY
SUM OF ALL RESPONSES TO PHQ QUESTIONS 1-9: 12
10. IF YOU CHECKED OFF ANY PROBLEMS, HOW DIFFICULT HAVE THESE PROBLEMS MADE IT FOR YOU TO DO YOUR WORK, TAKE CARE OF THINGS AT HOME, OR GET ALONG WITH OTHER PEOPLE: SOMEWHAT DIFFICULT
SUM OF ALL RESPONSES TO PHQ QUESTIONS 1-9: 12
7. TROUBLE CONCENTRATING ON THINGS, SUCH AS READING THE NEWSPAPER OR WATCHING TELEVISION: NOT AT ALL
5. POOR APPETITE OR OVEREATING: NEARLY EVERY DAY
6. FEELING BAD ABOUT YOURSELF - OR THAT YOU ARE A FAILURE OR HAVE LET YOURSELF OR YOUR FAMILY DOWN: NOT AT ALL
SUM OF ALL RESPONSES TO PHQ QUESTIONS 1-9: 12
4. FEELING TIRED OR HAVING LITTLE ENERGY: NEARLY EVERY DAY

## 2024-08-29 NOTE — ASSESSMENT & PLAN NOTE
-Recheck A1c and continue diet.    Orders:    ARIPiprazole (ABILIFY) 5 MG tablet; Take 1 tablet by mouth daily    pantoprazole (PROTONIX) 40 MG tablet; Take 1 tablet by mouth daily    CBC with Auto Differential; Future    Path Review, Smear; Future    Hemoglobin A1C; Future    Hemoglobin A1C    Path Review, Smear    CBC with Auto Differential

## 2024-08-29 NOTE — ASSESSMENT & PLAN NOTE
-Repeat peripheral smear to ensure resolution of monocytosis.  If not resolved consider hematology consult.    Orders:    ARIPiprazole (ABILIFY) 5 MG tablet; Take 1 tablet by mouth daily    pantoprazole (PROTONIX) 40 MG tablet; Take 1 tablet by mouth daily    CBC with Auto Differential; Future    Path Review, Smear; Future    Hemoglobin A1C; Future    Hemoglobin A1C    Path Review, Smear    CBC with Auto Differential

## 2024-08-29 NOTE — ASSESSMENT & PLAN NOTE
Uncontrolled, continue current medications and resume Abilify    Orders:    ARIPiprazole (ABILIFY) 5 MG tablet; Take 1 tablet by mouth daily    pantoprazole (PROTONIX) 40 MG tablet; Take 1 tablet by mouth daily    CBC with Auto Differential; Future    Path Review, Smear; Future    Hemoglobin A1C; Future    Hemoglobin A1C    Path Review, Smear    CBC with Auto Differential

## 2024-08-29 NOTE — ASSESSMENT & PLAN NOTE
-Start prednisone and follow-up for new or worsening symptoms.    Orders:    ARIPiprazole (ABILIFY) 5 MG tablet; Take 1 tablet by mouth daily    pantoprazole (PROTONIX) 40 MG tablet; Take 1 tablet by mouth daily    CBC with Auto Differential; Future    Path Review, Smear; Future    Hemoglobin A1C; Future    predniSONE (DELTASONE) 50 MG tablet; Take 1 tablet by mouth daily for 5 days    Hemoglobin A1C    Path Review, Smear    CBC with Auto Differential

## 2024-08-29 NOTE — PROGRESS NOTES
[facility]  2024    Maranda Burns (:  1960) is a 64 y.o. female, here for evaluation of the following medical concerns:    Chief Complaint   Patient presents with    1 Month Follow-Up     Pt is here for a 1 month follow up         ASSESSMENT/ PLAN  Assessment & Plan  Moderate episode of recurrent major depressive disorder (HCC)   Uncontrolled, continue current medications and resume Abilify    Orders:    ARIPiprazole (ABILIFY) 5 MG tablet; Take 1 tablet by mouth daily    pantoprazole (PROTONIX) 40 MG tablet; Take 1 tablet by mouth daily    CBC with Auto Differential; Future    Path Review, Smear; Future    Hemoglobin A1C; Future    Hemoglobin A1C    Path Review, Smear    CBC with Auto Differential    COPD exacerbation (HCC)    -Start prednisone and follow-up for new or worsening symptoms.    Orders:    ARIPiprazole (ABILIFY) 5 MG tablet; Take 1 tablet by mouth daily    pantoprazole (PROTONIX) 40 MG tablet; Take 1 tablet by mouth daily    CBC with Auto Differential; Future    Path Review, Smear; Future    Hemoglobin A1C; Future    predniSONE (DELTASONE) 50 MG tablet; Take 1 tablet by mouth daily for 5 days    Hemoglobin A1C    Path Review, Smear    CBC with Auto Differential    Prediabetes    -Recheck A1c and continue diet.    Orders:    ARIPiprazole (ABILIFY) 5 MG tablet; Take 1 tablet by mouth daily    pantoprazole (PROTONIX) 40 MG tablet; Take 1 tablet by mouth daily    CBC with Auto Differential; Future    Path Review, Smear; Future    Hemoglobin A1C; Future    Hemoglobin A1C    Path Review, Smear    CBC with Auto Differential    Monocytosis    -Repeat peripheral smear to ensure resolution of monocytosis.  If not resolved consider hematology consult.    Orders:    ARIPiprazole (ABILIFY) 5 MG tablet; Take 1 tablet by mouth daily    pantoprazole (PROTONIX) 40 MG tablet; Take 1 tablet by mouth daily    CBC with Auto Differential; Future    Path Review, Smear; Future    Hemoglobin

## 2024-08-30 LAB
EST. AVERAGE GLUCOSE BLD GHB EST-MCNC: 111.2 MG/DL
HBA1C MFR BLD: 5.5 %
PATH INTERP BLD-IMP: NORMAL

## 2024-09-19 RX ORDER — LOSARTAN POTASSIUM 100 MG/1
100 TABLET ORAL DAILY
Qty: 90 TABLET | Refills: 1 | Status: SHIPPED | OUTPATIENT
Start: 2024-09-19

## 2024-10-08 RX ORDER — LEVOTHYROXINE SODIUM 25 UG/1
25 TABLET ORAL DAILY
Qty: 90 TABLET | Refills: 1 | Status: SHIPPED | OUTPATIENT
Start: 2024-10-08

## 2024-10-22 ENCOUNTER — OFFICE VISIT (OUTPATIENT)
Dept: FAMILY MEDICINE CLINIC | Age: 64
End: 2024-10-22

## 2024-10-22 VITALS
RESPIRATION RATE: 16 BRPM | HEIGHT: 63 IN | BODY MASS INDEX: 14.67 KG/M2 | SYSTOLIC BLOOD PRESSURE: 112 MMHG | HEART RATE: 64 BPM | WEIGHT: 82.8 LBS | DIASTOLIC BLOOD PRESSURE: 82 MMHG | OXYGEN SATURATION: 93 %

## 2024-10-22 DIAGNOSIS — E88.01 HETEROZYGOUS ALPHA 1-ANTITRYPSIN DEFICIENCY (HCC): ICD-10-CM

## 2024-10-22 DIAGNOSIS — I10 PRIMARY HYPERTENSION: ICD-10-CM

## 2024-10-22 DIAGNOSIS — G57.92 NEUROPATHY OF LEFT LOWER EXTREMITY: ICD-10-CM

## 2024-10-22 DIAGNOSIS — E03.9 ACQUIRED HYPOTHYROIDISM: ICD-10-CM

## 2024-10-22 DIAGNOSIS — E27.9 ADRENAL NODULE (HCC): ICD-10-CM

## 2024-10-22 DIAGNOSIS — K21.9 GASTROESOPHAGEAL REFLUX DISEASE, UNSPECIFIED WHETHER ESOPHAGITIS PRESENT: ICD-10-CM

## 2024-10-22 DIAGNOSIS — R63.4 WEIGHT LOSS: ICD-10-CM

## 2024-10-22 DIAGNOSIS — Z59.82 TRANSPORTATION INSECURITY: ICD-10-CM

## 2024-10-22 DIAGNOSIS — I42.8 NONISCHEMIC CARDIOMYOPATHY (HCC): ICD-10-CM

## 2024-10-22 DIAGNOSIS — K51.911 ULCERATIVE COLITIS WITH RECTAL BLEEDING, UNSPECIFIED LOCATION (HCC): ICD-10-CM

## 2024-10-22 DIAGNOSIS — Z00.00 MEDICARE ANNUAL WELLNESS VISIT, SUBSEQUENT: Primary | ICD-10-CM

## 2024-10-22 DIAGNOSIS — J43.1 PANLOBULAR EMPHYSEMA (HCC): ICD-10-CM

## 2024-10-22 RX ORDER — GABAPENTIN 100 MG/1
200 CAPSULE ORAL 2 TIMES DAILY
Qty: 120 CAPSULE | Refills: 2 | Status: SHIPPED | OUTPATIENT
Start: 2024-10-22 | End: 2025-01-20

## 2024-10-22 RX ORDER — FAMOTIDINE 20 MG/1
20 TABLET, FILM COATED ORAL 2 TIMES DAILY PRN
Qty: 60 TABLET | Refills: 3 | Status: SHIPPED | OUTPATIENT
Start: 2024-10-22

## 2024-10-22 SDOH — ECONOMIC STABILITY - TRANSPORTATION SECURITY: TRANSPORTATION INSECURITY: Z59.82

## 2024-10-22 ASSESSMENT — PATIENT HEALTH QUESTIONNAIRE - PHQ9
SUM OF ALL RESPONSES TO PHQ QUESTIONS 1-9: 2
4. FEELING TIRED OR HAVING LITTLE ENERGY: SEVERAL DAYS
9. THOUGHTS THAT YOU WOULD BE BETTER OFF DEAD, OR OF HURTING YOURSELF: NOT AT ALL
10. IF YOU CHECKED OFF ANY PROBLEMS, HOW DIFFICULT HAVE THESE PROBLEMS MADE IT FOR YOU TO DO YOUR WORK, TAKE CARE OF THINGS AT HOME, OR GET ALONG WITH OTHER PEOPLE: NOT DIFFICULT AT ALL
5. POOR APPETITE OR OVEREATING: NOT AT ALL
SUM OF ALL RESPONSES TO PHQ QUESTIONS 1-9: 2
6. FEELING BAD ABOUT YOURSELF - OR THAT YOU ARE A FAILURE OR HAVE LET YOURSELF OR YOUR FAMILY DOWN: NOT AT ALL
7. TROUBLE CONCENTRATING ON THINGS, SUCH AS READING THE NEWSPAPER OR WATCHING TELEVISION: NOT AT ALL
2. FEELING DOWN, DEPRESSED OR HOPELESS: NOT AT ALL
8. MOVING OR SPEAKING SO SLOWLY THAT OTHER PEOPLE COULD HAVE NOTICED. OR THE OPPOSITE, BEING SO FIGETY OR RESTLESS THAT YOU HAVE BEEN MOVING AROUND A LOT MORE THAN USUAL: NOT AT ALL
3. TROUBLE FALLING OR STAYING ASLEEP: NOT AT ALL
SUM OF ALL RESPONSES TO PHQ9 QUESTIONS 1 & 2: 1
1. LITTLE INTEREST OR PLEASURE IN DOING THINGS: SEVERAL DAYS
SUM OF ALL RESPONSES TO PHQ QUESTIONS 1-9: 2
SUM OF ALL RESPONSES TO PHQ QUESTIONS 1-9: 2

## 2024-10-22 NOTE — PROGRESS NOTES
Medicare Annual Wellness Visit    Maranda Burns is here for Medicare AWV (Pt is here for a Medicare AWV )    Assessment & Plan   Medicare annual wellness visit, subsequent  -     LIPID PANEL; Future  -     CBC with Auto Differential; Future  -     Comprehensive Metabolic Panel; Future  -     TSH with Reflex; Future  Panlobular emphysema (HCC)  -     Ambulatory Referral to Care Management  -     LIPID PANEL; Future  -     CBC with Auto Differential; Future  -     Comprehensive Metabolic Panel; Future  -     TSH with Reflex; Future  Acquired hypothyroidism  -     Ambulatory Referral to Care Management  -     LIPID PANEL; Future  -     CBC with Auto Differential; Future  -     Comprehensive Metabolic Panel; Future  -     TSH with Reflex; Future  Primary hypertension  -     Ambulatory Referral to Care Management  -     LIPID PANEL; Future  -     CBC with Auto Differential; Future  -     Comprehensive Metabolic Panel; Future  -     TSH with Reflex; Future  Heterozygous alpha 1-antitrypsin deficiency (HCC)  -     LIPID PANEL; Future  -     CBC with Auto Differential; Future  -     Comprehensive Metabolic Panel; Future  -     TSH with Reflex; Future  Ulcerative colitis with rectal bleeding, unspecified location (HCC)  -     LIPID PANEL; Future  -     CBC with Auto Differential; Future  -     Comprehensive Metabolic Panel; Future  -     TSH with Reflex; Future  Adrenal nodule (HCC)  -     LIPID PANEL; Future  -     CBC with Auto Differential; Future  -     Comprehensive Metabolic Panel; Future  -     TSH with Reflex; Future  Nonischemic cardiomyopathy (HCC)  -     LIPID PANEL; Future  -     CBC with Auto Differential; Future  -     Comprehensive Metabolic Panel; Future  -     TSH with Reflex; Future  Weight loss  -     LIPID PANEL; Future  -     CBC with Auto Differential; Future  -     Comprehensive Metabolic Panel; Future  -     TSH with Reflex; Future  Gastroesophageal reflux disease, unspecified whether esophagitis

## 2024-10-22 NOTE — PATIENT INSTRUCTIONS
device in the bathroom with you.   Where can you learn more?  Go to [email or web address] and enter G117 to learn more about \"Preventing Falls: Care Instructions.\"  Current as of: July 17, 2023  Content Version: 14.2  © 2024 [employer].   Care instructions adapted under license by [employer]. If you have questions about a medical condition or this instruction, always ask your healthcare professional. [employer] disclaims any warranty or liability for your use of this information.           Learning About Emotional Support  When do you need emotional support?     You might find getting support from others helpful when you have a long-term health problem. Often people feel alone, confused, or scared when coping with an illness. But you aren't alone. Other people are going through the same thing you are and know how you feel.  Talking with others about your feelings can help you feel better.  Your family and friends can give you support. So can your doctor, a support group, or a Mandaeism. If you have a support network, you will not feel as alone. You will learn new ways to deal with your situation, and you may try harder to overcome it.  Where you can get support  Family and friends: They can help you cope by giving you comfort and encouragement.  Counseling: Professional counseling can help you cope with situations that interfere with your life and cause stress. Counseling can help you understand and deal with your illness.  Your doctor: Find a doctor you trust and feel comfortable with. Be open and honest about your fears and concerns. Your doctor can help you get the right medical treatments, including counseling.  Spiritual or Confucianism groups: They can provide comfort and may be able to help you find counseling or other social support services.  Social groups: They can help you meet new people and get involved in activities you enjoy.  Community support groups: In a support

## 2024-10-23 ENCOUNTER — OFFICE VISIT (OUTPATIENT)
Dept: PULMONOLOGY | Age: 64
End: 2024-10-23
Payer: MEDICARE

## 2024-10-23 VITALS
HEIGHT: 63 IN | HEART RATE: 66 BPM | DIASTOLIC BLOOD PRESSURE: 86 MMHG | RESPIRATION RATE: 16 BRPM | SYSTOLIC BLOOD PRESSURE: 164 MMHG | BODY MASS INDEX: 14.71 KG/M2 | TEMPERATURE: 96.9 F | OXYGEN SATURATION: 99 % | WEIGHT: 83 LBS

## 2024-10-23 DIAGNOSIS — J44.9 END STAGE COPD (HCC): ICD-10-CM

## 2024-10-23 DIAGNOSIS — J96.12 CHRONIC RESPIRATORY FAILURE WITH HYPOXIA AND HYPERCAPNIA: ICD-10-CM

## 2024-10-23 DIAGNOSIS — Z87.891 HISTORY OF TOBACCO USE: Primary | ICD-10-CM

## 2024-10-23 DIAGNOSIS — J96.11 CHRONIC RESPIRATORY FAILURE WITH HYPOXIA AND HYPERCAPNIA: ICD-10-CM

## 2024-10-23 DIAGNOSIS — R64 CACHEXIA (HCC): ICD-10-CM

## 2024-10-23 PROCEDURE — 99214 OFFICE O/P EST MOD 30 MIN: CPT | Performed by: INTERNAL MEDICINE

## 2024-10-23 PROCEDURE — 3079F DIAST BP 80-89 MM HG: CPT | Performed by: INTERNAL MEDICINE

## 2024-10-23 PROCEDURE — 3077F SYST BP >= 140 MM HG: CPT | Performed by: INTERNAL MEDICINE

## 2024-10-23 PROCEDURE — 3017F COLORECTAL CA SCREEN DOC REV: CPT | Performed by: INTERNAL MEDICINE

## 2024-10-23 PROCEDURE — G8484 FLU IMMUNIZE NO ADMIN: HCPCS | Performed by: INTERNAL MEDICINE

## 2024-10-23 PROCEDURE — 1036F TOBACCO NON-USER: CPT | Performed by: INTERNAL MEDICINE

## 2024-10-23 PROCEDURE — G8427 DOCREV CUR MEDS BY ELIG CLIN: HCPCS | Performed by: INTERNAL MEDICINE

## 2024-10-23 PROCEDURE — G8419 CALC BMI OUT NRM PARAM NOF/U: HCPCS | Performed by: INTERNAL MEDICINE

## 2024-10-23 PROCEDURE — G2211 COMPLEX E/M VISIT ADD ON: HCPCS | Performed by: INTERNAL MEDICINE

## 2024-10-23 PROCEDURE — 3023F SPIROM DOC REV: CPT | Performed by: INTERNAL MEDICINE

## 2024-10-23 NOTE — PATIENT INSTRUCTIONS
Continue Trelegy Ellipta 200 mcg with mouth washing after use.  Continue albuterol as needed  Continue azithromycin 3 times a week  Patient is not a candidate for Roflumilast with her progressive weight loss  Dupixant was denied by insurance  Patient completed pulmonary rehab  Continue BiPAP as tolerated  Continue oxygen 2-3 L/min during the day and bled into the BiPAP  Patient failed endobronchial valves previously at .  Low-dose CT scan for lung cancer screening in April 2025  Follow-up in 6 months 1 week after the CT      Health Maintenance/Preventive measures:        >>  Avoid smoking, vaping or secondhand exposure.  Avoid exposure to irritants, allergens as possible as well as contact with patients with infectious respiratory illness.        >>  Stay up-to-date with influenza & pneumonia vaccines, RSV, & COVID-19 vaccine as recommended by the Advisory Committee on Immunization Practices (ACIP)        >>  Healthy diet and activity as able.        >>  Acid reflux precautions: Head of bed elevation, avoiding tight clothes, avoiding big meals or snacking 3 hours before bedtime, targeting healthy weight.        >>  Practice sleep hygiene measures. Avoid driving or operating heavy machines if tired or sleepy.

## 2024-10-23 NOTE — PROGRESS NOTES
MA Communication:  The following orders are received by verbal communication from Mamie Morgan MD    Orders include:    Patient left the office without scheduling    
    1:57 PM 6/9/2021     9:19 AM   Sleep Medicine   Sitting and reading 3 0   Watching TV 2 0   Sitting, inactive in a public place (e.g. a theatre or a meeting) 2 0   As a passenger in a car for an hour without a break 0 0   Lying down to rest in the afternoon when circumstances permit 0 0   Sitting and talking to someone 0 0   Sitting quietly after a lunch without alcohol 1 0   In a car, while stopped for a few minutes in traffic 1 0   Acme Sleepiness Score 9 0       Physical Exam:  General appearance: In no apparent distress.  Cachectic  HEENT: Moist mucus membranes.  On oxygen by portable concentrator at 2 L/min  Cardiac: Normal S1 and S2.  Lungs: Very poor air entry bilaterally with rhonchi and wheezes  Abdomen: Soft.  Back & Extremities: Symmetric pulses with good perfusion.  Neurological: No focal deficit..  Muscle mass      ________________________________________________________  Orders Placed This Encounter    CT LUNG SCREENING     Standing Status:   Future     Standing Expiration Date:   4/23/2026     Order Specific Question:   Is there documentation of shared decision making?     Answer:   Yes     Order Specific Question:   Does the patient show any signs or symptoms of lung cancer?     Answer:   No     Order Specific Question:   Is this the first (baseline) CT or an annual exam?     Answer:   Annual [2]     Order Specific Question:   Is this a low dose CT or a routine CT?     Answer:   Low Dose CT [1]     Order Specific Question:   Smoking Status?     Answer:   Former [4]     Order Specific Question:   Date quit smoking? (must be within 15 years)     Answer:   11/28/2022     Order Specific Question:   Smoking packs per day?     Answer:   1     Order Specific Question:   Years smoking?     Answer:   35     Order Specific Question:   Pack Years     Answer:   35     Order Specific Question:   Has the patient been exposed to a high level of radon?     Answer:   Unknown [3]

## 2024-10-28 RX ORDER — METOPROLOL SUCCINATE 50 MG/1
50 TABLET, EXTENDED RELEASE ORAL DAILY
Qty: 90 TABLET | Refills: 0 | Status: SHIPPED | OUTPATIENT
Start: 2024-10-28

## 2024-10-29 ENCOUNTER — COMMUNITY OUTREACH (OUTPATIENT)
Dept: OTHER | Age: 64
End: 2024-10-29

## 2024-10-29 ENCOUNTER — CARE COORDINATION (OUTPATIENT)
Dept: CARE COORDINATION | Age: 64
End: 2024-10-29

## 2024-10-29 DIAGNOSIS — J44.1 COPD WITH ACUTE EXACERBATION (HCC): ICD-10-CM

## 2024-10-29 DIAGNOSIS — I10 ESSENTIAL HYPERTENSION: Primary | ICD-10-CM

## 2024-10-29 SDOH — HEALTH STABILITY: PHYSICAL HEALTH: ON AVERAGE, HOW MANY DAYS PER WEEK DO YOU ENGAGE IN MODERATE TO STRENUOUS EXERCISE (LIKE A BRISK WALK)?: 3 DAYS

## 2024-10-29 SDOH — HEALTH STABILITY: MENTAL HEALTH: HOW OFTEN DO YOU HAVE A DRINK CONTAINING ALCOHOL?: NEVER

## 2024-10-29 SDOH — SOCIAL STABILITY: SOCIAL NETWORK: ARE YOU MARRIED, WIDOWED, DIVORCED, SEPARATED, NEVER MARRIED, OR LIVING WITH A PARTNER?: MARRIED

## 2024-10-29 SDOH — SOCIAL STABILITY: SOCIAL NETWORK
DO YOU BELONG TO ANY CLUBS OR ORGANIZATIONS SUCH AS CHURCH GROUPS UNIONS, FRATERNAL OR ATHLETIC GROUPS, OR SCHOOL GROUPS?: NO

## 2024-10-29 SDOH — HEALTH STABILITY: PHYSICAL HEALTH: ON AVERAGE, HOW MANY MINUTES DO YOU ENGAGE IN EXERCISE AT THIS LEVEL?: 10 MIN

## 2024-10-29 SDOH — ECONOMIC STABILITY: INCOME INSECURITY: IN THE LAST 12 MONTHS, WAS THERE A TIME WHEN YOU WERE NOT ABLE TO PAY THE MORTGAGE OR RENT ON TIME?: NO

## 2024-10-29 SDOH — SOCIAL STABILITY: SOCIAL NETWORK: IN A TYPICAL WEEK, HOW MANY TIMES DO YOU TALK ON THE PHONE WITH FAMILY, FRIENDS, OR NEIGHBORS?: ONCE A WEEK

## 2024-10-29 SDOH — ECONOMIC STABILITY: FOOD INSECURITY: WITHIN THE PAST 12 MONTHS, THE FOOD YOU BOUGHT JUST DIDN'T LAST AND YOU DIDN'T HAVE MONEY TO GET MORE.: NEVER TRUE

## 2024-10-29 SDOH — ECONOMIC STABILITY: INCOME INSECURITY: HOW HARD IS IT FOR YOU TO PAY FOR THE VERY BASICS LIKE FOOD, HOUSING, MEDICAL CARE, AND HEATING?: NOT HARD AT ALL

## 2024-10-29 SDOH — ECONOMIC STABILITY: FOOD INSECURITY: WITHIN THE PAST 12 MONTHS, YOU WORRIED THAT YOUR FOOD WOULD RUN OUT BEFORE YOU GOT MONEY TO BUY MORE.: NEVER TRUE

## 2024-10-29 SDOH — SOCIAL STABILITY: SOCIAL NETWORK: HOW OFTEN DO YOU ATTEND CHURCH OR RELIGIOUS SERVICES?: 1 TO 4 TIMES PER YEAR

## 2024-10-29 SDOH — ECONOMIC STABILITY: TRANSPORTATION INSECURITY
IN THE PAST 12 MONTHS, HAS THE LACK OF TRANSPORTATION KEPT YOU FROM MEDICAL APPOINTMENTS OR FROM GETTING MEDICATIONS?: NO

## 2024-10-29 SDOH — ECONOMIC STABILITY: TRANSPORTATION INSECURITY
IN THE PAST 12 MONTHS, HAS LACK OF TRANSPORTATION KEPT YOU FROM MEETINGS, WORK, OR FROM GETTING THINGS NEEDED FOR DAILY LIVING?: NO

## 2024-10-29 SDOH — HEALTH STABILITY: MENTAL HEALTH
STRESS IS WHEN SOMEONE FEELS TENSE, NERVOUS, ANXIOUS, OR CAN'T SLEEP AT NIGHT BECAUSE THEIR MIND IS TROUBLED. HOW STRESSED ARE YOU?: NOT AT ALL

## 2024-10-29 SDOH — SOCIAL STABILITY: SOCIAL NETWORK: HOW OFTEN DO YOU GET TOGETHER WITH FRIENDS OR RELATIVES?: ONCE A WEEK

## 2024-10-29 SDOH — SOCIAL STABILITY: SOCIAL NETWORK: HOW OFTEN DO YOU ATTENT MEETINGS OF THE CLUB OR ORGANIZATION YOU BELONG TO?: NEVER

## 2024-10-29 NOTE — PROGRESS NOTES
[facility]--CHW    CHW called to make initial contact with patient and introduce herself and the partnership program. Patient could be heard in the background coughing a little, trying to speak. CHW asked her specific questions as to her needs and barriers. Patient responded that she only needs help with grocery shopping due to her having COPD. After investigating a resource for patient, CHW found assistance thru [facility]. Lizzeth at [employer] is going to call patient back to get more information from patient and connect her with a volunteer to grocery shop for her.    CHW will follow up with [facility] information via text so that patient can call herself if necessary.

## 2024-10-29 NOTE — CARE COORDINATION
weeks to complete:  - advance care planning   - goal progression  - education .   Patient  is agreeable to this plan.     Laurie LOZA, RN  Respecting Choices® Advanced Steps ACP Facilitator  Ambulatory Care Manager  [facility]  [phone]       I agree with the Care Coordinator's Plan of Care

## 2024-10-29 NOTE — PROGRESS NOTES
Remote Patient Monitoring Treatment Plan    Received request from [facility]/Laurie Frausto RN   to order remote patient monitoring for in home monitoring of COPD; Condition managed by PCP.  HTN; Condition managed by PCP.  and order completed.     Patient will be monitoring blood pressure   pulse ox .      Patient will engage in Remote Patient Monitoring each day to develop the skills necessary for self management.       RPM Care Team Responsibilities:   Alerts will be reviewed daily and addressed within 2-4 hours during operational hours (Monday -Friday 9 am-4 pm)  Alert response and intervention documented in patient medical record  Alert response escalated to PCP per protocol and documented in patient medical record  Patient monitored over approximately  days  Discharge from program based on self-management readiness    See care coordination encounters for additional details.

## 2024-10-30 ENCOUNTER — CARE COORDINATION (OUTPATIENT)
Dept: PRIMARY CARE CLINIC | Age: 64
End: 2024-10-30

## 2024-10-30 NOTE — CARE COORDINATION
Remote Patient Kit Ordering Note      Date/Time:  10/30/2024 9:02 AM      [facility]family today to notify of RPM kit order; patient/family was unavailable; Left HIPAA compliant voicemail regarding RPM kit.    [] [facility]  [] Patient will receive package over the next 1-3 business days. Someone 21 years or older must be present to sign for UPS delivery.  [] HRS will contact patient within 24 hours, an HRS  will call the patient directly: If the patient does not answer, HRS will follow up with the clinical team notifying them about the unsuccessful attempt to contact the patient. HRS will make three call attempts to the patient.Provide patient with [facility] Virtual install number is: [phone].  [] ACM will contact patient once equipment is active to welcome them to the program.                                                         [] Hours of RPM monitoring - Monday-Friday [id number]; encourage patient to get vitals entered by Noon each day to have the alert addressed same day.  [x][facility] mailed RPM Patient flyer to patient.                      ACM made aware the RPM kit has been ordered.

## 2024-11-05 ENCOUNTER — OFFICE VISIT (OUTPATIENT)
Dept: ENDOCRINOLOGY | Age: 64
End: 2024-11-05
Payer: MEDICARE

## 2024-11-05 VITALS
DIASTOLIC BLOOD PRESSURE: 93 MMHG | SYSTOLIC BLOOD PRESSURE: 178 MMHG | HEIGHT: 63 IN | BODY MASS INDEX: 14.71 KG/M2 | HEART RATE: 80 BPM | WEIGHT: 83 LBS

## 2024-11-05 DIAGNOSIS — E27.9 ADRENAL NODULE (HCC): Primary | ICD-10-CM

## 2024-11-05 DIAGNOSIS — E03.9 ACQUIRED HYPOTHYROIDISM: ICD-10-CM

## 2024-11-05 PROCEDURE — 3077F SYST BP >= 140 MM HG: CPT | Performed by: INTERNAL MEDICINE

## 2024-11-05 PROCEDURE — 1036F TOBACCO NON-USER: CPT | Performed by: INTERNAL MEDICINE

## 2024-11-05 PROCEDURE — 99204 OFFICE O/P NEW MOD 45 MIN: CPT | Performed by: INTERNAL MEDICINE

## 2024-11-05 PROCEDURE — G8427 DOCREV CUR MEDS BY ELIG CLIN: HCPCS | Performed by: INTERNAL MEDICINE

## 2024-11-05 PROCEDURE — G8484 FLU IMMUNIZE NO ADMIN: HCPCS | Performed by: INTERNAL MEDICINE

## 2024-11-05 PROCEDURE — G8419 CALC BMI OUT NRM PARAM NOF/U: HCPCS | Performed by: INTERNAL MEDICINE

## 2024-11-05 PROCEDURE — 3017F COLORECTAL CA SCREEN DOC REV: CPT | Performed by: INTERNAL MEDICINE

## 2024-11-05 PROCEDURE — 3080F DIAST BP >= 90 MM HG: CPT | Performed by: INTERNAL MEDICINE

## 2024-11-05 NOTE — PROGRESS NOTES
[facility] Endocrinology  Initial Patient Visit        Patient:  Maranda Burns                                               : 1960    MRN: [id number]  Date : 2024        CHIEF COMPLAINT:   Chief Complaint   Patient presents with    New Patient     Hypothyroidism        HISTORY OF PRESENT ILLNESS:   Maranda Burns is a pleasant 64 y.o. female who was referred by PCP for high metanephrines.  Patient has hypothyroidism, end-stage pan lobar emphysema, depression, right adrenal adenoma, hypercalcemia, osteoporosis and has a BMI of 14.    I reviewed outside records.  Patient was evaluated previously at  endocrinology as well as with Dr. Mason.  She had multiple CT scan completed over the past few years that showed right sided adrenal nodule.  CT of abdomen W contrast in 2019 showed 2 cm right adrenal adenoma with HU 52  MRI in 2019 showed 2 cm right adrenal adenoma consistent with benign adenoma  3/8/2021 CT adrenals  The adrenal glands are again noted for a lipid rich   adrenal adenoma on the right mildly increased in size measuring approximately   17 x 28 mm, previously 13 x 25 mm.      Likely evaluation in the past showed mild elevation of normetanephrines.  More recently, CT chest high-resolution without contrast completed on 2024  Upper Abdomen: Hypodense nodule seen in right adrenal gland, measuring 1.6  cm, Hounsfield units measuring -14, most suggestive of adenoma.    Plasma metanephrines completed on 2024 showed normetanephrine of 1.9, metanephrine of 0.52.  For that reason patient was referred.    Patient reports generalized fatigue.  She had been trying to cut down on her medications to see if that would help.  She recently did stop Abilify and BuSpar.  She reports that she had been losing weight unintentionally although she has a good appetite.  She has end-stage COPD and currently uses oxygen with exertion.  She follows with pulmonology.  She reports some weakness in arms

## 2024-11-11 ENCOUNTER — CARE COORDINATION (OUTPATIENT)
Dept: CASE MANAGEMENT | Age: 64
End: 2024-11-11

## 2024-11-11 NOTE — CARE COORDINATION
Remote Patient Monitoring Welcome Note  Date/Time:  2024 9:20 AM  Patient Current Location: [place]  Verified patients name and  as identifiers.       Completed and confirmed the following:    [x] Patient received all RPM equipment (tablet, scale, blood pressure device and cuff, and pulse oximeter)  Cuff Size: small (6.3\"-9.4\")    Weight Scale:  none                    [x] Instructed patient keep box for use when returning equipment                                                          [x] Reviewed Patient Welcome Letter with patient    [x]  Reviewed Consent Form  Copy of consent form in chart.                 [x] Reviewed expectations for patient and care team  Monitoring hours M-F 9-4pm  It is important to take your vitals every day, even on the weekends,to keep your care team aware of how you are doing every day of the week.  Completing monitoring by 12pm on  so that alerts can be responded to in the same day  Patient weighs self at same time every day (or after urinating and waking up)  Take blood pressure 1-2 hrs after medications   RPM team may have different phone area code (including [place], [place], [place] or [place])                              [x] Instructed patient to keep scale on flat surface                                                         [x] Instructed patient to keep tablet plugged in at all times                         [x] Instructed how to contact IT support  ([phone])  [x] Provided Remote Patient Monitoring care  information     Emergency Contact Verified: Emergency Contact : Ralph (Spouse)[phone]               All questions answered at this time.     Juarez Kearney LPN, PCC, Remote Patient Monitoring    PH: [phone]  Email: [email or web address]

## 2024-11-11 NOTE — CARE COORDINATION
numbers, including the patient's healthcare provider and oxygen supplier., No smoking near oxygen: Always ensure there are no open flames or smoking materials near the patient when using oxygen, \"Oxygen in Use\" signage: Clearly display \"Oxygen in Use\" signs in visible areas., and Store oxygen safely: Keep oxygen tanks upright and secured in a designated area.       Clinical Interventions: Reviewed and followed up on alerts and treatments-   LPN contacted pt in regard to RPM red alert for PO of 87%. Pt denied any new, worrisome and or worsening symptoms at this time.     Pt is speaking in complete sentences. No apparent distress noted. No audible wheezing, labored breathing or SOB.  Pt advised that she is currently donning the O2 at 3L continuously.     Writer asked pt to recheck PO, and pt will recheck PO \"later.\"    Plan/Follow Up: Will continue to review, monitor and address alerts with follow up based on severity of symptoms and risk factors.  **For any new or worsening symptoms or you are concerned in anyway, please contact your Provider or report to the nearest Emergency Room.**            Juarez Kearney LPN, PCC, Remote Patient Monitoring    PH: [phone]  Email: [email or web address]

## 2024-11-15 ENCOUNTER — CARE COORDINATION (OUTPATIENT)
Dept: CASE MANAGEMENT | Age: 64
End: 2024-11-15

## 2024-11-15 NOTE — CARE COORDINATION
11/15/2024 12:34 PM  *  Alert and Triage   -Remote Alert Monitoring Note      Date/Time:  11/15/2024 12:34 PM  Patient Current Location: [place]  Verified patients name and  as identifiers.          Rpm alert to be reviewed by the provider                          LPN contacted patient by telephone regarding red alert received   Background: RPM for COPD, HTN  Refer to 911 immediately if:  Patient unresponsive or unable to provide history  Change in cognition or sudden confusion  Patient unable to respond in complete sentences  Intense chest pain/tightness  Any concern for any clinical emergency  Red Alert: Provider response time of 1 hr required for any red alert requiring intervention  Yellow Alert: Provider response time of 3hr required for any escalated yellow alert  Patient Chief Complaint:  Oxygen O2 Triage  Are you having any Chest Pain? no   Are you having any Shortness of Breath? no   Swelling in your hands or feet? no   Are you having any other health concerns or issues? no  .............................................................................................................................................................................................  Do you use oxygen? Yes    How many [id number] Phone Number [phone]   Patient educated on oxygen preparedness in case of an emergency?  Yes, Check oxygen supply, Verify that all tubing is intact, free of kinks, and securely connected to the oxygen source and delivery device., Teach the patient how to use their oxygen equipment, including how to adjust the flow rate if necessary.  , Inform them about potential emergency situations, like power outages, and how to manage their oxygen supply in such scenarios, Discuss the importance of carrying a portable oxygen unit if applicable., Ensure the patient has an emergency plan, including where to go and who to contact in case of an emergency, Consider having a backup power source for

## 2024-11-15 NOTE — CARE COORDINATION
11/15/2024 10:56 AM  *  Unable to Reach Date/Time:  11/15/2024 10:56 AM  LPN attempted to reach patient by telephone regarding red alert in remote patient monitoring program for PO of 85%. Left HIPAA compliant message requesting a return call. Will attempt to reach patient again.                   Juarez Kearney LPN, PCC, Remote Patient Monitoring    PH: [phone]  Email: [email or web address]

## 2024-11-18 ENCOUNTER — CARE COORDINATION (OUTPATIENT)
Dept: OTHER | Facility: CLINIC | Age: 64
End: 2024-11-18

## 2024-11-19 ENCOUNTER — CARE COORDINATION (OUTPATIENT)
Dept: CASE MANAGEMENT | Age: 64
End: 2024-11-19

## 2024-11-19 NOTE — CARE COORDINATION
2024 3:41 PM  *  RPM Alert   Remote Patient Monitoring Note      Date/Time:  2024 3:41 PM  Patient Current Location: [place]  LPN contacted patient by telephone regarding yellow alert received for non compliance. Verified patients name and  as identifiers.  Background: RPM for COPD, HTN  Clinical Interventions: Reviewed and followed up on alerts and treatments-LPN contacted pt in regard to non compliance. Pt advised that she wakes up late. Per pt, she will resume RPM on tomorrow.    Plan/Follow Up: Will continue to review, monitor and address alerts with follow up based on severity of symptoms and risk factors.         Juarez Kearney LPN, PCC, Remote Patient Monitoring    PH: [phone]  Email: [email or web address]

## 2024-11-20 ENCOUNTER — CARE COORDINATION (OUTPATIENT)
Dept: CARE COORDINATION | Age: 64
End: 2024-11-20

## 2024-11-20 NOTE — CARE COORDINATION
Ambulatory Care Coordination Note     2024 4:26 PM     Patient Current Location:  Home: [address] [place] [address] [address]  [place] [address]     ACM contacted the patient by telephone. Verified name and  with patient as identifiers.         ACM: Laurie Pulido RN     Challenges to be reviewed by the provider   Additional needs identified to be addressed with provider No  none               Method of communication with provider: none.    Utilization: Patient has not had any utilization since our last call.     Care Summary Note: ACM made outreach today. Spoke to mylene and updated that she is feeling tired, no C/O CP or acute SOB at this time. Pt has COPD and is on 3L of cont. O2 at home. Taking medications as directed with no barriers. Steady on her feet with no report of falls.  Reviewed Endocrinology apt today with pt and upcoming apts as well. All questions answered, next outreach in one week.     Offered patient enrollment in the Remote Patient Monitoring (RPM) program for in-home monitoring: Yes, patient enrolled; current status is activated and monitoring.     Assessments Completed:   COPD Assessment    Does the patient understand envrionmental exposure?: Yes  Is the patient able to verbalize Rescue vs. Long Acting medications?: Yes  Does the patient have a nebulizer?: Yes  Does the patient use a space with inhaled medications?: Yes            Symptoms:  None: Yes            ,   Hypertension - Encounter Level          ,   General Assessment              Medications Reviewed:   Completed during a previous call     Advance Care Planning:   Not reviewed during this call     Care Planning:   Education Documentation  Educate transfer safety, taught by Laurie Pulido RN at 2024  4:26 PM.  Learner: Patient  Readiness: Acceptance  Method: Explanation  Response: Verbalizes Understanding    Provide High Risk Information for Falls Program, taught by Laurie Pulido RN at 2024  4:26

## 2024-11-22 VITALS — OXYGEN SATURATION: 89 % | HEART RATE: 83 BPM | DIASTOLIC BLOOD PRESSURE: 81 MMHG | SYSTOLIC BLOOD PRESSURE: 131 MMHG

## 2024-12-05 ENCOUNTER — OFFICE VISIT (OUTPATIENT)
Dept: FAMILY MEDICINE CLINIC | Age: 64
End: 2024-12-05

## 2024-12-05 VITALS
SYSTOLIC BLOOD PRESSURE: 120 MMHG | BODY MASS INDEX: 14.49 KG/M2 | HEIGHT: 63 IN | OXYGEN SATURATION: 99 % | HEART RATE: 76 BPM | RESPIRATION RATE: 16 BRPM | WEIGHT: 81.8 LBS | DIASTOLIC BLOOD PRESSURE: 82 MMHG

## 2024-12-05 DIAGNOSIS — F32.A ANXIETY AND DEPRESSION: ICD-10-CM

## 2024-12-05 DIAGNOSIS — K21.9 GASTROESOPHAGEAL REFLUX DISEASE, UNSPECIFIED WHETHER ESOPHAGITIS PRESENT: ICD-10-CM

## 2024-12-05 DIAGNOSIS — R63.4 WEIGHT LOSS: ICD-10-CM

## 2024-12-05 DIAGNOSIS — I10 PRIMARY HYPERTENSION: ICD-10-CM

## 2024-12-05 DIAGNOSIS — E27.9 ADRENAL NODULE (HCC): ICD-10-CM

## 2024-12-05 DIAGNOSIS — J43.1 PANLOBULAR EMPHYSEMA (HCC): ICD-10-CM

## 2024-12-05 DIAGNOSIS — E03.9 ACQUIRED HYPOTHYROIDISM: ICD-10-CM

## 2024-12-05 DIAGNOSIS — R63.4 WEIGHT LOSS OF MORE THAN 10% BODY WEIGHT: ICD-10-CM

## 2024-12-05 DIAGNOSIS — K51.911 ULCERATIVE COLITIS WITH RECTAL BLEEDING, UNSPECIFIED LOCATION (HCC): ICD-10-CM

## 2024-12-05 DIAGNOSIS — G57.92 NEUROPATHY OF LEFT LOWER EXTREMITY: ICD-10-CM

## 2024-12-05 DIAGNOSIS — F41.9 ANXIETY AND DEPRESSION: ICD-10-CM

## 2024-12-05 DIAGNOSIS — E88.01 HETEROZYGOUS ALPHA 1-ANTITRYPSIN DEFICIENCY (HCC): ICD-10-CM

## 2024-12-05 DIAGNOSIS — I42.8 NONISCHEMIC CARDIOMYOPATHY (HCC): ICD-10-CM

## 2024-12-05 DIAGNOSIS — Z59.82 TRANSPORTATION INSECURITY: ICD-10-CM

## 2024-12-05 DIAGNOSIS — R63.4 UNINTENTIONAL WEIGHT LOSS: Primary | ICD-10-CM

## 2024-12-05 DIAGNOSIS — J44.9 MODERATE COPD (CHRONIC OBSTRUCTIVE PULMONARY DISEASE) (HCC): ICD-10-CM

## 2024-12-05 RX ORDER — DULOXETIN HYDROCHLORIDE 30 MG/1
CAPSULE, DELAYED RELEASE ORAL
Qty: 7 CAPSULE | Refills: 0 | Status: SHIPPED | OUTPATIENT
Start: 2024-12-05

## 2024-12-05 RX ORDER — MIRTAZAPINE 7.5 MG/1
7.5 TABLET, FILM COATED ORAL NIGHTLY
Qty: 30 TABLET | Refills: 0 | Status: SHIPPED | OUTPATIENT
Start: 2024-12-05

## 2024-12-05 SDOH — ECONOMIC STABILITY - TRANSPORTATION SECURITY: TRANSPORTATION INSECURITY: Z59.82

## 2024-12-05 ASSESSMENT — PATIENT HEALTH QUESTIONNAIRE - PHQ9
10. IF YOU CHECKED OFF ANY PROBLEMS, HOW DIFFICULT HAVE THESE PROBLEMS MADE IT FOR YOU TO DO YOUR WORK, TAKE CARE OF THINGS AT HOME, OR GET ALONG WITH OTHER PEOPLE: NOT DIFFICULT AT ALL
8. MOVING OR SPEAKING SO SLOWLY THAT OTHER PEOPLE COULD HAVE NOTICED. OR THE OPPOSITE, BEING SO FIGETY OR RESTLESS THAT YOU HAVE BEEN MOVING AROUND A LOT MORE THAN USUAL: NOT AT ALL
6. FEELING BAD ABOUT YOURSELF - OR THAT YOU ARE A FAILURE OR HAVE LET YOURSELF OR YOUR FAMILY DOWN: NOT AT ALL
3. TROUBLE FALLING OR STAYING ASLEEP: NOT AT ALL
SUM OF ALL RESPONSES TO PHQ QUESTIONS 1-9: 7
SUM OF ALL RESPONSES TO PHQ QUESTIONS 1-9: 7
2. FEELING DOWN, DEPRESSED OR HOPELESS: NOT AT ALL
1. LITTLE INTEREST OR PLEASURE IN DOING THINGS: SEVERAL DAYS
SUM OF ALL RESPONSES TO PHQ9 QUESTIONS 1 & 2: 1
7. TROUBLE CONCENTRATING ON THINGS, SUCH AS READING THE NEWSPAPER OR WATCHING TELEVISION: NOT AT ALL
SUM OF ALL RESPONSES TO PHQ QUESTIONS 1-9: 7
5. POOR APPETITE OR OVEREATING: NEARLY EVERY DAY
SUM OF ALL RESPONSES TO PHQ QUESTIONS 1-9: 7
4. FEELING TIRED OR HAVING LITTLE ENERGY: NEARLY EVERY DAY
9. THOUGHTS THAT YOU WOULD BE BETTER OFF DEAD, OR OF HURTING YOURSELF: NOT AT ALL

## 2024-12-05 NOTE — ASSESSMENT & PLAN NOTE
-With decreasing duloxetine may increase her neuropathic pain.  May need to increase gabapentin.    Orders:    CBC with Auto Differential; Future    Comprehensive Metabolic Panel; Future    TSH with Reflex; Future    TSH with Reflex    Comprehensive Metabolic Panel    CBC with Auto Differential

## 2024-12-05 NOTE — ASSESSMENT & PLAN NOTE
-Continue levothyroxine and recheck TSH    Orders:    CBC with Auto Differential; Future    Comprehensive Metabolic Panel; Future    TSH with Reflex; Future    TSH with Reflex    Comprehensive Metabolic Panel    CBC with Auto Differential

## 2024-12-05 NOTE — ASSESSMENT & PLAN NOTE
Monitored by specialist- no acute findings meriting change in the plan    Orders:    CBC with Auto Differential; Future    Comprehensive Metabolic Panel; Future    TSH with Reflex; Future    TSH with Reflex    Comprehensive Metabolic Panel    CBC with Auto Differential

## 2024-12-05 NOTE — ASSESSMENT & PLAN NOTE
-Decrease duloxetine and then stop.  Start mirtazapine due to weight loss for appetite stimulation and for mood    Orders:    DULoxetine (CYMBALTA) 30 MG extended release capsule; Take 1 tablet by mouth daily for 1 week, then stop and start mirtazapine.    mirtazapine (REMERON) 7.5 MG tablet; Take 1 tablet by mouth nightly

## 2024-12-05 NOTE — PROGRESS NOTES
[facility]  2024    Maranda Burns (:  1960) is a 64 y.o. female, here for evaluation of the following medical concerns:    Chief Complaint   Patient presents with    Depression     Pt is here for a 1 month follow up     COPD      Last Weight Metrics:      2024     1:52 PM 2024     1:41 PM 10/23/2024     2:00 PM 10/22/2024    10:34 AM 2024     1:25 PM 2024     2:52 PM 2024     1:53 PM   Weight Loss Metrics   Height 5' 3\" 5' 3\" 5' 3\" 5' 3\" 5' 3\" 5' 3\" 5' 3\"   Weight - Scale 81 lbs 13 oz 83 lbs 83 lbs 82 lbs 13 oz 83 lbs 3 oz 88 lbs 10 oz 88 lbs 10 oz   BMI (Calculated) 14.5 kg/m2 14.7 kg/m2 14.7 kg/m2 14.7 kg/m2 14.8 kg/m2 15.7 kg/m2 15.7 kg/m2         ASSESSMENT/ PLAN  Assessment & Plan  Unintentional weight loss  -Has lost 9 pounds since July unintentionally.  Eating her normal amount.  Possibly from end-stage COPD.  Will get CT chest abdomen pelvis to rule out other causes    Orders:    CT CHEST ABDOMEN PELVIS W CONTRAST Additional Contrast? Radiologist Recommendation; Future    Anxiety and depression  -Decrease duloxetine and then stop.  Start mirtazapine due to weight loss for appetite stimulation and for mood    Orders:    DULoxetine (CYMBALTA) 30 MG extended release capsule; Take 1 tablet by mouth daily for 1 week, then stop and start mirtazapine.    mirtazapine (REMERON) 7.5 MG tablet; Take 1 tablet by mouth nightly    Weight loss of more than 10% body weight  -See plan above    Orders:    CT CHEST ABDOMEN PELVIS W CONTRAST Additional Contrast? Radiologist Recommendation; Future    Acquired hypothyroidism  -Continue levothyroxine and recheck TSH    Orders:    CBC with Auto Differential; Future    Comprehensive Metabolic Panel; Future    TSH with Reflex; Future    TSH with Reflex    Comprehensive Metabolic Panel    CBC with Auto Differential    Moderate COPD (chronic obstructive pulmonary disease) (HCC)   Monitored by specialist- no acute findings

## 2024-12-06 ENCOUNTER — CARE COORDINATION (OUTPATIENT)
Dept: CARE COORDINATION | Age: 64
End: 2024-12-06

## 2024-12-06 ENCOUNTER — CARE COORDINATION (OUTPATIENT)
Dept: OTHER | Facility: CLINIC | Age: 64
End: 2024-12-06

## 2024-12-06 LAB
ALBUMIN SERPL-MCNC: 4.2 G/DL (ref 3.4–5)
ALBUMIN/GLOB SERPL: 2.1 {RATIO} (ref 1.1–2.2)
ALP SERPL-CCNC: 120 U/L (ref 40–129)
ALT SERPL-CCNC: 28 U/L (ref 10–40)
ANION GAP SERPL CALCULATED.3IONS-SCNC: 9 MMOL/L (ref 3–16)
AST SERPL-CCNC: 29 U/L (ref 15–37)
BASOPHILS # BLD: 0.1 K/UL (ref 0–0.2)
BASOPHILS NFR BLD: 0.8 %
BILIRUB SERPL-MCNC: 0.3 MG/DL (ref 0–1)
BUN SERPL-MCNC: 16 MG/DL (ref 7–20)
CALCIUM SERPL-MCNC: 10.7 MG/DL (ref 8.3–10.6)
CHLORIDE SERPL-SCNC: 101 MMOL/L (ref 99–110)
CO2 SERPL-SCNC: 35 MMOL/L (ref 21–32)
CREAT SERPL-MCNC: 0.9 MG/DL (ref 0.6–1.2)
DEPRECATED RDW RBC AUTO: 14.4 % (ref 12.4–15.4)
EOSINOPHIL # BLD: 1 K/UL (ref 0–0.6)
EOSINOPHIL NFR BLD: 8.5 %
GFR SERPLBLD CREATININE-BSD FMLA CKD-EPI: 72 ML/MIN/{1.73_M2}
GLUCOSE SERPL-MCNC: 96 MG/DL (ref 70–99)
HCT VFR BLD AUTO: 37.2 % (ref 36–48)
HGB BLD-MCNC: 11.6 G/DL (ref 12–16)
LYMPHOCYTES # BLD: 2.9 K/UL (ref 1–5.1)
LYMPHOCYTES NFR BLD: 23.7 %
MCH RBC QN AUTO: 30.4 PG (ref 26–34)
MCHC RBC AUTO-ENTMCNC: 31.2 G/DL (ref 31–36)
MCV RBC AUTO: 97.5 FL (ref 80–100)
MONOCYTES # BLD: 1.5 K/UL (ref 0–1.3)
MONOCYTES NFR BLD: 12.5 %
NEUTROPHILS # BLD: 6.6 K/UL (ref 1.7–7.7)
NEUTROPHILS NFR BLD: 54.5 %
PLATELET # BLD AUTO: 315 K/UL (ref 135–450)
PMV BLD AUTO: 10.8 FL (ref 5–10.5)
POTASSIUM SERPL-SCNC: 4.6 MMOL/L (ref 3.5–5.1)
PROT SERPL-MCNC: 6.2 G/DL (ref 6.4–8.2)
RBC # BLD AUTO: 3.82 M/UL (ref 4–5.2)
SODIUM SERPL-SCNC: 145 MMOL/L (ref 136–145)
T3 SERPL-MCNC: 1.31 NG/ML (ref 0.8–2)
T4 FREE SERPL-MCNC: 1.7 NG/DL (ref 0.9–1.8)
TSH SERPL DL<=0.005 MIU/L-ACNC: <0.01 UIU/ML (ref 0.27–4.2)
WBC # BLD AUTO: 12.2 K/UL (ref 4–11)

## 2024-12-06 NOTE — CARE COORDINATION
12/6/2024 10:05 AM  *  Unable to Reach Date/Time:  12/6/2024 10:05 AM  ACM attempted to reach patient by telephone regarding red alert r/t BP in remote patient monitoring program. Left HIPAA compliant message requesting a return call. Will attempt to reach patient again.     DENIA Fung, RN  Associate Care Manager   Cell: [phone]  [email or web address]

## 2024-12-06 NOTE — CARE COORDINATION
Ambulatory Care Coordination Note     12/6/2024 4:18 PM     Patient outreach attempt by this ACM today to perform care management follow up . ACM was unable to reach the patient by telephone today;   Pt could not here ACM on call.     ACM: Laurie Pulido, RN      PCP/Specialist follow up:   Future Appointments         Provider Specialty Dept Phone    1/10/2025 1:30 PM London Mcrae, [facility] [phone]            Follow Up:   Plan for next ACM outreach in approximately 1 week to complete:  - CC Protocol assessments  - disease specific assessments  - SDOH assessments  - medication review  - advance care planning  - goal progression  - education   - RPM.     Laurie LOZA, RN  Respecting Choices® Advanced Steps ACP Facilitator  Ambulatory Care Manager  [facility]  [phone]

## 2024-12-09 ENCOUNTER — CARE COORDINATION (OUTPATIENT)
Dept: OTHER | Facility: CLINIC | Age: 64
End: 2024-12-09

## 2024-12-09 ENCOUNTER — CARE COORDINATION (OUTPATIENT)
Dept: CARE COORDINATION | Age: 64
End: 2024-12-09

## 2024-12-09 NOTE — CARE COORDINATION
Ambulatory Care Coordination Note     12/9/2024 10:58 AM     Patient outreach attempt by this ACM today to perform care management follow up . ACM was unable to reach the patient by telephone today;   left voice message requesting a return phone call to this ACM.     ACM: Laurie Pulido, RN         PCP/Specialist follow up:   Future Appointments         Provider Specialty Dept Phone    1/10/2025 1:30 PM London Mcrae,  Family Medicine [phone]            Follow Up:   Plan for next ACM outreach in approximately 2 weeks to complete:  - goal progression  - education   - outreach attempt to offer care management services.        Laurie LOZA, RN  Respecting Choices® Advanced Steps ACP Facilitator  Ambulatory Care Manager  [facility]  [phone]

## 2024-12-09 NOTE — CARE COORDINATION
12/9/2024 2:17 PM  *  Unable to Reach Date/Time:  12/9/2024 2:18 PM  ACM attempted to reach patient by telephone regarding yellow alert r/t no metrics day 3 in remote patient monitoring program. Left HIPAA compliant message requesting a return call. Will attempt to reach patient again.       DEINA Fung, RN  Associate Care Manager   Cell: [phone]  [email or web address]

## 2024-12-10 ENCOUNTER — CARE COORDINATION (OUTPATIENT)
Dept: OTHER | Facility: CLINIC | Age: 64
End: 2024-12-10

## 2024-12-10 NOTE — CARE COORDINATION
12/10/2024 2:35 PM  *  Unable to Reach Date/Time:  12/10/2024 2:35 PM  ACM attempted to reach patient by telephone regarding yellow alert r/t no metrics day 3 in remote patient monitoring program. Left HIPAA compliant message requesting a return call. Will attempt to reach patient again.         DENIA Fung, RN  Associate Care Manager   Cell: [phone]  [email or web address]

## 2024-12-23 ENCOUNTER — CARE COORDINATION (OUTPATIENT)
Dept: CASE MANAGEMENT | Age: 64
End: 2024-12-23

## 2024-12-27 ENCOUNTER — HOSPITAL ENCOUNTER (OUTPATIENT)
Dept: CT IMAGING | Age: 64
Discharge: HOME | End: 2024-12-27
Attending: STUDENT IN AN ORGANIZED HEALTH CARE EDUCATION/TRAINING PROGRAM
Payer: MEDICARE

## 2024-12-27 DIAGNOSIS — R63.4 WEIGHT LOSS OF MORE THAN 10% BODY WEIGHT: ICD-10-CM

## 2024-12-27 DIAGNOSIS — R63.4 UNINTENTIONAL WEIGHT LOSS: ICD-10-CM

## 2024-12-27 PROCEDURE — 6360000004 HC RX CONTRAST MEDICATION: Performed by: STUDENT IN AN ORGANIZED HEALTH CARE EDUCATION/TRAINING PROGRAM

## 2024-12-27 PROCEDURE — 71260 CT THORAX DX C+: CPT

## 2024-12-27 RX ORDER — DIATRIZOATE MEGLUMINE AND DIATRIZOATE SODIUM 660; 100 MG/ML; MG/ML
12 SOLUTION ORAL; RECTAL
Status: DISCONTINUED | OUTPATIENT
Start: 2024-12-27 | End: 2024-12-28 | Stop reason: HOSPADM

## 2024-12-27 RX ORDER — IOPAMIDOL 755 MG/ML
75 INJECTION, SOLUTION INTRAVASCULAR
Status: COMPLETED | OUTPATIENT
Start: 2024-12-27 | End: 2024-12-27

## 2024-12-27 RX ADMIN — DIATRIZOATE MEGLUMINE AND DIATRIZOATE SODIUM 12 ML: 660; 100 LIQUID ORAL; RECTAL at 14:37

## 2024-12-27 RX ADMIN — IOPAMIDOL 75 ML: 755 INJECTION, SOLUTION INTRAVENOUS at 14:35

## 2025-01-08 ENCOUNTER — CARE COORDINATION (OUTPATIENT)
Dept: CARE COORDINATION | Age: 65
End: 2025-01-08

## 2025-01-08 ENCOUNTER — TELEPHONE (OUTPATIENT)
Dept: PHARMACY | Facility: CLINIC | Age: 65
End: 2025-01-08

## 2025-01-08 NOTE — TELEPHONE ENCOUNTER
Dr. Thor DO     Note patient has upcoming appointment schedule with you 1/10/25  Patient has been identified as a statin use in persons with cardiovascular disease care gap per [employer] and not currently filling a moderate or high intensity statin. - She FAILED this care gap in 2024.  Per chart review, patient was prescribed atorvastatin by Dr. Marina, last filled in 2022, eventually removed from the medication list due to \"list clean-up\".  Patient is due for updated lipid panel.    If you find appropriate, please consider restarting a statin in patient with history of ASCVD.    Please let me know if have any questions.    Thank you,  Lore Alfredo, PharmD, [facility] Clinical Pharmacy   Department, toll free: [phone], option 1   ==============================================================      [facility] CLINICAL PHARMACY: STATIN THERAPY REVIEW  Identified statin use in persons with cardiovascular disease care gap per [employer]. Failed measure 2024.     Future Appointments   Date Time Provider Department Center   1/10/2025  1:30 PM [facility]     ASSESSMENT of Statin in Persons with Cardiovascular Disease Care Gap    Maranda Burns  has been identified as having a diagnosis for clinical ASCVD or event (e.g., inpatient hospitalization for MI, CABG, PCI or other revascularization procedures) in the measurement year and not currently filling a moderate or high intensity statin.   Patients included in this care gap are males age 21-75 and females age 40-75.     ASCVD: Per problem list NSTEMI  Currently Prescribed a statin: no    Per chart review: Patient was prescribed atorvastatin 40 mg tablets- removed from medication list 12/5/24 as \"list clean-up\"    Per Reconcile dispense history in Epic:    Dispensed Days Supply Quantity Provider Pharmacy    ATORVASTATIN 40 MG TABLET 04/09/2022 90 90 each TANA ADORNO,ASIA Waggoner

## 2025-01-08 NOTE — CARE COORDINATION
Ambulatory Care Coordination Note     1/8/2025 11:21 AM     Patient outreach attempt by this ACM today to perform care management follow up . ACM was unable to reach the patient by telephone today;   Called X2, pt answered and then hung up.     ACM: Laurie Pulido, RN     Care Summary Note:  RPM reviewed today and noted no metrics. Paused.     PCP/Specialist follow up:   Future Appointments         Provider Specialty Dept Phone    1/10/2025 1:30 PM London Mcrae,  Family Medicine [phone]            Follow Up:   Plan for next ACM outreach in approximately 1 week to complete:  - goal progression  - education   - RPM.       Laurie COLEMANN, RN  Respecting Choices® Advanced Steps ACP Facilitator  Ambulatory Care Manager  [facility]  [phone]

## 2025-01-14 ENCOUNTER — CARE COORDINATION (OUTPATIENT)
Dept: PRIMARY CARE CLINIC | Age: 65
End: 2025-01-14

## 2025-01-14 ENCOUNTER — CARE COORDINATION (OUTPATIENT)
Dept: CARE COORDINATION | Age: 65
End: 2025-01-14

## 2025-01-14 DIAGNOSIS — I10 ESSENTIAL HYPERTENSION: Primary | ICD-10-CM

## 2025-01-14 DIAGNOSIS — J44.1 COPD EXACERBATION (HCC): ICD-10-CM

## 2025-01-14 NOTE — CARE COORDINATION
RPM Kit Return    Maranda Burns  1/14/25    Care Coordination  placed call to Patient  to arrange RPM kit  through [employer].     CCSS spoke to Patient reviewed with Patient how to pack equipment in original packing. Patient aware [employer] will  equipment in 2-4 days.   All questions and concerns answered.

## 2025-01-14 NOTE — PROGRESS NOTES
Remote Patient Order Discontinued    Received request from Laurie Pulido, RN   to discontinue order for remote patient monitoring of COPD and HTN and order completed.     
patient states she "has a beer or two every few of years"

## 2025-01-14 NOTE — CARE COORDINATION
condition.    Barriers: fear of failure, lack of motivation, overwhelmed by complexity of regimen, stress, time constraints, medication side effects, and lack of education  Plan for overcoming my barriers: Work with ACM  Confidence: 9/10  Anticipated Goal Completion Date: 1/28/24         Reduce Falls    On track             PCP/Specialist follow up:   Future Appointments         Provider Specialty Dept Phone    1/15/2025 1:00 PM London Mcrae,  Family Medicine [phone]            Follow Up:   Plan for next ACM outreach in approximately 2 weeks to complete:  - advance care planning   - goal progression  - education .   Patient  is agreeable to this plan.  Laurie COLEMANN, RN  Respecting Choices® Advanced Steps ACP Facilitator  Ambulatory Care Manager  [facility]  [phone]

## 2025-01-15 ENCOUNTER — OFFICE VISIT (OUTPATIENT)
Dept: FAMILY MEDICINE CLINIC | Age: 65
End: 2025-01-15

## 2025-01-15 VITALS
SYSTOLIC BLOOD PRESSURE: 124 MMHG | OXYGEN SATURATION: 92 % | WEIGHT: 89 LBS | HEIGHT: 63 IN | HEART RATE: 72 BPM | RESPIRATION RATE: 16 BRPM | BODY MASS INDEX: 15.77 KG/M2 | DIASTOLIC BLOOD PRESSURE: 74 MMHG

## 2025-01-15 DIAGNOSIS — G57.92 NEUROPATHY OF LEFT LOWER EXTREMITY: ICD-10-CM

## 2025-01-15 DIAGNOSIS — F33.1 MODERATE EPISODE OF RECURRENT MAJOR DEPRESSIVE DISORDER (HCC): ICD-10-CM

## 2025-01-15 DIAGNOSIS — I42.8 NONISCHEMIC CARDIOMYOPATHY (HCC): ICD-10-CM

## 2025-01-15 DIAGNOSIS — J96.12 CHRONIC RESPIRATORY FAILURE WITH HYPOXIA AND HYPERCAPNIA: ICD-10-CM

## 2025-01-15 DIAGNOSIS — E88.01 HETEROZYGOUS ALPHA 1-ANTITRYPSIN DEFICIENCY (HCC): ICD-10-CM

## 2025-01-15 DIAGNOSIS — E78.2 MIXED HYPERLIPIDEMIA: Primary | ICD-10-CM

## 2025-01-15 DIAGNOSIS — J43.1 PANLOBULAR EMPHYSEMA (HCC): ICD-10-CM

## 2025-01-15 DIAGNOSIS — K51.911 ULCERATIVE COLITIS WITH RECTAL BLEEDING, UNSPECIFIED LOCATION (HCC): ICD-10-CM

## 2025-01-15 DIAGNOSIS — F41.9 ANXIETY AND DEPRESSION: ICD-10-CM

## 2025-01-15 DIAGNOSIS — M81.0 OSTEOPOROSIS WITHOUT CURRENT PATHOLOGICAL FRACTURE, UNSPECIFIED OSTEOPOROSIS TYPE: ICD-10-CM

## 2025-01-15 DIAGNOSIS — J44.9 MODERATE COPD (CHRONIC OBSTRUCTIVE PULMONARY DISEASE) (HCC): ICD-10-CM

## 2025-01-15 DIAGNOSIS — J96.11 CHRONIC RESPIRATORY FAILURE WITH HYPOXIA AND HYPERCAPNIA: ICD-10-CM

## 2025-01-15 DIAGNOSIS — F32.A ANXIETY AND DEPRESSION: ICD-10-CM

## 2025-01-15 RX ORDER — MIRTAZAPINE 7.5 MG/1
7.5 TABLET, FILM COATED ORAL NIGHTLY
Qty: 90 TABLET | Refills: 0 | Status: SHIPPED | OUTPATIENT
Start: 2025-01-15

## 2025-01-15 RX ORDER — GABAPENTIN 100 MG/1
200 CAPSULE ORAL 2 TIMES DAILY
Qty: 120 CAPSULE | Refills: 2 | Status: SHIPPED | OUTPATIENT
Start: 2025-01-15 | End: 2025-04-15

## 2025-01-15 RX ORDER — FLUTICASONE FUROATE, UMECLIDINIUM BROMIDE AND VILANTEROL TRIFENATATE 200; 62.5; 25 UG/1; UG/1; UG/1
1 POWDER RESPIRATORY (INHALATION) DAILY
Qty: 1 EACH | Refills: 5 | Status: SHIPPED | OUTPATIENT
Start: 2025-01-15

## 2025-01-15 RX ORDER — ATORVASTATIN CALCIUM 40 MG/1
40 TABLET, FILM COATED ORAL DAILY
Qty: 90 TABLET | Refills: 0 | Status: SHIPPED | OUTPATIENT
Start: 2025-01-15

## 2025-01-15 RX ORDER — ALBUTEROL SULFATE 90 UG/1
2 INHALANT RESPIRATORY (INHALATION) EVERY 4 HOURS PRN
Qty: 6.7 G | Refills: 5 | Status: SHIPPED | OUTPATIENT
Start: 2025-01-15

## 2025-01-15 RX ORDER — ALBUTEROL SULFATE 0.83 MG/ML
2.5 SOLUTION RESPIRATORY (INHALATION) EVERY 6 HOURS PRN
Qty: 120 EACH | Refills: 3 | Status: SHIPPED | OUTPATIENT
Start: 2025-01-15

## 2025-01-15 ASSESSMENT — PATIENT HEALTH QUESTIONNAIRE - PHQ9
SUM OF ALL RESPONSES TO PHQ QUESTIONS 1-9: 0
3. TROUBLE FALLING OR STAYING ASLEEP: NOT AT ALL
SUM OF ALL RESPONSES TO PHQ QUESTIONS 1-9: 0
9. THOUGHTS THAT YOU WOULD BE BETTER OFF DEAD, OR OF HURTING YOURSELF: NOT AT ALL
SUM OF ALL RESPONSES TO PHQ QUESTIONS 1-9: 0
5. POOR APPETITE OR OVEREATING: NOT AT ALL
6. FEELING BAD ABOUT YOURSELF - OR THAT YOU ARE A FAILURE OR HAVE LET YOURSELF OR YOUR FAMILY DOWN: NOT AT ALL
4. FEELING TIRED OR HAVING LITTLE ENERGY: NOT AT ALL
SUM OF ALL RESPONSES TO PHQ9 QUESTIONS 1 & 2: 0
8. MOVING OR SPEAKING SO SLOWLY THAT OTHER PEOPLE COULD HAVE NOTICED. OR THE OPPOSITE, BEING SO FIGETY OR RESTLESS THAT YOU HAVE BEEN MOVING AROUND A LOT MORE THAN USUAL: NOT AT ALL
SUM OF ALL RESPONSES TO PHQ QUESTIONS 1-9: 0
1. LITTLE INTEREST OR PLEASURE IN DOING THINGS: NOT AT ALL
10. IF YOU CHECKED OFF ANY PROBLEMS, HOW DIFFICULT HAVE THESE PROBLEMS MADE IT FOR YOU TO DO YOUR WORK, TAKE CARE OF THINGS AT HOME, OR GET ALONG WITH OTHER PEOPLE: NOT DIFFICULT AT ALL
2. FEELING DOWN, DEPRESSED OR HOPELESS: NOT AT ALL
7. TROUBLE CONCENTRATING ON THINGS, SUCH AS READING THE NEWSPAPER OR WATCHING TELEVISION: NOT AT ALL

## 2025-01-15 ASSESSMENT — ENCOUNTER SYMPTOMS
RHINORRHEA: 0
SHORTNESS OF BREATH: 0
COUGH: 0

## 2025-01-15 NOTE — ASSESSMENT & PLAN NOTE
Chronic, at goal (stable), continue current treatment plan    Orders:    gabapentin (NEURONTIN) 100 MG capsule; Take 2 capsules by mouth 2 times daily for 90 days. Intended supply: 90 days

## 2025-01-15 NOTE — ASSESSMENT & PLAN NOTE
Monitored by specialist- no acute findings meriting change in the plan. Resume atorvastatin.

## 2025-01-15 NOTE — PROGRESS NOTES
[facility]  1/15/2025    Maranda Burns (:  1960) is a 64 y.o. female, here for evaluation of the following medical concerns:    Chief Complaint   Patient presents with    1 Month Follow-Up     Pt is here for a 1 month follow up         ASSESSMENT/ PLAN  Assessment & Plan  Anxiety and depression   Chronic, at goal (stable), continue current treatment plan    Orders:    mirtazapine (REMERON) 7.5 MG tablet; Take 1 tablet by mouth nightly    Heterozygous alpha 1-antitrypsin deficiency (HCC)   Monitored by specialist- no acute findings meriting change in the plan         Chronic respiratory failure with hypoxia and hypercapnia   Monitored by specialist- no acute findings meriting change in the plan         Panlobular emphysema (HCC)   Monitored by specialist- no acute findings meriting change in the plan    Orders:    albuterol (PROVENTIL) (2.5 MG/3ML) 0.083% nebulizer solution; Take 3 mLs by nebulization every 6 hours as needed for Wheezing    albuterol sulfate HFA (PROVENTIL;VENTOLIN;PROAIR) 108 (90 Base) MCG/ACT inhaler; Inhale 2 puffs into the lungs every 4 hours as needed for Wheezing or Shortness of Breath    fluticasone-umeclidin-vilant (TRELEGY ELLIPTA) 200-62.5-25 MCG/ACT AEPB inhaler; Inhale 1 puff into the lungs daily    Moderate episode of recurrent major depressive disorder (HCC)   Chronic, at goal (stable), continue current treatment plan         Ulcerative colitis with rectal bleeding, unspecified location (HCC)   Monitored by specialist- no acute findings meriting change in the plan. Resume atorvastatin.         Nonischemic cardiomyopathy (HCC)   Monitored by specialist- no acute findings meriting change in the plan         Neuropathy of left lower extremity   Chronic, at goal (stable), continue current treatment plan    Orders:    gabapentin (NEURONTIN) 100 MG capsule; Take 2 capsules by mouth 2 times daily for 90 days. Intended supply: 90 days    Moderate COPD (chronic

## 2025-01-15 NOTE — ASSESSMENT & PLAN NOTE
Chronic, at goal (stable), continue current treatment plan    Orders:    mirtazapine (REMERON) 7.5 MG tablet; Take 1 tablet by mouth nightly

## 2025-01-15 NOTE — ASSESSMENT & PLAN NOTE
Chronic, at goal (stable), continue current treatment plan    Orders:    albuterol (PROVENTIL) (2.5 MG/3ML) 0.083% nebulizer solution; Take 3 mLs by nebulization every 6 hours as needed for Wheezing    albuterol sulfate HFA (PROVENTIL;VENTOLIN;PROAIR) 108 (90 Base) MCG/ACT inhaler; Inhale 2 puffs into the lungs every 4 hours as needed for Wheezing or Shortness of Breath

## 2025-01-15 NOTE — ASSESSMENT & PLAN NOTE
Chronic, at goal (stable), continue current treatment plan          [Normal] : well developed, well nourished, in no acute distress [Abdomen Soft] : soft [Nondistended] : nondistended

## 2025-01-16 DIAGNOSIS — M81.0 OSTEOPOROSIS WITHOUT PATHOLOGICAL FRACTURE: Primary | ICD-10-CM

## 2025-01-17 ENCOUNTER — TELEPHONE (OUTPATIENT)
Dept: FAMILY MEDICINE CLINIC | Age: 65
End: 2025-01-17

## 2025-01-17 DIAGNOSIS — Z78.0 POST-MENOPAUSAL: Primary | ICD-10-CM

## 2025-01-17 NOTE — TELEPHONE ENCOUNTER
Received call from [facility] regarding the prolia injection. Patient's insurance requires her to try Fosaxam, Boniva or Reclast first. She would also need an updated DEXA scan.

## 2025-01-27 NOTE — TELEPHONE ENCOUNTER
Note atorvastatin 40 mg re-prescribed 1/15/25. Per [facility] pharmacy, picked up 1/21/25.    Labs/follow-up per provider discretion.    No further outreach planned by PharmD at this time.    Logan LimD, [facility] Clinical Pharmacy   Department, toll free: [phone], option 1    For Pharmacy Admin Tracking Only  Program: transOMIC  CPA in place:  No  Recommendation Provided To: Provider: 1 via Note to Provider  Intervention Detail: New Rx: 1, reason: Needs Additional Therapy  Intervention Accepted By: Provider: 1  Gap Closed?: Yes   Time Spent (min): 15

## 2025-02-03 ENCOUNTER — CARE COORDINATION (OUTPATIENT)
Dept: CARE COORDINATION | Age: 65
End: 2025-02-03

## 2025-02-03 NOTE — CARE COORDINATION
Ambulatory Care Coordination Note     2/3/2025 2:29 PM     Patient has graduated from the Complex Case Management  program on 2/3/25.  Patient/family has the ability to self-manage at this time.  Care management goals have been completed. No further Ambulatory Care Manager follow up scheduled.     Goals Addressed                   This Visit's Progress     COMPLETED: Conditions and Symptoms   On track     I will schedule office visits, as directed by my provider.  I will keep my appointment or reschedule if I have to cancel.  I will notify my provider of any barriers to my plan of care.  I will follow my Zone Management tool to seek urgent or emergent care.  I will notify my provider of any symptoms that indicate a worsening of my condition.    Barriers: fear of failure, lack of motivation, overwhelmed by complexity of regimen, stress, time constraints, medication side effects, and lack of education  Plan for overcoming my barriers: Work with ACM  Confidence: 9/10  Anticipated Goal Completion Date: 1/28/24         COMPLETED: Reduce Falls    On track            Patient has Ambulatory Care Manager's contact information for any further questions, concerns, or needs.  Patients upcoming visits:    Future Appointments   Date Time Provider Department Center   3/3/2025  1:00 PM JENZ OP NURSING ROOM 1 [facility] OP RN Rich COLEMANN, RN  Respecting Choices® Advanced Steps ACP Facilitator  Ambulatory Care Manager  [facility]  [phone]

## 2025-02-04 RX ORDER — METOPROLOL SUCCINATE 50 MG/1
50 TABLET, EXTENDED RELEASE ORAL DAILY
Qty: 30 TABLET | Refills: 0 | Status: SHIPPED | OUTPATIENT
Start: 2025-02-04

## 2025-03-03 ENCOUNTER — HOSPITAL ENCOUNTER (OUTPATIENT)
Dept: NURSING | Age: 65
Setting detail: INFUSION SERIES
Discharge: HOME | End: 2025-03-03

## 2025-03-24 RX ORDER — LOSARTAN POTASSIUM 100 MG/1
100 TABLET ORAL DAILY
Qty: 90 TABLET | Refills: 1 | OUTPATIENT
Start: 2025-03-24

## 2025-03-26 ENCOUNTER — HOSPITAL ENCOUNTER (INPATIENT)
Age: 65
LOS: 6 days | Discharge: HOME | DRG: 871 | End: 2025-04-02
Attending: EMERGENCY MEDICINE | Admitting: HOSPITALIST
Payer: MEDICARE

## 2025-03-26 ENCOUNTER — APPOINTMENT (OUTPATIENT)
Dept: GENERAL RADIOLOGY | Age: 65
DRG: 871 | End: 2025-03-26
Payer: MEDICARE

## 2025-03-26 DIAGNOSIS — J96.02 ACUTE RESPIRATORY FAILURE WITH HYPOXIA AND HYPERCAPNIA: Primary | ICD-10-CM

## 2025-03-26 DIAGNOSIS — J96.01 ACUTE RESPIRATORY FAILURE WITH HYPOXIA AND HYPERCAPNIA: Primary | ICD-10-CM

## 2025-03-26 DIAGNOSIS — R65.10 SIRS (SYSTEMIC INFLAMMATORY RESPONSE SYNDROME) (HCC): ICD-10-CM

## 2025-03-26 DIAGNOSIS — J44.1 COPD EXACERBATION (HCC): ICD-10-CM

## 2025-03-26 DIAGNOSIS — G93.40 ACUTE ENCEPHALOPATHY: ICD-10-CM

## 2025-03-26 LAB
ALBUMIN SERPL-MCNC: 3.7 G/DL (ref 3.4–5)
ALBUMIN/GLOB SERPL: 1.4 {RATIO} (ref 1.1–2.2)
ALP SERPL-CCNC: 129 U/L (ref 40–129)
ALT SERPL-CCNC: 33 U/L (ref 10–40)
ANION GAP SERPL CALCULATED.3IONS-SCNC: 16 MMOL/L (ref 3–16)
AST SERPL-CCNC: 54 U/L (ref 15–37)
BASE EXCESS BLDV CALC-SCNC: -12.6 MMOL/L (ref -3–3)
BILIRUB SERPL-MCNC: 0.5 MG/DL (ref 0–1)
BUN SERPL-MCNC: 12 MG/DL (ref 7–20)
CALCIUM SERPL-MCNC: 9.6 MG/DL (ref 8.3–10.6)
CHLORIDE SERPL-SCNC: 101 MMOL/L (ref 99–110)
CO2 BLDV-SCNC: 22 MMOL/L
CO2 SERPL-SCNC: 22 MMOL/L (ref 21–32)
COHGB MFR BLDV: 3.4 % (ref 0–1.5)
CREAT SERPL-MCNC: 1 MG/DL (ref 0.6–1.2)
GFR SERPLBLD CREATININE-BSD FMLA CKD-EPI: 63 ML/MIN/{1.73_M2}
GLUCOSE SERPL-MCNC: 251 MG/DL (ref 70–99)
HCO3 BLDV-SCNC: 19.6 MMOL/L (ref 23–29)
METHGB MFR BLDV: 0.4 %
O2 THERAPY: ABNORMAL
PCO2 BLDV: 78.3 MMHG (ref 40–50)
PH BLDV: 7.02 [PH] (ref 7.35–7.45)
PO2 BLDV: 94.1 MMHG (ref 25–40)
POTASSIUM SERPL-SCNC: 4.5 MMOL/L (ref 3.5–5.1)
PROT SERPL-MCNC: 6.4 G/DL (ref 6.4–8.2)
SAO2 % BLDV: 93 %
SODIUM SERPL-SCNC: 139 MMOL/L (ref 136–145)
TROPONIN, HIGH SENSITIVITY: 39 NG/L (ref 0–14)

## 2025-03-26 PROCEDURE — 85025 COMPLETE CBC W/AUTO DIFF WBC: CPT

## 2025-03-26 PROCEDURE — 80307 DRUG TEST PRSMV CHEM ANLYZR: CPT

## 2025-03-26 PROCEDURE — 2500000003 HC RX 250 WO HCPCS: Performed by: EMERGENCY MEDICINE

## 2025-03-26 PROCEDURE — 71045 X-RAY EXAM CHEST 1 VIEW: CPT

## 2025-03-26 PROCEDURE — 96374 THER/PROPH/DIAG INJ IV PUSH: CPT

## 2025-03-26 PROCEDURE — 87636 SARSCOV2 & INF A&B AMP PRB: CPT

## 2025-03-26 PROCEDURE — 96375 TX/PRO/DX INJ NEW DRUG ADDON: CPT

## 2025-03-26 PROCEDURE — 81001 URINALYSIS AUTO W/SCOPE: CPT

## 2025-03-26 PROCEDURE — 6360000002 HC RX W HCPCS: Performed by: EMERGENCY MEDICINE

## 2025-03-26 PROCEDURE — 80053 COMPREHEN METABOLIC PANEL: CPT

## 2025-03-26 PROCEDURE — 2580000003 HC RX 258: Performed by: EMERGENCY MEDICINE

## 2025-03-26 PROCEDURE — 83605 ASSAY OF LACTIC ACID: CPT

## 2025-03-26 PROCEDURE — 99285 EMERGENCY DEPT VISIT HI MDM: CPT

## 2025-03-26 PROCEDURE — 82803 BLOOD GASES ANY COMBINATION: CPT

## 2025-03-26 PROCEDURE — 84484 ASSAY OF TROPONIN QUANT: CPT

## 2025-03-26 PROCEDURE — 74018 RADEX ABDOMEN 1 VIEW: CPT

## 2025-03-26 RX ORDER — MIDAZOLAM HYDROCHLORIDE 1 MG/ML
1-10 INJECTION, SOLUTION INTRAVENOUS CONTINUOUS
Status: DISCONTINUED | OUTPATIENT
Start: 2025-03-26 | End: 2025-03-27

## 2025-03-26 RX ORDER — FENTANYL CITRATE-0.9 % NACL/PF 10 MCG/ML
25-200 PLASTIC BAG, INJECTION (ML) INTRAVENOUS CONTINUOUS
Refills: 0 | Status: DISCONTINUED | OUTPATIENT
Start: 2025-03-26 | End: 2025-03-27

## 2025-03-26 RX ORDER — ROCURONIUM BROMIDE 10 MG/ML
INJECTION, SOLUTION INTRAVENOUS DAILY PRN
Status: COMPLETED | OUTPATIENT
Start: 2025-03-26 | End: 2025-03-26

## 2025-03-26 RX ORDER — 0.9 % SODIUM CHLORIDE 0.9 %
1000 INTRAVENOUS SOLUTION INTRAVENOUS ONCE
Status: COMPLETED | OUTPATIENT
Start: 2025-03-26 | End: 2025-03-27

## 2025-03-26 RX ORDER — ETOMIDATE 2 MG/ML
INJECTION INTRAVENOUS DAILY PRN
Status: COMPLETED | OUTPATIENT
Start: 2025-03-26 | End: 2025-03-26

## 2025-03-26 RX ADMIN — MIDAZOLAM IN SODIUM CHLORIDE 2 MG/HR: 1 INJECTION INTRAVENOUS at 23:50

## 2025-03-26 RX ADMIN — FENTANYL CITRATE 25 MCG/HR: 50 INJECTION, SOLUTION INTRAMUSCULAR; INTRAVENOUS at 23:52

## 2025-03-26 RX ADMIN — ETOMIDATE 20 MG: 2 INJECTION INTRAVENOUS at 23:17

## 2025-03-26 RX ADMIN — SODIUM CHLORIDE 1000 ML: 0.9 INJECTION, SOLUTION INTRAVENOUS at 23:36

## 2025-03-26 RX ADMIN — ROCURONIUM BROMIDE 60 MG: 50 INJECTION, SOLUTION INTRAVENOUS at 23:17

## 2025-03-26 ASSESSMENT — PAIN SCALES - GENERAL: PAINLEVEL_OUTOF10: 0

## 2025-03-26 ASSESSMENT — LIFESTYLE VARIABLES
HOW MANY STANDARD DRINKS CONTAINING ALCOHOL DO YOU HAVE ON A TYPICAL DAY: PATIENT UNABLE TO ANSWER
HOW OFTEN DO YOU HAVE A DRINK CONTAINING ALCOHOL: PATIENT UNABLE TO ANSWER

## 2025-03-27 ENCOUNTER — ANCILLARY PROCEDURE (OUTPATIENT)
Dept: EMERGENCY DEPT | Age: 65
DRG: 871 | End: 2025-03-27
Attending: EMERGENCY MEDICINE
Payer: MEDICARE

## 2025-03-27 ENCOUNTER — APPOINTMENT (OUTPATIENT)
Dept: GENERAL RADIOLOGY | Age: 65
DRG: 871 | End: 2025-03-27
Payer: MEDICARE

## 2025-03-27 ENCOUNTER — APPOINTMENT (OUTPATIENT)
Dept: CT IMAGING | Age: 65
DRG: 871 | End: 2025-03-27
Payer: MEDICARE

## 2025-03-27 PROBLEM — J96.01 ACUTE HYPOXIC RESPIRATORY FAILURE: Status: ACTIVE | Noted: 2025-03-27

## 2025-03-27 LAB
ACANTHOCYTES BLD QL SMEAR: ABNORMAL
ALBUMIN SERPL-MCNC: 4.1 G/DL (ref 3.4–5)
ALBUMIN/GLOB SERPL: 2.1 {RATIO} (ref 1.1–2.2)
ALP SERPL-CCNC: 118 U/L (ref 40–129)
ALT SERPL-CCNC: 50 U/L (ref 10–40)
AMPHETAMINES UR QL SCN>1000 NG/ML: NORMAL
ANION GAP SERPL CALCULATED.3IONS-SCNC: 13 MMOL/L (ref 3–16)
ANISOCYTOSIS BLD QL SMEAR: ABNORMAL
ANISOCYTOSIS BLD QL SMEAR: ABNORMAL
AST SERPL-CCNC: 52 U/L (ref 15–37)
BACTERIA URNS QL MICRO: ABNORMAL /HPF
BARBITURATES UR QL SCN>200 NG/ML: NORMAL
BASE EXCESS BLDA CALC-SCNC: -0.9 MMOL/L (ref -3–3)
BASE EXCESS BLDA CALC-SCNC: 0.8 MMOL/L (ref -3–3)
BASE EXCESS BLDA CALC-SCNC: 2 MMOL/L (ref -3–3)
BASOPHILS # BLD: 0 K/UL (ref 0–0.2)
BASOPHILS # BLD: 0 K/UL (ref 0–0.2)
BASOPHILS NFR BLD: 0 %
BASOPHILS NFR BLD: 0 %
BENZODIAZ UR QL SCN>200 NG/ML: NORMAL
BILIRUB SERPL-MCNC: 0.5 MG/DL (ref 0–1)
BILIRUB UR QL STRIP.AUTO: NEGATIVE
BUN SERPL-MCNC: 18 MG/DL (ref 7–20)
BURR CELLS BLD QL SMEAR: ABNORMAL
BURR CELLS BLD QL SMEAR: ABNORMAL
CALCIUM SERPL-MCNC: 9.2 MG/DL (ref 8.3–10.6)
CANNABINOIDS UR QL SCN>50 NG/ML: NORMAL
CHLORIDE SERPL-SCNC: 103 MMOL/L (ref 99–110)
CLARITY UR: CLEAR
CO2 BLDA-SCNC: 28.4 MMOL/L
CO2 BLDA-SCNC: 28.9 MMOL/L
CO2 BLDA-SCNC: 33 MMOL/L
CO2 SERPL-SCNC: 27 MMOL/L (ref 21–32)
COCAINE UR QL SCN: NORMAL
COHGB MFR BLDA: 0.1 % (ref 0–1.5)
COHGB MFR BLDA: 0.2 % (ref 0–1.5)
COLOR UR: YELLOW
CREAT SERPL-MCNC: 0.9 MG/DL (ref 0.6–1.2)
DEPRECATED RDW RBC AUTO: 13.6 % (ref 12.4–15.4)
DEPRECATED RDW RBC AUTO: 14.7 % (ref 12.4–15.4)
DRUG SCREEN COMMENT UR-IMP: NORMAL
EOSINOPHIL # BLD: 0 K/UL (ref 0–0.6)
EOSINOPHIL # BLD: 2 K/UL (ref 0–0.6)
EOSINOPHIL NFR BLD: 0 %
EOSINOPHIL NFR BLD: 10 %
FENTANYL SCREEN, URINE: NORMAL
FLUAV RNA RESP QL NAA+PROBE: NOT DETECTED
FLUBV RNA RESP QL NAA+PROBE: NOT DETECTED
GFR SERPLBLD CREATININE-BSD FMLA CKD-EPI: 71 ML/MIN/{1.73_M2}
GLUCOSE BLD-MCNC: 130 MG/DL (ref 70–99)
GLUCOSE BLD-MCNC: 136 MG/DL (ref 70–99)
GLUCOSE BLD-MCNC: 154 MG/DL (ref 70–99)
GLUCOSE BLD-MCNC: 175 MG/DL (ref 70–99)
GLUCOSE SERPL-MCNC: 126 MG/DL (ref 70–99)
GLUCOSE UR STRIP.AUTO-MCNC: 100 MG/DL
HCO3 BLDA-SCNC: 26.9 MMOL/L (ref 21–29)
HCO3 BLDA-SCNC: 27.1 MMOL/L (ref 21–29)
HCO3 BLDA-SCNC: 30.5 MMOL/L (ref 21–29)
HCT VFR BLD AUTO: 37 % (ref 36–48)
HCT VFR BLD AUTO: 40.6 % (ref 36–48)
HGB BLD-MCNC: 11.8 G/DL (ref 12–16)
HGB BLD-MCNC: 12.6 G/DL (ref 12–16)
HGB BLDA-MCNC: 11.6 G/DL (ref 12–16)
HGB BLDA-MCNC: 12.6 G/DL (ref 12–16)
HGB UR QL STRIP.AUTO: ABNORMAL
KETONES UR STRIP.AUTO-MCNC: NEGATIVE MG/DL
LACTATE BLD-SCNC: 1.77 MMOL/L (ref 0.4–2)
LACTATE BLDV-SCNC: 1.9 MMOL/L (ref 0.4–1.9)
LACTATE BLDV-SCNC: 3.2 MMOL/L (ref 0.4–1.9)
LACTATE BLDV-SCNC: 6.1 MMOL/L (ref 0.4–2)
LEUKOCYTE ESTERASE UR QL STRIP.AUTO: NEGATIVE
LYMPHOCYTES # BLD: 2.9 K/UL (ref 1–5.1)
LYMPHOCYTES # BLD: 9.1 K/UL (ref 1–5.1)
LYMPHOCYTES NFR BLD: 12 %
LYMPHOCYTES NFR BLD: 45 %
MACROCYTES BLD QL SMEAR: ABNORMAL
MCH RBC QN AUTO: 30.5 PG (ref 26–34)
MCH RBC QN AUTO: 31.1 PG (ref 26–34)
MCHC RBC AUTO-ENTMCNC: 31.1 G/DL (ref 31–36)
MCHC RBC AUTO-ENTMCNC: 32 G/DL (ref 31–36)
MCV RBC AUTO: 95.4 FL (ref 80–100)
MCV RBC AUTO: 99.9 FL (ref 80–100)
METHADONE UR QL SCN>300 NG/ML: NORMAL
METHGB MFR BLDA: 0.4 %
METHGB MFR BLDA: 0.4 %
MICROCYTES BLD QL SMEAR: ABNORMAL
MICROCYTES BLD QL SMEAR: ABNORMAL
MONOCYTES # BLD: 0.5 K/UL (ref 0–1.3)
MONOCYTES # BLD: 0.8 K/UL (ref 0–1.3)
MONOCYTES NFR BLD: 3 %
MONOCYTES NFR BLD: 4 %
NEUTROPHILS # BLD: 14.7 K/UL (ref 1.7–7.7)
NEUTROPHILS # BLD: 8.3 K/UL (ref 1.7–7.7)
NEUTROPHILS NFR BLD: 41 %
NEUTROPHILS NFR BLD: 78 %
NEUTS BAND NFR BLD MANUAL: 3 % (ref 0–7)
NITRITE UR QL STRIP.AUTO: NEGATIVE
O2 THERAPY: ABNORMAL
O2 THERAPY: ABNORMAL
OPIATES UR QL SCN>300 NG/ML: NORMAL
OVALOCYTES BLD QL SMEAR: ABNORMAL
OVALOCYTES BLD QL SMEAR: ABNORMAL
OXYCODONE UR QL SCN: NORMAL
PCO2 BLDA: 49.1 MMHG (ref 35–45)
PCO2 BLDA: 60.4 MMHG (ref 35–45)
PCO2 BLDA: 80 MM HG (ref 35–45)
PCP UR QL SCN>25 NG/ML: NORMAL
PERFORMED ON: ABNORMAL
PH BLDA: 7.19 [PH] (ref 7.35–7.45)
PH BLDA: 7.27 [PH] (ref 7.35–7.45)
PH BLDA: 7.36 [PH] (ref 7.35–7.45)
PH UR STRIP.AUTO: 7 [PH] (ref 5–8)
PH UR STRIP: 7 [PH]
PLATELET # BLD AUTO: 310 K/UL (ref 135–450)
PLATELET # BLD AUTO: 384 K/UL (ref 135–450)
PLATELET BLD QL SMEAR: ADEQUATE
PLATELET BLD QL SMEAR: ADEQUATE
PMV BLD AUTO: 9.6 FL (ref 5–10.5)
PMV BLD AUTO: 9.6 FL (ref 5–10.5)
PO2 BLDA: 168 MMHG (ref 75–108)
PO2 BLDA: 205.4 MMHG (ref 75–108)
PO2 BLDA: 90.4 MM HG (ref 75–108)
POC SAMPLE TYPE: ABNORMAL
POIKILOCYTOSIS BLD QL SMEAR: ABNORMAL
POIKILOCYTOSIS BLD QL SMEAR: ABNORMAL
POLYCHROMASIA BLD QL SMEAR: ABNORMAL
POTASSIUM SERPL-SCNC: 3.9 MMOL/L (ref 3.5–5.1)
PROT SERPL-MCNC: 6.1 G/DL (ref 6.4–8.2)
PROT UR STRIP.AUTO-MCNC: >=300 MG/DL
RBC # BLD AUTO: 3.87 M/UL (ref 4–5.2)
RBC # BLD AUTO: 4.06 M/UL (ref 4–5.2)
RBC #/AREA URNS HPF: ABNORMAL /HPF (ref 0–4)
SAO2 % BLDA: 94 % (ref 93–100)
SAO2 % BLDA: 99.2 %
SAO2 % BLDA: 99.4 %
SARS-COV-2 RNA RESP QL NAA+PROBE: NOT DETECTED
SLIDE REVIEW: ABNORMAL
SLIDE REVIEW: ABNORMAL
SODIUM SERPL-SCNC: 143 MMOL/L (ref 136–145)
SP GR UR STRIP.AUTO: >=1.03 (ref 1–1.03)
TROPONIN, HIGH SENSITIVITY: 131 NG/L (ref 0–14)
TROPONIN, HIGH SENSITIVITY: 58 NG/L (ref 0–14)
TROPONIN, HIGH SENSITIVITY: 97 NG/L (ref 0–14)
UA COMPLETE W REFLEX CULTURE PNL UR: ABNORMAL
UA DIPSTICK W REFLEX MICRO PNL UR: YES
URN SPEC COLLECT METH UR: ABNORMAL
UROBILINOGEN UR STRIP-ACNC: 0.2 E.U./DL
VARIANT LYMPHS NFR BLD MANUAL: 4 % (ref 0–6)
WBC # BLD AUTO: 18.1 K/UL (ref 4–11)
WBC # BLD AUTO: 20.2 K/UL (ref 4–11)
WBC #/AREA URNS HPF: ABNORMAL /HPF (ref 0–5)

## 2025-03-27 PROCEDURE — 0BJ08ZZ INSPECTION OF TRACHEOBRONCHIAL TREE, VIA NATURAL OR ARTIFICIAL OPENING ENDOSCOPIC: ICD-10-PCS | Performed by: STUDENT IN AN ORGANIZED HEALTH CARE EDUCATION/TRAINING PROGRAM

## 2025-03-27 PROCEDURE — 2500000003 HC RX 250 WO HCPCS: Performed by: STUDENT IN AN ORGANIZED HEALTH CARE EDUCATION/TRAINING PROGRAM

## 2025-03-27 PROCEDURE — 87205 SMEAR GRAM STAIN: CPT

## 2025-03-27 PROCEDURE — 82803 BLOOD GASES ANY COMBINATION: CPT

## 2025-03-27 PROCEDURE — 51798 US URINE CAPACITY MEASURE: CPT

## 2025-03-27 PROCEDURE — 6370000000 HC RX 637 (ALT 250 FOR IP): Performed by: HOSPITALIST

## 2025-03-27 PROCEDURE — 2700000000 HC OXYGEN THERAPY PER DAY

## 2025-03-27 PROCEDURE — 85025 COMPLETE CBC W/AUTO DIFF WBC: CPT

## 2025-03-27 PROCEDURE — 82947 ASSAY GLUCOSE BLOOD QUANT: CPT

## 2025-03-27 PROCEDURE — 84484 ASSAY OF TROPONIN QUANT: CPT

## 2025-03-27 PROCEDURE — 2000000000 HC ICU R&B

## 2025-03-27 PROCEDURE — 31500 INSERT EMERGENCY AIRWAY: CPT

## 2025-03-27 PROCEDURE — 2500000003 HC RX 250 WO HCPCS: Performed by: NURSE PRACTITIONER

## 2025-03-27 PROCEDURE — 71045 X-RAY EXAM CHEST 1 VIEW: CPT

## 2025-03-27 PROCEDURE — 2580000003 HC RX 258: Performed by: HOSPITALIST

## 2025-03-27 PROCEDURE — 6360000004 HC RX CONTRAST MEDICATION: Performed by: EMERGENCY MEDICINE

## 2025-03-27 PROCEDURE — 2580000003 HC RX 258: Performed by: EMERGENCY MEDICINE

## 2025-03-27 PROCEDURE — 2500000003 HC RX 250 WO HCPCS: Performed by: EMERGENCY MEDICINE

## 2025-03-27 PROCEDURE — 36415 COLL VENOUS BLD VENIPUNCTURE: CPT

## 2025-03-27 PROCEDURE — 2580000003 HC RX 258

## 2025-03-27 PROCEDURE — 6360000002 HC RX W HCPCS

## 2025-03-27 PROCEDURE — 80053 COMPREHEN METABOLIC PANEL: CPT

## 2025-03-27 PROCEDURE — 83605 ASSAY OF LACTIC ACID: CPT

## 2025-03-27 PROCEDURE — 93308 TTE F-UP OR LMTD: CPT

## 2025-03-27 PROCEDURE — 31622 DX BRONCHOSCOPE/WASH: CPT | Performed by: STUDENT IN AN ORGANIZED HEALTH CARE EDUCATION/TRAINING PROGRAM

## 2025-03-27 PROCEDURE — 6370000000 HC RX 637 (ALT 250 FOR IP)

## 2025-03-27 PROCEDURE — 6360000002 HC RX W HCPCS: Performed by: STUDENT IN AN ORGANIZED HEALTH CARE EDUCATION/TRAINING PROGRAM

## 2025-03-27 PROCEDURE — 94640 AIRWAY INHALATION TREATMENT: CPT

## 2025-03-27 PROCEDURE — 76604 US EXAM CHEST: CPT

## 2025-03-27 PROCEDURE — 0BJ08ZZ INSPECTION OF TRACHEOBRONCHIAL TREE, VIA NATURAL OR ARTIFICIAL OPENING ENDOSCOPIC: ICD-10-PCS

## 2025-03-27 PROCEDURE — 2500000003 HC RX 250 WO HCPCS: Performed by: HOSPITALIST

## 2025-03-27 PROCEDURE — 5A1935Z RESPIRATORY VENTILATION, LESS THAN 24 CONSECUTIVE HOURS: ICD-10-PCS | Performed by: HOSPITALIST

## 2025-03-27 PROCEDURE — 3E033XZ INTRODUCTION OF VASOPRESSOR INTO PERIPHERAL VEIN, PERCUTANEOUS APPROACH: ICD-10-PCS

## 2025-03-27 PROCEDURE — 96375 TX/PRO/DX INJ NEW DRUG ADDON: CPT

## 2025-03-27 PROCEDURE — 3E033XZ INTRODUCTION OF VASOPRESSOR INTO PERIPHERAL VEIN, PERCUTANEOUS APPROACH: ICD-10-PCS | Performed by: HOSPITALIST

## 2025-03-27 PROCEDURE — 6360000002 HC RX W HCPCS: Performed by: NURSE PRACTITIONER

## 2025-03-27 PROCEDURE — 70450 CT HEAD/BRAIN W/O DYE: CPT

## 2025-03-27 PROCEDURE — 94761 N-INVAS EAR/PLS OXIMETRY MLT: CPT

## 2025-03-27 PROCEDURE — 99291 CRITICAL CARE FIRST HOUR: CPT | Performed by: STUDENT IN AN ORGANIZED HEALTH CARE EDUCATION/TRAINING PROGRAM

## 2025-03-27 PROCEDURE — 31622 DX BRONCHOSCOPE/WASH: CPT

## 2025-03-27 PROCEDURE — 2580000003 HC RX 258: Performed by: STUDENT IN AN ORGANIZED HEALTH CARE EDUCATION/TRAINING PROGRAM

## 2025-03-27 PROCEDURE — 71260 CT THORAX DX C+: CPT

## 2025-03-27 PROCEDURE — 87070 CULTURE OTHR SPECIMN AEROBIC: CPT

## 2025-03-27 PROCEDURE — 94002 VENT MGMT INPAT INIT DAY: CPT

## 2025-03-27 PROCEDURE — 0BH17EZ INSERTION OF ENDOTRACHEAL AIRWAY INTO TRACHEA, VIA NATURAL OR ARTIFICIAL OPENING: ICD-10-PCS | Performed by: HOSPITALIST

## 2025-03-27 PROCEDURE — 6360000002 HC RX W HCPCS: Performed by: HOSPITALIST

## 2025-03-27 PROCEDURE — 87040 BLOOD CULTURE FOR BACTERIA: CPT

## 2025-03-27 PROCEDURE — 6360000002 HC RX W HCPCS: Performed by: EMERGENCY MEDICINE

## 2025-03-27 RX ORDER — ENOXAPARIN SODIUM 100 MG/ML
30 INJECTION SUBCUTANEOUS DAILY
Status: DISCONTINUED | OUTPATIENT
Start: 2025-03-27 | End: 2025-04-02 | Stop reason: HOSPADM

## 2025-03-27 RX ORDER — 0.9 % SODIUM CHLORIDE 0.9 %
1000 INTRAVENOUS SOLUTION INTRAVENOUS ONCE
Status: COMPLETED | OUTPATIENT
Start: 2025-03-27 | End: 2025-03-27

## 2025-03-27 RX ORDER — MIDAZOLAM HYDROCHLORIDE 1 MG/ML
2 INJECTION, SOLUTION INTRAMUSCULAR; INTRAVENOUS ONCE
Status: COMPLETED | OUTPATIENT
Start: 2025-03-27 | End: 2025-03-27

## 2025-03-27 RX ORDER — SODIUM CHLORIDE, SODIUM LACTATE, POTASSIUM CHLORIDE, CALCIUM CHLORIDE 600; 310; 30; 20 MG/100ML; MG/100ML; MG/100ML; MG/100ML
INJECTION, SOLUTION INTRAVENOUS CONTINUOUS
Status: DISCONTINUED | OUTPATIENT
Start: 2025-03-27 | End: 2025-03-27

## 2025-03-27 RX ORDER — INSULIN LISPRO 100 [IU]/ML
0-8 INJECTION, SOLUTION INTRAVENOUS; SUBCUTANEOUS
Status: DISCONTINUED | OUTPATIENT
Start: 2025-03-27 | End: 2025-03-30

## 2025-03-27 RX ORDER — INSULIN LISPRO 100 [IU]/ML
0-4 INJECTION, SOLUTION INTRAVENOUS; SUBCUTANEOUS
Status: DISCONTINUED | OUTPATIENT
Start: 2025-03-27 | End: 2025-03-27

## 2025-03-27 RX ORDER — DEXTROSE MONOHYDRATE 100 MG/ML
INJECTION, SOLUTION INTRAVENOUS CONTINUOUS PRN
Status: DISCONTINUED | OUTPATIENT
Start: 2025-03-27 | End: 2025-04-02 | Stop reason: HOSPADM

## 2025-03-27 RX ORDER — IPRATROPIUM BROMIDE AND ALBUTEROL SULFATE 2.5; .5 MG/3ML; MG/3ML
1 SOLUTION RESPIRATORY (INHALATION)
Status: DISCONTINUED | OUTPATIENT
Start: 2025-03-27 | End: 2025-04-02 | Stop reason: HOSPADM

## 2025-03-27 RX ORDER — POTASSIUM CHLORIDE 29.8 MG/ML
20 INJECTION INTRAVENOUS PRN
Status: DISCONTINUED | OUTPATIENT
Start: 2025-03-27 | End: 2025-04-02 | Stop reason: HOSPADM

## 2025-03-27 RX ORDER — ONDANSETRON 2 MG/ML
4 INJECTION INTRAMUSCULAR; INTRAVENOUS EVERY 6 HOURS PRN
Status: DISCONTINUED | OUTPATIENT
Start: 2025-03-27 | End: 2025-04-02 | Stop reason: HOSPADM

## 2025-03-27 RX ORDER — NOREPINEPHRINE BITARTRATE 0.06 MG/ML
1-100 INJECTION, SOLUTION INTRAVENOUS CONTINUOUS
Status: DISCONTINUED | OUTPATIENT
Start: 2025-03-27 | End: 2025-03-28

## 2025-03-27 RX ORDER — FENTANYL CITRATE-0.9 % NACL/PF 10 MCG/ML
25-200 PLASTIC BAG, INJECTION (ML) INTRAVENOUS CONTINUOUS
Status: DISCONTINUED | OUTPATIENT
Start: 2025-03-27 | End: 2025-03-28

## 2025-03-27 RX ORDER — SODIUM CHLORIDE 0.9 % (FLUSH) 0.9 %
5-40 SYRINGE (ML) INJECTION EVERY 12 HOURS SCHEDULED
Status: DISCONTINUED | OUTPATIENT
Start: 2025-03-27 | End: 2025-04-02 | Stop reason: HOSPADM

## 2025-03-27 RX ORDER — MUPIROCIN 20 MG/G
OINTMENT TOPICAL 2 TIMES DAILY
Status: DISCONTINUED | OUTPATIENT
Start: 2025-03-27 | End: 2025-03-30

## 2025-03-27 RX ORDER — DEXMEDETOMIDINE HYDROCHLORIDE 4 UG/ML
.1-1.5 INJECTION, SOLUTION INTRAVENOUS CONTINUOUS
Status: DISCONTINUED | OUTPATIENT
Start: 2025-03-27 | End: 2025-03-28

## 2025-03-27 RX ORDER — POLYETHYLENE GLYCOL 3350 17 G/17G
17 POWDER, FOR SOLUTION ORAL DAILY PRN
Status: DISCONTINUED | OUTPATIENT
Start: 2025-03-27 | End: 2025-04-02 | Stop reason: HOSPADM

## 2025-03-27 RX ORDER — SODIUM CHLORIDE 9 MG/ML
INJECTION, SOLUTION INTRAVENOUS PRN
Status: DISCONTINUED | OUTPATIENT
Start: 2025-03-27 | End: 2025-04-02 | Stop reason: HOSPADM

## 2025-03-27 RX ORDER — GLUCAGON 1 MG/ML
1 KIT INJECTION PRN
Status: DISCONTINUED | OUTPATIENT
Start: 2025-03-27 | End: 2025-04-02 | Stop reason: HOSPADM

## 2025-03-27 RX ORDER — MAGNESIUM SULFATE IN WATER 40 MG/ML
2000 INJECTION, SOLUTION INTRAVENOUS PRN
Status: DISCONTINUED | OUTPATIENT
Start: 2025-03-27 | End: 2025-04-02 | Stop reason: HOSPADM

## 2025-03-27 RX ORDER — ACETAMINOPHEN 325 MG/1
650 TABLET ORAL EVERY 6 HOURS PRN
Status: DISCONTINUED | OUTPATIENT
Start: 2025-03-27 | End: 2025-04-02 | Stop reason: HOSPADM

## 2025-03-27 RX ORDER — ACETAMINOPHEN 650 MG/1
650 SUPPOSITORY RECTAL EVERY 6 HOURS PRN
Status: DISCONTINUED | OUTPATIENT
Start: 2025-03-27 | End: 2025-04-02 | Stop reason: HOSPADM

## 2025-03-27 RX ORDER — ONDANSETRON 4 MG/1
4 TABLET, ORALLY DISINTEGRATING ORAL EVERY 8 HOURS PRN
Status: DISCONTINUED | OUTPATIENT
Start: 2025-03-27 | End: 2025-04-02 | Stop reason: HOSPADM

## 2025-03-27 RX ORDER — SODIUM CHLORIDE 0.9 % (FLUSH) 0.9 %
5-40 SYRINGE (ML) INJECTION PRN
Status: DISCONTINUED | OUTPATIENT
Start: 2025-03-27 | End: 2025-04-02 | Stop reason: HOSPADM

## 2025-03-27 RX ORDER — POTASSIUM CHLORIDE 7.45 MG/ML
10 INJECTION INTRAVENOUS PRN
Status: DISCONTINUED | OUTPATIENT
Start: 2025-03-27 | End: 2025-04-02 | Stop reason: HOSPADM

## 2025-03-27 RX ORDER — IOPAMIDOL 755 MG/ML
75 INJECTION, SOLUTION INTRAVASCULAR
Status: COMPLETED | OUTPATIENT
Start: 2025-03-27 | End: 2025-03-27

## 2025-03-27 RX ORDER — PROPOFOL 10 MG/ML
5-50 INJECTION, EMULSION INTRAVENOUS CONTINUOUS
Status: DISCONTINUED | OUTPATIENT
Start: 2025-03-27 | End: 2025-03-28

## 2025-03-27 RX ADMIN — DOXYCYCLINE 100 MG: 100 INJECTION, POWDER, LYOPHILIZED, FOR SOLUTION INTRAVENOUS at 22:17

## 2025-03-27 RX ADMIN — Medication 0.2 MCG/KG/HR: at 08:29

## 2025-03-27 RX ADMIN — IPRATROPIUM BROMIDE AND ALBUTEROL SULFATE 1 DOSE: 2.5; .5 SOLUTION RESPIRATORY (INHALATION) at 19:43

## 2025-03-27 RX ADMIN — IPRATROPIUM BROMIDE AND ALBUTEROL SULFATE 1 DOSE: 2.5; .5 SOLUTION RESPIRATORY (INHALATION) at 15:39

## 2025-03-27 RX ADMIN — CEFEPIME 2000 MG: 2 INJECTION, POWDER, FOR SOLUTION INTRAVENOUS at 08:40

## 2025-03-27 RX ADMIN — IPRATROPIUM BROMIDE AND ALBUTEROL SULFATE 1 DOSE: 2.5; .5 SOLUTION RESPIRATORY (INHALATION) at 11:29

## 2025-03-27 RX ADMIN — MUPIROCIN: 20 OINTMENT TOPICAL at 08:36

## 2025-03-27 RX ADMIN — MUPIROCIN: 20 OINTMENT TOPICAL at 20:53

## 2025-03-27 RX ADMIN — SODIUM CHLORIDE, PRESERVATIVE FREE 10 ML: 5 INJECTION INTRAVENOUS at 20:54

## 2025-03-27 RX ADMIN — VANCOMYCIN HYDROCHLORIDE 1000 MG: 1 INJECTION, POWDER, LYOPHILIZED, FOR SOLUTION INTRAVENOUS at 02:01

## 2025-03-27 RX ADMIN — Medication 50 MCG/HR: at 19:04

## 2025-03-27 RX ADMIN — WATER 40 MG: 1 INJECTION INTRAMUSCULAR; INTRAVENOUS; SUBCUTANEOUS at 14:46

## 2025-03-27 RX ADMIN — SODIUM CHLORIDE, PRESERVATIVE FREE 10 ML: 5 INJECTION INTRAVENOUS at 08:29

## 2025-03-27 RX ADMIN — PROPOFOL 20 MCG/KG/MIN: 10 INJECTION, EMULSION INTRAVENOUS at 10:25

## 2025-03-27 RX ADMIN — WATER 1000 MG: 1 INJECTION INTRAMUSCULAR; INTRAVENOUS; SUBCUTANEOUS at 12:21

## 2025-03-27 RX ADMIN — ENOXAPARIN SODIUM 30 MG: 100 INJECTION SUBCUTANEOUS at 08:36

## 2025-03-27 RX ADMIN — SODIUM CHLORIDE, SODIUM LACTATE, POTASSIUM CHLORIDE, AND CALCIUM CHLORIDE: .6; .31; .03; .02 INJECTION, SOLUTION INTRAVENOUS at 08:26

## 2025-03-27 RX ADMIN — IPRATROPIUM BROMIDE AND ALBUTEROL SULFATE 1 DOSE: 2.5; .5 SOLUTION RESPIRATORY (INHALATION) at 07:38

## 2025-03-27 RX ADMIN — WATER 40 MG: 1 INJECTION INTRAMUSCULAR; INTRAVENOUS; SUBCUTANEOUS at 00:00

## 2025-03-27 RX ADMIN — WATER 40 MG: 1 INJECTION INTRAMUSCULAR; INTRAVENOUS; SUBCUTANEOUS at 22:14

## 2025-03-27 RX ADMIN — IOPAMIDOL 75 ML: 755 INJECTION, SOLUTION INTRAVENOUS at 00:23

## 2025-03-27 RX ADMIN — DOXYCYCLINE 100 MG: 100 INJECTION, POWDER, LYOPHILIZED, FOR SOLUTION INTRAVENOUS at 12:18

## 2025-03-27 RX ADMIN — MIDAZOLAM HYDROCHLORIDE 2 MG: 1 INJECTION, SOLUTION INTRAMUSCULAR; INTRAVENOUS at 09:52

## 2025-03-27 RX ADMIN — NOREPINEPHRINE BITARTRATE 5 MCG/MIN: 0.06 INJECTION, SOLUTION INTRAVENOUS at 04:47

## 2025-03-27 RX ADMIN — PIPERACILLIN AND TAZOBACTAM 3375 MG: 3; .375 INJECTION, POWDER, LYOPHILIZED, FOR SOLUTION INTRAVENOUS at 01:17

## 2025-03-27 RX ADMIN — SODIUM CHLORIDE 1000 ML: 0.9 INJECTION, SOLUTION INTRAVENOUS at 01:18

## 2025-03-27 ASSESSMENT — PULMONARY FUNCTION TESTS
PIF_VALUE: 21
PIF_VALUE: 17
PIF_VALUE: 15
PIF_VALUE: 19
PIF_VALUE: 20
PIF_VALUE: 18
PIF_VALUE: 27

## 2025-03-27 ASSESSMENT — PAIN SCALES - GENERAL
PAINLEVEL_OUTOF10: 0

## 2025-03-27 NOTE — PROGRESS NOTES
03/27/25 1539   Vent Information   Equipment Changed HME   Vent Mode AC/PRVC   Ventilator Settings   Vt (Set, mL) 420 mL   Resp Rate (Set) 16 bpm   PEEP/CPAP (cmH2O) 5   FiO2  35 %   Insp Time (sec) 1.2 sec   Vent Patient Data (Readings)   Vt (Measured) 447 mL   Peak Inspiratory Pressure (cmH2O) 17 cmH2O   Rate Measured 16 br/min   Minute Volume (L/min) 7.11 Liters   Mean Airway Pressure (cmH2O) 9 cmH20   Inspiratory Time 1.2 sec   I:E Ratio 1:2.1   Flow Sensitivity 3 L/min   Backup Apnea On   Backup Rate 16 Breaths Per Minute   Backup Vt 420   Vent Alarm Settings   High Pressure (cmH2O) 40 cmH2O   Low Minute Volume (lpm) 2 L/min   High Minute Volume (lpm) 20 L/min   Low Exhaled Vt (ml) 200 mL   High Exhaled Vt (ml) 1000 mL   RR High (bpm) 40 br/min   Apnea (secs) 20 secs   Additional Respiratoray Assessments   Humidification Source HME   Circuit Condensation Drained   Ambu Bag With Mask At Bedside Yes   Airway Clearance   Suction ET Tube;Oral   Suction Device [place]   Sputum Method Obtained Endotracheal   Sputum Amount Small   Sputum Color/Odor   (SMALL CLOUDY)   Sputum Consistency Thin;Thick   ETT    Placement Date/Time: 03/26/25 2324   Present on Admission/Arrival: No  Placed By: In ED  Airway Tube Size: 7.5 mm  Location: Oral  Secured At: 25 cm  Measured From: Lips   Secured At 23 cm   Measured From Lips   ETT Placement Right   Secured By Commercial tube roman   Site Assessment Dry   Tie/Roman Changed No   Supplemental Gases   Supplemental Gases None   Ventilator Associated Pneumonia Bundle   Oral Care Performed Mouth suctioned   Respiratory   Respiratory Interventions Suction - ETT;Suction - oral  (HHN WITH AEROGEN)

## 2025-03-27 NOTE — PROGRESS NOTES
03/27/25 1945   Patient Observation   Pulse (!) 103   SpO2 95 %   Breath Sounds   Right Upper Lobe Diminished   Right Middle Lobe Diminished   Right Lower Lobe Diminished   Left Upper Lobe Diminished   Left Lower Lobe Diminished   Vent Information   Vent Mode AC/PRVC   Ventilator Settings   Vt (Set, mL) 420 mL   Resp Rate (Set) 16 bpm   PEEP/CPAP (cmH2O) 5   FiO2  35 %   Insp Time (sec) 1.2 sec   Vent Patient Data (Readings)   Vt (Measured) 456 mL   Peak Inspiratory Pressure (cmH2O) 18 cmH2O   Rate Measured 17 br/min   Minute Volume (L/min) 8.6 Liters   Mean Airway Pressure (cmH2O) 9 cmH20   Inspiratory Time 1.2 sec   I:E Ratio 1:2.10   Vent Alarm Settings   High Pressure (cmH2O) 40 cmH2O   Low Minute Volume (lpm) 2 L/min   Low Exhaled Vt (ml) 200 mL   RR High (bpm) 40 br/min   Additional Respiratoray Assessments   Humidification Source HME   Ambu Bag With Mask At Bedside Yes   Airway Clearance   Suction ET Tube   Suction Device Inline suction catheter   Sputum Method Obtained Endotracheal   Sputum Amount Small   Sputum Color/Odor Clear   Sputum Consistency Thick   ETT    Placement Date/Time: 03/26/25 1751   Present on Admission/Arrival: No  Placed By: In ED  Airway Tube Size: 7.5 mm  Location: Oral  Secured At: 25 cm  Measured From: Lips   Secured At 23 cm   Measured From Lips   ETT Placement Left   Secured By Commercial tube chaves   Site Assessment Dry   Cuff Pressure 28 cm H2O

## 2025-03-27 NOTE — PROGRESS NOTES
03/27/25 0734   Breath Sounds   Right Upper Lobe Inspiratory wheezes   Right Middle Lobe Inspiratory wheezes   Right Lower Lobe Inspiratory wheezes   Left Upper Lobe Inspiratory wheezes   Left Lower Lobe Inspiratory wheezes   Vent Information   Equipment Changed HME   Vent Mode AC/PRVC   Ventilator Settings   Vt (Set, mL) 400 mL   Resp Rate (Set) 14 bpm   PEEP/CPAP (cmH2O) 5   FiO2  40 %   Insp Time (sec) 0.9 sec   Vent Patient Data (Readings)   Vt (Measured) 531 mL   Peak Inspiratory Pressure (cmH2O) 19 cmH2O   Rate Measured 19 br/min   Minute Volume (L/min) 7.26 Liters   Mean Airway Pressure (cmH2O) 8.9 cmH20   Inspiratory Time 1.2 sec   I:E Ratio 1:2.6   Flow Sensitivity 3 L/min   Backup Apnea On   Backup Rate 14 Breaths Per Minute   Backup Vt 400   Vent Alarm Settings   High Pressure (cmH2O) 40 cmH2O   Low Minute Volume (lpm) 2 L/min   High Minute Volume (lpm) 20 L/min   Low Exhaled Vt (ml) 200 mL   High Exhaled Vt (ml) 1000 mL   RR High (bpm) 40 br/min   Apnea (secs) 20 secs   Additional Respiratoray Assessments   Humidification Source HME   Circuit Condensation Drained   Ambu Bag With Mask At Bedside Yes   Airway Clearance   Suction ET Tube   Suction Device Inline suction catheter   Sputum Method Obtained Endotracheal   Sputum Amount Small   Sputum Color/Odor   (cloudy)   Sputum Consistency Thick   ETT    Placement Date/Time: 03/26/25 2327   Present on Admission/Arrival: No  Placed By: In ED  Airway Tube Size: 7.5 mm  Location: Oral  Secured At: 25 cm  Measured From: Lips   Secured At 27 cm   Measured From Lips   ETT Placement Right   Secured By Commercial tube roman   Site Assessment Dry   Cuff Pressure   (40)   Tie/Roman Changed No   Supplemental Gases   Supplemental Gases None   Ventilator Associated Pneumonia Bundle   ETT/Trach Suctioning Yes   Respiratory   Respiratory Interventions Suction - ETT  (hhn with aerogen)

## 2025-03-27 NOTE — CARE COORDINATION
Case Management Assessment  Initial Evaluation    Date/Time of Evaluation: 3/27/2025 9:44 AM  Assessment Completed by: Robyn Iniguez RN    If patient is discharged prior to next notation, then this note serves as note for discharge by case management.    Patient Name: Maranda Burns                   YOB: 1960  Diagnosis: COPD exacerbation (HCC) [J44.1]  SIRS (systemic inflammatory response syndrome) (HCC) [R65.10]  Acute encephalopathy [G93.40]  Acute respiratory failure with hypoxia and hypercapnia [J96.01, J96.02]  Acute hypoxic respiratory failure [J96.01]                   Date / Time: 3/26/2025 11:13 PM    Patient Admission Status: Inpatient   Readmission Risk (Low < 19, Mod (19-27), High > 27): Readmission Risk Score: 12.7    Current PCP: London Mcrae, DO  PCP verified by CM? Yes    Chart Reviewed: Yes      History Provided by: Spouse  Patient Orientation: Unable to Assess    Patient Cognition: Severely Impaired    Hospitalization in the last 30 days (Readmission):  No    If yes, Readmission Assessment in CM Navigator will be completed.    Advance Directives:      Code Status: Full Code   Patient's Primary Decision Maker is: Legal Next of Kin    Primary Decision Maker: Ralph Burns - Spouse - [phone]    Primary Decision Maker: Susan Westfall - Child - [phone]    Discharge Planning:    Patient lives with: Spouse/Significant Other Type of Home: House  Primary Care Giver: Self  Patient Support Systems include: Family Members, Spouse/Significant Other   Current Financial resources: Medicare  Current community resources: None  Current services prior to admission: Durable Medical Equipment, Oxygen Therapy            Current DME: Walker, Other (Comment) (walker and rollator, grab bars in bathroom)            Type of Home Care services:  None    ADLS  Prior functional level: Independent in ADLs/IADLs  Current functional level: Assistance with the following:, Mobility, Shopping, Housework,

## 2025-03-27 NOTE — ED PROVIDER NOTES
EMERGENCY DEPARTMENT ENCOUNTER     [facility] EMERGENCY DEPARTMENT     Pt Name: Maranda Burns   MRN: [id number]   Birthdate 1960   Date of evaluation: 3/26/2025   Provider: Tash Lewis MD   PCP: London Mcrae DO   Note Started: 12:49 AM EDT 3/27/25     CHIEF COMPLAINT:     Chief Complaint   Patient presents with    Respiratory Distress     Pt brought in by [place] [facility]. EMS states pt has had no complaints all day. Pt said to  call 911, Pt fell on the floor and went unresponsive. Pt has been Intubated 1 time prior, and History of COPD per EMS. Per chart Pt had recent Viral Infection and BMI counseling.          HISTORY OF PRESENT ILLNESS:  This is a 64-year-old female who comes in by EMS.  She is unable to provide any history due to her unresponsive state.  According to EMS however the patient had no complaints at home.  She does have a history of COPD and is on home oxygen.  She had been on her CPAP when she complained to her  that she was not well that he should call EMS and she then went unresponsive and fell to the floor.  Her daughter later presented and this was confirmed in her chart that she was being treated for a viral respiratory infection.  Patient was found by EMS pale unresponsive and hypoxic.     PHYSICAL EXAM:    ED Triage Vitals   BP Systolic BP Percentile Diastolic BP Percentile Temp Temp Source Pulse Respirations SpO2   03/26/25 2318 -- -- 03/26/25 2348 03/26/25 2348 03/26/25 2318 03/26/25 2318 03/26/25 2318   (!) 187/88   97.2 °F (36.2 °C) CORE 99 14 100 %      Height Weight - Scale         03/26/25 2332 03/26/25 2332         1.6 m (5' 3\") 40.6 kg (89 lb 6.4 oz)              Physical Exam  Constitutional:       Comments: Very thin built adult female who comes in with acute respiratory distress, shallow respirations   HENT:      Head: Normocephalic and atraumatic.      Right Ear: External ear normal.      Left Ear: External ear normal.      Nose: No  embolism no focal pneumonia was seen.  Hyperinflated lungs again identified.  ED MEDICATIONS GIVEN:   Medications   etomidate (AMIDATE) injection (20 mg IntraVENous Given 3/26/25 2317)   rocuronium (ZEMURON) injection (60 mg IntraVENous Given 3/26/25 2317)   fentaNYL (SUBLIMAZE) 1,000 mcg in sodium chloride 0.9% 100 mL infusion (25 mcg/hr IntraVENous New Bag 3/26/25 2352)   midazolam (VERSED) 100mg/100mL in NS infusion (2 mg/hr IntraVENous New Bag 3/26/25 2350)   sodium chloride 0.9 % bolus 1,000 mL (1,000 mLs IntraVENous New Bag 3/27/25 0118)   vancomycin (VANCOCIN) 1,000 mg in sodium chloride 0.9 % 250 mL IVPB ([id number]) (has no administration in time range)   piperacillin-tazobactam (ZOSYN) 3,375 mg in sodium chloride 0.9 % 50 mL IVPB (addEASE) (3,375 mg IntraVENous New Bag 3/27/25 0117)   sodium chloride 0.9 % bolus 1,000 mL (0 mLs IntraVENous Stopped 3/27/25 0103)   methylPREDNISolone sodium succ (SOLU-MEDROL) 40 mg in sterile water 1 mL injection (40 mg IntraVENous Given 3/27/25 0000)   iopamidol (ISOVUE-370) 76 % injection 75 mL (75 mLs IntraVENous Given 3/27/25 0023)               Total Critical Care time was 120 minutes, excluding separately reportable procedures.  There was a high probability of clinically significant/life threatening deterioration in the patient's condition which required my urgent intervention.       CONSULTS:   Social Determinants:    Disposition Considerations:       I am the primary physician of Record.     FINAL IMPRESSION    1. Acute respiratory failure with hypoxia and hypercapnia (HCC)    2. Acute encephalopathy    3. SIRS (systemic inflammatory response syndrome) (HCC)    4. COPD exacerbation (HCC)         DISPOSITION/PLAN   DISPOSITION Admitted 03/27/2025 12:55:36 AM                PATIENT REFERRED TO:   No follow-up provider specified.   DISCHARGE MEDICATIONS:   New Prescriptions    No medications on file      DISCONTINUED MEDICATIONS:   Discontinued Medications    No

## 2025-03-27 NOTE — PROCEDURES
Bronchoscopy Procedure Note    Date of Operation: 3/27/2025    Pre-op Diagnosis: COPD exacerbation    Post-op Diagnosis: COPD exacerbation    Surgeon: Cody Matos MD    Anesthesia: General Anesthesia    Operation: Flexible fiberoptic bronchoscopy, diagnostic       Estimated Blood Loss: Minimal    Complications: None      Indications and History:  The patient is a 64 y.o. female with significant past medical history of COPD, emphysema, tobacco dependence that presents to [facility] for unresponsiveness..  The risks, benefits, complications, treatment options and expected outcomes were not discussed due to emergency.     Description of Procedure:  A Time Out was held and the above information confirmed.     The scope was then passed into the trachea.  Additional 2% lidocaine was used topically within the airway.  Careful inspection of the tracheal lumen was accomplished. The scope was sequentially passed into all airways.     Findings:  Endobronchial findings: None  Trachea: Tortuous  Enedelia: Tortuous  Right main bronchus: Collapse with mucus plugging  Bronchus intermdius:Normal mucosa  Right upper lobe bronchus: Normal mucosa  Right middle lobe bronchus: Normal mucosa  Right lower lobe bronchus: Collapse with mucus plugging  Left main bronchus: Normal mucosa  Left upper lobe bronchus: Normal mucosa  Left lower lobe bronchus: Collapse with mucus plugging    Specimen:  Bronchial washings were sent for labs/cultures      Assessment:  Acute hypoxic and hypercapnic respiratory failure  Mucous plugging  AECOPD    Plan:  - Cont full vent support  - send bronchial washings for cultures    Cody Matos MD

## 2025-03-27 NOTE — CONSULTS
Comprehensive Nutrition Assessment    Type and Reason for Visit:  Initial, Consult    Nutrition Recommendations/Plan:   Initiate Jevity 1.5 (standard with fiber formula) at 10 mL/hr and as tolerated. Increase by 10 mL/hr q12 hours until goal rate of 40 mL/hr via OG tube.  Recommend 60 mL H20 q4 hours. Monitor IVF infusion, Na labs, and need for adjustments.   Monitor closely and correct electrolytes (K, Phos, Mg) before progressing tube feeds to goal due to risk of refeeding syndrome.  Check triglycerides while on propofol infusion.   Monitor TF tolerance (abdominal pain/distension and exam findings, N/V, and bowel habits).  Monitor nutrition adequacy, pertinent labs, bowel habits, wt changes, and clinical progress.      Malnutrition Assessment:  Malnutrition Status:  At risk for malnutrition (03/27/25 1103)    Context:  Chronic Illness     Findings of the 6 clinical characteristics of malnutrition:  Energy Intake:  Unable to assess  Weight Loss:  Mild weight loss (14.3% or 14 lbs in ~1 year)     Body Fat Loss:  Unable to assess     Muscle Mass Loss:  Unable to assess    Fluid Accumulation:  Unable to assess (none documented)     Strength:  Not Performed    Nutrition Assessment:    Consult received for tube feeding ordering/management. New vent. Initially presented to the ED after being found unresponsive but was transferred to the ICU for further management. PMH of COPD and ulcerative colitis. Currently intubated and sedated on fentanyl, precedex, and propofol (current rate of 4.6 mL/hour to provide ~121 calories from lipids). Hypotensive on levophed. Pt is s/p bronchoscopy this morning. UBW appears to be around 83-93 lbs the past year. EMR suggests mild wt loss 14.3% (14 lbs) in ~1 year. Has OG tube in place w/ plans to initiate feeds today. Please see recommendations above. Will continue to monitor.    Nutrition Related Findings:    No BM documented this admit. Hypoactive BS. No edema documented.   MOST  Findings, Weight    Discharge Planning:    Too soon to determine     Rocio Baca RD  Contact: [phone]

## 2025-03-27 NOTE — PLAN OF CARE
Problem: Safety - Medical Restraint  Goal: Remains free of injury from restraints (Restraint for Interference with Medical Device)  Description: INTERVENTIONS:  1. Determine that other, less restrictive measures have been tried or would not be effective before applying the restraint  2. Evaluate the patient's condition at the time of restraint application  3. Inform patient/family regarding the reason for restraint  4. Q2H: Monitor safety, psychosocial status, comfort, nutrition and hydration  Outcome: Progressing  Flowsheets (Taken 3/27/2025 0600)  Remains free of injury from restraints (restraint for interference with medical device):   Every 2 hours: Monitor safety, psychosocial status, comfort, nutrition and hydration   Determine that other, less restrictive measures have been tried or would not be effective before applying the restraint   Inform patient/family regarding the reason for restraint   Evaluate the patient's condition at the time of restraint application     Problem: Discharge Planning  Goal: Discharge to home or other facility with appropriate resources  Outcome: Progressing     Problem: Pain  Goal: Verbalizes/displays adequate comfort level or baseline comfort level  Outcome: Progressing     Problem: Skin/Tissue Integrity  Goal: Skin integrity remains intact  Description: 1.  Monitor for areas of redness and/or skin breakdown  2.  Assess vascular access sites hourly  3.  Every 4-6 hours minimum:  Change oxygen saturation probe site  4.  Every 4-6 hours:  If on nasal continuous positive airway pressure, respiratory therapy assess nares and determine need for appliance change or resting period  Outcome: Progressing     Problem: Safety - Adult  Goal: Free from fall injury  Outcome: Progressing     Problem: ABCDS Injury Assessment  Goal: Absence of physical injury  Outcome: Progressing     Problem: Nutrition Deficit:  Goal: Optimize nutritional status  Outcome: Progressing

## 2025-03-27 NOTE — PROGRESS NOTES
Respiratory Therapy Bronchoscopy Assist      Name:  Maranda Burns  Medical Record Number:  [id number]  Age: 64 y.o.   Gender: female  : 1960  Today's Date:  3/27/2025  Room:  [address] [place], [place] [address]        BAL cath samples obtained from both lung fields  during bronchoscopy assist with patient on 100%.  Sterile field maintained throughout procedure.  Patient was pre-sedated.  10 mL of saline instilled.  10 mL of cloudy fluid obtained.  Pt tolerated procedure well.  Samples sent to lab for testing.  Patient SpO2 within acceptable range throughout bronchscopy procedure.  Physician assisted with bronch from 0950 AM to 0959 AM without complications. Bronchoscope ID: [id number]    LOT [id number]       ETT TUBE PULLED OUT TO THE 23 CM LIP LINE AND ETT BROWN WAS CHANGED, PT RETURNED TO 35% FIO2.     Electronically signed by Taylor Galaviz RCP on 3/27/2025 at 10:23 AM

## 2025-03-27 NOTE — PROGRESS NOTES
Patient transported to ICU from ED 1 @ 0140. Report gained from Graciela CUEVAS at bedside. Patient was assessed and given CHG bath upon arrival. MD contacted and placed orders for CBC and CMP morning labs. Daughter at bedside and given patient belongings (Bag of clothes and Jewelery) patients phone and  left in the room with patient. Patient is mechanically ventilated and lightly sedated.

## 2025-03-27 NOTE — PROGRESS NOTES
Patient's OGT noted to be at the 47 cm. Original placement: 60 cm. Tube feed paused and message sent to the MD

## 2025-03-27 NOTE — PROGRESS NOTES
03/27/25 0046   Ventilator Settings   Vt (Set, mL) 400 mL   Resp Rate (Set) 14 bpm   PEEP/CPAP (cmH2O) 5   FiO2  40 %   Insp Time (sec) 0.9 sec   Vent Patient Data (Readings)   Vt (Measured) 442 mL   Peak Inspiratory Pressure (cmH2O) 27 cmH2O   Rate Measured 14 br/min   Minute Volume (L/min) 5.97 Liters   Mean Airway Pressure (cmH2O) 9.7 cmH20   Inspiratory Time 0.9 sec   I:E Ratio 1:3.8   Flow Sensitivity 3 L/min   Backup Apnea On   Backup Rate 14 Breaths Per Minute   Backup Vt 400   Backup I Time 1   Vent Alarm Settings   High Pressure (cmH2O) 40 cmH2O   Low Minute Volume (lpm) 2 L/min   High Minute Volume (lpm) 20 L/min   Low Exhaled Vt (ml) 200 mL   High Exhaled Vt (ml) 1000 mL   RR High (bpm) 40 br/min   Apnea (secs) 20 secs   Airway Clearance   Suction ET Tube   Suction Device Inline suction catheter   Sputum Amount Scant   ETT    Placement Date/Time: 03/26/25 3806   Present on Admission/Arrival: No  Placed By: In ED  Airway Tube Size: 7.5 mm  Location: Oral  Secured At: 25 cm  Measured From: Lips   Secured At 25 cm   Measured From Lips   ETT Placement Center   Secured By Commercial tube roman   Site Assessment Cool;Dry   Cuff Pressure 30 cm H2O   Tie/Roman Changed No   Patient Transport   Time Spent Transporting 1-15   Transport Ventillation Type Transport vent   Transport From ER   Transport Destination CT scan   Transport Destination CT scan   Transport Destination ER   Emergency Equipment Included Yes   Supplemental Gases   Supplemental Gases None

## 2025-03-27 NOTE — PROGRESS NOTES
Shift: [id number]    Reason for ICU Admission: Acute hypoxic respiratory failure. Patient fell and went unresponsive at home.  called 911, bagged on way to hospital and intubated in ED. Sepsis protocol started in ED    Most recent vitals: BP 91/67   Pulse 85   Temp 99.4 °F (37.4 °C) (Bladder)   Resp 13   Ht 1.6 m (5' 3\")   Wt 38.1 kg (83 lb 15.9 oz)   SpO2 97%   BMI 14.88 kg/m²      Drip rates at handoff:    norepinephrine 5 mcg/min (03/27/25 8157)    fentaNYL 25 mcg/hr (03/27/25 0324)    midazolam 3 mg/hr (03/27/25 0311)       Current O2:   [] Room Air   [] NC L   [] BiPaP    [x] Vented  40 Fi02  5 Peep    Hospital Course:  ICU Day # 1 (3/27) Nights   - Acute Hypoxic Resp. Failure - Intubated in ED  - Brought to ICU around 0200   - Responding to commands on minimal sedation  - Levo started  - Morning ABG: pH: 7.19, pCO2: 80.0, pO2: 90.4, cHCO3: 30.5, Lac: 1.77

## 2025-03-27 NOTE — PROGRESS NOTES
Hospital Medicine Progress Note  V 1.6      Date of Admission: 3/26/2025    Hospital Day: 2      Chief Admission Complaint: Altered mental status    Subjective:    Patient seen and evaluated this morning, intubated with FiO2 at 40%.  She is on continuous infusion of Precedex and Levophed.     Presenting Admission History:     Maranda Burns 64-year-old female with past medical history of COPD/emphysema on oxygen at home, alpha-1 antitrypsin deficiency, ulcerative colitis, depression, DM type II, tobacco dependence hypothyroidism, presented with altered mental sensorium and unresponsiveness.  Her  stated that she was feeling dizzy and weak before this episode and then after describing her feeling she passed out.  She was found to be hypoxic by EMS and brought to ED where she was intubated due to unresponsiveness.  She was found to be in acute hypoxic hypercapnic respiratory failure with elevated carbon dioxide and acidosis.  Admitted to the suspicion of acute exacerbation of COPD/emphysema.  Given her leukocytosis at 20.2, lactic acid at 6.1 with tachypnea and tachycardia she was meeting the criteria of SIRS, IV fluid, IV antibiotics and IV steroids started.  Blood cultures sent and pulmonology consulted.    Assessment/Plan:      Current Principal Problem:  Acute hypoxic respiratory failure    Acute hypoxic/hypercapnic respiratory failure secondary to COPD exacerbation  - Intubated and sedated  - On continuous infusion of Precedex and Levophed due to hypotension  - CTA chest negative for pulmonary embolism, hyperinflation of bilateral lungs with severe apical centrilobular emphysema  - Limited echo showed diastolic dysfunction, IV fluids held  - Ultrasound chest unremarkable, no consolidation or pleural effusion  - Continue IV Solu-Medrol 40 mg IV 8 hourly  - Pulmonology consulted; recommendation             - Started IV ceftriaxone and doxycycline             - Blood cultures pending             -    [] Imaging personally reviewed and interpreted     [x] Discussion (any 1)  [x] Multi-Disciplinary Rounds with Case Management  [] Discussed management of the case with           Labs:  Personally reviewed on 3/27/2025 and interpreted for clinical significance as documented above.     Recent Labs     03/26/25  2322   WBC 20.2*   HGB 12.6   HCT 40.6        Recent Labs     03/26/25  2322      K 4.5      CO2 22   BUN 12   CREATININE 1.0   CALCIUM 9.6     Recent Labs     03/26/25  2322 03/27/25  0155   TROPHS 39* 58*     No results for input(s): \"LABA1C\" in the last 72 hours.  Recent Labs     03/26/25  2322   AST 54*   ALT 33   BILITOT 0.5   ALKPHOS 129     Recent Labs     03/26/25  2323   LACTA 6.1*       Urine Cultures:   Lab Results   Component Value Date/Time    LABURIN  03/14/2024 05:41 PM     <50,000 CFU/ml mixed skin/urogenital jeny. No further workup    LABURIN 50,000 CFU/ml 03/14/2024 05:41 PM     Blood Cultures:   Lab Results   Component Value Date/Time    BC No Growth after 4 days of incubation. 07/20/2024 01:35 AM     Lab Results   Component Value Date/Time    BLOODCULT2 No Growth after 4 days of incubation. 02/21/2024 04:34 PM     Organism:   Lab Results   Component Value Date/Time    ORG Enterococcus faecalis 03/14/2024 05:41 PM         Zina Aburto MD

## 2025-03-27 NOTE — PROGRESS NOTES
03/27/25 1129   Patient Observation   Pulse 80   Respirations 16   SpO2 93 %   Vent Information   Equipment Changed HME   Vent Mode AC/PRVC   Ventilator Settings   Vt (Set, mL) (S)  420 mL  (changed by DR MARGARET LOPEZ)   Resp Rate (Set) 16 bpm   PEEP/CPAP (cmH2O) 5   FiO2  35 %   Insp Time (sec) 1.2 sec   Vent Patient Data (Readings)   Vt (Measured) 467 mL   Peak Inspiratory Pressure (cmH2O) 21 cmH2O   Rate Measured 16 br/min   Minute Volume (L/min) 7.49 Liters   Mean Airway Pressure (cmH2O) 10 cmH20   Inspiratory Time 1.2 sec   I:E Ratio 1:2.1   Flow Sensitivity 3 L/min   Backup Apnea On   Backup Rate 16 Breaths Per Minute   Backup Vt 420   Vent Alarm Settings   High Pressure (cmH2O) 40 cmH2O   Low Minute Volume (lpm) 2 L/min   High Minute Volume (lpm) 20 L/min   Low Exhaled Vt (ml) 200 mL   High Exhaled Vt (ml) 1000 mL   RR High (bpm) 40 br/min   Apnea (secs) 20 secs   Additional Respiratoray Assessments   Humidification Source HME   Circuit Condensation Drained   Ambu Bag With Mask At Bedside Yes   Airway Clearance   Suction ET Tube   Suction Device Inline suction catheter   Sputum Method Obtained Endotracheal   Sputum Amount Small   Sputum Color/Odor   (CLOUDY)   Sputum Consistency Thin;Thick   ETT    Placement Date/Time: 03/26/25 2324   Present on Admission/Arrival: No  Placed By: In ED  Airway Tube Size: 7.5 mm  Location: Oral  Secured At: 25 cm  Measured From: Lips   Secured At 23 cm   Measured From Lips   ETT Placement Left   Secured By Commercial tube roman   Site Assessment Dry   Cuff Pressure 30 cm H2O   Tie/Roman Changed No   Ventilator Associated Pneumonia Bundle   Oral Care Performed Mouth suctioned   Treatment   $Treatment Type $Inhaled Therapy/Meds

## 2025-03-27 NOTE — H&P
[facility] Medicine History & Physical    V 1.6    Date of Admission: 3/26/2025    Date of Service:  Pt seen/examined on 3/27/25    [x]Admitted to Inpatient with expected LOS greater than two midnights due to medical therapy.  []Placed in Observation status.    Chief Admission Complaint:  acute hypoxic respiratory failure    Presenting Admission History:      64 y.o. female with hx moderate COPD, alpha 1 antitrypsin deficiency, ulcerative colitis, depression presented to the emergency room in a comatose state. Prior to the call for EMS she told her  that he needed to call and then she passed out. She has a history of COPD on home oxygen. She was found pale, hypotensive and hypoxic. She had no response to painful stimuli so was intubated. She has been given DuoNebs and steroids. WBC count was 20 and lactic acid was 6.1. She has gotten IV fluids. Vital signs are stabilized. CT scan is negative for any acute intracranial hemorrhage. CTA chest was negative for PE. UDS was unremarkable. CO2 is elevated on VBG. Pt to be admitted to ICU.    Assessment/Plan:      Current Principal Problem:  Acute hypoxic respiratory failure    Acute hypoxic respiratory failure possibly due to COPD exacerbation   - intubated in ED   - CTA chest neg for PE   - CT head neg for acute findings   - mechanical ventilation protocol   - IV solumedrol   - empiric IV azithromycin   - nebs, O2   - Pulm consult  Hx alpha 1 antitrypsin deficiency   - Pulm consult  Depression    - mirtazapine when taking PO  Ulcerative colitis   - stable   - OP GI followup    Labs/medications/imaging reviewed    Dispo:High risk to deteriorate with multiple medical problems               High level medical complexity with complicated medical decision-making              Guarded prognosis     Discussed management and the need for Hospitalization of the patient w/ the Emergency Department Provider: Yes    CXR: I have reviewed the CXR with the following interpretation:

## 2025-03-27 NOTE — PROGRESS NOTES
03/27/25 0959   Patient Observation   Pulse 87   Respirations 16   SpO2 99 %   Ventilator Settings   FiO2  (S)  35 %  (POST BRONCHOSCOPY)   ETT    Placement Date/Time: 03/26/25 2325   Present on Admission/Arrival: No  Placed By: In ED  Airway Tube Size: 7.5 mm  Location: Oral  Secured At: 25 cm  Measured From: Lips   Secured At (S)  23 cm  (PULLED OUT TO THE 23 CM DURING BRONCHOSCOPY)   Measured From Lips   ETT Placement Right   Secured By (S)  Commercial tube roman  (CHANGED DURING BRONCHOSCOPY)   Cuff Pressure 30 cm H2O   Tie/Roman Changed Yes   Ventilator Associated Pneumonia Bundle   ETT/Trach Suctioning Yes  (DURING BRONCHOSCOPY)

## 2025-03-27 NOTE — PROGRESS NOTES
03/27/25 0413   Patient Observation   Pulse 92   Respirations 14   SpO2 100 %   Vent Information   Vent Mode AC/PRVC   Ventilator Settings   Vt (Set, mL) 400 mL   Resp Rate (Set) 14 bpm   PEEP/CPAP (cmH2O) 5   FiO2  35 %   Insp Time (sec) 0.9 sec   Vent Patient Data (Readings)   Vt (Measured) 433 mL   Peak Inspiratory Pressure (cmH2O) 20 cmH2O   Rate Measured 14 br/min   Minute Volume (L/min) 6.1 Liters   Mean Airway Pressure (cmH2O) 8.1 cmH20   Inspiratory Time 0.9 sec   I:E Ratio 1:3.8   Flow Sensitivity 3 L/min   Backup Apnea On   Backup Rate 14 Breaths Per Minute   Backup Vt 400   Vent Alarm Settings   High Pressure (cmH2O) 40 cmH2O   Low Minute Volume (lpm) 2 L/min   High Minute Volume (lpm) 20 L/min   Low Exhaled Vt (ml) 200 mL   High Exhaled Vt (ml) 1000 mL   RR High (bpm) 40 br/min   Apnea (secs) 20 secs   Additional Respiratoray Assessments   Humidification Source HME   Ambu Bag With Mask At Bedside Yes   Airway Clearance   Suction Device Inline suction catheter   Sputum Method Obtained Endotracheal   Sputum Amount Moderate   Sputum Color/Odor Yellow   Sputum Consistency Thick   ETT    Placement Date/Time: 03/26/25 4647   Present on Admission/Arrival: No  Placed By: In ED  Airway Tube Size: 7.5 mm  Location: Oral  Secured At: 25 cm  Measured From: Lips   Secured At 27 cm   Measured From Lips   ETT Placement Center   Secured By Commercial tube chaves   Site Assessment Dry   Cuff Pressure 30 cm H2O

## 2025-03-27 NOTE — CONSULTS
PULMONARY AND CRITICAL CARE INPATIENT CONSULTATION      3/27/2025    Patient Name:  Maranda Burns       1960       Evaluation was requested by Dr. Sanchez regarding respiratory failure.    HPI:   Patient is a 64 y.o. female with significant past medical history of emphysema, COPD, chronic hypoxic and hypercapnic respiratory failure, tobacco dependence that presents to [facility] for unresponsiveness.  Patient is intubated and sedated unable to give history.  According EMR, she had been using her CPAP all day and complained to her  that she was short of breath.  They called EMS and she was found unresponsive on the floor.  She had a pulse.  She is being treated for an infection prior to this.    Patient presented to the ER and was intubated for airway protection.  She was started on antibiotics and steroid therapy for possible COPD exacerbation.  She was transferred to ICU for further management    Invasive Lines: PICC D#None   CVC D#None  Art Line D#None            ROS:   Review of Systems   Unable to perform ROS: Intubated          Past Medical History:   Diagnosis Date    Back pain     COPD (chronic obstructive pulmonary disease) ([facility])     Fatigue     Hypertension     Osteoporosis without pathological fracture 01/16/2025    Syncope and collapse 03/2019    Thyroid disease     Ulcerative colitis, unspecified, without complications ([facility])         Past Surgical History:   Procedure Laterality Date    BACK SURGERY      L4-L5 rods in    COLONOSCOPY N/A 07/07/2021    COLONOSCOPY WITH BIOPSY performed by Hetal Larson MD at [facility] ENDOSCOPY    HYSTERECTOMY (CERVIX STATUS UNKNOWN)      HYSTERECTOMY, VAGINAL  2000    LUNG SURGERY Right 07/2023    OVARY REMOVAL      SPLENECTOMY  2019    due to a fall    TONSILLECTOMY          Family History   Problem Relation Age of Onset    Heart Disease Father     High Blood Pressure Sister     Breast Cancer Maternal Aunt 74        Social History     Tobacco Use     89  - She may need arterial line with fluctuations in pressors and sedation  - ECHO reviewed with diastolic dysfunction, hold IVF  - trend troponin  - Lactic acid normal, can hold repeat  - Cont full vent support - increased RR and Vt with acidosis, repeat ABG in 30 min  - Difficult to see ETT on CXR, will perform bedside bronch  - Solumedrol 40mg IV q8h, Duonebs q4h  - Vent bundle  - NPO, can start on TF's today with no plan for extubation  - No GI ppx  - Replace electrolytes PRN   - Wyatt - remove today  - Rocephin/Zithro for 5 day course for possible PNA, blood cx sent and pending, respiratory culture sent from bronchoscopy   - Lovenox for DVT ppx  - Keep blood glucose 140-180, medium dose SSl    Ppx/Wyatt/Lines  - PIV  - Remove wyatt today  - No GI ppx, Lovenox for DVT ppx    Critical care time spent reviewing labs/films, examining patient, collaborating with other physicians but excluding procedures for life threatening organ failure is 48 minutes.    Thank you for the consultation. We will continue to follow with you.    Electronically signed by:  Cody Matos MD    3/27/2025    7:23 AM.

## 2025-03-28 ENCOUNTER — APPOINTMENT (OUTPATIENT)
Dept: GENERAL RADIOLOGY | Age: 65
DRG: 871 | End: 2025-03-28
Payer: MEDICARE

## 2025-03-28 LAB
ALBUMIN SERPL-MCNC: 3.7 G/DL (ref 3.4–5)
ANION GAP SERPL CALCULATED.3IONS-SCNC: 11 MMOL/L (ref 3–16)
BASE EXCESS BLDA CALC-SCNC: 5.2 MMOL/L (ref -3–3)
BASOPHILS # BLD: 0 K/UL (ref 0–0.2)
BASOPHILS NFR BLD: 0.2 %
BUN SERPL-MCNC: 24 MG/DL (ref 7–20)
CALCIUM SERPL-MCNC: 9.6 MG/DL (ref 8.3–10.6)
CHLORIDE SERPL-SCNC: 104 MMOL/L (ref 99–110)
CO2 BLDA-SCNC: 30.1 MMOL/L
CO2 SERPL-SCNC: 27 MMOL/L (ref 21–32)
COHGB MFR BLDA: 0.3 % (ref 0–1.5)
CREAT SERPL-MCNC: 0.7 MG/DL (ref 0.6–1.2)
DEPRECATED RDW RBC AUTO: 13.9 % (ref 12.4–15.4)
EOSINOPHIL # BLD: 0 K/UL (ref 0–0.6)
EOSINOPHIL NFR BLD: 0 %
GFR SERPLBLD CREATININE-BSD FMLA CKD-EPI: >90 ML/MIN/{1.73_M2}
GLUCOSE BLD-MCNC: 102 MG/DL (ref 70–99)
GLUCOSE BLD-MCNC: 158 MG/DL (ref 70–99)
GLUCOSE BLD-MCNC: 168 MG/DL (ref 70–99)
GLUCOSE BLD-MCNC: 92 MG/DL (ref 70–99)
GLUCOSE SERPL-MCNC: 163 MG/DL (ref 70–99)
HCO3 BLDA-SCNC: 28.9 MMOL/L (ref 21–29)
HCT VFR BLD AUTO: 33.9 % (ref 36–48)
HGB BLD-MCNC: 11.2 G/DL (ref 12–16)
HGB BLDA-MCNC: 11.9 G/DL (ref 12–16)
INR PPP: 1.13 (ref 0.85–1.15)
LYMPHOCYTES # BLD: 1.4 K/UL (ref 1–5.1)
LYMPHOCYTES NFR BLD: 8.6 %
MAGNESIUM SERPL-MCNC: 1.77 MG/DL (ref 1.8–2.4)
MCH RBC QN AUTO: 31 PG (ref 26–34)
MCHC RBC AUTO-ENTMCNC: 32.9 G/DL (ref 31–36)
MCV RBC AUTO: 94 FL (ref 80–100)
METHGB MFR BLDA: 0.2 %
MONOCYTES # BLD: 1.2 K/UL (ref 0–1.3)
MONOCYTES NFR BLD: 7.4 %
NEUTROPHILS # BLD: 14 K/UL (ref 1.7–7.7)
NEUTROPHILS NFR BLD: 83.8 %
O2 THERAPY: ABNORMAL
PCO2 BLDA: 39.3 MMHG (ref 35–45)
PERFORMED ON: ABNORMAL
PERFORMED ON: NORMAL
PH BLDA: 7.49 [PH] (ref 7.35–7.45)
PHOSPHATE SERPL-MCNC: 1.7 MG/DL (ref 2.5–4.9)
PLATELET # BLD AUTO: 357 K/UL (ref 135–450)
PMV BLD AUTO: 9.3 FL (ref 5–10.5)
PO2 BLDA: 91.4 MMHG (ref 75–108)
POTASSIUM SERPL-SCNC: 3.5 MMOL/L (ref 3.5–5.1)
PROTHROMBIN TIME: 14.7 SEC (ref 11.9–14.9)
RBC # BLD AUTO: 3.61 M/UL (ref 4–5.2)
SAO2 % BLDA: 97.5 %
SODIUM SERPL-SCNC: 142 MMOL/L (ref 136–145)
TROPONIN, HIGH SENSITIVITY: 74 NG/L (ref 0–14)
WBC # BLD AUTO: 16.7 K/UL (ref 4–11)

## 2025-03-28 PROCEDURE — 2000000000 HC ICU R&B

## 2025-03-28 PROCEDURE — 5A0935A ASSISTANCE WITH RESPIRATORY VENTILATION, LESS THAN 24 CONSECUTIVE HOURS, HIGH NASAL FLOW/VELOCITY: ICD-10-PCS | Performed by: HOSPITALIST

## 2025-03-28 PROCEDURE — 99291 CRITICAL CARE FIRST HOUR: CPT | Performed by: STUDENT IN AN ORGANIZED HEALTH CARE EDUCATION/TRAINING PROGRAM

## 2025-03-28 PROCEDURE — 6360000002 HC RX W HCPCS: Performed by: NURSE PRACTITIONER

## 2025-03-28 PROCEDURE — 2580000003 HC RX 258: Performed by: STUDENT IN AN ORGANIZED HEALTH CARE EDUCATION/TRAINING PROGRAM

## 2025-03-28 PROCEDURE — 94640 AIRWAY INHALATION TREATMENT: CPT

## 2025-03-28 PROCEDURE — 6360000002 HC RX W HCPCS: Performed by: HOSPITALIST

## 2025-03-28 PROCEDURE — 83735 ASSAY OF MAGNESIUM: CPT

## 2025-03-28 PROCEDURE — 84484 ASSAY OF TROPONIN QUANT: CPT

## 2025-03-28 PROCEDURE — 6370000000 HC RX 637 (ALT 250 FOR IP): Performed by: HOSPITALIST

## 2025-03-28 PROCEDURE — 80069 RENAL FUNCTION PANEL: CPT

## 2025-03-28 PROCEDURE — 94003 VENT MGMT INPAT SUBQ DAY: CPT

## 2025-03-28 PROCEDURE — 85610 PROTHROMBIN TIME: CPT

## 2025-03-28 PROCEDURE — 94761 N-INVAS EAR/PLS OXIMETRY MLT: CPT

## 2025-03-28 PROCEDURE — 97162 PT EVAL MOD COMPLEX 30 MIN: CPT

## 2025-03-28 PROCEDURE — 5A09357 ASSISTANCE WITH RESPIRATORY VENTILATION, LESS THAN 24 CONSECUTIVE HOURS, CONTINUOUS POSITIVE AIRWAY PRESSURE: ICD-10-PCS | Performed by: HOSPITALIST

## 2025-03-28 PROCEDURE — 2500000003 HC RX 250 WO HCPCS: Performed by: STUDENT IN AN ORGANIZED HEALTH CARE EDUCATION/TRAINING PROGRAM

## 2025-03-28 PROCEDURE — 97530 THERAPEUTIC ACTIVITIES: CPT

## 2025-03-28 PROCEDURE — 82803 BLOOD GASES ANY COMBINATION: CPT

## 2025-03-28 PROCEDURE — 6360000002 HC RX W HCPCS: Performed by: STUDENT IN AN ORGANIZED HEALTH CARE EDUCATION/TRAINING PROGRAM

## 2025-03-28 PROCEDURE — 71045 X-RAY EXAM CHEST 1 VIEW: CPT

## 2025-03-28 PROCEDURE — 94660 CPAP INITIATION&MGMT: CPT

## 2025-03-28 PROCEDURE — 2500000003 HC RX 250 WO HCPCS: Performed by: HOSPITALIST

## 2025-03-28 PROCEDURE — 2700000000 HC OXYGEN THERAPY PER DAY

## 2025-03-28 PROCEDURE — 97167 OT EVAL HIGH COMPLEX 60 MIN: CPT

## 2025-03-28 PROCEDURE — 36415 COLL VENOUS BLD VENIPUNCTURE: CPT

## 2025-03-28 PROCEDURE — 85025 COMPLETE CBC W/AUTO DIFF WBC: CPT

## 2025-03-28 PROCEDURE — 97535 SELF CARE MNGMENT TRAINING: CPT

## 2025-03-28 RX ORDER — MIRTAZAPINE 15 MG/1
7.5 TABLET, FILM COATED ORAL NIGHTLY
Status: DISCONTINUED | OUTPATIENT
Start: 2025-03-28 | End: 2025-03-30

## 2025-03-28 RX ADMIN — WATER 40 MG: 1 INJECTION INTRAMUSCULAR; INTRAVENOUS; SUBCUTANEOUS at 09:10

## 2025-03-28 RX ADMIN — IPRATROPIUM BROMIDE AND ALBUTEROL SULFATE 1 DOSE: 2.5; .5 SOLUTION RESPIRATORY (INHALATION) at 11:25

## 2025-03-28 RX ADMIN — ENOXAPARIN SODIUM 30 MG: 100 INJECTION SUBCUTANEOUS at 09:12

## 2025-03-28 RX ADMIN — DOXYCYCLINE 100 MG: 100 INJECTION, POWDER, LYOPHILIZED, FOR SOLUTION INTRAVENOUS at 22:53

## 2025-03-28 RX ADMIN — IPRATROPIUM BROMIDE AND ALBUTEROL SULFATE 1 DOSE: 2.5; .5 SOLUTION RESPIRATORY (INHALATION) at 07:21

## 2025-03-28 RX ADMIN — MIRTAZAPINE 7.5 MG: 15 TABLET, FILM COATED ORAL at 22:47

## 2025-03-28 RX ADMIN — WATER 40 MG: 1 INJECTION INTRAMUSCULAR; INTRAVENOUS; SUBCUTANEOUS at 18:14

## 2025-03-28 RX ADMIN — IPRATROPIUM BROMIDE AND ALBUTEROL SULFATE 1 DOSE: 2.5; .5 SOLUTION RESPIRATORY (INHALATION) at 15:34

## 2025-03-28 RX ADMIN — SODIUM CHLORIDE, PRESERVATIVE FREE 10 ML: 5 INJECTION INTRAVENOUS at 09:12

## 2025-03-28 RX ADMIN — SODIUM CHLORIDE, PRESERVATIVE FREE 10 ML: 5 INJECTION INTRAVENOUS at 21:11

## 2025-03-28 RX ADMIN — MUPIROCIN: 20 OINTMENT TOPICAL at 21:12

## 2025-03-28 RX ADMIN — WATER 1000 MG: 1 INJECTION INTRAMUSCULAR; INTRAVENOUS; SUBCUTANEOUS at 09:50

## 2025-03-28 RX ADMIN — MUPIROCIN: 20 OINTMENT TOPICAL at 09:12

## 2025-03-28 RX ADMIN — PROPOFOL 40 MCG/KG/MIN: 10 INJECTION, EMULSION INTRAVENOUS at 00:11

## 2025-03-28 RX ADMIN — IPRATROPIUM BROMIDE AND ALBUTEROL SULFATE 1 DOSE: 2.5; .5 SOLUTION RESPIRATORY (INHALATION) at 19:36

## 2025-03-28 RX ADMIN — WATER 40 MG: 1 INJECTION INTRAMUSCULAR; INTRAVENOUS; SUBCUTANEOUS at 22:58

## 2025-03-28 RX ADMIN — DOXYCYCLINE 100 MG: 100 INJECTION, POWDER, LYOPHILIZED, FOR SOLUTION INTRAVENOUS at 10:02

## 2025-03-28 RX ADMIN — POTASSIUM PHOSPHATE, MONOBASIC AND POTASSIUM PHOSPHATE, DIBASIC 15 MMOL: 224; 236 INJECTION, SOLUTION, CONCENTRATE INTRAVENOUS at 09:48

## 2025-03-28 RX ADMIN — MAGNESIUM SULFATE HEPTAHYDRATE 2000 MG: 40 INJECTION, SOLUTION INTRAVENOUS at 06:58

## 2025-03-28 ASSESSMENT — PULMONARY FUNCTION TESTS
PIF_VALUE: 15
PIF_VALUE: 15

## 2025-03-28 ASSESSMENT — PAIN SCALES - GENERAL: PAINLEVEL_OUTOF10: 0

## 2025-03-28 NOTE — PROGRESS NOTES
03/28/25 0319   Patient Observation   Pulse 81   SpO2 96 %   Breath Sounds   Right Upper Lobe Diminished   Right Middle Lobe Diminished   Right Lower Lobe Diminished   Left Upper Lobe Diminished   Left Lower Lobe Diminished   Vent Information   Equipment Changed HME   Vent Mode AC/PRVC   Ventilator Settings   Vt (Set, mL) 420 mL   Resp Rate (Set) 16 bpm   PEEP/CPAP (cmH2O) 5   FiO2  (S)  30 %   Insp Time (sec) 1.2 sec   Vent Patient Data (Readings)   Vt (Measured) 466 mL   Peak Inspiratory Pressure (cmH2O) 15 cmH2O   Rate Measured 16 br/min   Minute Volume (L/min) 7.5 Liters   Mean Airway Pressure (cmH2O) 8.2 cmH20   Inspiratory Time 1.2 sec   I:E Ratio 1:2.10   Vent Alarm Settings   High Pressure (cmH2O) 40 cmH2O   Low Minute Volume (lpm) 2 L/min   Low Exhaled Vt (ml) 200 mL   RR High (bpm) 40 br/min   Additional Respiratoray Assessments   Humidification Source HME   Ambu Bag With Mask At Bedside Yes   Airway Clearance   Suction ET Tube   Suction Device Inline suction catheter   Sputum Method Obtained Endotracheal   Sputum Amount Scant   Sputum Color/Odor White   Sputum Consistency Thin   ETT    Placement Date/Time: 03/26/25 0084   Present on Admission/Arrival: No  Placed By: In ED  Airway Tube Size: 7.5 mm  Location: Oral  Secured At: 25 cm  Measured From: Lips   Secured At 23 cm   Measured From Lips   ETT Placement Left   Secured By Commercial tube chaves   Site Assessment Dry   Cuff Pressure 26 cm H2O

## 2025-03-28 NOTE — PROGRESS NOTES
Occupational Therapy  Facility/Department: [facility] C2 CARD TELEMETRY  Occupational Therapy Initial Assessment    Name: Maranda Burns  : 1960  MRN: [id number]  Date of Service: 3/28/2025    Discharge Recommendations:  Continue to assess pending progress  OT Equipment Recommendations  Equipment Needed: No       Patient Diagnosis(es): The primary encounter diagnosis was Acute respiratory failure with hypoxia and hypercapnia (HCC). Diagnoses of Acute encephalopathy, SIRS (systemic inflammatory response syndrome) (HCC), and COPD exacerbation (HCC) were also pertinent to this visit.  Past Medical History:  has a past medical history of Back pain, COPD (chronic obstructive pulmonary disease) (HCC), Fatigue, Hypertension, Osteoporosis without pathological fracture, Syncope and collapse, Thyroid disease, and Ulcerative colitis, unspecified, without complications (HCC).  Past Surgical History:  has a past surgical history that includes Splenectomy (); Tonsillectomy; back surgery; Hysterectomy, vaginal (); Colonoscopy (N/A, 2021); Ovary removal; Hysterectomy; and Lung surgery (Right, 2023).           Assessment  Performance deficits / Impairments: Decreased functional mobility ;Decreased endurance;Decreased coordination;Decreased ADL status;Decreased sensation;Decreased posture;Decreased balance;Decreased strength;Decreased vision/visual deficit;Decreased high-level IADLs;Decreased safe awareness  Assessment: Patient tolerated initial evaluation/treatment well. Patient presented to [facility] with acute respiratory failure. Typically patient completes ADL tasks with out assistance and IADLs with out assistance. Today pt required minimal assistance for LB dressing, SBA for sock donning, min A for short distance ambulation, and CGA for STS transfers. Patient fatigues quickly and was extubated this AM therefore activity limited this date. Patient will benefit from continued OT services  to address strength,  Skilled Clinical Factors: toileting completed on BS with minimal assistance for balance in standing during pericares  Functional Mobility Skilled Clinical Factors: Short distance ambulation completed with minimal assistance provided     Activity Tolerance  Activity Tolerance: Patient tolerated evaluation without incident;Treatment limited secondary to decreased cognition           Cognition  Overall Cognitive Status: Exceptions  Arousal/Alertness: Appears intact  Following Commands: Follows one step commands with repetition;Follows multistep commands with increased time  Attention Span: Appears intact  Memory: Decreased recall of recent events  Safety Judgement: Impaired (decreased line safety awareness)  Problem Solving: Assistance required to implement solutions  Insights: Decreased awareness of deficits  Initiation: Requires cues for some  Sequencing: Requires cues for some  Orientation  Overall Orientation Status: Within Normal Limits  Orientation Level: Oriented X4                  Education Given To: Patient  Education Provided: Role of Therapy;Transfer Training;Plan of Care;Equipment;Precautions;Orientation;Mobility Training  Education Provided Comments: Pt educated on importance of OOB mobility, prevention of complications of bedrest, and general safety during hospitalization  Education Method: Demonstration;Verbal  Barriers to Learning: None  Education Outcome: Verbalized understanding;Demonstrated understanding           AM-PAC - ADL  AM-PAC Daily Activity - Inpatient   How much help is needed for putting on and taking off regular lower body clothing?: A Little  How much help is needed for bathing (which includes washing, rinsing, drying)?: A Lot  How much help is needed for toileting (which includes using toilet, bedpan, or urinal)?: A Little  How much help is needed for putting on and taking off regular upper body clothing?: A Little  How much help is needed for taking care of personal grooming?: A

## 2025-03-28 NOTE — PROGRESS NOTES
03/28/25 0721   Patient Observation   Pulse 94   SpO2 98 %   Breath Sounds   Breath Sounds Bilateral Diminished   Vent Information   Equipment Changed HME   Vent Mode (S)  CPAP/PS   Ventilator Settings   PEEP/CPAP (cmH2O) 5   FiO2  30 %   Pressure Support (cm H2O) (S)  10 cm H2O   Vent Patient Data (Readings)   Vt (Measured) 989 mL   Peak Inspiratory Pressure (cmH2O) 15 cmH2O   Rate Measured 13 br/min   Minute Volume (L/min) 12.2 Liters   Mean Airway Pressure (cmH2O) 8.2 cmH20   I:E Ratio 1:2.7   Flow Sensitivity 3 L/min   Backup Apnea On   Vent Alarm Settings   Low Minute Volume (lpm) 2 L/min   High Minute Volume (lpm) 20 L/min   Low Exhaled Vt (ml) 200 mL   High Exhaled Vt (ml) 1000 mL   RR High (bpm) 40 br/min   Apnea (secs) 20 secs   Additional Respiratoray Assessments   Humidification Source HME   Circuit Condensation Drained   Ambu Bag With Mask At Bedside Yes   Airway Clearance   Suction ET Tube;Oral   Suction Device [place];Inline suction catheter   Sputum Method Obtained Endotracheal   Sputum Amount Moderate   Sputum Color/Odor Clear   Sputum Consistency Thin;Thick   ETT    Placement Date/Time: 03/26/25 7346   Present on Admission/Arrival: No  Placed By: In ED  Airway Tube Size: 7.5 mm  Location: Oral  Secured At: 25 cm  Measured From: Lips   Secured At 23 cm   Measured From Lips   ETT Placement (S)  Center   Secured By Commercial tube chaves   Site Assessment Dry   Treatment   $Treatment Type $Inhaled Therapy/Meds

## 2025-03-28 NOTE — PROGRESS NOTES
Physical Therapy  Facility/Department: [facility] C2 CARD TELEMETRY  Physical Therapy Initial Assessment    Name: Maranda Burns  : 1960  MRN: [id number]  Date of Service: 3/28/2025    Discharge Recommendations:  Continue to assess pending progress (pending further mobility assessment w/ improved medical status)   PT Equipment Recommendations  Equipment Needed: No  Other: defer      Patient Diagnosis(es): The primary encounter diagnosis was Acute respiratory failure with hypoxia and hypercapnia (HCC). Diagnoses of Acute encephalopathy, SIRS (systemic inflammatory response syndrome) (HCC), and COPD exacerbation (HCC) were also pertinent to this visit.  Past Medical History:  has a past medical history of Back pain, COPD (chronic obstructive pulmonary disease) (HCC), Fatigue, Hypertension, Osteoporosis without pathological fracture, Syncope and collapse, Thyroid disease, and Ulcerative colitis, unspecified, without complications (HCC).  Past Surgical History:  has a past surgical history that includes Splenectomy (); Tonsillectomy; back surgery; Hysterectomy, vaginal (); Colonoscopy (N/A, 2021); Ovary removal; Hysterectomy; and Lung surgery (Right, 2023).    Assessment  Body Structures, Functions, Activity Limitations Requiring Skilled Therapeutic Intervention: Decreased functional mobility ;Decreased strength;Decreased safe awareness;Decreased balance;Decreased endurance  Assessment: Pt is awake and agreeable to therapy session. She is admitted to [facility] w/ AMS and respiratory failure, extubated this AM and currently on BiPAP. Pt is limited by decreased activity tolerance and safety awareness of lines. She requires stand by assistance with bed mobility, contact guard assist to stand, and minimal assist for stand pivot transfers w/ intermittent posterior lean and handheld assist. Prior to admission she was independent without use of AD.  Recommend continued inpatient PT to progress towards prior level  this session, please let this note serve as discharge summary.  Please see case management note for discharge disposition.  Thank you.

## 2025-03-28 NOTE — PROGRESS NOTES
Shift: [id number]    Reason for ICU Admission: Acute hypoxic respiratory failure. Patient fell and went unresponsive at home.  called 911, bagged on way to hospital and intubated in ED. Sepsis protocol started in ED    Most recent vitals: BP (!) 140/75   Pulse 94   Temp 98.4 °F (36.9 °C) (Axillary)   Resp 16   Ht 1.6 m (5' 2.99\")   Wt 39.9 kg (87 lb 15.4 oz)   SpO2 98%   BMI 15.59 kg/m²      Drip rates at handoff:    sodium chloride      norepinephrine 4 mcg/min (03/28/25 0702)    dexmedeTOMIDine Stopped (03/27/25 1220)    fentaNYL 50 mcg/hr (03/28/25 0709)    dextrose      propofol 30 mcg/kg/min (03/28/25 0709)       Current O2:   [] Room Air   [] NC L   [] BiPaP    [x] Vented  40 Fi02  5 Peep    Hospital Course:  ICU Day # 1 (3/27) Nights   - Acute Hypoxic Resp. Failure - Intubated in ED  - Brought to ICU around 0200   - Responding to commands on minimal sedation  - Levo started  - Morning ABG: pH: 7.19, pCO2: 80.0, pO2: 90.4, cHCO3: 30.5, Lac: 1.77    ICU Day #2 (3/28) Nights  - Patient OG noted to be at 40cm when originally at 60; TF stopped and MD messaged   - No message received back from MD so OG was removed at 0400 as the tape came    loose again and was even farther out  - Patient alert and following commands and writing on white board throughout night on sedation,    no efforts towards ETT so sedation weaned down in hopes for extubation  - No acute events overnight

## 2025-03-28 NOTE — PROGRESS NOTES
03/28/25 1534   NIV Type   NIV Started/Stopped (S)  On   Equipment Type v60   Mode Bilevel   Mask Type Full face mask   Mask Size Medium   Assessment   Pulse 85   /76   SpO2 100 %   Level of Consciousness 0   Comfort Level Good   Using Accessory Muscles No   Mask Compliance Good   Settings/Measurements   PIP Observed 10 cm H20   IPAP 10 cmH20   CPAP/EPAP 5 cmH2O   Vt (Measured) 616 mL   Rate Ordered 14   Insp Rise Time (%) 2 %   FiO2  30 %   I Time/ I Time % 1 s   Minute Volume (L/min) 11.8 Liters   Patient's Home Machine No   Alarm Settings   Alarms On Y

## 2025-03-28 NOTE — PROGRESS NOTES
03/28/25 0822   NIV Type   $NIV $Daily Charge   NIV Started/Stopped On   Equipment Type v60   Mode Bilevel   Mask Type Full face mask   Mask Size Medium   Assessment   Pulse (!) 116   Heart Rate Source Monitor   Respirations 15   SpO2 97 %   Level of Consciousness 0   Comfort Level Good   Using Accessory Muscles No   Mask Compliance Good   Skin Assessment Clean, dry, & intact   Breath Sounds   Breath Sounds Bilateral Diminished   Settings/Measurements   PIP Observed 10 cm H20   IPAP 10 cmH20   CPAP/EPAP 5 cmH2O   Vt (Measured) 759 mL   Rate Ordered 14   Insp Rise Time (%) 2 %   FiO2  40 %   I Time/ I Time % 1 s   Minute Volume (L/min) 11.7 Liters   Mask Leak (lpm) 2 lpm   Patient's Home Machine No   Alarm Settings   Alarms On Y   Patient Observation   Patient Observations pt extubated to bipap per order

## 2025-03-28 NOTE — PROGRESS NOTES
03/27/25 2337   Patient Observation   Pulse 77   SpO2 96 %   Breath Sounds   Right Upper Lobe Diminished   Right Middle Lobe Diminished   Right Lower Lobe Diminished   Left Upper Lobe Diminished   Left Lower Lobe Diminished   Vent Information   Vent Mode AC/PRVC   Ventilator Settings   Vt (Set, mL) 420 mL   Resp Rate (Set) 16 bpm   PEEP/CPAP (cmH2O) 5   FiO2  35 %   Insp Time (sec) 1.2 sec   Vent Patient Data (Readings)   Vt (Measured) 447 mL   Peak Inspiratory Pressure (cmH2O) 15 cmH2O   Rate Measured 16 br/min   Minute Volume (L/min) 7.2 Liters   Mean Airway Pressure (cmH2O) 8 cmH20   Inspiratory Time 1.2 sec   I:E Ratio 1:2.10   Vent Alarm Settings   High Pressure (cmH2O) 40 cmH2O   Low Minute Volume (lpm) 2 L/min   Low Exhaled Vt (ml) 200 mL   RR High (bpm) 40 br/min   Additional Respiratoray Assessments   Humidification Source HME   Ambu Bag With Mask At Bedside Yes   Airway Clearance   Suction ET Tube   Suction Device Inline suction catheter   Sputum Method Obtained Endotracheal   Sputum Amount Scant   Sputum Color/Odor White   Sputum Consistency Thin   ETT    Placement Date/Time: 03/26/25 2324   Present on Admission/Arrival: No  Placed By: In ED  Airway Tube Size: 7.5 mm  Location: Oral  Secured At: 25 cm  Measured From: Lips   Secured At 23 cm   Measured From Lips   ETT Placement Right   Secured By Commercial tube chaves   Site Assessment Dry

## 2025-03-28 NOTE — CARE COORDINATION
Chart reviewed. LOS day # 1. Admitted as inpatient. Followed by IM, Pulm. Extubated to Bipap today. Pt is from home with spouse. [employer] for home o2 3lpm cont. Therapy recs CTA pending progress. Will follow for needs.

## 2025-03-28 NOTE — PROGRESS NOTES
Hospital Medicine Progress Note  V 1.6      Date of Admission: 3/26/2025    Hospital Day: 3      Chief Admission Complaint: Altered mental status    Subjective:   Patient seen and evaluated this morning, on BiPAP. She is extubated this morning.  She is awake and alert and stated feeling fatigued today.  She is on continuous infusion of Precedex and Levophed     Presenting Admission History:     Maranda Burns 64-year-old female with past medical history of COPD/emphysema on oxygen at home, alpha-1 antitrypsin deficiency, ulcerative colitis, depression, DM type II, tobacco dependence hypothyroidism, presented with altered mental sensorium and unresponsiveness.  Her  stated that she was feeling dizzy and weak before this episode and then after describing her feeling she passed out.  She was found to be hypoxic by EMS and brought to ED where she was intubated due to unresponsiveness.  She was found to be in acute hypoxic hypercapnic respiratory failure with elevated carbon dioxide and acidosis.  Admitted to the suspicion of acute exacerbation of COPD/emphysema.  Given her leukocytosis at 20.2, lactic acid at 6.1 with tachypnea and tachycardia she was meeting the criteria of SIRS, IV fluid, IV antibiotics and IV steroids started.  Blood cultures sent and pulmonology consulted.    Assessment/Plan:      Current Principal Problem:  Acute hypoxic respiratory failure    Acute hypoxic/hypercapnic respiratory failure secondary to COPD exacerbation  - On BiPAP, extubated at 8 AM, 3/27/2025  -Plan to alternate BiPAP with supplemental oxygen during the day  - On continuous infusion of Precedex and Levophed due to hypotension  - CTA chest negative for pulmonary embolism, hyperinflation of bilateral lungs with severe apical centrilobular emphysema  - Limited echo showed diastolic dysfunction, IV fluids held  - Ultrasound chest unremarkable, no consolidation or pleural effusion  - Continue IV Solu-Medrol 40 mg IV 8  current labs reviewed interpreted for clinical significance    [x] Appropriate follow-up labs were ordered  [] Collateral history obtained     [x] Independent Interpretation of tests (any 1)    [x] Telemetry (Rhythm Strip) personally reviewed and interpreted        [] Imaging personally reviewed and interpreted     [x] Discussion (any 1)  [x] Multi-Disciplinary Rounds with Case Management  [] Discussed management of the case with           Labs:  Personally reviewed on 3/28/2025 and interpreted for clinical significance as documented above.     Recent Labs     03/26/25 2322 03/27/25 1628 03/28/25 0417   WBC 20.2* 18.1* 16.7*   HGB 12.6 11.8* 11.2*   HCT 40.6 37.0 33.9*    384 357     Recent Labs     03/26/25 2322 03/27/25 1628 03/28/25 0417    143 142   K 4.5 3.9 3.5    103 104   CO2 22 27 27   BUN 12 18 24*   CREATININE 1.0 0.9 0.7   CALCIUM 9.6 9.2 9.6   MG  --   --  1.77*   PHOS  --   --  1.7*     Recent Labs     03/27/25 1628 03/27/25 2220 03/28/25 0417   TROPHS 131* 97* 74*     No results for input(s): \"LABA1C\" in the last 72 hours.  Recent Labs     03/26/25 2322 03/27/25 1628   AST 54* 52*   ALT 33 50*   BILITOT 0.5 0.5   ALKPHOS 129 118     Recent Labs     03/26/25 2323 03/28/25 0417   INR  --  1.13   LACTA 6.1*  --        Urine Cultures:   Lab Results   Component Value Date/Time    LABURIN  03/14/2024 05:41 PM     <50,000 CFU/ml mixed skin/urogenital jeny. No further workup    LABURIN 50,000 CFU/ml 03/14/2024 05:41 PM     Blood Cultures:   Lab Results   Component Value Date/Time    BC  03/27/2025 12:40 AM     No Growth to date.  Any change in status will be called.     Lab Results   Component Value Date/Time    BLOODCULT2  03/27/2025 12:39 AM     No Growth to date.  Any change in status will be called.     Organism:   Lab Results   Component Value Date/Time    ORG Enterococcus faecalis 03/14/2024 05:41 PM         Zina Aburto MD

## 2025-03-28 NOTE — PROGRESS NOTES
Pulmonary & Critical Care Medicine ICU Progress Note      Events of Last 24 hours:   No acute events overnight. Pt is following commands on vent.         Invasive Lines: PICC D#None   CVC D#None  Art Line D#None            Vitals:  BP (!) 140/75   Pulse 92   Temp 98.4 °F (36.9 °C) (Axillary)   Resp 16   Ht 1.6 m (5' 2.99\")   Wt 39.9 kg (87 lb 15.4 oz)   SpO2 99%   BMI 15.59 kg/m²    Tmax:  CVP:        Intake/Output Summary (Last 24 hours) at 3/28/2025 0718  Last data filed at 3/28/2025 0709  Gross per 24 hour   Intake 691.75 ml   Output 0 ml   Net 691.75 ml       EXAM:  Physical Exam  Constitutional:       Appearance: She is ill-appearing.   HENT:      Head: Normocephalic and atraumatic.      Nose: Nose normal.      Mouth/Throat:      Pharynx: No oropharyngeal exudate.   Eyes:      General: No scleral icterus.        Right eye: No discharge.         Left eye: No discharge.   Cardiovascular:      Rate and Rhythm: Normal rate.      Heart sounds: No murmur heard.     No gallop.   Pulmonary:      Comments: Diminished breath sounds in all lung fields  Abdominal:      General: Abdomen is flat. Bowel sounds are normal. There is no distension.      Tenderness: There is no abdominal tenderness.   Musculoskeletal:         General: No swelling.      Cervical back: Normal range of motion.   Skin:     General: Skin is warm and dry.   Neurological:      Mental Status: She is alert and oriented to person, place, and time.          Medications:  Scheduled Meds:   sodium chloride flush  5-40 mL IntraVENous 2 times per day    enoxaparin  30 mg SubCUTAneous Daily    ipratropium 0.5 mg-albuterol 2.5 mg  1 Dose Inhalation Q4H WA RT    mupirocin   Each Nostril BID    methylPREDNISolone  40 mg IntraVENous Q8H    doxycycline (VIBRAMYCIN) IV  100 mg IntraVENous Q12H    cefTRIAXone (ROCEPHIN) IV  1,000 mg IntraVENous Q24H    insulin lispro  0-8 Units SubCUTAneous 4x Daily AC & HS       PRN Meds:  sodium chloride flush, sodium  projecting over the mid thoracic trachea approximately 5.5 cm above the level of the diego. The transverse diameter of the heart is within normal limits. The lungs are clear. There is no effusion or pneumothorax. IMPRESSION: 1. Endotracheal tube tip projects over the mid thoracic trachea. Electronically signed by Grady Fletcher MD     XR ABDOMEN (KUB) (SINGLE AP VIEW)  Result Date: 3/27/2025  1. Nasogastric tube tip projects over the gastric body region. Chest findings: 2 projections, AP portable, of the chest at 2346 hours. There is an endotracheal tube identified, with tip projecting over the mid thoracic trachea approximately 5.5 cm above the level of the diego. The transverse diameter of the heart is within normal limits. The lungs are clear. There is no effusion or pneumothorax. IMPRESSION: 1. Endotracheal tube tip projects over the mid thoracic trachea. Electronically signed by Grady Fletcher MD        ASSESSMENT:  Acute on chronic hypoxic and hypercapnic respiratory failure  Septic shock  Hyperglycemia  Elevated troponin  Panlobular emphysema  AECOPD  Tobacco dependence  Alpha 1 antitrypsin deficiency  Lactic acidosis   Diastolic dysfunction     Plan:   - SAT this morning, she is following commands on vent   - No pressors, normotensive, keep goal MAP > 65 or SBP > 90  - ECHO reviewed with diastolic dysfunction  - troponin trending down, likely demand ischemia  - SBT with RSBI < 105, can extubate to NIPPV, alternate with supplemental O2 during the day  - Solumedrol 40mg IV q8h, Duonebs q4h  - NPO for extubation, can reassess with bedside eval if able to start a diet  - No GI ppx  - Replace electrolytes PRN - replace phos  - No wyatt, bladder scan qshift  - Rocephin/doxy for 5 day course for possible PNA, blood cx NGTD x 2, respiratory culture sent from bronchoscopy pending  - Lovenox for DVT ppx  - Keep blood glucose 140-180, medium dose SSl  - PT/OT     Ppx/Wyatt/Lines  - PIV  - No wyatt   - No GI ppx,

## 2025-03-29 LAB
ALBUMIN SERPL-MCNC: 3.6 G/DL (ref 3.4–5)
ANION GAP SERPL CALCULATED.3IONS-SCNC: 7 MMOL/L (ref 3–16)
BACTERIA SPEC RESP CULT: NORMAL
BASOPHILS # BLD: 0.1 K/UL (ref 0–0.2)
BASOPHILS NFR BLD: 0.6 %
BUN SERPL-MCNC: 24 MG/DL (ref 7–20)
CALCIUM SERPL-MCNC: 9.6 MG/DL (ref 8.3–10.6)
CHLORIDE SERPL-SCNC: 106 MMOL/L (ref 99–110)
CO2 SERPL-SCNC: 29 MMOL/L (ref 21–32)
CREAT SERPL-MCNC: 0.5 MG/DL (ref 0.6–1.2)
DEPRECATED RDW RBC AUTO: 14.2 % (ref 12.4–15.4)
EOSINOPHIL # BLD: 0 K/UL (ref 0–0.6)
EOSINOPHIL NFR BLD: 0 %
GFR SERPLBLD CREATININE-BSD FMLA CKD-EPI: >90 ML/MIN/{1.73_M2}
GLUCOSE BLD-MCNC: 112 MG/DL (ref 70–99)
GLUCOSE BLD-MCNC: 119 MG/DL (ref 70–99)
GLUCOSE BLD-MCNC: 121 MG/DL (ref 70–99)
GLUCOSE SERPL-MCNC: 137 MG/DL (ref 70–99)
GRAM STN SPEC: NORMAL
HCT VFR BLD AUTO: 32.5 % (ref 36–48)
HGB BLD-MCNC: 10.7 G/DL (ref 12–16)
LYMPHOCYTES # BLD: 1.3 K/UL (ref 1–5.1)
LYMPHOCYTES NFR BLD: 7.6 %
MAGNESIUM SERPL-MCNC: 1.99 MG/DL (ref 1.8–2.4)
MCH RBC QN AUTO: 31 PG (ref 26–34)
MCHC RBC AUTO-ENTMCNC: 32.8 G/DL (ref 31–36)
MCV RBC AUTO: 94.7 FL (ref 80–100)
MONOCYTES # BLD: 0.7 K/UL (ref 0–1.3)
MONOCYTES NFR BLD: 4.3 %
NEUTROPHILS # BLD: 15.2 K/UL (ref 1.7–7.7)
NEUTROPHILS NFR BLD: 87.5 %
PERFORMED ON: ABNORMAL
PHOSPHATE SERPL-MCNC: 2.9 MG/DL (ref 2.5–4.9)
PLATELET # BLD AUTO: 329 K/UL (ref 135–450)
PMV BLD AUTO: 9.3 FL (ref 5–10.5)
POTASSIUM SERPL-SCNC: 4.5 MMOL/L (ref 3.5–5.1)
RBC # BLD AUTO: 3.43 M/UL (ref 4–5.2)
SODIUM SERPL-SCNC: 142 MMOL/L (ref 136–145)
WBC # BLD AUTO: 17.3 K/UL (ref 4–11)

## 2025-03-29 PROCEDURE — 6370000000 HC RX 637 (ALT 250 FOR IP): Performed by: HOSPITALIST

## 2025-03-29 PROCEDURE — 80069 RENAL FUNCTION PANEL: CPT

## 2025-03-29 PROCEDURE — 2500000003 HC RX 250 WO HCPCS: Performed by: STUDENT IN AN ORGANIZED HEALTH CARE EDUCATION/TRAINING PROGRAM

## 2025-03-29 PROCEDURE — 85025 COMPLETE CBC W/AUTO DIFF WBC: CPT

## 2025-03-29 PROCEDURE — 94660 CPAP INITIATION&MGMT: CPT

## 2025-03-29 PROCEDURE — 6360000002 HC RX W HCPCS: Performed by: HOSPITALIST

## 2025-03-29 PROCEDURE — 2580000003 HC RX 258: Performed by: STUDENT IN AN ORGANIZED HEALTH CARE EDUCATION/TRAINING PROGRAM

## 2025-03-29 PROCEDURE — 2500000003 HC RX 250 WO HCPCS: Performed by: HOSPITALIST

## 2025-03-29 PROCEDURE — 99291 CRITICAL CARE FIRST HOUR: CPT | Performed by: STUDENT IN AN ORGANIZED HEALTH CARE EDUCATION/TRAINING PROGRAM

## 2025-03-29 PROCEDURE — 2060000000 HC ICU INTERMEDIATE R&B

## 2025-03-29 PROCEDURE — 94761 N-INVAS EAR/PLS OXIMETRY MLT: CPT

## 2025-03-29 PROCEDURE — 6360000002 HC RX W HCPCS: Performed by: STUDENT IN AN ORGANIZED HEALTH CARE EDUCATION/TRAINING PROGRAM

## 2025-03-29 PROCEDURE — 94640 AIRWAY INHALATION TREATMENT: CPT

## 2025-03-29 PROCEDURE — 97530 THERAPEUTIC ACTIVITIES: CPT

## 2025-03-29 PROCEDURE — 83735 ASSAY OF MAGNESIUM: CPT

## 2025-03-29 PROCEDURE — 2700000000 HC OXYGEN THERAPY PER DAY

## 2025-03-29 RX ADMIN — ENOXAPARIN SODIUM 30 MG: 100 INJECTION SUBCUTANEOUS at 08:36

## 2025-03-29 RX ADMIN — SODIUM CHLORIDE, PRESERVATIVE FREE 10 ML: 5 INJECTION INTRAVENOUS at 20:32

## 2025-03-29 RX ADMIN — DOXYCYCLINE 100 MG: 100 INJECTION, POWDER, LYOPHILIZED, FOR SOLUTION INTRAVENOUS at 12:16

## 2025-03-29 RX ADMIN — IPRATROPIUM BROMIDE AND ALBUTEROL SULFATE 1 DOSE: 2.5; .5 SOLUTION RESPIRATORY (INHALATION) at 08:03

## 2025-03-29 RX ADMIN — DOXYCYCLINE 100 MG: 100 INJECTION, POWDER, LYOPHILIZED, FOR SOLUTION INTRAVENOUS at 22:22

## 2025-03-29 RX ADMIN — MUPIROCIN: 20 OINTMENT TOPICAL at 12:16

## 2025-03-29 RX ADMIN — MIRTAZAPINE 7.5 MG: 15 TABLET, FILM COATED ORAL at 20:31

## 2025-03-29 RX ADMIN — WATER 40 MG: 1 INJECTION INTRAMUSCULAR; INTRAVENOUS; SUBCUTANEOUS at 20:31

## 2025-03-29 RX ADMIN — SODIUM CHLORIDE, PRESERVATIVE FREE 10 ML: 5 INJECTION INTRAVENOUS at 08:37

## 2025-03-29 RX ADMIN — WATER 1000 MG: 1 INJECTION INTRAMUSCULAR; INTRAVENOUS; SUBCUTANEOUS at 08:37

## 2025-03-29 RX ADMIN — IPRATROPIUM BROMIDE AND ALBUTEROL SULFATE 1 DOSE: 2.5; .5 SOLUTION RESPIRATORY (INHALATION) at 15:50

## 2025-03-29 RX ADMIN — IPRATROPIUM BROMIDE AND ALBUTEROL SULFATE 1 DOSE: 2.5; .5 SOLUTION RESPIRATORY (INHALATION) at 11:36

## 2025-03-29 RX ADMIN — IPRATROPIUM BROMIDE AND ALBUTEROL SULFATE 1 DOSE: 2.5; .5 SOLUTION RESPIRATORY (INHALATION) at 19:32

## 2025-03-29 RX ADMIN — MUPIROCIN: 20 OINTMENT TOPICAL at 20:31

## 2025-03-29 NOTE — PROGRESS NOTES
Physical Therapy  Facility/Department: [facility] C2 CARD TELEMETRY  Daily Treatment Note  NAME: Maranda Burns  : 1960  MRN: [id number]    Date of Service: 3/29/2025    Discharge Recommendations:  Home with Home health PT, 24 hour supervision or assist   PT Equipment Recommendations  Equipment Needed: No  Other: n/a    Patient Diagnosis(es): The primary encounter diagnosis was Acute respiratory failure with hypoxia and hypercapnia (HCC). Diagnoses of Acute encephalopathy, SIRS (systemic inflammatory response syndrome) (HCC), and COPD exacerbation (HCC) were also pertinent to this visit.    Assessment  Assessment: Pt is awake and agreeable to therapy session. Pt is limited by decreased activity tolerance but demos improved balance and cognition this date. She requires stand by assistance with transfers and ambulation in room without AD. SpO2 91% on 6L following ambulation. Recommend initial 24/7 assist and home PT upon discharge to progress towards prior level of function and address remaining strength and mobility deficits.  Activity Tolerance: Patient tolerated evaluation without incident;Treatment limited secondary to decreased cognition  Equipment Needed: No  Other: n/a      Plan  Physical Therapy Plan  General Plan: 3-5 times per week  Current Treatment Recommendations: Strengthening;Balance training;Functional mobility training;Transfer training;Gait training;Home exercise program;Safety education & training;Therapeutic activities;Endurance training    Restrictions  Restrictions/Precautions  Restrictions/Precautions: General Precautions, Contact Precautions  Position Activity Restriction  Other Position/Activity Restrictions: O2, IV, ICU monitoring     Subjective   Subjective  Subjective: pt agreeable to therapy  Pain: no pain at rest, \"I don't know yet I haven't gotten up\"    Objective  Vitals  Pulse: (!) 102  SpO2: 100 %  O2 Device: Nasal cannula (6L)  Bed Mobility Training  Bed Mobility Training:  is needed moving to and from a bed to a chair?: A Little  How much help is needed standing up from a chair using your arms?: A Little  How much help is needed walking in hospital room?: A Little  How much help is needed climbing 3-5 steps with a railing?: A Little  AM-PAC Inpatient Mobility Raw Score : 18  AM-PAC Inpatient T-Scale Score : 43.63  Mobility Inpatient CMS 0-100% Score: 46.58  Mobility Inpatient CMS G-Code Modifier : CK         Therapy Time   Individual Concurrent Group Co-treatment   Time In 0844         Time Out 0908         Minutes 24         Timed Code Treatment Minutes: 24 Minutes       TORI RODRIGUEZ PT       If pt is unable to be seen after this session, please let this note serve as discharge summary.  Please see case management note for discharge disposition.  Thank you.       Statement Selected

## 2025-03-29 NOTE — PROGRESS NOTES
03/29/25 0359   NIV Type   Equipment Type v60   Mode Bilevel   Mask Type Full face mask   Mask Size Medium   Assessment   Pulse 61   Respirations 14   SpO2 99 %   Comfort Level Good   Using Accessory Muscles No   Mask Compliance Good   Skin Protection for O2 Device Yes   Orientation Middle   Location Nose   Settings/Measurements   IPAP 12 cmH20   CPAP/EPAP 5 cmH2O   Vt (Measured) 434 mL   Rate Ordered 14   FiO2  30 %   Minute Volume (L/min) 6.1 Liters   Mask Leak (lpm) 1 lpm   Patient's Home Machine No   Alarm Settings   Alarms On Y   Low Pressure (cmH2O) 6 cmH2O   High Pressure (cmH2O) 30 cmH2O   Delay Alarm 20 sec(s)   RR Low (bpm) 6   RR High (bpm) 40 br/min

## 2025-03-29 NOTE — PROGRESS NOTES
Pulmonary & Critical Care Medicine ICU Progress Note      Events of Last 24 hours:   Pt still having cough and chest congestion. She has no other complaints.       Invasive Lines: PICC D#None   CVC D#None  Art Line D#None            Vitals:  /77   Pulse 59   Temp 97.8 °F (36.6 °C) (Oral)   Resp 16   Ht 1.6 m (5' 2.99\")   Wt 40.1 kg (88 lb 6.5 oz)   SpO2 98%   BMI 15.66 kg/m²   Tmax:  CVP:        Intake/Output Summary (Last 24 hours) at 3/29/2025 0806  Last data filed at 3/29/2025 0255  Gross per 24 hour   Intake 980.03 ml   Output --   Net 980.03 ml       EXAM:  Physical Exam  Constitutional:       Appearance: She is ill-appearing.   HENT:      Head: Normocephalic and atraumatic.      Nose: Nose normal.      Mouth/Throat:      Pharynx: No oropharyngeal exudate.   Eyes:      General: No scleral icterus.        Right eye: No discharge.         Left eye: No discharge.   Cardiovascular:      Rate and Rhythm: Normal rate.      Heart sounds: No murmur heard.     No gallop.   Pulmonary:      Effort: Pulmonary effort is normal.      Comments: Diminished breath sounds in all lung fields  Abdominal:      General: Abdomen is flat. Bowel sounds are normal. There is no distension.      Tenderness: There is no abdominal tenderness.   Musculoskeletal:         General: No swelling.      Cervical back: Normal range of motion.   Skin:     General: Skin is warm and dry.   Neurological:      Mental Status: She is alert and oriented to person, place, and time.          Medications:  Scheduled Meds:   mirtazapine  7.5 mg Oral Nightly    sodium chloride flush  5-40 mL IntraVENous 2 times per day    enoxaparin  30 mg SubCUTAneous Daily    ipratropium 0.5 mg-albuterol 2.5 mg  1 Dose Inhalation Q4H WA RT    mupirocin   Each Nostril BID    methylPREDNISolone  40 mg IntraVENous Q8H    doxycycline (VIBRAMYCIN) IV  100 mg IntraVENous Q12H    cefTRIAXone (ROCEPHIN) IV  1,000 mg IntraVENous Q24H    insulin lispro  0-8 Units  ppx    Critical care time spent reviewing labs/films, examining patient, collaborating with other physicians but excluding procedures for life threatening organ failure is 48 minutes.        Electronically signed by:  Cody Matos MD    3/29/2025    8:06 AM.

## 2025-03-29 NOTE — PROGRESS NOTES
Rounds completed. PT/OT is bedside. Pt is doing well. PCU move out orders obtained.     Electronically signed by Jen Antunez RN on 3/29/2025 at 10:39 AM

## 2025-03-29 NOTE — PROGRESS NOTES
03/28/25 2345   NIV Type   Equipment Type v60   Mode Bilevel   Mask Type Full face mask   Mask Size Medium   Assessment   Pulse 82   Respirations 19   SpO2 100 %   Comfort Level Good   Using Accessory Muscles No   Mask Compliance Good   Skin Protection for O2 Device Yes   Orientation Middle   Location Nose   Settings/Measurements   IPAP 12 cmH20   CPAP/EPAP 5 cmH2O   Vt (Measured) 441 mL   Rate Ordered 14   FiO2  30 %   Minute Volume (L/min) 6.9 Liters   Mask Leak (lpm) 20 lpm   Patient's Home Machine No   Alarm Settings   Alarms On Y   Low Pressure (cmH2O) 6 cmH2O   High Pressure (cmH2O) 30 cmH2O   Delay Alarm 20 sec(s)   RR Low (bpm) 6   RR High (bpm) 40 br/min

## 2025-03-29 NOTE — PROGRESS NOTES
03/28/25 2149   NIV Type   NIV Started/Stopped On   Equipment Type v60   Mode Bilevel   Mask Type Full face mask   Mask Size Medium   Assessment   Pulse 87   Respirations 15   SpO2 99 %   Comfort Level Good   Using Accessory Muscles No   Mask Compliance Good   Skin Assessment Clean, dry, & intact   Skin Protection for O2 Device Yes   Orientation Middle   Location Nose   Settings/Measurements   IPAP 10 cmH20   CPAP/EPAP 5 cmH2O   Vt (Measured) 605 mL   Rate Ordered 14   FiO2  30 %   Minute Volume (L/min) 8.6 Liters   Mask Leak (lpm) 13 lpm   Patient's Home Machine No   Alarm Settings   Alarms On Y   Low Pressure (cmH2O) 6 cmH2O   High Pressure (cmH2O) 30 cmH2O   Delay Alarm 20 sec(s)   RR Low (bpm) 6   RR High (bpm) 40 br/min

## 2025-03-29 NOTE — PROGRESS NOTES
[facility] Medicine Progress Note  V 1.6      Date of Admission: 3/26/2025    Hospital Day: 4      Chief Admission Complaint: Altered mental status    Subjective:  Patient seen and evaluated this morning, on 6 oxygen via HFNC.   She is awake and alert and stated feeling better today.  She denies having any other acute complaints today.  No acute overnight events.    Presenting Admission History:     Maranda Burns 64-year-old female with past medical history of COPD/emphysema on oxygen at home, alpha-1 antitrypsin deficiency, ulcerative colitis, depression, DM type II, tobacco dependence hypothyroidism, presented with altered mental sensorium and unresponsiveness.  Her  stated that she was feeling dizzy and weak before this episode and then after describing her feeling she passed out.  She was found to be hypoxic by EMS and brought to ED where she was intubated due to unresponsiveness.  She was found to be in acute hypoxic hypercapnic respiratory failure with elevated carbon dioxide and acidosis.  Admitted to the suspicion of acute exacerbation of COPD/emphysema.  Given her leukocytosis at 20.2, lactic acid at 6.1 with tachypnea and tachycardia she was meeting the criteria of SIRS, IV fluid, IV antibiotics and IV steroids started.  Blood cultures sent and pulmonology consulted.    Assessment/Plan:      Current Principal Problem:  Acute hypoxic respiratory failure    Acute hypoxic/hypercapnic respiratory failure secondary to COPD exacerbation  - On 6 L oxygen via HFNC  - Plan to alternate BiPAP with supplemental oxygen if needed  - Off of pressors today  - CTA chest negative for pulmonary embolism, hyperinflation of bilateral lungs with severe apical centrilobular emphysema  - Limited echo showed diastolic dysfunction, IV fluids held  - Ultrasound chest unremarkable, no consolidation or pleural effusion  - Continue IV Solu-Medrol 40 mg IV 8 hourly  - Pulmonology consulted; recommendation             -

## 2025-03-30 LAB
ALBUMIN SERPL-MCNC: 3.8 G/DL (ref 3.4–5)
ANION GAP SERPL CALCULATED.3IONS-SCNC: 9 MMOL/L (ref 3–16)
BASOPHILS # BLD: 0 K/UL (ref 0–0.2)
BASOPHILS NFR BLD: 0.3 %
BUN SERPL-MCNC: 26 MG/DL (ref 7–20)
CALCIUM SERPL-MCNC: 9.6 MG/DL (ref 8.3–10.6)
CHLORIDE SERPL-SCNC: 104 MMOL/L (ref 99–110)
CO2 SERPL-SCNC: 29 MMOL/L (ref 21–32)
CREAT SERPL-MCNC: 0.6 MG/DL (ref 0.6–1.2)
DEPRECATED RDW RBC AUTO: 13.9 % (ref 12.4–15.4)
EOSINOPHIL # BLD: 0 K/UL (ref 0–0.6)
EOSINOPHIL NFR BLD: 0 %
GFR SERPLBLD CREATININE-BSD FMLA CKD-EPI: >90 ML/MIN/{1.73_M2}
GLUCOSE BLD-MCNC: 114 MG/DL (ref 70–99)
GLUCOSE BLD-MCNC: 138 MG/DL (ref 70–99)
GLUCOSE BLD-MCNC: 146 MG/DL (ref 70–99)
GLUCOSE BLD-MCNC: 163 MG/DL (ref 70–99)
GLUCOSE SERPL-MCNC: 142 MG/DL (ref 70–99)
HCT VFR BLD AUTO: 34.6 % (ref 36–48)
HGB BLD-MCNC: 11.3 G/DL (ref 12–16)
LYMPHOCYTES # BLD: 1.4 K/UL (ref 1–5.1)
LYMPHOCYTES NFR BLD: 9.3 %
MAGNESIUM SERPL-MCNC: 1.7 MG/DL (ref 1.8–2.4)
MCH RBC QN AUTO: 30.9 PG (ref 26–34)
MCHC RBC AUTO-ENTMCNC: 32.6 G/DL (ref 31–36)
MCV RBC AUTO: 94.7 FL (ref 80–100)
MONOCYTES # BLD: 0.7 K/UL (ref 0–1.3)
MONOCYTES NFR BLD: 4.9 %
NEUTROPHILS # BLD: 12.8 K/UL (ref 1.7–7.7)
NEUTROPHILS NFR BLD: 85.5 %
PERFORMED ON: ABNORMAL
PHOSPHATE SERPL-MCNC: 2.7 MG/DL (ref 2.5–4.9)
PLATELET # BLD AUTO: 405 K/UL (ref 135–450)
PMV BLD AUTO: 9.4 FL (ref 5–10.5)
POTASSIUM SERPL-SCNC: 4.8 MMOL/L (ref 3.5–5.1)
RBC # BLD AUTO: 3.66 M/UL (ref 4–5.2)
SODIUM SERPL-SCNC: 142 MMOL/L (ref 136–145)
WBC # BLD AUTO: 14.9 K/UL (ref 4–11)

## 2025-03-30 PROCEDURE — 2060000000 HC ICU INTERMEDIATE R&B

## 2025-03-30 PROCEDURE — 6360000002 HC RX W HCPCS: Performed by: HOSPITALIST

## 2025-03-30 PROCEDURE — 02HV33Z INSERTION OF INFUSION DEVICE INTO SUPERIOR VENA CAVA, PERCUTANEOUS APPROACH: ICD-10-PCS | Performed by: HOSPITALIST

## 2025-03-30 PROCEDURE — 36569 INSJ PICC 5 YR+ W/O IMAGING: CPT

## 2025-03-30 PROCEDURE — 2500000003 HC RX 250 WO HCPCS: Performed by: HOSPITALIST

## 2025-03-30 PROCEDURE — 6370000000 HC RX 637 (ALT 250 FOR IP)

## 2025-03-30 PROCEDURE — 6370000000 HC RX 637 (ALT 250 FOR IP): Performed by: HOSPITALIST

## 2025-03-30 PROCEDURE — 85025 COMPLETE CBC W/AUTO DIFF WBC: CPT

## 2025-03-30 PROCEDURE — 94660 CPAP INITIATION&MGMT: CPT

## 2025-03-30 PROCEDURE — 2580000003 HC RX 258: Performed by: STUDENT IN AN ORGANIZED HEALTH CARE EDUCATION/TRAINING PROGRAM

## 2025-03-30 PROCEDURE — C1751 CATH, INF, PER/CENT/MIDLINE: HCPCS

## 2025-03-30 PROCEDURE — 94669 MECHANICAL CHEST WALL OSCILL: CPT

## 2025-03-30 PROCEDURE — 80069 RENAL FUNCTION PANEL: CPT

## 2025-03-30 PROCEDURE — 94640 AIRWAY INHALATION TREATMENT: CPT

## 2025-03-30 PROCEDURE — 6370000000 HC RX 637 (ALT 250 FOR IP): Performed by: INTERNAL MEDICINE

## 2025-03-30 PROCEDURE — 6370000000 HC RX 637 (ALT 250 FOR IP): Performed by: STUDENT IN AN ORGANIZED HEALTH CARE EDUCATION/TRAINING PROGRAM

## 2025-03-30 PROCEDURE — 2500000003 HC RX 250 WO HCPCS: Performed by: STUDENT IN AN ORGANIZED HEALTH CARE EDUCATION/TRAINING PROGRAM

## 2025-03-30 PROCEDURE — 97535 SELF CARE MNGMENT TRAINING: CPT

## 2025-03-30 PROCEDURE — 2700000000 HC OXYGEN THERAPY PER DAY

## 2025-03-30 PROCEDURE — 94761 N-INVAS EAR/PLS OXIMETRY MLT: CPT

## 2025-03-30 PROCEDURE — 83735 ASSAY OF MAGNESIUM: CPT

## 2025-03-30 PROCEDURE — 76937 US GUIDE VASCULAR ACCESS: CPT

## 2025-03-30 PROCEDURE — 36415 COLL VENOUS BLD VENIPUNCTURE: CPT

## 2025-03-30 PROCEDURE — 99233 SBSQ HOSP IP/OBS HIGH 50: CPT | Performed by: STUDENT IN AN ORGANIZED HEALTH CARE EDUCATION/TRAINING PROGRAM

## 2025-03-30 PROCEDURE — 02HV33Z INSERTION OF INFUSION DEVICE INTO SUPERIOR VENA CAVA, PERCUTANEOUS APPROACH: ICD-10-PCS

## 2025-03-30 PROCEDURE — 6360000002 HC RX W HCPCS: Performed by: STUDENT IN AN ORGANIZED HEALTH CARE EDUCATION/TRAINING PROGRAM

## 2025-03-30 PROCEDURE — 6360000002 HC RX W HCPCS

## 2025-03-30 RX ORDER — BENZONATATE 100 MG/1
100 CAPSULE ORAL 3 TIMES DAILY PRN
Status: DISCONTINUED | OUTPATIENT
Start: 2025-03-30 | End: 2025-04-02 | Stop reason: HOSPADM

## 2025-03-30 RX ORDER — ATORVASTATIN CALCIUM 40 MG/1
40 TABLET, FILM COATED ORAL DAILY
Status: DISCONTINUED | OUTPATIENT
Start: 2025-03-30 | End: 2025-04-02 | Stop reason: HOSPADM

## 2025-03-30 RX ORDER — LEVOTHYROXINE SODIUM 25 UG/1
25 TABLET ORAL
Status: DISCONTINUED | OUTPATIENT
Start: 2025-03-31 | End: 2025-04-02 | Stop reason: HOSPADM

## 2025-03-30 RX ORDER — MIRTAZAPINE 15 MG/1
7.5 TABLET, FILM COATED ORAL NIGHTLY
Status: DISCONTINUED | OUTPATIENT
Start: 2025-03-30 | End: 2025-04-02 | Stop reason: HOSPADM

## 2025-03-30 RX ORDER — GABAPENTIN 100 MG/1
200 CAPSULE ORAL 2 TIMES DAILY
Status: DISCONTINUED | OUTPATIENT
Start: 2025-03-30 | End: 2025-04-02 | Stop reason: HOSPADM

## 2025-03-30 RX ORDER — MAGNESIUM SULFATE IN WATER 40 MG/ML
2000 INJECTION, SOLUTION INTRAVENOUS ONCE
Status: COMPLETED | OUTPATIENT
Start: 2025-03-30 | End: 2025-03-30

## 2025-03-30 RX ORDER — LOSARTAN POTASSIUM 100 MG/1
100 TABLET ORAL DAILY
Status: DISCONTINUED | OUTPATIENT
Start: 2025-03-30 | End: 2025-04-02 | Stop reason: HOSPADM

## 2025-03-30 RX ORDER — BUTALBITAL, ACETAMINOPHEN AND CAFFEINE 50; 325; 40 MG/1; MG/1; MG/1
1 TABLET ORAL ONCE
Status: COMPLETED | OUTPATIENT
Start: 2025-03-30 | End: 2025-03-31

## 2025-03-30 RX ORDER — PREDNISONE 20 MG/1
40 TABLET ORAL DAILY
Status: DISCONTINUED | OUTPATIENT
Start: 2025-03-30 | End: 2025-04-02 | Stop reason: HOSPADM

## 2025-03-30 RX ORDER — GUAIFENESIN 600 MG/1
600 TABLET, EXTENDED RELEASE ORAL 2 TIMES DAILY
Status: DISCONTINUED | OUTPATIENT
Start: 2025-03-30 | End: 2025-04-02 | Stop reason: HOSPADM

## 2025-03-30 RX ORDER — FAMOTIDINE 20 MG/1
20 TABLET, FILM COATED ORAL 2 TIMES DAILY PRN
Status: DISCONTINUED | OUTPATIENT
Start: 2025-03-30 | End: 2025-04-02 | Stop reason: HOSPADM

## 2025-03-30 RX ADMIN — IPRATROPIUM BROMIDE AND ALBUTEROL SULFATE 1 DOSE: 2.5; .5 SOLUTION RESPIRATORY (INHALATION) at 07:22

## 2025-03-30 RX ADMIN — IPRATROPIUM BROMIDE AND ALBUTEROL SULFATE 1 DOSE: 2.5; .5 SOLUTION RESPIRATORY (INHALATION) at 15:10

## 2025-03-30 RX ADMIN — PREDNISONE 40 MG: 20 TABLET ORAL at 13:48

## 2025-03-30 RX ADMIN — IPRATROPIUM BROMIDE AND ALBUTEROL SULFATE 1 DOSE: 2.5; .5 SOLUTION RESPIRATORY (INHALATION) at 19:11

## 2025-03-30 RX ADMIN — IPRATROPIUM BROMIDE AND ALBUTEROL SULFATE 1 DOSE: 2.5; .5 SOLUTION RESPIRATORY (INHALATION) at 11:26

## 2025-03-30 RX ADMIN — ENOXAPARIN SODIUM 30 MG: 100 INJECTION SUBCUTANEOUS at 08:14

## 2025-03-30 RX ADMIN — GUAIFENESIN 600 MG: 600 TABLET ORAL at 21:06

## 2025-03-30 RX ADMIN — LOSARTAN POTASSIUM 100 MG: 100 TABLET, FILM COATED ORAL at 11:05

## 2025-03-30 RX ADMIN — SODIUM CHLORIDE, PRESERVATIVE FREE 10 ML: 5 INJECTION INTRAVENOUS at 08:16

## 2025-03-30 RX ADMIN — GABAPENTIN 200 MG: 100 CAPSULE ORAL at 11:05

## 2025-03-30 RX ADMIN — ATORVASTATIN CALCIUM 40 MG: 40 TABLET, FILM COATED ORAL at 11:05

## 2025-03-30 RX ADMIN — WATER 1000 MG: 1 INJECTION INTRAMUSCULAR; INTRAVENOUS; SUBCUTANEOUS at 11:02

## 2025-03-30 RX ADMIN — MAGNESIUM SULFATE HEPTAHYDRATE 2000 MG: 40 INJECTION, SOLUTION INTRAVENOUS at 08:16

## 2025-03-30 RX ADMIN — WATER 40 MG: 1 INJECTION INTRAMUSCULAR; INTRAVENOUS; SUBCUTANEOUS at 06:29

## 2025-03-30 RX ADMIN — DOXYCYCLINE 100 MG: 100 INJECTION, POWDER, LYOPHILIZED, FOR SOLUTION INTRAVENOUS at 11:13

## 2025-03-30 RX ADMIN — MIRTAZAPINE 7.5 MG: 15 TABLET, FILM COATED ORAL at 21:06

## 2025-03-30 RX ADMIN — DOXYCYCLINE 100 MG: 100 INJECTION, POWDER, LYOPHILIZED, FOR SOLUTION INTRAVENOUS at 21:12

## 2025-03-30 RX ADMIN — SODIUM CHLORIDE, PRESERVATIVE FREE 10 ML: 5 INJECTION INTRAVENOUS at 21:07

## 2025-03-30 RX ADMIN — ACETAMINOPHEN 650 MG: 325 TABLET ORAL at 19:18

## 2025-03-30 RX ADMIN — GABAPENTIN 200 MG: 100 CAPSULE ORAL at 21:06

## 2025-03-30 RX ADMIN — GUAIFENESIN 600 MG: 600 TABLET ORAL at 08:14

## 2025-03-30 ASSESSMENT — PAIN SCALES - GENERAL: PAINLEVEL_OUTOF10: 8

## 2025-03-30 NOTE — PROGRESS NOTES
[facility] Medicine Progress Note  V 1.6      Date of Admission: 3/26/2025    Hospital Day: 5      Chief Admission Complaint: Altered mental status    Subjective:  Patient seen and evaluated this morning, on 2 oxygen via HFNC. Transferred out of ICU. She is sitting up in chair at bedside. Still with some wheezing and congestion. 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  Her  stated that she was feeling dizzy and weak before this episode and then after describing her feeling she passed out.  She was found to be hypoxic by EMS and brought to ED where she was intubated due to unresponsiveness.  She was found to be in acute hypoxic hypercapnic respiratory failure with elevated carbon dioxide and acidosis.  Admitted to the suspicion of acute exacerbation of COPD/emphysema.  Given her leukocytosis at 20.2, lactic acid at 6.1 with tachypnea and tachycardia she was meeting the criteria of SIRS, IV fluid, IV antibiotics and IV steroids started.  Blood cultures sent and pulmonology consulted.    Assessment/Plan:      Current Principal Problem:  Acute hypoxic respiratory failure    Acute hypoxic/hypercapnic respiratory failure secondary to COPD exacerbation  - Improving, transferred out of ICU  - On 2 L oxygen via HFNC, back to her baseline  - Off of pressors   - CTA chest negative for pulmonary embolism  - Limited echo showed diastolic dysfunction, IV fluids held  - Ultrasound chest unremarkable, no consolidation or pleural effusion  - Pulmonology consulted; recommendation             -Continue IV ceftriaxone and doxycycline  -Continue IV Solu-Medrol 40 mg IV BID             - Blood cultures  NGTD x 2,              - Flexible

## 2025-03-30 NOTE — PROGRESS NOTES
Arrived to place PICC line with bedside RN Cristel. Pre-procedure and timeout done with RN, discussed limitations of placement and allergies. Consent confirmed. Vital signs stable. Labs, allergies, medications, and code status reviewed. No contraindications noted.     Procedure explained to pt, including the risk and benefits of the procedure. All questions answered. Pt verbalizes understanding of the procedure and states no more questions.     Pt's basilic, brachial, cephalic are all easily collapsible with no indication for a clot. Vein to catheter ration greater than or equal to 45%. Image of vessel shown below. Pt tolerated sterile procedure well, with no difficulty accessing brachial vein, when accessed - blood was free flowing and non-pulsatile. Guidewire, introducer, and catheter went in smoothly.      PICC line verified with ECG. PICC okay to use.    Please replace all existing IV tubing with new IV tubing prior to using the PICC for current IV infusions.  Please remove any PIVs from PICC arm.  All of the above may be sources of infection or an increase chance of a clot.      Post procedure - reorganized pt table, placed pt in lowest position, with call light and educated on line care. Reported off to bedside RN.      If you have any questions please call/message the IV therapy department.    [phone]

## 2025-03-30 NOTE — PLAN OF CARE
Problem: Discharge Planning  Goal: Discharge to home or other facility with appropriate resources  Outcome: Progressing  Flowsheets (Taken 3/29/2025 2000)  Discharge to home or other facility with appropriate resources: Refer to discharge planning if patient needs post-hospital services based on physician order or complex needs related to functional status, cognitive ability or social support system     Problem: Skin/Tissue Integrity  Goal: Skin integrity remains intact  Description: 1.  Monitor for areas of redness and/or skin breakdown  2.  Assess vascular access sites hourly  3.  Every 4-6 hours minimum:  Change oxygen saturation probe site  4.  Every 4-6 hours:  If on nasal continuous positive airway pressure, respiratory therapy assess nares and determine need for appliance change or resting period  Outcome: Progressing  Flowsheets (Taken 3/29/2025 2000)  Skin Integrity Remains Intact:   Monitor for areas of redness and/or skin breakdown   Assess vascular access sites hourly   Every 4-6 hours minimum: Change oxygen saturation probe site     Problem: Safety - Adult  Goal: Free from fall injury  Outcome: Progressing     Problem: Nutrition Deficit:  Goal: Optimize nutritional status  Outcome: Progressing

## 2025-03-30 NOTE — PROGRESS NOTES
Patient: Maranda Burns MRN: [id number]  Date of  Admission: 3/26/2025   YOB: 1960  Age: 64 y.o.  Sex: female    Unit: [facility] A2 CARD TELEMETRY  Room/Bed: 0202/0202-01 Admitting Physician: JENNIFER TSE    Attending Physician:  Javad Wong MD         Pulmonary Service Note      SUBJECTIVE:    Patient has no complaints.  She is on her baseline oxygen requirements.  She says that she walked around her room and felt okay.         OBJECTIVE    Medications    Continuous Infusions:   sodium chloride      dextrose         Scheduled Meds:   guaiFENesin  600 mg Oral BID    losartan  100 mg Oral Daily    mirtazapine  7.5 mg Oral Nightly    atorvastatin  40 mg Oral Daily    gabapentin  200 mg Oral BID    [START ON 3/31/2025] levothyroxine  25 mcg Oral QAM AC    methylPREDNISolone  40 mg IntraVENous Q12H    sodium chloride flush  5-40 mL IntraVENous 2 times per day    enoxaparin  30 mg SubCUTAneous Daily    ipratropium 0.5 mg-albuterol 2.5 mg  1 Dose Inhalation Q4H WA RT    doxycycline (VIBRAMYCIN) IV  100 mg IntraVENous Q12H    cefTRIAXone (ROCEPHIN) IV  1,000 mg IntraVENous Q24H    insulin lispro  0-8 Units SubCUTAneous 4x Daily AC & HS       PRN Meds:  benzonatate, famotidine, sodium chloride flush, sodium chloride, potassium chloride **OR** potassium chloride, magnesium sulfate, ondansetron **OR** ondansetron, polyethylene glycol, acetaminophen **OR** acetaminophen, glucose, dextrose bolus **OR** dextrose bolus, glucagon (rDNA), dextrose    Physical    Patient Vitals for the past 24 hrs:   BP Temp Temp src Pulse Resp SpO2   03/30/25 1135 (!) 144/90 98.4 °F (36.9 °C) Oral 95 18 100 %   03/30/25 0739 (!) 145/86 -- -- 88 -- 100 %   03/30/25 0722 -- -- -- -- -- 100 %   03/30/25 0355 -- -- -- -- 21 100 %   03/30/25 0350 (!) 141/90 -- -- 78 -- 100 %   03/29/25 2252 (!) 163/89 97.7 °F (36.5 °C) -- 72 22 100 %   03/29/25 2250 -- -- -- -- 15 99 %   03/29/25 2200 114/63 -- -- 89 17 97 %   03/29/25 2100  was taking Trelegy in the past but stopped taking due to cost, recommend discharge with triple therapy - Symbicort, Spiriva with Albuterol PRN for SOB  - Rocephin/doxy for 5 day course for possible PNA, blood cx NGTD x 2, respiratory culture sent from bronchoscopy with yeast, 2+ wbc's  - PT/OT, pulmonary hygiene  - Pt has appt with Pulmonary clinic tomorrow, will reschedule for time after her discharge    Cody Matos MD, [facility]    [facility] Pulmonary, Critical Care and Sleep Medicine  [phone]      Please note that some or all of this record was generated using voice recognition software. If there are any questions about the content of this document, please contact the author as some errors in transcription may have occurred.

## 2025-03-30 NOTE — PROGRESS NOTES
Patient transferred to room 202 from [facility].  Patient oriented to room, bed rails, phone, lights and bathroom. Patient instructed about the schedule of the day including: vital sign frequency, lab draws, possible tests, frequency of MD and staff rounds, including RN/MD rounding together at bedside, daily weights, and I &O's.  Patient instructed about prescribed diet, how to use call light, and television. Bed alarm in place, patient aware of placement and reason. Telemetry box 80 in place, patient aware of placement and reason.  Bed locked, in lowest position, side rails up 2/4, call light within reach.  Will continue to monitor.

## 2025-03-30 NOTE — PROGRESS NOTES
Occupational Therapy  Facility/Department: [facility] A2 CARD TELEMETRY  Daily Treatment Note  NAME: Maranda Burns  : 1960  MRN: [id number]    Date of Service: 3/30/2025    Discharge Recommendations:  Home with Home health OT, 24 hour supervision or assist (initial 24 hour assistance)  OT Equipment Recommendations  Equipment Needed: No  Other: pt owns a shower chair      Patient Diagnosis(es): The primary encounter diagnosis was Acute respiratory failure with hypoxia and hypercapnia (HCC). Diagnoses of Acute encephalopathy, SIRS (systemic inflammatory response syndrome) (HCC), and COPD exacerbation (HCC) were also pertinent to this visit.     Assessment   Assessment: Patient tolerated tx session well. Patient completed all ADLs and mobility this date with SBA. Patient was SOB throughout and required cues for ways to conserve energy with basic ADL tasks. Energy conservation education covered for all ADLs and iADLs of laundry, cleaning and cooking. Patient will benefit from continued OT services at home upon discharge to address strength, mobility, coordination, activity tolerance, and balance in order to increase their independence and safety with ADL and IADL task completion.    Activity Tolerance: Patient tolerated treatment well;Patient limited by endurance  Discharge Recommendations: Home with Home health OT;24 hour supervision or assist (initial 24 hour assistance)  Equipment Needed: No  Other: pt owns a shower chair     Plan  Occupational Therapy Plan  Times Per Week: 2-3x  Current Treatment Recommendations: Strengthening;ROM;Balance training;Functional mobility training;Endurance training;Gait training;Cognitive reorientation;Pain management;Safety education & training;Patient/Caregiver education & training;Equipment evaluation, education, & procurement;Positioning;Self-Care / ADL;Home management training    Restrictions  Restrictions/Precautions  Restrictions/Precautions: General Precautions  Position  education        Safety Devices  Type of Devices: Left in chair;Nurse notified;Call light within reach;Gait belt  Restraints  Restraints Initially in Place: No    Patient Education  Education Given To: Patient  Education Provided: Role of Therapy;Transfer Training;Plan of Care;Equipment;Precautions;Orientation;Mobility Training;Energy Conservation;ADL Adaptive Strategies  Education Provided Comments: Pt educated on importance of OOB mobility, prevention of complications of bedrest, and general safety during hospitalization  Education Method: Demonstration;Verbal  Barriers to Learning: None  Education Outcome: Verbalized understanding;Demonstrated understanding    Goals  Short Term Goals  Time Frame for Short Term Goals: 4/4 unless noted  Short Term Goal 1: Pt will complete LB dressing with mod I; goal met 3/30 SBA and updated  Short Term Goal 2: Pt will complete toileting with mod I; goal met 3/30 SBA and updated  Short Term Goal 3: Pt will complete functional/ toilet transfer with mod I; goal met 3/30 SBA and updated  Short Term Goal 4: Pt will complete x2-3 standing grooming ADLs with mod I; goal met 3/30 SBA and updated  Short Term Goal 5: Pt will perform x12 reps BUE therex for inc strengthening -- by 4/2  Patient Goals   Patient goals : \"to go home\"    AM-PAC - ADL  AM-PAC Daily Activity - Inpatient   How much help is needed for putting on and taking off regular lower body clothing?: A Little  How much help is needed for bathing (which includes washing, rinsing, drying)?: A Little  How much help is needed for toileting (which includes using toilet, bedpan, or urinal)?: A Little  How much help is needed for putting on and taking off regular upper body clothing?: A Little  How much help is needed for taking care of personal grooming?: A Little  How much help for eating meals?: None  AM-PAC Inpatient Daily Activity Raw Score: 19  AM-PAC Inpatient ADL T-Scale Score : 40.22  ADL Inpatient CMS 0-100% Score: 42.8  ADL

## 2025-03-30 NOTE — PROGRESS NOTES
4 Eyes Skin Assessment     NAME:  Maranda Burns  YOB: 1960  MEDICAL RECORD NUMBER:  [id number]    The patient is being assessed for  Transfer to New Unit    I agree that at least one RN has performed a thorough Head to Toe Skin Assessment on the patient. ALL assessment sites listed below have been assessed.      Areas assessed by both nurses:    Head, Face, Ears, Shoulders, Back, Chest, Arms, Elbows, Hands, Sacrum. Buttock, Coccyx, Ischium, Legs. Feet and Heels, and Under Medical Devices         Does the Patient have a Wound? No noted wound(s)       Narciso Prevention initiated by RN: Yes  Wound Care Orders initiated by RN: No    Pressure Injury (Stage 3,4, Unstageable, DTI, NWPT, and Complex wounds) if present, place Wound referral order by RN under : No    New Ostomies, if present place, Ostomy referral order under : No     Nurse 1 eSignature: Electronically signed by Octavia Ha RN on 3/29/25 at 10:28 PM EDT    **SHARE this note so that the co-signing nurse can place an eSignature**    Nurse 2 eSignature: {Esignature:[id number]}       Patient transferred to  with belongings as follows: undergarments and clothes, mobile phone and .

## 2025-03-30 NOTE — PROGRESS NOTES
03/30/25 0355   NIV Type   Equipment Type v60   Mode Bilevel   Mask Type Full face mask   Mask Size Medium   Assessment   Respirations 21   SpO2 100 %   Comfort Level Good   Using Accessory Muscles No   Mask Compliance Good   Skin Protection for O2 Device Yes   Orientation Middle   Location Nose   Settings/Measurements   IPAP 12 cmH20   CPAP/EPAP 5 cmH2O   Vt (Measured) 519 mL   Rate Ordered 14   FiO2  30 %   Minute Volume (L/min) 10.3 Liters   Mask Leak (lpm) 4 lpm   Patient's Home Machine No   Alarm Settings   Alarms On Y   Low Pressure (cmH2O) 6 cmH2O   High Pressure (cmH2O) 30 cmH2O   Delay Alarm 20 sec(s)   RR Low (bpm) 6   RR High (bpm) 40 br/min

## 2025-03-30 NOTE — PROGRESS NOTES
03/29/25 2250   NIV Type   NIV Started/Stopped On   Equipment Type v60   Mode Bilevel   Mask Type Full face mask   Mask Size Medium   Assessment   Respirations 15   SpO2 99 %   Comfort Level Good   Using Accessory Muscles No   Mask Compliance Good   Skin Assessment Clean, dry, & intact   Skin Protection for O2 Device Yes   Orientation Middle   Location Nose   Settings/Measurements   IPAP 12 cmH20   CPAP/EPAP 5 cmH2O   Vt (Measured) 525 mL   Rate Ordered 14   FiO2  30 %   Minute Volume (L/min) 7.6 Liters   Mask Leak (lpm) 6 lpm   Patient's Home Machine No   Alarm Settings   Low Pressure (cmH2O) 6 cmH2O   High Pressure (cmH2O) 30 cmH2O   Delay Alarm 20 sec(s)   RR Low (bpm) 6   RR High (bpm) 40 br/min

## 2025-03-30 NOTE — PLAN OF CARE
Problem: Pain  Goal: Verbalizes/displays adequate comfort level or baseline comfort level  Outcome: Progressing   Pt will be satisfied with pain control. Pt uses numeric pain rating scale with reassessments after pain med administration. Will continue to monitor progression throughout shift.     Problem: Safety - Adult  Goal: Free from fall injury  Outcome: Progressing   Pt will remain free from falls throughout hospital stay. Fall precautions in place, bed alarm on, bed in lowest position with wheels locked and side rails 2/4 up. Room door open and hourly rounding completed. Will continue to monitor throughout shift.

## 2025-03-31 ENCOUNTER — TELEPHONE (OUTPATIENT)
Dept: PULMONOLOGY | Age: 65
End: 2025-03-31

## 2025-03-31 LAB
ACANTHOCYTES BLD QL SMEAR: ABNORMAL
ANION GAP SERPL CALCULATED.3IONS-SCNC: 7 MMOL/L (ref 3–16)
ANISOCYTOSIS BLD QL SMEAR: ABNORMAL
BACTERIA BLD CULT ORG #2: NORMAL
BACTERIA BLD CULT: NORMAL
BASOPHILS # BLD: 0 K/UL (ref 0–0.2)
BASOPHILS NFR BLD: 0 %
BUN SERPL-MCNC: 18 MG/DL (ref 7–20)
BURR CELLS BLD QL SMEAR: ABNORMAL
CALCIUM SERPL-MCNC: 9.9 MG/DL (ref 8.3–10.6)
CHLORIDE SERPL-SCNC: 108 MMOL/L (ref 99–110)
CO2 SERPL-SCNC: 33 MMOL/L (ref 21–32)
CREAT SERPL-MCNC: 0.5 MG/DL (ref 0.6–1.2)
DACRYOCYTES BLD QL SMEAR: ABNORMAL
DEPRECATED RDW RBC AUTO: 14.1 % (ref 12.4–15.4)
EOSINOPHIL # BLD: 0 K/UL (ref 0–0.6)
EOSINOPHIL NFR BLD: 0 %
GFR SERPLBLD CREATININE-BSD FMLA CKD-EPI: >90 ML/MIN/{1.73_M2}
GLUCOSE SERPL-MCNC: 95 MG/DL (ref 70–99)
HCT VFR BLD AUTO: 34 % (ref 36–48)
HGB BLD-MCNC: 11 G/DL (ref 12–16)
LYMPHOCYTES # BLD: 3.9 K/UL (ref 1–5.1)
LYMPHOCYTES NFR BLD: 15 %
MACROCYTES BLD QL SMEAR: ABNORMAL
MCH RBC QN AUTO: 30.4 PG (ref 26–34)
MCHC RBC AUTO-ENTMCNC: 32.3 G/DL (ref 31–36)
MCV RBC AUTO: 94.1 FL (ref 80–100)
MICROCYTES BLD QL SMEAR: ABNORMAL
MONOCYTES # BLD: 1.9 K/UL (ref 0–1.3)
MONOCYTES NFR BLD: 10 %
NEUTROPHILS # BLD: 13.5 K/UL (ref 1.7–7.7)
NEUTROPHILS NFR BLD: 66 %
NEUTS BAND NFR BLD MANUAL: 4 % (ref 0–7)
OVALOCYTES BLD QL SMEAR: ABNORMAL
PLATELET # BLD AUTO: 375 K/UL (ref 135–450)
PMV BLD AUTO: 8.9 FL (ref 5–10.5)
POIKILOCYTOSIS BLD QL SMEAR: ABNORMAL
POLYCHROMASIA BLD QL SMEAR: ABNORMAL
POTASSIUM SERPL-SCNC: 3.9 MMOL/L (ref 3.5–5.1)
RBC # BLD AUTO: 3.62 M/UL (ref 4–5.2)
SCHISTOCYTES BLD QL SMEAR: ABNORMAL
SODIUM SERPL-SCNC: 148 MMOL/L (ref 136–145)
VARIANT LYMPHS NFR BLD MANUAL: 5 % (ref 0–6)
WBC # BLD AUTO: 19.3 K/UL (ref 4–11)

## 2025-03-31 PROCEDURE — 6370000000 HC RX 637 (ALT 250 FOR IP): Performed by: NURSE PRACTITIONER

## 2025-03-31 PROCEDURE — 36592 COLLECT BLOOD FROM PICC: CPT

## 2025-03-31 PROCEDURE — 80048 BASIC METABOLIC PNL TOTAL CA: CPT

## 2025-03-31 PROCEDURE — 6370000000 HC RX 637 (ALT 250 FOR IP): Performed by: HOSPITALIST

## 2025-03-31 PROCEDURE — 6360000002 HC RX W HCPCS: Performed by: HOSPITALIST

## 2025-03-31 PROCEDURE — 94640 AIRWAY INHALATION TREATMENT: CPT

## 2025-03-31 PROCEDURE — 94660 CPAP INITIATION&MGMT: CPT

## 2025-03-31 PROCEDURE — 99233 SBSQ HOSP IP/OBS HIGH 50: CPT | Performed by: INTERNAL MEDICINE

## 2025-03-31 PROCEDURE — 6370000000 HC RX 637 (ALT 250 FOR IP): Performed by: INTERNAL MEDICINE

## 2025-03-31 PROCEDURE — 6370000000 HC RX 637 (ALT 250 FOR IP)

## 2025-03-31 PROCEDURE — 2700000000 HC OXYGEN THERAPY PER DAY

## 2025-03-31 PROCEDURE — 6370000000 HC RX 637 (ALT 250 FOR IP): Performed by: STUDENT IN AN ORGANIZED HEALTH CARE EDUCATION/TRAINING PROGRAM

## 2025-03-31 PROCEDURE — 2060000000 HC ICU INTERMEDIATE R&B

## 2025-03-31 PROCEDURE — 2580000003 HC RX 258

## 2025-03-31 PROCEDURE — 97110 THERAPEUTIC EXERCISES: CPT

## 2025-03-31 PROCEDURE — 2580000003 HC RX 258: Performed by: STUDENT IN AN ORGANIZED HEALTH CARE EDUCATION/TRAINING PROGRAM

## 2025-03-31 PROCEDURE — 94669 MECHANICAL CHEST WALL OSCILL: CPT

## 2025-03-31 PROCEDURE — 85025 COMPLETE CBC W/AUTO DIFF WBC: CPT

## 2025-03-31 PROCEDURE — 2500000003 HC RX 250 WO HCPCS: Performed by: HOSPITALIST

## 2025-03-31 PROCEDURE — 2500000003 HC RX 250 WO HCPCS: Performed by: STUDENT IN AN ORGANIZED HEALTH CARE EDUCATION/TRAINING PROGRAM

## 2025-03-31 PROCEDURE — 6360000002 HC RX W HCPCS: Performed by: STUDENT IN AN ORGANIZED HEALTH CARE EDUCATION/TRAINING PROGRAM

## 2025-03-31 PROCEDURE — 94761 N-INVAS EAR/PLS OXIMETRY MLT: CPT

## 2025-03-31 PROCEDURE — 97530 THERAPEUTIC ACTIVITIES: CPT

## 2025-03-31 RX ORDER — PANTOPRAZOLE SODIUM 40 MG/1
40 TABLET, DELAYED RELEASE ORAL
Status: DISCONTINUED | OUTPATIENT
Start: 2025-03-31 | End: 2025-04-02 | Stop reason: HOSPADM

## 2025-03-31 RX ORDER — SODIUM CHLORIDE 450 MG/100ML
INJECTION, SOLUTION INTRAVENOUS CONTINUOUS
Status: ACTIVE | OUTPATIENT
Start: 2025-03-31 | End: 2025-04-01

## 2025-03-31 RX ORDER — LOPERAMIDE HYDROCHLORIDE 2 MG/1
2 CAPSULE ORAL 4 TIMES DAILY PRN
Status: DISCONTINUED | OUTPATIENT
Start: 2025-03-31 | End: 2025-04-02 | Stop reason: HOSPADM

## 2025-03-31 RX ORDER — BUDESONIDE AND FORMOTEROL FUMARATE DIHYDRATE 160; 4.5 UG/1; UG/1
2 AEROSOL RESPIRATORY (INHALATION)
Status: DISCONTINUED | OUTPATIENT
Start: 2025-03-31 | End: 2025-04-02 | Stop reason: HOSPADM

## 2025-03-31 RX ADMIN — DOXYCYCLINE 100 MG: 100 INJECTION, POWDER, LYOPHILIZED, FOR SOLUTION INTRAVENOUS at 09:33

## 2025-03-31 RX ADMIN — PREDNISONE 40 MG: 20 TABLET ORAL at 09:25

## 2025-03-31 RX ADMIN — IPRATROPIUM BROMIDE AND ALBUTEROL SULFATE 1 DOSE: 2.5; .5 SOLUTION RESPIRATORY (INHALATION) at 11:14

## 2025-03-31 RX ADMIN — LEVOTHYROXINE SODIUM 25 MCG: 0.03 TABLET ORAL at 05:46

## 2025-03-31 RX ADMIN — IPRATROPIUM BROMIDE AND ALBUTEROL SULFATE 1 DOSE: 2.5; .5 SOLUTION RESPIRATORY (INHALATION) at 16:00

## 2025-03-31 RX ADMIN — MIRTAZAPINE 7.5 MG: 15 TABLET, FILM COATED ORAL at 21:09

## 2025-03-31 RX ADMIN — GUAIFENESIN 600 MG: 600 TABLET ORAL at 09:25

## 2025-03-31 RX ADMIN — PANTOPRAZOLE SODIUM 40 MG: 40 TABLET, DELAYED RELEASE ORAL at 21:54

## 2025-03-31 RX ADMIN — IPRATROPIUM BROMIDE AND ALBUTEROL SULFATE 1 DOSE: 2.5; .5 SOLUTION RESPIRATORY (INHALATION) at 07:38

## 2025-03-31 RX ADMIN — BUTALBITAL, ACETAMINOPHEN AND CAFFEINE 1 TABLET: 325; 50; 40 TABLET ORAL at 05:45

## 2025-03-31 RX ADMIN — Medication 2 PUFF: at 19:07

## 2025-03-31 RX ADMIN — SODIUM CHLORIDE, PRESERVATIVE FREE 10 ML: 5 INJECTION INTRAVENOUS at 21:12

## 2025-03-31 RX ADMIN — WATER 1000 MG: 1 INJECTION INTRAMUSCULAR; INTRAVENOUS; SUBCUTANEOUS at 09:25

## 2025-03-31 RX ADMIN — ENOXAPARIN SODIUM 30 MG: 100 INJECTION SUBCUTANEOUS at 09:25

## 2025-03-31 RX ADMIN — LOPERAMIDE HYDROCHLORIDE 2 MG: 2 CAPSULE ORAL at 21:54

## 2025-03-31 RX ADMIN — GUAIFENESIN 600 MG: 600 TABLET ORAL at 21:09

## 2025-03-31 RX ADMIN — ATORVASTATIN CALCIUM 40 MG: 40 TABLET, FILM COATED ORAL at 09:25

## 2025-03-31 RX ADMIN — SODIUM CHLORIDE, PRESERVATIVE FREE 10 ML: 5 INJECTION INTRAVENOUS at 09:25

## 2025-03-31 RX ADMIN — IPRATROPIUM BROMIDE AND ALBUTEROL SULFATE 1 DOSE: 2.5; .5 SOLUTION RESPIRATORY (INHALATION) at 19:03

## 2025-03-31 RX ADMIN — DOXYCYCLINE 100 MG: 100 INJECTION, POWDER, LYOPHILIZED, FOR SOLUTION INTRAVENOUS at 21:52

## 2025-03-31 RX ADMIN — LOSARTAN POTASSIUM 100 MG: 100 TABLET, FILM COATED ORAL at 09:25

## 2025-03-31 RX ADMIN — GABAPENTIN 200 MG: 100 CAPSULE ORAL at 09:25

## 2025-03-31 RX ADMIN — SODIUM CHLORIDE: 0.45 INJECTION, SOLUTION INTRAVENOUS at 16:00

## 2025-03-31 RX ADMIN — GABAPENTIN 200 MG: 100 CAPSULE ORAL at 21:09

## 2025-03-31 ASSESSMENT — PAIN SCALES - GENERAL: PAINLEVEL_OUTOF10: 5

## 2025-03-31 NOTE — PLAN OF CARE
Problem: Pain  Goal: Verbalizes/displays adequate comfort level or baseline comfort level  3/31/2025 0126 by Maria Barrios RN  Outcome: Progressing     Problem: Discharge Planning  Goal: Discharge to home or other facility with appropriate resources  Outcome: Progressing

## 2025-03-31 NOTE — PROGRESS NOTES
Physical Therapy  Facility/Department: [facility] A2 CARD TELEMETRY  Daily Treatment Note  NAME: Maranda Burns  : 1960  MRN: [id number]    Date of Service: 3/31/2025    Discharge Recommendations:  Home with assist PRN, 24 hour supervision or assist   PT Equipment Recommendations  Equipment Needed: No  Other: n/a    Patient Diagnosis(es): The primary encounter diagnosis was Acute respiratory failure with hypoxia and hypercapnia (HCC). Diagnoses of Acute encephalopathy, SIRS (systemic inflammatory response syndrome) (HCC), and COPD exacerbation (HCC) were also pertinent to this visit.    Assessment  Assessment: Pt tolerated treatment session well w/ a focus on bed mobility, functional transfers, ambulation, stair navigation, and BLE strengthening. Pt currently demos bed mobility Mod I, STS transfers w/ SPV w/o AD, ambulation w/ grossly SBA for 250ft w/o AD, stair navigation for 3 steps w/ R HR assist and SBA. Pt demos some med-lat sway ambulating but no overt LOB, slight unsteadiness. Pt also demos furniture walking and use of walls at times. PT demos reciprocal gait when navigation stairs. Pt SPO2 >95% on 1L throughout session but continues to have c/o shortness of breath. Pt tolerated BLE strengthening well in standing and sitting. Pt able to go to the bathroom and complete pericare 3/3 w/ assistance. Pt is currently below their baseline function and will continue to benefit from skilled PT intervention in order to address deficits, reach goals, and d/c safely. PT is rec home w/ PRN assist and HHPT upon d/c due to deficits.  Activity Tolerance: Patient tolerated treatment well;Patient limited by fatigue;Patient limited by endurance  Equipment Needed: No  Other: n/a    Plan  Physical Therapy Plan  General Plan: 3-5 times per week  Current Treatment Recommendations: Strengthening;Balance training;Functional mobility training;Transfer training;Gait training;Home exercise program;Safety education &

## 2025-03-31 NOTE — PROGRESS NOTES
03/30/25 2200   NIV Type   NIV Started/Stopped On   Equipment Type v60   Mode Bilevel   Mask Type Full face mask   Mask Size Medium   Assessment   Skin Assessment Clean, dry, & intact   Skin Protection for O2 Device Yes   Orientation Middle   Location Nose   Intervention(s) Skin Barrier   Settings/Measurements   PIP Observed 12 cm H20   IPAP 12 cmH20   CPAP/EPAP 5 cmH2O   Vt (Measured) 615 mL   Rate Ordered 14   Insp Rise Time (%) 2 %   FiO2  30 %   I Time/ I Time % 1 s   Minute Volume (L/min) 10.8 Liters   Mask Leak (lpm) 8 lpm   Patient's Home Machine No   Alarm Settings   Alarms On Y   Low Pressure (cmH2O) 6 cmH2O   High Pressure (cmH2O) 30 cmH2O   Delay Alarm 20 sec(s)   RR Low (bpm) 6   RR High (bpm) 40 br/min

## 2025-03-31 NOTE — PROGRESS NOTES
03/31/25 0338   NIV Type   $NIV $Daily Charge   NIV Started/Stopped On   Equipment Type v60   Mode Bilevel   Mask Type Full face mask   Mask Size Medium   Settings/Measurements   PIP Observed 12 cm H20   IPAP 12 cmH20   CPAP/EPAP 5 cmH2O   Vt (Measured) 394 mL   Rate Ordered 14   Insp Rise Time (%) 2 %   FiO2  30 %   I Time/ I Time % 1 s   Minute Volume (L/min) 7.5 Liters   Mask Leak (lpm) 3 lpm   Patient's Home Machine No   Alarm Settings   Alarms On Y   Low Pressure (cmH2O) 6 cmH2O   High Pressure (cmH2O) 30 cmH2O   Delay Alarm 20 sec(s)   RR Low (bpm) 6   RR High (bpm) 40 br/min

## 2025-03-31 NOTE — PROGRESS NOTES
Hospital Medicine Progress Note  V 1.6      Date of Admission: 3/26/2025    Hospital Day: 6      Chief Admission Complaint: Altered mental status    Subjective:  Patient seen and evaluated this morning, on 2 oxygen via HFNC. She is sitting up in chair at bedside. Still with moderate cough and congestion. 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  Her  stated that she was feeling dizzy and weak before this episode and then after describing her feeling she passed out.  She was found to be hypoxic by EMS and brought to ED where she was intubated due to unresponsiveness.  She was found to be in acute hypoxic hypercapnic respiratory failure with elevated carbon dioxide and acidosis.  Admitted to the suspicion of acute exacerbation of COPD/emphysema.  Given her leukocytosis at 20.2, lactic acid at 6.1 with tachypnea and tachycardia she was meeting the criteria of SIRS, IV fluid, IV antibiotics and IV steroids started.  Blood cultures sent and pulmonology consulted.    Assessment/Plan:      Current Principal Problem:  Acute hypoxic respiratory failure    Acute hypoxic/hypercapnic respiratory failure secondary to COPD exacerbation  - Improving, transferred out of ICU  - On 2 L oxygen via HFNC, back to her baseline  - Off of pressors   - CTA chest negative for pulmonary embolism  - Limited echo showed diastolic dysfunction, IV fluids held  - Ultrasound chest unremarkable, no consolidation or pleural effusion  - Pulmonology consulted; recommendation             -IV ceftriaxone and doxycycline course completed for 5 days  -Continue oral prednisone 40 mg once daily             - Blood cultures  NGTD x 2,              - Flexible  interpreted        [] Imaging personally reviewed and interpreted     [x] Discussion (any 1)  [x] Multi-Disciplinary Rounds with Case Management  [] Discussed management of the case with           Labs:  Personally reviewed on 3/31/2025 and interpreted for clinical significance as documented above.     Recent Labs     03/29/25 0413 03/30/25  0420 03/31/25  0545   WBC 17.3* 14.9* 19.3*   HGB 10.7* 11.3* 11.0*   HCT 32.5* 34.6* 34.0*    405 375     Recent Labs     03/29/25 0413 03/30/25  0420 03/31/25  0545    142 148*   K 4.5 4.8 3.9    104 108   CO2 29 29 33*   BUN 24* 26* 18   CREATININE 0.5* 0.6 0.5*   CALCIUM 9.6 9.6 9.9   MG 1.99 1.70*  --    PHOS 2.9 2.7  --      No results for input(s): \"PROBNP\", \"TROPHS\" in the last 72 hours.    No results for input(s): \"LABA1C\" in the last 72 hours.  No results for input(s): \"AST\", \"ALT\", \"BILIDIR\", \"BILITOT\", \"ALKPHOS\" in the last 72 hours.    No results for input(s): \"INR\", \"LACTA\", \"TSH\" in the last 72 hours.      Urine Cultures:   Lab Results   Component Value Date/Time    LABURIN  03/14/2024 05:41 PM     <50,000 CFU/ml mixed skin/urogenital jeny. No further workup    LABURIN 50,000 CFU/ml 03/14/2024 05:41 PM     Blood Cultures:   Lab Results   Component Value Date/Time    BC No Growth after 4 days of incubation. 03/27/2025 12:40 AM     Lab Results   Component Value Date/Time    BLOODCULT2 No Growth after 4 days of incubation. 03/27/2025 12:39 AM     Organism:   Lab Results   Component Value Date/Time    ORG Enterococcus faecalis 03/14/2024 05:41 PM         Luis Miguel Ritter DO  PGY3 Family Medicine Resident  [facility]

## 2025-03-31 NOTE — CARE COORDINATION
Therapy rec's of home with 24 hour assist as needed and home care noted.  Discussed rec's with patient and she states that her  will be with her 24/7 and will be able to assist as needed. She is in agreement with home care and has no preference in home care agency.  Placed call to Teresa with [facility] and notified of referral.  She states they are able to accept at discharge for home care needs.  Patient has home O2 through [employer].

## 2025-03-31 NOTE — PROGRESS NOTES
focal pneumonia identified.          Cardiology:      Labs reviewed:  CBC:   Recent Labs     03/29/25  0413 03/30/25  0420   WBC 17.3* 14.9*   HGB 10.7* 11.3*    405       BMP:   Recent Labs     03/29/25 0413 03/30/25  0420    142   K 4.5 4.8    104   CO2 29 29   PHOS 2.9 2.7   BUN 24* 26*   CREATININE 0.5* 0.6       LIVER PROFILE: No results for input(s): \"AST\", \"ALT\", \"LIPASE\", \"AMYLASE\", \"BILIDIR\", \"BILITOT\", \"ALKPHOS\", \"PROTIME\", \"INR\" in the last 72 hours.    Invalid input(s): \"ALB\", \"D-DIMER\"    Acid-Base:   Recent Labs     03/29/25 0413 03/30/25  0420   CO2 29 29   ALBUMIN 3.6 3.8   CREATININE 0.5* 0.6   GLUCOSE 137* 142*       Cardiac:   Lab Results   Component Value Date    TROPONINI 0.01 09/28/2022         Vitals:    03/31/25 0418   BP: (!) 142/98   Pulse: 80   Resp: 18   Temp:    SpO2: 96%       Physical Exam:  General appearance: In no apparent distress.  HEENT: Anicteric.  Cardiac: No loud murmur.  Lungs: Poor air entry bilaterally with expiratory rhonchi  Abdomen: Soft.  Back & Extremities: No clubbing.  No signs of acute ischemia.  Neurological: No focal deficit.      ________________________________________________________  Electronically signed by:  Mamie Morgan MD,FACP    3/31/2025    5:55 AM.     [facility]  [address], [place] [address]   Available on [employer]  Phone (office): [phone]

## 2025-03-31 NOTE — TELEPHONE ENCOUNTER
----- Message from Dr. Cody Matos MD sent at 3/30/2025  1:03 PM EDT -----  Hello,    This patient has a follow-up with [facility] tomorrow, 3/31/25 but she is still inpatient. Can we reschedule her to 2 weeks from now. Thank you.    KD

## 2025-03-31 NOTE — TELEPHONE ENCOUNTER
Appointment was cancelled for 3/31 and rescheduled to 4/14/25 @ 1:30 PM.  Pt has not been informed yet.  Will call once discharged and will change appt if needed.

## 2025-04-01 LAB
ANION GAP SERPL CALCULATED.3IONS-SCNC: 8 MMOL/L (ref 3–16)
BUN SERPL-MCNC: 15 MG/DL (ref 7–20)
CALCIUM SERPL-MCNC: 9.6 MG/DL (ref 8.3–10.6)
CHLORIDE SERPL-SCNC: 105 MMOL/L (ref 99–110)
CO2 SERPL-SCNC: 30 MMOL/L (ref 21–32)
CREAT SERPL-MCNC: 0.5 MG/DL (ref 0.6–1.2)
GFR SERPLBLD CREATININE-BSD FMLA CKD-EPI: >90 ML/MIN/{1.73_M2}
GLUCOSE SERPL-MCNC: 71 MG/DL (ref 70–99)
POTASSIUM SERPL-SCNC: 3.5 MMOL/L (ref 3.5–5.1)
SODIUM SERPL-SCNC: 143 MMOL/L (ref 136–145)

## 2025-04-01 PROCEDURE — 6370000000 HC RX 637 (ALT 250 FOR IP): Performed by: STUDENT IN AN ORGANIZED HEALTH CARE EDUCATION/TRAINING PROGRAM

## 2025-04-01 PROCEDURE — 94640 AIRWAY INHALATION TREATMENT: CPT

## 2025-04-01 PROCEDURE — 94660 CPAP INITIATION&MGMT: CPT

## 2025-04-01 PROCEDURE — 2500000003 HC RX 250 WO HCPCS: Performed by: HOSPITALIST

## 2025-04-01 PROCEDURE — 6370000000 HC RX 637 (ALT 250 FOR IP): Performed by: NURSE PRACTITIONER

## 2025-04-01 PROCEDURE — 97530 THERAPEUTIC ACTIVITIES: CPT

## 2025-04-01 PROCEDURE — 97116 GAIT TRAINING THERAPY: CPT

## 2025-04-01 PROCEDURE — 85025 COMPLETE CBC W/AUTO DIFF WBC: CPT

## 2025-04-01 PROCEDURE — 97110 THERAPEUTIC EXERCISES: CPT

## 2025-04-01 PROCEDURE — 2700000000 HC OXYGEN THERAPY PER DAY

## 2025-04-01 PROCEDURE — 94761 N-INVAS EAR/PLS OXIMETRY MLT: CPT

## 2025-04-01 PROCEDURE — 6370000000 HC RX 637 (ALT 250 FOR IP): Performed by: INTERNAL MEDICINE

## 2025-04-01 PROCEDURE — 6370000000 HC RX 637 (ALT 250 FOR IP)

## 2025-04-01 PROCEDURE — 6360000002 HC RX W HCPCS: Performed by: HOSPITALIST

## 2025-04-01 PROCEDURE — 99233 SBSQ HOSP IP/OBS HIGH 50: CPT | Performed by: INTERNAL MEDICINE

## 2025-04-01 PROCEDURE — 6370000000 HC RX 637 (ALT 250 FOR IP): Performed by: HOSPITALIST

## 2025-04-01 PROCEDURE — 97535 SELF CARE MNGMENT TRAINING: CPT

## 2025-04-01 PROCEDURE — 94669 MECHANICAL CHEST WALL OSCILL: CPT

## 2025-04-01 PROCEDURE — 80048 BASIC METABOLIC PNL TOTAL CA: CPT

## 2025-04-01 PROCEDURE — 1200000000 HC SEMI PRIVATE

## 2025-04-01 RX ADMIN — IPRATROPIUM BROMIDE AND ALBUTEROL SULFATE 1 DOSE: 2.5; .5 SOLUTION RESPIRATORY (INHALATION) at 16:25

## 2025-04-01 RX ADMIN — IPRATROPIUM BROMIDE AND ALBUTEROL SULFATE 1 DOSE: 2.5; .5 SOLUTION RESPIRATORY (INHALATION) at 07:27

## 2025-04-01 RX ADMIN — PREDNISONE 40 MG: 20 TABLET ORAL at 08:50

## 2025-04-01 RX ADMIN — GABAPENTIN 200 MG: 100 CAPSULE ORAL at 08:51

## 2025-04-01 RX ADMIN — PANTOPRAZOLE SODIUM 40 MG: 40 TABLET, DELAYED RELEASE ORAL at 06:20

## 2025-04-01 RX ADMIN — LOPERAMIDE HYDROCHLORIDE 2 MG: 2 CAPSULE ORAL at 20:02

## 2025-04-01 RX ADMIN — GUAIFENESIN 600 MG: 600 TABLET ORAL at 08:50

## 2025-04-01 RX ADMIN — Medication 2 PUFF: at 19:08

## 2025-04-01 RX ADMIN — MIRTAZAPINE 7.5 MG: 15 TABLET, FILM COATED ORAL at 20:01

## 2025-04-01 RX ADMIN — BENZONATATE 100 MG: 100 CAPSULE ORAL at 20:01

## 2025-04-01 RX ADMIN — IPRATROPIUM BROMIDE AND ALBUTEROL SULFATE 1 DOSE: 2.5; .5 SOLUTION RESPIRATORY (INHALATION) at 11:14

## 2025-04-01 RX ADMIN — LOSARTAN POTASSIUM 100 MG: 100 TABLET, FILM COATED ORAL at 08:50

## 2025-04-01 RX ADMIN — SODIUM CHLORIDE, PRESERVATIVE FREE 10 ML: 5 INJECTION INTRAVENOUS at 08:50

## 2025-04-01 RX ADMIN — SODIUM CHLORIDE, PRESERVATIVE FREE 10 ML: 5 INJECTION INTRAVENOUS at 20:02

## 2025-04-01 RX ADMIN — ATORVASTATIN CALCIUM 40 MG: 40 TABLET, FILM COATED ORAL at 08:50

## 2025-04-01 RX ADMIN — Medication 2 PUFF: at 07:27

## 2025-04-01 RX ADMIN — LEVOTHYROXINE SODIUM 25 MCG: 0.03 TABLET ORAL at 06:20

## 2025-04-01 RX ADMIN — ENOXAPARIN SODIUM 30 MG: 100 INJECTION SUBCUTANEOUS at 08:50

## 2025-04-01 RX ADMIN — GABAPENTIN 200 MG: 100 CAPSULE ORAL at 20:01

## 2025-04-01 RX ADMIN — ACETAMINOPHEN 650 MG: 325 TABLET ORAL at 20:03

## 2025-04-01 RX ADMIN — GUAIFENESIN 600 MG: 600 TABLET ORAL at 20:02

## 2025-04-01 RX ADMIN — IPRATROPIUM BROMIDE AND ALBUTEROL SULFATE 1 DOSE: 2.5; .5 SOLUTION RESPIRATORY (INHALATION) at 19:08

## 2025-04-01 ASSESSMENT — PAIN DESCRIPTION - LOCATION: LOCATION: CHEST

## 2025-04-01 ASSESSMENT — PAIN DESCRIPTION - ORIENTATION: ORIENTATION: RIGHT;LEFT

## 2025-04-01 ASSESSMENT — PAIN DESCRIPTION - DESCRIPTORS: DESCRIPTORS: ACHING

## 2025-04-01 ASSESSMENT — PAIN SCALES - GENERAL
PAINLEVEL_OUTOF10: 6
PAINLEVEL_OUTOF10: 0
PAINLEVEL_OUTOF10: 0
PAINLEVEL_OUTOF10: 6

## 2025-04-01 ASSESSMENT — PAIN - FUNCTIONAL ASSESSMENT: PAIN_FUNCTIONAL_ASSESSMENT: ACTIVITIES ARE NOT PREVENTED

## 2025-04-01 NOTE — PLAN OF CARE
Problem: Discharge Planning  Goal: Discharge to home or other facility with appropriate resources  Outcome: Progressing   Pt plans to discharge home tomorrow.

## 2025-04-01 NOTE — PROGRESS NOTES
Occupational Therapy  Facility/Department: [facility] A2 CARD TELEMETRY  Daily Treatment Note/Discharge Summary  NAME: Maranda Burns  : 1960  MRN: [id number]    Date of Service: 2025    Discharge Recommendations:  Assist PRN         Patient Diagnosis(es): The primary encounter diagnosis was Acute respiratory failure with hypoxia and hypercapnia (HCC). Diagnoses of Acute encephalopathy, SIRS (systemic inflammatory response syndrome) (HCC), and COPD exacerbation (HCC) were also pertinent to this visit.     Assessment   Assessment: Pt tolerates session well. Limited by activity tolerance. Pt performs ADLs with mod I/IND and fxl ambulation to/from bathroom without AD + mod I. Pt demo's good mgmt of oxygen tubing with ambulation. Pt educated on ECON and BUE ex HEP. Pt verbalizes understanding. Pt has met all OT goals at this time. Will d/c from OT caseload. Recommending assist PRN at d/c.   Activity Tolerance: Patient tolerated treatment well;Patient limited by fatigue;Patient limited by endurance  Discharge Recommendations: Assist PRN     Plan  Occupational Therapy Plan  Times Per Week: 2-3x  Current Treatment Recommendations: Strengthening;ROM;Balance training;Functional mobility training;Endurance training;Gait training;Cognitive reorientation;Pain management;Safety education & training;Patient/Caregiver education & training;Equipment evaluation, education, & procurement;Positioning;Self-Care / ADL;Home management training    Restrictions  Restrictions/Precautions  Restrictions/Precautions: General Precautions  Position Activity Restriction  Other Position/Activity Restrictions: O2, IV    Subjective  Subjective  Subjective: Pt ambulating to bathroom at OT arrival. /79, /138 (RN aware), and Spo2 > 95% on 1 L throughout session.  Orientation  Overall Orientation Status: Within Normal Limits  Orientation Level: Oriented X4  Pain: Pt endorses pain in chest from coughing, does not formally

## 2025-04-01 NOTE — PROGRESS NOTES
PULMONARY AND CRITICAL CARE INPATIENT NOTE        Maranda Burns   : 1960  MRN: [id number]     Admitting Physician: Guero Chan MD  Attending Physician: Javad Wong MD  PCP: Lashanda Amaro DO     Date of Service: 2025  Admission date:3/26/2025   LOS: Hospital Day: 7   Code:Full Code      ASSESSMENT & PLAN       64 y.o. female patient was admitted on 3/26/2025 with  Chief Complaint   Patient presents with    Respiratory Distress     Pt brought in by [facility]. EMS states pt has had no complaints all day. Pt said to  call 911, Pt fell on the floor and went unresponsive. Pt has been Intubated 1 time prior, and History of COPD per EMS. Per chart Pt had recent Viral Infection and BMI counseling.           Acute on chronic hypoxic and hypercapnic respiratory failure.  On 2 L at baseline with NIV  Septic shock/lactic acidosis.  Resolved  Hyperglycemia  COPD with end-stage emphysema.  Failed endobronchial valves at   Alpha-1 antitrypsin deficiency  Diastolic heart dysfunction      Plan:              Completed antibiotic course  Continue prednisone and taper at discharge  Symbicort with DuoNebs  Patient will need to be discharged on Dulera, Spiriva or alternatives that insurance covers  Pulm hygiene measures  Oxygen and BiPAP at bedtime and resume home NIV at discharge with 2 L of oxygen  Patient is anxious to go home today.  Would be reasonable to give another day      Subjective/Objective       Interval history/Encounter: 2025   Patient made afebrile and hemodynamically stable on 1 L of oxygen  I's and O's +250 cc  Patient remains on Symbicort, Mucinex, prednisone 40 mg p.o. daily  No new positive micro results  Chemistry and CBC remain relatively stable with leukocytosis      Objective    Test Results:  Imaging:  I have reviewed radiology images personally.    CT chest 3-27-25  IMPRESSION:     1. No evidence of pulmonary embolus.    2. Hyperinflation of the bilateral lungs

## 2025-04-01 NOTE — PROGRESS NOTES
04/01/25 0317   NIV Type   $NIV $Daily Charge   NIV Started/Stopped On   Equipment Type v60   Mode Bilevel   Mask Type Full face mask   Mask Size Medium   Settings/Measurements   IPAP 12 cmH20   CPAP/EPAP 5 cmH2O   Vt (Measured) 394 mL   Rate Ordered 14   FiO2  30 %   Mask Leak (lpm) 12 lpm   Patient's Home Machine No   Alarm Settings   Alarms On Y   Low Pressure (cmH2O) 6 cmH2O   High Pressure (cmH2O) 30 cmH2O   Patient Observation   Patient Observations mepilex on nose

## 2025-04-01 NOTE — PLAN OF CARE
Problem: Safety - Medical Restraint  Goal: Remains free of injury from restraints (Restraint for Interference with Medical Device)  Description: INTERVENTIONS:  1. Determine that other, less restrictive measures have been tried or would not be effective before applying the restraint  2. Evaluate the patient's condition at the time of restraint application  3. Inform patient/family regarding the reason for restraint  4. Q2H: Monitor safety, psychosocial status, comfort, nutrition and hydration  Outcome: Progressing     Problem: Discharge Planning  Goal: Discharge to home or other facility with appropriate resources  Outcome: Progressing  Flowsheets (Taken 3/31/2025 2106)  Discharge to home or other facility with appropriate resources: Identify barriers to discharge with patient and caregiver     Problem: Pain  Goal: Verbalizes/displays adequate comfort level or baseline comfort level  Outcome: Progressing     Problem: Skin/Tissue Integrity  Goal: Skin integrity remains intact  Description: 1.  Monitor for areas of redness and/or skin breakdown  2.  Assess vascular access sites hourly  3.  Every 4-6 hours minimum:  Change oxygen saturation probe site  4.  Every 4-6 hours:  If on nasal continuous positive airway pressure, respiratory therapy assess nares and determine need for appliance change or resting period  Outcome: Progressing  Flowsheets (Taken 3/31/2025 2106)  Skin Integrity Remains Intact: Monitor for areas of redness and/or skin breakdown     Problem: Safety - Adult  Goal: Free from fall injury  Outcome: Progressing     Problem: ABCDS Injury Assessment  Goal: Absence of physical injury  Outcome: Progressing     Problem: Nutrition Deficit:  Goal: Optimize nutritional status  Outcome: Progressing  Flowsheets (Taken 3/31/2025 1344 by Lizzy Quiñonez, FORREST)  Nutrient intake appropriate for improving, restoring, or maintaining nutritional needs:   Assess nutritional status and recommend course of action   Monitor  oral intake, labs, and treatment plans   Recommend appropriate diets, oral nutritional supplements, and vitamin/mineral supplements

## 2025-04-01 NOTE — PROGRESS NOTES
Physical Therapy  Facility/Department: [facility] A2 CARD TELEMETRY  Daily Treatment Note  NAME: Maranda Burns  : 1960  MRN: [id number]    Date of Service: 2025    Discharge Recommendations:  Home with assist PRN, Home with Home health PT   PT Equipment Recommendations  Equipment Needed: No  Other: none needed    Patient Diagnosis(es): The primary encounter diagnosis was Acute respiratory failure with hypoxia and hypercapnia (HCC). Diagnoses of Acute encephalopathy, SIRS (systemic inflammatory response syndrome) (HCC), and COPD exacerbation (HCC) were also pertinent to this visit.    Assessment  Assessment: Patient seen for gait and endurance training. Patient completed transfers mod I and ambulates 300 ft without AD with supervision. Pt tolerates standing HEP without seated rest between exercises. Pt verbalizes understanding of completing at home to progress endurance and balance.  Decreased PT frequency this date d/t progress. Recommending DC to home with assist prn and home PT to increase safety in home and progress endurance and balance.  Activity Tolerance: Patient tolerated treatment well;Patient limited by fatigue;Patient limited by endurance  Equipment Needed: No  Other: none needed    Plan  Physical Therapy Plan  General Plan: 2-3 times per week  Current Treatment Recommendations: Strengthening;Balance training;Functional mobility training;Transfer training;Gait training;Home exercise program;Safety education & training;Therapeutic activities;Endurance training;Stair training;Neuromuscular re-education;Patient/Caregiver education & training;Equipment evaluation, education, & procurement;Co-Treatment    Restrictions  Restrictions/Precautions  Restrictions/Precautions: General Precautions  Position Activity Restriction  Other Position/Activity Restrictions: O2, IV     Subjective   Subjective  Subjective: Pt in bed, agreeable to therapy  Pain: Pt endorses pain in chest from coughing, does not formally

## 2025-04-01 NOTE — PROGRESS NOTES
Hospital Medicine Progress Note  V 1.6      Date of Admission: 3/26/2025    Hospital Day: 7      Chief Admission Complaint: Altered mental status    Subjective:  Patient seen and evaluated this morning, on 1 oxygen via HFNC. She is sitting up in chair at bedside. Still with moderate cough and congestion.   She reported feeling better today and denies any acute complaints.   No acute overnight events.    Presenting Admission History:     Maranda Burns 64-year-old female with past medical history of COPD/emphysema on oxygen at home, alpha-1 antitrypsin deficiency, ulcerative colitis, depression, DM type II, tobacco dependence hypothyroidism, presented with altered mental sensorium and unresponsiveness.  Her  stated that she was feeling dizzy and weak before this episode and then after describing her feeling she passed out.  She was found to be hypoxic by EMS and brought to ED where she was intubated due to unresponsiveness.  She was found to be in acute hypoxic hypercapnic respiratory failure with elevated carbon dioxide and acidosis.  Admitted to the suspicion of acute exacerbation of COPD/emphysema.  Given her leukocytosis at 20.2, lactic acid at 6.1 with tachypnea and tachycardia she was meeting the criteria of SIRS, IV fluid, IV antibiotics and IV steroids started.  Blood cultures sent and pulmonology consulted.    Assessment/Plan:      Current Principal Problem:  Acute hypoxic respiratory failure    Acute hypoxic/hypercapnic respiratory failure secondary to COPD exacerbation  - Improving,   - On 1 L oxygen via NC, back to her baseline  - CTA chest negative for pulmonary embolism  - Limited echo showed diastolic dysfunction, IV fluids held  - Ultrasound chest unremarkable, no consolidation or pleural effusion  - Pulmonology consulted; recommendation             -IV ceftriaxone and doxycycline course completed for 5 days  -Continue oral prednisone 40 mg once daily and taper on discharge             -    [x] Appropriate follow-up labs were ordered  [] Collateral history obtained     [x] Independent Interpretation of tests (any 1)    [x] Telemetry (Rhythm Strip) personally reviewed and interpreted        [] Imaging personally reviewed and interpreted     [x] Discussion (any 1)  [x] Multi-Disciplinary Rounds with Case Management  [] Discussed management of the case with           Labs:  Personally reviewed on 4/1/2025 and interpreted for clinical significance as documented above.     Recent Labs     03/30/25 0420 03/31/25  0545 04/01/25  0500   WBC 14.9* 19.3* 19.4*   HGB 11.3* 11.0* 10.5*   HCT 34.6* 34.0* 32.4*    375 363     Recent Labs     03/30/25 0420 03/31/25  0545 04/01/25  0500    148* 143   K 4.8 3.9 3.5    108 105   CO2 29 33* 30   BUN 26* 18 15   CREATININE 0.6 0.5* 0.5*   CALCIUM 9.6 9.9 9.6   MG 1.70*  --   --    PHOS 2.7  --   --      No results for input(s): \"PROBNP\", \"TROPHS\" in the last 72 hours.    No results for input(s): \"LABA1C\" in the last 72 hours.  No results for input(s): \"AST\", \"ALT\", \"BILIDIR\", \"BILITOT\", \"ALKPHOS\" in the last 72 hours.    No results for input(s): \"INR\", \"LACTA\", \"TSH\" in the last 72 hours.      Urine Cultures:   Lab Results   Component Value Date/Time    LABURIN  03/14/2024 05:41 PM     <50,000 CFU/ml mixed skin/urogenital jeny. No further workup    LABURIN 50,000 CFU/ml 03/14/2024 05:41 PM     Blood Cultures:   Lab Results   Component Value Date/Time    BC No Growth after 4 days of incubation. 03/27/2025 12:40 AM     Lab Results   Component Value Date/Time    BLOODCULT2 No Growth after 4 days of incubation. 03/27/2025 12:39 AM     Organism:   Lab Results   Component Value Date/Time    ORG Enterococcus faecalis 03/14/2024 05:41 PM         Luis Miguel Ritter DO  PGY3 Family Medicine Resident  [facility] Medicine Res[facility]

## 2025-04-01 NOTE — PROGRESS NOTES
03/31/25 7318   NIV Type   NIV Started/Stopped On   Equipment Type v60   Mode Bilevel   Mask Type Full face mask   Mask Size Medium   Assessment   Respirations 19   Settings/Measurements   IPAP 12 cmH20   CPAP/EPAP 5 cmH2O   Vt (Measured) 590 mL   Rate Ordered 14   FiO2  30 %   Mask Leak (lpm) 8 lpm   Patient's Home Machine No   Alarm Settings   Alarms On Y   Low Pressure (cmH2O) 6 cmH2O   High Pressure (cmH2O) 30 cmH2O   Patient Observation   Patient Observations mepilex on nose

## 2025-04-01 NOTE — PROGRESS NOTES
RT Inhaler-Nebulizer Bronchodilator Protocol Note    There is a bronchodilator order in the chart from a provider indicating to follow the RT Bronchodilator Protocol and there is an “Initiate RT Inhaler-Nebulizer Bronchodilator Protocol” order as well (see protocol at bottom of note).    CXR Findings:  No results found.    The findings from the last RT Protocol Assessment were as follows:   History Pulmonary Disease: Chronic pulmonary disease  Respiratory Pattern: Regular pattern and RR 12-20 bpm  Breath Sounds: Intermittent or unilateral wheezes  Cough: Strong, productive  Indication for Bronchodilator Therapy: On home bronchodilators  Bronchodilator Assessment Score: 7    Aerosolized bronchodilator medication orders have been revised according to the RT Inhaler-Nebulizer Bronchodilator Protocol below.    Respiratory Therapist to perform RT Therapy Protocol Assessment initially then follow the protocol.  Repeat RT Therapy Protocol Assessment PRN for score 0-3 or on second treatment, BID, and PRN for scores above 3.    No Indications - adjust the frequency to every 6 hours PRN wheezing or bronchospasm, if no treatments needed after 48 hours then discontinue using Per Protocol order mode.     If indication present, adjust the RT bronchodilator orders based on the Bronchodilator Assessment Score as indicated below.  Use Inhaler orders unless patient has one or more of the following: on home nebulizer, not able to hold breath for 10 seconds, is not alert and oriented, cannot activate and use MDI correctly, or respiratory rate 25 breaths per minute or more, then use the equivalent nebulizer order(s) with same Frequency and PRN reasons based on the score.  If a patient is on this medication at home then do not decrease Frequency below that used at home.    0-3 - enter or revise RT bronchodilator order(s) to equivalent RT Bronchodilator order with Frequency of every 4 hours PRN for wheezing or increased work of breathing

## 2025-04-01 NOTE — CARE COORDINATION
Spoke with RN and MD both who state patient will be here another night per Pulmonology.   Plan is for patient to discharge to home with her .   [facility] will follow at discharge for home care needs.

## 2025-04-02 ENCOUNTER — TELEPHONE (OUTPATIENT)
Dept: PULMONOLOGY | Age: 65
End: 2025-04-02

## 2025-04-02 VITALS
OXYGEN SATURATION: 98 % | SYSTOLIC BLOOD PRESSURE: 119 MMHG | BODY MASS INDEX: 16.27 KG/M2 | RESPIRATION RATE: 20 BRPM | HEART RATE: 112 BPM | HEIGHT: 63 IN | TEMPERATURE: 98.2 F | DIASTOLIC BLOOD PRESSURE: 67 MMHG | WEIGHT: 91.8 LBS

## 2025-04-02 DIAGNOSIS — J44.9 MODERATE COPD (CHRONIC OBSTRUCTIVE PULMONARY DISEASE) (HCC): ICD-10-CM

## 2025-04-02 DIAGNOSIS — J43.1 PANLOBULAR EMPHYSEMA (HCC): ICD-10-CM

## 2025-04-02 LAB
ACANTHOCYTES BLD QL SMEAR: ABNORMAL
ACANTHOCYTES BLD QL SMEAR: ABNORMAL
ALBUMIN SERPL-MCNC: 3.4 G/DL (ref 3.4–5)
ALBUMIN/GLOB SERPL: 1.7 {RATIO} (ref 1.1–2.2)
ALP SERPL-CCNC: 106 U/L (ref 40–129)
ALT SERPL-CCNC: 77 U/L (ref 10–40)
ANION GAP SERPL CALCULATED.3IONS-SCNC: 7 MMOL/L (ref 3–16)
ANISOCYTOSIS BLD QL SMEAR: ABNORMAL
AST SERPL-CCNC: 42 U/L (ref 15–37)
BASOPHILS # BLD: 0 K/UL (ref 0–0.2)
BASOPHILS # BLD: 0 K/UL (ref 0–0.2)
BASOPHILS NFR BLD: 0 %
BASOPHILS NFR BLD: 0 %
BILIRUB SERPL-MCNC: 0.4 MG/DL (ref 0–1)
BUN SERPL-MCNC: 21 MG/DL (ref 7–20)
BURR CELLS BLD QL SMEAR: ABNORMAL
CALCIUM SERPL-MCNC: 9.7 MG/DL (ref 8.3–10.6)
CHLORIDE SERPL-SCNC: 103 MMOL/L (ref 99–110)
CO2 SERPL-SCNC: 34 MMOL/L (ref 21–32)
CREAT SERPL-MCNC: 0.6 MG/DL (ref 0.6–1.2)
DEPRECATED RDW RBC AUTO: 13.7 % (ref 12.4–15.4)
DEPRECATED RDW RBC AUTO: 13.9 % (ref 12.4–15.4)
EOSINOPHIL # BLD: 0 K/UL (ref 0–0.6)
EOSINOPHIL # BLD: 0.6 K/UL (ref 0–0.6)
EOSINOPHIL NFR BLD: 0 %
EOSINOPHIL NFR BLD: 3 %
GFR SERPLBLD CREATININE-BSD FMLA CKD-EPI: >90 ML/MIN/{1.73_M2}
GLUCOSE BLD-MCNC: 242 MG/DL (ref 70–99)
GLUCOSE SERPL-MCNC: 77 MG/DL (ref 70–99)
HCT VFR BLD AUTO: 30.8 % (ref 36–48)
HCT VFR BLD AUTO: 32.4 % (ref 36–48)
HGB BLD-MCNC: 10.2 G/DL (ref 12–16)
HGB BLD-MCNC: 10.5 G/DL (ref 12–16)
LYMPHOCYTES # BLD: 4.7 K/UL (ref 1–5.1)
LYMPHOCYTES # BLD: 6.2 K/UL (ref 1–5.1)
LYMPHOCYTES NFR BLD: 23 %
LYMPHOCYTES NFR BLD: 32 %
MACROCYTES BLD QL SMEAR: ABNORMAL
MAGNESIUM SERPL-MCNC: 1.52 MG/DL (ref 1.8–2.4)
MCH RBC QN AUTO: 30.3 PG (ref 26–34)
MCH RBC QN AUTO: 30.9 PG (ref 26–34)
MCHC RBC AUTO-ENTMCNC: 32.4 G/DL (ref 31–36)
MCHC RBC AUTO-ENTMCNC: 33.1 G/DL (ref 31–36)
MCV RBC AUTO: 93.3 FL (ref 80–100)
MCV RBC AUTO: 93.6 FL (ref 80–100)
MICROCYTES BLD QL SMEAR: ABNORMAL
MONOCYTES # BLD: 1.4 K/UL (ref 0–1.3)
MONOCYTES # BLD: 2 K/UL (ref 0–1.3)
MONOCYTES NFR BLD: 10 %
MONOCYTES NFR BLD: 7 %
NEUTROPHILS # BLD: 11.8 K/UL (ref 1.7–7.7)
NEUTROPHILS # BLD: 13.1 K/UL (ref 1.7–7.7)
NEUTROPHILS NFR BLD: 61 %
NEUTROPHILS NFR BLD: 64 %
OVALOCYTES BLD QL SMEAR: ABNORMAL
OVALOCYTES BLD QL SMEAR: ABNORMAL
PATH INTERP BLD-IMP: NORMAL
PATH INTERP BLD-IMP: NORMAL
PATH INTERP BLD-IMP: YES
PATH INTERP BLD-IMP: YES
PERFORMED ON: ABNORMAL
PLATELET # BLD AUTO: 349 K/UL (ref 135–450)
PLATELET # BLD AUTO: 363 K/UL (ref 135–450)
PLATELET BLD QL SMEAR: ADEQUATE
PLATELET BLD QL SMEAR: ADEQUATE
PMV BLD AUTO: 8.5 FL (ref 5–10.5)
PMV BLD AUTO: 8.5 FL (ref 5–10.5)
POIKILOCYTOSIS BLD QL SMEAR: ABNORMAL
POIKILOCYTOSIS BLD QL SMEAR: ABNORMAL
POTASSIUM SERPL-SCNC: 3.5 MMOL/L (ref 3.5–5.1)
PROT SERPL-MCNC: 5.4 G/DL (ref 6.4–8.2)
RBC # BLD AUTO: 3.31 M/UL (ref 4–5.2)
RBC # BLD AUTO: 3.46 M/UL (ref 4–5.2)
SCHISTOCYTES BLD QL SMEAR: ABNORMAL
SCHISTOCYTES BLD QL SMEAR: ABNORMAL
SLIDE REVIEW: ABNORMAL
SLIDE REVIEW: ABNORMAL
SODIUM SERPL-SCNC: 144 MMOL/L (ref 136–145)
TARGETS BLD QL SMEAR: ABNORMAL
TARGETS BLD QL SMEAR: ABNORMAL
WBC # BLD AUTO: 19.4 K/UL (ref 4–11)
WBC # BLD AUTO: 20.4 K/UL (ref 4–11)

## 2025-04-02 PROCEDURE — 99233 SBSQ HOSP IP/OBS HIGH 50: CPT | Performed by: INTERNAL MEDICINE

## 2025-04-02 PROCEDURE — 94761 N-INVAS EAR/PLS OXIMETRY MLT: CPT

## 2025-04-02 PROCEDURE — 94669 MECHANICAL CHEST WALL OSCILL: CPT

## 2025-04-02 PROCEDURE — 6370000000 HC RX 637 (ALT 250 FOR IP): Performed by: HOSPITALIST

## 2025-04-02 PROCEDURE — 2500000003 HC RX 250 WO HCPCS: Performed by: HOSPITALIST

## 2025-04-02 PROCEDURE — 94640 AIRWAY INHALATION TREATMENT: CPT

## 2025-04-02 PROCEDURE — 94660 CPAP INITIATION&MGMT: CPT

## 2025-04-02 PROCEDURE — 6370000000 HC RX 637 (ALT 250 FOR IP)

## 2025-04-02 PROCEDURE — 83735 ASSAY OF MAGNESIUM: CPT

## 2025-04-02 PROCEDURE — 6370000000 HC RX 637 (ALT 250 FOR IP): Performed by: STUDENT IN AN ORGANIZED HEALTH CARE EDUCATION/TRAINING PROGRAM

## 2025-04-02 PROCEDURE — 6370000000 HC RX 637 (ALT 250 FOR IP): Performed by: INTERNAL MEDICINE

## 2025-04-02 PROCEDURE — 6360000002 HC RX W HCPCS: Performed by: HOSPITALIST

## 2025-04-02 PROCEDURE — 2700000000 HC OXYGEN THERAPY PER DAY

## 2025-04-02 PROCEDURE — 80053 COMPREHEN METABOLIC PANEL: CPT

## 2025-04-02 PROCEDURE — 6370000000 HC RX 637 (ALT 250 FOR IP): Performed by: NURSE PRACTITIONER

## 2025-04-02 PROCEDURE — 6360000002 HC RX W HCPCS

## 2025-04-02 PROCEDURE — 85025 COMPLETE CBC W/AUTO DIFF WBC: CPT

## 2025-04-02 RX ORDER — TIOTROPIUM BROMIDE INHALATION SPRAY 1.56 UG/1
2 SPRAY, METERED RESPIRATORY (INHALATION) DAILY
Qty: 1 EACH | Refills: 5 | Status: SHIPPED | OUTPATIENT
Start: 2025-04-02 | End: 2025-06-01

## 2025-04-02 RX ORDER — BENZONATATE 100 MG/1
100 CAPSULE ORAL 3 TIMES DAILY PRN
Qty: 21 CAPSULE | Refills: 0 | Status: SHIPPED | OUTPATIENT
Start: 2025-04-02 | End: 2025-04-09

## 2025-04-02 RX ORDER — LOPERAMIDE HYDROCHLORIDE 2 MG/1
2 CAPSULE ORAL 4 TIMES DAILY PRN
Qty: 40 CAPSULE | Refills: 0 | Status: SHIPPED | OUTPATIENT
Start: 2025-04-02 | End: 2025-04-12

## 2025-04-02 RX ORDER — GUAIFENESIN 600 MG/1
600 TABLET, EXTENDED RELEASE ORAL 2 TIMES DAILY
Qty: 14 TABLET | Refills: 0 | Status: SHIPPED | OUTPATIENT
Start: 2025-04-02 | End: 2025-04-09

## 2025-04-02 RX ORDER — PREDNISONE 10 MG/1
TABLET ORAL
Qty: 45 TABLET | Refills: 0 | Status: CANCELLED | OUTPATIENT
Start: 2025-04-02 | End: 2025-04-17

## 2025-04-02 RX ORDER — MAGNESIUM SULFATE IN WATER 40 MG/ML
4000 INJECTION, SOLUTION INTRAVENOUS ONCE
Status: COMPLETED | OUTPATIENT
Start: 2025-04-02 | End: 2025-04-02

## 2025-04-02 RX ORDER — LANOLIN ALCOHOL/MO/W.PET/CERES
400 CREAM (GRAM) TOPICAL DAILY
Qty: 30 TABLET | Refills: 1 | Status: SHIPPED | OUTPATIENT
Start: 2025-04-02

## 2025-04-02 RX ORDER — ALBUTEROL SULFATE 0.83 MG/ML
2.5 SOLUTION RESPIRATORY (INHALATION) EVERY 6 HOURS PRN
Qty: 120 EACH | Refills: 3 | Status: SHIPPED | OUTPATIENT
Start: 2025-04-02 | End: 2025-04-02 | Stop reason: HOSPADM

## 2025-04-02 RX ORDER — PREDNISONE 5 MG/1
5 TABLET ORAL DAILY
Qty: 30 TABLET | Refills: 2 | Status: SHIPPED | OUTPATIENT
Start: 2025-04-02 | End: 2025-07-01

## 2025-04-02 RX ORDER — LANOLIN ALCOHOL/MO/W.PET/CERES
400 CREAM (GRAM) TOPICAL DAILY
Status: DISCONTINUED | OUTPATIENT
Start: 2025-04-02 | End: 2025-04-02 | Stop reason: HOSPADM

## 2025-04-02 RX ADMIN — IPRATROPIUM BROMIDE AND ALBUTEROL SULFATE 1 DOSE: 2.5; .5 SOLUTION RESPIRATORY (INHALATION) at 07:21

## 2025-04-02 RX ADMIN — MAGNESIUM SULFATE HEPTAHYDRATE 4000 MG: 40 INJECTION, SOLUTION INTRAVENOUS at 08:21

## 2025-04-02 RX ADMIN — ATORVASTATIN CALCIUM 40 MG: 40 TABLET, FILM COATED ORAL at 08:22

## 2025-04-02 RX ADMIN — PANTOPRAZOLE SODIUM 40 MG: 40 TABLET, DELAYED RELEASE ORAL at 06:10

## 2025-04-02 RX ADMIN — Medication 400 MG: at 12:39

## 2025-04-02 RX ADMIN — Medication 2 PUFF: at 07:21

## 2025-04-02 RX ADMIN — PREDNISONE 40 MG: 20 TABLET ORAL at 08:22

## 2025-04-02 RX ADMIN — LEVOTHYROXINE SODIUM 25 MCG: 0.03 TABLET ORAL at 06:10

## 2025-04-02 RX ADMIN — LOSARTAN POTASSIUM 100 MG: 100 TABLET, FILM COATED ORAL at 08:22

## 2025-04-02 RX ADMIN — SODIUM CHLORIDE, PRESERVATIVE FREE 10 ML: 5 INJECTION INTRAVENOUS at 08:22

## 2025-04-02 RX ADMIN — GUAIFENESIN 600 MG: 600 TABLET ORAL at 08:22

## 2025-04-02 RX ADMIN — GABAPENTIN 200 MG: 100 CAPSULE ORAL at 08:22

## 2025-04-02 RX ADMIN — ENOXAPARIN SODIUM 30 MG: 100 INJECTION SUBCUTANEOUS at 08:22

## 2025-04-02 ASSESSMENT — PAIN SCALES - GENERAL
PAINLEVEL_OUTOF10: 0
PAINLEVEL_OUTOF10: 0

## 2025-04-02 NOTE — PLAN OF CARE
Problem: Safety - Medical Restraint  Goal: Remains free of injury from restraints (Restraint for Interference with Medical Device)  Description: INTERVENTIONS:  1. Determine that other, less restrictive measures have been tried or would not be effective before applying the restraint  2. Evaluate the patient's condition at the time of restraint application  3. Inform patient/family regarding the reason for restraint  4. Q2H: Monitor safety, psychosocial status, comfort, nutrition and hydration  Outcome: Adequate for Discharge     Problem: Discharge Planning  Goal: Discharge to home or other facility with appropriate resources  Outcome: Adequate for Discharge     Problem: Pain  Goal: Verbalizes/displays adequate comfort level or baseline comfort level  Outcome: Adequate for Discharge     Problem: Skin/Tissue Integrity  Goal: Skin integrity remains intact  Description: 1.  Monitor for areas of redness and/or skin breakdown  2.  Assess vascular access sites hourly  3.  Every 4-6 hours minimum:  Change oxygen saturation probe site  4.  Every 4-6 hours:  If on nasal continuous positive airway pressure, respiratory therapy assess nares and determine need for appliance change or resting period  Outcome: Adequate for Discharge     Problem: Safety - Adult  Goal: Free from fall injury  Outcome: Adequate for Discharge     Problem: ABCDS Injury Assessment  Goal: Absence of physical injury  Outcome: Adequate for Discharge     Problem: Nutrition Deficit:  Goal: Optimize nutritional status  Outcome: Adequate for Discharge

## 2025-04-02 NOTE — TELEPHONE ENCOUNTER
----- Message from Dr. Mamie Morgan MD sent at 4/2/2025  8:19 AM EDT -----  Follow-up in my clinic in first week of June

## 2025-04-02 NOTE — TELEPHONE ENCOUNTER
Submitted PA for Dulera 100-5MCG/ACT aerosol   Via CMM Key: [id number]  STATUS: PENDING.    Follow up done daily; if no decision with in three days we will refax.  If another three days goes by with no decision will call the insurance for status.

## 2025-04-02 NOTE — PROGRESS NOTES
Pt d/c'd home in private vehicle.  Removed PICC and stopped bleeding.  Catheter intact. Pt tolerated well. No redness noted at site.  Notified [facility] and removed tele box. Reviewed d/c instructions, home meds, and  f/u information utilizing teach-back method.  Scripts for medications sent to outpatient pharmacy and [facility]. Patient verbalized understanding.

## 2025-04-02 NOTE — CARE COORDINATION
CASE MANAGEMENT DISCHARGE SUMMARY      Discharge to: home with [facility]       IMM given: (date) 4/1/25    Transportation: private        Confirmed discharge plan with:patient//RN/Teresa with [facility]      Patient: yes     Family:  yes       RN, name: Josette    Note: Discharging nurse to complete KAYODE, reconcile AVS, and place final copy with patient's discharge packet.

## 2025-04-02 NOTE — DISCHARGE INSTRUCTIONS
Follow-up with PCP after 1 week with repeat CMP and magnesium  Start taking magnesium tablets once daily  Start taking tablet prednisone 5 mg once daily until you see pulmonology   Follow-up with pulmonology in first week of June  Stop taking tablet Toprol XR on discharge and keep BP record  Follow-up with PCP regarding hypertension, hyperlipidemia  Follow-up with GI regarding ulcerative colitis

## 2025-04-02 NOTE — PROGRESS NOTES
04/02/25 0323   NIV Type   NIV Started/Stopped On   Equipment Type V60   Mode Bilevel   Mask Type Full face mask   Mask Size Medium   Assessment   SpO2 99 %   Level of Consciousness 0   Comfort Level Good   Using Accessory Muscles No   Mask Compliance Good   Skin Assessment Clean, dry, & intact   Skin Protection for O2 Device Yes   Orientation Middle   Location Nose   Intervention(s) Skin Barrier   Settings/Measurements   PIP Observed 12 cm H20   IPAP 12 cmH20   CPAP/EPAP 5 cmH2O   Vt (Measured) 436 mL   Rate Ordered 14   Insp Rise Time (%) 2 %   FiO2  30 %   I Time/ I Time % 1 s   Minute Volume (L/min) 6.6 Liters   Mask Leak (lpm) 10 lpm   Patient's Home Machine No   Alarm Settings   Alarms On Y   Low Pressure (cmH2O) 6 cmH2O   High Pressure (cmH2O) 30 cmH2O   RR Low (bpm) 6   RR High (bpm) 40 br/min   Oxygen Therapy/Pulse Ox   O2 Therapy Oxygen   O2 Device PAP (positive airway pressure)

## 2025-04-02 NOTE — DISCHARGE SUMMARY
is in conjunction with any daily progress note from day of discharge. Time spent on discharge: 40 minutes in the examination, evaluation, counseling and review of medications and discharge plan.    ------------------------------------------------------------------------------------------------------------------------------------------------------    Discharge Medications:   Current Discharge Medication List        START taking these medications    Details   loperamide (IMODIUM) 2 MG capsule Take 1 capsule by mouth 4 times daily as needed for Diarrhea  Qty: 40 capsule, Refills: 0      benzonatate (TESSALON) 100 MG capsule Take 1 capsule by mouth 3 times daily as needed for Cough  Qty: 21 capsule, Refills: 0      guaiFENesin (MUCINEX) 600 MG extended release tablet Take 1 tablet by mouth 2 times daily for 7 days  Qty: 14 tablet, Refills: 0      magnesium oxide (MAG-OX) 400 (240 Mg) MG tablet Take 1 tablet by mouth daily  Qty: 30 tablet, Refills: 1           Current Discharge Medication List        Current Discharge Medication List        CONTINUE these medications which have NOT CHANGED    Details   tiotropium (SPIRIVA RESPIMAT) 1.25 MCG/ACT AERS inhaler Inhale 2 puffs into the lungs daily Review inhaler technique at ("[employer]inhal[employer])  Qty: 1 each, Refills: 5      predniSONE (DELTASONE) 5 MG tablet Take 1 tablet by mouth daily  Qty: 30 tablet, Refills: 2      mometasone-formoterol (DULERA) 100-5 MCG/ACT inhaler Inhale 2 puffs into the lungs 2 times daily Rinse mouth after inhaler use to avoid oral thrush. Use AeroChamber. Review inhaler technique at ("[employer])  Qty: 1 each, Refills: 5      metoprolol succinate (TOPROL XL) 50 MG extended release tablet TAKE 1 TABLET BY MOUTH DAILY  Qty: 30 tablet, Refills: 0      mirtazapine (REMERON) 7.5 MG tablet Take 1 tablet by mouth nightly  Qty: 90 tablet, Refills: 0    Associated Diagnoses: Anxiety and depression      gabapentin (NEURONTIN) 100 MG capsule Take 2  Endotracheal and nasogastric tube placements. Comparisons: CT of the chest abdomen and pelvis 12/27/2024. Abdomen findings: Single AP portable supine view of the abdomen. These projections demonstrate nasogastric tube, which projects over the region of the gastric body.     1. Nasogastric tube tip projects over the gastric body region. Chest findings: 2 projections, AP portable, of the chest at 2346 hours. There is an endotracheal tube identified, with tip projecting over the mid thoracic trachea approximately 5.5 cm above the level of the diego. The transverse diameter of the heart is within normal limits. The lungs are clear. There is no effusion or pneumothorax. IMPRESSION: 1. Endotracheal tube tip projects over the mid thoracic trachea. Electronically signed by Grady Fletcher MD    XR ABDOMEN (KUB) (SINGLE AP VIEW)  Result Date: 3/27/2025  Clinical history: Endotracheal and nasogastric tube placements. Comparisons: CT of the chest abdomen and pelvis 12/27/2024. Abdomen findings: Single AP portable supine view of the abdomen. These projections demonstrate nasogastric tube, which projects over the region of the gastric body.     1. Nasogastric tube tip projects over the gastric body region. Chest findings: 2 projections, AP portable, of the chest at 2346 hours. There is an endotracheal tube identified, with tip projecting over the mid thoracic trachea approximately 5.5 cm above the level of the diego. The transverse diameter of the heart is within normal limits. The lungs are clear. There is no effusion or pneumothorax. IMPRESSION: 1. Endotracheal tube tip projects over the mid thoracic trachea. Electronically signed by Grady Fletcher MD      Consults:     IP CONSULT TO HOSPITALIST  IP CONSULT TO PULMONOLOGY  IP CONSULT TO DIETITIAN  IP CONSULT TO DIETITIAN  IP CONSULT TO VASCULAR ACCESS TEAM    Labs:     Recent Labs     03/31/25  0545 04/01/25  0500 04/02/25  0454   WBC 19.3* 19.4* 20.4*   HGB 11.0* 10.5* 10.2*

## 2025-04-02 NOTE — DISCHARGE INSTR - COC
Continuity of Care Form    Patient Name: Maranda Burns   :  1960  MRN:  [id number]    Admit date:  3/26/2025  Discharge date:  ***    Code Status Order: Full Code   Advance Directives:     Admitting Physician:  Guero Chan MD  PCP: Lashanda Amaro DO    Discharging Nurse: ***  Discharging Hospital Unit/Room#: 0202/0202-01  Discharging Unit Phone Number: ***    Emergency Contact:   Extended Emergency Contact Information  Primary Emergency Contact: Ralph Burns  Address: [address] [place] [address] [place] [address] [facility]  Home Phone: [phone]  Mobile Phone: [phone]  Relation: Spouse   needed? No  Secondary Emergency Contact: Susan Westfall  Home Phone: [phone]  Mobile Phone: [phone]  Relation: Child    Past Surgical History:  Past Surgical History:   Procedure Laterality Date    BACK SURGERY      L4-L5 rods in    COLONOSCOPY N/A 2021    COLONOSCOPY WITH BIOPSY performed by Hetal Larson MD at [facility] ENDOSCOPY    HYSTERECTOMY (CERVIX STATUS UNKNOWN)      HYSTERECTOMY, VAGINAL      LUNG SURGERY Right 2023    OVARY REMOVAL      SPLENECTOMY      due to a fall    TONSILLECTOMY         Immunization History:   Immunization History   Administered Date(s) Administered    COVID-19, MODERNA BLUE border, Primary or Immunocompromised, (age 12y+), IM, 100 mcg/0.5mL 2021, 05/10/2021    Hib PRP-OMP, PEDVAXHIB, (age 2m-6y, Adlt Risk), IM, 0.5mL 06/15/2019    Hib PRP-T, ACTHIB (age 2m-5y, Adlt Risk), HIBERIX (age 6w-4y, Adlt Risk), IM, 0.5mL 06/15/2019    Influenza Virus Vaccine 2022    Influenza, FLUARIX, FLULAVAL, FLUZONE (age 6 mo+) and AFLURIA, (age 3 y+), Quadv PF, 0.5mL 2020, 2022    Influenza, FLUCELVAX, (age 6 mo+) IM, Trivalent PF, 0.5mL 10/22/2024    Influenza, FLUCELVAX, (age 6 mo+), MDCK, Quadv PF, 0.5mL 2023    Meningococcal ACWY Vaccine 2019    Meningococcal ACWY, MENACTRA (MenACWY-D), (age 9m-55y), IM,

## 2025-04-02 NOTE — PROGRESS NOTES
04/01/25 2207   NIV Type   NIV Started/Stopped On   Equipment Type V60   Mode Bilevel   Mask Type Full face mask   Mask Size Medium   Assessment   SpO2 98 %   Level of Consciousness 0   Comfort Level Good   Using Accessory Muscles No   Mask Compliance Good   Skin Assessment Clean, dry, & intact   Skin Protection for O2 Device Yes   Orientation Middle   Location Nose   Intervention(s) Skin Barrier   Settings/Measurements   PIP Observed 12 cm H20   IPAP 12 cmH20   CPAP/EPAP 5 cmH2O   Vt (Measured) 502 mL   Rate Ordered 14   Insp Rise Time (%) 2 %   FiO2  24 %   Minute Volume (L/min) 7.9 Liters   Mask Leak (lpm) 0 lpm   Patient's Home Machine No   Alarm Settings   Alarms On Y   Low Pressure (cmH2O) 6 cmH2O   High Pressure (cmH2O) 30 cmH2O   RR Low (bpm) 6   RR High (bpm) 40 br/min   Oxygen Therapy/Pulse Ox   O2 Therapy Oxygen   O2 Device (S)  PAP (positive airway pressure)

## 2025-04-02 NOTE — PROGRESS NOTES
PULMONARY AND CRITICAL CARE INPATIENT NOTE        Maranda Burns   : 1960  MRN: [id number]     Admitting Physician: Guero Chan MD  Attending Physician: Javad Wong MD  PCP: Lashanda Amaro DO     Date of Service: 2025  Admission date:3/26/2025   LOS: Hospital Day: 8   Code:Full Code      ASSESSMENT & PLAN       64 y.o. female patient was admitted on 3/26/2025 with  Chief Complaint   Patient presents with    Respiratory Distress     Pt brought in by [place] [facility]. EMS states pt has had no complaints all day. Pt said to  call 911, Pt fell on the floor and went unresponsive. Pt has been Intubated 1 time prior, and History of COPD per EMS. Per chart Pt had recent Viral Infection and BMI counseling.           Acute on chronic hypoxic and hypercapnic respiratory failure.  On 2 L at baseline with NIV  Septic shock/lactic acidosis.  Resolved  Hyperglycemia  COPD with end-stage emphysema.  Failed endobronchial valves at   Alpha-1 antitrypsin deficiency  Diastolic heart dysfunction      Plan:              Completed antibiotic course  Continue prednisone and taper at discharge  Will order prednisone 5 mg daily to stay on until seen in the pulmonary clinic the first week of   Symbicort with DuoNebs  Patient will need to be discharged on Dulera, Spiriva or alternatives that insurance covers  Will also provide nebulizer solution refilled  Pulm hygiene measures  Oxygen and BiPAP at bedtime and resume home NIV at discharge with 2 L of oxygen  Okay discharge from pulmonary standpoint.    We will sign off. Please let us know if you have any questions.  Available on [employer].   Thank you for involving us in this patient's care.      Subjective/Objective       Interval history/Encounter: 2025   Patient gigi afebrile and hemodynamically stable on 1 L of oxygen  I's and O's +1.1 L  She remains on Symbicort, pantoprazole, prednisone 40 mg  No new positive micro results  CBC and chemistry  remain relatively stable with moderate leukocytosis      Objective    Test Results:  Imaging:  I have reviewed radiology images personally.    CT chest 3-27-25  IMPRESSION:     1. No evidence of pulmonary embolus.    2. Hyperinflation of the bilateral lungs with severe apical predominant centrilobular emphysema. No focal pneumonia identified.          Cardiology:      Labs reviewed:  CBC:   Recent Labs     03/31/25  0545 04/01/25  0500 04/02/25  0454   WBC 19.3* 19.4* 20.4*   HGB 11.0* 10.5* 10.2*    363 349       BMP:   Recent Labs     03/31/25  0545 04/01/25  0500 04/02/25  0454   * 143 144   K 3.9 3.5 3.5    105 103   CO2 33* 30 34*   BUN 18 15 21*   CREATININE 0.5* 0.5* 0.6       LIVER PROFILE:   Recent Labs     04/02/25  0454   AST 42*   ALT 77*   BILITOT 0.4   ALKPHOS 106       Acid-Base:   Recent Labs     03/31/25  0545 04/01/25  0500 04/02/25  0454   CO2 33* 30 34*   ALBUMIN  --   --  3.4   CREATININE 0.5* 0.5* 0.6   GLUCOSE 95 71 77       Cardiac:   Lab Results   Component Value Date    TROPONINI 0.01 09/28/2022         Vitals:    04/02/25 0721   BP:    Pulse: 92   Resp: 18   Temp:    SpO2: 98%       Physical Exam:  General appearance: In no apparent distress.  HEENT: Anicteric.  Cardiac: No loud murmur.  Lungs: Poor air entry bilaterally with expiratory rhonchi  Abdomen: Soft.  Back & Extremities: No clubbing.  No signs of acute ischemia.  Neurological: No focal deficit.      ________________________________________________________  Electronically signed by:  Mamie Morgan MD,FACP    4/2/2025    7:56 AM.     [facility]  [address], [place] [address]   Available on [employer]  Phone (office): [phone]

## 2025-04-02 NOTE — PLAN OF CARE
Problem: Safety - Medical Restraint  Goal: Remains free of injury from restraints (Restraint for Interference with Medical Device)  Description: INTERVENTIONS:  1. Determine that other, less restrictive measures have been tried or would not be effective before applying the restraint  2. Evaluate the patient's condition at the time of restraint application  3. Inform patient/family regarding the reason for restraint  4. Q2H: Monitor safety, psychosocial status, comfort, nutrition and hydration  Outcome: Progressing     Problem: Discharge Planning  Goal: Discharge to home or other facility with appropriate resources  4/1/2025 2020 by Peggy Iniguez, RN  Outcome: Progressing  4/1/2025 1607 by Maren Burch, RN  Outcome: Progressing     Problem: Pain  Goal: Verbalizes/displays adequate comfort level or baseline comfort level  Outcome: Progressing  Flowsheets (Taken 4/1/2025 1955)  Verbalizes/displays adequate comfort level or baseline comfort level: Encourage patient to monitor pain and request assistance     Problem: Skin/Tissue Integrity  Goal: Skin integrity remains intact  Description: 1.  Monitor for areas of redness and/or skin breakdown  2.  Assess vascular access sites hourly  3.  Every 4-6 hours minimum:  Change oxygen saturation probe site  4.  Every 4-6 hours:  If on nasal continuous positive airway pressure, respiratory therapy assess nares and determine need for appliance change or resting period  Outcome: Progressing     Problem: Safety - Adult  Goal: Free from fall injury  Outcome: Progressing     Problem: ABCDS Injury Assessment  Goal: Absence of physical injury  Outcome: Progressing     Problem: Nutrition Deficit:  Goal: Optimize nutritional status  Outcome: Progressing

## 2025-04-03 ENCOUNTER — CLINICAL DOCUMENTATION (OUTPATIENT)
Age: 65
End: 2025-04-03

## 2025-04-03 NOTE — PROGRESS NOTES
Physician Progress Note      PATIENT:               PAIGE VELEZ  CSN #:                  [id number]  :                       1960  ADMIT DATE:       3/26/2025 11:13 PM  DISCH DATE:        2025 3:17 PM  RESPONDING  PROVIDER #:        Zina Aburto          QUERY TEXT:    Noted documentation of pneumonia and septic shock on pulmonology notes.    The clinical indicators include:  Per pulmonology notes \"Acute on chronic hypoxic and hypercapnic respiratory   failure. Septic shock. Levophed for pressor support, keep goal MAP > 65 or SBP   > 90. Rocephin/Zithro for 5 day course for possible PNA, blood cx sent and   pending, respiratory culture sent from bronchoscopy\".  Wbc 20.2, pulse 106, lactic acid 3.2, acute respiratory failure  AE COPD    Treated with IV Rocephin and Doxycline, blood cultures, intubation,   pulmonology consult, Levophed, serial labs, supportive care  Options provided:  -- Agree with pulmonology consultant diagnosis of septic shock and possible   PNA  -- Septic shock due to unidentified bacterial infection  -- Sepsis and PNA ruled out  -- Other - I will add my own diagnosis  -- Disagree - Not applicable / Not valid  -- Disagree - Clinically unable to determine / Unknown  -- Refer to Clinical Documentation Reviewer    PROVIDER RESPONSE TEXT:    Sepsis and PNA ruled out    Query created by: Lauren Emanuel on 4/3/2025 3:25 PM      Electronically signed by:  Zina Aburto 4/3/2025 5:10 PM

## 2025-04-03 NOTE — TELEPHONE ENCOUNTER
Brad from insurance called to see we have the anser to the question below, can we get an answer soon please

## 2025-04-04 ENCOUNTER — TELEPHONE (OUTPATIENT)
Dept: PULMONOLOGY | Age: 65
End: 2025-04-04

## 2025-04-04 ENCOUNTER — CARE COORDINATION (OUTPATIENT)
Dept: CARE COORDINATION | Age: 65
End: 2025-04-04

## 2025-04-04 DIAGNOSIS — J44.1 COPD WITH ACUTE EXACERBATION (HCC): Primary | ICD-10-CM

## 2025-04-04 PROCEDURE — 1111F DSCHRG MED/CURRENT MED MERGE: CPT | Performed by: STUDENT IN AN ORGANIZED HEALTH CARE EDUCATION/TRAINING PROGRAM

## 2025-04-04 NOTE — TELEPHONE ENCOUNTER
She is to continue to use Symbicort 2 puffs twice a day. Rinse mouth out after use to prevent hoarseness and thrush.   Can use albuterol inhaler or nebulizer up to 4 times a day as needed with increased SOB, wheezing.   Call with worsening symptoms such as increased shortness of breath, productive cough, wheezing or symptoms not responding to treatment plan.   Follow up as scheduled in a couple of weeks.

## 2025-04-04 NOTE — TELEPHONE ENCOUNTER
Spoke with the patient and she is confused as to what medication she is suppose to be using.  She has been using symbicort only.  She says that her discharge paperwork says to stop the albuterol and nebulizer.   Please advise. She did say that the inhalers that were sent to the pharmacy were too expensive too . Last scripts sent to the pharmacy were spiriva and dulera.

## 2025-04-04 NOTE — CARE COORDINATION
Ambulatory Care Coordination Note     2025 1:13 PM     Patient Current Location:  Home: [address] [place] [address]   [place] [address]     This patient was received as a referral from Ambulatory Care Manager .    ACM contacted the patient by telephone. Verified name and  with patient as identifiers. Provided introduction to self, and explanation of the ACM role.   Patient accepted care management services at this time.          ACM: Laurie Pulido RN     Challenges to be reviewed by the provider   Additional needs identified to be addressed with provider No  none               Method of communication with provider: none.    Utilization: Has the patient been discharged from the hospital since your last call? yes -   Call within 2 business days of discharge: Yes    Patient: Maranda Burns    Patient : 1960   MRN: [id number]    Reason for Admission: Acute hypoxic respiratory failure   Discharge Date: 25  RURS: Readmission Risk Score: 12.3      Last Discharge Facility       Date Complaint Diagnosis Description Type Department Provider    3/26/25 Respiratory Distress Acute respiratory failure with hypoxia and hypercapnia ... ED to Hosp-Admission (Discharged) (ADMITTED) Javad Esteban MD; Tracey...            Was this an external facility discharge? No    Ambulatory Care Manager reviewed discharge instructions, medical action plan, and red flags with patient. The patient was given an opportunity to ask questions; all questions answered at this time.. The patient verbalized understanding.   Were discharge instructions available to patient? No.   Reviewed appropriate site of care based on symptoms and resources available to patient including: PCP  Specialist  Home health. The patient agrees to contact the primary care provider and/or specialist office for questions related to their healthcare.     Patients top risk factors for readmission: ineffective coping, level of motivation, and

## 2025-04-04 NOTE — TELEPHONE ENCOUNTER
Patient has a follow up scheduled with Adamaris Valadez per message from Dr. Matos on 04/14/2025.  Should patient cancel this appointment?

## 2025-04-15 DIAGNOSIS — E78.2 MIXED HYPERLIPIDEMIA: ICD-10-CM

## 2025-04-15 RX ORDER — ATORVASTATIN CALCIUM 40 MG/1
40 TABLET, FILM COATED ORAL DAILY
Qty: 90 TABLET | Refills: 0 | OUTPATIENT
Start: 2025-04-15

## 2025-04-15 NOTE — PROGRESS NOTES
Maranda Burns is a 64 y.o. who comes in today for Follow-Up from Hospital   She has significant medical history of HTN, CAD, heterozygous alpha-1-antitrypsin deficiency,  end stage COPD on Bipap  with oxygen therapy.  She had failed endobronchial valves at .   She is a former smoker, quit 2022. She was dc'ed on 4/2/25 after acute COPD exacerbation, septic shock. She completed antibiotics. She continues prednisone 5 mg daily, azithromycin M, W, F, Spiriva and using albuterol 3-4 times a day. She states she was not able to afford Dulera.  She continues to wear Bipap nightly with oxygen bled into device and oxygen during the day at 2 L.   States her breathing is doing better. She is drinking 2 protein shakes a day. States her appetite has been good and gained some weight.  She is here with her daughter today.      Hospital Course:   Patient treated for:     1.  Acute hypoxic/hypercapnic respiratory failure secondary to COPD exacerbation.  Patient requiring high flow nasal cannula.  Patient on BiPAP at night.  Pulmonary following.  Will continue steroids.  Discussed with Dr. Matos.  Continue pulmonary toilet.  Patient with harsh cough.  Wheezes noted bilaterally.  Mucinex ordered.  Tessalon as needed for cough.  Acapella ordered.  Patient states cough persist.  It is minimally productive.  She states breathing improved.  Patient on O2 1 L/min.  Discussed with pulmonary.  Plan for possible discharge tomorrow if stable.  Patient switched to p.o. prednisone.  Leukocytosis may be secondary to steroids.  Okay for discharge per pulmonary  2.  Alpha 1 antitrypsin deficiency.  Pulmonology following.  Will continue antibiotics and steroids.  Continue use of Acapella.  Continue to wean steroids  3.  Electrolyte abnormality.  Will correct and monitor closely.  Patient required IV supplementation.  Electrolytes improved.  Continue to monitor with serial BMPs.  Hypomagnesemia treated.  4.  Hypertension. HTN - w/out known CAD

## 2025-04-16 ENCOUNTER — CARE COORDINATION (OUTPATIENT)
Dept: CARE COORDINATION | Age: 65
End: 2025-04-16

## 2025-04-16 ENCOUNTER — OFFICE VISIT (OUTPATIENT)
Dept: PULMONOLOGY | Age: 65
End: 2025-04-16
Payer: MEDICARE

## 2025-04-16 VITALS
DIASTOLIC BLOOD PRESSURE: 66 MMHG | SYSTOLIC BLOOD PRESSURE: 110 MMHG | BODY MASS INDEX: 17.54 KG/M2 | TEMPERATURE: 98.2 F | HEART RATE: 67 BPM | HEIGHT: 63 IN | OXYGEN SATURATION: 98 % | RESPIRATION RATE: 16 BRPM | WEIGHT: 99 LBS

## 2025-04-16 DIAGNOSIS — J96.12 CHRONIC RESPIRATORY FAILURE WITH HYPOXIA AND HYPERCAPNIA: ICD-10-CM

## 2025-04-16 DIAGNOSIS — R64 PULMONARY CACHEXIA DUE TO CHRONIC OBSTRUCTIVE PULMONARY DISEASE (HCC): ICD-10-CM

## 2025-04-16 DIAGNOSIS — Z87.891 FORMER SMOKER: ICD-10-CM

## 2025-04-16 DIAGNOSIS — E88.01 HETEROZYGOUS ALPHA 1-ANTITRYPSIN DEFICIENCY (HCC): ICD-10-CM

## 2025-04-16 DIAGNOSIS — J44.9 PULMONARY CACHEXIA DUE TO CHRONIC OBSTRUCTIVE PULMONARY DISEASE (HCC): ICD-10-CM

## 2025-04-16 DIAGNOSIS — J44.9 END STAGE COPD (HCC): Primary | ICD-10-CM

## 2025-04-16 DIAGNOSIS — J96.11 CHRONIC RESPIRATORY FAILURE WITH HYPOXIA AND HYPERCAPNIA: ICD-10-CM

## 2025-04-16 PROCEDURE — 1111F DSCHRG MED/CURRENT MED MERGE: CPT | Performed by: NURSE PRACTITIONER

## 2025-04-16 PROCEDURE — 99214 OFFICE O/P EST MOD 30 MIN: CPT | Performed by: NURSE PRACTITIONER

## 2025-04-16 PROCEDURE — G8427 DOCREV CUR MEDS BY ELIG CLIN: HCPCS | Performed by: NURSE PRACTITIONER

## 2025-04-16 PROCEDURE — G8419 CALC BMI OUT NRM PARAM NOF/U: HCPCS | Performed by: NURSE PRACTITIONER

## 2025-04-16 PROCEDURE — 3074F SYST BP LT 130 MM HG: CPT | Performed by: NURSE PRACTITIONER

## 2025-04-16 PROCEDURE — 3023F SPIROM DOC REV: CPT | Performed by: NURSE PRACTITIONER

## 2025-04-16 PROCEDURE — 1036F TOBACCO NON-USER: CPT | Performed by: NURSE PRACTITIONER

## 2025-04-16 PROCEDURE — 3078F DIAST BP <80 MM HG: CPT | Performed by: NURSE PRACTITIONER

## 2025-04-16 PROCEDURE — 3017F COLORECTAL CA SCREEN DOC REV: CPT | Performed by: NURSE PRACTITIONER

## 2025-04-16 RX ORDER — BUDESONIDE 0.5 MG/2ML
2 INHALANT ORAL 2 TIMES DAILY
Qty: 120 ML | Refills: 5 | Status: SHIPPED | OUTPATIENT
Start: 2025-04-16 | End: 2026-04-16

## 2025-04-16 RX ORDER — ARFORMOTEROL TARTRATE 15 UG/2ML
15 SOLUTION RESPIRATORY (INHALATION) 2 TIMES DAILY
Qty: 120 ML | Refills: 5 | Status: SHIPPED | OUTPATIENT
Start: 2025-04-16

## 2025-04-16 ASSESSMENT — ENCOUNTER SYMPTOMS
DYSPNEA ASSOCIATED WITH: MINIMAL EXERTION
RHINORRHEA: 0
ABDOMINAL PAIN: 0
CHEST TIGHTNESS: 0
COUGH: 0
SORE THROAT: 1
BACK PAIN: 1
SHORTNESS OF BREATH: 1
EYE PAIN: 0
WHEEZING: 1

## 2025-04-16 NOTE — PROGRESS NOTES
MA Communication:  The following orders are received by verbal communication from Adamaris Valadez CNP    Orders include:    3 month w/Cathy

## 2025-04-16 NOTE — PATIENT INSTRUCTIONS
Call with worsening symptoms such as increased shortness of breath, productive cough, wheezing or symptoms not responding to treatment plan.       Start Budesonide twice a day in nebulizer. Rinse mouth out after use to prevent hoarseness and thrush.     Start Arformoterol nebulizer twice a day     Continue Spiriva    Your current pulmonary medications are controlling/treating your COPD.  Stay compliant.  Reviewed present pulmonary medications and side effects.  Reviewed proper inhaler usage.    Continue Bipap therapy with oxygen, benefiting from use

## 2025-04-16 NOTE — CARE COORDINATION
Ambulatory Care Coordination Note     2025 2:47 PM     Patient Current Location:  Home: [address] [place] [address] Dr  [place] [address]     ACM contacted the patient by telephone. Verified name and  with patient as identifiers.         ACM: Aida Taylor     Challenges to be reviewed by the provider   Additional needs identified to be addressed with provider No  none               Method of communication with provider: none.    Utilization: Patient has not had any utilization since our last call.     Care Summary Note: Pt states she's doing better. She saw a new pulm provider today and was started on new medication for her nebulizer. She is waiting for it to be filled at [facility]; no barriers anticipated. Reports she has SOB on exertion and occasionally at rest at baseline. O2 sat 94% on 2L O2. Reports cough at baseline and occasional chest tightness. Denies any new/worsening symptoms. States she was told today her lungs sound good.     BP today 110/66. Losartan on hold by her PCP; metoprolol resumed. Reports taking all medications as prescribed. She declined to review SDOH at this time as she is being dropped off by her daughter. Denies questions or concerns at this time.     Offered patient enrollment in the Remote Patient Monitoring (RPM) program for in-home monitoring: Yes, but did not enroll at this time: already monitoring with home equipment.     Assessments Completed:   COPD Assessment    Does the patient understand envrionmental exposure?: Yes  Is the patient able to verbalize Rescue vs. Long Acting medications?: Yes  Does the patient have a nebulizer?: Yes  Does the patient use a space with inhaled medications?: Yes            Symptoms:  None: Yes      Symptom course: stable  Breathlessness: minimal exertion  Increase use of rapid acting/rescue inhaled medications?: No  Change in chronic cough?: No/At Baseline  Change in sputum?: No/At Baseline  Self Monitoring - SaO2: Yes  Baseline SaO2 Readin      ,

## 2025-04-21 ENCOUNTER — HOSPITAL ENCOUNTER (OUTPATIENT)
Dept: WOMENS IMAGING | Age: 65
Discharge: HOME | End: 2025-04-21
Attending: STUDENT IN AN ORGANIZED HEALTH CARE EDUCATION/TRAINING PROGRAM
Payer: MEDICARE

## 2025-04-21 DIAGNOSIS — Z78.0 POST-MENOPAUSAL: ICD-10-CM

## 2025-04-21 PROCEDURE — 77080 DXA BONE DENSITY AXIAL: CPT

## 2025-05-13 ENCOUNTER — CARE COORDINATION (OUTPATIENT)
Dept: CARE COORDINATION | Age: 65
End: 2025-05-13

## 2025-05-13 NOTE — CARE COORDINATION
Ambulatory Care Coordination Note     5/13/2025 12:37 PM     Patient outreach attempt by this ACM today to perform care management follow up . ACM was unable to reach the patient by telephone today;   left voice message requesting a return phone call to this ACM.     ACM: Laurie Pulido, RN         PCP/Specialist follow up:   Future Appointments         Provider Specialty Dept Phone    7/16/2025 2:00 PM Mamie Morgan MD Pulmonology [phone]            Follow Up:   Plan for next ACM outreach in approximately 1 week to complete:  - CC Protocol assessments  - disease specific assessments  - SDOH assessments  - medication review  - advance care planning  - goal progression  - education .       Laurie LOZA, RN  Respecting Choices® Advanced Steps ACP Facilitator  Ambulatory Care Manager  [facility]  [phone]

## 2025-05-22 ENCOUNTER — CARE COORDINATION (OUTPATIENT)
Dept: CARE COORDINATION | Age: 65
End: 2025-05-22

## 2025-05-22 SDOH — ECONOMIC STABILITY: FOOD INSECURITY: WITHIN THE PAST 12 MONTHS, YOU WORRIED THAT YOUR FOOD WOULD RUN OUT BEFORE YOU GOT THE MONEY TO BUY MORE.: NEVER TRUE

## 2025-05-22 SDOH — SOCIAL STABILITY: SOCIAL NETWORK
DO YOU BELONG TO ANY CLUBS OR ORGANIZATIONS SUCH AS CHURCH GROUPS, UNIONS, FRATERNAL OR ATHLETIC GROUPS, OR SCHOOL GROUPS?: NO

## 2025-05-22 SDOH — ECONOMIC STABILITY: FOOD INSECURITY: HOW HARD IS IT FOR YOU TO PAY FOR THE VERY BASICS LIKE FOOD, HOUSING, MEDICAL CARE, AND HEATING?: NOT HARD AT ALL

## 2025-05-22 SDOH — SOCIAL STABILITY: SOCIAL INSECURITY: ARE YOU MARRIED, WIDOWED, DIVORCED, SEPARATED, NEVER MARRIED, OR LIVING WITH A PARTNER?: MARRIED

## 2025-05-22 SDOH — ECONOMIC STABILITY: FOOD INSECURITY: WITHIN THE PAST 12 MONTHS, THE FOOD YOU BOUGHT JUST DIDN'T LAST AND YOU DIDN'T HAVE MONEY TO GET MORE.: NEVER TRUE

## 2025-05-22 SDOH — SOCIAL STABILITY: SOCIAL NETWORK: HOW OFTEN DO YOU ATTEND MEETINGS OF THE CLUBS OR ORGANIZATIONS YOU BELONG TO?: 1 TO 4 TIMES PER YEAR

## 2025-05-22 SDOH — HEALTH STABILITY: PHYSICAL HEALTH: ON AVERAGE, HOW MANY DAYS PER WEEK DO YOU ENGAGE IN MODERATE TO STRENUOUS EXERCISE (LIKE A BRISK WALK)?: 4 DAYS

## 2025-05-22 SDOH — SOCIAL STABILITY: SOCIAL NETWORK
IN A TYPICAL WEEK, HOW MANY TIMES DO YOU TALK ON THE PHONE WITH FAMILY, FRIENDS, OR NEIGHBORS?: MORE THAN THREE TIMES A WEEK

## 2025-05-22 SDOH — ECONOMIC STABILITY: TRANSPORTATION INSECURITY: IN THE PAST 12 MONTHS, HAS LACK OF TRANSPORTATION KEPT YOU FROM MEDICAL APPOINTMENTS OR FROM GETTING MEDICATIONS?: NO

## 2025-05-22 SDOH — SOCIAL STABILITY: SOCIAL NETWORK: HOW OFTEN DO YOU ATTEND CHURCH OR RELIGIOUS SERVICES?: 1 TO 4 TIMES PER YEAR

## 2025-05-22 SDOH — HEALTH STABILITY: MENTAL HEALTH
DO YOU FEEL STRESS - TENSE, RESTLESS, NERVOUS, OR ANXIOUS, OR UNABLE TO SLEEP AT NIGHT BECAUSE YOUR MIND IS TROUBLED ALL THE TIME - THESE DAYS?: NOT AT ALL

## 2025-05-22 SDOH — SOCIAL STABILITY: SOCIAL NETWORK: HOW OFTEN DO YOU GET TOGETHER WITH FRIENDS OR RELATIVES?: MORE THAN THREE TIMES A WEEK

## 2025-05-22 SDOH — ECONOMIC STABILITY: HOUSING INSECURITY: IN THE LAST 12 MONTHS, WAS THERE A TIME WHEN YOU WERE NOT ABLE TO PAY THE MORTGAGE OR RENT ON TIME?: NO

## 2025-05-22 SDOH — HEALTH STABILITY: MENTAL HEALTH: HOW OFTEN DO YOU HAVE A DRINK CONTAINING ALCOHOL?: NEVER

## 2025-05-22 SDOH — HEALTH STABILITY: PHYSICAL HEALTH: ON AVERAGE, HOW MANY MINUTES DO YOU ENGAGE IN EXERCISE AT THIS LEVEL?: 40 MIN

## 2025-05-22 ASSESSMENT — ACTIVITIES OF DAILY LIVING (ADL): LACK_OF_TRANSPORTATION: NO

## 2025-05-22 NOTE — CARE COORDINATION
in terms of safety and stability (including domestic violence, insecure housing, neighbor harassment)?: Consistently safe, supportive, stable, no identified problems   How do daily activities impact on the patient's well-being? (include current or anticipated unemployment, work, caregiving, access to transportation or other): No identified problems or perceived positive benefits   How would you rate their social network (family, work, friends)?: Good participation with social networks   How would you rate their financial resources (including ability to afford all required medical care)?: Financially secure, resources adequate, no identified problems   How wells does the patient now understand their health and well-being (symptoms, signs or risk factors) and what they need to do to manage their health?: Reasonable to good understanding and already engages in managing health or is willing to undertake better management   How well do you think your patient can engage in healthcare discussions? (Barriers include language, deafness, aphasia, alcohol or drug problems, learning difficulties, concentration): Clear and open communication, no identified barriers   Do other services need to be involved to help this patient?: Other care/services not required at this time   Are current services involved with this patient well-coordinated? (Include coordination with other services you are now recommendation): Required care/services in place and adequately coordinated   Suggested Interventions and Community Resources  Fall Risk Prevention: In Process Home Health Services: In Process   Physical Therapy: In Process         Other Interventions: Set up/Review Goals, Set up/Review an Education Plan           ,   COPD Assessment    Does the patient understand envrionmental exposure?: Yes  Is the patient able to verbalize Rescue vs. Long Acting medications?: Yes  Does the patient have a nebulizer?: Yes  Does the patient use a space with

## 2025-06-05 ENCOUNTER — CARE COORDINATION (OUTPATIENT)
Dept: CARE COORDINATION | Age: 65
End: 2025-06-05

## 2025-06-05 NOTE — CARE COORDINATION
Ambulatory Care Coordination Note     2025 3:44 PM     Patient Current Location:  Home: [address] [place] [address] Dr  [place] [address]     LPN CC contacted the patient by telephone. Verified name and  with patient as identifiers.         ACM: Annalee Huff LPN     Challenges to be reviewed by the provider   Additional needs identified to be addressed with provider No                  Method of communication with provider: none.    Utilization: Patient has not had any utilization since our last call.     Care Summary Note: LPN CC spoke with patient. States she's well. BP \"really good\" <120/60-70. LPN CC spoke with patient. States she has good & bad days for breathing. SpO2 resting 94%. Some drop with activity, but recovers quickly. No albuterol use. Appetite good, gained 25 pounds, #105. Denies problems with bladder. Some constipation, taking stool softeners with some success. Graduated with [facility]. Denies medication changes. F/u pulm . May schedule appt with GI r/t some LLQ pains. LPN CC encouraged patient to do so considering the constipation sx as well. Patient verbalized understanding. Denies needs.     Thank you,   Annalee Huff LPN Care Coordinator   [facility]  Remote Patient Monitoring, Care Transitions, Ambulatory Care Management  [phone]    Offered patient enrollment in the Remote Patient Monitoring (RPM) program for in-home monitoring: Yes, but did not enroll at this time: already monitoring with home equipment.     Assessments Completed:   No changes since last call    Medications Reviewed:   Patient denies any changes with medications and reports taking all medications as prescribed.    Advance Care Planning:   Not reviewed during this call     Care Planning:   Not completed during this call    PCP/Specialist follow up:   Future Appointments         Provider Specialty Dept Phone    2025 2:00 PM Mamie Morgan MD Pulmonology [phone]            Follow Up:   Plan for next AC

## 2025-06-16 ENCOUNTER — TRANSCRIBE ORDERS (OUTPATIENT)
Dept: ADMINISTRATIVE | Age: 65
End: 2025-06-16

## 2025-06-16 DIAGNOSIS — R10.32 LLQ PAIN: Primary | ICD-10-CM

## 2025-06-17 ENCOUNTER — HOSPITAL ENCOUNTER (OUTPATIENT)
Dept: CT IMAGING | Age: 65
Discharge: HOME | End: 2025-06-17
Payer: MEDICARE

## 2025-06-17 DIAGNOSIS — R10.32 LLQ PAIN: ICD-10-CM

## 2025-06-17 LAB
PERFORMED ON: NORMAL
POC CREATININE: 0.7 MG/DL (ref 0.6–1.2)
POC SAMPLE TYPE: NORMAL

## 2025-06-17 PROCEDURE — 82565 ASSAY OF CREATININE: CPT

## 2025-06-17 PROCEDURE — 74177 CT ABD & PELVIS W/CONTRAST: CPT

## 2025-06-17 PROCEDURE — 6360000004 HC RX CONTRAST MEDICATION

## 2025-06-17 RX ORDER — IOPAMIDOL 755 MG/ML
75 INJECTION, SOLUTION INTRAVASCULAR
Status: COMPLETED | OUTPATIENT
Start: 2025-06-17 | End: 2025-06-17

## 2025-06-17 RX ADMIN — IOPAMIDOL 75 ML: 755 INJECTION, SOLUTION INTRAVENOUS at 11:56

## 2025-06-23 ENCOUNTER — CARE COORDINATION (OUTPATIENT)
Dept: CARE COORDINATION | Age: 65
End: 2025-06-23

## 2025-06-23 NOTE — CARE COORDINATION
Ambulatory Care Coordination Note     Patient has graduated from the Complex Case Management  program on 6/23/25.  Patient/family has the ability to self-manage at this time.  Care management goals have been completed. No further Ambulatory Care Manager follow up scheduled.     Goals Addressed                   This Visit's Progress     COMPLETED: Conditions and Symptoms   On track     I will schedule office visits, as directed by my provider.  I will keep my appointment or reschedule if I have to cancel.  I will notify my provider of any barriers to my plan of care.  I will follow my Zone Management tool to seek urgent or emergent care.  I will notify my provider of any symptoms that indicate a worsening of my condition.    Barriers: fear of failure, lack of motivation, overwhelmed by complexity of regimen, stress, time constraints, medication side effects, and lack of education  Plan for overcoming my barriers: Work with ACM  Confidence: 9/10  Anticipated Goal Completion Date: 7/4/25                Patient has Ambulatory Care Manager's contact information for any further questions, concerns, or needs.  Patients upcoming visits:    Future Appointments   Date Time Provider Department Center   7/16/2025  2:00 PM Mamie Morgan MD AND NEDRA LOZA, RN  Respecting Choices® Advanced Steps ACP Facilitator  Ambulatory Care Manager  [facility]  [phone]

## 2025-07-03 NOTE — PROGRESS NOTES
PULMONARY CLINIC NOTE      Maranda Burns   : 1960  MRN: [id number]     Date of Service: 2025    PCP: Lashanda Amaro DO    Referring provider: No ref. provider found      Chief Complaint   Patient presents with    Follow-up     3 month            ASSESSMENT & PLAN       65 y.o. pleasant  female patient with:    # COPD, end-stage with panlobular emphysema and exacerbations.   Failed endobronchial valves.  Status post right lung reduction surgery at  in 2023.  PFTs 2023: FEV1 29% 0.6 L, , DC 37%  # Ineffective airway clearance due to diaphragmatic malfunction from hyperinflation/flattening  # Heterozygous alpha 1 antitrypsin deficiency.  Adequate level in 2024: 116  # Chronic hypoxic and hypercapnic respiratory failure on 2 to 3 L of oxygen during the day and oxygen with BiPAP overnight  # Protein-energy malnutrition/pulmonary cachexia  # Peripheral eosinophilia   #  9 millimeter left upper lobe nodule.  Not seen on follow-up CT in 2025  # Tobacco abuse history.  35 pack years.  Stopped .  Cardiovascular lung cancer screening  # Immunocompromised host, status post splenectomy post fall/trauma in 2019  # Pitting leg edema  Continue Brovana, Pulmicort and Spiriva Respimat.  Continue albuterol/DuoNebs as needed  Continue azithromycin M/W/F  Patient is not a candidate for Roflumilast with her progressive weight loss  Will order Nucala for COPD with frequent exacerbations and eosinophilia.  Dupixant was denied by insurance.  Patient completed pulmonary rehab  Continue BiPAP with 2 L of oxygen  Recommended to increase oxygen flow to 3 to 4 L with activity if still feeling short of breath.  Patient failed endobronchial valves previously at .  Low-dose CT scan for lung cancer screening in 2026  Compression stockings and leg elevation for leg edema  [facility] referral for lung transplant evaluation  Follow-up in 3 months    I spent total of 30 minutes on this encounter on

## 2025-07-16 ENCOUNTER — TELEPHONE (OUTPATIENT)
Dept: PULMONOLOGY | Age: 65
End: 2025-07-16

## 2025-07-16 ENCOUNTER — OFFICE VISIT (OUTPATIENT)
Dept: PULMONOLOGY | Age: 65
End: 2025-07-16
Payer: MEDICARE

## 2025-07-16 VITALS
HEART RATE: 87 BPM | WEIGHT: 102.2 LBS | TEMPERATURE: 98 F | OXYGEN SATURATION: 90 % | DIASTOLIC BLOOD PRESSURE: 76 MMHG | RESPIRATION RATE: 20 BRPM | HEIGHT: 63 IN | BODY MASS INDEX: 18.11 KG/M2 | SYSTOLIC BLOOD PRESSURE: 129 MMHG

## 2025-07-16 DIAGNOSIS — J44.9 END STAGE COPD (HCC): Primary | ICD-10-CM

## 2025-07-16 PROCEDURE — 1036F TOBACCO NON-USER: CPT | Performed by: INTERNAL MEDICINE

## 2025-07-16 PROCEDURE — 3023F SPIROM DOC REV: CPT | Performed by: INTERNAL MEDICINE

## 2025-07-16 PROCEDURE — 3074F SYST BP LT 130 MM HG: CPT | Performed by: INTERNAL MEDICINE

## 2025-07-16 PROCEDURE — G8427 DOCREV CUR MEDS BY ELIG CLIN: HCPCS | Performed by: INTERNAL MEDICINE

## 2025-07-16 PROCEDURE — G8419 CALC BMI OUT NRM PARAM NOF/U: HCPCS | Performed by: INTERNAL MEDICINE

## 2025-07-16 PROCEDURE — 99215 OFFICE O/P EST HI 40 MIN: CPT | Performed by: INTERNAL MEDICINE

## 2025-07-16 PROCEDURE — G2211 COMPLEX E/M VISIT ADD ON: HCPCS | Performed by: INTERNAL MEDICINE

## 2025-07-16 PROCEDURE — 3017F COLORECTAL CA SCREEN DOC REV: CPT | Performed by: INTERNAL MEDICINE

## 2025-07-16 PROCEDURE — 3078F DIAST BP <80 MM HG: CPT | Performed by: INTERNAL MEDICINE

## 2025-07-16 RX ORDER — ALBUTEROL SULFATE 90 UG/1
2 INHALANT RESPIRATORY (INHALATION) EVERY 6 HOURS PRN
Qty: 18 G | Refills: 3 | Status: SHIPPED | OUTPATIENT
Start: 2025-07-16

## 2025-07-16 RX ORDER — IPRATROPIUM BROMIDE AND ALBUTEROL SULFATE 2.5; .5 MG/3ML; MG/3ML
1 SOLUTION RESPIRATORY (INHALATION) EVERY 6 HOURS PRN
Qty: 360 ML | Refills: 2 | Status: SHIPPED | OUTPATIENT
Start: 2025-07-16 | End: 2025-10-14

## 2025-07-16 ASSESSMENT — SLEEP AND FATIGUE QUESTIONNAIRES
HOW LIKELY ARE YOU TO NOD OFF OR FALL ASLEEP WHILE SITTING QUIETLY AFTER LUNCH WITHOUT ALCOHOL: MODERATE CHANCE OF DOZING
HOW LIKELY ARE YOU TO NOD OFF OR FALL ASLEEP WHILE SITTING INACTIVE IN A PUBLIC PLACE: WOULD NEVER DOZE
HOW LIKELY ARE YOU TO NOD OFF OR FALL ASLEEP IN A CAR, WHILE STOPPED FOR A FEW MINUTES IN TRAFFIC: WOULD NEVER DOZE
HOW LIKELY ARE YOU TO NOD OFF OR FALL ASLEEP WHILE WATCHING TV: SLIGHT CHANCE OF DOZING
HOW LIKELY ARE YOU TO NOD OFF OR FALL ASLEEP WHILE LYING DOWN TO REST IN THE AFTERNOON WHEN CIRCUMSTANCES PERMIT: WOULD NEVER DOZE
HOW LIKELY ARE YOU TO NOD OFF OR FALL ASLEEP WHILE SITTING AND READING: WOULD NEVER DOZE
ESS TOTAL SCORE: 3
HOW LIKELY ARE YOU TO NOD OFF OR FALL ASLEEP WHILE SITTING AND TALKING TO SOMEONE: WOULD NEVER DOZE
HOW LIKELY ARE YOU TO NOD OFF OR FALL ASLEEP WHEN YOU ARE A PASSENGER IN A CAR FOR AN HOUR WITHOUT A BREAK: WOULD NEVER DOZE

## 2025-07-16 NOTE — PROGRESS NOTES
MA Communication:  The following orders are received by verbal communication from Mamie Morgan MD    Orders include:    Referral for [facility]  Nucala   Follow up 3 months

## 2025-07-16 NOTE — TELEPHONE ENCOUNTER
Pt was seen in office today and Dr. Morgan wants to start patient on Nucala.    Patient signed Nucala Enrollment Forms.    Forms given to Dr Morgan's MA for signature.

## 2025-07-16 NOTE — PATIENT INSTRUCTIONS
Continue Brovana, Pulmicort and Spiriva Respimat.  Continue albuterol/DuoNebs as needed  Continue azithromycin M/W/F  Patient is not a candidate for Roflumilast with her progressive weight loss  Will order Nucala for COPD with frequent exacerbations and eosinophilia.  Dupixant was denied by insurance.  Patient completed pulmonary rehab  Continue BiPAP with 2 L of oxygen  Recommended to increase oxygen flow to 3 to 4 L with activity if still feeling short of breath.  Patient failed endobronchial valves previously at .  Low-dose CT scan for lung cancer screening in March 2026  Follow-up in 3 months      Health Maintenance/Preventive measures:        >>  Avoid smoking, vaping or secondhand exposure.  Avoid exposure to irritants, allergens as possible as well as contact with patients with infectious respiratory illness.        >>  Stay up-to-date with influenza & pneumonia vaccines, RSV, & COVID-19 vaccine as recommended by the Advisory Committee on Immunization Practices (ACIP)        >>  Healthy diet and activity as able.        >>  Acid reflux precautions: Head of bed elevation, avoiding tight clothes, avoiding big meals or snacking 3 hours before bedtime, targeting healthy weight.        >>  Practice sleep hygiene measures. Avoid driving or operating heavy machines if tired or sleepy.

## 2025-07-17 ENCOUNTER — OFFICE VISIT (OUTPATIENT)
Dept: OBGYN CLINIC | Age: 65
End: 2025-07-17

## 2025-07-17 VITALS
WEIGHT: 106.8 LBS | SYSTOLIC BLOOD PRESSURE: 117 MMHG | OXYGEN SATURATION: 95 % | BODY MASS INDEX: 18.92 KG/M2 | HEART RATE: 86 BPM | DIASTOLIC BLOOD PRESSURE: 74 MMHG

## 2025-07-17 DIAGNOSIS — Z01.419 WELL WOMAN EXAM WITH ROUTINE GYNECOLOGICAL EXAM: Primary | ICD-10-CM

## 2025-07-17 RX ORDER — ALBUTEROL SULFATE 0.83 MG/ML
SOLUTION RESPIRATORY (INHALATION)
COMMUNITY
Start: 2025-04-15 | End: 2025-07-17 | Stop reason: ALTCHOICE

## 2025-07-17 RX ORDER — PANTOPRAZOLE SODIUM 40 MG/1
TABLET, DELAYED RELEASE ORAL
COMMUNITY
Start: 2025-07-16 | End: 2025-07-17 | Stop reason: ALTCHOICE

## 2025-07-17 RX ORDER — PREDNISONE 5 MG/1
TABLET ORAL
COMMUNITY
Start: 2025-07-16

## 2025-07-17 ASSESSMENT — ENCOUNTER SYMPTOMS
DIARRHEA: 0
SHORTNESS OF BREATH: 1
NAUSEA: 0
COUGH: 0
CONSTIPATION: 0
ABDOMINAL PAIN: 0
VOMITING: 0

## 2025-07-17 NOTE — PROGRESS NOTES
[facility]   Annual Exam      CC:   Chief Complaint   Patient presents with    New Patient     LLQ pain, saw GI completed CT scan, saw pelvic congestion.  Last pap   Last mammogram   Last colonoscopy   Last Dexa        HPI:  65 y.o.  presents for her gynecologic annual exam. Needs pap smear, getting on list for lung transplant    Health Maintenance:  Sexually Active: no   Sexual Issues: no   Contraception: n/a    Screening:  Last pap smear: unsure  History of abnormal pap smears: no  STI hx: no  Mammogram: up to date  Colonoscopy:   DEXA scan:     Review of Systems:   Review of Systems   Constitutional:  Negative for activity change and fatigue.   Respiratory:  Positive for shortness of breath. Negative for cough.    Cardiovascular:  Negative for chest pain.   Gastrointestinal:  Negative for abdominal pain, constipation, diarrhea, nausea and vomiting.   Genitourinary:  Negative for difficulty urinating, dyspareunia, dysuria, menstrual problem, pelvic pain, vaginal bleeding, vaginal discharge and vaginal pain.   Musculoskeletal:  Negative for joint swelling and myalgias.   Allergic/Immunologic: Negative for environmental allergies.   Neurological:  Negative for dizziness and headaches.        Primary Care Physician: Lashanda Amaro DO    Obstetric History  OB History    Para Term  AB Living   3 3 3   3   SAB IAB Ectopic Molar Multiple Live Births        3      # Outcome Date GA Lbr Pawan/2nd Weight Sex Type Anes PTL Lv   3 Term      Vag-Spont   MEGHANA   2 Term      Vag-Spont   MEGHANA   1 Term      Vag-Spont   MEGHANA       Gynecologic History  Menstrual History:  LMP: n/a hyst    Post Menopausal Bleeding: no     Medical History:  Past Medical History:   Diagnosis Date    Back pain     COPD (chronic obstructive pulmonary disease) (HCC)     Fatigue     Hypertension     Osteoporosis without pathological fracture 2025    Syncope and collapse 2019    Thyroid disease

## 2025-07-18 LAB — HPV16+18+H RISK 12 DNA SPEC-IMP: NORMAL

## 2025-07-21 LAB
HPV HR 12 DNA SPEC QL NAA+PROBE: NOT DETECTED
HPV16 DNA SPEC QL NAA+PROBE: NOT DETECTED
HPV16+18+H RISK 12 DNA SPEC-IMP: NORMAL
HPV18 DNA SPEC QL NAA+PROBE: NOT DETECTED

## 2025-07-24 NOTE — TELEPHONE ENCOUNTER
Patricia from [employer] called to discuss the EOB.  She said patient will have a $200 deductible she will have to pay and then she has to pay $1,753.36 copay.  After that the medication will be covered 100%.    Patient is eligible to sign up for the MP3 plan.  Patricia is going to call patient and discuss.  I am also waiting to hear back from Shweta from [facility] to see if there is a cheaper alternative for her.

## 2025-07-24 NOTE — TELEPHONE ENCOUNTER
Received summary of benefits from [facility].  Rep from [facility] came in and told me about [facility] that medicare patients need to go through for their injections.  She gave me the name of the rep Shweta Mcclain at [phone].    I called and LVM asking her to call me so that I can let her know where we are in the process and to get her assistance.

## 2025-07-28 NOTE — TELEPHONE ENCOUNTER
Approved for [employer] [facility] Assistance.  Pt will receive Nucala Auto-Injector for Free until the end of the year.  Send in new enrollment form around end of October to reapply for next year.  Pt is also aware.  She will get meds through [facility]. [phone]

## (undated) PROCEDURE — 0BH17EZ INSERTION OF ENDOTRACHEAL AIRWAY INTO TRACHEA, VIA NATURAL OR ARTIFICIAL OPENING: ICD-10-PCS

## (undated) PROCEDURE — 5A1935Z RESPIRATORY VENTILATION, LESS THAN 24 CONSECUTIVE HOURS: ICD-10-PCS

## (undated) DEVICE — FORCEPS BX L240CM JAW DIA2.8MM L CAP W/ NDL MIC MESH TOOTH

## (undated) DEVICE — CANNULA NSL AD TBNG L7FT PVC STR NONFLARED PRNG O2 DEL W STD

## (undated) DEVICE — ELECTRODE,ECG,STRESS,FOAM,3PK: Brand: MEDLINE

## (undated) DEVICE — ENDOSCOPIC KIT 2 12 FT OP4 DE2 GWN SYR